# Patient Record
Sex: MALE | Race: WHITE | NOT HISPANIC OR LATINO
[De-identification: names, ages, dates, MRNs, and addresses within clinical notes are randomized per-mention and may not be internally consistent; named-entity substitution may affect disease eponyms.]

---

## 2021-04-19 ENCOUNTER — NON-APPOINTMENT (OUTPATIENT)
Age: 61
End: 2021-04-19

## 2021-04-23 ENCOUNTER — APPOINTMENT (OUTPATIENT)
Dept: DERMATOLOGY | Facility: CLINIC | Age: 61
End: 2021-04-23
Payer: MEDICAID

## 2021-04-23 ENCOUNTER — NON-APPOINTMENT (OUTPATIENT)
Age: 61
End: 2021-04-23

## 2021-04-23 PROCEDURE — 17003 DESTRUCT PREMALG LES 2-14: CPT | Mod: 59

## 2021-04-23 PROCEDURE — 99072 ADDL SUPL MATRL&STAF TM PHE: CPT

## 2021-04-23 PROCEDURE — 99203 OFFICE O/P NEW LOW 30 MIN: CPT | Mod: 25

## 2021-04-23 PROCEDURE — 17000 DESTRUCT PREMALG LESION: CPT | Mod: 59

## 2021-04-23 PROCEDURE — 11102 TANGNTL BX SKIN SINGLE LES: CPT | Mod: 59

## 2021-04-23 NOTE — ASSESSMENT
[FreeTextEntry1] : 1) benign findings as above- education\par \par 2) AKs\par diffuse\par plan to start efudex after bx results; use daily for 4 weeks; SED\par LN2 to 5 lesions on face and 2 on scalp\par Actinic keratoses as above\par -Treated with cryotherapy x 2, side effects discussed including erythema and scarring, blister formation expected by patient, total freeze time 4 seconds. Wound care reviewed withpatient. Risk of hypo/hyperpigmentation reviewed with patient, and possible need for re-treatment and / or future biopsy also reviewed with patient\par - total # treated- 7\par \par 3) Shave bx location left posterior scalp\par diagnosis: r/o SCC\par  \par Shave biopsy performed today over above location, risks and benefits discussed including incomplete removal, not enough tissue for diagnosis scarring and infection, informed consent obtained, pictures taken,  cleaned with alcohol and anesthetized with 1%lido+epi, 0.3 cc total, hemostasis obtained with cautery, vaseline and bandaid placed, tolerated well, wound care reviewed, specimen sent to pathology.\par \par

## 2021-04-23 NOTE — PHYSICAL EXAM
[FreeTextEntry3] : AAOx3, pleasant, NAD, no visual lymphadenopathy\par hair, scalp, face, nose, eyelids, ears, lips, oropharynx, neck, chest, abdomen, back, right arm, left arm, nails, and hands examined with all normal findings,\par pertinent findings include:\par \par multiple benign nevi and lentigines\par diffuse actinic damage on scalp\par scaling erythematous papules x5 on face\par verrucous plaque on left posterior scalp

## 2021-04-23 NOTE — HISTORY OF PRESENT ILLNESS
[FreeTextEntry1] : growths [de-identified] : 61 year old male with numerous growths. here for skin check.

## 2021-04-29 LAB — CORE LAB BIOPSY: NORMAL

## 2021-05-10 ENCOUNTER — APPOINTMENT (OUTPATIENT)
Dept: DERMATOLOGY | Facility: CLINIC | Age: 61
End: 2021-05-10
Payer: MEDICAID

## 2021-05-10 DIAGNOSIS — D04.4 CARCINOMA IN SITU OF SKIN OF SCALP AND NECK: ICD-10-CM

## 2021-05-10 PROCEDURE — 99072 ADDL SUPL MATRL&STAF TM PHE: CPT

## 2021-05-10 PROCEDURE — 14020 TIS TRNFR S/A/L 10 SQ CM/<: CPT

## 2021-05-10 NOTE — PROCEDURE
[___ mm] : [unfilled] mm [___ cm] : [unfilled] cm [___ ml of 1% lidocaine w/ 1:100,000 epinephrine] : [unfilled] ml of 1% lidocaine with 1:100,000 epinephrine [Pressure] : pressure [Electrodessication] : electrodessication [4-0] : 4-0 Nylon [____ cm] : [unfilled] cm [____ x ____ cm] : [unfilled] cm [____ days] : [unfilled] days [FreeTextEntry1] : Yunior Rai [FreeTextEntry7] : left posterior scalp [TWNoteComboBox1] : Squamous Cell Carcinoma In Situ [TWNoteComboBox4] : Squamous Cell Carcinoma In Situ [TWNoteComboBox3] : Subcutaneous fat [TWNoteComboBox5] : Subcutaneous fat

## 2021-05-24 ENCOUNTER — APPOINTMENT (OUTPATIENT)
Dept: DERMATOLOGY | Facility: CLINIC | Age: 61
End: 2021-05-24
Payer: MEDICAID

## 2021-05-24 ENCOUNTER — TRANSCRIPTION ENCOUNTER (OUTPATIENT)
Age: 61
End: 2021-05-24

## 2021-05-24 PROCEDURE — 11102 TANGNTL BX SKIN SINGLE LES: CPT | Mod: 79

## 2021-05-24 PROCEDURE — 99213 OFFICE O/P EST LOW 20 MIN: CPT | Mod: 24,25

## 2021-05-24 NOTE — ASSESSMENT
[FreeTextEntry1] : sutures removed\par results discussed\par healing well\par \par AKs\par discussed using efudex; SED\par \par SK\par education\par \par Shave bx location left forearm\par diagnosis: r/o DN\par  \par Shave biopsy performed today over above location, risks and benefits discussed including incomplete removal, not enough tissue for diagnosis scarring and infection, informed consent obtained, pictures taken,  cleaned with alcohol and anesthetized with 1%lido+epi, 0.3 cc total, hemostasis obtained with cautery, vaseline and bandaid placed, tolerated well, wound care reviewed, specimen sent to pathology.\par \par

## 2021-05-24 NOTE — HISTORY OF PRESENT ILLNESS
[FreeTextEntry1] : suture removal [de-identified] : Patient here for suture removal. no issues with site. healing well.\par also discussion of AKs and growth on left forearm.

## 2021-05-25 LAB — CORE LAB BIOPSY: NORMAL

## 2021-11-22 ENCOUNTER — APPOINTMENT (OUTPATIENT)
Dept: DERMATOLOGY | Facility: CLINIC | Age: 61
End: 2021-11-22

## 2021-12-27 ENCOUNTER — APPOINTMENT (OUTPATIENT)
Dept: DERMATOLOGY | Facility: CLINIC | Age: 61
End: 2021-12-27
Payer: MEDICAID

## 2021-12-27 DIAGNOSIS — L82.1 OTHER SEBORRHEIC KERATOSIS: ICD-10-CM

## 2021-12-27 PROCEDURE — 99214 OFFICE O/P EST MOD 30 MIN: CPT | Mod: 25

## 2021-12-27 PROCEDURE — 17003 DESTRUCT PREMALG LES 2-14: CPT

## 2021-12-27 PROCEDURE — 17000 DESTRUCT PREMALG LESION: CPT

## 2021-12-27 RX ORDER — FLUOROURACIL 50 MG/G
5 CREAM TOPICAL
Qty: 1 | Refills: 1 | Status: DISCONTINUED | COMMUNITY
Start: 2021-04-23 | End: 2021-12-27

## 2022-06-27 ENCOUNTER — APPOINTMENT (OUTPATIENT)
Dept: DERMATOLOGY | Facility: CLINIC | Age: 62
End: 2022-06-27
Payer: MEDICAID

## 2022-06-27 VITALS — HEIGHT: 70 IN | BODY MASS INDEX: 31.5 KG/M2 | WEIGHT: 220 LBS

## 2022-06-27 DIAGNOSIS — Z12.83 ENCOUNTER FOR SCREENING FOR MALIGNANT NEOPLASM OF SKIN: ICD-10-CM

## 2022-06-27 DIAGNOSIS — D22.9 MELANOCYTIC NEVI, UNSPECIFIED: ICD-10-CM

## 2022-06-27 DIAGNOSIS — Z86.007 PERSONAL HISTORY OF IN-SITU NEOPLASM OF SKIN: ICD-10-CM

## 2022-06-27 DIAGNOSIS — L30.4 ERYTHEMA INTERTRIGO: ICD-10-CM

## 2022-06-27 DIAGNOSIS — D48.5 NEOPLASM OF UNCERTAIN BEHAVIOR OF SKIN: ICD-10-CM

## 2022-06-27 DIAGNOSIS — L82.0 INFLAMED SEBORRHEIC KERATOSIS: ICD-10-CM

## 2022-06-27 DIAGNOSIS — L81.4 OTHER MELANIN HYPERPIGMENTATION: ICD-10-CM

## 2022-06-27 DIAGNOSIS — Z02.82 ENCOUNTER FOR ADOPTION SERVICES: ICD-10-CM

## 2022-06-27 DIAGNOSIS — Z87.891 PERSONAL HISTORY OF NICOTINE DEPENDENCE: ICD-10-CM

## 2022-06-27 PROCEDURE — 11102 TANGNTL BX SKIN SINGLE LES: CPT | Mod: 59

## 2022-06-27 PROCEDURE — 99214 OFFICE O/P EST MOD 30 MIN: CPT | Mod: 25

## 2022-06-27 PROCEDURE — 17110 DESTRUCTION B9 LES UP TO 14: CPT | Mod: 59

## 2022-06-27 PROCEDURE — 11103 TANGNTL BX SKIN EA SEP/ADDL: CPT | Mod: 59

## 2022-06-27 PROCEDURE — 17003 DESTRUCT PREMALG LES 2-14: CPT | Mod: 59

## 2022-06-27 PROCEDURE — 17000 DESTRUCT PREMALG LESION: CPT | Mod: 59

## 2022-06-27 NOTE — PHYSICAL EXAM
[FreeTextEntry3] : AAOx3, pleasant, NAD, no visual lymphadenopathy\par hair, scalp, face, nose, eyelids, ears, lips, oropharynx, neck, chest, abdomen, back, right arm, left arm, nails, and hands examined with all normal findings,\par pertinent findings include:\par \par multiple benign nevi and lentigines\par scaling erythematous papules x4 on scalp\par + inflamed, waxy, keratotic papule on right lower back\par papules on right upper back and mid back

## 2022-06-27 NOTE — ASSESSMENT
[FreeTextEntry1] : 1) benign findings as above- education\par \par 2) ISK\par The specified lesions were treated with liquid nitrogen cryotherapy.  Discussed risks including pain, blistering, crusting, discoloration, recurrence.\par \par 3) Actinic keratoses as above\par -Treated with cryotherapy x 2, side effects discussed including erythema and scarring, blister formation expected by patient, total freeze time 4 seconds. Wound care reviewed withpatient. Risk of hypo/hyperpigmentation reviewed with patient, and possible need for re-treatment and / or future biopsy also reviewed with patient\par - total # treated- 4\par \par 4) Shave bx location mid back, right upper back\par diagnosis: r/o IDN\par  \par Shave biopsy performed today over above location, risks and benefits discussed including incomplete removal, not enough tissue for diagnosis scarring and infection, informed consent obtained, pictures taken,  cleaned with alcohol and anesthetized with 1%lido+epi, 0.3 cc total, hemostasis obtained with monsels, vaseline and bandaid placed, tolerated well, wound care reviewed, specimen sent to pathology.\par \par 5) intertrigo\par keto pRN; SED\par

## 2022-06-27 NOTE — HISTORY OF PRESENT ILLNESS
[FreeTextEntry1] : growth [de-identified] : 62 year old male here with growths on back. here for skin check. and jock itch.

## 2022-09-21 ENCOUNTER — EMERGENCY (EMERGENCY)
Facility: HOSPITAL | Age: 62
LOS: 1 days | Discharge: LEFT BEFORE DISCHARGE | End: 2022-09-21
Attending: EMERGENCY MEDICINE
Payer: MEDICAID

## 2022-09-21 ENCOUNTER — OFFICE (OUTPATIENT)
Dept: URBAN - METROPOLITAN AREA CLINIC 102 | Facility: CLINIC | Age: 62
Setting detail: OPHTHALMOLOGY
End: 2022-09-21
Payer: COMMERCIAL

## 2022-09-21 VITALS
HEART RATE: 78 BPM | DIASTOLIC BLOOD PRESSURE: 83 MMHG | SYSTOLIC BLOOD PRESSURE: 150 MMHG | TEMPERATURE: 98 F | OXYGEN SATURATION: 97 % | WEIGHT: 214.95 LBS | HEIGHT: 70 IN | RESPIRATION RATE: 16 BRPM

## 2022-09-21 VITALS
DIASTOLIC BLOOD PRESSURE: 84 MMHG | RESPIRATION RATE: 18 BRPM | HEART RATE: 73 BPM | OXYGEN SATURATION: 98 % | SYSTOLIC BLOOD PRESSURE: 134 MMHG | TEMPERATURE: 98 F

## 2022-09-21 DIAGNOSIS — H25.13: ICD-10-CM

## 2022-09-21 DIAGNOSIS — H53.40: ICD-10-CM

## 2022-09-21 LAB
ALBUMIN SERPL ELPH-MCNC: 4.9 G/DL — SIGNIFICANT CHANGE UP (ref 3.3–5)
ALP SERPL-CCNC: 68 U/L — SIGNIFICANT CHANGE UP (ref 40–120)
ALT FLD-CCNC: 28 U/L — SIGNIFICANT CHANGE UP (ref 10–45)
ANION GAP SERPL CALC-SCNC: 13 MMOL/L — SIGNIFICANT CHANGE UP (ref 5–17)
AST SERPL-CCNC: 19 U/L — SIGNIFICANT CHANGE UP (ref 10–40)
BASOPHILS # BLD AUTO: 0.04 K/UL — SIGNIFICANT CHANGE UP (ref 0–0.2)
BASOPHILS NFR BLD AUTO: 0.4 % — SIGNIFICANT CHANGE UP (ref 0–2)
BILIRUB SERPL-MCNC: 0.5 MG/DL — SIGNIFICANT CHANGE UP (ref 0.2–1.2)
BUN SERPL-MCNC: 10 MG/DL — SIGNIFICANT CHANGE UP (ref 7–23)
CALCIUM SERPL-MCNC: 10.1 MG/DL — SIGNIFICANT CHANGE UP (ref 8.4–10.5)
CHLORIDE SERPL-SCNC: 100 MMOL/L — SIGNIFICANT CHANGE UP (ref 96–108)
CO2 SERPL-SCNC: 25 MMOL/L — SIGNIFICANT CHANGE UP (ref 22–31)
CREAT SERPL-MCNC: 1.02 MG/DL — SIGNIFICANT CHANGE UP (ref 0.5–1.3)
CRP SERPL-MCNC: <3 MG/L — SIGNIFICANT CHANGE UP (ref 0–4)
EGFR: 82 ML/MIN/1.73M2 — SIGNIFICANT CHANGE UP
EOSINOPHIL # BLD AUTO: 0.08 K/UL — SIGNIFICANT CHANGE UP (ref 0–0.5)
EOSINOPHIL NFR BLD AUTO: 0.8 % — SIGNIFICANT CHANGE UP (ref 0–6)
GLUCOSE SERPL-MCNC: 111 MG/DL — HIGH (ref 70–99)
HCT VFR BLD CALC: 47.8 % — SIGNIFICANT CHANGE UP (ref 39–50)
HGB BLD-MCNC: 15.9 G/DL — SIGNIFICANT CHANGE UP (ref 13–17)
IMM GRANULOCYTES NFR BLD AUTO: 0.4 % — SIGNIFICANT CHANGE UP (ref 0–0.9)
LYMPHOCYTES # BLD AUTO: 1.62 K/UL — SIGNIFICANT CHANGE UP (ref 1–3.3)
LYMPHOCYTES # BLD AUTO: 17.1 % — SIGNIFICANT CHANGE UP (ref 13–44)
MCHC RBC-ENTMCNC: 28.8 PG — SIGNIFICANT CHANGE UP (ref 27–34)
MCHC RBC-ENTMCNC: 33.3 GM/DL — SIGNIFICANT CHANGE UP (ref 32–36)
MCV RBC AUTO: 86.4 FL — SIGNIFICANT CHANGE UP (ref 80–100)
MONOCYTES # BLD AUTO: 0.59 K/UL — SIGNIFICANT CHANGE UP (ref 0–0.9)
MONOCYTES NFR BLD AUTO: 6.2 % — SIGNIFICANT CHANGE UP (ref 2–14)
NEUTROPHILS # BLD AUTO: 7.09 K/UL — SIGNIFICANT CHANGE UP (ref 1.8–7.4)
NEUTROPHILS NFR BLD AUTO: 75.1 % — SIGNIFICANT CHANGE UP (ref 43–77)
NRBC # BLD: 0 /100 WBCS — SIGNIFICANT CHANGE UP (ref 0–0)
PLATELET # BLD AUTO: 154 K/UL — SIGNIFICANT CHANGE UP (ref 150–400)
POTASSIUM SERPL-MCNC: 4.2 MMOL/L — SIGNIFICANT CHANGE UP (ref 3.5–5.3)
POTASSIUM SERPL-SCNC: 4.2 MMOL/L — SIGNIFICANT CHANGE UP (ref 3.5–5.3)
PROT SERPL-MCNC: 8 G/DL — SIGNIFICANT CHANGE UP (ref 6–8.3)
RBC # BLD: 5.53 M/UL — SIGNIFICANT CHANGE UP (ref 4.2–5.8)
RBC # FLD: 13.6 % — SIGNIFICANT CHANGE UP (ref 10.3–14.5)
SARS-COV-2 RNA SPEC QL NAA+PROBE: SIGNIFICANT CHANGE UP
SODIUM SERPL-SCNC: 138 MMOL/L — SIGNIFICANT CHANGE UP (ref 135–145)
WBC # BLD: 9.46 K/UL — SIGNIFICANT CHANGE UP (ref 3.8–10.5)
WBC # FLD AUTO: 9.46 K/UL — SIGNIFICANT CHANGE UP (ref 3.8–10.5)

## 2022-09-21 PROCEDURE — 85025 COMPLETE CBC W/AUTO DIFF WBC: CPT

## 2022-09-21 PROCEDURE — 70496 CT ANGIOGRAPHY HEAD: CPT | Mod: 26,MG

## 2022-09-21 PROCEDURE — 70498 CT ANGIOGRAPHY NECK: CPT | Mod: 26,MG

## 2022-09-21 PROCEDURE — 99285 EMERGENCY DEPT VISIT HI MDM: CPT

## 2022-09-21 PROCEDURE — 92133 CPTRZD OPH DX IMG PST SGM ON: CPT | Performed by: OPHTHALMOLOGY

## 2022-09-21 PROCEDURE — U0005: CPT

## 2022-09-21 PROCEDURE — 70496 CT ANGIOGRAPHY HEAD: CPT | Mod: MG

## 2022-09-21 PROCEDURE — 92083 EXTENDED VISUAL FIELD XM: CPT | Performed by: OPHTHALMOLOGY

## 2022-09-21 PROCEDURE — 70498 CT ANGIOGRAPHY NECK: CPT | Mod: MG

## 2022-09-21 PROCEDURE — 85652 RBC SED RATE AUTOMATED: CPT

## 2022-09-21 PROCEDURE — 86140 C-REACTIVE PROTEIN: CPT

## 2022-09-21 PROCEDURE — 80053 COMPREHEN METABOLIC PANEL: CPT

## 2022-09-21 PROCEDURE — 92020 GONIOSCOPY: CPT | Performed by: OPHTHALMOLOGY

## 2022-09-21 PROCEDURE — 92004 COMPRE OPH EXAM NEW PT 1/>: CPT | Performed by: OPHTHALMOLOGY

## 2022-09-21 PROCEDURE — G1004: CPT

## 2022-09-21 PROCEDURE — U0003: CPT

## 2022-09-21 PROCEDURE — 99284 EMERGENCY DEPT VISIT MOD MDM: CPT | Mod: 25

## 2022-09-21 PROCEDURE — 70450 CT HEAD/BRAIN W/O DYE: CPT | Mod: MG

## 2022-09-21 ASSESSMENT — TONOMETRY
OS_IOP_MMHG: 20
OD_IOP_MMHG: 19

## 2022-09-21 ASSESSMENT — CONFRONTATIONAL VISUAL FIELD TEST (CVF)
OD_FINDINGS: FULL
OS_FINDINGS: FULL

## 2022-09-21 NOTE — ED PROVIDER NOTE - NSFOLLOWUPCLINICS_GEN_ALL_ED_FT
[facility] Internal [facility]  General Internal Medicine  [address] [place] [address]  Phone: [phone]  Fax:     [facility] [place]  Internal Medicine  [address] [place] [address]  Phone: [phone]  Fax: [phone]

## 2022-09-21 NOTE — ED PROVIDER NOTE - CARE PROVIDER_API CALL
Todd Turpin)  Neurosurgery  [address] [place] [address]  Phone: [phone]  Fax: [phone]  Follow Up Time: 4-6 Days

## 2022-09-21 NOTE — CONSULT NOTE ADULT - NSCONSULTADDITIONALINFOA_GEN_ALL_CORE
ATTENDING ADDENDUM  Patient left the hospital AMA prior to case being seen or discussed with me. After review of the resident note agreement with assessment and plan. Please call neurology back if patient returns to the hospital for further evaluation/intervention.

## 2022-09-21 NOTE — ED PROVIDER NOTE - PATIENT PORTAL LINK FT
You can access the FollowMyHealth Patient Portal offered by [facility] by registering at the following website: [email or web address]. By joining [employer]’s FollowMyHealth portal, you will also be able to view your health information using other applications (apps) compatible with our system.

## 2022-09-21 NOTE — ED ADULT NURSE NOTE - NS ED NURSE LEVEL OF CONSCIOUSNESS ORIENTATION
[facility]    Neurology Consultation    Jagdeep Walker MRN# [id number]   YOB: 1970 Age: 51 year old    Code Status:Full Code   Date of Admission: 6/14/2022  Date of service:06/27/2022                                                                                       Assessment and Plan:                                         #.  Encephalopathy-most likely metabolic in origin given the hypernatraemia and intercurrent medical problems.  His lethargy has improved today, suggesting that correction of medical/pulmonary factors is resolving the issue.  However, he does have considerable ongoing dysarthria.    Continue to correct hypernatremia.  MR brain to r/o stroke  If MR brain is negative recommend speech therapies     Radha Cooper MD  [facility] Neurology  Office Phone [phone]    Reason for consult: as above       Chief Complaint:   Lethargy, slurred speech  History is obtained from the patient / chart       History of Present Illness:   This patient is a 51 year old male who presents with increasing lethargy and slurred speech over the last 2 days.    He has a history of longstanding mental disability/schizoaffective disorder for which he lives in a group home.  He also carries a diagnosis of seizure disorder which has been relatively inactive, this condition started when he was approximately 16.  He is followed by Dr. Flaherty from [place] [facility].  Several years ago he underwent a inpatient evaluation with withdrawal of medication no seizures were observed.  He was advised to stay on Depakote and topiramate long-term.  He has been quite ill since late April 2022, requiring several admissions for acute pancreatitis, occlusive portal vein thrombosis.  He was seen by my general neurology colleagues during his more recent admission 6/4/2022 when he was diagnosed with metabolic encephalopathy.    An EEG done at that time revealed mild slowing.  No seizure activity was  observed.  He was found to have a slightly low zinc level.  He was readmitted 6/14, from TCU because of shortness of breath.  Diagnosed with left pleural effusion which is being managed by thoracic surgery, infectious disease services.  For management of pancreatitis, he is on [place] feeding tube.  With plan ERCP next week.    No recurrent spells of seizures  Speech is dysarthric         Past Medical History:     Past Medical History:   Diagnosis Date     Anxiety      Fisher esophagus      Benign essential hypertension      Bipolar disorder (H)      Depression      Obesity      Pancreatitis      Portal vein thrombosis      Schizoaffective disorder, bipolar type (H)      Seizure disorder (H)      Thyroid disease          Past Surgical History:     Past Surgical History:   Procedure Laterality Date     ENDOSCOPIC RETROGRADE CHOLANGIOPANCREATOGRAM COMPLEX N/A 6/21/2022    Procedure: ENDOSCOPIC RETROGRADE CHOLANGIOPANCREATOGRAPHY, COMPLEX;  Surgeon: Massimo Clark MD;  Location:  OR     ENDOSCOPIC ULTRASOUND UPPER GASTROINTESTINAL TRACT (GI) N/A 5/12/2022    Procedure: ENDOSCOPIC ULTRASOUND, ESOPHAGOSCOPY / UPPER GASTROINTESTINAL TRACT (GI);  Surgeon: Massimo Clark MD;  Location:  GI     ESOPHAGOSCOPY, GASTROSCOPY, DUODENOSCOPY (EGD), COMBINED N/A 5/12/2022    Procedure: ESOPHAGOGASTRODUODENOSCOPY, WITH BIOPSY;  Surgeon: Massimo Clark MD;  Location:  GI     IR CHEST TUBE PLACEMENT NON-TUNNELED RIGHT  6/18/2022     IR CHEST TUBE REPOSITION/REPLACEMENT NON-TUNNELED RIGHT  6/22/2022          Social History:     Social History     Socioeconomic History     Marital status: Single     Spouse name: None     Number of children: None     Years of education: None     Highest education level: None   Tobacco Use     Smoking status: Never Smoker     Smokeless tobacco: Never Used   Vaping Use     Vaping Use: Never used   Substance and Sexual Activity     Alcohol use: Not Currently     Comment: Occasional      Drug use: Never     Patient denies smoking, no significant alcohol intake, denies illicit drugs use lives in a group home      Family History:     Family History   Problem Relation Age of Onset     Cerebrovascular Disease Mother 76     Prostate Cancer Father      Reviewed and not felt to be contributory.        Home Medications:     Prior to Admission Medications   Prescriptions Last Dose Informant Patient Reported? Taking?   LORazepam (ATIVAN) 2 MG tablet prn med  No Yes   Sig: Take 1 tablet (2 mg) by mouth daily as needed for seizures Give bucally   Vitamin D (Cholecalciferol) 50 MCG (2000 UT) CAPS 6/14/2022 at Unknown time  Yes Yes   Sig: Take 2,000 Units by mouth daily   acetaminophen (TYLENOL) 325 MG tablet prn med  No Yes   Sig: Take 2 tablets (650 mg) by mouth every 6 hours as needed for mild pain   albuterol (PROAIR HFA/PROVENTIL HFA/VENTOLIN HFA) 108 (90 Base) MCG/ACT inhaler prn med  Yes Yes   Sig: Inhale 2 puffs into the lungs every 6 hours as needed for shortness of breath / dyspnea or wheezing   amylase-lipase-protease (CREON 36) 371250-08340-740691 units CPEP 6/14/2022 at x3  No Yes   Sig: Take 2 capsules by mouth 3 times daily (with meals)   atenolol (TENORMIN) 25 MG tablet 6/14/2022 at Unknown time  Yes Yes   Sig: Take 25 mg by mouth daily   calcium carbonate 600 mg-vitamin D 400 units (CALTRATE) 600-400 MG-UNIT per tablet 6/14/2022 at Unknown time  Yes Yes   Sig: Take 1 tablet by mouth daily (with breakfast)   calcium polycarbophil (FIBERCON) 625 MG tablet 6/14/2022 at am  Yes Yes   Sig: Take 1 tablet by mouth 2 times daily   carboxymethylcellulose PF (REFRESH LIQUIGEL) 1 % ophthalmic gel 6/13/2022 at Unknown time  Yes Yes   Sig: Place 1 drop into the right eye At Bedtime   carboxymethylcellulose PF (REFRESH PLUS) 0.5 % ophthalmic solution prn med  Yes Yes   Sig: Place 1 drop into both eyes daily as needed for dry eyes   chlorhexidine (PERIDEX) 0.12 % solution 6/14/2022 at Unknown time  Yes Yes    Sig: Swish and spit 15 mLs in mouth daily   ciclopirox (LOPROX) 0.77 % cream Not Taking at Unknown time  Yes No   Sig: Apply topically nightly as needed   Patient not taking: Reported on 6/14/2022   clonazePAM (KLONOPIN) 0.125 MG TBDP ODT tab prn med  No Yes   Sig: Take 1 tablet (0.125 mg) by mouth daily as needed for anxiety   dicyclomine (BENTYL) 20 MG tablet 6/14/2022 at x3  No Yes   Sig: Take 1 tablet (20 mg) by mouth 4 times daily (before meals and nightly)   divalproex sodium delayed-release (DEPAKOTE) 250 MG DR tablet 6/13/2022 at Unknown time  No Yes   Sig: Take 1 tablet (250 mg) by mouth At Bedtime   divalproex sodium delayed-release (DEPAKOTE) 500 MG DR tablet 6/14/2022 at x2  No Yes   Sig: Take 1 tablet (500 mg) by mouth 2 times daily With AM meds and early afternoon around 1500.   docusate sodium (COLACE) 100 MG capsule 6/14/2022 at Unknown time  Yes Yes   Sig: Take 100 mg by mouth daily   hydrocortisone (CORTAID) 1 % external cream Not Taking at Unknown time  Yes No   Sig: Apply topically 2 times daily   Patient not taking: Reported on 6/14/2022   ipratropium (ATROVENT) 0.06 % nasal spray 6/14/2022 at x2  Yes Yes   Sig: Spray 2 sprays into both nostrils 3 times daily   ketoconazole (NIZORAL) 2 % external cream prn med  Yes Yes   Sig: Apply topically 2 times daily as needed for itching R foot   levOCARNitine (CARNITOR) 330 MG tablet 6/14/2022 at x2  Yes Yes   Sig: Take by mouth 3 times daily   levocetirizine (XYZAL) 5 MG tablet 6/13/2022 at Unknown time  Yes Yes   Sig: Take 5 mg by mouth every evening   levothyroxine (SYNTHROID/LEVOTHROID) 50 MCG tablet 6/14/2022 at Unknown time  Yes Yes   Sig: Take 50 mcg by mouth daily   loperamide (IMODIUM) 2 MG capsule prn med  Yes Yes   Sig: Take 2 mg by mouth 4 times daily as needed for diarrhea   montelukast (SINGULAIR) 10 MG tablet 6/13/2022 at Unknown time  Yes Yes   Sig: Take 10 mg by mouth At Bedtime   multivitamin, therapeutic (THERA-VIT) TABS tablet  6/14/2022 at Unknown time  Yes Yes   Sig: Take 1 tablet by mouth daily   omeprazole 20 MG tablet 6/14/2022 at am  Yes Yes   Sig: Take 20 mg by mouth 2 times daily   polyethylene glycol-propylene glycol (SYSTANE ULTRA) 0.4-0.3 % SOLN ophthalmic solution prn med  Yes Yes   Sig: Place 2 drops into both eyes daily as needed for dry eyes   prednisoLONE acetate (PRED FORTE) 1 % ophthalmic suspension 6/14/2022 at Unknown time  Yes Yes   Sig: Place 1 drop into the right eye daily   risperiDONE (RISPERDAL) 2 MG tablet 6/14/2022 at am  Yes Yes   Sig: Take 2 mg by mouth 2 times daily   sertraline (ZOLOFT) 100 MG tablet 6/14/2022 at Unknown time  Yes Yes   Sig: Take 100 mg by mouth daily   topiramate (TOPAMAX) 100 MG tablet 6/13/2022 at Unknown time  Yes Yes   Sig: Take 100 mg by mouth At Bedtime      Facility-Administered Medications: None          Allergy:   No Known Allergies       Inpatient Medications:   Scheduled Meds:    albumin human  25 g Intravenous Q8H     [Held by provider] amylase-lipase-protease  1-2 capsule Per Feeding Tube Q4H     atenolol  25 mg Oral or NG Tube Daily     carboxymethylcellulose PF  1 drop Right Eye At Bedtime     ipratropium  2 spray Both Nostrils TID     levOCARNitine  330 mg Oral or NG Tube TID     levocetirizine  5 mg Oral or NG Tube QPM     levothyroxine  50 mcg Oral or NG Tube Daily     montelukast  10 mg Oral or NG Tube At Bedtime     pantoprazole  40 mg Oral or Feeding Tube BID     prednisoLONE acetate  1 drop Right Eye Daily     protein modular  1 packet Per Feeding Tube TID     risperiDONE  2 mg Oral or NG Tube BID     sertraline  100 mg Oral or NG Tube Daily     [Held by provider] sodium bicarbonate  325 mg Per Feeding Tube Q4H     sodium chloride (PF)  3 mL Intracatheter Q8H     topiramate  100 mg Oral or NG Tube At Bedtime     [Held by provider] valproic acid  250 mg Oral or NG Tube At Bedtime     [Held by provider] valproic acid  500 mg Oral or NG Tube BID     zinc sulfate  220 mg  Oral or NG Tube Daily     PRN Meds: acetaminophen, acetaminophen **OR** acetaminophen, albuterol, calcium carbonate, carboxymethylcellulose PF, dextrose, HYDROmorphone, hyoscyamine ER, hypromellose-dextran, lidocaine 4%, lidocaine (buffered or not buffered), melatonin, naloxone **OR** naloxone **OR** naloxone **OR** naloxone, ondansetron **OR** ondansetron, oxyCODONE, polyethylene glycol, prochlorperazine **OR** prochlorperazine **OR** prochlorperazine, senna-docusate, sodium chloride (PF)        Review of Systems    Otherwise noncontributory  INTEGUMENTARY/SKIN: no rash or obvious new lesions  ENT/MOUTH: Nasojejunal feeding tube in place, facemask in place for oxygen  RESP: Chest tube in place  CV: negative for chest pain, palpitations or peripheral edema  GI: Dependent on NG feeding tubes  : Catheterized  MUSCULOSKELETAL: no myalgias, arthralgias or join efffusion  ENDOCRINE: Negative  PSYCHIATRIC: History of schizoaffective disorder  LYMPHATIC: no new lymphadenopathy  HEME: no bleeding or easy bruisability  NEURO: see HPI       Physical Exam:   Physical Exam   Vitals:  Height:Data Unavailable  Weight:181 lbs 11.2 oz   Temp: 98.2  F (36.8  C) Temp src: Oral BP: 109/60 Pulse: 57   Resp: 13 SpO2: 93 % O2 Device: None (Room air) Oxygen Delivery: 2 LPM  General Appearance:  No acute distress  Neuro:       Mental Status Exam:   Alert. he is oriented to June/year , his age.   Communication is difficult as he is severely dysarthric.  He is able to follow all motor commands without difficulty       Cranial Nerves:    Extraocular movements intact, no gaze preference.  Mild facial weakness-  Hearing intact, Tongue midline.         Motor:   Able to move all four limbs against gravity       Reflexes: Symmetrically intact, Plantar signs normal       Sensory: Vibration and light touch intact                      Gait: Unable         Data:   ROUTINE IP LABS   CBC RESULTS:     Recent Labs   Lab 06/27/22  0648 06/25/22  2899  06/25/22  0540 06/22/22  0738 06/21/22  0704   WBC 5.5 6.4  --   --  5.4   RBC 4.05* 4.21*  --   --  4.06*   HGB 12.3* 13.0*  --   --  12.6*   HCT 39.3* 40.9  --   --  39.7*    158 157   < > 138*    < > = values in this interval not displayed.     Basic Metabolic Panel:   Recent Labs   Lab Test 06/27/22  1221 06/27/22  1114 06/27/22  0842 06/27/22  0648 06/27/22  0249 06/26/22  0131 06/25/22  2234 06/25/22  0757 06/25/22  0540 06/22/22  1717 06/22/22  0738 06/21/22  1923 06/21/22  0704   *  --   --  149*  149* 149*   < > 154*  --   --   --   --   --  144   POTASSIUM  --   --   --  3.7  --   --  3.7  --   --   --  3.8  --  3.9   CHLORIDE  --   --   --  113*  --   --  117*  --   --   --   --   --  116*   CO2  --   --   --  33*  --   --  33*  --   --   --   --   --  26   BUN  --   --   --  35*  --   --  33*  --   --   --   --   --  17   CR  --   --   --  0.82  --   --  0.86  --  0.88  --  0.76  --  0.67   GLC  --  166* 144* 176*  --   --  163*   < >  --    < >  --    < > 72   NASIR  --   --   --  9.1  --   --  9.1  --   --   --   --   --  8.8    < > = values in this interval not displayed.     Liver panel:  Recent Labs   Lab Test 06/27/22  0648 06/21/22  0704 06/20/22  0715 06/18/22 2041 06/17/22  1035 06/14/22  1920   PROTTOTAL 5.4* 4.6* 5.0* 4.8* 5.1* 5.4*   ALBUMIN 1.8* 1.7* 1.9* 2.2*  --  2.4*   BILITOTAL 1.7* 0.5 0.5 0.6  --  0.6   ALKPHOS 144 51 72 56  --  75   AST 67* 23 20 23  --  21   ALT 42 12 15 24  --  18     Lipid Profile:  Recent Labs   Lab Test 05/09/22  1415   TRIG 118     Thyroid Panel:  Recent Labs   Lab Test 06/04/22  0754 05/08/22  0917 04/28/22  0016   TSH 3.42 2.16 1.63      Vitamin B12:   Recent Labs   Lab Test 06/06/22  1643 04/28/22  1034   B12 693 [id number]           IMAGING:     CT head: Last CT 6/4/2022-no acute change- attenuation changes consistent with small vessel ischemic disease.      Radha Cooper MD  [facility] Neu[facility]  Office Phone [phone]                Oriented - self; Oriented - place; Oriented - time

## 2022-09-21 NOTE — ED PROVIDER NOTE - NSFOLLOWUPINSTRUCTIONS_ED_ALL_ED_FT
You were found to have brain edema on your CT scan, and likely have an underlying mass that requires brain MRI and body CTs and further work-up.     You have decided to leave against our medical advice.    Please follow up with your primary care physician within 24 hours - take copies of your results.      Please follow up with neurosurgeon Dr. Turpin within 1 week. Call to make an appointment.    Return to the Emergency Department for worsening or persistent symptoms, and/or ANY NEW OR CONCERNING SYMPTOMS, including headache, vision changes, new weakness/numbness/tingling. If you have issues obtaining follow up, please call: [phone] to obtain a doctor or specialist who takes your insurance in your area.

## 2022-09-21 NOTE — ED PROVIDER NOTE - PROGRESS NOTE DETAILS
Abdelrahman, PGY4 - Discussed with pt's ophtho Dr. Yoli Mireles [phone]/ home ([phone]), confirmed that pt has hemianopic defect on visual field testing, eye exam otherwise normal; Dr. Mireles requesting call back with results if possible, understands that will likely take several hours, states she can be reached at her home number listed above. Neuro paged Abdelrahman, PGY4 - CT c/f for L brain mass with vasogenic edema. Pt seen by neuro/nsgy, recommended admission for further w/u of likely underlying malignancy. Pt wishes to leave against AMA, states he cannot change for further CTs or MRI, as he needs to care for his 98 year old mother at home.    The patient wishes to be discharged against medical advice. I have assessed the patient's mental status and  the patient has capacity to make this decision (has remained neuro intact). I have explained the risks of leaving without full treatment, including severe disability and death, which the patient understands and is willing to accept. I have answered all of the patient's questions. I reiterated my medical opinion and advised the patient to return at any time. We discussed the further workup outside of the current visit and return precautions. Abdelrahman, PGY4 - CT c/f for L brain mass with vasogenic edema. Pt seen by neuro/nsgy, recommended admission for further w/u of likely underlying malignancy. Pt wishes to leave against AMA, states he cannot change for further CTs or MRI, as he needs to care for his 98 year old mother at home.    The patient wishes to be discharged against medical advice. I have assessed the patient's mental status and  the patient has capacity to make this decision (has remained neuro intact). I have explained the risks of leaving without full treatment, including severe disability and death, which the patient understands and is willing to accept. I have answered all of the patient's questions. I reiterated my medical opinion and advised the patient to return at any time. We discussed the further workup outside of the current visit and return precautions.    Attending Statement: Agree with the above.  Patient understands he likely has a brain mass.  This was explained to him at length by myself, Dr. Quick, and the neurology and NSG residents.  He was counseled at length that the most prudent course would be admission for further workup and likely biopsy but he declines stating his mother is 98 and relies on him for her care.  He was given multiple alternative solutions, including the ED case manager (his friend) to stay with his mother.  He still declines admission.  He understands that if left untreated this will likely cause worsening vision changes, headaches, likely seizures, and possibly paralysis and/or death.  He was given close f/u with both neuro and NSG, Rx for AED and steroids, and extensive strict verbal and written return precautions.  He demonstrated capacity to make the decision to leave against medical advice and was able to articulate back to me my concerns as written above.  --BMM

## 2022-09-21 NOTE — CONSULT NOTE ADULT - ASSESSMENT
Patient PD is a 63yo M no PMHx who presents for several days of forehead headache and 2 days of R visual field of R eye blurriness for which he went to ophthalmologist and was found with VF defect and told to come to hospital. Otherwise denies double vision, speech changes, facial droop, numbness/weakness of extremities or gait difficulty. Of note, his brother and father had recently passed away and his mother is alone at home. Here CT imaging showed L parietal temporal vasogenic edema w/ abnormal masslike enhancement concerning for malignancy primary or metastatic. No prior colonoscopy screening or formal skin screening. Unsure of fhx. Patient needed to leave home and signed out AMA, promising to return in AM for further diagnostic workup and care.     Impression: Mild RHH secondary to L temporo/parietal vasogenic edema w/ possible underlying lesion. Etiology unknown. *Patient planning to leave AMA due to social factors.     - MR brain, c/t/l spine w/w/o contrast  - CT c/a/p w/ w/o con   - Neurosurgery consult  - Steroids per nsurg  - At this time will defer starting aspirin pending MR brain  - Monitor CBC, BMP  - BG <180  - BP goal normotension given onset of symptoms >48hrs ago  - DVT ppx  - PT/OT    To be discussed with neurology attending and will be formally staffed by attending during morning rounds. Recommendations will be finalized once signed by attending.   Patient PD is a 63yo M no PMHx who presents for several days of forehead headache and 2 days of R visual field of R eye blurriness for which he went to ophthalmologist and was found with VF defect and told to come to hospital. Otherwise denies double vision, speech changes, facial droop, numbness/weakness of extremities or gait difficulty. Of note, his brother and father had recently passed away and his mother is alone at home. Here CT imaging showed L parietal temporal vasogenic edema w/ abnormal masslike enhancement concerning for malignancy primary or metastatic. No prior colonoscopy screening or formal skin screening. Unsure of fhx. Patient needed to leave home and signed out AMA, promising to return in AM for further diagnostic workup and care.     Impression: Mild RHH secondary to L temporo/parietal vasogenic edema w/ possible underlying lesion. Etiology unknown. *Patient planning to leave AMA due to social factors.     - MR brain w/ w/o con  - CT c/a/p w/ w/o con   - Neurosurgery consult  - Steroids per nsurg  - At this time will defer starting aspirin pending MR brain  - Monitor CBC, BMP  - BG <180  - BP goal normotension given onset of symptoms >48hrs ago  - DVT ppx  - PT/OT    To be discussed with neurology attending and will be formally staffed by attending during morning rounds. Recommendations will be finalized once signed by attending.

## 2022-09-21 NOTE — ED PROVIDER NOTE - CLINICAL SUMMARY MEDICAL DECISION MAKING FREE TEXT BOX
Abdelrahman, PGY4 - 62M no PMH p/w headache (resolved) and R partial visual field blurriness x 4 day, found to have hemianopic defect on outpatient ophtho testing today. Neuro exam non-focal, unable to elicit visual field defect on PE. Will eval for CVA, brain mass, do not suspect temporal arteritis. Plan for labs, CTA head/neck, neuro eval Abdelrahman, PGY4 - 62M no PMH p/w headache (resolved) and R partial visual field blurriness x 4 day, found to have hemianopic defect on outpatient ophtho testing today. Neuro exam non-focal, unable to elicit visual field defect on PE. Will eval for CVA, brain mass, do not suspect temporal arteritis. Plan for labs, CTA head/neck, neuro eval    Attending Statement: Agree with the above.  Several days intermittent HA now resolved and partial field deficit OD noted by outpt ophtho to be hemianopia, though patient describes it moreso as waves/lines rather than a visual field cut.  Neurologically intact throughout otherwise and on exam I am unable to appreciate definitive visual field cut OD.  Given findings outpatient will check CT-A h&n to eval for aneurysm, mass, cva (less likely).  Possible primary ophtho cause -- RD/VD.  No pain to suggest glaucoma and globes are soft OU.  Likely neuro and ophtho c/s.  Possible MRI?  Will reassess.  --BINA

## 2022-09-21 NOTE — ED PROVIDER NOTE - RAPID ASSESSMENT
62y M presents to the ED c/o R eye blurred vision. Pt states he has HA initially 3 days ago and woke up the next day (9/19) w/ blurred vision. Pt reports HA has improved since onset but pt has seen his Optho for persistent blurred vision and was recommended to come to the ED for further evaluation. Pt notices symptoms the most when looking at his cell phone. Denies eye pain, weakness, numbness. Pt is well appearing in triage.    Davian MARTINEZ (Scribe) have documented this rapid assessment note under the dictation of Tahir Brown) which has been reviewed and affirmed to be accurate. Patient was seen as a QPA patient. 62y M presents to the ED c/o R eye blurred vision. Pt states he has HA initially 3 days ago and woke up the next day (9/19) w/ blurred vision. Pt reports HA has improved since onset but pt has seen his Optho for persistent blurred vision and was recommended to come to the ED for further evaluation - has paperwork which does not indicate any further recommendations. Pt notices symptoms the most when looking at his cell phone. Denies eye pain, weakness, numbness. Pt is well appearing in triage.    Davian MARTINEZ (Scribe) have documented this rapid assessment note under the dictation of Tahir Brown (PA) which has been reviewed and affirmed to be accurate. Patient was seen as a QPA patient.    Tahir Brown (PA) note: This scribe's documentation has been prepared under my direction and personally reviewed by me. The patient will be seen and further worked up in the main emergency department and their care will be completed by the main emergency department team along with a thorough physical exam. Receiving team will follow up on labs, analgesia, any clinical imaging, reassess and disposition as clinically indicated, all decisions regarding the progression of care will be made at their discretion.

## 2022-09-21 NOTE — ED PROVIDER NOTE - NSPTACCESSSVCSAPPTDETAILS_ED_ALL_ED_FT
within 24 hours please - needs to establish care with PCP and with neurosurgeon and requires further work-up for likely brain mass, left AMA

## 2022-09-21 NOTE — CONSULT NOTE ADULT - ASSESSMENT
62M no hx, sent in by ophthalmologist for R visual blurriness since 9/19, h/a x3d (resolved), found to have "hemianopic defect" at outpt ophto testing today. CTH c/f underlying L TP mass w/ edema. Exam: AOx3, FC, PERRL, EOMI, no facial, 5/5 throughout, no drift, VFF  -No acute nsgy intervention  -Rec med adm for met w/u incluing CT CAP w/ IV contrast and MRI brain wwo  -Pt wants to leave to take care of his mother and understands he would be leaving AMA  -If pt leaves, should f/u outpt w/ Dr. Turpin w/i 1w of d/c  -No keppra or dex recs at this time. 62M no hx, sent in by ophthalmologist for R visual blurriness since 9/19, h/a x3d (resolved), found to have "hemianopic defect" at outpt ophto testing today. CTH c/f underlying L TP mass w/ edema, likely primary brain tumor. Exam: AOx3, FC, PERRL, EOMI, no facial, 5/5 throughout, no drift, VFF  -No acute nsgy intervention  -Rec CT CAP w/ IV contrast and MRI brain wwo  -Pt wants to leave to take care of his mother and understands he would be leaving AMA  -If pt leaves, should f/u outpt w/ Dr. Turpin w/i 1w of d/c  -No keppra or dex recs at this time.

## 2022-09-21 NOTE — CONSULT NOTE ADULT - SUBJECTIVE AND OBJECTIVE BOX
p (1130)       62M no hx, sent in by ophthalmologist for R visual blurriness since 9/19, h/a x3d (resolved), found to have "hemianopic defect" at outpt ophto testing today. CTH c/f underlying L TP mass w/ edema. Exam: AOx3, FC, PERRL, EOMI, no facial, 5/5 throughout, no drift, VFF        --Anticoagulation:    =====================  PAST MEDICAL HISTORY   No pertinent past medical history      PAST SURGICAL HISTORY         MEDICATIONS:  Antibiotics:    Neuro:    Other:      SOCIAL HISTORY:   Occupation:   Marital Status:     FAMILY HISTORY:      ROS: Negative except per HPI    LABS:                          15.9   9.46  )-----------( 154      ( 21 Sep 2022 19:25 )             47.8     09-21    138  |  100  |  10  ----------------------------<  111<H>  4.2   |  25  |  1.02    Ca    10.1      21 Sep 2022 19:25    TPro  8.0  /  Alb  4.9  /  TBili  0.5  /  DBili  x   /  AST  19  /  ALT  28  /  AlkPhos  68  09-21       p ([id number])       62M no hx, sent in by ophthalmologist for R visual blurriness since 9/19, h/a x3d (resolved), found to have "hemianopic defect" at outpt ophto testing today. CTH c/f underlying L TP mass w/ edema, likely primary brain tumor. Exam: AOx3, FC, PERRL, EOMI, no facial, 5/5 throughout, no drift, VFF        --Anticoagulation:    =====================  PAST MEDICAL HISTORY   No pertinent past medical history      PAST SURGICAL HISTORY         MEDICATIONS:  Antibiotics:    Neuro:    Other:      SOCIAL HISTORY:   Occupation:   Marital Status:     FAMILY HISTORY:      ROS: Negative except per HPI    LABS:                          15.9   9.46  )-----------( 154      ( 21 Sep 2022 19:25 )             47.8     09-21    138  |  100  |  10  ----------------------------<  111<H>  4.2   |  25  |  1.02    Ca    10.1      21 Sep 2022 19:25    TPro  8.0  /  Alb  4.9  /  TBili  0.5  /  DBili  x   /  AST  19  /  ALT  28  /  AlkPhos  68  09-21

## 2022-09-21 NOTE — CONSULT NOTE ADULT - SUBJECTIVE AND OBJECTIVE BOX
Neurology Consultation     HPI: Patient PD is a 61yo M no PMHx who presents for several days of forehead headache and 2 days of R visual field of R eye blurriness for which he went to ophthalmologist and was found with VF defect and told to come to hospital. Of note, his brother and father had recently passed away and his mother is alone at home. Here CT imaging showed L parietal temporal vasogenic edema w/ abnormal masslike enhancement concerning for malignancy primary or metastatic. Patient needed to leave home and signed out AMA, promising to return in AM for further diagnostic workup and care.     PAST MEDICAL & SURGICAL HISTORY:  No pertinent past medical history    FAMILY HISTORY:    SOCIAL HISTORY: former smoker, alcohol use    MEDICATIONS (HOME):  Home Medications:    MEDICATIONS  (STANDING):    MEDICATIONS  (PRN):    ALLERGIES/INTOLERANCES:  Allergies  No Known Allergies    Intolerances    VITALS & EXAMINATION:  Vital Signs Last 24 Hrs  T(C): 36.9 (21 Sep 2022 20:11), Max: 37.1 (21 Sep 2022 18:20)  T(F): 98.5 (21 Sep 2022 20:11), Max: 98.7 (21 Sep 2022 18:20)  HR: 64 (21 Sep 2022 20:11) (64 - 78)  BP: 152/92 (21 Sep 2022 20:11) (144/92 - 152/92)  BP(mean): --  RR: 18 (21 Sep 2022 20:11) (16 - 18)  SpO2: 99% (21 Sep 2022 20:11) (97% - 99%)    Parameters below as of 21 Sep 2022 20:11  Patient On (Oxygen Delivery Method): room air         LABORATORY:  CBC                       15.9   9.46  )-----------( 154      ( 21 Sep 2022 19:25 )             47.8     Chem 09-21    138  |  100  |  10  ----------------------------<  111<H>  4.2   |  25  |  1.02    Ca    10.1      21 Sep 2022 19:25    TPro  8.0  /  Alb  4.9  /  TBili  0.5  /  DBili  x   /  AST  19  /  ALT  28  /  AlkPhos  68  09-21    LFTs LIVER FUNCTIONS - ( 21 Sep 2022 19:25 )  Alb: 4.9 g/dL / Pro: 8.0 g/dL / ALK PHOS: 68 U/L / ALT: 28 U/L / AST: 19 U/L / GGT: x           Coagulopathy   Lipid Panel   A1c   Cardiac enzymes     U/A   CSF  Other    STUDIES & IMAGING: (EEG, CT, MR, U/S, TTE/LAZARUS):      ACC: [id number] EXAM:  CT ANGIO NECK (W) IC                        ACC: [id number] EXAM:  CT ANGIO BRAIN (W)[facility]                        ACC: [id number] EXAM:  CT BRAIN                          PROCEDURE DATE:  09/21/2022          INTERPRETATION:  CLINICAL INFORMATION: Right eye blurriness/hemianopsia,   frontal headache.    TECHNIQUE:  Noncontrast CT of the head was performed followed by CT angiography of   the neck and brain. RAPID analysis was performed to assess for hemorrhage.  Additional post-contrast axial CT images through the head were obtained.  Two-dimensional sagittal and coronal reformats and 3-D and maximum   intensity projection reformats were created.  IV Contrast: 70 mL Omnipaque-300 mg/ml.    COMPARISON: None.    FINDINGS:    CT BRAIN:  PARENCHYMA:  There is vasogenic edema within the left parietal and   posterior temporal lobes with underlying masslike lesion in the rounded   masslike lesion suggested in the left parietal lobe on series 2 image 20   measuring about 2.2 cm and possible additional versus contiguous lesion   in the posterior left temporal lobe on series 2 image 14. There is no   acute intracranial hemorrhage or evidence for acute large vascular   territory infarct.  VENTRICLES: Mass effect on the posterior horn of the left lateral   ventricle with associated effacement. No acute hydrocephalus or midline   shift.  EXTRA-AXIAL:  No abnormal extraaxial collection.  PARANASAL SINUSES:  Within normal limits.  TYMPANOMASTOID CAVITIES:  Within normal limits.  ORBITS: Within normal limits.    CTA NECK AND BRAIN:  GREAT VESSELS: Within normal limits.  COMMON CAROTID ARTERIES: Patent without stenosis by NASCET criteria.  CAROTID BIFURCATIONS: Within normal limits.  INTERNAL CAROTID ARTERIES: Patent without stenosis by NASCET criteria.    VERTEBRAL ARTERIES: Right dominant vertebral artery. Left vertebral   artery may terminate as the posterior inferior cerebellar artery Patent   without stenosis or dissection. No evidence of aneurysm.    ANTERIOR CIRCULATION: The bilateral anterior cerebral and middle cerebral   arteries are patent without stenosis or aneurysm.  POSTERIOR CIRCULATION: The distal vertebral arteries, basilar artery, and   bilateral posterior cerebral arteries are patent without stenosis or   aneurysm.    VISUALIZED LUNGS: Within normal limits.  BONES: Degenerative changes.  OTHER: Abnormal enhancement in the vasogenic edema centered within the   left parietal temporal lobes    IMPRESSION:    CT brain: Predominantly left parietal temporal vasogenic edema with   underlying abnormal masslike enhancement concerning for underlying   primary brain malignancy although a metastatic lesion and possible   infectious cause as abscess are in the differential. Follow-up with   contrast-enhanced brain MRI would be of benefit.    CTA neck and brain: No vessel occlusion or significant stenosis. No   evidence of dissection in the neck or aneurysm in the brain.    Evaluation of stenosis in the neck is in keeping with the NASCET criteria.  NASCET CAROTID STENOSIS CRITERIA (distal normal appearing ICA as   denominator for measurement): 0%-none, 1-49%-mild, 50-70%-moderate,   70-89%-severe, 90-99%-critical.    --- End of Report ---       JAGDEEP SOLIS MD; Resident Radiologist  This document has been electronically signed.  GIOVANNY MANNING MD; Attending Radiologist  This document has been electronically signed. Sep 21 2022  9:55PM   Neurology Consultation     HPI: Patient PD is a 61yo M no PMHx who presents for several days of forehead headache and 2 days of R visual field of R eye blurriness for which he went to ophthalmologist and was found with VF defect and told to come to hospital. Otherwise denies double vision, speech changes, facial droop, numbness/weakness of extremities or gait difficulty. Of note, his brother and father had recently passed away and his mother is alone at home. Here CT imaging showed L parietal temporal vasogenic edema w/ abnormal masslike enhancement concerning for malignancy primary or metastatic. No prior colonoscopy screening or formal skin screening. Unsure of fhx. Patient needed to leave home and signed out AMA, promising to return in AM for further diagnostic workup and care.     PAST MEDICAL & SURGICAL HISTORY:  No pertinent past medical history    FAMILY HISTORY:    SOCIAL HISTORY: former smoker, alcohol use    MEDICATIONS (HOME):  Home Medications:    MEDICATIONS  (STANDING):    MEDICATIONS  (PRN):    ALLERGIES/INTOLERANCES:  Allergies  No Known Allergies    Intolerances      VITALS & EXAMINATION:  Vital Signs Last 24 Hrs  T(C): 36.9 (21 Sep 2022 20:11), Max: 37.1 (21 Sep 2022 18:20)  T(F): 98.5 (21 Sep 2022 20:11), Max: 98.7 (21 Sep 2022 18:20)  HR: 64 (21 Sep 2022 20:11) (64 - 78)  BP: 152/92 (21 Sep 2022 20:11) (144/92 - 152/92)  BP(mean): --  RR: 18 (21 Sep 2022 20:11) (16 - 18)  SpO2: 99% (21 Sep 2022 20:11) (97% - 99%)    Parameters below as of 21 Sep 2022 20:11  Patient On (Oxygen Delivery Method): room air    General:  Constitutional: Male, appears stated age   Head: Normocephalic; Eyes: clear sclera;   Extremities: No cyanosis; Skin: No jose alfredo edema of LE  Resp: breathing comfortably     Neurological (>12):  MS: Awake, alert.  Follows commands. Attends to examiner  Language: Speech is clear, fluent, good repetition,  comprehension, registration of words.  CNs: PERRL (R 3mm, L 3mm). grossly VFF with occasional difficulty in R lower quadrant vision both eyes. EOMI no nystagmus. V1-3 intact LT, No jose alfredo facial asymmetry b/l. Hearing grossly normal b/l. Tongue midline.     Motor - Normal bulk and tone throughout. No pronator drift.    L/R         Deltoid  5/5    Biceps   5/5      Triceps  5/5         Wrist Extension 5/5   Wrist Flexion  5/5      5/5   L/R         Hip Flexion  5/5    Hip Extension  5/5  Knee Extension  5/5  Dorsiflexion  5/5      Plantar Flexion 5/5     Sensation: Intact to LT b/l. Cortical: Extinction on DSS (neglect): none  Reflexes L/R:  diffusely +1  no ankle clonus  Toes: mute  Coordination: No dysmetria to FTN b/l UE    LABORATORY:  CBC                       15.9   9.46  )-----------( 154      ( 21 Sep 2022 19:25 )             47.8     Chem 09-21    138  |  100  |  10  ----------------------------<  111<H>  4.2   |  25  |  1.02    Ca    10.1      21 Sep 2022 19:25    TPro  8.0  /  Alb  4.9  /  TBili  0.5  /  DBili  x   /  AST  19  /  ALT  28  /  AlkPhos  68  09-21    LFTs LIVER FUNCTIONS - ( 21 Sep 2022 19:25 )  Alb: 4.9 g/dL / Pro: 8.0 g/dL / ALK PHOS: 68 U/L / ALT: 28 U/L / AST: 19 U/L / GGT: x           Coagulopathy   Lipid Panel   A1c   Cardiac enzymes     U/A   CSF  Other    STUDIES & IMAGING: (EEG, CT, MR, U/S, TTE/LAZARUS):      ACC: [id number] EXAM:  CT ANGIO NECK (W) IC                        ACC: [id number] EXAM:  CT ANGIO BRAIN (W) IC                        ACC: [id number] EXAM:  CT BRAIN                          PROCEDURE DATE:  09/21/2022      INTERPRETATION:  CLINICAL INFORMATION: Right eye blurriness/hemianopsia,   frontal headache.    TECHNIQUE:  Noncontrast CT of the head was performed followed by CT angiography of   the neck and brain. RAPID analysis was performed to assess for hemorrhage.  Additional post-contrast axial CT images through the head were obtained.  Two-dimensional sagittal and coronal reformats and 3-D and maximum   intensity projection reformats were created.  IV Contrast: 70 mL Omnipaque-300 mg/ml.    COMPARISON: None.    FINDINGS:    CT BRAIN:  PARENCHYMA:  There is vasogenic edema within the left parietal and   posterior temporal lobes with underlying masslike lesion in the rounded   masslike lesion suggested in the left parietal lobe on series 2 image 20   measuring about 2.2 cm and possible additional versus contiguous lesion   in the posterior left temporal lobe on series 2 image 14. There is no   acute intracranial hemorrhage or evidence for acute large vascular   territory infarct.  VENTRICLES: Mass effect on the posterior horn of the left lateral   ventricle with associated effacement. No acute hydrocephalus or midline   shift.  EXTRA-AXIAL:  No abnormal extraaxial collection.  PARANASAL SINUSES:  Within normal limits.  TYMPANOMASTOID CAVITIES:  Within normal limits.  ORBITS: Within normal limits.    CTA NECK AND BRAIN:  GREAT VESSELS: Within normal limits.  COMMON CAROTID ARTERIES: Patent without stenosis by NASCET criteria.  CAROTID BIFURCATIONS: Within normal limits.  INTERNAL CAROTID ARTERIES: Patent without stenosis by NASCET criteria.    VERTEBRAL ARTERIES: Right dominant vertebral artery. Left vertebral   artery may terminate as the posterior inferior cerebellar artery Patent   without stenosis or dissection. No evidence of aneurysm.    ANTERIOR CIRCULATION: The bilateral anterior cerebral and middle cerebral   arteries are patent without stenosis or aneurysm.  POSTERIOR CIRCULATION: The distal vertebral arteries, basilar artery, and   bilateral posterior cerebral arteries are patent without stenosis or   aneurysm.    VISUALIZED LUNGS: Within normal limits.  BONES: Degenerative changes.  OTHER: Abnormal enhancement in the vasogenic edema centered within the   left parietal temporal lobes    IMPRESSION:    CT brain: Predominantly left parietal temporal vasogenic edema with   underlying abnormal masslike enhancement concerning for underlying   primary brain malignancy although a metastatic lesion and possible   infectious cause as abscess are in the differential. Follow-up with   contrast-enhanced brain MRI would be of benefit.    CTA neck and brain: No vessel occlusion or significant stenosis. No   evidence of dissection in the neck or aneurysm in the brain.    Evaluation of stenosis in the neck is in keeping with the NASCET criteria.  NASCET CAROTID STENOSIS CRITERIA (distal normal appearing ICA as   denominator for measurement): 0%-none, 1-49%-mild, 50-70%-moderate,   70-89%-severe, 90-99%-critical.    --- End of Report ---       JAGDEEP SOLIS MD; Resident Radiologist  This document has been electronically signed.  GIOVANNY MANNING MD; Attending Radiologist  This document has been electronically signed. Sep 21 2022  9:55PM

## 2022-09-21 NOTE — ED ADULT NURSE NOTE - OBJECTIVE STATEMENT
62 y.o male, A&Ox4, no PMH, pt presents to ED c/o right eye blurriness. pt states on Sunday he was awoken from his sleep with a sudden onset headache, states he went back to sleep and woke up with headache but took advil which seemed to relieve the headache, pt states on Monday he then developed blurriness to his right eye. pt states the blurriness is more noticeable when he is looking at his phone, pt states he went to his optho today. pt denies headache at this time. pt denies chest pain, N/V/D, sob, fevers, chills. IV done by qdoc RN, pt safety and comfort provided.

## 2022-09-21 NOTE — ED ADULT NURSE REASSESSMENT NOTE - NS ED NURSE REASSESS COMMENT FT1
Patient evaluated by "QDOC" provider. Verified patient's identify with two forms of patient identification (correct spelling of full name and date of birth). Laboratory specimen ordered and obtained via peripheral IV access device. Laboratory specimen sent to laboratory.   18  gauge peripheral IV access device placed to the RIGHT AC by DORCAS Sampson RN. Peripheral IV access device tolerated well, positive blood return, flushing without resistance, and secured with securement device, transparent dressing, and tape. COVID swab obtained and sent to laboratory

## 2022-09-21 NOTE — ED PROVIDER NOTE - OBJECTIVE STATEMENT
62M no known PMH (has not seen doctor for several years) [facility]. Pt reports he a frontal headache waking him from sleep 4 days ago, Sunday evening. A/w blurriness to part of R eye x 4 days. States headache resolved. No eye pain, n/v, numbness, tingling, weakness, jaw claudication. Went to [facility] today, had visual field test showing hemianopic defect as per paperwork.

## 2022-09-21 NOTE — ED PROVIDER NOTE - PHYSICAL EXAMINATION
I have reviewed the triage vital signs.  Const: AAOx3, in NAD  Eyes: External appearance: OD no discharge or conjunctival injection; OS no discharge or conjunctival injection  Pupils: OD round and reactive; OS round and reactive  Extraocular movements: OD intact; OS intact  Visual Fields: OD intact x 4; OS intact x 4  Visual Acuity: OD 20/20; OS 20/20; (wearing glasses)  Lids/Lashes/Lacrimal: OD no lesions; OS no lesions  Conjunctiva & Sclera: OD white; OS white  HENT: NCAT, Neck supple, oral mucosa moist, no temporal tenderness, no jaw claudication  CV: RRR, +S1, S2  Resp: CTAB, no respiratory distress  GI: Abdomen soft, NTND, no guarding  : no CVA tenderness  Extremities: No peripheral edema,  2+ radial and DP pulses  Skin: Warm, well perfused, no rash  MSK: No gross deformities appreciated  Neuro: CN2-12 grossly intact, MS +5/5 in UE and LE BL, finger to nose smooth and rapid, gross sensation intact in UE and LE BL, gait smooth and coordinated, negative rhomberg, negative pronator drift   Psych: Appropriate mood and affect

## 2022-09-22 LAB — ERYTHROCYTE [SEDIMENTATION RATE] IN BLOOD: 23 MM/HR — HIGH (ref 0–20)

## 2022-09-22 ASSESSMENT — REFRACTION_MANIFEST
OD_VA1: 20/20-1
OD_SPHERE: +2.00
OD_AXIS: 110
OS_CYLINDER: -0.50
OS_AXIS: 102
OS_VA1: 20/20
OD_CYLINDER: -0.50
OS_SPHERE: +2.25

## 2022-09-22 ASSESSMENT — KERATOMETRY
OD_AXISANGLE_DEGREES: 4
OS_K1POWER_DIOPTERS: 43.25
OD_K2POWER_DIOPTERS: 43.50
OS_K2POWER_DIOPTERS: 43.75
OS_AXISANGLE_DEGREES: 11
OD_K1POWER_DIOPTERS: 42.75
METHOD_AUTO_MANUAL: AUTO

## 2022-09-22 ASSESSMENT — SPHEQUIV_DERIVED
OD_SPHEQUIV: 2
OS_SPHEQUIV: 2
OS_SPHEQUIV: 2
OD_SPHEQUIV: 1.75

## 2022-09-22 ASSESSMENT — AXIALLENGTH_DERIVED
OD_AL: 23.0601
OD_AL: 22.9674
OS_AL: 22.8376
OS_AL: 22.8376

## 2022-09-22 ASSESSMENT — REFRACTION_AUTOREFRACTION
OD_CYLINDER: -0.50
OS_SPHERE: +2.25
OS_CYLINDER: -0.50
OS_AXIS: 102
OD_SPHERE: +2.25
OD_AXIS: 108

## 2022-09-22 ASSESSMENT — VISUAL ACUITY
OD_BCVA: 20/60
OS_BCVA: 20/70+2

## 2022-09-22 NOTE — ED POST DISCHARGE NOTE - RESULT SUMMARY
9/22/22: Spoke with pts referring PMD Dr. Mireles. She called to discuss imaging results. Aware of findings, states pt does not want to come back to be admitted. Gave her neuro/nerusx recs as well as name of attending pt should f/u with. She states she will call him back to stress importance of f/u for this. Lázaro Rodriguez PA-C

## 2022-09-22 NOTE — ED POST DISCHARGE NOTE - ADDITIONAL DOCUMENTATION
9/22/22: Call from red desk-states that patient's outpatient provider would like to discuss patient's care. I was unable to speak provider as phone was disconnected prior to my answering. -Abi Rosario PA-C

## 2022-09-26 ENCOUNTER — INPATIENT (INPATIENT)
Facility: HOSPITAL | Age: 62
LOS: 9 days | Discharge: HOME HEALTH | DRG: 25 | End: 2022-10-06
Attending: NEUROLOGICAL SURGERY | Admitting: INTERNAL MEDICINE
Payer: MEDICAID

## 2022-09-26 VITALS
HEIGHT: 70 IN | RESPIRATION RATE: 18 BRPM | DIASTOLIC BLOOD PRESSURE: 88 MMHG | WEIGHT: 210.1 LBS | SYSTOLIC BLOOD PRESSURE: 129 MMHG | TEMPERATURE: 98 F | OXYGEN SATURATION: 97 % | HEART RATE: 80 BPM

## 2022-09-26 DIAGNOSIS — G93.89 OTHER SPECIFIED DISORDERS OF BRAIN: ICD-10-CM

## 2022-09-26 LAB
ALBUMIN SERPL ELPH-MCNC: 4.5 G/DL — SIGNIFICANT CHANGE UP (ref 3.3–5)
ALP SERPL-CCNC: 65 U/L — SIGNIFICANT CHANGE UP (ref 40–120)
ALT FLD-CCNC: 23 U/L — SIGNIFICANT CHANGE UP (ref 10–45)
ANION GAP SERPL CALC-SCNC: 10 MMOL/L — SIGNIFICANT CHANGE UP (ref 5–17)
AST SERPL-CCNC: 19 U/L — SIGNIFICANT CHANGE UP (ref 10–40)
BASOPHILS # BLD AUTO: 0.05 K/UL — SIGNIFICANT CHANGE UP (ref 0–0.2)
BASOPHILS NFR BLD AUTO: 0.6 % — SIGNIFICANT CHANGE UP (ref 0–2)
BILIRUB SERPL-MCNC: 0.8 MG/DL — SIGNIFICANT CHANGE UP (ref 0.2–1.2)
BUN SERPL-MCNC: 13 MG/DL — SIGNIFICANT CHANGE UP (ref 7–23)
CALCIUM SERPL-MCNC: 9.9 MG/DL — SIGNIFICANT CHANGE UP (ref 8.4–10.5)
CHLORIDE SERPL-SCNC: 105 MMOL/L — SIGNIFICANT CHANGE UP (ref 96–108)
CO2 SERPL-SCNC: 25 MMOL/L — SIGNIFICANT CHANGE UP (ref 22–31)
CREAT SERPL-MCNC: 1.02 MG/DL — SIGNIFICANT CHANGE UP (ref 0.5–1.3)
EGFR: 82 ML/MIN/1.73M2 — SIGNIFICANT CHANGE UP
EOSINOPHIL # BLD AUTO: 0.06 K/UL — SIGNIFICANT CHANGE UP (ref 0–0.5)
EOSINOPHIL NFR BLD AUTO: 0.7 % — SIGNIFICANT CHANGE UP (ref 0–6)
GLUCOSE SERPL-MCNC: 102 MG/DL — HIGH (ref 70–99)
HCT VFR BLD CALC: 48.5 % — SIGNIFICANT CHANGE UP (ref 39–50)
HGB BLD-MCNC: 15.8 G/DL — SIGNIFICANT CHANGE UP (ref 13–17)
IMM GRANULOCYTES NFR BLD AUTO: 0.2 % — SIGNIFICANT CHANGE UP (ref 0–0.9)
LYMPHOCYTES # BLD AUTO: 1.25 K/UL — SIGNIFICANT CHANGE UP (ref 1–3.3)
LYMPHOCYTES # BLD AUTO: 14.6 % — SIGNIFICANT CHANGE UP (ref 13–44)
MCHC RBC-ENTMCNC: 28.9 PG — SIGNIFICANT CHANGE UP (ref 27–34)
MCHC RBC-ENTMCNC: 32.6 GM/DL — SIGNIFICANT CHANGE UP (ref 32–36)
MCV RBC AUTO: 88.8 FL — SIGNIFICANT CHANGE UP (ref 80–100)
MONOCYTES # BLD AUTO: 0.49 K/UL — SIGNIFICANT CHANGE UP (ref 0–0.9)
MONOCYTES NFR BLD AUTO: 5.7 % — SIGNIFICANT CHANGE UP (ref 2–14)
NEUTROPHILS # BLD AUTO: 6.69 K/UL — SIGNIFICANT CHANGE UP (ref 1.8–7.4)
NEUTROPHILS NFR BLD AUTO: 78.2 % — HIGH (ref 43–77)
NRBC # BLD: 0 /100 WBCS — SIGNIFICANT CHANGE UP (ref 0–0)
PLATELET # BLD AUTO: 180 K/UL — SIGNIFICANT CHANGE UP (ref 150–400)
POTASSIUM SERPL-MCNC: 4 MMOL/L — SIGNIFICANT CHANGE UP (ref 3.5–5.3)
POTASSIUM SERPL-SCNC: 4 MMOL/L — SIGNIFICANT CHANGE UP (ref 3.5–5.3)
PROT SERPL-MCNC: 7.4 G/DL — SIGNIFICANT CHANGE UP (ref 6–8.3)
RBC # BLD: 5.46 M/UL — SIGNIFICANT CHANGE UP (ref 4.2–5.8)
RBC # FLD: 13.3 % — SIGNIFICANT CHANGE UP (ref 10.3–14.5)
SARS-COV-2 RNA SPEC QL NAA+PROBE: SIGNIFICANT CHANGE UP
SODIUM SERPL-SCNC: 140 MMOL/L — SIGNIFICANT CHANGE UP (ref 135–145)
WBC # BLD: 8.56 K/UL — SIGNIFICANT CHANGE UP (ref 3.8–10.5)
WBC # FLD AUTO: 8.56 K/UL — SIGNIFICANT CHANGE UP (ref 3.8–10.5)

## 2022-09-26 PROCEDURE — 71260 CT THORAX DX C+: CPT | Mod: 26,MA

## 2022-09-26 PROCEDURE — 99285 EMERGENCY DEPT VISIT HI MDM: CPT

## 2022-09-26 PROCEDURE — 74177 CT ABD & PELVIS W/CONTRAST: CPT | Mod: 26,MA

## 2022-09-26 RX ORDER — DIPHENHYDRAMINE HCL 50 MG
12.5 CAPSULE ORAL ONCE
Refills: 0 | Status: COMPLETED | OUTPATIENT
Start: 2022-09-26 | End: 2022-09-26

## 2022-09-26 RX ORDER — METOCLOPRAMIDE HCL 10 MG
10 TABLET ORAL ONCE
Refills: 0 | Status: COMPLETED | OUTPATIENT
Start: 2022-09-26 | End: 2022-09-26

## 2022-09-26 RX ORDER — ACETAMINOPHEN 500 MG
650 TABLET ORAL ONCE
Refills: 0 | Status: COMPLETED | OUTPATIENT
Start: 2022-09-26 | End: 2022-09-26

## 2022-09-26 RX ADMIN — Medication 650 MILLIGRAM(S): at 13:41

## 2022-09-26 RX ADMIN — Medication 10 MILLIGRAM(S): at 13:41

## 2022-09-26 NOTE — ED ADULT TRIAGE NOTE - CHIEF COMPLAINT QUOTE
Worsening Headache over the last few days, difficulty speaking last few days, dx w/ Brain edema on 9/21/22 left AMA

## 2022-09-26 NOTE — ED PROVIDER NOTE - NS ED ROS FT
CONSTITUTIONAL: No fevers, no chills, no lightheadedness, no dizziness  EYES: no visual changes, no eye pain  EARS: no ear drainage, no ear pain, no change in hearing  NOSE: no nasal congestion  MOUTH/THROAT: no sore throat  CV: No chest pain, no palpitations  RESP: No SOB, no cough  GI: No n/v/d, no abd pain  : no dysuria, no hematuria, no flank pain  MSK: no back pain, no extremity pain  SKIN: no rashes  NEURO: see hpi   PSYCHIATRIC: no known mental health issues

## 2022-09-26 NOTE — ED ADULT NURSE NOTE - OBJECTIVE STATEMENT
pt presented to ed with c/o headache that started 8 days ago. pt denies nausea, vomiting, fever, chills, sob, chest pain, dizziness, numbness, weakness, change in vision. Will continue to monitor.

## 2022-09-26 NOTE — ED ADULT NURSE NOTE - TEMPLATE
I have personally performed a face to face diagnostic evaluation on this patient. I have reviewed the ACP note and agree with the history, exam and plan of care, except as noted. Abdominal Pain, N/V/D

## 2022-09-26 NOTE — H&P ADULT - NSHPLABSRESULTS_GEN_ALL_CORE
15.8   8.56  )-----------( 180      ( 26 Sep 2022 14:09 )             48.5   09-26    140  |  105  |  13  ----------------------------<  102<H>  4.0   |  25  |  1.02    Ca    9.9      26 Sep 2022 14:09    TPro  7.4  /  Alb  4.5  /  TBili  0.8  /  DBili  x   /  AST  19  /  ALT  23  /  AlkPhos  65  09-26

## 2022-09-26 NOTE — ED ADULT NURSE NOTE - NSIMPLEMENTINTERV_GEN_ALL_ED
Implemented All Fall Risk Interventions:  [place] to call system. Call bell, personal items and telephone within reach. Instruct patient to call for assistance. Room bathroom lighting operational. Non-slip footwear when patient is off stretcher. Physically safe environment: no spills, clutter or unnecessary equipment. Stretcher in lowest position, wheels locked, appropriate side rails in place. Provide visual cue, wrist band, yellow gown, etc. Monitor gait and stability. Monitor for mental status changes and reorient to person, place, and time. Review medications for side effects contributing to fall risk. Reinforce activity limits and safety measures with patient and family.

## 2022-09-26 NOTE — ED PROVIDER NOTE - CLINICAL SUMMARY MEDICAL DECISION MAKING FREE TEXT BOX
61 y/o m p/w headache. Known brain mass, recent diagnosis. Unable to obtain CT scans (c/a/p) or MRI brain previously. 63 y/o m p/w headache. Known brain mass, recent diagnosis. Unable to obtain CT scans (c/a/p) or MRI brain. Will get CT a/p and chest. Will admit for further workup.

## 2022-09-26 NOTE — ED PROVIDER NOTE - OBJECTIVE STATEMENT
Patient is a 63 y/o M with no sig PMHx who presents to the ED with headache. On 9/21/22 was seen in this ED for HA x4 days that woke him from sleep with R eye visual defect (hemianopic, dx by Optho). Imaging done then c/f L brain mass with vasogenic edema. Patient evaluated by Neurology and Neurosurgery team. Recommended further imaging, patient signed out out AMA as he had to go home to take care of his mother. Symptoms persisted since then. Has appt with [facility] on wed but not sure when he can have MRI done.

## 2022-09-26 NOTE — ED PROVIDER NOTE - ATTENDING CONTRIBUTION TO CARE
62m here 3d prior for right visual defects and global headache, ct at that time was concerning for vasogenic edema and a mass. Patient was encouraged stay but left AMA. Patient was due for ct c/a/p and MRI brain but now having persistent and worsening HA s n/v. No other new features. Patient requesting STI evaluation.    will get iv, cbc, cmp, ekg, cxr, ekg, pt/inr, analgesia and antiemetic prn  Will follow up on labs, analgesia, imaging, reassess and disposition as clinically indicated.  *The above represents an initial assessment/impression. Please refer to my progress notes below for potential changes in patient clinical course* 62m here 3d prior for right visual defects and global headache, ct at that time was concerning for vasogenic edema and a mass. Patient was encouraged stay but left AMA. Patient was due for ct c/a/p and MRI brain but now having persistent and worsening HA s n/v. No other new features. Patient requesting STI evaluation.    will get iv, cbc, cmp, ekg, cxr, ekg, pt/inr, analgesia and antiemetic prn  Will follow up on labs, analgesia, imaging, reassess and disposition as clinically indicated.  *The above represents an initial assessment/impression. Please refer to my progress notes below for potential changes in patient clinical course*  Patient endorsed to Dr. Murrieta at the time of admission. Based on patient's history and physical exam, as well as the results of today's workup, I feel that patient warrants admission to the hospital for further workup/evaluation and continued management. I discussed the findings of today's workup with the patient and addressed the patient's questions and concerns. The patient was agreeable with admission. Our team spoke with the inpatient receiving team who accepted the patient for admission and subsequently took over the patient's care at the time of admission. The receiving team will follow up on pending labs, analgesia, any clinical imaging results, ancillary findings, reassess, and disposition as clinically indicated. Details of patient and plan conveyed to receiving physician team and conveyed back for understanding. There were no questions at this time about the patient's status, disposition, and plan. Patient's care to be taken over by receiving physician team at this time, all decisions regarding the progression of care will be made at their discretion.

## 2022-09-26 NOTE — H&P ADULT - ASSESSMENT
A/P     Brain mass / Blurry vision/ hemianopsia   -pt. was seen by neurosurgery and neurology team few days ago in ED and at that time he signed out AM   as per ER physcian : Neurosuregry and neurology was consulted from ER   will follow up in morning   as per neuro notes : no steroids for now     HA :  -tylenol prn     please consult house onchology in am     advance care planning : d/w patinet regarding advance directive,. d/w him regarding intubation / CPR if need arise. at present he agrees for everything , remain full code. time spend 15 min

## 2022-09-26 NOTE — H&P ADULT - NSHPPHYSICALEXAM_GEN_ALL_CORE
pt. seen and examined   Vital Signs Last 24 Hrs  T(C): 36.5 (27 Sep 2022 04:34), Max: 37 (26 Sep 2022 22:12)  T(F): 97.7 (27 Sep 2022 04:34), Max: 98.6 (26 Sep 2022 22:12)  HR: 67 (27 Sep 2022 04:34) (67 - 100)  BP: 100/62 (27 Sep 2022 04:34) (100/62 - 129/88)  BP(mean): --  RR: 18 (27 Sep 2022 04:34) (16 - 18)  SpO2: 97% (27 Sep 2022 04:34) (96% - 98%)    Parameters below as of 27 Sep 2022 04:34  Patient On (Oxygen Delivery Method): room air    heent : nc/at   neck : supple, no JVD  lungs : B/L clear , no w/r/r  heart: s1s2 nml  abd : soft, NABS, NT/Nd  ext : no e/c/c, pulses 2 +  neuro: HA+. no focal deficit

## 2022-09-26 NOTE — ED PROVIDER NOTE - PHYSICAL EXAMINATION
General: Alert and Orientated x 3. No apparent distress.  Head: Normocephalic and atraumatic.  Eyes: PERRLA with EOMI.  Neck: Supple. Trachea midline.   Cardiac: Normal S1 and S2 w/ RRR. No murmurs appreciated. No JVD appreciated.  Pulmonary: CTA bilaterally. No increased WOB. No wheezes or crackles.  Abdominal: Soft, non-tender. (+) bowel sounds appreciated in all 4 quadrants. No hepatosplenomegaly.   Neurologic: No focal sensory or motor deficits. cn2-12 grossly intact. No dysmetria. gait normal.   Musculoskeletal: Strength appropriate in all 4 extremities for age with no limited ROM.  Skin: Color appropriate for race. Intact, warm, and well-perfused.  Psychiatric: Appropriate mood and affect. No apparent risk to self or others.

## 2022-09-27 ENCOUNTER — TRANSCRIPTION ENCOUNTER (OUTPATIENT)
Age: 62
End: 2022-09-27

## 2022-09-27 PROCEDURE — 70553 MRI BRAIN STEM W/O & W/DYE: CPT | Mod: 26

## 2022-09-27 RX ORDER — INFLUENZA VIRUS VACCINE 15; 15; 15; 15 UG/.5ML; UG/.5ML; UG/.5ML; UG/.5ML
0.5 SUSPENSION INTRAMUSCULAR ONCE
Refills: 0 | Status: DISCONTINUED | OUTPATIENT
Start: 2022-09-27 | End: 2022-10-06

## 2022-09-27 RX ORDER — HEPARIN SODIUM 5000 [USP'U]/ML
5000 INJECTION INTRAVENOUS; SUBCUTANEOUS EVERY 12 HOURS
Refills: 0 | Status: DISCONTINUED | OUTPATIENT
Start: 2022-09-27 | End: 2022-10-02

## 2022-09-27 RX ORDER — PANTOPRAZOLE SODIUM 20 MG/1
40 TABLET, DELAYED RELEASE ORAL
Refills: 0 | Status: DISCONTINUED | OUTPATIENT
Start: 2022-09-27 | End: 2022-10-03

## 2022-09-27 RX ORDER — DEXAMETHASONE 0.5 MG/5ML
4 ELIXIR ORAL EVERY 6 HOURS
Refills: 0 | Status: DISCONTINUED | OUTPATIENT
Start: 2022-09-27 | End: 2022-10-03

## 2022-09-27 RX ORDER — ACETAMINOPHEN 500 MG
650 TABLET ORAL EVERY 6 HOURS
Refills: 0 | Status: DISCONTINUED | OUTPATIENT
Start: 2022-09-27 | End: 2022-10-03

## 2022-09-27 RX ORDER — LEVETIRACETAM 250 MG/1
500 TABLET, FILM COATED ORAL
Refills: 0 | Status: DISCONTINUED | OUTPATIENT
Start: 2022-09-27 | End: 2022-10-03

## 2022-09-27 RX ORDER — LANOLIN ALCOHOL/MO/W.PET/CERES
5 CREAM (GRAM) TOPICAL ONCE
Refills: 0 | Status: COMPLETED | OUTPATIENT
Start: 2022-09-27 | End: 2022-09-27

## 2022-09-27 RX ORDER — ACETAMINOPHEN 500 MG
650 TABLET ORAL ONCE
Refills: 0 | Status: COMPLETED | OUTPATIENT
Start: 2022-09-27 | End: 2022-09-27

## 2022-09-27 RX ADMIN — Medication 4 MILLIGRAM(S): at 20:12

## 2022-09-27 RX ADMIN — HEPARIN SODIUM 5000 UNIT(S): 5000 INJECTION INTRAVENOUS; SUBCUTANEOUS at 05:37

## 2022-09-27 RX ADMIN — PANTOPRAZOLE SODIUM 40 MILLIGRAM(S): 20 TABLET, DELAYED RELEASE ORAL at 05:37

## 2022-09-27 RX ADMIN — Medication 650 MILLIGRAM(S): at 22:50

## 2022-09-27 RX ADMIN — Medication 650 MILLIGRAM(S): at 00:58

## 2022-09-27 RX ADMIN — Medication 5 MILLIGRAM(S): at 21:53

## 2022-09-27 RX ADMIN — Medication 650 MILLIGRAM(S): at 21:52

## 2022-09-27 RX ADMIN — HEPARIN SODIUM 5000 UNIT(S): 5000 INJECTION INTRAVENOUS; SUBCUTANEOUS at 18:43

## 2022-09-27 RX ADMIN — Medication 1 MILLIGRAM(S): at 15:19

## 2022-09-27 NOTE — DISCHARGE NOTE NURSING/CASE MANAGEMENT/SOCIAL WORK - NSDCPEFALRISK_GEN_ALL_CORE
Constant   Observation        Constant  Observation   was  discontinued at  this  time,  Pt  was  placed  on  enhanced  supervision.   Pt  is  currently  resting comfortable,  pt  is  calm  non- combative  at  this  time.   Bilateral  mitten    is  in  place unsecured. For information on Fall & Injury Prevention, visit: [email or web address] OR  [email or web address] OR  https://www.cdc.gov/steadi/patient.html

## 2022-09-27 NOTE — DISCHARGE NOTE NURSING/CASE MANAGEMENT/SOCIAL WORK - PATIENT PORTAL LINK FT
You can access the FollowMyHealth Patient Portal offered by [facility] by registering at the following website: [email or web address]. By joining [facility]’s FollowMyHealth portal, you will also be able to view your health information using other applications (apps) compatible with our system.

## 2022-09-27 NOTE — CONSULT NOTE ADULT - SUBJECTIVE AND OBJECTIVE BOX
Rachel Del  62M R handed, no hx, originally sent in on 9/21 by ophthalmologist for R visual blurriness since 9/19, found to have "hemianopic defect" at outpt ophto testing today. CTH then showed c/f underlying L TP mass w/ edema, likely primary brain tumor. He left AMA (difficult social situation) and now represents with HA. MRI here shows 4 x 2.5 x 4cm ring-enhancing mass in L temporal occipital region w/ surrounding edema. He endorses mixing up words at times, but this was not seen on current exam. Repetition and naming was intact.     --Anticoagulation--  heparin   Injectable 5000 Unit(s) SubCutaneous every 12 hours    T(C): 36.6 (09-27-22 @ 11:59), Max: 37 (09-26-22 @ 22:12)  HR: 69 (09-27-22 @ 11:59) (67 - 91)  BP: 111/75 (09-27-22 @ 11:59) (100/62 - 122/82)  RR: 16 (09-27-22 @ 11:59) (16 - 18)  SpO2: 98% (09-27-22 @ 11:59) (97% - 98%)  Wt(kg): --    Exam: AO3, PERRL, EOMI, no droop/drift, R homonymous hemianopsia, LOUISE 5/5, SILT

## 2022-09-27 NOTE — CHART NOTE - NSCHARTNOTEFT_GEN_A_CORE
MRI Brain- Irregularly ring-enhancing mass left temporal occipital   region adjacent to the atrium of the lateral ventricle with subependymal   spread and significant vasogenic edema likely represents a neoplasm.   Favor glioblastoma. Mild midline shift to the right.    Finding d/w pt who had requested to be discharge AMA, pt now agrees to remain in the hospital for further monitoring and treatment. Decadron 4mg mg ivp q6hrs started as advised by neurosx team, official consult pending. The above d/w Dr. Murrieta.

## 2022-09-27 NOTE — CONSULT NOTE ADULT - ASSESSMENT
Rachel Del  62M R handed, no hx, originally sent in on 9/21 by ophthalmologist for R visual blurriness since 9/19, found to have "hemianopic defect" at outpt ophto testing today. CTH then showed c/f underlying L TP mass w/ edema, likely primary brain tumor. He left AMA (difficult social situation) and now represents with HA. MRI here shows 4 x 2.5 x 4cm ring-enhancing mass in L temporal occipital region w/ surrounding edema. He endorses mixing up words at times, but this was not seen on current exam. Repetition and naming was intact. Exam: AO3, PERRL, EOMI, no droop/drift, R homonymous hemianopsia, LOUISE 5/5, SILT     - MR brain with DTI synaptive protocol  - CT CAP negative for neoplasm  - Dex4q6   - Keppra 500 BID  - Ophthalmology consult

## 2022-09-27 NOTE — PATIENT PROFILE ADULT - WILL THE PATIENT ACCEPT THE PFIZER COVID-19 VACCINE IF ELIGIBLE AND IT IS AVAILABLE?
Thank you for your visit today!    Please make a follow up with Dr. Esteban in 6 months. Refill has been sent.   
No

## 2022-09-27 NOTE — PROGRESS NOTE ADULT - ASSESSMENT
A/P     Brain mass / Blurry vision/ hemianopsia   -pt. was seen by neurosurgery and neurology team few days ago in ED and at that time he signed out AM   as per ER physcian : Neurosuregry and neurology was consulted from ER   will follow up in morning   as per neuro notes : no steroids for now   MRI brain done : result pending     HA :  -tylenol prn     dispo: pt. is all worked up , dressed and want to leave home, says her mother is 89 y/o and has dementia and he can't stay in hospital , he did same thing few days ago in ED and signed AMA.   I tried to explain him that he need to be seen by neurology/ neurosurgery team after MRI to find out what else he need ( like biopsy or not) but he don't want to stay , says he was here for MRI and will follow with neurosurgery as out pt.     will let him sign AMA again

## 2022-09-27 NOTE — PATIENT PROFILE ADULT - FALL HARM RISK - UNIVERSAL INTERVENTIONS
Bed in lowest position, wheels locked, appropriate side rails in place/Call bell, personal items and telephone in reach/Instruct patient to call for assistance before getting out of bed or chair/Non-slip footwear when patient is out of bed/[place] to call system/Physically safe environment - no spills, clutter or unnecessary equipment/Purposeful Proactive Rounding/Room/bathroom lighting operational, light cord in reach

## 2022-09-27 NOTE — PROGRESS NOTE ADULT - SUBJECTIVE AND OBJECTIVE BOX
Patient is a 62y old  Male who presents with a chief complaint of HA/ brain mass (26 Sep 2022 16:25)      INTERVAL HPI/OVERNIGHT EVENTS: just came back from MRI , no being dress up and want to go home   T(C): 36.6 (09-27-22 @ 11:59), Max: 37 (09-26-22 @ 22:12)  HR: 69 (09-27-22 @ 11:59) (67 - 100)  BP: 111/75 (09-27-22 @ 11:59) (100/62 - 122/82)  RR: 16 (09-27-22 @ 11:59) (16 - 18)  SpO2: 98% (09-27-22 @ 11:59) (97% - 98%)  Wt(kg): --  I&O's Summary      PAST MEDICAL & SURGICAL HISTORY:  No pertinent past medical history      No significant past surgical history          SOCIAL HISTORY  Alcohol:  Tobacco:  Illicit substance use:    FAMILY HISTORY:    REVIEW OF SYSTEMS:  CONSTITUTIONAL: No fever, weight loss, or fatigue  EYES: No eye pain, visual disturbances, or discharge  ENMT:  No difficulty hearing, tinnitus, vertigo; No sinus or throat pain  NECK: No pain or stiffness  RESPIRATORY: No cough, wheezing, chills or hemoptysis; No shortness of breath  CARDIOVASCULAR: No chest pain, palpitations, dizziness, or leg swelling  GASTROINTESTINAL: No abdominal or epigastric pain. No nausea, vomiting, or hematemesis; No diarrhea or constipation. No melena or hematochezia.  GENITOURINARY: No dysuria, frequency, hematuria, or incontinence  NEUROLOGICAL: No headaches, memory loss, loss of strength, numbness, or tremors  SKIN: No itching, burning, rashes, or lesions   LYMPH NODES: No enlarged glands  ENDOCRINE: No heat or cold intolerance; No hair loss  MUSCULOSKELETAL: No joint pain or swelling; No muscle, back, or extremity pain  PSYCHIATRIC: No depression, anxiety, mood swings, or difficulty sleeping  HEME/LYMPH: No easy bruising, or bleeding gums  ALLERY AND IMMUNOLOGIC: No hives or eczema    RADIOLOGY & ADDITIONAL TESTS:    Imaging Personally Reviewed:  [ ] YES  [ ] NO    Consultant(s) Notes Reviewed:  [ ] YES  [ ] NO    PHYSICAL EXAM:  GENERAL: NAD, well-groomed, well-developed  HEAD:  Atraumatic, Normocephalic  EYES: EOMI, PERRLA, conjunctiva and sclera clear  ENMT: No tonsillar erythema, exudates, or enlargement; Moist mucous membranes, Good dentition, No lesions  NECK: Supple, No JVD, Normal thyroid  NERVOUS SYSTEM:  Alert & Oriented X3, Good concentration; Motor Strength 5/5 B/L upper and lower extremities; DTRs 2+ intact and symmetric  CHEST/LUNG: Clear to percussion bilaterally; No rales, rhonchi, wheezing, or rubs  HEART: Regular rate and rhythm; No murmurs, rubs, or gallops  ABDOMEN: Soft, Nontender, Nondistended; Bowel sounds present  EXTREMITIES:  2+ Peripheral Pulses, No clubbing, cyanosis, or edema  LYMPH: No lymphadenopathy noted  SKIN: No rashes or lesions    LABS:                        15.8   8.56  )-----------( 180      ( 26 Sep 2022 14:09 )             48.5     09-26    140  |  105  |  13  ----------------------------<  102<H>  4.0   |  25  |  1.02    Ca    9.9      26 Sep 2022 14:09    TPro  7.4  /  Alb  4.5  /  TBili  0.8  /  DBili  x   /  AST  19  /  ALT  23  /  AlkPhos  65  09-26        CAPILLARY BLOOD GLUCOSE                MEDICATIONS  (STANDING):  heparin   Injectable 5000 Unit(s) SubCutaneous every 12 hours  influenza   Vaccine 0.5 milliLiter(s) IntraMuscular once  pantoprazole    Tablet 40 milliGRAM(s) Oral before breakfast    MEDICATIONS  (PRN):  acetaminophen     Tablet .. 650 milliGRAM(s) Oral every 6 hours PRN Temp greater or equal to 38C (100.4F), Moderate Pain (4 - 6)      Care Discussed with Consultants/Other Providers [ ] YES  [ ] NO

## 2022-09-28 ENCOUNTER — APPOINTMENT (OUTPATIENT)
Dept: NEUROSURGERY | Facility: CLINIC | Age: 62
End: 2022-09-28

## 2022-09-28 LAB
HCV AB S/CO SERPL IA: 0.25 S/CO — SIGNIFICANT CHANGE UP (ref 0–0.99)
HCV AB SERPL-IMP: SIGNIFICANT CHANGE UP

## 2022-09-28 PROCEDURE — 70553 MRI BRAIN STEM W/O & W/DYE: CPT | Mod: 26

## 2022-09-28 RX ADMIN — LEVETIRACETAM 500 MILLIGRAM(S): 250 TABLET, FILM COATED ORAL at 18:00

## 2022-09-28 RX ADMIN — Medication 1 MILLIGRAM(S): at 20:23

## 2022-09-28 RX ADMIN — HEPARIN SODIUM 5000 UNIT(S): 5000 INJECTION INTRAVENOUS; SUBCUTANEOUS at 06:37

## 2022-09-28 RX ADMIN — PANTOPRAZOLE SODIUM 40 MILLIGRAM(S): 20 TABLET, DELAYED RELEASE ORAL at 06:36

## 2022-09-28 RX ADMIN — Medication 4 MILLIGRAM(S): at 18:00

## 2022-09-28 RX ADMIN — LEVETIRACETAM 500 MILLIGRAM(S): 250 TABLET, FILM COATED ORAL at 06:36

## 2022-09-28 RX ADMIN — HEPARIN SODIUM 5000 UNIT(S): 5000 INJECTION INTRAVENOUS; SUBCUTANEOUS at 18:00

## 2022-09-28 RX ADMIN — Medication 4 MILLIGRAM(S): at 06:37

## 2022-09-28 RX ADMIN — Medication 4 MILLIGRAM(S): at 11:43

## 2022-09-28 RX ADMIN — Medication 4 MILLIGRAM(S): at 01:00

## 2022-09-28 NOTE — PROGRESS NOTE ADULT - ASSESSMENT
A/P     Brain mass / Blurry vision/ hemianopsia   -pt. was seen by neurosurgery and neurology team few days ago in ED and at that time he signed out AM   MRI brain done   neurosurgery following   started on keppra and IV decadrone     HA :  -tylenol prn     dispo: Opthalmology consult

## 2022-09-28 NOTE — PROGRESS NOTE ADULT - SUBJECTIVE AND OBJECTIVE BOX
Patient seen and examined at bedside.    --Anticoagulation--  heparin   Injectable 5000 Unit(s) SubCutaneous every 12 hours    T(C): 36.8 (09-28-22 @ 00:07), Max: 36.8 (09-28-22 @ 00:07)  HR: 70 (09-28-22 @ 00:07) (69 - 72)  BP: 100/63 (09-28-22 @ 00:07) (100/63 - 113/72)  RR: 18 (09-28-22 @ 00:07) (16 - 18)  SpO2: 96% (09-28-22 @ 00:07) (96% - 98%)  Wt(kg): --    Exam: AO3, PERRL, EOMI, no droop/drift, R homonymous hemianopsia, LOUISE 5/5, SILT

## 2022-09-28 NOTE — PROGRESS NOTE ADULT - SUBJECTIVE AND OBJECTIVE BOX
Patient is a 62y old  Male who presents with a chief complaint of HA/ brain mass (28 Sep 2022 05:27)      INTERVAL HPI/OVERNIGHT EVENTS:  T(C): 36.3 (09-28-22 @ 09:06), Max: 36.8 (09-28-22 @ 00:07)  HR: 75 (09-28-22 @ 09:06) (70 - 75)  BP: 122/74 (09-28-22 @ 09:06) (100/63 - 122/74)  RR: 18 (09-28-22 @ 09:06) (18 - 18)  SpO2: 98% (09-28-22 @ 09:06) (96% - 98%)  Wt(kg): --  I&O's Summary      PAST MEDICAL & SURGICAL HISTORY:  No pertinent past medical history      No significant past surgical history          SOCIAL HISTORY  Alcohol:  Tobacco:  Illicit substance use:    FAMILY HISTORY:    REVIEW OF SYSTEMS:  CONSTITUTIONAL: No fever, weight loss, or fatigue  EYES: No eye pain, visual disturbances, or discharge  ENMT:  No difficulty hearing, tinnitus, vertigo; No sinus or throat pain  NECK: No pain or stiffness  RESPIRATORY: No cough, wheezing, chills or hemoptysis; No shortness of breath  CARDIOVASCULAR: No chest pain, palpitations, dizziness, or leg swelling  GASTROINTESTINAL: No abdominal or epigastric pain. No nausea, vomiting, or hematemesis; No diarrhea or constipation. No melena or hematochezia.  GENITOURINARY: No dysuria, frequency, hematuria, or incontinence  NEUROLOGICAL: No headaches, memory loss, loss of strength, numbness, or tremors  SKIN: No itching, burning, rashes, or lesions   LYMPH NODES: No enlarged glands  ENDOCRINE: No heat or cold intolerance; No hair loss  MUSCULOSKELETAL: No joint pain or swelling; No muscle, back, or extremity pain  PSYCHIATRIC: No depression, anxiety, mood swings, or difficulty sleeping  HEME/LYMPH: No easy bruising, or bleeding gums  ALLERY AND IMMUNOLOGIC: No hives or eczema    RADIOLOGY & ADDITIONAL TESTS:    Imaging Personally Reviewed:  [ ] YES  [ ] NO    Consultant(s) Notes Reviewed:  [ ] YES  [ ] NO    PHYSICAL EXAM:  GENERAL: NAD, well-groomed, well-developed  HEAD:  Atraumatic, Normocephalic  EYES: EOMI, PERRLA, conjunctiva and sclera clear  ENMT: No tonsillar erythema, exudates, or enlargement; Moist mucous membranes, Good dentition, No lesions  NECK: Supple, No JVD, Normal thyroid  NERVOUS SYSTEM:  Alert & Oriented X3, Good concentration; Motor Strength 5/5 B/L upper and lower extremities; DTRs 2+ intact and symmetric  CHEST/LUNG: Clear to percussion bilaterally; No rales, rhonchi, wheezing, or rubs  HEART: Regular rate and rhythm; No murmurs, rubs, or gallops  ABDOMEN: Soft, Nontender, Nondistended; Bowel sounds present  EXTREMITIES:  2+ Peripheral Pulses, No clubbing, cyanosis, or edema  LYMPH: No lymphadenopathy noted  SKIN: No rashes or lesions    LABS:              CAPILLARY BLOOD GLUCOSE                MEDICATIONS  (STANDING):  dexAMETHasone  Injectable 4 milliGRAM(s) IV Push every 6 hours  heparin   Injectable 5000 Unit(s) SubCutaneous every 12 hours  influenza   Vaccine 0.5 milliLiter(s) IntraMuscular once  levETIRAcetam 500 milliGRAM(s) Oral two times a day  pantoprazole    Tablet 40 milliGRAM(s) Oral before breakfast    MEDICATIONS  (PRN):  acetaminophen     Tablet .. 650 milliGRAM(s) Oral every 6 hours PRN Temp greater or equal to 38C (100.4F), Moderate Pain (4 - 6)      Care Discussed with Consultants/Other Providers [ ] YES  [ ] NO Patient is a 62y old  Male who presents with a chief complaint of HA/ brain mass (28 Sep 2022 05:27)      INTERVAL HPI/OVERNIGHT EVENTS: seen and examined   T(C): 36.3 (09-28-22 @ 09:06), Max: 36.8 (09-28-22 @ 00:07)  HR: 75 (09-28-22 @ 09:06) (70 - 75)  BP: 122/74 (09-28-22 @ 09:06) (100/63 - 122/74)  RR: 18 (09-28-22 @ 09:06) (18 - 18)  SpO2: 98% (09-28-22 @ 09:06) (96% - 98%)  Wt(kg): --  I&O's Summary      PAST MEDICAL & SURGICAL HISTORY:  No pertinent past medical history      No significant past surgical history          SOCIAL HISTORY  Alcohol:  Tobacco:  Illicit substance use:    FAMILY HISTORY:    REVIEW OF SYSTEMS:  CONSTITUTIONAL: No fever, weight loss, or fatigue  EYES: No eye pain, visual disturbances, or discharge  ENMT:  No difficulty hearing, tinnitus, vertigo; No sinus or throat pain  NECK: No pain or stiffness  RESPIRATORY: No cough, wheezing, chills or hemoptysis; No shortness of breath  CARDIOVASCULAR: No chest pain, palpitations, dizziness, or leg swelling  GASTROINTESTINAL: No abdominal or epigastric pain. No nausea, vomiting, or hematemesis; No diarrhea or constipation. No melena or hematochezia.  GENITOURINARY: No dysuria, frequency, hematuria, or incontinence  NEUROLOGICAL: No headaches, memory loss, loss of strength, numbness, or tremors  SKIN: No itching, burning, rashes, or lesions   LYMPH NODES: No enlarged glands  ENDOCRINE: No heat or cold intolerance; No hair loss  MUSCULOSKELETAL: No joint pain or swelling; No muscle, back, or extremity pain  PSYCHIATRIC: No depression, anxiety, mood swings, or difficulty sleeping  HEME/LYMPH: No easy bruising, or bleeding gums  ALLERY AND IMMUNOLOGIC: No hives or eczema    RADIOLOGY & ADDITIONAL TESTS:    Imaging Personally Reviewed:  [ ] YES  [ ] NO    Consultant(s) Notes Reviewed:  [ ] YES  [ ] NO    PHYSICAL EXAM:  GENERAL: NAD, well-groomed, well-developed  HEAD:  Atraumatic, Normocephalic  EYES: EOMI, PERRLA, conjunctiva and sclera clear  ENMT: No tonsillar erythema, exudates, or enlargement; Moist mucous membranes, Good dentition, No lesions  NECK: Supple, No JVD, Normal thyroid  NERVOUS SYSTEM:  Alert & Oriented X3, Good concentration; Motor Strength 5/5 B/L upper and lower extremities; DTRs 2+ intact and symmetric  CHEST/LUNG: Clear to percussion bilaterally; No rales, rhonchi, wheezing, or rubs  HEART: Regular rate and rhythm; No murmurs, rubs, or gallops  ABDOMEN: Soft, Nontender, Nondistended; Bowel sounds present  EXTREMITIES:  2+ Peripheral Pulses, No clubbing, cyanosis, or edema  LYMPH: No lymphadenopathy noted  SKIN: No rashes or lesions    LABS:              CAPILLARY BLOOD GLUCOSE                MEDICATIONS  (STANDING):  dexAMETHasone  Injectable 4 milliGRAM(s) IV Push every 6 hours  heparin   Injectable 5000 Unit(s) SubCutaneous every 12 hours  influenza   Vaccine 0.5 milliLiter(s) IntraMuscular once  levETIRAcetam 500 milliGRAM(s) Oral two times a day  pantoprazole    Tablet 40 milliGRAM(s) Oral before breakfast    MEDICATIONS  (PRN):  acetaminophen     Tablet .. 650 milliGRAM(s) Oral every 6 hours PRN Temp greater or equal to 38C (100.4F), Moderate Pain (4 - 6)      Care Discussed with Consultants/Other Providers [ ] YES  [ ] NO

## 2022-09-29 RX ORDER — CHLORHEXIDINE GLUCONATE 213 G/1000ML
1 SOLUTION TOPICAL DAILY
Refills: 0 | Status: DISCONTINUED | OUTPATIENT
Start: 2022-09-29 | End: 2022-10-03

## 2022-09-29 RX ADMIN — Medication 4 MILLIGRAM(S): at 00:17

## 2022-09-29 RX ADMIN — Medication 4 MILLIGRAM(S): at 23:04

## 2022-09-29 RX ADMIN — Medication 4 MILLIGRAM(S): at 06:16

## 2022-09-29 RX ADMIN — Medication 650 MILLIGRAM(S): at 23:04

## 2022-09-29 RX ADMIN — HEPARIN SODIUM 5000 UNIT(S): 5000 INJECTION INTRAVENOUS; SUBCUTANEOUS at 06:17

## 2022-09-29 RX ADMIN — Medication 4 MILLIGRAM(S): at 17:45

## 2022-09-29 RX ADMIN — LEVETIRACETAM 500 MILLIGRAM(S): 250 TABLET, FILM COATED ORAL at 17:46

## 2022-09-29 RX ADMIN — CHLORHEXIDINE GLUCONATE 1 APPLICATION(S): 213 SOLUTION TOPICAL at 12:22

## 2022-09-29 RX ADMIN — PANTOPRAZOLE SODIUM 40 MILLIGRAM(S): 20 TABLET, DELAYED RELEASE ORAL at 06:18

## 2022-09-29 RX ADMIN — LEVETIRACETAM 500 MILLIGRAM(S): 250 TABLET, FILM COATED ORAL at 06:18

## 2022-09-29 RX ADMIN — Medication 4 MILLIGRAM(S): at 12:22

## 2022-09-29 RX ADMIN — HEPARIN SODIUM 5000 UNIT(S): 5000 INJECTION INTRAVENOUS; SUBCUTANEOUS at 17:45

## 2022-09-29 NOTE — CONSULT NOTE ADULT - ASSESSMENT
Assessment and Recommendations:  62y male w/ no pmhx/ochx consulted for baseline ophthalmic exam in setting of known mass in L temporal occipital region. Pt initially sent in by her outpatient ophthalmologist who found "hemianopic defect" on HVF. Pt found to have subtle R homonymous hemianopsia on confrontational visual field testing.     1. R homonymous hemianopsia 2/2 brain mass  - Visual acuity (sc): 20/20 OD, 20/25 OS, Pupils: PERRL OU, no APD, Ttono: 13 OD, 16 OS, Extraocular movements (EOMs): Full OU, no pain, no diplopia, Confrontational Visual Field (CVF): Full OU, though pt states there are areas of his R VF that he cannot see OU, Color Plates: 12/12 OU (pt shifts plates towards his L VF OU to see plates).   - Anterior exam and DFE unremarkable  - MRI brain: irregularly ring-enhancing mass left temporal occipital, region adjacent to the atrium of the lateral ventricle with subependymal spread and significant vasogenic edema likely represents a neoplasm. Favor glioblastoma. Mild midline shift to the right.  - Per neurosurgery, plan for OR Monday for tumor biopsy/resection  - Medical management per neurosurgery.   - Pt should follow up as an outpatient for serial HVF testing.     Outpatient follow-up: Patient should follow-up with his/her ophthalmologist or with [facility] Department of Ophthalmology at the address below     [address] [place] [address]. [address]  [place] [address]  [phone]    Case to bed discussed with Dr. Narvaez, neuro-ophthalmologist.     Gissel CORDERO Rai, MD  PGY-3, Ophthalmology  [phone]    Also available on [employer] Teams. Assessment and Recommendations:  62y male w/ no pmhx/ochx consulted for baseline ophthalmic exam in setting of known mass in L temporal occipital region. Pt initially sent in by her outpatient ophthalmologist who found "hemianopic defect" on HVF. Pt found to have subtle R homonymous hemianopsia on confrontational visual field testing.     1. R homonymous hemianopsia 2/2 brain mass  - Visual acuity (sc): 20/20 OD, 20/25 OS, Pupils: PERRL OU, no APD, Ttono: 13 OD, 16 OS, Extraocular movements (EOMs): Full OU, no pain, no diplopia, Confrontational Visual Field (CVF): Full OU, though pt states there are areas of his R VF that he cannot see OU, Color Plates: 12/12 OU (pt shifts plates towards his L VF OU to see plates).   - Anterior exam and DFE unremarkable  - MRI brain: irregularly ring-enhancing mass left temporal occipital, region adjacent to the atrium of the lateral ventricle with subependymal spread and significant vasogenic edema likely represents a neoplasm. Favor glioblastoma. Mild midline shift to the right.  - Per neurosurgery, plan for OR Monday for tumor biopsy/resection  - Medical management per neurosurgery.   - Pt should follow up as an outpatient for serial HVF testing.     Outpatient follow-up: Patient should follow-up with his/her ophthalmologist or with [facility] Department of Ophthalmology at the address below     [address] [place] [address]. [address]  [place] [address]  [phone]    Case to be discussed with Dr. Narvaez, neuro-ophthalmologist.     Gissel CORDERO Rai, MD  PGY-3, Ophthalmology  [phone]    Also available on [employer] Teams.

## 2022-09-29 NOTE — PROGRESS NOTE ADULT - SUBJECTIVE AND OBJECTIVE BOX
Patient is a 62y old  Male who presents with a chief complaint of HA/ brain mass (29 Sep 2022 05:14)      INTERVAL HPI/OVERNIGHT EVENTS:  T(C): 36.7 (09-29-22 @ 15:55), Max: 36.7 (09-29-22 @ 15:55)  HR: 55 (09-29-22 @ 15:55) (55 - 89)  BP: 121/61 (09-29-22 @ 15:55) (102/73 - 121/75)  RR: 18 (09-29-22 @ 15:55) (18 - 18)  SpO2: 95% (09-29-22 @ 15:55) (94% - 96%)  Wt(kg): --  I&O's Summary      PAST MEDICAL & SURGICAL HISTORY:  No pertinent past medical history      No significant past surgical history          SOCIAL HISTORY  Alcohol:  Tobacco:  Illicit substance use:    FAMILY HISTORY:    REVIEW OF SYSTEMS:  CONSTITUTIONAL: No fever, weight loss, or fatigue  EYES: No eye pain, visual disturbances, or discharge  ENMT:  No difficulty hearing, tinnitus, vertigo; No sinus or throat pain  NECK: No pain or stiffness  RESPIRATORY: No cough, wheezing, chills or hemoptysis; No shortness of breath  CARDIOVASCULAR: No chest pain, palpitations, dizziness, or leg swelling  GASTROINTESTINAL: No abdominal or epigastric pain. No nausea, vomiting, or hematemesis; No diarrhea or constipation. No melena or hematochezia.  GENITOURINARY: No dysuria, frequency, hematuria, or incontinence  NEUROLOGICAL: No headaches, memory loss, loss of strength, numbness, or tremors  SKIN: No itching, burning, rashes, or lesions   LYMPH NODES: No enlarged glands  ENDOCRINE: No heat or cold intolerance; No hair loss  MUSCULOSKELETAL: No joint pain or swelling; No muscle, back, or extremity pain  PSYCHIATRIC: No depression, anxiety, mood swings, or difficulty sleeping  HEME/LYMPH: No easy bruising, or bleeding gums  ALLERY AND IMMUNOLOGIC: No hives or eczema    RADIOLOGY & ADDITIONAL TESTS:    Imaging Personally Reviewed:  [ ] YES  [ ] NO    Consultant(s) Notes Reviewed:  [ ] YES  [ ] NO    PHYSICAL EXAM:  GENERAL: NAD, well-groomed, well-developed  HEAD:  Atraumatic, Normocephalic  EYES: EOMI, PERRLA, conjunctiva and sclera clear  ENMT: No tonsillar erythema, exudates, or enlargement; Moist mucous membranes, Good dentition, No lesions  NECK: Supple, No JVD, Normal thyroid  NERVOUS SYSTEM:  Alert & Oriented X3, Good concentration; Motor Strength 5/5 B/L upper and lower extremities; DTRs 2+ intact and symmetric  CHEST/LUNG: Clear to percussion bilaterally; No rales, rhonchi, wheezing, or rubs  HEART: Regular rate and rhythm; No murmurs, rubs, or gallops  ABDOMEN: Soft, Nontender, Nondistended; Bowel sounds present  EXTREMITIES:  2+ Peripheral Pulses, No clubbing, cyanosis, or edema  LYMPH: No lymphadenopathy noted  SKIN: No rashes or lesions    LABS:              CAPILLARY BLOOD GLUCOSE                MEDICATIONS  (STANDING):  chlorhexidine 2% Cloths 1 Application(s) Topical daily  dexAMETHasone  Injectable 4 milliGRAM(s) IV Push every 6 hours  heparin   Injectable 5000 Unit(s) SubCutaneous every 12 hours  influenza   Vaccine 0.5 milliLiter(s) IntraMuscular once  levETIRAcetam 500 milliGRAM(s) Oral two times a day  pantoprazole    Tablet 40 milliGRAM(s) Oral before breakfast    MEDICATIONS  (PRN):  acetaminophen     Tablet .. 650 milliGRAM(s) Oral every 6 hours PRN Temp greater or equal to 38C (100.4F), Moderate Pain (4 - 6)      Care Discussed with Consultants/Other Providers [ ] YES  [ ] NO Patient is a 62y old  Male who presents with a chief complaint of HA/ brain mass (29 Sep 2022 05:14)      INTERVAL HPI/OVERNIGHT EVENTS: seen and examined, scheduled for roney surgery on Monday   T(C): 36.7 (09-29-22 @ 15:55), Max: 36.7 (09-29-22 @ 15:55)  HR: 55 (09-29-22 @ 15:55) (55 - 89)  BP: 121/61 (09-29-22 @ 15:55) (102/73 - 121/75)  RR: 18 (09-29-22 @ 15:55) (18 - 18)  SpO2: 95% (09-29-22 @ 15:55) (94% - 96%)  Wt(kg): --  I&O's Summary      PAST MEDICAL & SURGICAL HISTORY:  No pertinent past medical history      No significant past surgical history          SOCIAL HISTORY  Alcohol:  Tobacco:  Illicit substance use:    FAMILY HISTORY:    REVIEW OF SYSTEMS:  CONSTITUTIONAL: No fever, weight loss, or fatigue  EYES: No eye pain, visual disturbances, or discharge  ENMT:  No difficulty hearing, tinnitus, vertigo; No sinus or throat pain  NECK: No pain or stiffness  RESPIRATORY: No cough, wheezing, chills or hemoptysis; No shortness of breath  CARDIOVASCULAR: No chest pain, palpitations, dizziness, or leg swelling  GASTROINTESTINAL: No abdominal or epigastric pain. No nausea, vomiting, or hematemesis; No diarrhea or constipation. No melena or hematochezia.  GENITOURINARY: No dysuria, frequency, hematuria, or incontinence  NEUROLOGICAL: No headaches, memory loss, loss of strength, numbness, or tremors  SKIN: No itching, burning, rashes, or lesions   LYMPH NODES: No enlarged glands  ENDOCRINE: No heat or cold intolerance; No hair loss  MUSCULOSKELETAL: No joint pain or swelling; No muscle, back, or extremity pain  PSYCHIATRIC: No depression, anxiety, mood swings, or difficulty sleeping  HEME/LYMPH: No easy bruising, or bleeding gums  ALLERY AND IMMUNOLOGIC: No hives or eczema    RADIOLOGY & ADDITIONAL TESTS:    Imaging Personally Reviewed:  [ ] YES  [ ] NO    Consultant(s) Notes Reviewed:  [ ] YES  [ ] NO    PHYSICAL EXAM:  GENERAL: NAD, well-groomed, well-developed  HEAD:  Atraumatic, Normocephalic  EYES: EOMI, PERRLA, conjunctiva and sclera clear  ENMT: No tonsillar erythema, exudates, or enlargement; Moist mucous membranes, Good dentition, No lesions  NECK: Supple, No JVD, Normal thyroid  NERVOUS SYSTEM:  Alert & Oriented X3, Good concentration; Motor Strength 5/5 B/L upper and lower extremities; DTRs 2+ intact and symmetric  CHEST/LUNG: Clear to percussion bilaterally; No rales, rhonchi, wheezing, or rubs  HEART: Regular rate and rhythm; No murmurs, rubs, or gallops  ABDOMEN: Soft, Nontender, Nondistended; Bowel sounds present  EXTREMITIES:  2+ Peripheral Pulses, No clubbing, cyanosis, or edema  LYMPH: No lymphadenopathy noted  SKIN: No rashes or lesions    LABS:              CAPILLARY BLOOD GLUCOSE                MEDICATIONS  (STANDING):  chlorhexidine 2% Cloths 1 Application(s) Topical daily  dexAMETHasone  Injectable 4 milliGRAM(s) IV Push every 6 hours  heparin   Injectable 5000 Unit(s) SubCutaneous every 12 hours  influenza   Vaccine 0.5 milliLiter(s) IntraMuscular once  levETIRAcetam 500 milliGRAM(s) Oral two times a day  pantoprazole    Tablet 40 milliGRAM(s) Oral before breakfast    MEDICATIONS  (PRN):  acetaminophen     Tablet .. 650 milliGRAM(s) Oral every 6 hours PRN Temp greater or equal to 38C (100.4F), Moderate Pain (4 - 6)      Care Discussed with Consultants/Other Providers [ ] YES  [ ] NO

## 2022-09-29 NOTE — PROGRESS NOTE ADULT - ASSESSMENT
Rachel Del  62M R handed, no hx, originally sent in on 9/21 by ophthalmologist for R visual blurriness since 9/19, found to have "hemianopic defect" at outpt ophto testing today. CTH then showed c/f underlying L TP mass w/ edema, likely primary brain tumor. He left AMA (difficult social situation) and now represents with HA. MRI here shows 4 x 2.5 x 4cm ring-enhancing mass in L temporal occipital region w/ surrounding edema. He endorses mixing up words at times, but this was not seen on current exam. Repetition and naming was intact. Exam: AO3, PERRL, EOMI, no droop/drift, R homonymous hemianopsia, LOUISE 5/5, SILT     - Plan for OR Monday for tumor biopsy/resection  - MR brain with DTI synaptive protocol completed  - CT CAP negative for neoplasm  - Dex4q6  - Keppra 500 BID

## 2022-09-29 NOTE — PROGRESS NOTE ADULT - SUBJECTIVE AND OBJECTIVE BOX
Patient seen and examined at bedside.    --Anticoagulation--  heparin   Injectable 5000 Unit(s) SubCutaneous every 12 hours    T(C): 36.4 (09-28-22 @ 23:52), Max: 36.4 (09-28-22 @ 23:52)  HR: 89 (09-28-22 @ 23:52) (75 - 89)  BP: 102/73 (09-28-22 @ 23:52) (102/73 - 122/74)  RR: 18 (09-28-22 @ 23:52) (18 - 18)  SpO2: 94% (09-28-22 @ 23:52) (94% - 98%)  Wt(kg): --    Exam: AO3, PERRL, EOMI, no droop/drift, R homonymous hemianopsia, LOUISE 5/5, SILT

## 2022-09-29 NOTE — CONSULT NOTE ADULT - SUBJECTIVE AND OBJECTIVE BOX
[facility] DEPARTMENT OF OPHTHALMOLOGY - INITIAL ADULT CONSULT  ------------------------------------------------------------------------------------------------------------    HPI:   Patient is a 63 y/o M with no sig PMHx who presents to the ED with headache. On 9/21/22 was seen in this ED for HA x4 days that woke him from sleep with R eye visual defect (hemianopic, dx by Optho). Imaging done then c/f L brain mass with vasogenic edema. Patient evaluated by Neurology and Neurosurgery team. Recommended further imaging, patient signed out out AMA as he had to go home to take care of his mother. Symptoms persisted since then. Has appt with Nsurgx on wed but not sure when he can have MRI done. (26 Sep 2022 16:25)    Interval History: Pt states he notices part of R visual field is missing in both eyes. Also endorses some word finding difficulties. States that he has hx of dry eye for his entire life.     PAST MEDICAL & SURGICAL HISTORY:  No pertinent past medical history      No significant past surgical history        Past Ocular History: Dry eye  Ophthalmic Medications: Visine PRN  FAMILY HISTORY: None    Social History: No substance use    MEDICATIONS  (STANDING):  dexAMETHasone  Injectable 4 milliGRAM(s) IV Push every 6 hours  heparin   Injectable 5000 Unit(s) SubCutaneous every 12 hours  influenza   Vaccine 0.5 milliLiter(s) IntraMuscular once  levETIRAcetam 500 milliGRAM(s) Oral two times a day  pantoprazole    Tablet 40 milliGRAM(s) Oral before breakfast    MEDICATIONS  (PRN):  acetaminophen     Tablet .. 650 milliGRAM(s) Oral every 6 hours PRN Temp greater or equal to 38C (100.4F), Moderate Pain (4 - 6)    Allergies & Intolerances:     Review of Systems:  Constitutional: No fever, chills  Eyes: No blurry vision, flashes, floaters, FBS, erythema, discharge, double vision, OU. R visual field loss OU  Neuro: No tremors  Cardiovascular: No chest pain, palpitations  Respiratory: No SOB, no cough  GI: No nausea, vomiting, abdominal pain  : No dysuria  Skin: no rash  Psych: no depression  Endocrine: no polyuria, polydipsia  Heme/lymph: no swelling    VITALS: T(C): 36.4 (09-28-22 @ 23:52)  T(F): 97.5 (09-28-22 @ 23:52), Max: 97.5 (09-28-22 @ 23:52)  HR: 89 (09-28-22 @ 23:52) (75 - 89)  BP: 102/73 (09-28-22 @ 23:52) (102/73 - 122/74)  RR:  (18 - 18)  SpO2:  (94% - 98%)  Wt(kg): --  General: AAO x 3, appropriate mood and affect    Ophthalmology Exam:  Visual acuity (sc): 20/20 OD, 20/25 OS  Pupils: PERRL OU, no APD  Ttono: 13 OD, 16 OS  Extraocular movements (EOMs): Full OU, no pain, no diplopia  Confrontational Visual Field (CVF): Full OU, though pt states there are areas of his R VF that he cannot see OU  Color Plates: 12/12 OU (pt shifts plates towards his L VF OU.     Pen Light Exam (PLE)  External: Flat OU  Lids/Lashes/Lacrimal Ducts: Flat OU    Sclera/Conjunctiva: W+Q OU  Cornea: Cl OU  Anterior Chamber: D+F OU    Iris: Flat OU  Lens: Cl OU    Fundus Exam: dilated with 1% tropicamide and 2.5% phenylephrine  Approval obtained from primary team for dilation  Patient aware that pupils can remained dilated for at least 4-6 hours  Exam performed with 20D lens    Vitreous: wnl OU  Disc, cup/disc: sharp and pink, 0.4 OU  Macula: wnl OU  Vessels: wnl OU  Periphery: wnl OU    Labs/Imaging:    ACC: [id number] EXAM:  MR BRAIN [facility]                          PROCEDURE DATE:  09/27/2022      IMPRESSION: Irregularly ring-enhancing mass left temporal occipital   region adjacent to the atrium of the lateral ventricle with subependymal   spread and significant vasogenic edema likely represents a neoplasm.   Favor glioblastoma. Mild midline shift to the right.     [facility] DEPARTMENT OF OPHTHALMOLOGY - INITIAL ADULT CONSULT  ------------------------------------------------------------------------------------------------------------    HPI:   Patient is a 61 y/o M with no sig PMHx who presents to the ED with headache. On 9/21/22 was seen in this ED for HA x4 days that woke him from sleep with R eye visual defect (hemianopic, dx by Optho). Imaging done then c/f L brain mass with vasogenic edema. Patient evaluated by Neurology and Neurosurgery team. Recommended further imaging, patient signed out out AMA as he had to go home to take care of his mother. Symptoms persisted since then. Has appt with Nsurgx on wed but not sure when he can have MRI done. (26 Sep 2022 16:25)    Interval History: Pt states he notices part of R visual field is missing in both eyes. Also endorses some word finding difficulties. States that he has hx of dry eye for his entire life.     PAST MEDICAL & SURGICAL HISTORY:  No pertinent past medical history      No significant past surgical history        Past Ocular History: Dry eye  Ophthalmic Medications: Visine PRN  FAMILY HISTORY: None    Social History: No substance use    MEDICATIONS  (STANDING):  dexAMETHasone  Injectable 4 milliGRAM(s) IV Push every 6 hours  heparin   Injectable 5000 Unit(s) SubCutaneous every 12 hours  influenza   Vaccine 0.5 milliLiter(s) IntraMuscular once  levETIRAcetam 500 milliGRAM(s) Oral two times a day  pantoprazole    Tablet 40 milliGRAM(s) Oral before breakfast    MEDICATIONS  (PRN):  acetaminophen     Tablet .. 650 milliGRAM(s) Oral every 6 hours PRN Temp greater or equal to 38C (100.4F), Moderate Pain (4 - 6)    Allergies & Intolerances:     Review of Systems:  Constitutional: No fever, chills  Eyes: No blurry vision, flashes, floaters, FBS, erythema, discharge, double vision, OU. R visual field loss OU  Neuro: No tremors  Cardiovascular: No chest pain, palpitations  Respiratory: No SOB, no cough  GI: No nausea, vomiting, abdominal pain  : No dysuria  Skin: no rash  Psych: no depression  Endocrine: no polyuria, polydipsia  Heme/lymph: no swelling    VITALS: T(C): 36.4 (09-28-22 @ 23:52)  T(F): 97.5 (09-28-22 @ 23:52), Max: 97.5 (09-28-22 @ 23:52)  HR: 89 (09-28-22 @ 23:52) (75 - 89)  BP: 102/73 (09-28-22 @ 23:52) (102/73 - 122/74)  RR:  (18 - 18)  SpO2:  (94% - 98%)  Wt(kg): --  General: AAO x 3, appropriate mood and affect    Ophthalmology Exam:  Visual acuity (sc): 20/20 OD, 20/25 OS  Pupils: PERRL OU, no APD  Ttono: 13 OD, 16 OS  Extraocular movements (EOMs): Full OU, no pain, no diplopia  Confrontational Visual Field (CVF): Full OU, though pt states there are areas of his R VF that he cannot see OU  Color Plates: 12/12 OU (pt shifts plates towards his L VF OU to see plates).     Pen Light Exam (PLE)  External: Flat OU  Lids/Lashes/Lacrimal Ducts: Flat OU    Sclera/Conjunctiva: W+Q OU  Cornea: Cl OU  Anterior Chamber: D+F OU    Iris: Flat OU  Lens: NS OU    Fundus Exam: dilated with 1% tropicamide and 2.5% phenylephrine  Approval obtained from primary team for dilation  Patient aware that pupils can remained dilated for at least 4-6 hours  Exam performed with 20D lens    Vitreous: wnl OU  Disc, cup/disc: sharp and pink, 0.3 OU  Macula: wnl OU  Vessels: wnl OU  Periphery: wnl OU    Labs/Imaging:    ACC: [id number] EXAM:  MR BRAIN [facility]                          PROCEDURE DATE:  09/27/2022      IMPRESSION: Irregularly ring-enhancing mass left temporal occipital   region adjacent to the atrium of the lateral ventricle with subependymal   spread and significant vasogenic edema likely represents a neoplasm.   Favor glioblastoma. Mild midline shift to the right.

## 2022-09-29 NOTE — PROGRESS NOTE ADULT - ASSESSMENT
A/P     Brain mass / Blurry vision/ hemianopsia   -pt. was seen by neurosurgery and neurology team few days ago in ED and at that time he signed out AM   MRI brain done   neurosurgery following : scheduled for brain surgery on Monday   started on keppra and IV decadrone   seen by opthalmology     HA :  -tylenol prn     dispo: will consult cardio Dr. dyer for preop cardiac eval

## 2022-09-30 LAB
ANION GAP SERPL CALC-SCNC: 10 MMOL/L — SIGNIFICANT CHANGE UP (ref 5–17)
BUN SERPL-MCNC: 17 MG/DL — SIGNIFICANT CHANGE UP (ref 7–23)
CALCIUM SERPL-MCNC: 9.6 MG/DL — SIGNIFICANT CHANGE UP (ref 8.4–10.5)
CHLORIDE SERPL-SCNC: 103 MMOL/L — SIGNIFICANT CHANGE UP (ref 96–108)
CO2 SERPL-SCNC: 25 MMOL/L — SIGNIFICANT CHANGE UP (ref 22–31)
CREAT SERPL-MCNC: 0.9 MG/DL — SIGNIFICANT CHANGE UP (ref 0.5–1.3)
EGFR: 95 ML/MIN/1.73M2 — SIGNIFICANT CHANGE UP
GLUCOSE SERPL-MCNC: 121 MG/DL — HIGH (ref 70–99)
HCT VFR BLD CALC: 44.8 % — SIGNIFICANT CHANGE UP (ref 39–50)
HGB BLD-MCNC: 14.8 G/DL — SIGNIFICANT CHANGE UP (ref 13–17)
MCHC RBC-ENTMCNC: 29.1 PG — SIGNIFICANT CHANGE UP (ref 27–34)
MCHC RBC-ENTMCNC: 33 GM/DL — SIGNIFICANT CHANGE UP (ref 32–36)
MCV RBC AUTO: 88.2 FL — SIGNIFICANT CHANGE UP (ref 80–100)
NRBC # BLD: 0 /100 WBCS — SIGNIFICANT CHANGE UP (ref 0–0)
PLATELET # BLD AUTO: 213 K/UL — SIGNIFICANT CHANGE UP (ref 150–400)
POTASSIUM SERPL-MCNC: 4.1 MMOL/L — SIGNIFICANT CHANGE UP (ref 3.5–5.3)
POTASSIUM SERPL-SCNC: 4.1 MMOL/L — SIGNIFICANT CHANGE UP (ref 3.5–5.3)
RBC # BLD: 5.08 M/UL — SIGNIFICANT CHANGE UP (ref 4.2–5.8)
RBC # FLD: 13.3 % — SIGNIFICANT CHANGE UP (ref 10.3–14.5)
SODIUM SERPL-SCNC: 138 MMOL/L — SIGNIFICANT CHANGE UP (ref 135–145)
WBC # BLD: 13.58 K/UL — HIGH (ref 3.8–10.5)
WBC # FLD AUTO: 13.58 K/UL — HIGH (ref 3.8–10.5)

## 2022-09-30 RX ORDER — LANOLIN ALCOHOL/MO/W.PET/CERES
5 CREAM (GRAM) TOPICAL AT BEDTIME
Refills: 0 | Status: COMPLETED | OUTPATIENT
Start: 2022-09-30 | End: 2022-09-30

## 2022-09-30 RX ADMIN — PANTOPRAZOLE SODIUM 40 MILLIGRAM(S): 20 TABLET, DELAYED RELEASE ORAL at 05:43

## 2022-09-30 RX ADMIN — Medication 4 MILLIGRAM(S): at 05:43

## 2022-09-30 RX ADMIN — Medication 650 MILLIGRAM(S): at 21:45

## 2022-09-30 RX ADMIN — Medication 4 MILLIGRAM(S): at 23:58

## 2022-09-30 RX ADMIN — Medication 650 MILLIGRAM(S): at 00:04

## 2022-09-30 RX ADMIN — CHLORHEXIDINE GLUCONATE 1 APPLICATION(S): 213 SOLUTION TOPICAL at 11:24

## 2022-09-30 RX ADMIN — HEPARIN SODIUM 5000 UNIT(S): 5000 INJECTION INTRAVENOUS; SUBCUTANEOUS at 05:43

## 2022-09-30 RX ADMIN — Medication 650 MILLIGRAM(S): at 22:45

## 2022-09-30 RX ADMIN — Medication 5 MILLIGRAM(S): at 21:45

## 2022-09-30 RX ADMIN — LEVETIRACETAM 500 MILLIGRAM(S): 250 TABLET, FILM COATED ORAL at 17:41

## 2022-09-30 RX ADMIN — Medication 4 MILLIGRAM(S): at 17:42

## 2022-09-30 RX ADMIN — LEVETIRACETAM 500 MILLIGRAM(S): 250 TABLET, FILM COATED ORAL at 05:43

## 2022-09-30 RX ADMIN — HEPARIN SODIUM 5000 UNIT(S): 5000 INJECTION INTRAVENOUS; SUBCUTANEOUS at 17:41

## 2022-09-30 RX ADMIN — Medication 4 MILLIGRAM(S): at 11:24

## 2022-09-30 NOTE — PROGRESS NOTE ADULT - SUBJECTIVE AND OBJECTIVE BOX
[facility] DEPARTMENT OF OPHTHALMOLOGY  ------------------------------------------------------------------------------  Ned Murray MD PGY 3  [phone]  ------------------------------------------------------------------------------    Interval History: No acute events overnight. Today patient denying blurred vision, eye pain, flashes, floaters, FBS, erythema, or discharge. Awaiting surgery on Monday    MEDICATIONS  (STANDING):  chlorhexidine 2% Cloths 1 Application(s) Topical daily  dexAMETHasone  Injectable 4 milliGRAM(s) IV Push every 6 hours  heparin   Injectable 5000 Unit(s) SubCutaneous every 12 hours  influenza   Vaccine 0.5 milliLiter(s) IntraMuscular once  levETIRAcetam 500 milliGRAM(s) Oral two times a day  pantoprazole    Tablet 40 milliGRAM(s) Oral before breakfast    MEDICATIONS  (PRN):  acetaminophen     Tablet .. 650 milliGRAM(s) Oral every 6 hours PRN Temp greater or equal to 38C (100.4F), Moderate Pain (4 - 6)      VITALS: T(C): 36.9 (09-29-22 @ 22:55)  T(F): 98.4 (09-29-22 @ 22:55), Max: 98.4 (09-29-22 @ 22:55)  HR: 75 (09-29-22 @ 22:55) (55 - 75)  BP: 119/70 (09-29-22 @ 22:55) (119/70 - 121/75)  RR:  (18 - 18)  SpO2:  (95% - 98%)  Wt(kg): --  General: AAO x 3, appropriate mood and affect    Ophthalmology Exam:  Visual acuity (sc): 20/25 OD, 20/25 OS  Pupils: PERRL OU, no APD  Ttono: 16 OD, 16 OS  Extraocular movements (EOMs): Full OU, no pain, no diplopia  Confrontational Visual Field (CVF): subtle right homonymous defect superior>inferior    Pen Light Exam (PLE)  External: Flat OU  Lids/Lashes/Lacrimal Ducts: Flat OU    Sclera/Conjunctiva: W+Q OU  Cornea: Cl OU  Anterior Chamber: D+F OU    Iris: Flat OU  Lens: NS OU

## 2022-09-30 NOTE — PROGRESS NOTE ADULT - ASSESSMENT
A/P     Brain mass / Blurry vision/ hemianopsia   -pt. was seen by neurosurgery and neurology team few days ago in ED and at that time he signed out AM   MRI brain done   neurosurgery following : scheduled for brain surgery on Monday   started on keppra and IV decadrone   seen by opthalmology     HA :  -tylenol prn     insomnia : try melatonin , if don't work , start ambien 5 mg q HS

## 2022-09-30 NOTE — CONSULT NOTE ADULT - SUBJECTIVE AND OBJECTIVE BOX
DATE OF SERVICE: 09-30-22 @ 15:43    CHIEF COMPLAINT:Patient is a 62y old  Male who presents with a chief complaint of HA/ brain mass (30 Sep 2022 08:43)      HISTORY OF PRESENT ILLNESS:   Patient is a 61 y/o M with no sig PMHx who presents to the ED with headache. On 9/21/22 was seen in this ED for HA x4 days that woke him from sleep with R eye visual defect (hemianopic, dx by Optho). Imaging done then c/f L brain mass with vasogenic edema. Patient evaluated by Neurology and Neurosurgery team. Recommended further imaging, patient signed out out AMA as he had to go home to take care of his mother. Symptoms persisted since then. Has appt with [facility] on wed but not sure when he can have MRI done. (26 Sep 2022 16:25)      PAST MEDICAL & SURGICAL HISTORY:  No pertinent past medical history      No significant past surgical history              MEDICATIONS:  heparin   Injectable 5000 Unit(s) SubCutaneous every 12 hours        acetaminophen     Tablet .. 650 milliGRAM(s) Oral every 6 hours PRN  levETIRAcetam 500 milliGRAM(s) Oral two times a day    pantoprazole    Tablet 40 milliGRAM(s) Oral before breakfast    dexAMETHasone  Injectable 4 milliGRAM(s) IV Push every 6 hours    chlorhexidine 2% Cloths 1 Application(s) Topical daily  influenza   Vaccine 0.5 milliLiter(s) IntraMuscular once      FAMILY HISTORY:      Non-contributory    SOCIAL HISTORY:    [ ] Tobacco  [ ] Drugs  [ ] Alcohol    Allergies    No Known Allergies    Intolerances    	    REVIEW OF SYSTEMS:  CONSTITUTIONAL: No fever, + HA  EYES: No eye pain,  + visual disturbances, or discharge  ENMT:  No difficulty hearing, tinnitus  NECK: No pain or stiffness  RESPIRATORY: No cough, wheezing,  CARDIOVASCULAR: No chest pain, palpitations, passing out, dizziness, or leg swelling  GASTROINTESTINAL:  No nausea, vomiting, diarrhea or constipation. No melena.  GENITOURINARY: No dysuria, hematuria  NEUROLOGICAL: No stroke like symptoms  SKIN: No burning or lesions   ENDOCRINE: No heat or cold intolerance  MUSCULOSKELETAL: No joint pain or swelling  PSYCHIATRIC: No  anxiety, mood swings  HEME/LYMPH: No bleeding gums  ALLERGY AND IMMUNOLOGIC: No hives or eczema	    All other ROS negative    PHYSICAL EXAM:  T(C): 36.3 (09-30-22 @ 08:55), Max: 36.9 (09-29-22 @ 22:55)  HR: 66 (09-30-22 @ 08:55) (55 - 75)  BP: 123/75 (09-30-22 @ 08:55) (119/70 - 123/75)  RR: 18 (09-30-22 @ 08:55) (18 - 18)  SpO2: 98% (09-30-22 @ 08:55) (95% - 98%)  Wt(kg): --  I&O's Summary      Appearance: Normal	  HEENT:   Normal oral mucosa, EOMI	  Cardiovascular:  S1 S2, No JVD,    Respiratory: Lungs clear to auscultation	  Psychiatry: Alert  Gastrointestinal:  Soft, Non-tender, + BS	  Skin: No rashes   Neurologic: Non-focal  Extremities:  No edema  Vascular: Peripheral pulses palpable    	    	  	  CARDIAC MARKERS:  Labs personally reviewed by me                                  14.8   13.58 )-----------( 213      ( 30 Sep 2022 07:13 )             44.8     09-30    138  |  103  |  17  ----------------------------<  121<H>  4.1   |  25  |  0.90    Ca    9.6      30 Sep 2022 07:10            EKG: Personally reviewed by me - SR with RBBB, twi III, aVF, V1-V4  Radiology: Personally reviewed by me -      CT Abdomen and Pelvis w/ IV Cont (09.26.22 @ 15:03) >  IMPRESSION:  No acute pathology or evidence of malignancy in the chest, abdomen or   pelvis.     CT Angio Neck w/ IV Cont (09.21.22 @ 20:56) >  CT brain: Predominantly left parietal temporal vasogenic edema with   underlying abnormal masslike enhancement concerning for underlying   primary brain malignancy although a metastatic lesion and possible   infectious cause as abscess are in the differential. Follow-up with   contrast-enhanced brain MRI would be of benefit.    CTA neck and brain: No vessel occlusion or significant stenosis. No   evidence of dissection in the neck or aneurysm in the brain.    Evaluation of stenosis in the neck is in keeping with the NASCET criteria.  NASCET CAROTID STENOSIS CRITERIA (distal normal appearing ICA as   denominator for measurement): 0%-none, 1-49%-mild, 50-70%-moderate,   70-89%-severe, 90-99%-critical.    : MR Head w/wo IV Cont (09.28.22 @ 22:58) >  IMPRESSION: Preoperative imaging of a left temporal occipital ring   enhancing mass suggestive of a neoplasm. No change since 9/27/2022.        Assessment /Plan:     62M with no hx, originally sent in on 9/21 by ophthalmologist for R visual blurriness since 9/19, found to have "hemianopic defect" at outpt ophto testing today. CTH then showed c/f underlying L TP mass w/ edema, likely primary brain tumor. MRI here shows 4 x 2.5 x 4cm ring-enhancing mass in L temporal occipital region w/ surrounding edema. Now planned for tumor biopsy/resection likely Monday.    1. Cardiac Risk Stratification  - EKG NSR with RBBB and possible inferior ischemia  - Patient currently not in decompensated HF  - No hx of AS/MS. NO cardiac murmur appreciated.  - No hx of tachy/neida arrhythmia.  - Patient is mod risk for mod to high risk, no-elective surgery surgery. Further cardiac workup would not  at this time therefore no absolute contraindication to proceed.    2. Brain Tumor  - Plan for OR Monday for possible biopsy/resection  - Neurosurgery following    3. DVT PPx  - c/w SQ heparin    Differential diagnosis and plan of care discussed with patient after the evaluation. Counseling on diet, nutritional counseling, weight management, exercise and medication compliance was done.   Advanced care planning/advanced directives discussed with patient/family. DNR status including forceful chest compressions to attempt to restart the heart, ventilator support/artificial breathing, electric shock, artificial nutrition, health care proxy, Molst form all discussed with pt. Pt wishes to consider. More than fifteen minutes spent on discussing advanced directives.     Isha Almonte [facility]-BC  Josue Ybarra DO [facility]  Cardiovascular Medicine  [address], [address]  Available for call or text via [employer] TE[employer]  Office [phone]   DATE OF SERVICE: 09-30-22 @ 15:43    CHIEF COMPLAINT:Patient is a 62y old  Male who presents with a chief complaint of HA/ brain mass (30 Sep 2022 08:43)      HISTORY OF PRESENT ILLNESS:   Patient is a 61 y/o M with no sig PMHx who presents to the ED with headache. On 9/21/22 was seen in this ED for HA x4 days that woke him from sleep with R eye visual defect (hemianopic, dx by Optho). Imaging done then c/f L brain mass with vasogenic edema. Patient evaluated by Neurology and Neurosurgery team. Recommended further imaging, patient signed out out AMA as he had to go home to take care of his mother. Symptoms persisted since then. Has appt with [facility] on wed but not sure when he can have MRI done. (26 Sep 2022 16:25)      PAST MEDICAL & SURGICAL HISTORY:  No pertinent past medical history      No significant past surgical history              MEDICATIONS:  heparin   Injectable 5000 Unit(s) SubCutaneous every 12 hours        acetaminophen     Tablet .. 650 milliGRAM(s) Oral every 6 hours PRN  levETIRAcetam 500 milliGRAM(s) Oral two times a day    pantoprazole    Tablet 40 milliGRAM(s) Oral before breakfast    dexAMETHasone  Injectable 4 milliGRAM(s) IV Push every 6 hours    chlorhexidine 2% Cloths 1 Application(s) Topical daily  influenza   Vaccine 0.5 milliLiter(s) IntraMuscular once      FAMILY HISTORY:      Non-contributory    SOCIAL HISTORY:    [ ] not current smoker    Allergies    No Known Allergies    Intolerances    	    REVIEW OF SYSTEMS:  CONSTITUTIONAL: No fever, + HA  EYES: No eye pain,  + visual disturbances, or discharge  ENMT:  No difficulty hearing, tinnitus  NECK: No pain or stiffness  RESPIRATORY: No cough, wheezing,  CARDIOVASCULAR: No chest pain, palpitations, passing out, dizziness, or leg swelling  GASTROINTESTINAL:  No nausea, vomiting, diarrhea or constipation. No melena.  GENITOURINARY: No dysuria, hematuria  NEUROLOGICAL: No stroke like symptoms  SKIN: No burning or lesions   ENDOCRINE: No heat or cold intolerance  MUSCULOSKELETAL: No joint pain or swelling  PSYCHIATRIC: No  anxiety, mood swings  HEME/LYMPH: No bleeding gums  ALLERGY AND IMMUNOLOGIC: No hives or eczema	    All other ROS negative    PHYSICAL EXAM:  T(C): 36.3 (09-30-22 @ 08:55), Max: 36.9 (09-29-22 @ 22:55)  HR: 66 (09-30-22 @ 08:55) (55 - 75)  BP: 123/75 (09-30-22 @ 08:55) (119/70 - 123/75)  RR: 18 (09-30-22 @ 08:55) (18 - 18)  SpO2: 98% (09-30-22 @ 08:55) (95% - 98%)  Wt(kg): --  I&O's Summary      Appearance: Normal	  HEENT:   Normal oral mucosa, EOMI	  Cardiovascular:  S1 S2, No JVD,    Respiratory: Lungs clear to auscultation	  Psychiatry: Alert  Gastrointestinal:  Soft, Non-tender, + BS	  Skin: No rashes   Neurologic: Non-focal  Extremities:  No edema  Vascular: Peripheral pulses palpable    	    	  	  CARDIAC MARKERS:  Labs personally reviewed by me                                  14.8   13.58 )-----------( 213      ( 30 Sep 2022 07:13 )             44.8     09-30    138  |  103  |  17  ----------------------------<  121<H>  4.1   |  25  |  0.90    Ca    9.6      30 Sep 2022 07:10            EKG: Personally reviewed by me - SR with RBBB, twi III, aVF, V1-V4  Radiology: Personally reviewed by me -      CT Abdomen and Pelvis w/ IV Cont (09.26.22 @ 15:03) >  IMPRESSION:  No acute pathology or evidence of malignancy in the chest, abdomen or   pelvis.     CT Angio Neck w/ IV Cont (09.21.22 @ 20:56) >  CT brain: Predominantly left parietal temporal vasogenic edema with   underlying abnormal masslike enhancement concerning for underlying   primary brain malignancy although a metastatic lesion and possible   infectious cause as abscess are in the differential. Follow-up with   contrast-enhanced brain MRI would be of benefit.    CTA neck and brain: No vessel occlusion or significant stenosis. No   evidence of dissection in the neck or aneurysm in the brain.    Evaluation of stenosis in the neck is in keeping with the NASCET criteria.  NASCET CAROTID STENOSIS CRITERIA (distal normal appearing ICA as   denominator for measurement): 0%-none, 1-49%-mild, 50-70%-moderate,   70-89%-severe, 90-99%-critical.    : MR Head w/wo IV Cont (09.28.22 @ 22:58) >  IMPRESSION: Preoperative imaging of a left temporal occipital ring   enhancing mass suggestive of a neoplasm. No change since 9/27/2022.        Assessment /Plan:     62M with no hx, originally sent in on 9/21 by ophthalmologist for R visual blurriness since 9/19, found to have "hemianopic defect" at outpt ophto testing today. CTH then showed c/f underlying L TP mass w/ edema, likely primary brain tumor. MRI here shows 4 x 2.5 x 4cm ring-enhancing mass in L temporal occipital region w/ surrounding edema. Now planned for tumor biopsy/resection likely Monday.    1. Cardiac Risk Stratification  - EKG NSR with RBBB and possible inferior ischemia  - Patient currently not in decompensated HF  - No hx of AS/MS. NO cardiac murmur appreciated.  - No hx of tachy/neida arrhythmia.  - Patient is mod risk for mod to high risk, non-elective surgery. Further cardiac workup would not  at this time therefore no absolute contraindication to proceed.    2. Brain Tumor  - Plan for OR Monday for possible biopsy/resection  - Neurosurgery following    3. DVT PPx  - c/w SQ heparin        Differential diagnosis and plan of care discussed with patient after the evaluation. Counseling on diet, nutritional counseling, weight management, exercise and medication compliance was done.   Advanced care planning/advanced directives discussed with patient/family. DNR status including forceful chest compressions to attempt to restart the heart, ventilator support/artificial breathing, electric shock, artificial nutrition, health care proxy, Molst form all discussed with pt. Pt wishes to consider. More than fifteen minutes spent on discussing advanced directives.     Isha JESSICA-BC  Josue Ybarra DO [facility]  Cardiovascular Medicine  [address], [address]  Available for call or text via [employer]  Office [phone]

## 2022-09-30 NOTE — PROGRESS NOTE ADULT - SUBJECTIVE AND OBJECTIVE BOX
Patient is a 62y old  Male who presents with a chief complaint of HA/ brain mass (30 Sep 2022 15:43)      INTERVAL HPI/OVERNIGHT EVENTS:  T(C): 36.4 (09-30-22 @ 15:44), Max: 36.4 (09-30-22 @ 15:44)  HR: 68 (09-30-22 @ 15:44) (66 - 68)  BP: 108/70 (09-30-22 @ 15:44) (108/70 - 123/75)  RR: 18 (09-30-22 @ 15:44) (18 - 18)  SpO2: 95% (09-30-22 @ 15:44) (95% - 98%)  Wt(kg): --  I&O's Summary      PAST MEDICAL & SURGICAL HISTORY:  No pertinent past medical history      No significant past surgical history          SOCIAL HISTORY  Alcohol:  Tobacco:  Illicit substance use:    FAMILY HISTORY:    REVIEW OF SYSTEMS:  CONSTITUTIONAL: No fever, weight loss, or fatigue  EYES: No eye pain, visual disturbances, or discharge  ENMT:  No difficulty hearing, tinnitus, vertigo; No sinus or throat pain  NECK: No pain or stiffness  RESPIRATORY: No cough, wheezing, chills or hemoptysis; No shortness of breath  CARDIOVASCULAR: No chest pain, palpitations, dizziness, or leg swelling  GASTROINTESTINAL: No abdominal or epigastric pain. No nausea, vomiting, or hematemesis; No diarrhea or constipation. No melena or hematochezia.  GENITOURINARY: No dysuria, frequency, hematuria, or incontinence  NEUROLOGICAL: No headaches, memory loss, loss of strength, numbness, or tremors  SKIN: No itching, burning, rashes, or lesions   LYMPH NODES: No enlarged glands  ENDOCRINE: No heat or cold intolerance; No hair loss  MUSCULOSKELETAL: No joint pain or swelling; No muscle, back, or extremity pain  PSYCHIATRIC: No depression, anxiety, mood swings, or difficulty sleeping  HEME/LYMPH: No easy bruising, or bleeding gums  ALLERY AND IMMUNOLOGIC: No hives or eczema    RADIOLOGY & ADDITIONAL TESTS:    Imaging Personally Reviewed:  [ ] YES  [ ] NO    Consultant(s) Notes Reviewed:  [ ] YES  [ ] NO    PHYSICAL EXAM:  GENERAL: NAD, well-groomed, well-developed  HEAD:  Atraumatic, Normocephalic  EYES: EOMI, PERRLA, conjunctiva and sclera clear  ENMT: No tonsillar erythema, exudates, or enlargement; Moist mucous membranes, Good dentition, No lesions  NECK: Supple, No JVD, Normal thyroid  NERVOUS SYSTEM:  Alert & Oriented X3, Good concentration; Motor Strength 5/5 B/L upper and lower extremities; DTRs 2+ intact and symmetric  CHEST/LUNG: Clear to percussion bilaterally; No rales, rhonchi, wheezing, or rubs  HEART: Regular rate and rhythm; No murmurs, rubs, or gallops  ABDOMEN: Soft, Nontender, Nondistended; Bowel sounds present  EXTREMITIES:  2+ Peripheral Pulses, No clubbing, cyanosis, or edema  LYMPH: No lymphadenopathy noted  SKIN: No rashes or lesions    LABS:                        14.8   13.58 )-----------( 213      ( 30 Sep 2022 07:13 )             44.8     09-30    138  |  103  |  17  ----------------------------<  121<H>  4.1   |  25  |  0.90    Ca    9.6      30 Sep 2022 07:10          CAPILLARY BLOOD GLUCOSE                MEDICATIONS  (STANDING):  chlorhexidine 2% Cloths 1 Application(s) Topical daily  dexAMETHasone  Injectable 4 milliGRAM(s) IV Push every 6 hours  heparin   Injectable 5000 Unit(s) SubCutaneous every 12 hours  influenza   Vaccine 0.5 milliLiter(s) IntraMuscular once  levETIRAcetam 500 milliGRAM(s) Oral two times a day  pantoprazole    Tablet 40 milliGRAM(s) Oral before breakfast    MEDICATIONS  (PRN):  acetaminophen     Tablet .. 650 milliGRAM(s) Oral every 6 hours PRN Temp greater or equal to 38C (100.4F), Moderate Pain (4 - 6)      Care Discussed with Consultants/Other Providers [ ] YES  [ ] NO Patient is a 62y old  Male who presents with a chief complaint of HA/ brain mass (30 Sep 2022 15:43)      INTERVAL HPI/OVERNIGHT EVENTS: c/o difficulty sleeping   T(C): 36.4 (09-30-22 @ 15:44), Max: 36.4 (09-30-22 @ 15:44)  HR: 68 (09-30-22 @ 15:44) (66 - 68)  BP: 108/70 (09-30-22 @ 15:44) (108/70 - 123/75)  RR: 18 (09-30-22 @ 15:44) (18 - 18)  SpO2: 95% (09-30-22 @ 15:44) (95% - 98%)  Wt(kg): --  I&O's Summary      PAST MEDICAL & SURGICAL HISTORY:  No pertinent past medical history      No significant past surgical history          SOCIAL HISTORY  Alcohol:  Tobacco:  Illicit substance use:    FAMILY HISTORY:    REVIEW OF SYSTEMS:  CONSTITUTIONAL: No fever, weight loss, or fatigue  EYES: No eye pain, visual disturbances, or discharge  ENMT:  No difficulty hearing, tinnitus, vertigo; No sinus or throat pain  NECK: No pain or stiffness  RESPIRATORY: No cough, wheezing, chills or hemoptysis; No shortness of breath  CARDIOVASCULAR: No chest pain, palpitations, dizziness, or leg swelling  GASTROINTESTINAL: No abdominal or epigastric pain. No nausea, vomiting, or hematemesis; No diarrhea or constipation. No melena or hematochezia.  GENITOURINARY: No dysuria, frequency, hematuria, or incontinence  NEUROLOGICAL: No headaches, memory loss, loss of strength, numbness, or tremors  SKIN: No itching, burning, rashes, or lesions   LYMPH NODES: No enlarged glands  ENDOCRINE: No heat or cold intolerance; No hair loss  MUSCULOSKELETAL: No joint pain or swelling; No muscle, back, or extremity pain  PSYCHIATRIC: No depression, anxiety, mood swings, or difficulty sleeping  HEME/LYMPH: No easy bruising, or bleeding gums  ALLERY AND IMMUNOLOGIC: No hives or eczema    RADIOLOGY & ADDITIONAL TESTS:    Imaging Personally Reviewed:  [ ] YES  [ ] NO    Consultant(s) Notes Reviewed:  [ ] YES  [ ] NO    PHYSICAL EXAM:  GENERAL: NAD, well-groomed, well-developed  HEAD:  Atraumatic, Normocephalic  EYES: EOMI, PERRLA, conjunctiva and sclera clear  ENMT: No tonsillar erythema, exudates, or enlargement; Moist mucous membranes, Good dentition, No lesions  NECK: Supple, No JVD, Normal thyroid  NERVOUS SYSTEM:  Alert & Oriented X3, Good concentration; Motor Strength 5/5 B/L upper and lower extremities; DTRs 2+ intact and symmetric  CHEST/LUNG: Clear to percussion bilaterally; No rales, rhonchi, wheezing, or rubs  HEART: Regular rate and rhythm; No murmurs, rubs, or gallops  ABDOMEN: Soft, Nontender, Nondistended; Bowel sounds present  EXTREMITIES:  2+ Peripheral Pulses, No clubbing, cyanosis, or edema  LYMPH: No lymphadenopathy noted  SKIN: No rashes or lesions    LABS:                        14.8   13.58 )-----------( 213      ( 30 Sep 2022 07:13 )             44.8     09-30    138  |  103  |  17  ----------------------------<  121<H>  4.1   |  25  |  0.90    Ca    9.6      30 Sep 2022 07:10          CAPILLARY BLOOD GLUCOSE                MEDICATIONS  (STANDING):  chlorhexidine 2% Cloths 1 Application(s) Topical daily  dexAMETHasone  Injectable 4 milliGRAM(s) IV Push every 6 hours  heparin   Injectable 5000 Unit(s) SubCutaneous every 12 hours  influenza   Vaccine 0.5 milliLiter(s) IntraMuscular once  levETIRAcetam 500 milliGRAM(s) Oral two times a day  pantoprazole    Tablet 40 milliGRAM(s) Oral before breakfast    MEDICATIONS  (PRN):  acetaminophen     Tablet .. 650 milliGRAM(s) Oral every 6 hours PRN Temp greater or equal to 38C (100.4F), Moderate Pain (4 - 6)      Care Discussed with Consultants/Other Providers [ ] YES  [ ] NO

## 2022-09-30 NOTE — PROGRESS NOTE ADULT - ASSESSMENT
Assessment and Recommendations:  62y male w/ no pmhx/ochx consulted for baseline ophthalmic exam in setting of known mass in L temporal occipital region. Pt initially sent in by her outpatient ophthalmologist who found "hemianopic defect" on HVF. Pt found to have subtle R homonymous hemianopsia on confrontational visual field testing.     # R homonymous hemianopsia 2/2 brain mass  - Vision intact, no sign of optic nerve dysfunction  - Subtle right homonymous defect superior>inferior   - Anterior exam and DFE unremarkable  - MRI brain: irregularly ring-enhancing mass left temporal occipital, region adjacent to the atrium of the lateral ventricle with subependymal spread and significant vasogenic edema likely represents a neoplasm. Favor glioblastoma. Mild midline shift to the right.  - Per neurosurgery, plan for OR Monday for tumor biopsy/resection  - Medical management per neurosurgery.   - Pt should follow up as an outpatient for serial HVF testing.     Outpatient follow-up: Patient should follow-up with his/her ophthalmologist or with [facility] Department of Ophthalmology at the address below     [address] [place] [address]. [address]  [place] [address]  [phone]     Assessment and Recommendations:  62y male w/ no pmhx/ochx consulted for baseline ophthalmic exam in setting of known mass in L temporal occipital region. Pt initially sent in by her outpatient ophthalmologist who found "hemianopic defect" on HVF. Pt found to have subtle R homonymous hemianopsia on confrontational visual field testing.     # R homonymous hemianopsia 2/2 brain mass  - Vision intact, no sign of optic nerve dysfunction  - Subtle right homonymous defect superior>inferior   - Anterior exam and DFE unremarkable  - MRI brain: irregularly ring-enhancing mass left temporal occipital, region adjacent to the atrium of the lateral ventricle with subependymal spread and significant vasogenic edema likely represents a neoplasm. Favor glioblastoma. Mild midline shift to the right.  - Per neurosurgery, plan for OR Monday for tumor biopsy/resection  - Medical management per neurosurgery.   - Pt should follow up as an outpatient for serial HVF testing.     Seen & discussed with Dr Burgos    Outpatient follow-up: Patient should follow-up with his/her ophthalmologist or with [facility] Department of Ophthalmology at the address below     [address] [place] [address]. [address]  [place] [address]  [phone]

## 2022-09-30 NOTE — PROGRESS NOTE ADULT - SUBJECTIVE AND OBJECTIVE BOX
Patient seen and examined at bedside.    --Anticoagulation--  heparin   Injectable 5000 Unit(s) SubCutaneous every 12 hours    T(C): 36.9 (09-29-22 @ 22:55), Max: 36.9 (09-29-22 @ 22:55)  HR: 75 (09-29-22 @ 22:55) (55 - 75)  BP: 119/70 (09-29-22 @ 22:55) (119/70 - 121/75)  RR: 18 (09-29-22 @ 22:55) (18 - 18)  SpO2: 98% (09-29-22 @ 22:55) (95% - 98%)  Wt(kg): --    Exam: AO3, PERRL, EOMI, no droop/drift, R homonymous hemianopsia, LOUISE 5/5, SILT

## 2022-09-30 NOTE — CONSULT NOTE ADULT - NS ATTEND AMEND GEN_ALL_CORE FT
Patient care and plan discussed and reviewed with Advanced Care Provider. Plan as outlined above edited by me to reflect our discussion.

## 2022-09-30 NOTE — PROGRESS NOTE ADULT - ASSESSMENT
Rachel Del  62M R handed, no hx, originally sent in on 9/21 by ophthalmologist for R visual blurriness since 9/19, found to have "hemianopic defect" at outpt ophto testing today. CTH then showed c/f underlying L TP mass w/ edema, likely primary brain tumor. He left AMA (difficult social situation) and now represents with HA. MRI here shows 4 x 2.5 x 4cm ring-enhancing mass in L temporal occipital region w/ surrounding edema. He endorses mixing up words at times, but this was not seen on current exam. Repetition and naming was intact. Exam: AO3, PERRL, EOMI, no droop/drift, R homonymous hemianopsia, LOUISE 5/5, SILT     - Plan for OR Monday for tumor biopsy/resection. Please pre-op and document clearance.  - MR brain with DTI synaptive protocol completed  - CT CAP negative for neoplasm  - Dex4q6  - Keppra 500 BID

## 2022-09-30 NOTE — CHART NOTE - NSCHARTNOTEFT_GEN_A_CORE
Pt c/o difficulty sleeping since admission, did not sleep last night, requested sleep aid this morning 5 am (too late). He is requesting to have sleep aid so he "can have at least 1 good night of sleep before surgery". He reports he has tried ambien at home ~ a yr ago (got from a friend) and it "worked well without issues". he is agreeable to start with melatonin then if ineffective he will try ambien    Sleep difficulty  - melatonin 5mg

## 2022-10-01 LAB
APTT BLD: 23.7 SEC — LOW (ref 27.5–35.5)
BLD GP AB SCN SERPL QL: NEGATIVE — SIGNIFICANT CHANGE UP
INR BLD: 1.08 RATIO — SIGNIFICANT CHANGE UP (ref 0.88–1.16)
PROTHROM AB SERPL-ACNC: 12.5 SEC — SIGNIFICANT CHANGE UP (ref 10.5–13.4)
RH IG SCN BLD-IMP: NEGATIVE — SIGNIFICANT CHANGE UP
RH IG SCN BLD-IMP: NEGATIVE — SIGNIFICANT CHANGE UP
SARS-COV-2 RNA SPEC QL NAA+PROBE: SIGNIFICANT CHANGE UP

## 2022-10-01 RX ORDER — LANOLIN ALCOHOL/MO/W.PET/CERES
5 CREAM (GRAM) TOPICAL ONCE
Refills: 0 | Status: COMPLETED | OUTPATIENT
Start: 2022-10-01 | End: 2022-10-01

## 2022-10-01 RX ADMIN — LEVETIRACETAM 500 MILLIGRAM(S): 250 TABLET, FILM COATED ORAL at 18:30

## 2022-10-01 RX ADMIN — PANTOPRAZOLE SODIUM 40 MILLIGRAM(S): 20 TABLET, DELAYED RELEASE ORAL at 05:59

## 2022-10-01 RX ADMIN — HEPARIN SODIUM 5000 UNIT(S): 5000 INJECTION INTRAVENOUS; SUBCUTANEOUS at 05:58

## 2022-10-01 RX ADMIN — Medication 4 MILLIGRAM(S): at 12:06

## 2022-10-01 RX ADMIN — Medication 5 MILLIGRAM(S): at 23:22

## 2022-10-01 RX ADMIN — Medication 4 MILLIGRAM(S): at 23:20

## 2022-10-01 RX ADMIN — Medication 4 MILLIGRAM(S): at 18:29

## 2022-10-01 RX ADMIN — LEVETIRACETAM 500 MILLIGRAM(S): 250 TABLET, FILM COATED ORAL at 05:58

## 2022-10-01 RX ADMIN — Medication 650 MILLIGRAM(S): at 23:20

## 2022-10-01 RX ADMIN — Medication 4 MILLIGRAM(S): at 05:58

## 2022-10-01 RX ADMIN — HEPARIN SODIUM 5000 UNIT(S): 5000 INJECTION INTRAVENOUS; SUBCUTANEOUS at 18:29

## 2022-10-01 RX ADMIN — CHLORHEXIDINE GLUCONATE 1 APPLICATION(S): 213 SOLUTION TOPICAL at 12:06

## 2022-10-01 NOTE — PROGRESS NOTE ADULT - ASSESSMENT
Rachel Del  62M R handed, no hx, originally sent in on 9/21 by ophthalmologist for R visual blurriness since 9/19, found to have "hemianopic defect" at outpt ophto testing today. CTH then showed c/f underlying L TP mass w/ edema, likely primary brain tumor. He left AMA (difficult social situation) and now represents with HA. MRI here shows 4 x 2.5 x 4cm ring-enhancing mass in L temporal occipital region w/ surrounding edema. He endorses mixing up words at times, but this was not seen on current exam. Repetition and naming was intact. Exam: AO3, PERRL, EOMI, no droop/drift, R homonymous hemianopsia, LOUISE 5/5, SILT     - Plan for OR Monday for tumor resection  - Please document medical clearance  - Cardiology- cleared  - MR brain with DTI synaptive protocol completed  - CT CAP negative for neoplasm  - Dex4q6  - Keppra 500 BID

## 2022-10-01 NOTE — PROGRESS NOTE ADULT - ASSESSMENT
A/P     Brain mass / Blurry vision/ hemianopsia   -pt. was seen by neurosurgery and neurology team few days ago in ED and at that time he signed out AM   MRI brain done   neurosurgery following : scheduled for brain surgery on Monday   started on keppra and IV decadrone   seen by opthalmology     HA :  -tylenol prn     insomnia :   try melatonin , if don't work , start ambien 5 mg q HS     dispo: seen by cardio , cleared for surgery with moderate to high risk for the procedure

## 2022-10-01 NOTE — PROGRESS NOTE ADULT - SUBJECTIVE AND OBJECTIVE BOX
Patient is a 62y old  Male who presents with a chief complaint of HA/ brain mass (01 Oct 2022 15:11)      INTERVAL HPI/OVERNIGHT EVENTS: seen and examined   T(C): 36.6 (10-01-22 @ 17:04), Max: 36.8 (10-01-22 @ 08:43)  HR: 81 (10-01-22 @ 17:04) (68 - 81)  BP: 118/78 (10-01-22 @ 17:04) (114/75 - 118/78)  RR: 18 (10-01-22 @ 17:04) (18 - 18)  SpO2: 95% (10-01-22 @ 17:04) (95% - 96%)  Wt(kg): --  I&O's Summary      PAST MEDICAL & SURGICAL HISTORY:  No pertinent past medical history      No significant past surgical history          SOCIAL HISTORY  Alcohol:  Tobacco:  Illicit substance use:    FAMILY HISTORY:    REVIEW OF SYSTEMS:  CONSTITUTIONAL: No fever, weight loss, or fatigue  EYES: No eye pain, visual disturbances, or discharge  ENMT:  No difficulty hearing, tinnitus, vertigo; No sinus or throat pain  NECK: No pain or stiffness  RESPIRATORY: No cough, wheezing, chills or hemoptysis; No shortness of breath  CARDIOVASCULAR: No chest pain, palpitations, dizziness, or leg swelling  GASTROINTESTINAL: No abdominal or epigastric pain. No nausea, vomiting, or hematemesis; No diarrhea or constipation. No melena or hematochezia.  GENITOURINARY: No dysuria, frequency, hematuria, or incontinence  NEUROLOGICAL: No headaches, memory loss, loss of strength, numbness, or tremors  SKIN: No itching, burning, rashes, or lesions   LYMPH NODES: No enlarged glands  ENDOCRINE: No heat or cold intolerance; No hair loss  MUSCULOSKELETAL: No joint pain or swelling; No muscle, back, or extremity pain  PSYCHIATRIC: No depression, anxiety, mood swings, or difficulty sleeping  HEME/LYMPH: No easy bruising, or bleeding gums  ALLERY AND IMMUNOLOGIC: No hives or eczema    RADIOLOGY & ADDITIONAL TESTS:    Imaging Personally Reviewed:  [ ] YES  [ ] NO    Consultant(s) Notes Reviewed:  [ ] YES  [ ] NO    PHYSICAL EXAM:  GENERAL: NAD, well-groomed, well-developed  HEAD:  Atraumatic, Normocephalic  EYES: EOMI, PERRLA, conjunctiva and sclera clear  ENMT: No tonsillar erythema, exudates, or enlargement; Moist mucous membranes, Good dentition, No lesions  NECK: Supple, No JVD, Normal thyroid  NERVOUS SYSTEM:  Alert & Oriented X3, Good concentration; Motor Strength 5/5 B/L upper and lower extremities; DTRs 2+ intact and symmetric  CHEST/LUNG: Clear to percussion bilaterally; No rales, rhonchi, wheezing, or rubs  HEART: Regular rate and rhythm; No murmurs, rubs, or gallops  ABDOMEN: Soft, Nontender, Nondistended; Bowel sounds present  EXTREMITIES:  2+ Peripheral Pulses, No clubbing, cyanosis, or edema  LYMPH: No lymphadenopathy noted  SKIN: No rashes or lesions    LABS:                        14.8   13.58 )-----------( 213      ( 30 Sep 2022 07:13 )             44.8     09-30    138  |  103  |  17  ----------------------------<  121<H>  4.1   |  25  |  0.90    Ca    9.6      30 Sep 2022 07:10      PT/INR - ( 01 Oct 2022 19:07 )   PT: 12.5 sec;   INR: 1.08 ratio         PTT - ( 01 Oct 2022 19:07 )  PTT:23.7 sec    CAPILLARY BLOOD GLUCOSE                MEDICATIONS  (STANDING):  chlorhexidine 2% Cloths 1 Application(s) Topical daily  dexAMETHasone  Injectable 4 milliGRAM(s) IV Push every 6 hours  heparin   Injectable 5000 Unit(s) SubCutaneous every 12 hours  influenza   Vaccine 0.5 milliLiter(s) IntraMuscular once  levETIRAcetam 500 milliGRAM(s) Oral two times a day  melatonin 5 milliGRAM(s) Oral once  pantoprazole    Tablet 40 milliGRAM(s) Oral before breakfast    MEDICATIONS  (PRN):  acetaminophen     Tablet .. 650 milliGRAM(s) Oral every 6 hours PRN Temp greater or equal to 38C (100.4F), Moderate Pain (4 - 6)      Care Discussed with Consultants/Other Providers [ ] YES  [ ] NO

## 2022-10-01 NOTE — PROGRESS NOTE ADULT - SUBJECTIVE AND OBJECTIVE BOX
DATE OF SERVICE: 10-01-22 @ 15:11    Patient is a 62y old  Male who presents with a chief complaint of HA/ brain mass (01 Oct 2022 09:54)      INTERVAL HISTORY: No complaints     REVIEW OF SYSTEMS:  CONSTITUTIONAL: No weakness  EYES/ENT: No visual changes;  No throat pain   NECK: No pain or stiffness  RESPIRATORY: No cough, wheezing; No shortness of breath  CARDIOVASCULAR: No chest pain or palpitations  GASTROINTESTINAL: No abdominal  pain. No nausea, vomiting, or hematemesis  GENITOURINARY: No dysuria, frequency or hematuria  NEUROLOGICAL: No stroke like symptoms  SKIN: No rashes    	  MEDICATIONS:        PHYSICAL EXAM:  T(C): 36.8 (10-01-22 @ 08:43), Max: 36.8 (10-01-22 @ 08:43)  HR: 77 (10-01-22 @ 08:43) (68 - 77)  BP: 114/75 (10-01-22 @ 08:43) (108/70 - 116/69)  RR: 18 (10-01-22 @ 08:43) (18 - 18)  SpO2: 96% (10-01-22 @ 08:43) (95% - 96%)  Wt(kg): --  I&O's Summary        Appearance: In no distress	  HEENT:    PERRL, EOMI	  Cardiovascular:  S1 S2, No JVD  Respiratory: Lungs clear to auscultation	  Gastrointestinal:  Soft, Non-tender, + BS	  Vascularature:  No edema of LE  Psychiatric: Appropriate affect   Neuro: no acute focal deficits                               14.8   13.58 )-----------( 213      ( 30 Sep 2022 07:13 )             44.8     09-30    138  |  103  |  17  ----------------------------<  121<H>  4.1   |  25  |  0.90    Ca    9.6      30 Sep 2022 07:10          Labs personally reviewed      ASSESSMENT/PLAN: 	    62M with no hx, originally sent in on 9/21 by ophthalmologist for R visual blurriness since 9/19, found to have "hemianopic defect" at outpt ophto testing today. CTH then showed c/f underlying L TP mass w/ edema, likely primary brain tumor. MRI here shows 4 x 2.5 x 4cm ring-enhancing mass in L temporal occipital region w/ surrounding edema. Now planned for tumor biopsy/resection likely Monday.    1. Cardiac Risk Stratification  - EKG NSR with RBBB and possible inferior ischemia  - Patient currently not in decompensated HF  - No hx of AS/MS. NO cardiac murmur appreciated.  - No hx of tachy/neida arrhythmia.  - Patient is mod risk for mod to high risk, non-elective surgery. Further cardiac workup would not  at this time therefore no absolute contraindication to proceed.    2. Brain Tumor  - Plan for OR Monday for possible biopsy/resection  - Neurosurgery following    3. DVT PPx  - c/w SQ heparin        MARSHALL Olson DO [facility]  Cardiovascular Medicine  [address] [place] [address], [address]  Office: [phone]  Cell: [phone]

## 2022-10-01 NOTE — PROGRESS NOTE ADULT - SUBJECTIVE AND OBJECTIVE BOX
Patient seen and examined at bedside.    --Anticoagulation--  heparin   Injectable 5000 Unit(s) SubCutaneous every 12 hours    T(C): 36.8 (10-01-22 @ 08:43), Max: 36.8 (10-01-22 @ 08:43)  HR: 77 (10-01-22 @ 08:43) (68 - 77)  BP: 114/75 (10-01-22 @ 08:43) (108/70 - 116/69)  RR: 18 (10-01-22 @ 08:43) (18 - 18)  SpO2: 96% (10-01-22 @ 08:43) (95% - 96%)  Wt(kg): --    Exam: AO3, PERRL, EOMI, no droop/drift, R homonymous hemianopsia, LOUISE 5/5, SILT

## 2022-10-02 ENCOUNTER — TRANSCRIPTION ENCOUNTER (OUTPATIENT)
Age: 62
End: 2022-10-02

## 2022-10-02 ENCOUNTER — FORM ENCOUNTER (OUTPATIENT)
Age: 62
End: 2022-10-02

## 2022-10-02 LAB
ANION GAP SERPL CALC-SCNC: 12 MMOL/L — SIGNIFICANT CHANGE UP (ref 5–17)
BUN SERPL-MCNC: 18 MG/DL — SIGNIFICANT CHANGE UP (ref 7–23)
CALCIUM SERPL-MCNC: 9.4 MG/DL — SIGNIFICANT CHANGE UP (ref 8.4–10.5)
CHLORIDE SERPL-SCNC: 100 MMOL/L — SIGNIFICANT CHANGE UP (ref 96–108)
CO2 SERPL-SCNC: 24 MMOL/L — SIGNIFICANT CHANGE UP (ref 22–31)
CREAT SERPL-MCNC: 0.82 MG/DL — SIGNIFICANT CHANGE UP (ref 0.5–1.3)
EGFR: 98 ML/MIN/1.73M2 — SIGNIFICANT CHANGE UP
GLUCOSE SERPL-MCNC: 155 MG/DL — HIGH (ref 70–99)
HCT VFR BLD CALC: 46.6 % — SIGNIFICANT CHANGE UP (ref 39–50)
HGB BLD-MCNC: 15.2 G/DL — SIGNIFICANT CHANGE UP (ref 13–17)
MCHC RBC-ENTMCNC: 29.1 PG — SIGNIFICANT CHANGE UP (ref 27–34)
MCHC RBC-ENTMCNC: 32.6 GM/DL — SIGNIFICANT CHANGE UP (ref 32–36)
MCV RBC AUTO: 89.1 FL — SIGNIFICANT CHANGE UP (ref 80–100)
MRSA PCR RESULT.: SIGNIFICANT CHANGE UP
NRBC # BLD: 0 /100 WBCS — SIGNIFICANT CHANGE UP (ref 0–0)
PLATELET # BLD AUTO: 225 K/UL — SIGNIFICANT CHANGE UP (ref 150–400)
POTASSIUM SERPL-MCNC: 4.1 MMOL/L — SIGNIFICANT CHANGE UP (ref 3.5–5.3)
POTASSIUM SERPL-SCNC: 4.1 MMOL/L — SIGNIFICANT CHANGE UP (ref 3.5–5.3)
RBC # BLD: 5.23 M/UL — SIGNIFICANT CHANGE UP (ref 4.2–5.8)
RBC # FLD: 13.5 % — SIGNIFICANT CHANGE UP (ref 10.3–14.5)
S AUREUS DNA NOSE QL NAA+PROBE: SIGNIFICANT CHANGE UP
SODIUM SERPL-SCNC: 136 MMOL/L — SIGNIFICANT CHANGE UP (ref 135–145)
WBC # BLD: 14.61 K/UL — HIGH (ref 3.8–10.5)
WBC # FLD AUTO: 14.61 K/UL — HIGH (ref 3.8–10.5)

## 2022-10-02 PROCEDURE — 93970 EXTREMITY STUDY: CPT | Mod: 26

## 2022-10-02 RX ORDER — LANOLIN ALCOHOL/MO/W.PET/CERES
5 CREAM (GRAM) TOPICAL ONCE
Refills: 0 | Status: COMPLETED | OUTPATIENT
Start: 2022-10-02 | End: 2022-10-02

## 2022-10-02 RX ORDER — SODIUM CHLORIDE 9 MG/ML
1000 INJECTION, SOLUTION INTRAVENOUS
Refills: 0 | Status: DISCONTINUED | OUTPATIENT
Start: 2022-10-02 | End: 2022-10-03

## 2022-10-02 RX ORDER — CHLORHEXIDINE GLUCONATE 213 G/1000ML
1 SOLUTION TOPICAL ONCE
Refills: 0 | Status: COMPLETED | OUTPATIENT
Start: 2022-10-02 | End: 2022-10-02

## 2022-10-02 RX ORDER — DIPHENHYDRAMINE HCL 50 MG
25 CAPSULE ORAL ONCE
Refills: 0 | Status: COMPLETED | OUTPATIENT
Start: 2022-10-02 | End: 2022-10-02

## 2022-10-02 RX ADMIN — Medication 650 MILLIGRAM(S): at 15:40

## 2022-10-02 RX ADMIN — LEVETIRACETAM 500 MILLIGRAM(S): 250 TABLET, FILM COATED ORAL at 17:34

## 2022-10-02 RX ADMIN — Medication 4 MILLIGRAM(S): at 23:17

## 2022-10-02 RX ADMIN — Medication 4 MILLIGRAM(S): at 17:34

## 2022-10-02 RX ADMIN — Medication 4 MILLIGRAM(S): at 05:57

## 2022-10-02 RX ADMIN — PANTOPRAZOLE SODIUM 40 MILLIGRAM(S): 20 TABLET, DELAYED RELEASE ORAL at 05:58

## 2022-10-02 RX ADMIN — Medication 25 MILLIGRAM(S): at 21:25

## 2022-10-02 RX ADMIN — Medication 650 MILLIGRAM(S): at 14:47

## 2022-10-02 RX ADMIN — Medication 650 MILLIGRAM(S): at 00:20

## 2022-10-02 RX ADMIN — Medication 4 MILLIGRAM(S): at 12:04

## 2022-10-02 RX ADMIN — CHLORHEXIDINE GLUCONATE 1 APPLICATION(S): 213 SOLUTION TOPICAL at 12:07

## 2022-10-02 RX ADMIN — Medication 5 MILLIGRAM(S): at 21:25

## 2022-10-02 RX ADMIN — HEPARIN SODIUM 5000 UNIT(S): 5000 INJECTION INTRAVENOUS; SUBCUTANEOUS at 05:57

## 2022-10-02 RX ADMIN — CHLORHEXIDINE GLUCONATE 1 APPLICATION(S): 213 SOLUTION TOPICAL at 21:26

## 2022-10-02 RX ADMIN — LEVETIRACETAM 500 MILLIGRAM(S): 250 TABLET, FILM COATED ORAL at 05:57

## 2022-10-02 NOTE — PROGRESS NOTE ADULT - SUBJECTIVE AND OBJECTIVE BOX
Patient seen and examined at bedside.    --Anticoagulation--    T(C): 36.8 (10-02-22 @ 08:00), Max: 36.8 (10-02-22 @ 08:00)  HR: 75 (10-02-22 @ 08:00) (75 - 85)  BP: 111/71 (10-02-22 @ 08:00) (99/66 - 118/78)  RR: 18 (10-02-22 @ 08:00) (18 - 18)  SpO2: 96% (10-02-22 @ 08:00) (95% - 96%)  Wt(kg): --    Exam: AO3, PERRL, EOMI, no droop/drift, R homonymous hemianopsia, LOUISE 5/5, SILT

## 2022-10-02 NOTE — PROGRESS NOTE ADULT - ASSESSMENT
Rachel Del  62M R handed, no hx, originally sent in on 9/21 by ophthalmologist for R visual blurriness since 9/19, found to have "hemianopic defect" at outpt ophto testing today. CTH then showed c/f underlying L TP mass w/ edema, likely primary brain tumor. He left AMA (difficult social situation) and now represents with HA. MRI here shows 4 x 2.5 x 4cm ring-enhancing mass in L temporal occipital region w/ surrounding edema. He endorses mixing up words at times, but this was not seen on current exam. Repetition and naming was intact. Exam: AO3, PERRL, EOMI, no droop/drift, R homonymous hemianopsia, LOUISE 5/5, SILT     10/2  - Plan for OR Monday for tumor biopsy/resection  - MR brain with DTI synaptive protocol completed  - CT CAP negative for neoplasm  - Dex4q6  - Keppra 500 BID

## 2022-10-02 NOTE — PROGRESS NOTE ADULT - SUBJECTIVE AND OBJECTIVE BOX
Patient is a 62y old  Male who presents with a chief complaint of HA/ brain mass (02 Oct 2022 11:25)      INTERVAL HPI/OVERNIGHT EVENTS:  T(C): 36.4 (10-02-22 @ 16:30), Max: 36.8 (10-02-22 @ 08:00)  HR: 67 (10-02-22 @ 16:30) (67 - 85)  BP: 121/70 (10-02-22 @ 16:30) (99/66 - 121/70)  RR: 18 (10-02-22 @ 16:30) (18 - 18)  SpO2: 98% (10-02-22 @ 16:30) (95% - 98%)  Wt(kg): --  I&O's Summary      PAST MEDICAL & SURGICAL HISTORY:  No pertinent past medical history      No significant past surgical history          SOCIAL HISTORY  Alcohol:  Tobacco:  Illicit substance use:    FAMILY HISTORY:    REVIEW OF SYSTEMS:  CONSTITUTIONAL: No fever, weight loss, or fatigue  EYES: No eye pain, visual disturbances, or discharge  ENMT:  No difficulty hearing, tinnitus, vertigo; No sinus or throat pain  NECK: No pain or stiffness  RESPIRATORY: No cough, wheezing, chills or hemoptysis; No shortness of breath  CARDIOVASCULAR: No chest pain, palpitations, dizziness, or leg swelling  GASTROINTESTINAL: No abdominal or epigastric pain. No nausea, vomiting, or hematemesis; No diarrhea or constipation. No melena or hematochezia.  GENITOURINARY: No dysuria, frequency, hematuria, or incontinence  NEUROLOGICAL: No headaches, memory loss, loss of strength, numbness, or tremors  SKIN: No itching, burning, rashes, or lesions   LYMPH NODES: No enlarged glands  ENDOCRINE: No heat or cold intolerance; No hair loss  MUSCULOSKELETAL: No joint pain or swelling; No muscle, back, or extremity pain  PSYCHIATRIC: No depression, anxiety, mood swings, or difficulty sleeping  HEME/LYMPH: No easy bruising, or bleeding gums  ALLERY AND IMMUNOLOGIC: No hives or eczema    RADIOLOGY & ADDITIONAL TESTS:    Imaging Personally Reviewed:  [ ] YES  [ ] NO    Consultant(s) Notes Reviewed:  [ ] YES  [ ] NO    PHYSICAL EXAM:  GENERAL: NAD, well-groomed, well-developed  HEAD:  Atraumatic, Normocephalic  EYES: EOMI, PERRLA, conjunctiva and sclera clear  ENMT: No tonsillar erythema, exudates, or enlargement; Moist mucous membranes, Good dentition, No lesions  NECK: Supple, No JVD, Normal thyroid  NERVOUS SYSTEM:  Alert & Oriented X3, Good concentration; Motor Strength 5/5 B/L upper and lower extremities; DTRs 2+ intact and symmetric  CHEST/LUNG: Clear to percussion bilaterally; No rales, rhonchi, wheezing, or rubs  HEART: Regular rate and rhythm; No murmurs, rubs, or gallops  ABDOMEN: Soft, Nontender, Nondistended; Bowel sounds present  EXTREMITIES:  2+ Peripheral Pulses, No clubbing, cyanosis, or edema  LYMPH: No lymphadenopathy noted  SKIN: No rashes or lesions    LABS:                        15.2   14.61 )-----------( 225      ( 02 Oct 2022 07:36 )             46.6     10-02    136  |  100  |  18  ----------------------------<  155<H>  4.1   |  24  |  0.82    Ca    9.4      02 Oct 2022 07:24      PT/INR - ( 01 Oct 2022 19:07 )   PT: 12.5 sec;   INR: 1.08 ratio         PTT - ( 01 Oct 2022 19:07 )  PTT:23.7 sec    CAPILLARY BLOOD GLUCOSE                MEDICATIONS  (STANDING):  chlorhexidine 2% Cloths 1 Application(s) Topical daily  chlorhexidine 4% Liquid 1 Application(s) Topical once  dexAMETHasone  Injectable 4 milliGRAM(s) IV Push every 6 hours  influenza   Vaccine 0.5 milliLiter(s) IntraMuscular once  lactated ringers. 1000 milliLiter(s) (75 mL/Hr) IV Continuous <Continuous>  levETIRAcetam 500 milliGRAM(s) Oral two times a day  melatonin 5 milliGRAM(s) Oral once  pantoprazole    Tablet 40 milliGRAM(s) Oral before breakfast    MEDICATIONS  (PRN):  acetaminophen     Tablet .. 650 milliGRAM(s) Oral every 6 hours PRN Temp greater or equal to 38C (100.4F), Moderate Pain (4 - 6)  diphenhydrAMINE 25 milliGRAM(s) Oral once PRN Insomnia      Care Discussed with Consultants/Other Providers [ ] YES  [ ] NO

## 2022-10-03 ENCOUNTER — NON-APPOINTMENT (OUTPATIENT)
Age: 62
End: 2022-10-03

## 2022-10-03 ENCOUNTER — APPOINTMENT (OUTPATIENT)
Dept: NEUROSURGERY | Facility: CLINIC | Age: 62
End: 2022-10-03

## 2022-10-03 ENCOUNTER — RESULT REVIEW (OUTPATIENT)
Age: 62
End: 2022-10-03

## 2022-10-03 ENCOUNTER — TRANSCRIPTION ENCOUNTER (OUTPATIENT)
Age: 62
End: 2022-10-03

## 2022-10-03 LAB
ANION GAP SERPL CALC-SCNC: 12 MMOL/L — SIGNIFICANT CHANGE UP (ref 5–17)
APTT BLD: 22.6 SEC — LOW (ref 27.5–35.5)
BUN SERPL-MCNC: 19 MG/DL — SIGNIFICANT CHANGE UP (ref 7–23)
CALCIUM SERPL-MCNC: 9.2 MG/DL — SIGNIFICANT CHANGE UP (ref 8.4–10.5)
CHLORIDE SERPL-SCNC: 102 MMOL/L — SIGNIFICANT CHANGE UP (ref 96–108)
CO2 SERPL-SCNC: 24 MMOL/L — SIGNIFICANT CHANGE UP (ref 22–31)
CREAT SERPL-MCNC: 0.84 MG/DL — SIGNIFICANT CHANGE UP (ref 0.5–1.3)
EGFR: 97 ML/MIN/1.73M2 — SIGNIFICANT CHANGE UP
GAS PNL BLDA: SIGNIFICANT CHANGE UP
GLUCOSE SERPL-MCNC: 130 MG/DL — HIGH (ref 70–99)
HCT VFR BLD CALC: 47.8 % — SIGNIFICANT CHANGE UP (ref 39–50)
HGB BLD-MCNC: 15.7 G/DL — SIGNIFICANT CHANGE UP (ref 13–17)
INR BLD: 1.1 RATIO — SIGNIFICANT CHANGE UP (ref 0.88–1.16)
MAGNESIUM SERPL-MCNC: 2.3 MG/DL — SIGNIFICANT CHANGE UP (ref 1.6–2.6)
MCHC RBC-ENTMCNC: 28.5 PG — SIGNIFICANT CHANGE UP (ref 27–34)
MCHC RBC-ENTMCNC: 32.8 GM/DL — SIGNIFICANT CHANGE UP (ref 32–36)
MCV RBC AUTO: 86.9 FL — SIGNIFICANT CHANGE UP (ref 80–100)
NRBC # BLD: 0 /100 WBCS — SIGNIFICANT CHANGE UP (ref 0–0)
PHOSPHATE SERPL-MCNC: 3.8 MG/DL — SIGNIFICANT CHANGE UP (ref 2.5–4.5)
PLATELET # BLD AUTO: 236 K/UL — SIGNIFICANT CHANGE UP (ref 150–400)
POTASSIUM SERPL-MCNC: 4.1 MMOL/L — SIGNIFICANT CHANGE UP (ref 3.5–5.3)
POTASSIUM SERPL-SCNC: 4.1 MMOL/L — SIGNIFICANT CHANGE UP (ref 3.5–5.3)
PROTHROM AB SERPL-ACNC: 12.8 SEC — SIGNIFICANT CHANGE UP (ref 10.5–13.4)
RBC # BLD: 5.5 M/UL — SIGNIFICANT CHANGE UP (ref 4.2–5.8)
RBC # FLD: 13.6 % — SIGNIFICANT CHANGE UP (ref 10.3–14.5)
SODIUM SERPL-SCNC: 138 MMOL/L — SIGNIFICANT CHANGE UP (ref 135–145)
WBC # BLD: 16.81 K/UL — HIGH (ref 3.8–10.5)
WBC # FLD AUTO: 16.81 K/UL — HIGH (ref 3.8–10.5)

## 2022-10-03 PROCEDURE — 88360 TUMOR IMMUNOHISTOCHEM/MANUAL: CPT | Mod: 26

## 2022-10-03 PROCEDURE — 95962 ELECTRODE STIM BRAIN ADD-ON: CPT | Mod: 26

## 2022-10-03 PROCEDURE — 99291 CRITICAL CARE FIRST HOUR: CPT

## 2022-10-03 PROCEDURE — 95961 ELECTRODE STIMULATION BRAIN: CPT | Mod: 26

## 2022-10-03 PROCEDURE — 88341 IMHCHEM/IMCYTCHM EA ADD ANTB: CPT | Mod: 26,59

## 2022-10-03 PROCEDURE — 61510 CRNEC TREPH EXC BRN TUM STTL: CPT

## 2022-10-03 PROCEDURE — 88342 IMHCHEM/IMCYTCHM 1ST ANTB: CPT | Mod: 26,59

## 2022-10-03 PROCEDURE — 88334 PATH CONSLTJ SURG CYTO XM EA: CPT | Mod: 26,59

## 2022-10-03 PROCEDURE — 88331 PATH CONSLTJ SURG 1 BLK 1SPC: CPT | Mod: 26

## 2022-10-03 PROCEDURE — 61781 SCAN PROC CRANIAL INTRA: CPT

## 2022-10-03 PROCEDURE — 88307 TISSUE EXAM BY PATHOLOGIST: CPT | Mod: 26

## 2022-10-03 PROCEDURE — 69990 MICROSURGERY ADD-ON: CPT | Mod: 59

## 2022-10-03 DEVICE — ELCTR SPINAL KIT (6 CONTACTS): Type: IMPLANTABLE DEVICE | Site: LEFT | Status: FUNCTIONAL

## 2022-10-03 DEVICE — PLATE COVER BURRHOLE UN3 W/TAB 10MM: Type: IMPLANTABLE DEVICE | Site: LEFT | Status: FUNCTIONAL

## 2022-10-03 DEVICE — SCREW UN3 AXS SELF DRILL 1.5X4MM 5/PK: Type: IMPLANTABLE DEVICE | Site: LEFT | Status: FUNCTIONAL

## 2022-10-03 DEVICE — SURGICEL FIBRILLAR 2 X 4": Type: IMPLANTABLE DEVICE | Site: LEFT | Status: FUNCTIONAL

## 2022-10-03 DEVICE — IMPLANTABLE DEVICE: Type: IMPLANTABLE DEVICE | Site: LEFT | Status: FUNCTIONAL

## 2022-10-03 DEVICE — PLATE UN3 2 HOLE RIGID: Type: IMPLANTABLE DEVICE | Site: LEFT | Status: FUNCTIONAL

## 2022-10-03 DEVICE — KIT A-LINE 1LUM 20G X 12CM SAFE KIT: Type: IMPLANTABLE DEVICE | Site: LEFT | Status: FUNCTIONAL

## 2022-10-03 DEVICE — MAYFIELD SKULL PIN ADULT PLASTIC: Type: IMPLANTABLE DEVICE | Site: LEFT | Status: FUNCTIONAL

## 2022-10-03 DEVICE — SURGIFLO MATRIX WITH THROMBIN KIT: Type: IMPLANTABLE DEVICE | Site: LEFT | Status: FUNCTIONAL

## 2022-10-03 DEVICE — SURGIFOAM PAD 8CM X 12.5CM X 10MM (100): Type: IMPLANTABLE DEVICE | Site: LEFT | Status: FUNCTIONAL

## 2022-10-03 DEVICE — SURGICEL 2 X 14": Type: IMPLANTABLE DEVICE | Site: LEFT | Status: FUNCTIONAL

## 2022-10-03 RX ORDER — DEXAMETHASONE 0.5 MG/5ML
4 ELIXIR ORAL EVERY 6 HOURS
Refills: 0 | Status: DISCONTINUED | OUTPATIENT
Start: 2022-10-03 | End: 2022-10-06

## 2022-10-03 RX ORDER — SENNA PLUS 8.6 MG/1
2 TABLET ORAL AT BEDTIME
Refills: 0 | Status: DISCONTINUED | OUTPATIENT
Start: 2022-10-03 | End: 2022-10-06

## 2022-10-03 RX ORDER — PANTOPRAZOLE SODIUM 20 MG/1
40 TABLET, DELAYED RELEASE ORAL
Refills: 0 | Status: DISCONTINUED | OUTPATIENT
Start: 2022-10-03 | End: 2022-10-06

## 2022-10-03 RX ORDER — ACETAMINOPHEN 500 MG
1000 TABLET ORAL ONCE
Refills: 0 | Status: COMPLETED | OUTPATIENT
Start: 2022-10-03 | End: 2022-10-03

## 2022-10-03 RX ORDER — CHLORHEXIDINE GLUCONATE 213 G/1000ML
1 SOLUTION TOPICAL DAILY
Refills: 0 | Status: DISCONTINUED | OUTPATIENT
Start: 2022-10-03 | End: 2022-10-03

## 2022-10-03 RX ORDER — POLYETHYLENE GLYCOL 3350 17 G/17G
17 POWDER, FOR SOLUTION ORAL DAILY
Refills: 0 | Status: DISCONTINUED | OUTPATIENT
Start: 2022-10-03 | End: 2022-10-04

## 2022-10-03 RX ORDER — LEVETIRACETAM 250 MG/1
500 TABLET, FILM COATED ORAL
Refills: 0 | Status: DISCONTINUED | OUTPATIENT
Start: 2022-10-03 | End: 2022-10-06

## 2022-10-03 RX ORDER — ACETAMINOPHEN 500 MG
650 TABLET ORAL EVERY 6 HOURS
Refills: 0 | Status: DISCONTINUED | OUTPATIENT
Start: 2022-10-03 | End: 2022-10-06

## 2022-10-03 RX ORDER — SODIUM CHLORIDE 9 MG/ML
1000 INJECTION, SOLUTION INTRAVENOUS
Refills: 0 | Status: DISCONTINUED | OUTPATIENT
Start: 2022-10-03 | End: 2022-10-04

## 2022-10-03 RX ORDER — CHLORHEXIDINE GLUCONATE 213 G/1000ML
1 SOLUTION TOPICAL
Refills: 0 | Status: DISCONTINUED | OUTPATIENT
Start: 2022-10-03 | End: 2022-10-04

## 2022-10-03 RX ORDER — OXYCODONE HYDROCHLORIDE 5 MG/1
5 TABLET ORAL EVERY 4 HOURS
Refills: 0 | Status: DISCONTINUED | OUTPATIENT
Start: 2022-10-03 | End: 2022-10-06

## 2022-10-03 RX ORDER — OXYCODONE HYDROCHLORIDE 5 MG/1
10 TABLET ORAL EVERY 4 HOURS
Refills: 0 | Status: DISCONTINUED | OUTPATIENT
Start: 2022-10-03 | End: 2022-10-06

## 2022-10-03 RX ORDER — NAFCILLIN 10 G/100ML
2 INJECTION, POWDER, FOR SOLUTION INTRAVENOUS EVERY 4 HOURS
Refills: 0 | Status: COMPLETED | OUTPATIENT
Start: 2022-10-03 | End: 2022-10-04

## 2022-10-03 RX ADMIN — PANTOPRAZOLE SODIUM 40 MILLIGRAM(S): 20 TABLET, DELAYED RELEASE ORAL at 04:53

## 2022-10-03 RX ADMIN — Medication 4 MILLIGRAM(S): at 04:53

## 2022-10-03 RX ADMIN — LEVETIRACETAM 500 MILLIGRAM(S): 250 TABLET, FILM COATED ORAL at 04:54

## 2022-10-03 RX ADMIN — Medication 4 MILLIGRAM(S): at 13:33

## 2022-10-03 RX ADMIN — Medication 400 MILLIGRAM(S): at 23:45

## 2022-10-03 NOTE — PRE-ANESTHESIA EVALUATION ADULT - NSANTHOSAYNRD_GEN_A_CORE
No. KELLY screening performed.  STOP BANG Legend: 0-2 = LOW Risk; 3-4 = INTERMEDIATE Risk; 5-8 = HIGH Risk
fair balance

## 2022-10-03 NOTE — PROGRESS NOTE ADULT - SUBJECTIVE AND OBJECTIVE BOX
Patient seen and examined at bedside.    --Anticoagulation--    T(C): 36.3 (10-02-22 @ 22:55), Max: 36.8 (10-02-22 @ 08:00)  HR: 62 (10-02-22 @ 22:55) (62 - 75)  BP: 100/60 (10-02-22 @ 22:55) (100/60 - 121/70)  RR: 18 (10-02-22 @ 22:55) (18 - 18)  SpO2: 93% (10-02-22 @ 22:55) (93% - 98%)  Wt(kg): --    Exam: AO3, PERRL, EOMI, no droop/drift, R homonymous hemianopsia, LOUISE 5/5, SILT

## 2022-10-03 NOTE — PROGRESS NOTE ADULT - SUBJECTIVE AND OBJECTIVE BOX
Patient is a 62y old  Male who presents with a chief complaint of HA/ brain mass (03 Oct 2022 05:16)      INTERVAL HPI/OVERNIGHT EVENTS: going to OR today   T(C): 36.8 (10-03-22 @ 16:11), Max: 36.8 (10-03-22 @ 14:30)  HR: 59 (10-03-22 @ 16:11) (59 - 63)  BP: 138/78 (10-03-22 @ 16:11) (100/60 - 138/78)  RR: 16 (10-03-22 @ 16:11) (16 - 18)  SpO2: 98% (10-03-22 @ 16:11) (93% - 100%)  Wt(kg): --  I&O's Summary      PAST MEDICAL & SURGICAL HISTORY:  No pertinent past medical history      No significant past surgical history          SOCIAL HISTORY  Alcohol:  Tobacco:  Illicit substance use:    FAMILY HISTORY:    REVIEW OF SYSTEMS:  CONSTITUTIONAL: No fever, weight loss, or fatigue  EYES: No eye pain, visual disturbances, or discharge  ENMT:  No difficulty hearing, tinnitus, vertigo; No sinus or throat pain  NECK: No pain or stiffness  RESPIRATORY: No cough, wheezing, chills or hemoptysis; No shortness of breath  CARDIOVASCULAR: No chest pain, palpitations, dizziness, or leg swelling  GASTROINTESTINAL: No abdominal or epigastric pain. No nausea, vomiting, or hematemesis; No diarrhea or constipation. No melena or hematochezia.  GENITOURINARY: No dysuria, frequency, hematuria, or incontinence  NEUROLOGICAL: No headaches, memory loss, loss of strength, numbness, or tremors  SKIN: No itching, burning, rashes, or lesions   LYMPH NODES: No enlarged glands  ENDOCRINE: No heat or cold intolerance; No hair loss  MUSCULOSKELETAL: No joint pain or swelling; No muscle, back, or extremity pain  PSYCHIATRIC: No depression, anxiety, mood swings, or difficulty sleeping  HEME/LYMPH: No easy bruising, or bleeding gums  ALLERY AND IMMUNOLOGIC: No hives or eczema    RADIOLOGY & ADDITIONAL TESTS:    Imaging Personally Reviewed:  [ ] YES  [ ] NO    Consultant(s) Notes Reviewed:  [ ] YES  [ ] NO    PHYSICAL EXAM:  GENERAL: NAD, well-groomed, well-developed  HEAD:  Atraumatic, Normocephalic  EYES: EOMI, PERRLA, conjunctiva and sclera clear  ENMT: No tonsillar erythema, exudates, or enlargement; Moist mucous membranes, Good dentition, No lesions  NECK: Supple, No JVD, Normal thyroid  NERVOUS SYSTEM:  Alert & Oriented X3, Good concentration; Motor Strength 5/5 B/L upper and lower extremities; DTRs 2+ intact and symmetric  CHEST/LUNG: Clear to percussion bilaterally; No rales, rhonchi, wheezing, or rubs  HEART: Regular rate and rhythm; No murmurs, rubs, or gallops  ABDOMEN: Soft, Nontender, Nondistended; Bowel sounds present  EXTREMITIES:  2+ Peripheral Pulses, No clubbing, cyanosis, or edema  LYMPH: No lymphadenopathy noted  SKIN: No rashes or lesions    LABS:                        15.7   16.81 )-----------( 236      ( 03 Oct 2022 06:47 )             47.8     10-03    138  |  102  |  19  ----------------------------<  130<H>  4.1   |  24  |  0.84    Ca    9.2      03 Oct 2022 06:47  Phos  3.8     10-03  Mg     2.3     10-03      PT/INR - ( 03 Oct 2022 06:47 )   PT: 12.8 sec;   INR: 1.10 ratio         PTT - ( 03 Oct 2022 06:47 )  PTT:22.6 sec    CAPILLARY BLOOD GLUCOSE        ABG - ( 03 Oct 2022 22:03 )  pH, Arterial: 7.40  pH, Blood: x     /  pCO2: 30    /  pO2: 213   / HCO3: 19    / Base Excess: -5.0  /  SaO2: 99.6                    MEDICATIONS  (STANDING):  influenza   Vaccine 0.5 milliLiter(s) IntraMuscular once    MEDICATIONS  (PRN):      Care Discussed with Consultants/Other Providers [ ] YES  [ ] NO

## 2022-10-03 NOTE — CHART NOTE - NSCHARTNOTEFT_GEN_A_CORE
62M R handed, no sig med hx, originally sent in on 9/21 by ophthalmologist for R visual blurriness since 9/19, found to have "hemianopic defect" at outpt ophto testing. CTH then showed c/f underlying L TP mass w/ edema, likely primary brain tumor. He left AMA (difficult social situation) and represented with HA. MRI here shows 4 x 2.5 x 4cm ring-enhancing mass in L temporal occipital region w/ surrounding edema. CT CAP w/ IV contrast was negative.     The decision was made to proceed with a left craniotomy for biopsy and resection of tumor on 10/3/22. After surgery, the patient will be in the [facility] where he will be closely monitored with q1h neuro checks. Plan was discussed with patient. All questions were addressed.    Exam today: AO3, PERRL, EOMI, no droop/drift, R homonymous hemianopsia, LOUISE 5/5, SILT 62M R handed, no sig med hx, originally sent in on 9/21 by ophthalmologist for R visual blurriness since 9/19, found to have "hemianopic defect" at outpt ophto testing. CTH then showed c/f underlying L TP mass w/ edema, likely primary brain tumor. He left AMA (difficult social situation) and represented with HA. MRI here shows 4 x 2.5 x 4cm ring-enhancing mass in L temporal occipital region w/ surrounding edema. CT CAP w/ IV contrast was negative.     The decision was made to proceed with a left craniotomy for biopsy and resection of tumor on 10/3/22. After surgery, the patient will be in the [facility] where he will be closely monitored with q1h neuro checks. Plan was discussed with patient. All questions were addressed.    Exam today: AO3, PERRL, EOMI, no droop/drift, no homonymous hemianopsia appreciated, LOUISE 5/5, SILT

## 2022-10-03 NOTE — PROGRESS NOTE ADULT - ASSESSMENT
Rachel Del  62M R handed, no hx, originally sent in on 9/21 by ophthalmologist for R visual blurriness since 9/19, found to have "hemianopic defect" at outpt ophto testing today. CTH then showed c/f underlying L TP mass w/ edema, likely primary brain tumor. He left AMA (difficult social situation) and now represents with HA. MRI here shows 4 x 2.5 x 4cm ring-enhancing mass in L temporal occipital region w/ surrounding edema. He endorses mixing up words at times, but this was not seen on current exam. Repetition and naming was intact. Exam: AO3, PERRL, EOMI, no droop/drift, R homonymous hemianopsia, LOUISE 5/5, SILT     10/3  - Plan for OR Monday for tumor biopsy/resection  - MR brain with DTI synaptive protocol completed  - CT CAP negative for neoplasm  - Dex4q6  - Keppra 500 BID

## 2022-10-03 NOTE — PROGRESS NOTE ADULT - ATTENDING COMMENTS
The patient says his headache has resolved on steroids, and that his mild word finding difficulty has nearly resolved. His right hemianopsia was mild and peripheral on exam, and he did not extinguish double simultaneous stimuli.    Plan: Left craniotomy for mass  I discussed the risks, benefits, and alternatives to surgery with the patient, and answered all questions. In particular, the risks of bleeding, infection, and new neurological deficit, in particular, right sided visual worsening, were discussed.

## 2022-10-03 NOTE — BRIEF OPERATIVE NOTE - NSICDXBRIEFPROCEDURE_GEN_ALL_CORE_FT
PROCEDURES:  Craniotomy for resection of tumor of left side of brain 03-Oct-2022 21:57:13  Gregory Fry

## 2022-10-03 NOTE — PROGRESS NOTE ADULT - ASSESSMENT
A/P     Brain mass / Blurry vision/ hemianopsia   -pt. was seen by neurosurgery and neurology team few days ago in ED and at that time he signed out AM   MRI brain done   neurosurgery following : scheduled for brain surgery today  started on keppra and IV decadrone   seen by opthalmology     HA :  -tylenol prn     insomnia :   try melatonin , if don't work , start ambien 5 mg q HS     dispo: seen by cardio , cleared for surgery with moderate to high risk for the procedure

## 2022-10-03 NOTE — PROGRESS NOTE ADULT - SUBJECTIVE AND OBJECTIVE BOX
Interval events:    VITALS:  T(C): , Max: 36.8 (10-03-22 @ 14:30)  HR:  (59 - 63)  BP:  (116/68 - 138/78)  ABP: --  RR:  (16 - 18)  SpO2:  (98% - 100%)  Wt(kg): --      LABS:  Na: 138 (10-03 @ 06:47), 136 (10-02 @ 07:24)  K: 4.1 (10-03 @ 06:47), 4.1 (10-02 @ 07:24)  Cl: 102 (10-03 @ 06:47), 100 (10-02 @ 07:24)  CO2: 24 (10-03 @ 06:47), 24 (10-02 @ 07:24)  BUN: 19 (10-03 @ 06:47), 18 (10-02 @ 07:24)  Cr: 0.84 (10-03 @ 06:47), 0.82 (10-02 @ 07:24)  Glu: 130(10-03 @ 06:47), 155(10-02 @ 07:24)    Hgb: 15.7 (10-03 @ 06:47), 15.2 (10-02 @ 07:36)  Hct: 47.8 (10-03 @ 06:47), 46.6 (10-02 @ 07:36)  WBC: 16.81 (10-03 @ 06:47), 14.61 (10-02 @ 07:36)  Plt: 236 (10-03 @ 06:47), 225 (10-02 @ 07:36)    INR: 1.10 10-03-22 @ 06:47, 1.08 10-01-22 @ 19:07  PTT: 22.6 10-03-22 @ 06:47, 23.7 10-01-22 @ 19:07    MEDICATIONS:  acetaminophen     Tablet .. 650 milliGRAM(s) Oral every 6 hours PRN  acetaminophen   IVPB .. 1000 milliGRAM(s) IV Intermittent once  chlorhexidine 4% Liquid 1 Application(s) Topical <User Schedule>  dexAMETHasone  Injectable 4 milliGRAM(s) IV Push every 6 hours  influenza   Vaccine 0.5 milliLiter(s) IntraMuscular once  lactated ringers. 1000 milliLiter(s) IV Continuous <Continuous>  levETIRAcetam 500 milliGRAM(s) Oral two times a day  nafcillin  IVPB 2 Gram(s) IV Intermittent every 4 hours  oxyCODONE    IR 5 milliGRAM(s) Oral every 4 hours PRN  oxyCODONE    IR 10 milliGRAM(s) Oral every 4 hours PRN  pantoprazole    Tablet 40 milliGRAM(s) Oral before breakfast  polyethylene glycol 3350 17 Gram(s) Oral daily  senna 2 Tablet(s) Oral at bedtime    EXAMINATION:  General:  in NAD  HEENT:  MMM  Neuro:  awake, alert, oriented x 3, follows commands, EOMI, face symmetric, no PD, DF 5/5   Cards:  RRR  Respiratory:  no respiratory distress  Abdomen:  soft  Extremities:  no LE edema    Assessment/Plan:    HPI:  62 RH man with no significant PMH who was found to have a R VF cut as outpatient. CTH then showed c/f underlying L TP mass w/ edema, likely primary brain tumor. He left AMA (difficult social situation) and represented with HA. MRI here shows 4 x 2.5 x 4cm ring-enhancing mass in L temporal occipital region w/ surrounding edema. CT CAP w/ IV contrast was negative.    S/p craniotomy for resection of tumor today with some oozing in OR, EBL 400cc, frozen path with high grade neoplasm.     In NSCU, tachycardic, receiving pain control and 1 L NS bolus. Patient reports headache is well controlled.     VITALS:  T(C): , Max: 36.8 (10-03-22 @ 14:30)  HR:  (59 - 63)  BP:  (116/68 - 138/78)  ABP: --  RR:  (16 - 18)  SpO2:  (98% - 100%)  Wt(kg): --      LABS:  Na: 138 (10-03 @ 06:47), 136 (10-02 @ 07:24)  K: 4.1 (10-03 @ 06:47), 4.1 (10-02 @ 07:24)  Cl: 102 (10-03 @ 06:47), 100 (10-02 @ 07:24)  CO2: 24 (10-03 @ 06:47), 24 (10-02 @ 07:24)  BUN: 19 (10-03 @ 06:47), 18 (10-02 @ 07:24)  Cr: 0.84 (10-03 @ 06:47), 0.82 (10-02 @ 07:24)  Glu: 130(10-03 @ 06:47), 155(10-02 @ 07:24)    Hgb: 15.7 (10-03 @ 06:47), 15.2 (10-02 @ 07:36)  Hct: 47.8 (10-03 @ 06:47), 46.6 (10-02 @ 07:36)  WBC: 16.81 (10-03 @ 06:47), 14.61 (10-02 @ 07:36)  Plt: 236 (10-03 @ 06:47), 225 (10-02 @ 07:36)    INR: 1.10 10-03-22 @ 06:47, 1.08 10-01-22 @ 19:07  PTT: 22.6 10-03-22 @ 06:47, 23.7 10-01-22 @ 19:07    MEDICATIONS:  acetaminophen     Tablet .. 650 milliGRAM(s) Oral every 6 hours PRN  acetaminophen   IVPB .. 1000 milliGRAM(s) IV Intermittent once  chlorhexidine 4% Liquid 1 Application(s) Topical <User Schedule>  dexAMETHasone  Injectable 4 milliGRAM(s) IV Push every 6 hours  influenza   Vaccine 0.5 milliLiter(s) IntraMuscular once  lactated ringers. 1000 milliLiter(s) IV Continuous <Continuous>  levETIRAcetam 500 milliGRAM(s) Oral two times a day  nafcillin  IVPB 2 Gram(s) IV Intermittent every 4 hours  oxyCODONE    IR 5 milliGRAM(s) Oral every 4 hours PRN  oxyCODONE    IR 10 milliGRAM(s) Oral every 4 hours PRN  pantoprazole    Tablet 40 milliGRAM(s) Oral before breakfast  polyethylene glycol 3350 17 Gram(s) Oral daily  senna 2 Tablet(s) Oral at bedtime    EXAMINATION:  General:  in NAD  HEENT:  MMM  Neuro:  awake, alert, oriented to institution, month and year, speech is fluent but patient occasionally corrects himself, follows commands, PERRL, EOMI, with mild R VF cut, face symmetric, no PD, 5/5 in b/l biceps and triceps, HF, DF and PF 5/5    Cards:  RRR  Respiratory:  no respiratory distress  Abdomen:  soft  Extremities:  no LE edema    Assessment/Plan:     Neuro:  q1h NC  CT head in AM   dex 4 mg q6h  oxycodone and tylenol for pain   keppra for seizure ppx     CV:  SBP goal 100-130    Pulm: on RA  ABG with lactate post bolus     GI:  PPI  dysphagia screen  senna and miralax     Renal:  Bolus 1L NS, NS at 75cc/hr    Heme:  SCDs, no Lov ppx for POD 0    ID:  Ancef     Endo:   FS goal 120-180    A line, castro removed     Patient seen and examined by attending on 10/3/2022.    Patient is critically ill due to ? and at high risk for neurological deterioration or death due to:     HPI:  63yo RH man with no significant PMH who was found to have a R VF cut as outpatient. MRI brain with a 4 x 2.5 x 4cm ring-enhancing mass in L temporal occipital region with surrounding edema. CT CAP was negative.    Patient is s/p craniotomy for resection of tumor today with some oozing in OR, EBL 400cc, frozen path with high grade neoplasm.     In NSCU, tachycardic, receiving pain control and 1 L NS bolus. Patient reports headache is well controlled.     VITALS:  T(C): , Max: 36.8 (10-03-22 @ 14:30)  HR:  (59 - 63)  BP:  (116/68 - 138/78)  ABP: --  RR:  (16 - 18)  SpO2:  (98% - 100%)  Wt(kg): --      LABS:  Na: 138 (10-03 @ 06:47), 136 (10-02 @ 07:24)  K: 4.1 (10-03 @ 06:47), 4.1 (10-02 @ 07:24)  Cl: 102 (10-03 @ 06:47), 100 (10-02 @ 07:24)  CO2: 24 (10-03 @ 06:47), 24 (10-02 @ 07:24)  BUN: 19 (10-03 @ 06:47), 18 (10-02 @ 07:24)  Cr: 0.84 (10-03 @ 06:47), 0.82 (10-02 @ 07:24)  Glu: 130(10-03 @ 06:47), 155(10-02 @ 07:24)    Hgb: 15.7 (10-03 @ 06:47), 15.2 (10-02 @ 07:36)  Hct: 47.8 (10-03 @ 06:47), 46.6 (10-02 @ 07:36)  WBC: 16.81 (10-03 @ 06:47), 14.61 (10-02 @ 07:36)  Plt: 236 (10-03 @ 06:47), 225 (10-02 @ 07:36)    INR: 1.10 10-03-22 @ 06:47, 1.08 10-01-22 @ 19:07  PTT: 22.6 10-03-22 @ 06:47, 23.7 10-01-22 @ 19:07    MEDICATIONS:  acetaminophen     Tablet .. 650 milliGRAM(s) Oral every 6 hours PRN  acetaminophen   IVPB .. 1000 milliGRAM(s) IV Intermittent once  chlorhexidine 4% Liquid 1 Application(s) Topical <User Schedule>  dexAMETHasone  Injectable 4 milliGRAM(s) IV Push every 6 hours  influenza   Vaccine 0.5 milliLiter(s) IntraMuscular once  lactated ringers. 1000 milliLiter(s) IV Continuous <Continuous>  levETIRAcetam 500 milliGRAM(s) Oral two times a day  nafcillin  IVPB 2 Gram(s) IV Intermittent every 4 hours  oxyCODONE    IR 5 milliGRAM(s) Oral every 4 hours PRN  oxyCODONE    IR 10 milliGRAM(s) Oral every 4 hours PRN  pantoprazole    Tablet 40 milliGRAM(s) Oral before breakfast  polyethylene glycol 3350 17 Gram(s) Oral daily  senna 2 Tablet(s) Oral at bedtime    EXAMINATION:  General:  in NAD  HEENT:  MMM  Neuro:  awake, alert, oriented to institution, month and year, speech is fluent but patient occasionally corrects himself, follows commands, PERRL, EOMI, with mild R VF cut, face symmetric, no PD, 5/5 in b/l biceps and triceps, HF, DF and PF 5/5    Cards:  RRR  Respiratory:  no respiratory distress  Abdomen:  soft  Extremities:  no LE edema    Assessment/Plan:   63yo RH man with no significant PMH who was found to have a R VF cut as outpatient. MRI brain with a 4 x 2.5 x 4cm ring-enhancing mass in L temporal occipital region with surrounding edema. CT CAP was negative. Patient is s/p craniotomy for resection of tumor today.     Neuro:  q1h NC  CT head in AM   dex 4 mg q6h  oxycodone and tylenol for pain   keppra for seizure ppx     CV:  SBP goal 100-130    Pulm: on RA  ABG with lactate post bolus     GI:  PPI  dysphagia screen  senna and miralax     Renal:  Bolus 1L NS, NS at 75cc/hr    Heme:  SCDs, no Lov ppx for POD 0    ID:  Ancef     Endo:   FS goal 120-180    A line, castro removed     Patient seen and examined by attending on 10/3/2022.    Patient is critically ill due to craniotomy for tumor resection and at high risk for neurological deterioration or death due to: potential complications from craniotomy for tumor resection such as ICH and brain edema requiring close neurologic monitoring.

## 2022-10-04 LAB
ANION GAP SERPL CALC-SCNC: 12 MMOL/L — SIGNIFICANT CHANGE UP (ref 5–17)
BUN SERPL-MCNC: 21 MG/DL — SIGNIFICANT CHANGE UP (ref 7–23)
CALCIUM SERPL-MCNC: 8.1 MG/DL — LOW (ref 8.4–10.5)
CHLORIDE SERPL-SCNC: 107 MMOL/L — SIGNIFICANT CHANGE UP (ref 96–108)
CO2 SERPL-SCNC: 19 MMOL/L — LOW (ref 22–31)
CREAT SERPL-MCNC: 0.78 MG/DL — SIGNIFICANT CHANGE UP (ref 0.5–1.3)
EGFR: 100 ML/MIN/1.73M2 — SIGNIFICANT CHANGE UP
GAS PNL BLDA: SIGNIFICANT CHANGE UP
GLUCOSE SERPL-MCNC: 150 MG/DL — HIGH (ref 70–99)
HCT VFR BLD CALC: 41.7 % — SIGNIFICANT CHANGE UP (ref 39–50)
HGB BLD-MCNC: 13.6 G/DL — SIGNIFICANT CHANGE UP (ref 13–17)
MAGNESIUM SERPL-MCNC: 2 MG/DL — SIGNIFICANT CHANGE UP (ref 1.6–2.6)
MCHC RBC-ENTMCNC: 28.2 PG — SIGNIFICANT CHANGE UP (ref 27–34)
MCHC RBC-ENTMCNC: 32.6 GM/DL — SIGNIFICANT CHANGE UP (ref 32–36)
MCV RBC AUTO: 86.5 FL — SIGNIFICANT CHANGE UP (ref 80–100)
NRBC # BLD: 0 /100 WBCS — SIGNIFICANT CHANGE UP (ref 0–0)
PHOSPHATE SERPL-MCNC: 5.4 MG/DL — HIGH (ref 2.5–4.5)
PLATELET # BLD AUTO: 255 K/UL — SIGNIFICANT CHANGE UP (ref 150–400)
POTASSIUM SERPL-MCNC: 4.2 MMOL/L — SIGNIFICANT CHANGE UP (ref 3.5–5.3)
POTASSIUM SERPL-SCNC: 4.2 MMOL/L — SIGNIFICANT CHANGE UP (ref 3.5–5.3)
RBC # BLD: 4.82 M/UL — SIGNIFICANT CHANGE UP (ref 4.2–5.8)
RBC # FLD: 13.8 % — SIGNIFICANT CHANGE UP (ref 10.3–14.5)
SODIUM SERPL-SCNC: 138 MMOL/L — SIGNIFICANT CHANGE UP (ref 135–145)
WBC # BLD: 20.01 K/UL — HIGH (ref 3.8–10.5)
WBC # FLD AUTO: 20.01 K/UL — HIGH (ref 3.8–10.5)

## 2022-10-04 PROCEDURE — 99233 SBSQ HOSP IP/OBS HIGH 50: CPT

## 2022-10-04 PROCEDURE — 70450 CT HEAD/BRAIN W/O DYE: CPT | Mod: 26

## 2022-10-04 RX ORDER — LANOLIN ALCOHOL/MO/W.PET/CERES
5 CREAM (GRAM) TOPICAL AT BEDTIME
Refills: 0 | Status: DISCONTINUED | OUTPATIENT
Start: 2022-10-04 | End: 2022-10-06

## 2022-10-04 RX ORDER — POLYETHYLENE GLYCOL 3350 17 G/17G
17 POWDER, FOR SOLUTION ORAL
Refills: 0 | Status: DISCONTINUED | OUTPATIENT
Start: 2022-10-04 | End: 2022-10-06

## 2022-10-04 RX ORDER — SODIUM CHLORIDE 9 MG/ML
1000 INJECTION INTRAMUSCULAR; INTRAVENOUS; SUBCUTANEOUS
Refills: 0 | Status: DISCONTINUED | OUTPATIENT
Start: 2022-10-04 | End: 2022-10-04

## 2022-10-04 RX ORDER — SODIUM CHLORIDE 9 MG/ML
1000 INJECTION, SOLUTION INTRAVENOUS ONCE
Refills: 0 | Status: COMPLETED | OUTPATIENT
Start: 2022-10-04 | End: 2022-10-04

## 2022-10-04 RX ORDER — CALCIUM GLUCONATE 100 MG/ML
1 VIAL (ML) INTRAVENOUS ONCE
Refills: 0 | Status: COMPLETED | OUTPATIENT
Start: 2022-10-04 | End: 2022-10-04

## 2022-10-04 RX ADMIN — Medication 100 GRAM(S): at 02:43

## 2022-10-04 RX ADMIN — Medication 650 MILLIGRAM(S): at 05:42

## 2022-10-04 RX ADMIN — OXYCODONE HYDROCHLORIDE 5 MILLIGRAM(S): 5 TABLET ORAL at 03:54

## 2022-10-04 RX ADMIN — LEVETIRACETAM 500 MILLIGRAM(S): 250 TABLET, FILM COATED ORAL at 17:07

## 2022-10-04 RX ADMIN — Medication 650 MILLIGRAM(S): at 18:00

## 2022-10-04 RX ADMIN — OXYCODONE HYDROCHLORIDE 5 MILLIGRAM(S): 5 TABLET ORAL at 02:54

## 2022-10-04 RX ADMIN — Medication 4 MILLIGRAM(S): at 00:36

## 2022-10-04 RX ADMIN — NAFCILLIN 200 GRAM(S): 10 INJECTION, POWDER, FOR SOLUTION INTRAVENOUS at 13:52

## 2022-10-04 RX ADMIN — NAFCILLIN 200 GRAM(S): 10 INJECTION, POWDER, FOR SOLUTION INTRAVENOUS at 22:21

## 2022-10-04 RX ADMIN — LEVETIRACETAM 500 MILLIGRAM(S): 250 TABLET, FILM COATED ORAL at 05:07

## 2022-10-04 RX ADMIN — Medication 650 MILLIGRAM(S): at 18:30

## 2022-10-04 RX ADMIN — Medication 4 MILLIGRAM(S): at 17:07

## 2022-10-04 RX ADMIN — NAFCILLIN 200 GRAM(S): 10 INJECTION, POWDER, FOR SOLUTION INTRAVENOUS at 09:14

## 2022-10-04 RX ADMIN — Medication 650 MILLIGRAM(S): at 12:20

## 2022-10-04 RX ADMIN — OXYCODONE HYDROCHLORIDE 5 MILLIGRAM(S): 5 TABLET ORAL at 23:00

## 2022-10-04 RX ADMIN — Medication 650 MILLIGRAM(S): at 11:50

## 2022-10-04 RX ADMIN — NAFCILLIN 200 GRAM(S): 10 INJECTION, POWDER, FOR SOLUTION INTRAVENOUS at 05:06

## 2022-10-04 RX ADMIN — Medication 4 MILLIGRAM(S): at 11:21

## 2022-10-04 RX ADMIN — Medication 4 MILLIGRAM(S): at 23:38

## 2022-10-04 RX ADMIN — NAFCILLIN 200 GRAM(S): 10 INJECTION, POWDER, FOR SOLUTION INTRAVENOUS at 17:07

## 2022-10-04 RX ADMIN — NAFCILLIN 200 GRAM(S): 10 INJECTION, POWDER, FOR SOLUTION INTRAVENOUS at 01:16

## 2022-10-04 RX ADMIN — PANTOPRAZOLE SODIUM 40 MILLIGRAM(S): 20 TABLET, DELAYED RELEASE ORAL at 08:43

## 2022-10-04 RX ADMIN — Medication 1000 MILLIGRAM(S): at 00:00

## 2022-10-04 RX ADMIN — Medication 4 MILLIGRAM(S): at 05:07

## 2022-10-04 RX ADMIN — POLYETHYLENE GLYCOL 3350 17 GRAM(S): 17 POWDER, FOR SOLUTION ORAL at 17:10

## 2022-10-04 RX ADMIN — SODIUM CHLORIDE 2000 MILLILITER(S): 9 INJECTION, SOLUTION INTRAVENOUS at 01:15

## 2022-10-04 RX ADMIN — SODIUM CHLORIDE 75 MILLILITER(S): 9 INJECTION INTRAMUSCULAR; INTRAVENOUS; SUBCUTANEOUS at 01:00

## 2022-10-04 RX ADMIN — Medication 650 MILLIGRAM(S): at 06:42

## 2022-10-04 NOTE — PHYSICAL THERAPY INITIAL EVALUATION ADULT - PERTINENT HX OF CURRENT PROBLEM, REHAB EVAL
No signif PMHx. originally pt c/o HA x4 days that woke him from sleep with R eye visual defect, was sent to ED on 9/21 by ophthalmologist for R visual blurriness since 9/19 where pt was found to have "hemianopic defect" at outpt ophto testing (subtle R homonymous hemianopsia on confrontational visual field testing). CTH: underlying L TP mass with vasogenic edema, likely primary brain tumor. Pt left AMA (difficult social situation, needed to go home to take care of mother). Pt now represents with HA. MRI here shows 4 x 2.5 x 4cm ring-enhancing mass in L temporal occipital region with surrounding edema. He endorses mixing up words at times, but this was not seen on current exam. Repetition and naming was intact. CT CAP w/ IV contrast was negative. s/p L crani for tumor resection 10/3 (frozen path with high grade neoplasm)

## 2022-10-04 NOTE — PHYSICAL THERAPY INITIAL EVALUATION ADULT - PLANNED THERAPY INTERVENTIONS, PT EVAL
stair training: GOAL: (to be met in 4 wks) negotiate 1 flight of stairs with 1 rail and appropriate assistive device with step to step pattern independently/balance training/gait training/strengthening

## 2022-10-04 NOTE — PROGRESS NOTE ADULT - SUBJECTIVE AND OBJECTIVE BOX
HPI:  62 RH man with no significant PMH who was found to have a R VF cut as outpatient. CTH then showed c/f underlying L TP mass w/ edema, likely primary brain tumor. He left AMA (difficult social situation) and represented with HA. MRI here shows 4 x 2.5 x 4cm ring-enhancing mass in L temporal occipital region w/ surrounding edema. CT CAP w/ IV contrast was negative.    S/p craniotomy for resection of tumor today with some oozing in OR, EBL 400cc, frozen path with high grade neoplasm.     In NSCU, tachycardic, receiving pain control and 1 L NS bolus. Patient reports headache is well controlled.     REVIEW OF SYSTEMS: [] Unable to Assess due to neurologic exam   [ ] All ROS addressed below are non-contributory, except:  Neuro: [ ] Headache [ ] Back pain [ ] Numbness [ ] Weakness [ ] Ataxia [ ] Dizziness [ ] Aphasia [ ] Dysarthria [ ] Visual disturbance  Resp: [ ] Shortness of breath/dyspnea [ ] Orthopnea [ ] Cough  CV: [ ] Chest pain [ ] Palpitation [ ] Lightheadedness [ ] Syncope  Renal: [ ] Thirst [ ] Edema  GI: [ ] Nausea [ ] Emesis [ ] Abdominal pain [ ] Constipation [ ] Diarrhea  Hem: [ ] Hematemesis [ ] bBright red blood per rectum  ID: [ ] Fever [ ] Chills [ ] Dysuria  ENT: [ ] Rhinorrhea    VITALS: reviewed  LABS: reviewed  MEDICATIONS: reviewed  IMAGING reviewed    EXAMINATION:  General:  in NAD  HEENT:  MMM  Neuro:  awake, alert, oriented to institution, month and year, speech is fluent but patient occasionally corrects himself, follows commands, PERRL, EOMI, with mild R VF cut, face symmetric, no PD, 5/5 in b/l biceps and triceps, HF, DF and PF 5/5    Cards:  RRR  Respiratory:  no respiratory distress  Abdomen:  soft  Extremities:  no LE edema     HPI:  62 RH man with no significant PMH who was found to have a R VF cut as outpatient. CTH then showed c/f underlying L TP mass w/ edema, likely primary brain tumor. He left AMA (difficult social situation) and represented with HA. MRI here shows 4 x 2.5 x 4cm ring-enhancing mass in L temporal occipital region w/ surrounding edema. CT CAP w/ IV contrast was negative.    S/p craniotomy for resection of tumor today with some oozing in OR, EBL 400cc, frozen path with high grade neoplasm.     In NSCU, tachycardic, receiving pain control and 1 L NS bolus. Patient reports headache is well controlled.     stable exam     REVIEW OF SYSTEMS: [] Unable to Assess due to neurologic exam   [x ] All ROS addressed below are non-contributory, except:  Neuro: [ x] Headache [ ] Back pain [ ] Numbness [ ] Weakness [ ] Ataxia [ ] Dizziness [ ] Aphasia [ ] Dysarthria [ ] Visual disturbance  Resp: [ ] Shortness of breath/dyspnea [ ] Orthopnea [ ] Cough  CV: [ ] Chest pain [ ] Palpitation [ ] Lightheadedness [ ] Syncope  Renal: [ ] Thirst [ ] Edema  GI: [ ] Nausea [ ] Emesis [ ] Abdominal pain [ ] Constipation [ ] Diarrhea  Hem: [ ] Hematemesis [ ] bBright red blood per rectum  ID: [ ] Fever [ ] Chills [ ] Dysuria  ENT: [ ] Rhinorrhea    VITALS: reviewed  LABS: reviewed  MEDICATIONS: reviewed  IMAGING reviewed    EXAMINATION:  General:  in NAD  HEENT:  MMM  Neuro:  awake, alert, fully oriented, follows commands, PERRL, EOMI, with mild R VF cut, face symmetric, no drift, full strength throughout   Cards:  RRR  Respiratory:  no respiratory distress  Abdomen:  soft  Extremities:  no LE edema

## 2022-10-04 NOTE — DIETITIAN INITIAL EVALUATION ADULT - REASON FOR ADMISSION
Pt is a 63 yo M with no significant PMH. Found to have a R VF cut as outpatient. CTH then showed c/f underlying TP mass with edema, likely primary brain tumor. Left AMA, and represented with a HA. MRI here showed 4 x 2.5 x 4cm ring-enhancing mass in L temporal occipital region with surrounding edema. CT CAP with IV contrast negative. Pt now s/p craniotomy for resection of tumor; frozen path with high grade neoplasm.

## 2022-10-04 NOTE — PROGRESS NOTE ADULT - SUBJECTIVE AND OBJECTIVE BOX
Patient is a 62y old  Male who presents with a chief complaint of HA/ brain mass (04 Oct 2022 15:15)      INTERVAL HPI/OVERNIGHT EVENTS: seen and examined POD#1  T(C): 37.2 (10-05-22 @ 00:00), Max: 37.3 (10-04-22 @ 20:00)  HR: 72 (10-05-22 @ 00:00) (72 - 123)  BP: 111/69 (10-05-22 @ 00:00) (107/73 - 111/69)  RR: 18 (10-05-22 @ 00:00) (16 - 23)  SpO2: 96% (10-05-22 @ 00:00) (92% - 97%)  Wt(kg): --  I&O's Summary    03 Oct 2022 07:01  -  04 Oct 2022 07:00  --------------------------------------------------------  IN: 2035 mL / OUT: 1100 mL / NET: 935 mL    04 Oct 2022 07:01  -  05 Oct 2022 02:06  --------------------------------------------------------  IN: 1100 mL / OUT: 1100 mL / NET: 0 mL        PAST MEDICAL & SURGICAL HISTORY:  No pertinent past medical history      No significant past surgical history          SOCIAL HISTORY  Alcohol:  Tobacco:  Illicit substance use:    FAMILY HISTORY:    REVIEW OF SYSTEMS:  CONSTITUTIONAL: No fever, weight loss, or fatigue  EYES: No eye pain, visual disturbances, or discharge  ENMT:  No difficulty hearing, tinnitus, vertigo; No sinus or throat pain  NECK: No pain or stiffness  RESPIRATORY: No cough, wheezing, chills or hemoptysis; No shortness of breath  CARDIOVASCULAR: No chest pain, palpitations, dizziness, or leg swelling  GASTROINTESTINAL: No abdominal or epigastric pain. No nausea, vomiting, or hematemesis; No diarrhea or constipation. No melena or hematochezia.  GENITOURINARY: No dysuria, frequency, hematuria, or incontinence  NEUROLOGICAL: No headaches, memory loss, loss of strength, numbness, or tremors  SKIN: No itching, burning, rashes, or lesions   LYMPH NODES: No enlarged glands  ENDOCRINE: No heat or cold intolerance; No hair loss  MUSCULOSKELETAL: No joint pain or swelling; No muscle, back, or extremity pain  PSYCHIATRIC: No depression, anxiety, mood swings, or difficulty sleeping  HEME/LYMPH: No easy bruising, or bleeding gums  ALLERY AND IMMUNOLOGIC: No hives or eczema    RADIOLOGY & ADDITIONAL TESTS:    Imaging Personally Reviewed:  [ ] YES  [ ] NO    Consultant(s) Notes Reviewed:  [ ] YES  [ ] NO    PHYSICAL EXAM:  GENERAL: NAD, well-groomed, well-developed  HEAD:  Atraumatic, Normocephalic  EYES: EOMI, PERRLA, conjunctiva and sclera clear  ENMT: No tonsillar erythema, exudates, or enlargement; Moist mucous membranes, Good dentition, No lesions  NECK: Supple, No JVD, Normal thyroid  NERVOUS SYSTEM:  Alert & Oriented X3, Good concentration; Motor Strength 5/5 B/L upper and lower extremities; DTRs 2+ intact and symmetric  CHEST/LUNG: Clear to percussion bilaterally; No rales, rhonchi, wheezing, or rubs  HEART: Regular rate and rhythm; No murmurs, rubs, or gallops  ABDOMEN: Soft, Nontender, Nondistended; Bowel sounds present  EXTREMITIES:  2+ Peripheral Pulses, No clubbing, cyanosis, or edema  LYMPH: No lymphadenopathy noted  SKIN: No rashes or lesions    LABS:                        13.6   20.01 )-----------( 255      ( 03 Oct 2022 23:54 )             41.7     10-03    138  |  107  |  21  ----------------------------<  150<H>  4.2   |  19<L>  |  0.78    Ca    8.1<L>      03 Oct 2022 23:54  Phos  5.4     10-03  Mg     2.0     10-03      PT/INR - ( 03 Oct 2022 06:47 )   PT: 12.8 sec;   INR: 1.10 ratio         PTT - ( 03 Oct 2022 06:47 )  PTT:22.6 sec    CAPILLARY BLOOD GLUCOSE        ABG - ( 04 Oct 2022 02:44 )  pH, Arterial: 7.41  pH, Blood: x     /  pCO2: 35    /  pO2: 96    / HCO3: 22    / Base Excess: -1.9  /  SaO2: 99.2                    MEDICATIONS  (STANDING):  dexAMETHasone  Injectable 4 milliGRAM(s) IV Push every 6 hours  influenza   Vaccine 0.5 milliLiter(s) IntraMuscular once  levETIRAcetam 500 milliGRAM(s) Oral two times a day  pantoprazole    Tablet 40 milliGRAM(s) Oral before breakfast  polyethylene glycol 3350 17 Gram(s) Oral two times a day  senna 2 Tablet(s) Oral at bedtime    MEDICATIONS  (PRN):  acetaminophen     Tablet .. 650 milliGRAM(s) Oral every 6 hours PRN Temp greater or equal to 38C (100.4F), Moderate Pain (4 - 6)  melatonin 5 milliGRAM(s) Oral at bedtime PRN Insomnia  oxyCODONE    IR 5 milliGRAM(s) Oral every 4 hours PRN Moderate Pain (4 - 6)  oxyCODONE    IR 10 milliGRAM(s) Oral every 4 hours PRN Severe Pain (7 - 10)      Care Discussed with Consultants/Other Providers [ ] YES  [ ] NO

## 2022-10-04 NOTE — DIETITIAN INITIAL EVALUATION ADULT - PERTINENT LABORATORY DATA
10-03    138  |  107  |  21  ----------------------------<  150<H>  4.2   |  19<L>  |  0.78    Ca    8.1<L>      03 Oct 2022 23:54  Phos  5.4     10-03  Mg     2.0     10-03

## 2022-10-04 NOTE — DIETITIAN INITIAL EVALUATION ADULT - ENERGY INTAKE
Pt reports good intake in-house. Now s/p crani; awaiting arrival of breakfast meal. Menu provided to pt, and educated on ordering procedures.

## 2022-10-04 NOTE — PROGRESS NOTE ADULT - SUBJECTIVE AND OBJECTIVE BOX
DATE OF SERVICE: 10-04-22 @ 15:15    Patient is a 62y old  Male who presents with a chief complaint of Pt is a 63 yo M with no significant PMH. Found to have a R VF cut as outpatient. CTH then showed c/f underlying TP mass with edema, likely primary brain tumor. Left AMA, and represented with a HA. MRI here showed 4 x 2.5 x 4cm ring-enhancing mass in L temporal occipital region with surrounding edema. CT CAP with IV contrast negative. Pt now s/p craniotomy for resection of tumor; frozen path with high grade neoplasm.      (04 Oct 2022 09:33)      INTERVAL HISTORY: Feeling slightly claustrophobic post-op in ICU but overall well.     REVIEW OF SYSTEMS:  CONSTITUTIONAL: No weakness  EYES/ENT: No visual changes;  No throat pain   NECK: No pain or stiffness  RESPIRATORY: No cough, wheezing; No shortness of breath  CARDIOVASCULAR: No chest pain or palpitations  GASTROINTESTINAL: No abdominal  pain. No nausea, vomiting, or hematemesis  GENITOURINARY: No dysuria, frequency or hematuria  NEUROLOGICAL: No stroke like symptoms  SKIN: No rashes    TELEMETRY Personally reviewed: SR/ST  	  MEDICATIONS:        PHYSICAL EXAM:  T(C): 36.9 (10-04-22 @ 12:00), Max: 36.9 (10-04-22 @ 11:00)  HR: 106 (10-04-22 @ 14:00) (59 - 125)  BP: 138/78 (10-03-22 @ 16:11) (138/78 - 138/78)  RR: 20 (10-04-22 @ 14:00) (11 - 28)  SpO2: 92% (10-04-22 @ 14:00) (92% - 98%)  Wt(kg): --  I&O's Summary    03 Oct 2022 07:01  -  04 Oct 2022 07:00  --------------------------------------------------------  IN: 2035 mL / OUT: 1100 mL / NET: 935 mL    04 Oct 2022 07:01  -  04 Oct 2022 15:15  --------------------------------------------------------  IN: 1050 mL / OUT: 700 mL / NET: 350 mL      Height (cm): 177.8 (10-03 @ 16:11)  Weight (kg): 95.3 (10-03 @ 16:11)  BMI (kg/m2): 30.1 (10-03 @ 16:11)  BSA (m2): 2.13 (10-03 @ 16:11)    Appearance: In no distress	  HEENT:    PERRL, EOMI	  Cardiovascular:  S1 S2, No JVD  Respiratory: Lungs clear to auscultation	  Gastrointestinal:  Soft, Non-tender, + BS	  Vascularature:  No edema of LE  Psychiatric: Appropriate affect   Neuro: no acute focal deficits                               13.6   20.01 )-----------( 255      ( 03 Oct 2022 23:54 )             41.7     10-03    138  |  107  |  21  ----------------------------<  150<H>  4.2   |  19<L>  |  0.78    Ca    8.1<L>      03 Oct 2022 23:54  Phos  5.4     10-03  Mg     2.0     10-03          Labs personally reviewed      ASSESSMENT/PLAN: 	    62M with no hx, originally sent in on 9/21 by ophthalmologist for R visual blurriness since 9/19, found to have "hemianopic defect" at outpt ophto testing today. CTH then showed c/f underlying L TP mass w/ edema, likely primary brain tumor. MRI here shows 4 x 2.5 x 4cm ring-enhancing mass in L temporal occipital region w/ surrounding edema. Now planned for tumor biopsy/resection likely Monday.    1. Cardiac Risk Stratification  - EKG NSR with RBBB and possible inferior ischemia  - Patient currently not in decompensated HF  - No hx of AS/MS. NO cardiac murmur appreciated.  - No hx of tachy/neida arrhythmia.  - Patient is mod risk for mod to high risk, non-elective surgery. Further cardiac workup would not  at this time therefore no absolute contraindication to proceed.  - Tolerated surgery well.     2. Brain Tumor  - s/p Brain biopsy/resection  - Neurosurgery following  - Neuro checks [id number]    3. DVT PPx  - c/w SQ heparin          GOGO Thompson-NP   Josue Ybarra DO [facility]  Cardiovascular Medicine  [address] [place] [address], [address]  Available through call or text on [employer]  Office: [phone]

## 2022-10-04 NOTE — OCCUPATIONAL THERAPY INITIAL EVALUATION ADULT - PERTINENT HX OF CURRENT PROBLEM, REHAB EVAL
62 RH man with no significant PMH who was found to have a R VF cut as outpatient. CTH then showed c/f underlying L TP mass w/ edema, likely primary brain tumor. He left AMA (difficult social situation) and represented with HA. MRI here shows 4 x 2.5 x 4cm ring-enhancing mass in L temporal occipital region w/ surrounding edema. CT CAP w/ IV contrast was negative. S/p craniotomy for resection of tumor 10/3

## 2022-10-04 NOTE — PROGRESS NOTE ADULT - ASSESSMENT
Assessment/Plan:     Neuro:  q1h NC  CT head in AM   dex 4 mg q6h  oxycodone and tylenol for pain   keppra for seizure ppx     CV:  SBP goal 100-130    Pulm: on RA  ABG with lactate post bolus     GI:  PPI  dysphagia screen  senna and miralax     Renal:  Bolus 1L NS, NS at 75cc/hr    Heme:  SCDs, no Lov ppx for POD 0    ID:  Ancef     Endo:   FS goal 120-180    A line, castro removed     Patient seen and examined by attending on 10/3/2022.    Patient is critically ill due to ? and at high risk for neurological deterioration or death due to:     Assessment/Plan:  POD1 L occipital crani for tumor resection     Neuro:  q4h NC  dex 4 mg q6h for cerebral edema   oxycodone and tylenol for pain   keppra for seizure ppx   PT/OT speech eval    CV:  SBP goal 100-150    Pulm: on RA  incentive spirometry as able     GI:  PPI for decadron   regular diet   senna and miralax     Renal:  IVL     Heme:  SCDs, no Lov ppx fresh post op  BLE dopplers pending, high risk DVT due to tumor     ID:  periop antibiotics, monitor for fevers    Endo:   FS goal 120-180    A line, castro removed     full code  clinically stable for transfer to floor if neurosurgery agreeable  not critically ill but medically complex

## 2022-10-04 NOTE — PROGRESS NOTE ADULT - SUBJECTIVE AND OBJECTIVE BOX
Patient seen and examined at bedside.    --Anticoagulation--    T(C): 36.4 (10-04-22 @ 03:00), Max: 36.8 (10-03-22 @ 14:30)  HR: 111 (10-04-22 @ 03:00) (59 - 125)  BP: 138/78 (10-03-22 @ 16:11) (116/68 - 138/78)  RR: 20 (10-04-22 @ 03:00) (11 - 28)  SpO2: 96% (10-04-22 @ 03:00) (95% - 100%)  Wt(kg): --    Exam: EOV, Ox3, EOMI, Right homonymous hemianopsia, no facial/drift, LOUISE 5/5, SILT

## 2022-10-04 NOTE — DIETITIAN INITIAL EVALUATION ADULT - NSFNSGIIOFT_GEN_A_CORE
-Last BM (10/2). On bowel regimen (senna, Miralax).   -Continues on Decadron as prescribed. At risk for elevated BG. Not on apparent antihyperglycemic regimen at this time.   -Continues on NaCl 0.9%, infusing at 75ml/hr for hydration.   -Continues on antibiotics as prescribed.

## 2022-10-04 NOTE — DIETITIAN INITIAL EVALUATION ADULT - PERTINENT MEDS FT
MEDICATIONS  (STANDING):  chlorhexidine 4% Liquid 1 Application(s) Topical <User Schedule>  dexAMETHasone  Injectable 4 milliGRAM(s) IV Push every 6 hours  influenza   Vaccine 0.5 milliLiter(s) IntraMuscular once  levETIRAcetam 500 milliGRAM(s) Oral two times a day  nafcillin  IVPB 2 Gram(s) IV Intermittent every 4 hours  pantoprazole    Tablet 40 milliGRAM(s) Oral before breakfast  polyethylene glycol 3350 17 Gram(s) Oral daily  senna 2 Tablet(s) Oral at bedtime  sodium chloride 0.9%. 1000 milliLiter(s) (75 mL/Hr) IV Continuous <Continuous>    MEDICATIONS  (PRN):  acetaminophen     Tablet .. 650 milliGRAM(s) Oral every 6 hours PRN Temp greater or equal to 38C (100.4F), Moderate Pain (4 - 6)  oxyCODONE    IR 5 milliGRAM(s) Oral every 4 hours PRN Moderate Pain (4 - 6)  oxyCODONE    IR 10 milliGRAM(s) Oral every 4 hours PRN Severe Pain (7 - 10)

## 2022-10-04 NOTE — PROGRESS NOTE ADULT - ASSESSMENT
A/P     Brain mass / Blurry vision/ hemianopsia   s/p brain surgery   POD#1   -c/w keppra   c/w nisreen     HA :  -tylenol prn

## 2022-10-04 NOTE — PHYSICAL THERAPY INITIAL EVALUATION ADULT - GENERAL OBSERVATIONS, REHAB EVAL
Anticoagulation Progress Note    Indication: PE, Prothrombin gene mutation  Goal INR: 2.0-3.0  INR Result: 2.4     66 year old male returns to the [facility] for a follow-up visit after 6 weeks.      Assessment  (-) bleeding, bruising or thromboembolic complications  (-) missed warfarin doses  (-) medication changes  (-) dietary changes  (-) alcohol  (-) activity level  (-) hospital visits, ER visits, interruptions in therapy  (-) illness/infection, injuries, or falls    Plan   -Pt's INR=2.4 today, which is within the desired therapeutic range of 2.0-3.0.  -Pt's INR has increased from 2.1 at last visit.    -Pt has been taking warfarin 27.5mg weekly.  -Plan to have the pt Continue with the current warfarin regimen.   -Pt to return to clinic in 6 weeks (5/7/20).     received supine with head of bed elevated +iv, +lashon RN at bedside, +bandage head c/d/i

## 2022-10-04 NOTE — PHYSICAL THERAPY INITIAL EVALUATION ADULT - WEIGHT-BEARING RESTRICTIONS: SIT/STAND, REHAB EVAL
"  [facility]  Adult Nutrition  Consult Note    SUMMARY     Recommendations    1. Continue Cardiac diet.   2. Weekly weights.     Goals: 1. >75% of EEN, EPN by RD follow up.   Nutrition Goal Status: new  Communication of RD Recs: other (comment)(POC)    Reason for Assessment    Reason For Assessment: consult  Diagnosis: (S/P CABG (coronary artery bypass graft) Z95.1 )  Relevant Medical History: HLD, HTN, CAD  Interdisciplinary Rounds: did not attend  General Information Comments:   20- Pt is a 61 year old male who presents to [facility] via virtual visit for possible CABG. Pt reports decreased appetite. PO intake 25-75%  of meals x 3 days. UBW 227lbs, no signifigant change in weight. Pt states prior knowledge of cardiac diet and tries to follow a low sodium diet at home. Pt educated on Cardiac diet and handouts left at bedside.   NFPE 20- Pt nouished.    Nutrition Discharge Planning: Adequate nutrition to meet needs via Cardiac diet.     Nutrition Risk Screen    Nutrition Risk Screen: no indicators present    Nutrition/Diet History    Food Preferences: No chocolate, almonds, martinez, peppers, or onions.   Spiritual, Cultural Beliefs, Confucianist Practices, Values that Affect Care: no    Anthropometrics    Temp: 98.7 °F (37.1 °C)  Height Method: Stated  Height: 5' 11" (180.3 cm)  Height (inches): 71 in  Weight Method: Standard Scale  Weight: 100.7 kg (222 lb 0.1 oz)  Weight (lb): 222.01 lb  Ideal Body Weight (IBW), Male: 172 lb  % Ideal Body Weight, Male (lb): 129.08 %  BMI (Calculated): 31  BMI Grade: 30 - 34.9- obesity - grade I  Usual Body Weight (UBW), k kg  % Usual Body Weight: 97.97  % Weight Change From Usual Weight: -2.23 %     Lab/Procedures/Meds    Pertinent Labs Reviewed: reviewed  Pertinent Labs Comments: BUN 29, Phos 2.2, Ca 8.6  Pertinent Medications Reviewed: reviewed  Pertinent Medications Comments: Atorvastatin, Furosemide, Metoprolol, pantoprazole    Estimated/Assessed " Needs    Weight Used For Calorie Calculations: 100.7 kg (222 lb 0.1 oz)  Energy Calorie Requirements (kcal): 1824.12kcals/day  Energy Need Method: [place]- Callum  Protein Requirements: 80..91 g/day(0.8-1.0 g/kg)  Weight Used For Protein Calculations: 100.7 kg (222 lb 0.1 oz)  Fluid Requirements (mL): 1mL/kcal or per MD  Estimated Fluid Requirement Method: RDA Method  RDA Method (mL): 1824.12     Nutrition Prescription Ordered    Current Diet Order: Cardiac    Evaluation of Received Nutrient/Fluid Intake    Energy Calories Required: meeting needs  Protein Required: meeting needs  Fluid Required: meeting needs  % Intake of Estimated Energy Needs: 25 - 50 %  % Meal Intake: 25 - 50 %    Nutrition Risk    Level of Risk/Frequency of Follow-up: low     Assessment and Plan    * S/P CABG (coronary artery bypass graft)  Contributing Nutrition Diagnosis  Inadequate energy intake    Related to (etiology):   Decreased appetite    Signs and Symptoms (as evidenced by):   PO intake >75% of meals    Interventions (treatment strategy):  Collaboration with other providers    Nutrition Diagnosis Status:   New    Monitor and Evaluation    Food and Nutrient Intake: food and beverage intake  Food and Nutrient Adminstration: diet order  Knowledge/Beliefs/Attitudes: food and nutrition knowledge/skill  Physical Activity and Function: nutrition-related ADLs and IADLs  Anthropometric Measurements: weight  Biochemical Data, Medical Tests and Procedures: lipid profile  Nutrition-Focused Physical Findings: overall appearance     Malnutrition Assessment    Juan Daniel Score: 20  Orbital Region (Subcutaneous Fat Loss): well nourished  Upper Arm Region (Subcutaneous Fat Loss): well nourished  Thoracic and Lumbar Region: well nourished   [place] Region (Muscle Loss): well nourished  Clavicle Bone Region (Muscle Loss): well nourished  Clavicle and Acromion Bone Region (Muscle Loss): well nourished  Scapular Bone Region (Muscle Loss): well  nourished  Dorsal Hand (Muscle Loss): well nourished  Patellar Region (Muscle Loss): well nourished  Anterior Thigh Region (Muscle Loss): well nourished  Posterior Calf Region (Muscle Loss): well nourished   Edema (Fluid Accumulation): 0-->no edema present        Nutrition Follow-Up    RD Follow-up?: Yes     full weight-bearing

## 2022-10-04 NOTE — OCCUPATIONAL THERAPY INITIAL EVALUATION ADULT - NS ASR FOLLOW COMMAND OT EVAL
impulsive at times/100% of the time Detail Level: Detailed Photo Preface (Leave Blank If You Do Not Want): Photographs were obtained today

## 2022-10-04 NOTE — DIETITIAN INITIAL EVALUATION ADULT - ORAL INTAKE PTA/DIET HISTORY
Pt reports good appetite PTA; consumes three meals daily with no overt changes in overall PO intake. Noted with preference for "light foods" in-house, however denies specific dietary preferences at baseline. Denies food allergies, denies intolerance to chewing/swallowing. Denies intake of vitamins/supplements PTA. Denies N/V/C/diarrhea.

## 2022-10-04 NOTE — PHYSICAL THERAPY INITIAL EVALUATION ADULT - ORIENTATION, REHAB EVAL
year = 202023 initially, then 202022; initially stated "not Trump" for name of president but then unable to state the correct name without choices/oriented to person, place, time and situation

## 2022-10-04 NOTE — PHYSICAL THERAPY INITIAL EVALUATION ADULT - ADDITIONAL COMMENTS
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

## 2022-10-04 NOTE — PROGRESS NOTE ADULT - ASSESSMENT
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

## 2022-10-05 LAB
ANION GAP SERPL CALC-SCNC: 11 MMOL/L — SIGNIFICANT CHANGE UP (ref 5–17)
BUN SERPL-MCNC: 21 MG/DL — SIGNIFICANT CHANGE UP (ref 7–23)
CALCIUM SERPL-MCNC: 8.6 MG/DL — SIGNIFICANT CHANGE UP (ref 8.4–10.5)
CHLORIDE SERPL-SCNC: 102 MMOL/L — SIGNIFICANT CHANGE UP (ref 96–108)
CO2 SERPL-SCNC: 23 MMOL/L — SIGNIFICANT CHANGE UP (ref 22–31)
CREAT SERPL-MCNC: 0.81 MG/DL — SIGNIFICANT CHANGE UP (ref 0.5–1.3)
EGFR: 98 ML/MIN/1.73M2 — SIGNIFICANT CHANGE UP
GLUCOSE SERPL-MCNC: 157 MG/DL — HIGH (ref 70–99)
HCT VFR BLD CALC: 39.5 % — SIGNIFICANT CHANGE UP (ref 39–50)
HGB BLD-MCNC: 13 G/DL — SIGNIFICANT CHANGE UP (ref 13–17)
MCHC RBC-ENTMCNC: 29 PG — SIGNIFICANT CHANGE UP (ref 27–34)
MCHC RBC-ENTMCNC: 32.9 GM/DL — SIGNIFICANT CHANGE UP (ref 32–36)
MCV RBC AUTO: 88 FL — SIGNIFICANT CHANGE UP (ref 80–100)
NRBC # BLD: 0 /100 WBCS — SIGNIFICANT CHANGE UP (ref 0–0)
PLATELET # BLD AUTO: 185 K/UL — SIGNIFICANT CHANGE UP (ref 150–400)
POTASSIUM SERPL-MCNC: 3.9 MMOL/L — SIGNIFICANT CHANGE UP (ref 3.5–5.3)
POTASSIUM SERPL-SCNC: 3.9 MMOL/L — SIGNIFICANT CHANGE UP (ref 3.5–5.3)
RBC # BLD: 4.49 M/UL — SIGNIFICANT CHANGE UP (ref 4.2–5.8)
RBC # FLD: 14.5 % — SIGNIFICANT CHANGE UP (ref 10.3–14.5)
SODIUM SERPL-SCNC: 136 MMOL/L — SIGNIFICANT CHANGE UP (ref 135–145)
WBC # BLD: 16.13 K/UL — HIGH (ref 3.8–10.5)
WBC # FLD AUTO: 16.13 K/UL — HIGH (ref 3.8–10.5)

## 2022-10-05 PROCEDURE — 70553 MRI BRAIN STEM W/O & W/DYE: CPT | Mod: 26

## 2022-10-05 RX ORDER — DIAZEPAM 5 MG
5 TABLET ORAL ONCE
Refills: 0 | Status: DISCONTINUED | OUTPATIENT
Start: 2022-10-05 | End: 2022-10-05

## 2022-10-05 RX ADMIN — Medication 650 MILLIGRAM(S): at 05:51

## 2022-10-05 RX ADMIN — Medication 650 MILLIGRAM(S): at 23:59

## 2022-10-05 RX ADMIN — Medication 5 MILLIGRAM(S): at 20:52

## 2022-10-05 RX ADMIN — Medication 650 MILLIGRAM(S): at 06:21

## 2022-10-05 RX ADMIN — Medication 4 MILLIGRAM(S): at 23:58

## 2022-10-05 RX ADMIN — Medication 4 MILLIGRAM(S): at 17:35

## 2022-10-05 RX ADMIN — Medication 4 MILLIGRAM(S): at 05:49

## 2022-10-05 RX ADMIN — Medication 4 MILLIGRAM(S): at 11:40

## 2022-10-05 RX ADMIN — LEVETIRACETAM 500 MILLIGRAM(S): 250 TABLET, FILM COATED ORAL at 05:49

## 2022-10-05 RX ADMIN — LEVETIRACETAM 500 MILLIGRAM(S): 250 TABLET, FILM COATED ORAL at 17:35

## 2022-10-05 NOTE — SPEECH LANGUAGE PATHOLOGY EVALUATION - COMMENTS
10/3: s/p craniotomy for resection of tumor with some oozing in OR, EBL 400cc, frozen path with high grade neoplasm.

## 2022-10-05 NOTE — PROGRESS NOTE ADULT - SUBJECTIVE AND OBJECTIVE BOX
SUBJECTIVE:   Patient was seen and evaluated at bedside. Patient is resting in bed and is in no new acute distress.   OVERNIGHT EVENTS:   none   Vital Signs Last 24 Hrs  T(C): 36.6 (05 Oct 2022 09:30), Max: 37.3 (04 Oct 2022 20:00)  T(F): 97.8 (05 Oct 2022 09:30), Max: 99.2 (04 Oct 2022 20:00)  HR: 86 (05 Oct 2022 09:30) (68 - 106)  BP: 114/74 (05 Oct 2022 09:30) (107/73 - 114/74)  BP(mean): 84 (04 Oct 2022 19:00) (84 - 84)  RR: 18 (05 Oct 2022 09:30) (18 - 22)  SpO2: 95% (05 Oct 2022 09:30) (92% - 96%)    Parameters below as of 05 Oct 2022 09:30  Patient On (Oxygen Delivery Method): room air        PHYSICAL EXAM:    General: No Acute Distress     Neurological: Awake, alert oriented to person, place and time, Following Commands, PERRL, EOMI,  has right homonymous hemianopsia Face Symmetrical, Speech Fluent, Moving all extremities, Muscle Strength normal in all four extremities, No Drift, Sensation to Light Touch Intact    Pulmonary: Clear to Auscultation, No Rales, No Rhonchi, No Wheezes     Cardiovascular: S1, S2, Regular Rate and Rhythm     Gastrointestinal: Soft, Nontender, Nondistended     Incision:   clean and dry   LABS:                        13.0   16.13 )-----------( 185      ( 05 Oct 2022 10:45 )             39.5    10-05    136  |  102  |  21  ----------------------------<  157<H>  3.9   |  23  |  0.81    Ca    8.6      05 Oct 2022 10:45  Phos  5.4     10-03  Mg     2.0     10-03          10-04 @ 07:01  -  10-05 @ 07:00  --------------------------------------------------------  IN: 1100 mL / OUT: 2050 mL / NET: -950 mL      DRAINS:     MEDICATIONS:  Antibiotics:    Neuro:  acetaminophen     Tablet .. 650 milliGRAM(s) Oral every 6 hours PRN Temp greater or equal to 38C (100.4F), Moderate Pain (4 - 6)  diazepam    Tablet 5 milliGRAM(s) Oral once  levETIRAcetam 500 milliGRAM(s) Oral two times a day  melatonin 5 milliGRAM(s) Oral at bedtime PRN Insomnia  oxyCODONE    IR 5 milliGRAM(s) Oral every 4 hours PRN Moderate Pain (4 - 6)  oxyCODONE    IR 10 milliGRAM(s) Oral every 4 hours PRN Severe Pain (7 - 10)    Cardiac:    Pulm:    GI/:  pantoprazole    Tablet 40 milliGRAM(s) Oral before breakfast  polyethylene glycol 3350 17 Gram(s) Oral two times a day  senna 2 Tablet(s) Oral at bedtime    Other:   dexAMETHasone  Injectable 4 milliGRAM(s) IV Push every 6 hours  influenza   Vaccine 0.5 milliLiter(s) IntraMuscular once    DIET: [] Regular [] CCD [] Renal [] Puree [] Dysphagia [] Tube Feeds:   regular diet   IMAGING:   ACC: [id number] EXAM:  CT BRAIN                          PROCEDURE DATE:  10/04/2022          INTERPRETATION:  Noncontrast CT of the brain.    CLINICAL INDICATION:  Postoperative scan for resection of left parietal   mass    TECHNIQUE : Axial CT scanning of the brain was obtained from the skull   base to the vertex without the administration of intravenous contrast.   Sagittal and coronal reformats were provided.    COMPARISON: CT brain 9/21/2022. MRI brain 9/20/2022.    FINDINGS:    Interval left parietal craniotomy. Subjacent pneumocephalus measures 4 to   5 mm in depth.    Multifocal irregularly shaped acute parenchymal hemorrhage in the left   parietal surgical bed.    Small acute subdural hemorrhage layering along left tentorial leaf.    Minimal acute hemorrhage within the left ventricular atrium.    Similar left parietotemporal edema and mass effect upon the left lateral   ventricle.    Similar rightward midline shift of 4 to 5 mm. Basal cisterns are   visualized.    Ventricles are similar in size, no hydrocephalus.    IMPRESSION:    Postsurgical left parietal parenchymal, small left tentorial subdural,   and small intraventricular hemorrhage as described.    Similar left parietotemporal edema.    Similar mild rightward midline shift, no hydrocephalus.        --- End of Report ---            FELY DELONG MD; Attending Radiologist  This document has been electronically signed. Oct  4 2022  9:42AM

## 2022-10-05 NOTE — PROGRESS NOTE ADULT - SUBJECTIVE AND OBJECTIVE BOX
Patient is a 62y old  Male who presents with a chief complaint of HA/ brain mass (05 Oct 2022 12:40)      INTERVAL HPI/OVERNIGHT EVENTS: seen and examined, doing fair   T(C): 37.1 (10-05-22 @ 20:01), Max: 37.2 (10-05-22 @ 00:00)  HR: 63 (10-05-22 @ 20:01) (63 - 86)  BP: 108/72 (10-05-22 @ 20:01) (108/72 - 116/72)  RR: 18 (10-05-22 @ 20:01) (18 - 18)  SpO2: 95% (10-05-22 @ 20:01) (95% - 96%)  Wt(kg): --  I&O's Summary    04 Oct 2022 07:01  -  05 Oct 2022 07:00  --------------------------------------------------------  IN: 1100 mL / OUT: 2050 mL / NET: -950 mL    05 Oct 2022 07:01  -  05 Oct 2022 23:21  --------------------------------------------------------  IN: 250 mL / OUT: 0 mL / NET: 250 mL        PAST MEDICAL & SURGICAL HISTORY:  No pertinent past medical history      No significant past surgical history          SOCIAL HISTORY  Alcohol:  Tobacco:  Illicit substance use:    FAMILY HISTORY:    REVIEW OF SYSTEMS:  CONSTITUTIONAL: No fever, weight loss, or fatigue  EYES: No eye pain, visual disturbances, or discharge  ENMT:  No difficulty hearing, tinnitus, vertigo; No sinus or throat pain  NECK: No pain or stiffness  RESPIRATORY: No cough, wheezing, chills or hemoptysis; No shortness of breath  CARDIOVASCULAR: No chest pain, palpitations, dizziness, or leg swelling  GASTROINTESTINAL: No abdominal or epigastric pain. No nausea, vomiting, or hematemesis; No diarrhea or constipation. No melena or hematochezia.  GENITOURINARY: No dysuria, frequency, hematuria, or incontinence  NEUROLOGICAL: No headaches, memory loss, loss of strength, numbness, or tremors  SKIN: No itching, burning, rashes, or lesions   LYMPH NODES: No enlarged glands  ENDOCRINE: No heat or cold intolerance; No hair loss  MUSCULOSKELETAL: No joint pain or swelling; No muscle, back, or extremity pain  PSYCHIATRIC: No depression, anxiety, mood swings, or difficulty sleeping  HEME/LYMPH: No easy bruising, or bleeding gums  ALLERY AND IMMUNOLOGIC: No hives or eczema    RADIOLOGY & ADDITIONAL TESTS:    Imaging Personally Reviewed:  [ ] YES  [ ] NO    Consultant(s) Notes Reviewed:  [ ] YES  [ ] NO    PHYSICAL EXAM:  GENERAL: NAD, well-groomed, well-developed  HEAD:  Atraumatic, Normocephalic  EYES: EOMI, PERRLA, conjunctiva and sclera clear  ENMT: No tonsillar erythema, exudates, or enlargement; Moist mucous membranes, Good dentition, No lesions  NECK: Supple, No JVD, Normal thyroid  NERVOUS SYSTEM:  Alert & Oriented X3, Good concentration; Motor Strength 5/5 B/L upper and lower extremities; DTRs 2+ intact and symmetric  CHEST/LUNG: Clear to percussion bilaterally; No rales, rhonchi, wheezing, or rubs  HEART: Regular rate and rhythm; No murmurs, rubs, or gallops  ABDOMEN: Soft, Nontender, Nondistended; Bowel sounds present  EXTREMITIES:  2+ Peripheral Pulses, No clubbing, cyanosis, or edema  LYMPH: No lymphadenopathy noted  SKIN: No rashes or lesions    LABS:                        13.0   16.13 )-----------( 185      ( 05 Oct 2022 10:45 )             39.5     10-05    136  |  102  |  21  ----------------------------<  157<H>  3.9   |  23  |  0.81    Ca    8.6      05 Oct 2022 10:45  Phos  5.4     10-03  Mg     2.0     10-03          CAPILLARY BLOOD GLUCOSE        ABG - ( 04 Oct 2022 02:44 )  pH, Arterial: 7.41  pH, Blood: x     /  pCO2: 35    /  pO2: 96    / HCO3: 22    / Base Excess: -1.9  /  SaO2: 99.2                    MEDICATIONS  (STANDING):  dexAMETHasone  Injectable 4 milliGRAM(s) IV Push every 6 hours  influenza   Vaccine 0.5 milliLiter(s) IntraMuscular once  levETIRAcetam 500 milliGRAM(s) Oral two times a day  pantoprazole    Tablet 40 milliGRAM(s) Oral before breakfast  polyethylene glycol 3350 17 Gram(s) Oral two times a day  senna 2 Tablet(s) Oral at bedtime    MEDICATIONS  (PRN):  acetaminophen     Tablet .. 650 milliGRAM(s) Oral every 6 hours PRN Temp greater or equal to 38C (100.4F), Moderate Pain (4 - 6)  melatonin 5 milliGRAM(s) Oral at bedtime PRN Insomnia  oxyCODONE    IR 5 milliGRAM(s) Oral every 4 hours PRN Moderate Pain (4 - 6)  oxyCODONE    IR 10 milliGRAM(s) Oral every 4 hours PRN Severe Pain (7 - 10)      Care Discussed with Consultants/Other Providers [ ] YES  [ ] NO

## 2022-10-05 NOTE — PROGRESS NOTE ADULT - ASSESSMENT
A/P     Brain mass / Blurry vision/ hemianopsia   s/p brain surgery   POD#2   -c/w keppra   c/w decadrone   oxycodone for pain control    HA :  -tylenol prn

## 2022-10-05 NOTE — SPEECH LANGUAGE PATHOLOGY EVALUATION - SLP DIAGNOSIS
Consult for speech language evaluation received and appreciated. Chart reviewed and events noted. Attempted to see pt for evaluation, however, currently speaking with . Will f/u and re-attempt as schedule permits.

## 2022-10-05 NOTE — SPEECH LANGUAGE PATHOLOGY EVALUATION - SLP PERTINENT HISTORY OF CURRENT PROBLEM
Per neurosurgery: 62M R handed, no hx, originally sent in on 9/21 by ophthalmologist for R visual blurriness since 9/19, found to have "hemianopic defect" at outpt ophto testing today. CTH then showed c/f underlying L TP mass w/ edema, likely primary brain tumor. He left AMA (difficult social situation) and now represents with HA. MRI here shows 4 x 2.5 x 4cm ring-enhancing mass in L temporal occipital region w/ surrounding edema. He endorses mixing up words at times, but this was not seen on current exam. Repetition and naming was intact.

## 2022-10-05 NOTE — PROGRESS NOTE ADULT - ASSESSMENT
HPI:   Patient is a 63 y/o M with no sig PMHx who presents to the ED with headache. On 9/21/22 was seen in this ED for HA x4 days that woke him from sleep with R eye visual defect (hemianopic, dx by Optho). Imaging done then c/f L brain mass with vasogenic edema. Patient evaluated by Neurology and Neurosurgery team. Recommended further imaging, patient signed out out AMA as he had to go home to take care of his mother. Symptoms persisted since then. Has appt with Nsurgx on wed but not sure when he can have MRI done. (26 Sep 2022 16:25)    PROCEDURE: Craniotomy for resection of tumor of left side of brain  s/p Left occipital craniotomy and tumor resection on 10/3   POD#2     PLAN:  Neuro:  1 Out of bed   2 continue pt/ot   3 prn pain medication   4 keppra to prevent seizure   5 decadron 4q6 to control brain edema      Respiratory:   1 room air   2 incentive spirometry     CV:  1 blood pressure stable     Endocrine:   1 stable     Heme/Onc:             DVT ppx: scds , if mri stable will start sq lovenox   1 pathology :p     Renal:   1 voiding   2 iv lock     ID:   1 afebrile     GI:   1 regular diet   2 senna and miralax     Social/Family:   Discharge planning: home likely in am     -will discuss with Dr Turpin   -paula [phone]

## 2022-10-06 ENCOUNTER — APPOINTMENT (OUTPATIENT)
Dept: NEUROSURGERY | Facility: CLINIC | Age: 62
End: 2022-10-06

## 2022-10-06 ENCOUNTER — TRANSCRIPTION ENCOUNTER (OUTPATIENT)
Age: 62
End: 2022-10-06

## 2022-10-06 VITALS
TEMPERATURE: 98 F | HEART RATE: 89 BPM | SYSTOLIC BLOOD PRESSURE: 117 MMHG | RESPIRATION RATE: 18 BRPM | OXYGEN SATURATION: 98 % | DIASTOLIC BLOOD PRESSURE: 79 MMHG

## 2022-10-06 PROCEDURE — 80053 COMPREHEN METABOLIC PANEL: CPT

## 2022-10-06 PROCEDURE — 93970 EXTREMITY STUDY: CPT

## 2022-10-06 PROCEDURE — 88334 PATH CONSLTJ SURG CYTO XM EA: CPT

## 2022-10-06 PROCEDURE — 84132 ASSAY OF SERUM POTASSIUM: CPT

## 2022-10-06 PROCEDURE — 86901 BLOOD TYPING SEROLOGIC RH(D): CPT

## 2022-10-06 PROCEDURE — 85610 PROTHROMBIN TIME: CPT

## 2022-10-06 PROCEDURE — 97116 GAIT TRAINING THERAPY: CPT

## 2022-10-06 PROCEDURE — C1713: CPT

## 2022-10-06 PROCEDURE — 83605 ASSAY OF LACTIC ACID: CPT

## 2022-10-06 PROCEDURE — 97165 OT EVAL LOW COMPLEX 30 MIN: CPT

## 2022-10-06 PROCEDURE — 82803 BLOOD GASES ANY COMBINATION: CPT

## 2022-10-06 PROCEDURE — 84100 ASSAY OF PHOSPHORUS: CPT

## 2022-10-06 PROCEDURE — 86850 RBC ANTIBODY SCREEN: CPT

## 2022-10-06 PROCEDURE — 36415 COLL VENOUS BLD VENIPUNCTURE: CPT

## 2022-10-06 PROCEDURE — 82565 ASSAY OF CREATININE: CPT

## 2022-10-06 PROCEDURE — 87641 MR-STAPH DNA AMP PROBE: CPT

## 2022-10-06 PROCEDURE — U0003: CPT

## 2022-10-06 PROCEDURE — 85014 HEMATOCRIT: CPT

## 2022-10-06 PROCEDURE — 88307 TISSUE EXAM BY PATHOLOGIST: CPT

## 2022-10-06 PROCEDURE — A9585: CPT

## 2022-10-06 PROCEDURE — 70450 CT HEAD/BRAIN W/O DYE: CPT

## 2022-10-06 PROCEDURE — 85730 THROMBOPLASTIN TIME PARTIAL: CPT

## 2022-10-06 PROCEDURE — 88331 PATH CONSLTJ SURG 1 BLK 1SPC: CPT

## 2022-10-06 PROCEDURE — C1769: CPT

## 2022-10-06 PROCEDURE — 70553 MRI BRAIN STEM W/O & W/DYE: CPT

## 2022-10-06 PROCEDURE — 85027 COMPLETE CBC AUTOMATED: CPT

## 2022-10-06 PROCEDURE — 96374 THER/PROPH/DIAG INJ IV PUSH: CPT

## 2022-10-06 PROCEDURE — 82435 ASSAY OF BLOOD CHLORIDE: CPT

## 2022-10-06 PROCEDURE — 86900 BLOOD TYPING SEROLOGIC ABO: CPT

## 2022-10-06 PROCEDURE — 85018 HEMOGLOBIN: CPT

## 2022-10-06 PROCEDURE — 71260 CT THORAX DX C+: CPT | Mod: MA

## 2022-10-06 PROCEDURE — 84295 ASSAY OF SERUM SODIUM: CPT

## 2022-10-06 PROCEDURE — 88341 IMHCHEM/IMCYTCHM EA ADD ANTB: CPT

## 2022-10-06 PROCEDURE — 88342 IMHCHEM/IMCYTCHM 1ST ANTB: CPT

## 2022-10-06 PROCEDURE — 80048 BASIC METABOLIC PNL TOTAL CA: CPT

## 2022-10-06 PROCEDURE — 85025 COMPLETE CBC W/AUTO DIFF WBC: CPT

## 2022-10-06 PROCEDURE — 83735 ASSAY OF MAGNESIUM: CPT

## 2022-10-06 PROCEDURE — C1889: CPT

## 2022-10-06 PROCEDURE — 97530 THERAPEUTIC ACTIVITIES: CPT

## 2022-10-06 PROCEDURE — 99285 EMERGENCY DEPT VISIT HI MDM: CPT | Mod: 25

## 2022-10-06 PROCEDURE — 82947 ASSAY GLUCOSE BLOOD QUANT: CPT

## 2022-10-06 PROCEDURE — 74177 CT ABD & PELVIS W/CONTRAST: CPT | Mod: MA

## 2022-10-06 PROCEDURE — 87640 STAPH A DNA AMP PROBE: CPT

## 2022-10-06 PROCEDURE — 88360 TUMOR IMMUNOHISTOCHEM/MANUAL: CPT

## 2022-10-06 PROCEDURE — 86803 HEPATITIS C AB TEST: CPT

## 2022-10-06 PROCEDURE — 97161 PT EVAL LOW COMPLEX 20 MIN: CPT

## 2022-10-06 PROCEDURE — U0005: CPT

## 2022-10-06 PROCEDURE — 82330 ASSAY OF CALCIUM: CPT

## 2022-10-06 RX ORDER — DEXAMETHASONE 0.5 MG/5ML
1 ELIXIR ORAL
Qty: 22 | Refills: 0
Start: 2022-10-06 | End: 2022-10-12

## 2022-10-06 RX ORDER — LEVETIRACETAM 250 MG/1
1 TABLET, FILM COATED ORAL
Qty: 60 | Refills: 0
Start: 2022-10-06 | End: 2022-11-04

## 2022-10-06 RX ORDER — ACETAMINOPHEN 500 MG
2 TABLET ORAL
Qty: 0 | Refills: 0 | DISCHARGE
Start: 2022-10-06

## 2022-10-06 RX ORDER — OXYCODONE HYDROCHLORIDE 5 MG/1
1 TABLET ORAL
Qty: 20 | Refills: 0
Start: 2022-10-06 | End: 2022-10-10

## 2022-10-06 RX ORDER — PANTOPRAZOLE SODIUM 20 MG/1
1 TABLET, DELAYED RELEASE ORAL
Qty: 10 | Refills: 0
Start: 2022-10-06 | End: 2022-10-15

## 2022-10-06 RX ADMIN — Medication 650 MILLIGRAM(S): at 01:29

## 2022-10-06 RX ADMIN — Medication 4 MILLIGRAM(S): at 05:40

## 2022-10-06 RX ADMIN — LEVETIRACETAM 500 MILLIGRAM(S): 250 TABLET, FILM COATED ORAL at 05:41

## 2022-10-06 NOTE — DISCHARGE NOTE PROVIDER - HOSPITAL COURSE
62M R handed, no hx, originally sent in on 9/21 by ophthalmologist for R visual blurriness since 9/19, found to have "hemianopic defect" at outpt ophto testing today. CT Head  then showed concern for underlying Left Temporal Parietal mass w/ edema, likely primary brain tumor. He left AMA (difficult social situation/care for elderly mother) and now represents with HA. MRI here shows 4 x 2.5 x 4cm ring-enhancing mass in L temporal occipital region w/ surrounding edema. He endorsed mixing up words at times, but this was not seen on exam.  Patient admitted under medicine and followed by neurosurgery as consultation service until OR on 10/3/2022.  Patient evaluated by Ophthalmology while inpatient and instructed to follow-up outpatient.  Patient cleared by cardiology for operation.  Patient underwent Left craniotomy for tumor resection on 10/3/2022 and recovered in Neuro ICU post-operatively.  Post-operative imaging obtained and reviewed by Neurosurgeon, patient then transferred to floor for further medical care and discharge planning.  Patient seen by physical therapy and Occupational therapy, both whom recommend No PT needs but Outpatient OT/Speech therapy.  Patient cleared by neurosurgery and medically stable for discharge.

## 2022-10-06 NOTE — PROGRESS NOTE ADULT - REASON FOR ADMISSION
HA/ brain mass

## 2022-10-06 NOTE — DISCHARGE NOTE PROVIDER - CARE PROVIDER_API CALL
Todd Turpin)  Neurosurgery  [address] [place] [address]  Phone: [phone]  Fax: [phone]  Follow Up Time:

## 2022-10-06 NOTE — PROGRESS NOTE ADULT - PROVIDER SPECIALTY LIST ADULT
Internal Medicine
Neurosurgery
Neurosurgery
Internal Medicine
Neurosurgery
Neurosurgery
Cardiology
Internal Medicine
NSICU
Neurosurgery
Ophthalmology
Internal Medicine
NSICU
Neurosurgery

## 2022-10-06 NOTE — PROGRESS NOTE ADULT - ASSESSMENT
63 y/o M with no sig PMHx who presents to the ED with headache. On 9/21/22 was seen in this ED for HA x4 days that woke him from sleep with R eye visual defect (hemianopic, dx by Optho). Imaging done then c/f L brain mass with vasogenic edema. Patient evaluated by Neurology and Neurosurgery team. Recommended further imaging, patient signed out out AMA as he had to go home to take care of his mother. Symptoms persisted since then. Has appt with Nsurgx on wed but not sure when he can have MRI done. (26 Sep 2022 16:25)    PROCEDURE:   POD#3 s/p Craniotomy for resection of tumor of left side of brain    Plan    Neuro:  Cont Keppra for seizure Ppx  Cont decadron 4Q6, will DC on 1 week taper  Pain control  F/U Ophthalmology outpatient      Cards:  -160  No meds at home    Pulm:  Incentive spirometry as tolerated  RA sats >92%    Renal:  IVL  Voiding  Replete electrolytes PRN    GI:  Tolerating diet  Bowel regimen    Endo:  Maintain Euglycemia    ID:  Afebrile  No leukocytosis    Heme:  DVT PPX: SCDs, SQL    Dispo:  PT/OT Rec: Outpatient OT, PT no needs.     Discussed with patient and family wound care, follow up plans, activity, and medications. Questions answered, and they verbalized understanding. Time Spent: 30 min    d/w Dr Turpin   [id number]

## 2022-10-06 NOTE — DISCHARGE NOTE PROVIDER - NSDCFUSCHEDAPPT_GEN_ALL_CORE_FT
Yunior RaiFormerly [facility]  [facility] [address]  Scheduled Appointment: 11/01/2022

## 2022-10-06 NOTE — DISCHARGE NOTE PROVIDER - NSDCCPCAREPLAN_GEN_ALL_CORE_FT
PRINCIPAL DISCHARGE DIAGNOSIS  Diagnosis: Brain mass  Assessment and Plan of Treatment: Please follow up with Dr Turpin in 1-2 weeks upon discharge. If you need to reschedule or make an appointment, Please call their office.  Your incision will be evaluated at this appointment. Any sutures or staples may be removed.   You are being discharged on new medications; DECADRON for cerebral edema and KEPPRA for seizure prevention.   Please make sure you follow the instructions both verbally told and on the prescription.  If you have any questions or concerns, please call the office.  No strenous activity. No heavy lifting. Do not return to work or operate a motor vehicle until cleared by physician. DO NOT start aspirin / NSAIDS (motrin, advil ...) until cleared by your Neurosurgeon.  You may shower but please keep incision clean and dry, do not submerge wound in water for prolonged periods of time, pat dry after showering, and do not use any creams or ointments to incision.  Please return immediately to the ED for any of the following: fevers, bleeding, swelling, pain not relieved by medication, increased sluggishness or irritability, increased nausea or vomiting, inability to tolerate foods or liquids, increased numbness/tingling/ or inability to move extremities, seizures, chest pain, shortness of breathe.  Please follow up with your Primary Care Physician as it is important to keep them updated on your health. Please call their office to make appointment.      SECONDARY DISCHARGE DIAGNOSES  Diagnosis: Visual changes  Assessment and Plan of Treatment: Follow up as an outpatient for serial HVF testing.   Outpatient follow-up: Patient should follow-up with his/her ophthalmologist or with [facility] Department of Ophthalmology at the address below   [address] [place] [address]. [address]  [place] [address]  [phone]

## 2022-10-06 NOTE — DISCHARGE NOTE PROVIDER - NSDCMRMEDTOKEN_GEN_ALL_CORE_FT
acetaminophen 325 mg oral tablet: 2 tab(s) orally every 6 hours, As needed, Temp greater or equal to 38C (100.4F), Moderate Pain (4 - 6)  dexamethasone 2 mg oral tablet: 2 tabs orally every 6 hours x 1 day  2 tabs orally every 12 hours x 2 days  1 tab every 12 hours x 2 days  1 tab daily x 2 days MDD:As written  levETIRAcetam 500 mg oral tablet: 1 tab(s) orally 2 times a day MDD:2  oxyCODONE 5 mg oral tablet: 1 tab(s) orally every 6 hours, As Needed -Moderate Pain (4 - 6) MDD:4  pantoprazole 40 mg oral delayed release tablet: 1 tab(s) orally once a day (before a meal) MDD:1

## 2022-10-06 NOTE — DISCHARGE NOTE PROVIDER - NSDCCPTREATMENT_GEN_ALL_CORE_FT
PRINCIPAL PROCEDURE  Procedure: Craniotomy for resection of tumor of left side of brain  Findings and Treatment: on 10/3/202 with Dr. Turpin.

## 2022-10-06 NOTE — PROGRESS NOTE ADULT - SUBJECTIVE AND OBJECTIVE BOX
DATE OF SERVICE: 10-06-22 @ 14:52    Patient is a 62y old  Male who presents with a chief complaint of HA/ brain mass (06 Oct 2022 09:35)      INTERVAL HISTORY: Feels ok. Planned for DC today.    REVIEW OF SYSTEMS:  CONSTITUTIONAL: No weakness  EYES/ENT: No visual changes;  No throat pain   NECK: No pain or stiffness  RESPIRATORY: No cough, wheezing; No shortness of breath  CARDIOVASCULAR: No chest pain or palpitations  GASTROINTESTINAL: No abdominal  pain. No nausea, vomiting, or hematemesis  GENITOURINARY: No dysuria, frequency or hematuria  NEUROLOGICAL: No stroke like symptoms  SKIN: No rashes    	  MEDICATIONS:        PHYSICAL EXAM:  T(C): 36.5 (10-06-22 @ 08:00), Max: 37.1 (10-05-22 @ 20:01)  HR: 89 (10-06-22 @ 08:00) (63 - 89)  BP: 117/79 (10-06-22 @ 08:00) (107/76 - 117/79)  RR: 18 (10-06-22 @ 08:00) (18 - 18)  SpO2: 98% (10-06-22 @ 08:00) (95% - 98%)  Wt(kg): --  I&O's Summary    05 Oct 2022 07:01  -  06 Oct 2022 07:00  --------------------------------------------------------  IN: 250 mL / OUT: 500 mL / NET: -250 mL    06 Oct 2022 07:01  -  06 Oct 2022 14:52  --------------------------------------------------------  IN: 240 mL / OUT: 0 mL / NET: 240 mL          Appearance: In no distress	  HEENT:    PERRL, EOMI	  Cardiovascular:  S1 S2, No JVD  Respiratory: Lungs clear to auscultation	  Gastrointestinal:  Soft, Non-tender, + BS	  Vascularature:  No edema of LE  Psychiatric: Appropriate affect   Neuro: no acute focal deficits                               13.0   16.13 )-----------( 185      ( 05 Oct 2022 10:45 )             39.5     10-05    136  |  102  |  21  ----------------------------<  157<H>  3.9   |  23  |  0.81    Ca    8.6      05 Oct 2022 10:45          Labs personally reviewed      ASSESSMENT/PLAN: 	      62M with no hx, originally sent in on 9/21 by ophthalmologist for R visual blurriness since 9/19, found to have "hemianopic defect" at outpt ophto testing today. CTH then showed c/f underlying L TP mass w/ edema, likely primary brain tumor. MRI here shows 4 x 2.5 x 4cm ring-enhancing mass in L temporal occipital region w/ surrounding edema. Now planned for tumor biopsy/resection likely Monday.    1. Cardiac Risk Stratification  - EKG NSR with RBBB and possible inferior ischemia  - Patient currently not in decompensated HF  - No hx of AS/MS. NO cardiac murmur appreciated.  - No hx of tachy/neida arrhythmia.  - Patient is mod risk for mod to high risk, non-elective surgery. Further cardiac workup would not  at this time therefore no absolute contraindication to proceed.  - Tolerated surgery well.     2. Brain Tumor  - s/p Brain biopsy/resection  - Neurosurgery following    3. DVT PPx  - c/w SQ heparin        Isha Almonte, GOGO-NP   Josue Ybarra DO [facility]  Cardiovascular Medicine  [address], [address]  Available through call or text on [employer]  Office: [phone]

## 2022-10-06 NOTE — PROGRESS NOTE ADULT - SUBJECTIVE AND OBJECTIVE BOX
SUBJECTIVE:  Patient seen and examined, no acute complaints.  Has present VF cut on Right.    OVERNIGHT EVENTS:  none     Vital Signs Last 24 Hrs  T(C): 36.5 (06 Oct 2022 08:00), Max: 37.1 (05 Oct 2022 20:01)  T(F): 97.7 (06 Oct 2022 08:00), Max: 98.8 (05 Oct 2022 20:01)  HR: 89 (06 Oct 2022 08:00) (63 - 89)  BP: 117/79 (06 Oct 2022 08:00) (107/76 - 117/79)  BP(mean): --  RR: 18 (06 Oct 2022 08:00) (18 - 18)  SpO2: 98% (06 Oct 2022 08:00) (95% - 98%)    Parameters below as of 06 Oct 2022 08:00  Patient On (Oxygen Delivery Method): room air    PHYSICAL EXAM:    General: No Acute Distress   Neurological: Awake, alert oriented to person, place and time, Following Commands, PERRL, EOMI,  has right homonymous hemianopsia Face Symmetrical, Speech Fluent, Moving all extremities, Muscle Strength normal in all four extremities, No Drift, Sensation to Light Touch Intact  Pulmonary: Clear to Auscultation, No Rales, No Rhonchi, No Wheezes   Cardiovascular: S1, S2, Regular Rate and Rhythm   Gastrointestinal: Soft, Nontender, Nondistended     Incision:   clean and dry     LABS: No new labs today, stable yesterday.                                    13.0   16.13 )-----------( 185      ( 05 Oct 2022 10:45 )             39.5     10-05    136  |  102  |  21  ----------------------------<  157<H>  3.9   |  23  |  0.81    Ca    8.6      05 Oct 2022 10:45    DRAINS:  None    MEDICATIONS  (STANDING):  dexAMETHasone  Injectable 4 milliGRAM(s) IV Push every 6 hours  influenza   Vaccine 0.5 milliLiter(s) IntraMuscular once  levETIRAcetam 500 milliGRAM(s) Oral two times a day  pantoprazole    Tablet 40 milliGRAM(s) Oral before breakfast  polyethylene glycol 3350 17 Gram(s) Oral two times a day  senna 2 Tablet(s) Oral at bedtime    MEDICATIONS  (PRN):  acetaminophen     Tablet .. 650 milliGRAM(s) Oral every 6 hours PRN Temp greater or equal to 38C (100.4F), Moderate Pain (4 - 6)  melatonin 5 milliGRAM(s) Oral at bedtime PRN Insomnia  oxyCODONE    IR 5 milliGRAM(s) Oral every 4 hours PRN Moderate Pain (4 - 6)  oxyCODONE    IR 10 milliGRAM(s) Oral every 4 hours PRN Severe Pain (7 - 10)      IMAGING:     m< from: CT Head No Cont (10.04.22 @ 08:25) >  IMPRESSION:    Postsurgical left parietal parenchymal, small left tentorial subdural,   and small intraventricular hemorrhage as described.    Similar left parietotemporal edema.    Similar mild rightward midline shift, no hydrocephalus.    < end of copied text >

## 2022-10-10 LAB — SURGICAL PATHOLOGY STUDY: SIGNIFICANT CHANGE UP

## 2022-10-12 ENCOUNTER — NON-APPOINTMENT (OUTPATIENT)
Age: 62
End: 2022-10-12

## 2022-10-12 ENCOUNTER — APPOINTMENT (OUTPATIENT)
Dept: NEUROSURGERY | Facility: CLINIC | Age: 62
End: 2022-10-12

## 2022-10-12 DIAGNOSIS — Z98.890 OTHER SPECIFIED POSTPROCEDURAL STATES: ICD-10-CM

## 2022-10-12 PROCEDURE — 99024 POSTOP FOLLOW-UP VISIT: CPT

## 2022-10-12 NOTE — ASSESSMENT
[FreeTextEntry1] : IMPRESSION:\par 62 year old right handed male with ring-enhancing mass in L temporal occipital region with surrounding edema s/p left craniotomy for tumor resection on 10/3/2022. Doing well, in good spirit. Mild headache, and mild blurry vision on the right. No other symptoms. Incision healing well, no drainage or signs of infection. Staples removed without complications. 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 \par \par \par \par PLAN:\par - Had a long discussion with patient regarding his diagnosis and the next steps. \par - Will arrange to see Hem/Onc and radiation oncology Dr Torres and Dr. Ayala as soon as possible for evaluation and treatment planning chemo and radiation \par - Continue Keppra for a month post-op \par \par \par \par

## 2022-10-12 NOTE — PHYSICAL EXAM
[General Appearance - Alert] : alert [General Appearance - In No Acute Distress] : in no acute distress [General Appearance - Well Nourished] : well nourished [General Appearance - Well Developed] : well developed [Transverse] : transverse [Clean] : clean [Well-Healed] : well-healed [Intact] : intact [No Drainage] : without drainage [Normal Skin] : normal [Normal Skin Turgor] : skin turgor was normal [Oriented To Time, Place, And Person] : oriented to person, place, and time [Impaired Insight] : insight and judgment were intact [Affect] : the affect was normal [Mood] : the mood was normal [Person] : oriented to person [Place] : oriented to place [Time] : oriented to time [Cranial Nerves Optic (II)] : visual acuity intact bilaterally,  pupils equal round and reactive to light [Cranial Nerves Oculomotor (III)] : extraocular motion intact [Cranial Nerves Trigeminal (V)] : facial sensation intact symmetrically [Cranial Nerves Facial (VII)] : face symmetrical [Cranial Nerves Vestibulocochlear (VIII)] : hearing was intact bilaterally [Cranial Nerves Glossopharyngeal (IX)] : tongue and palate midline [Cranial Nerves Accessory (XI - Cranial And Spinal)] : head turning and shoulder shrug symmetric [Cranial Nerves Hypoglossal (XII)] : there was no tongue deviation with protrusion [Motor Tone] : muscle tone was normal in all four extremities [Motor Strength] : muscle strength was normal in all four extremities [Sensation Tactile Decrease] : light touch was intact [Balance] : balance was intact [Sclera] : the sclera and conjunctiva were normal [Extraocular Movements] : extraocular movements were intact [Outer Ear] : the ears and nose were normal in appearance [Hearing Threshold Finger Rub Not Petroleum] : hearing was normal [Neck Appearance] : the appearance of the neck was normal [] : no respiratory distress [Exaggerated Use Of Accessory Muscles For Inspiration] : no accessory muscle use [Edema] : there was no peripheral edema [Abnormal Walk] : normal gait [Skin Color & Pigmentation] : normal skin color and pigmentation [Erythema] : not erythematous [Tender] : not tender [Warm] : not warm [Indurated] : not indurated [FreeTextEntry5] : R Homonymous hemianopia, visual field full on the left side

## 2022-10-12 NOTE — REASON FOR VISIT
[de-identified] : Left craniotomy for tumor resection [de-identified] : 10/3/22 [de-identified] : 9

## 2022-10-12 NOTE — HISTORY OF PRESENT ILLNESS
[FreeTextEntry1] : 62 year old right handed male with no significant PMH, right vision changes since 9/19/22 who presented with headache. MRI showed with ring-enhancing mass in L temporal occipital region with surrounding edema. \par \par Patient underwent Left craniotomy for tumor resection on 10/3/2022, unremarkable hospital course. Post-op imaging stable. Discharged with outpatient OT and speech therapy. Comes in today for post-op check. Doing well, in good spirit. Mild headache, right sided blurry vision but over all vision is getting better, otherwise no other symptoms, no weakness, numbness, nausea, vomiting. Off Decadron today, still taking Keppra. Path result c/w GBM (WHO grade 4). \par \par \par

## 2022-10-17 ENCOUNTER — NON-APPOINTMENT (OUTPATIENT)
Age: 62
End: 2022-10-17

## 2022-10-17 ENCOUNTER — APPOINTMENT (OUTPATIENT)
Dept: OPHTHALMOLOGY | Facility: CLINIC | Age: 62
End: 2022-10-17

## 2022-10-17 PROCEDURE — 92133 CPTRZD OPH DX IMG PST SGM ON: CPT

## 2022-10-17 PROCEDURE — 92083 EXTENDED VISUAL FIELD XM: CPT

## 2022-10-17 PROCEDURE — 99204 OFFICE O/P NEW MOD 45 MIN: CPT

## 2022-10-18 ENCOUNTER — APPOINTMENT (OUTPATIENT)
Dept: NEUROLOGY | Facility: CLINIC | Age: 62
End: 2022-10-18

## 2022-10-18 ENCOUNTER — OUTPATIENT (OUTPATIENT)
Dept: OUTPATIENT SERVICES | Facility: HOSPITAL | Age: 62
LOS: 1 days | Discharge: HOME | End: 2022-10-18

## 2022-10-18 ENCOUNTER — APPOINTMENT (OUTPATIENT)
Dept: HEMATOLOGY ONCOLOGY | Facility: CLINIC | Age: 62
End: 2022-10-18

## 2022-10-18 ENCOUNTER — RESULT REVIEW (OUTPATIENT)
Age: 62
End: 2022-10-18

## 2022-10-18 ENCOUNTER — NON-APPOINTMENT (OUTPATIENT)
Age: 62
End: 2022-10-18

## 2022-10-18 ENCOUNTER — APPOINTMENT (OUTPATIENT)
Dept: RADIATION ONCOLOGY | Facility: CLINIC | Age: 62
End: 2022-10-18

## 2022-10-18 VITALS
BODY MASS INDEX: 28.47 KG/M2 | OXYGEN SATURATION: 100 % | DIASTOLIC BLOOD PRESSURE: 72 MMHG | RESPIRATION RATE: 16 BRPM | TEMPERATURE: 34.44 F | HEIGHT: 70 IN | HEART RATE: 71 BPM | SYSTOLIC BLOOD PRESSURE: 107 MMHG | WEIGHT: 198.85 LBS

## 2022-10-18 DIAGNOSIS — G93.9 DISORDER OF BRAIN, UNSPECIFIED: ICD-10-CM

## 2022-10-18 LAB
BASOPHILS # BLD AUTO: 0.03 K/UL — SIGNIFICANT CHANGE UP (ref 0–0.2)
BASOPHILS NFR BLD AUTO: 0.4 % — SIGNIFICANT CHANGE UP (ref 0–2)
EOSINOPHIL # BLD AUTO: 0.12 K/UL — SIGNIFICANT CHANGE UP (ref 0–0.5)
EOSINOPHIL NFR BLD AUTO: 1.5 % — SIGNIFICANT CHANGE UP (ref 0–6)
HCT VFR BLD CALC: 41.9 % — SIGNIFICANT CHANGE UP (ref 39–50)
HGB BLD-MCNC: 13.4 G/DL — SIGNIFICANT CHANGE UP (ref 13–17)
IMM GRANULOCYTES NFR BLD AUTO: 0.5 % — SIGNIFICANT CHANGE UP (ref 0–0.9)
LYMPHOCYTES # BLD AUTO: 1.3 K/UL — SIGNIFICANT CHANGE UP (ref 1–3.3)
LYMPHOCYTES # BLD AUTO: 16.5 % — SIGNIFICANT CHANGE UP (ref 13–44)
MCHC RBC-ENTMCNC: 29.1 PG — SIGNIFICANT CHANGE UP (ref 27–34)
MCHC RBC-ENTMCNC: 32 G/DL — SIGNIFICANT CHANGE UP (ref 32–36)
MCV RBC AUTO: 90.9 FL — SIGNIFICANT CHANGE UP (ref 80–100)
MONOCYTES # BLD AUTO: 0.41 K/UL — SIGNIFICANT CHANGE UP (ref 0–0.9)
MONOCYTES NFR BLD AUTO: 5.2 % — SIGNIFICANT CHANGE UP (ref 2–14)
NEUTROPHILS # BLD AUTO: 5.96 K/UL — SIGNIFICANT CHANGE UP (ref 1.8–7.4)
NEUTROPHILS NFR BLD AUTO: 75.9 % — SIGNIFICANT CHANGE UP (ref 43–77)
NRBC # BLD: 0 /100 WBCS — SIGNIFICANT CHANGE UP (ref 0–0)
PLATELET # BLD AUTO: 171 K/UL — SIGNIFICANT CHANGE UP (ref 150–400)
RBC # BLD: 4.61 M/UL — SIGNIFICANT CHANGE UP (ref 4.2–5.8)
RBC # FLD: 15.3 % — HIGH (ref 10.3–14.5)
WBC # BLD: 7.86 K/UL — SIGNIFICANT CHANGE UP (ref 3.8–10.5)
WBC # FLD AUTO: 7.86 K/UL — SIGNIFICANT CHANGE UP (ref 3.8–10.5)

## 2022-10-18 PROCEDURE — 99495 TRANSJ CARE MGMT MOD F2F 14D: CPT

## 2022-10-18 PROCEDURE — 99204 OFFICE O/P NEW MOD 45 MIN: CPT | Mod: 25

## 2022-10-18 PROCEDURE — 77263 THER RADIOLOGY TX PLNG CPLX: CPT

## 2022-10-18 NOTE — DISCUSSION/SUMMARY
[FreeTextEntry1] : In summary, this is a 61 yo man with a newly diagnosed GBM, WHO 4 - IDH wt - remainder of molecular studies pending - s/p large resection of left parieto-occipital mass. Neurologic exam notable for right VF defect - worse in UQ - not driving for safety reasons.\par \par We discussed the need for treatment including Radiation with concurrent Temodar - risks and benefits also discussed. Baseline blood work to be obtained today and if ok will order Temodar and set up a chemotherapy teaching session - should he have any new issues in the interim, he knows to call me.\par Keppra was refilled.

## 2022-10-18 NOTE — PHYSICAL EXAM
[FreeTextEntry1] : 5'10", 198 lbs\par He is awake and alert -  mild reduced fluency which improved throughout our time together\par Normal comprehension and fully oriented\par Anxious but appropriate\par EOMI, Right VF defect - UQ more than LQ - no neglect\par Face symmetric and tongue protrudes midline\par No drift - normal FFM and TATYANA\par Strength full in UE and LE\par Coordination intact\par Ambulating independently and steadily.

## 2022-10-18 NOTE — HISTORY OF PRESENT ILLNESS
[FreeTextEntry1] : Mr. Ramos is being seen in neuro oncologic evaluation for a new diagnosis of brain tumor.\par \par History obtained via discussion with patient and chart review, including personal review of all neuroimaging.\par Mr. 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 He returned on 926 with worsening headache and underwent MRI scan of his brain:\par \par MRI brain 9/27 shows an irregularly enhancing mass in the left temporal-occipital region measuring 2.6 cm transversely and 8.9 cm cranial - caudal with surrounding vasogenic edema.\par \par CT C/A/P 9/26 unremarkable.\par \par Dr. Turpin performed a large resection of this mass on 10/3.\par 10/3 Pathology - Glioblastoma, WHO 4, IDH wt -  Molecular studies pending.\par \par Post-operative MRI 10/5: LEFT parietal occipital craniotomy with near complete resection of the of the neoplasm in the LEFT occipital/posterior temporal lobes. Minimal residual neoplasm is seen in the anterior part of the neoplasm, which abuts the ependymal surface of the atrium of the LEFT ventricle. Moderate postsurgical hemorrhage is seen within the surgical cavity. Moderate surrounding vasogenic edema is unchanged with effacement of the posterior horn of the LEFT lateral ventricle. Abnormal signal is also seen within the LEFT cerebral peduncle with a 4 mm focus of central enhancement and 1.6 cm region of rim enhancement, which is suspicious for a secondary focus of neoplasm or extension from the primary along the white matter tracts.\par \par He is here today to discuss diagnosis and treatment - he has been tapered off of steroids and denies headache. He is taking the Keppra 500 mg bid - no history of seizures. Patient had not seen Drs for many years and is unaware of any underlying medical issues.\par \par Patient retires about 9 years ago - he was working as a  - remote tobacco history. Currently no alcohol.\par NKDA\par He is adopted.

## 2022-10-19 LAB
ALBUMIN SERPL ELPH-MCNC: 4.2 G/DL
ALP BLD-CCNC: 64 U/L
ALT SERPL-CCNC: 26 U/L
ANION GAP SERPL CALC-SCNC: 12 MMOL/L
AST SERPL-CCNC: 12 U/L
BILIRUB SERPL-MCNC: 0.3 MG/DL
BUN SERPL-MCNC: 11 MG/DL
CALCIUM SERPL-MCNC: 9.4 MG/DL
CHLORIDE SERPL-SCNC: 105 MMOL/L
CO2 SERPL-SCNC: 24 MMOL/L
CREAT SERPL-MCNC: 0.99 MG/DL
EGFR: 85 ML/MIN/1.73M2
GLUCOSE SERPL-MCNC: 91 MG/DL
POTASSIUM SERPL-SCNC: 4.3 MMOL/L
PROT SERPL-MCNC: 6.3 G/DL
SODIUM SERPL-SCNC: 141 MMOL/L

## 2022-10-20 ENCOUNTER — NON-APPOINTMENT (OUTPATIENT)
Age: 62
End: 2022-10-20

## 2022-10-20 NOTE — HISTORY OF PRESENT ILLNESS
[FreeTextEntry1] : Mr. Del Ramos present with Glioblastoma, WHO 4, IDH WT for initial consultation of post-op RT. \par \par Mr. Ramos with no significant PMH presented with severe headache and right vision changes since 9/19/22.\par MRI brain 9/27 shows an irregularly enhancing mass in the left temporal-occipital region measuring 2.6 cm transversely and 8.9 cm cranial - caudal with surrounding vasogenic edema.\par \par Patient underwent Left craniotomy for tumor resection on 10/3 by Dr. Turpin.  Pathology - show a high grade glioma with nuclear pleomorphism, increased mitotic activity, microvascular proliferation and  necrosis. Immunostains are performed on block 2B and show tumor cells are positive for GFAP and EGFR. A subset of tumor cell nuclei are highlighted by p53. There is no ATRX loss in the tumor. The MIB-1 (Ki-67) labeling index is markedly elevated (15-25%). pHH3 shows scattered mitoses. IDH1 (by anti-IDH1   R132H antibody) immunostain is negative.  - Molecular studies pending.\par \par Post-operative MRI 10/5: LEFT parietal occipital craniotomy with near complete resection of the of the neoplasm in the LEFT occipital/posterior temporal lobes. Minimal residual neoplasm is seen in the anterior part of the neoplasm, which abuts the ependymal surface of the atrium of the LEFT ventricle. Moderate postsurgical hemorrhage is seen within the surgical cavity. Moderate surrounding vasogenic edema is unchanged with effacement of the posterior horn of the LEFT lateral ventricle. Abnormal signal is also seen within the LEFT cerebral peduncle with a 4 mm focus of central enhancement and 1.6 cm region of rim enhancement, which is suspicious for a secondary focus of neoplasm or extension from the primary along the white matter tracts. \par \par On 10/18/2022, he has light frontal headache 1/10, no seizure, no focal deficit.  Off Decadron since 10/12/2022 , still taking Keppra 500mg bid. f/u with Dr. Torres and Dr. Turpin.

## 2022-10-20 NOTE — REVIEW OF SYSTEMS
[Fever] : no fever [Chills] : no chills [Night Sweats] : no night sweats [FreeTextEntry2] : intentional weight loss 20 lbs [FreeTextEntry3] : right side visual field comprised

## 2022-10-21 ENCOUNTER — NON-APPOINTMENT (OUTPATIENT)
Age: 62
End: 2022-10-21

## 2022-10-21 PROCEDURE — 77334 RADIATION TREATMENT AID(S): CPT | Mod: 26

## 2022-10-25 ENCOUNTER — NON-APPOINTMENT (OUTPATIENT)
Age: 62
End: 2022-10-25

## 2022-10-27 NOTE — CHART NOTE - NSCHARTNOTEFT_GEN_A_CORE
Surgical Pathology Report:   ACCESSION No: [id number]   Patient: SERJIO TIDWELL   Accession: [id number]   Collected Date/Time: 10/3/2022 19:18 EDT   Received Date/Time: 10/3/2022 19:43 EDT   Surgical Pathology Report - Auth (Verified)   Specimen(s) Submitted   1 Left occipital mass   2 Left occipital mass   Final Diagnosis   1. Brain, designated "left occipital mass ", excision:   - Glioblastoma (WHO grade 4) (See comment)   2. Brain, designated "left occipital mass ", resection:   - Glioblastoma (WHO grade 4) (See comment)   Comment:   Tissue sections from parts 1-2 show a high grade glioma with nuclear   pleomorphism, increased mitotic activity, microvascular proliferation   and necrosis. Immunostains are performed on block 2B and show tumor   cells are positive for GFAP and EGFR. A subset of tumor cell nuclei are   highlighted by p53. There is no ATRX loss in the tumor. The MIB-1 (Ki-67)   labeling index is markedly elevated (15-25%). pHH3 shows scattered   mitoses. IDH1 (by anti-IDH1 R132H antibody) immunostain is negative.   Slide(s) with built in immunohistochemical study control(s) and negative   control associated with this case has/have been verified by the sign out   pathologist.   For slide(s) without built in controls positive control slides has/have   been reviewed and approved by immunohistochemistry lab   These immunohistochemical/ in-situ hybridization tests have been developed   and their performance characteristics determined by [facility], Department of Pathology, Division of Immunopathology,   [address] [place] [address]. It has not been cleared   or approved by the U.S. Food and Drug Administration. The FDA has   determined that such clearance or approval is not necessary. This test   is used for clinical purposes. The laboratory is certified under the   CLIA-88 as qualified to perform high complexity clinical testing.   Verified by: KATT KIRBY MD   (Electronic Signature)   Reported on: 10/10/22 09:44 EDT, [facility], [address] [place] [address]   Phone: [phone] Fax: [phone]   _________________________________________________________________   Intraoperative Consultation   1. Left occipital mass, smear and frozen section diagnosis:   - Lesional tissue present, favor high-grade neoplasm   By Dr. Aquiles Villagran   Frozen section performed at [facility], Department   of Pathology, [address] [place] [address]. The frozen section and   frozen section control have been reviewed by the final pathologist who   verified this report.   Clinical Information   MRI: Left occipital mass, history of headache, right hematochezia   Gross Description   1. Received: In formalin labeled "left occipital mass" , consisting of   multiple pieces of soft tissue measuring up to 1.5 cm in aggregate   Submitted: One smear is done. Entirely for frozen section diagnosis in 2   cassettes:   1FSA: Frozen section representative   1A: Remaining tissue   2. Received: In formalin labeled "left occipital mass"   Size: 3.2 x 2.8 x 1.1 cm aggregate   Description: Tan soft tissue fragments admixed with clotted blood   Submitted: Entirely in two cassettes:2A-2B   MARTITA Kevin 10/04/2022 01:19 PM   Disclaimer   In addition to other data that may appear on the specimen containers, all   labels have been inspected to confirm the presence of the patient's name   and date of birth.   Histological Processing Performed at [facility],   Department of Pathology, [address] [place] [address]. (10.03.22 @ 19:18)

## 2022-10-28 PROCEDURE — 77338 DESIGN MLC DEVICE FOR IMRT: CPT | Mod: 26

## 2022-10-28 PROCEDURE — 77300 RADIATION THERAPY DOSE PLAN: CPT | Mod: 26

## 2022-10-28 PROCEDURE — 77301 RADIOTHERAPY DOSE PLAN IMRT: CPT | Mod: 26

## 2022-10-28 RX ORDER — LEVETIRACETAM 500 MG/1
500 TABLET, FILM COATED ORAL TWICE DAILY
Qty: 60 | Refills: 5 | Status: DISCONTINUED | COMMUNITY
Start: 2022-10-18 | End: 2022-10-28

## 2022-11-01 ENCOUNTER — APPOINTMENT (OUTPATIENT)
Dept: DERMATOLOGY | Facility: CLINIC | Age: 62
End: 2022-11-01

## 2022-11-02 ENCOUNTER — APPOINTMENT (OUTPATIENT)
Dept: NEUROLOGY | Facility: CLINIC | Age: 62
End: 2022-11-02

## 2022-11-02 VITALS
SYSTOLIC BLOOD PRESSURE: 112 MMHG | BODY MASS INDEX: 28.35 KG/M2 | HEART RATE: 72 BPM | DIASTOLIC BLOOD PRESSURE: 64 MMHG | HEIGHT: 70 IN | OXYGEN SATURATION: 98 % | WEIGHT: 198 LBS | RESPIRATION RATE: 16 BRPM

## 2022-11-02 PROCEDURE — 99215 OFFICE O/P EST HI 40 MIN: CPT

## 2022-11-02 RX ORDER — KETOCONAZOLE 20 MG/G
2 CREAM TOPICAL TWICE DAILY
Qty: 1 | Refills: 3 | Status: DISCONTINUED | COMMUNITY
Start: 2022-06-27 | End: 2022-11-02

## 2022-11-02 RX ORDER — OXYCODONE 5 MG/1
5 TABLET ORAL
Qty: 20 | Refills: 0 | Status: DISCONTINUED | COMMUNITY
Start: 2022-10-10

## 2022-11-02 NOTE — PHYSICAL EXAM
[General Appearance - Alert] : alert [General Appearance - In No Acute Distress] : in no acute distress [General Appearance - Well Nourished] : well nourished [Oriented To Time, Place, And Person] : oriented to person, place, and time [Impaired Insight] : insight and judgment were intact [] : no respiratory distress [Respiration, Rhythm And Depth] : normal respiratory rhythm and effort [Exaggerated Use Of Accessory Muscles For Inspiration] : no accessory muscle use [Edema] : there was no peripheral edema [FreeTextEntry1] : Awake and alert , oriented X 3 - mild reduced fluency \par \par EOMI, Right VF defect - UQ more than LQ - no neglect\par Face symmetric and tongue protrudes midline\par LOUISE X 4 with good and equal strength\par normal FFM and TATYANA\par Strength full in UE and LE\par Coordination intact\par Ambulating independently and steadily.

## 2022-11-02 NOTE — HISTORY OF PRESENT ILLNESS
[FreeTextEntry1] : Mr. Ramos is being seen in neuro oncologic evaluation for a new diagnosis of brain tumor.\par \par History obtained via discussion with patient and chart review, including personal review of all neuroimaging.\par Mr. 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 He returned on 926 with worsening headache and underwent MRI scan of his brain:\par \par MRI brain 9/27 shows an irregularly enhancing mass in the left temporal-occipital region measuring 2.6 cm transversely and 8.9 cm cranial - caudal with surrounding vasogenic edema.\par \par CT C/A/P 9/26 unremarkable.\par \par Dr. Turpin performed a large resection of this mass on 10/3.\par 10/3 Pathology - Glioblastoma, WHO 4, IDH wt - MGMT methylated.\par \par Post-operative MRI 10/5: LEFT parietal occipital craniotomy with near complete resection of the of the neoplasm in the LEFT occipital/posterior temporal lobes. Minimal residual neoplasm is seen in the anterior part of the neoplasm, which abuts the ependymal surface of the atrium of the LEFT ventricle. Moderate postsurgical hemorrhage is seen within the surgical cavity. Moderate surrounding vasogenic edema is unchanged with effacement of the posterior horn of the LEFT lateral ventricle. Abnormal signal is also seen within the LEFT cerebral peduncle with a 4 mm focus of central enhancement and 1.6 cm region of rim enhancement, which is suspicious for a secondary focus of neoplasm or extension from the primary along the white matter tracts.\par 10/25- Had an episode of loss of vision X 15-20 minutes , episode resolved on its own - Restarted Dexamethasone 2 mg daily, Keppra raised to 750 mg bid\par 11/2/2022- Here for a follow up and chemo teaching. He continues to have headaches even waking him from sleep. No associated N/V.  Admits compliance to all medications.

## 2022-11-02 NOTE — DISCUSSION/SUMMARY
[FreeTextEntry1] : Patient seen and examined\par GLIOMA - Neurologically not worse. Patient to start Chemo on 11/7/2022 and RT will begin on 11/8/2022\par Temozolomide instructions reviewed with patient\par Explained to take Zofran 2 ½ hours followed by TMZ 30 minutes later. Patient to remain NPO till morning after taking chemo pill.Explained medication side effects in detail. \par Educational pamphlet provided\par Reinforced weekly CBC - start on 11/14/2022, script given\par Patient to take TDD of 160 mg X 42 days\par HEADACHES - Will raise dexamethasone to 4 mg daily. GI Prophylaxis with pantoprazole.\par SEIZURES- Continue Keppra 750 mg bid.\par  More than 50% of time spent counseling and educating patient about epilepsy specific safety issues including AED side effects and interactions, alcohol consumption, sleep deprivation, risks and driving privileges associated with the [facility] Guidelines, death related to seizures/SUDEP, seizure 1st aid and risks. Patient is educated on seizure precautions, including instruction not to do following after a breakthrough sz - no driving, no operating machinery, no swimming or bathing, no climbing heights, or engage in any risky activities during which a seizure could cause further injury to pt or others. \par  FOLLOW UP - Will do a clinical follow up on 11/30/2022. In the interim, if any concerns patient knows to call our office\par \par

## 2022-11-07 ENCOUNTER — NON-APPOINTMENT (OUTPATIENT)
Age: 62
End: 2022-11-07

## 2022-11-08 ENCOUNTER — NON-APPOINTMENT (OUTPATIENT)
Age: 62
End: 2022-11-08

## 2022-11-08 ENCOUNTER — APPOINTMENT (OUTPATIENT)
Dept: NEUROLOGY | Facility: CLINIC | Age: 62
End: 2022-11-08

## 2022-11-08 VITALS
DIASTOLIC BLOOD PRESSURE: 83 MMHG | TEMPERATURE: 98.6 F | OXYGEN SATURATION: 100 % | SYSTOLIC BLOOD PRESSURE: 125 MMHG | BODY MASS INDEX: 29.36 KG/M2 | HEART RATE: 75 BPM | WEIGHT: 204.59 LBS | RESPIRATION RATE: 18 BRPM

## 2022-11-08 PROCEDURE — G6002: CPT | Mod: 26

## 2022-11-09 ENCOUNTER — NON-APPOINTMENT (OUTPATIENT)
Age: 62
End: 2022-11-09

## 2022-11-09 PROCEDURE — G6002: CPT | Mod: 26

## 2022-11-10 ENCOUNTER — NON-APPOINTMENT (OUTPATIENT)
Age: 62
End: 2022-11-10

## 2022-11-10 VITALS
HEART RATE: 67 BPM | RESPIRATION RATE: 17 BRPM | TEMPERATURE: 97.88 F | BODY MASS INDEX: 29.27 KG/M2 | OXYGEN SATURATION: 99 % | SYSTOLIC BLOOD PRESSURE: 118 MMHG | WEIGHT: 204 LBS | DIASTOLIC BLOOD PRESSURE: 78 MMHG

## 2022-11-10 PROCEDURE — G6002: CPT | Mod: 26

## 2022-11-11 PROCEDURE — G6002: CPT | Mod: 26

## 2022-11-14 ENCOUNTER — RESULT REVIEW (OUTPATIENT)
Age: 62
End: 2022-11-14

## 2022-11-14 ENCOUNTER — APPOINTMENT (OUTPATIENT)
Dept: NEUROLOGY | Facility: CLINIC | Age: 62
End: 2022-11-14

## 2022-11-14 ENCOUNTER — APPOINTMENT (OUTPATIENT)
Dept: HEMATOLOGY ONCOLOGY | Facility: CLINIC | Age: 62
End: 2022-11-14

## 2022-11-14 VITALS
HEART RATE: 80 BPM | OXYGEN SATURATION: 99 % | SYSTOLIC BLOOD PRESSURE: 110 MMHG | RESPIRATION RATE: 16 BRPM | DIASTOLIC BLOOD PRESSURE: 78 MMHG

## 2022-11-14 DIAGNOSIS — Z00.00 ENCOUNTER FOR GENERAL ADULT MEDICAL EXAMINATION W/OUT ABNORMAL FINDINGS: ICD-10-CM

## 2022-11-14 LAB
BASOPHILS # BLD AUTO: 0.03 K/UL — SIGNIFICANT CHANGE UP (ref 0–0.2)
BASOPHILS NFR BLD AUTO: 0.2 % — SIGNIFICANT CHANGE UP (ref 0–2)
EOSINOPHIL # BLD AUTO: 0 K/UL — SIGNIFICANT CHANGE UP (ref 0–0.5)
EOSINOPHIL NFR BLD AUTO: 0 % — SIGNIFICANT CHANGE UP (ref 0–6)
HCT VFR BLD CALC: 43.1 % — SIGNIFICANT CHANGE UP (ref 39–50)
HGB BLD-MCNC: 14.3 G/DL — SIGNIFICANT CHANGE UP (ref 13–17)
IMM GRANULOCYTES NFR BLD AUTO: 1 % — HIGH (ref 0–0.9)
LYMPHOCYTES # BLD AUTO: 0.75 K/UL — LOW (ref 1–3.3)
LYMPHOCYTES # BLD AUTO: 5 % — LOW (ref 13–44)
MCHC RBC-ENTMCNC: 28.7 PG — SIGNIFICANT CHANGE UP (ref 27–34)
MCHC RBC-ENTMCNC: 33.2 G/DL — SIGNIFICANT CHANGE UP (ref 32–36)
MCV RBC AUTO: 86.4 FL — SIGNIFICANT CHANGE UP (ref 80–100)
MONOCYTES # BLD AUTO: 0.66 K/UL — SIGNIFICANT CHANGE UP (ref 0–0.9)
MONOCYTES NFR BLD AUTO: 4.4 % — SIGNIFICANT CHANGE UP (ref 2–14)
NEUTROPHILS # BLD AUTO: 13.45 K/UL — HIGH (ref 1.8–7.4)
NEUTROPHILS NFR BLD AUTO: 89.4 % — HIGH (ref 43–77)
NRBC # BLD: 0 /100 WBCS — SIGNIFICANT CHANGE UP (ref 0–0)
PLATELET # BLD AUTO: 225 K/UL — SIGNIFICANT CHANGE UP (ref 150–400)
RBC # BLD: 4.99 M/UL — SIGNIFICANT CHANGE UP (ref 4.2–5.8)
RBC # FLD: 14 % — SIGNIFICANT CHANGE UP (ref 10.3–14.5)
WBC # BLD: 15.04 K/UL — HIGH (ref 3.8–10.5)
WBC # FLD AUTO: 15.04 K/UL — HIGH (ref 3.8–10.5)

## 2022-11-14 PROCEDURE — 77427 RADIATION TX MANAGEMENT X5: CPT

## 2022-11-14 PROCEDURE — 99215 OFFICE O/P EST HI 40 MIN: CPT

## 2022-11-14 PROCEDURE — 77014: CPT | Mod: 26

## 2022-11-14 RX ORDER — DEXAMETHASONE 2 MG/1
2 TABLET ORAL DAILY
Qty: 60 | Refills: 2 | Status: DISCONTINUED | COMMUNITY
Start: 2022-10-25 | End: 2022-11-14

## 2022-11-14 RX ORDER — DEXAMETHASONE 2 MG/1
2 TABLET ORAL TWICE DAILY
Qty: 120 | Refills: 0 | Status: DISCONTINUED | COMMUNITY
Start: 2022-11-11 | End: 2022-11-14

## 2022-11-14 NOTE — DISCUSSION/SUMMARY
[FreeTextEntry1] : Patient seen and examined\par GLIOMA - Neurologically not worse. \par Started  Chemo on 11/7/2022 and RT will begin on 11/8/2022\par Patient to take TDD of 160 mg X 42 days\par Reinforced weekly blood works\par HEADACHES -  Continue dexamethasone to 4 mg bid. GI Prophylaxis with pantoprazole.\par If he remains headache free on Monday will restart taper.\par SEIZURES- Continue Keppra 750 mg bid.\par \par FOLLOW UP - Will do a clinical follow up on 11/30/2022. In the interim, if any concerns patient knows to call our office

## 2022-11-14 NOTE — HISTORY OF PRESENT ILLNESS
[FreeTextEntry1] : Mr. Ramos is being seen in neuro oncologic evaluation for a new diagnosis of brain tumor.\par \par History obtained via discussion with patient and chart review, including personal review of all neuroimaging.\par Mr. 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 He returned on 926 with worsening headache and underwent MRI scan of his brain:\par \par MRI brain 9/27 shows an irregularly enhancing mass in the left temporal-occipital region measuring 2.6 cm transversely and 8.9 cm cranial - caudal with surrounding vasogenic edema.\par \par CT C/A/P 9/26 unremarkable.\par \par Dr. Turpin performed a large resection of this mass on 10/3.\par 10/3 Pathology - Glioblastoma, WHO 4, IDH wt - MGMT methylated.\par \par Post-operative MRI 10/5: LEFT parietal occipital craniotomy with near complete resection of the of the neoplasm in the LEFT occipital/posterior temporal lobes. Minimal residual neoplasm is seen in the anterior part of the neoplasm, which abuts the ependymal surface of the atrium of the LEFT ventricle. Moderate postsurgical hemorrhage is seen within the surgical cavity. Moderate surrounding vasogenic edema is unchanged with effacement of the posterior horn of the LEFT lateral ventricle. Abnormal signal is also seen within the LEFT cerebral peduncle with a 4 mm focus of central enhancement and 1.6 cm region of rim enhancement, which is suspicious for a secondary focus of neoplasm or extension from the primary along the white matter tracts.\par 10/25- Had an episode of loss of vision X 15-20 minutes , episode resolved on its own - Restarted Dexamethasone 2 mg daily, Keppra raised to 750 mg bid\par 11/2/2022-  He continues to have headaches even waking him from sleep. Raised dexamethasone to 4 mg daily\par 11/14/2022 - Patient here for a follow up. Reports when we raised the Dexamethasone on the 2nd , he was not taking the 4 mg daily, after discussing with HCP started on 11/7, headache didn’t resolve so on 11/10- Dr Ayala raised to 4 mg bid. Since then he reports no further headaches. Vision has not improved but has not worsened. No GI side effects with chemotherapy  - mild constipation - resolved with OTC.\par Weekly CBC to start today.\par

## 2022-11-14 NOTE — PHYSICAL EXAM
[General Appearance - Alert] : alert [General Appearance - In No Acute Distress] : in no acute distress [General Appearance - Well Nourished] : well nourished [] : no respiratory distress [Respiration, Rhythm And Depth] : normal respiratory rhythm and effort [Exaggerated Use Of Accessory Muscles For Inspiration] : no accessory muscle use [Edema] : there was no peripheral edema [Abnormal Walk] : normal gait [FreeTextEntry1] : \par Awake and alert , oriented X 3 - mild reduced fluency \par EOMI, Right VF defect - UQ more than LQ - no neglect\par Face symmetric and tongue protrudes midline\par LOUISE X 4 with good and equal strength\par FFM and TATYANA equal\par LOUISE X 4 with good and equal strength\par No extinction to DSS\par Coordination intact\par Ambulating independently and steadily.

## 2022-11-15 ENCOUNTER — NON-APPOINTMENT (OUTPATIENT)
Age: 62
End: 2022-11-15

## 2022-11-15 PROCEDURE — G6002: CPT | Mod: 26

## 2022-11-15 NOTE — DISEASE MANAGEMENT
[Clinical] : TNM Stage: c [TTNM] : - [NTNM] : - [MTNM] : - [N/A] : Currently not applicable [de-identified] : 1200 cgy [de-identified] : 6000 cgy [de-identified] : left partial brain

## 2022-11-15 NOTE — VITALS
[Maximal Pain Intensity: 1/10] : 1/10 [Least Pain Intensity: 0/10] : 0/10 [90: Able to carry normal activity; minor signs or symptoms of disease.] : 90: Able to carry normal activity; minor signs or symptoms of disease.  [ECOG Performance Status: 1 - Restricted in physically strenuous activity but ambulatory and able to carry out work of a light or sedentary nature] : Performance Status: 1 - Restricted in physically strenuous activity but ambulatory and able to carry out work of a light or sedentary nature, e.g., light house work, office work [Date: ____________] : Patient's last distress assessment performed on [unfilled]. [1 - Distress Level] : Distress Level: 1

## 2022-11-15 NOTE — HISTORY OF PRESENT ILLNESS
[FreeTextEntry1] : Mr. Del Ramos present with Glioblastoma, WHO 4, IDH WT, presented for initial consultation of post-op RT on 10/18/2022.\par \par ONCOLOGY HISTORY\par Mr. Ramos with no significant PMH presented with severe headache and right vision changes since 9/19/22.\par MRI brain 9/27 shows an irregularly enhancing mass in the left temporal-occipital region measuring 2.6 cm transversely and 8.9 cm cranial - caudal with surrounding vasogenic edema.\par \par Patient underwent Left craniotomy for tumor resection on 10/3 by Dr. Turpin.  Pathology - show a high grade glioma with nuclear pleomorphism, increased mitotic activity, microvascular proliferation and  necrosis. Immunostains are performed on block 2B and show tumor cells are positive for GFAP and EGFR. A subset of tumor cell nuclei are highlighted by p53. There is no ATRX loss in the tumor. The MIB-1 (Ki-67) labeling index is markedly elevated (15-25%). pHH3 shows scattered mitoses. IDH1 (by anti-IDH1   R132H antibody) immunostain is negative.  - Molecular studies pending.\par \par Post-operative MRI 10/5: LEFT parietal occipital craniotomy with near complete resection of the of the neoplasm in the LEFT occipital/posterior temporal lobes. Minimal residual neoplasm is seen in the anterior part of the neoplasm, which abuts the ependymal surface of the atrium of the LEFT ventricle. Moderate postsurgical hemorrhage is seen within the surgical cavity. Moderate surrounding vasogenic edema is unchanged with effacement of the posterior horn of the LEFT lateral ventricle. Abnormal signal is also seen within the LEFT cerebral peduncle with a 4 mm focus of central enhancement and 1.6 cm region of rim enhancement, which is suspicious for a secondary focus of neoplasm or extension from the primary along the white matter tracts. \par \par On 10/18/2022, he has light frontal headache 1/10, no seizure, no focal deficit.  Off Decadron since 10/12/2022 , still taking Keppra 500mg bid. f/u with Dr. Torres and Dr. Turpin.\par \par 10/15/2022- Mr. Ramos presents today for on treatment visit. he has completed 1200/6000 cgy of radiation to the left partial brain.\par He feels well overall. Currently taking dexamethasone 4mg BID. no longer having headaches. remains with visual changes on the right side, not improved on higher dose of dex. confusion and speech trouble is much improved.

## 2022-11-16 PROCEDURE — G6002: CPT | Mod: 26

## 2022-11-17 PROCEDURE — G6002: CPT | Mod: 26

## 2022-11-18 ENCOUNTER — NON-APPOINTMENT (OUTPATIENT)
Age: 62
End: 2022-11-18

## 2022-11-18 PROCEDURE — G6002: CPT | Mod: 26

## 2022-11-20 PROCEDURE — 77427 RADIATION TX MANAGEMENT X5: CPT

## 2022-11-20 PROCEDURE — 77014: CPT | Mod: 26

## 2022-11-21 PROCEDURE — G6002: CPT | Mod: 26

## 2022-11-22 ENCOUNTER — NON-APPOINTMENT (OUTPATIENT)
Age: 62
End: 2022-11-22

## 2022-11-22 VITALS
DIASTOLIC BLOOD PRESSURE: 76 MMHG | BODY MASS INDEX: 29.06 KG/M2 | TEMPERATURE: 97.7 F | HEART RATE: 60 BPM | OXYGEN SATURATION: 98 % | WEIGHT: 203 LBS | HEIGHT: 70 IN | SYSTOLIC BLOOD PRESSURE: 120 MMHG | RESPIRATION RATE: 17 BRPM

## 2022-11-22 PROCEDURE — G6002: CPT | Mod: 26

## 2022-11-23 ENCOUNTER — RESULT REVIEW (OUTPATIENT)
Age: 62
End: 2022-11-23

## 2022-11-23 ENCOUNTER — APPOINTMENT (OUTPATIENT)
Dept: HEMATOLOGY ONCOLOGY | Facility: CLINIC | Age: 62
End: 2022-11-23

## 2022-11-23 VITALS
RESPIRATION RATE: 18 BRPM | HEIGHT: 70 IN | WEIGHT: 203 LBS | SYSTOLIC BLOOD PRESSURE: 106 MMHG | DIASTOLIC BLOOD PRESSURE: 69 MMHG | BODY MASS INDEX: 29.06 KG/M2 | OXYGEN SATURATION: 95 % | TEMPERATURE: 97.7 F | HEART RATE: 57 BPM

## 2022-11-23 LAB
BASOPHILS # BLD AUTO: 0.02 K/UL — SIGNIFICANT CHANGE UP (ref 0–0.2)
BASOPHILS NFR BLD AUTO: 0.1 % — SIGNIFICANT CHANGE UP (ref 0–2)
EOSINOPHIL # BLD AUTO: 0.01 K/UL — SIGNIFICANT CHANGE UP (ref 0–0.5)
EOSINOPHIL NFR BLD AUTO: 0.1 % — SIGNIFICANT CHANGE UP (ref 0–6)
HCT VFR BLD CALC: 45.4 % — SIGNIFICANT CHANGE UP (ref 39–50)
HGB BLD-MCNC: 15 G/DL — SIGNIFICANT CHANGE UP (ref 13–17)
IMM GRANULOCYTES NFR BLD AUTO: 1.2 % — HIGH (ref 0–0.9)
LYMPHOCYTES # BLD AUTO: 0.57 K/UL — LOW (ref 1–3.3)
LYMPHOCYTES # BLD AUTO: 4.1 % — LOW (ref 13–44)
MCHC RBC-ENTMCNC: 29.1 PG — SIGNIFICANT CHANGE UP (ref 27–34)
MCHC RBC-ENTMCNC: 33 G/DL — SIGNIFICANT CHANGE UP (ref 32–36)
MCV RBC AUTO: 88.2 FL — SIGNIFICANT CHANGE UP (ref 80–100)
MONOCYTES # BLD AUTO: 0.9 K/UL — SIGNIFICANT CHANGE UP (ref 0–0.9)
MONOCYTES NFR BLD AUTO: 6.5 % — SIGNIFICANT CHANGE UP (ref 2–14)
NEUTROPHILS # BLD AUTO: 12.15 K/UL — HIGH (ref 1.8–7.4)
NEUTROPHILS NFR BLD AUTO: 88 % — HIGH (ref 43–77)
NRBC # BLD: 0 /100 WBCS — SIGNIFICANT CHANGE UP (ref 0–0)
PLATELET # BLD AUTO: 159 K/UL — SIGNIFICANT CHANGE UP (ref 150–400)
RBC # BLD: 5.15 M/UL — SIGNIFICANT CHANGE UP (ref 4.2–5.8)
RBC # FLD: 14.8 % — HIGH (ref 10.3–14.5)
WBC # BLD: 13.81 K/UL — HIGH (ref 3.8–10.5)
WBC # FLD AUTO: 13.81 K/UL — HIGH (ref 3.8–10.5)

## 2022-11-23 PROCEDURE — G6002: CPT | Mod: 26

## 2022-11-23 NOTE — DISEASE MANAGEMENT
[Clinical] : TNM Stage: c [N/A] : Currently not applicable [TTNM] : - [NTNM] : - [MTNM] : - [de-identified] : 2600 cgy [de-identified] : 6000 cgy [de-identified] : left partial brain

## 2022-11-23 NOTE — REVIEW OF SYSTEMS
[Constipation: Grade 0] : Constipation: Grade 0 [Diarrhea: Grade 0] : Diarrhea: Grade 0 [Nausea: Grade 0] : Nausea: Grade 0 [Vomiting: Grade 0] : Vomiting: Grade 0 [Dizziness: Grade 0] : Dizziness: Grade 0  [Headache: Grade 0] : Headache: Grade 0 [Lethargy: Grade 0] : Lethargy: Grade 0 [de-identified] : Right eye visual compromised [Fever] : no fever [Chills] : no chills [Night Sweats] : no night sweats [Recent Change In Weight] : ~T no recent weight change [Visual Disturbances] : no visual disturbances

## 2022-11-23 NOTE — REASON FOR VISIT
[Routine On-Treatment] : a routine on-treatment visit for [Brain Tumor] : brain tumor [Other: _____] : [unfilled]

## 2022-11-23 NOTE — HISTORY OF PRESENT ILLNESS
[FreeTextEntry1] : Mr. Del Ramos present with Glioblastoma, WHO 4, IDH WT, presented for initial consultation of post-op RT on 10/18/2022.\par \par ONCOLOGY HISTORY\par Mr. Ramos with no significant PMH presented with severe headache and right vision changes since 9/19/22.\par MRI brain 9/27 shows an irregularly enhancing mass in the left temporal-occipital region measuring 2.6 cm transversely and 8.9 cm cranial - caudal with surrounding vasogenic edema.\par \par Patient underwent Left craniotomy for tumor resection on 10/3 by Dr. Turpin.  Pathology - show a high grade glioma with nuclear pleomorphism, increased mitotic activity, microvascular proliferation and  necrosis. Immunostains are performed on block 2B and show tumor cells are positive for GFAP and EGFR. A subset of tumor cell nuclei are highlighted by p53. There is no ATRX loss in the tumor. The MIB-1 (Ki-67) labeling index is markedly elevated (15-25%). pHH3 shows scattered mitoses. IDH1 (by anti-IDH1   R132H antibody) immunostain is negative.  - Molecular studies pending.\par \par Post-operative MRI 10/5: LEFT parietal occipital craniotomy with near complete resection of the of the neoplasm in the LEFT occipital/posterior temporal lobes. Minimal residual neoplasm is seen in the anterior part of the neoplasm, which abuts the ependymal surface of the atrium of the LEFT ventricle. Moderate postsurgical hemorrhage is seen within the surgical cavity. Moderate surrounding vasogenic edema is unchanged with effacement of the posterior horn of the LEFT lateral ventricle. Abnormal signal is also seen within the LEFT cerebral peduncle with a 4 mm focus of central enhancement and 1.6 cm region of rim enhancement, which is suspicious for a secondary focus of neoplasm or extension from the primary along the white matter tracts. \par \par On 10/18/2022, he has light frontal headache 1/10, no seizure, no focal deficit.  Off Decadron since 10/12/2022 , still taking Keppra 500mg bid. f/u with Dr. Torres and Dr. Turpin.\par \par 10/15/2022- Mr. Ramos presents today for on treatment visit. he has completed 1200/6000 cgy of radiation to the left partial brain.\par He feels well overall. Currently taking dexamethasone 4mg BID. no longer having headaches. remains with visual changes on the right side, not improved on higher dose of dex. confusion and speech trouble is much improved.\par \par 11/23/2022: 13/30 Fractions completed. Mr. Ramos present today for on treatment visit.  He has completed   2600/6000 cgy of radiation to the left partial brain. He is doing well today. He denies an headache, nausea, vomiting or bowel issues. He complains of insomnia and "puffiness" of his face.

## 2022-11-28 PROCEDURE — 77014: CPT | Mod: 26

## 2022-11-29 ENCOUNTER — NON-APPOINTMENT (OUTPATIENT)
Age: 62
End: 2022-11-29

## 2022-11-29 ENCOUNTER — APPOINTMENT (OUTPATIENT)
Dept: HEMATOLOGY ONCOLOGY | Facility: CLINIC | Age: 62
End: 2022-11-29

## 2022-11-29 ENCOUNTER — RESULT REVIEW (OUTPATIENT)
Age: 62
End: 2022-11-29

## 2022-11-29 VITALS
HEART RATE: 63 BPM | TEMPERATURE: 97.34 F | HEIGHT: 70 IN | DIASTOLIC BLOOD PRESSURE: 75 MMHG | SYSTOLIC BLOOD PRESSURE: 114 MMHG | WEIGHT: 203.71 LBS | OXYGEN SATURATION: 99 % | RESPIRATION RATE: 18 BRPM | BODY MASS INDEX: 29.16 KG/M2

## 2022-11-29 LAB
BASOPHILS # BLD AUTO: 0.02 K/UL — SIGNIFICANT CHANGE UP (ref 0–0.2)
BASOPHILS NFR BLD AUTO: 0.2 % — SIGNIFICANT CHANGE UP (ref 0–2)
EOSINOPHIL # BLD AUTO: 0.01 K/UL — SIGNIFICANT CHANGE UP (ref 0–0.5)
EOSINOPHIL NFR BLD AUTO: 0.1 % — SIGNIFICANT CHANGE UP (ref 0–6)
HCT VFR BLD CALC: 46.2 % — SIGNIFICANT CHANGE UP (ref 39–50)
HGB BLD-MCNC: 15.2 G/DL — SIGNIFICANT CHANGE UP (ref 13–17)
IMM GRANULOCYTES NFR BLD AUTO: 0.8 % — SIGNIFICANT CHANGE UP (ref 0–0.9)
LYMPHOCYTES # BLD AUTO: 0.51 K/UL — LOW (ref 1–3.3)
LYMPHOCYTES # BLD AUTO: 3.9 % — LOW (ref 13–44)
MCHC RBC-ENTMCNC: 29.1 PG — SIGNIFICANT CHANGE UP (ref 27–34)
MCHC RBC-ENTMCNC: 32.9 G/DL — SIGNIFICANT CHANGE UP (ref 32–36)
MCV RBC AUTO: 88.3 FL — SIGNIFICANT CHANGE UP (ref 80–100)
MONOCYTES # BLD AUTO: 0.84 K/UL — SIGNIFICANT CHANGE UP (ref 0–0.9)
MONOCYTES NFR BLD AUTO: 6.4 % — SIGNIFICANT CHANGE UP (ref 2–14)
NEUTROPHILS # BLD AUTO: 11.58 K/UL — HIGH (ref 1.8–7.4)
NEUTROPHILS NFR BLD AUTO: 88.6 % — HIGH (ref 43–77)
NRBC # BLD: 0 /100 WBCS — SIGNIFICANT CHANGE UP (ref 0–0)
PLATELET # BLD AUTO: 172 K/UL — SIGNIFICANT CHANGE UP (ref 150–400)
RBC # BLD: 5.23 M/UL — SIGNIFICANT CHANGE UP (ref 4.2–5.8)
RBC # FLD: 15 % — HIGH (ref 10.3–14.5)
WBC # BLD: 13.06 K/UL — HIGH (ref 3.8–10.5)
WBC # FLD AUTO: 13.06 K/UL — HIGH (ref 3.8–10.5)

## 2022-11-29 PROCEDURE — 77427 RADIATION TX MANAGEMENT X5: CPT

## 2022-11-29 PROCEDURE — G6002: CPT | Mod: 26

## 2022-11-29 NOTE — HISTORY OF PRESENT ILLNESS
[FreeTextEntry1] : Mr. Del Ramos present with Glioblastoma, WHO 4, IDH WT, presented for initial consultation of post-op RT on 10/18/2022.\par \par ONCOLOGY HISTORY\par Mr. Ramos with no significant PMH presented with severe headache and right vision changes since 9/19/22.\par MRI brain 9/27 shows an irregularly enhancing mass in the left temporal-occipital region measuring 2.6 cm transversely and 8.9 cm cranial - caudal with surrounding vasogenic edema.\par \par Patient underwent Left craniotomy for tumor resection on 10/3 by Dr. Turpin.  Pathology - show a high grade glioma with nuclear pleomorphism, increased mitotic activity, microvascular proliferation and  necrosis. Immunostains are performed on block 2B and show tumor cells are positive for GFAP and EGFR. A subset of tumor cell nuclei are highlighted by p53. There is no ATRX loss in the tumor. The MIB-1 (Ki-67) labeling index is markedly elevated (15-25%). pHH3 shows scattered mitoses. IDH1 (by anti-IDH1   R132H antibody) immunostain is negative.  - Molecular studies pending.\par \par Post-operative MRI 10/5: LEFT parietal occipital craniotomy with near complete resection of the of the neoplasm in the LEFT occipital/posterior temporal lobes. Minimal residual neoplasm is seen in the anterior part of the neoplasm, which abuts the ependymal surface of the atrium of the LEFT ventricle. Moderate postsurgical hemorrhage is seen within the surgical cavity. Moderate surrounding vasogenic edema is unchanged with effacement of the posterior horn of the LEFT lateral ventricle. Abnormal signal is also seen within the LEFT cerebral peduncle with a 4 mm focus of central enhancement and 1.6 cm region of rim enhancement, which is suspicious for a secondary focus of neoplasm or extension from the primary along the white matter tracts. \par \par On 10/18/2022, he has light frontal headache 1/10, no seizure, no focal deficit.  Off Decadron since 10/12/2022 , still taking Keppra 500mg bid. f/u with Dr. Torres and Dr. Turpin.\par \par 10/15/2022- Mr. Ramos presents today for on treatment visit. he has completed 1200/6000 cgy of radiation to the left partial brain.\par He feels well overall. Currently taking dexamethasone 4mg BID. no longer having headaches. remains with visual changes on the right side, not improved on higher dose of dex. confusion and speech trouble is much improved.\par \par 11/23/2022: 13/30 Fractions completed. Mr. Ramos present today for on treatment visit.  He has completed   2600/6000 cgy of radiation to the left partial brain. He is doing well today. He denies an headache, nausea, vomiting or bowel issues. He complains of insomnia and "puffiness" of his face.  \par \par 11/29/22_ Mr. Ramos present today for on treatment visit s/p 15/30 fraction. He has completed 3000/6000 cgy of radiation to the left partial brain. feeling well. no headaches, no nausea, no vomiting, no focal weakness. speech is improving. sleep improved some on dexamethasone 2mg BID. vision stable.

## 2022-11-29 NOTE — DISEASE MANAGEMENT
[Clinical] : TNM Stage: c [N/A] : Currently not applicable [TTNM] : - [NTNM] : - [MTNM] : - [de-identified] : 3000 cgy [de-identified] : 6000 cgy [de-identified] : left partial brain

## 2022-11-29 NOTE — PHYSICAL EXAM
[General Appearance - Well Developed] : well developed [Normal] : oriented to person, place and time, the affect was normal, the mood was normal and not anxious [de-identified] : notes visual changes to the right eye, stable this week

## 2022-11-29 NOTE — REVIEW OF SYSTEMS
[Constipation: Grade 0] : Constipation: Grade 0 [Diarrhea: Grade 0] : Diarrhea: Grade 0 [Nausea: Grade 0] : Nausea: Grade 0 [Vomiting: Grade 0] : Vomiting: Grade 0 [Dizziness: Grade 0] : Dizziness: Grade 0  [Headache: Grade 0] : Headache: Grade 0 [Lethargy: Grade 0] : Lethargy: Grade 0 [Negative] : Psychiatric [de-identified] : Right eye visual compromised [Fever] : no fever [Chills] : no chills [Night Sweats] : no night sweats [Recent Change In Weight] : ~T no recent weight change [Visual Disturbances] : no visual disturbances [Confused] : no confusion [Dizziness] : no dizziness [Fainting] : no fainting [FreeTextEntry2] : sleep improved some

## 2022-11-30 ENCOUNTER — APPOINTMENT (OUTPATIENT)
Dept: NEUROLOGY | Facility: CLINIC | Age: 62
End: 2022-11-30

## 2022-11-30 PROCEDURE — G6002: CPT | Mod: 26

## 2022-12-01 ENCOUNTER — APPOINTMENT (OUTPATIENT)
Dept: NEUROLOGY | Facility: CLINIC | Age: 62
End: 2022-12-01

## 2022-12-01 PROCEDURE — 99215 OFFICE O/P EST HI 40 MIN: CPT

## 2022-12-01 PROCEDURE — G6002: CPT | Mod: 26

## 2022-12-01 NOTE — HISTORY OF PRESENT ILLNESS
[FreeTextEntry1] : Mr. Ramos is being seen in neuro oncologic evaluation for a new diagnosis of brain tumor.\par \par History obtained via discussion with patient and chart review, including personal review of all neuroimaging.\par Mr. 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 He returned on 926 with worsening headache and underwent MRI scan of his brain:\par \par MRI brain 9/27 shows an irregularly enhancing mass in the left temporal-occipital region measuring 2.6 cm transversely and 8.9 cm cranial - caudal with surrounding vasogenic edema.\par \par CT C/A/P 9/26 unremarkable.\par \par Dr. Turpin performed a large resection of this mass on 10/3.\par 10/3 Pathology - Glioblastoma, WHO 4, IDH wt - MGMT methylated.\par \par Post-operative MRI 10/5: LEFT parietal occipital craniotomy with near complete resection of the of the neoplasm in the LEFT occipital/posterior temporal lobes. Minimal residual neoplasm is seen in the anterior part of the neoplasm, which abuts the ependymal surface of the atrium of the LEFT ventricle. Moderate postsurgical hemorrhage is seen within the surgical cavity. Moderate surrounding vasogenic edema is unchanged with effacement of the posterior horn of the LEFT lateral ventricle. Abnormal signal is also seen within the LEFT cerebral peduncle with a 4 mm focus of central enhancement and 1.6 cm region of rim enhancement, which is suspicious for a secondary focus of neoplasm or extension from the primary along the white matter tracts.\par 10/25- Had an episode of loss of vision X 15-20 minutes , episode resolved on its own - Restarted Dexamethasone 2 mg daily, Keppra raised to 750 mg bid\par 11/2/2022- He continues to have headaches even waking him from sleep. Raised dexamethasone to 4 mg daily\par 11/7/2022 - CHEMORT started\par 11/14/2022 - Reports when we raised the Dexamethasone on the 2nd , he was not taking the 4 mg daily, after discussing with HCP started on 11/7, headache didn’t resolve so on 11/10- Dr Ayala raised to 4 mg bid. Since then he reports no further headaches. Vision has not improved but has not worsened. \par 12/1/2022 - Here for a follow up. Offers no new complaints , he feels his vision has improved. He is on Dexamethasone 2 mg bid\par

## 2022-12-01 NOTE — DISCUSSION/SUMMARY
[FreeTextEntry1] : Patient seen and examined\par GLIOMA - Neurologically not worse. \par Continue ChemoRT\par Continue  TDD of   mg \par Reinforced weekly blood works\par Will refer for PT.OT,ST\par HEADACHES - Continue dexamethasone 2 mg bid ( Taper per Dr Ayala). GI Prophylaxis with pantoprazole.\par SEIZURES- Continue Keppra 750 mg bid.\par FOLLOW UP - Will do a clinical follow up on 12/20//2022 at 11: 30 am. In the interim, if any concerns patient knows to call our office. \par

## 2022-12-01 NOTE — PHYSICAL EXAM
[General Appearance - Alert] : alert [General Appearance - In No Acute Distress] : in no acute distress [Oriented To Time, Place, And Person] : oriented to person, place, and time [Impaired Insight] : insight and judgment were intact [Affect] : the affect was normal [] : no respiratory distress [Respiration, Rhythm And Depth] : normal respiratory rhythm and effort [Exaggerated Use Of Accessory Muscles For Inspiration] : no accessory muscle use [Edema] : there was no peripheral edema [FreeTextEntry1] : \par Awake and alert , oriented X 3 - speaking well\par Excellent energy today\par EOMI, Right VF defect - UQ more than LQ - no neglect\par Face symmetric and tongue protrudes midline\par LOUISE X 4 with good and equal strength\par FFM and TATYANA equal\par LOUISE X 4 with good and equal strength\par No extinction to DSS\par Coordination intact\par Ambulating independently and steadily.

## 2022-12-02 PROCEDURE — G6002: CPT | Mod: 26

## 2022-12-05 PROCEDURE — 77014: CPT | Mod: 26

## 2022-12-06 ENCOUNTER — RESULT REVIEW (OUTPATIENT)
Age: 62
End: 2022-12-06

## 2022-12-06 ENCOUNTER — APPOINTMENT (OUTPATIENT)
Dept: HEMATOLOGY ONCOLOGY | Facility: CLINIC | Age: 62
End: 2022-12-06

## 2022-12-06 LAB
BASOPHILS # BLD AUTO: 0 K/UL — SIGNIFICANT CHANGE UP (ref 0–0.2)
BASOPHILS NFR BLD AUTO: 0 % — SIGNIFICANT CHANGE UP (ref 0–2)
EOSINOPHIL # BLD AUTO: 0.01 K/UL — SIGNIFICANT CHANGE UP (ref 0–0.5)
EOSINOPHIL NFR BLD AUTO: 0.1 % — SIGNIFICANT CHANGE UP (ref 0–6)
HCT VFR BLD CALC: 45.2 % — SIGNIFICANT CHANGE UP (ref 39–50)
HGB BLD-MCNC: 14.7 G/DL — SIGNIFICANT CHANGE UP (ref 13–17)
IMM GRANULOCYTES NFR BLD AUTO: 0.7 % — SIGNIFICANT CHANGE UP (ref 0–0.9)
LYMPHOCYTES # BLD AUTO: 0.38 K/UL — LOW (ref 1–3.3)
LYMPHOCYTES # BLD AUTO: 4.7 % — LOW (ref 13–44)
MCHC RBC-ENTMCNC: 28.9 PG — SIGNIFICANT CHANGE UP (ref 27–34)
MCHC RBC-ENTMCNC: 32.5 G/DL — SIGNIFICANT CHANGE UP (ref 32–36)
MCV RBC AUTO: 88.8 FL — SIGNIFICANT CHANGE UP (ref 80–100)
MONOCYTES # BLD AUTO: 0.45 K/UL — SIGNIFICANT CHANGE UP (ref 0–0.9)
MONOCYTES NFR BLD AUTO: 5.5 % — SIGNIFICANT CHANGE UP (ref 2–14)
NEUTROPHILS # BLD AUTO: 7.24 K/UL — SIGNIFICANT CHANGE UP (ref 1.8–7.4)
NEUTROPHILS NFR BLD AUTO: 89 % — HIGH (ref 43–77)
NRBC # BLD: 0 /100 WBCS — SIGNIFICANT CHANGE UP (ref 0–0)
PLATELET # BLD AUTO: 131 K/UL — LOW (ref 150–400)
RBC # BLD: 5.09 M/UL — SIGNIFICANT CHANGE UP (ref 4.2–5.8)
RBC # FLD: 14.5 % — SIGNIFICANT CHANGE UP (ref 10.3–14.5)
WBC # BLD: 8.14 K/UL — SIGNIFICANT CHANGE UP (ref 3.8–10.5)
WBC # FLD AUTO: 8.14 K/UL — SIGNIFICANT CHANGE UP (ref 3.8–10.5)

## 2022-12-06 PROCEDURE — G6002: CPT | Mod: 26

## 2022-12-07 PROCEDURE — G6002: CPT | Mod: 26

## 2022-12-08 ENCOUNTER — NON-APPOINTMENT (OUTPATIENT)
Age: 62
End: 2022-12-08

## 2022-12-08 VITALS
BODY MASS INDEX: 29.07 KG/M2 | SYSTOLIC BLOOD PRESSURE: 111 MMHG | RESPIRATION RATE: 18 BRPM | TEMPERATURE: 97.2 F | OXYGEN SATURATION: 99 % | DIASTOLIC BLOOD PRESSURE: 75 MMHG | WEIGHT: 203.06 LBS | HEART RATE: 69 BPM | HEIGHT: 70 IN

## 2022-12-08 PROCEDURE — G6002: CPT | Mod: 26

## 2022-12-08 NOTE — REVIEW OF SYSTEMS
[Constipation: Grade 0] : Constipation: Grade 0 [Diarrhea: Grade 0] : Diarrhea: Grade 0 [Nausea: Grade 0] : Nausea: Grade 0 [Vomiting: Grade 0] : Vomiting: Grade 0 [de-identified] : Right eye visual compromised [Dizziness: Grade 0] : Dizziness: Grade 0  [Headache: Grade 0] : Headache: Grade 0 [Lethargy: Grade 0] : Lethargy: Grade 0 [Fever] : no fever [Chills] : no chills [Night Sweats] : no night sweats [Recent Change In Weight] : ~T no recent weight change [Visual Disturbances] : no visual disturbances [Confused] : no confusion [Dizziness] : no dizziness [Fainting] : no fainting [Negative] : Psychiatric [FreeTextEntry2] : sleep improved some

## 2022-12-08 NOTE — PHYSICAL EXAM
[General Appearance - Well Developed] : well developed [Normal] : oriented to person, place and time, the affect was normal, the mood was normal and not anxious [de-identified] : notes visual changes to the right eye, stable this week

## 2022-12-08 NOTE — DISEASE MANAGEMENT
[Clinical] : TNM Stage: c [TTNM] : - [NTNM] : - [MTNM] : - [N/A] : Currently not applicable [de-identified] : 4400 cgy [de-identified] : 6000 cgy [de-identified] : left partial brain

## 2022-12-08 NOTE — HISTORY OF PRESENT ILLNESS
[FreeTextEntry1] : Mr. Del Ramos present with Glioblastoma, WHO 4, IDH WT, presented for initial consultation of post-op RT on 10/18/2022.\par \par ONCOLOGY HISTORY\par Mr. Ramos with no significant PMH presented with severe headache and right vision changes since 9/19/22.\par MRI brain 9/27 shows an irregularly enhancing mass in the left temporal-occipital region measuring 2.6 cm transversely and 8.9 cm cranial - caudal with surrounding vasogenic edema.\par \par Patient underwent Left craniotomy for tumor resection on 10/3 by Dr. Turpin.  Pathology - show a high grade glioma with nuclear pleomorphism, increased mitotic activity, microvascular proliferation and  necrosis. Immunostains are performed on block 2B and show tumor cells are positive for GFAP and EGFR. A subset of tumor cell nuclei are highlighted by p53. There is no ATRX loss in the tumor. The MIB-1 (Ki-67) labeling index is markedly elevated (15-25%). pHH3 shows scattered mitoses. IDH1 (by anti-IDH1   R132H antibody) immunostain is negative.  - Molecular studies pending.\par \par Post-operative MRI 10/5: LEFT parietal occipital craniotomy with near complete resection of the of the neoplasm in the LEFT occipital/posterior temporal lobes. Minimal residual neoplasm is seen in the anterior part of the neoplasm, which abuts the ependymal surface of the atrium of the LEFT ventricle. Moderate postsurgical hemorrhage is seen within the surgical cavity. Moderate surrounding vasogenic edema is unchanged with effacement of the posterior horn of the LEFT lateral ventricle. Abnormal signal is also seen within the LEFT cerebral peduncle with a 4 mm focus of central enhancement and 1.6 cm region of rim enhancement, which is suspicious for a secondary focus of neoplasm or extension from the primary along the white matter tracts. \par \par On 10/18/2022, he has light frontal headache 1/10, no seizure, no focal deficit.  Off Decadron since 10/12/2022 , still taking Keppra 500mg bid. f/u with Dr. Torres and Dr. Turpin.\par \par 10/15/2022- Mr. Ramos presents today for on treatment visit. he has completed 1200/6000 cgy of radiation to the left partial brain.\par He feels well overall. Currently taking dexamethasone 4mg BID. no longer having headaches. remains with visual changes on the right side, not improved on higher dose of dex. confusion and speech trouble is much improved.\par \par 11/23/2022: 13/30 Fractions completed. Mr. Ramos present today for on treatment visit.  He has completed   2600/6000 cgy of radiation to the left partial brain. He is doing well today. He denies an headache, nausea, vomiting or bowel issues. He complains of insomnia and "puffiness" of his face.  \par \par 11/29/22_ Mr. Ramos present today for on treatment visit s/p 15/30 fraction. He has completed 3000/6000 cgy of radiation to the left partial brain. feeling well. no headaches, no nausea, no vomiting, no focal weakness. speech is improving. sleep improved some on dexamethasone 2mg BID. vision stable. \par \par 12/8/2022- Mr. Ramos presents today for on treatment visit. He has completed 4400/6000 cgy of radiation to the left partial brain. feeling well. no headaches, speech more fluid, notes vision improvement today. had one episode of cramping to the right hand.

## 2022-12-09 PROCEDURE — G6002: CPT | Mod: 26

## 2022-12-12 PROCEDURE — G6002: CPT | Mod: 26

## 2022-12-13 ENCOUNTER — RESULT REVIEW (OUTPATIENT)
Age: 62
End: 2022-12-13

## 2022-12-13 ENCOUNTER — APPOINTMENT (OUTPATIENT)
Dept: HEMATOLOGY ONCOLOGY | Facility: CLINIC | Age: 62
End: 2022-12-13

## 2022-12-13 ENCOUNTER — NON-APPOINTMENT (OUTPATIENT)
Age: 62
End: 2022-12-13

## 2022-12-13 VITALS
OXYGEN SATURATION: 98 % | RESPIRATION RATE: 18 BRPM | HEART RATE: 84 BPM | BODY MASS INDEX: 29.08 KG/M2 | DIASTOLIC BLOOD PRESSURE: 77 MMHG | HEIGHT: 70 IN | WEIGHT: 203.15 LBS | TEMPERATURE: 98.2 F | SYSTOLIC BLOOD PRESSURE: 117 MMHG

## 2022-12-13 LAB
BASOPHILS # BLD AUTO: 0.07 K/UL — SIGNIFICANT CHANGE UP (ref 0–0.2)
BASOPHILS NFR BLD AUTO: 0.7 % — SIGNIFICANT CHANGE UP (ref 0–2)
EOSINOPHIL # BLD AUTO: 0.02 K/UL — SIGNIFICANT CHANGE UP (ref 0–0.5)
EOSINOPHIL NFR BLD AUTO: 0.2 % — SIGNIFICANT CHANGE UP (ref 0–6)
HCT VFR BLD CALC: 48.1 % — SIGNIFICANT CHANGE UP (ref 39–50)
HGB BLD-MCNC: 15.8 G/DL — SIGNIFICANT CHANGE UP (ref 13–17)
IMM GRANULOCYTES NFR BLD AUTO: 2.3 % — HIGH (ref 0–0.9)
LYMPHOCYTES # BLD AUTO: 0.56 K/UL — LOW (ref 1–3.3)
LYMPHOCYTES # BLD AUTO: 5.8 % — LOW (ref 13–44)
MCHC RBC-ENTMCNC: 28.8 PG — SIGNIFICANT CHANGE UP (ref 27–34)
MCHC RBC-ENTMCNC: 32.8 G/DL — SIGNIFICANT CHANGE UP (ref 32–36)
MCV RBC AUTO: 87.6 FL — SIGNIFICANT CHANGE UP (ref 80–100)
MONOCYTES # BLD AUTO: 0.6 K/UL — SIGNIFICANT CHANGE UP (ref 0–0.9)
MONOCYTES NFR BLD AUTO: 6.2 % — SIGNIFICANT CHANGE UP (ref 2–14)
NEUTROPHILS # BLD AUTO: 8.19 K/UL — HIGH (ref 1.8–7.4)
NEUTROPHILS NFR BLD AUTO: 84.8 % — HIGH (ref 43–77)
NRBC # BLD: 0 /100 WBCS — SIGNIFICANT CHANGE UP (ref 0–0)
PLATELET # BLD AUTO: 168 K/UL — SIGNIFICANT CHANGE UP (ref 150–400)
RBC # BLD: 5.49 M/UL — SIGNIFICANT CHANGE UP (ref 4.2–5.8)
RBC # FLD: 13.8 % — SIGNIFICANT CHANGE UP (ref 10.3–14.5)
WBC # BLD: 9.66 K/UL — SIGNIFICANT CHANGE UP (ref 3.8–10.5)
WBC # FLD AUTO: 9.66 K/UL — SIGNIFICANT CHANGE UP (ref 3.8–10.5)

## 2022-12-13 PROCEDURE — 77427 RADIATION TX MANAGEMENT X5: CPT

## 2022-12-13 PROCEDURE — 77014: CPT | Mod: 26

## 2022-12-13 NOTE — PHYSICAL EXAM
[General Appearance - Well Developed] : well developed [Normal] : oriented to person, place and time, the affect was normal, the mood was normal and not anxious [de-identified] : notes visual changes to right eye continue to improve

## 2022-12-13 NOTE — DISEASE MANAGEMENT
[Clinical] : TNM Stage: c [TTNM] : - [NTNM] : - [MTNM] : - [N/A] : Currently not applicable [de-identified] : 5000 cgy [de-identified] : 6000 cgy [de-identified] : left partial brain

## 2022-12-13 NOTE — REVIEW OF SYSTEMS
[Constipation: Grade 0] : Constipation: Grade 0 [Diarrhea: Grade 0] : Diarrhea: Grade 0 [Nausea: Grade 0] : Nausea: Grade 0 [Vomiting: Grade 0] : Vomiting: Grade 0 [de-identified] : Right eye visual compromised, improving [Dizziness: Grade 0] : Dizziness: Grade 0  [Headache: Grade 0] : Headache: Grade 0 [Lethargy: Grade 0] : Lethargy: Grade 0 [Fever] : no fever [Chills] : no chills [Night Sweats] : no night sweats [Recent Change In Weight] : ~T no recent weight change [Visual Disturbances] : no visual disturbances [Confused] : no confusion [Dizziness] : no dizziness [Fainting] : no fainting [Negative] : Psychiatric [FreeTextEntry2] : sleep improved some [FreeTextEntry3] : vision improving on the right

## 2022-12-13 NOTE — HISTORY OF PRESENT ILLNESS
[FreeTextEntry1] : Mr. Del Ramos present with Glioblastoma, WHO 4, IDH WT, presented for initial consultation of post-op RT on 10/18/2022.\par \par ONCOLOGY HISTORY\par Mr. Ramos with no significant PMH presented with severe headache and right vision changes since 9/19/22.\par MRI brain 9/27 shows an irregularly enhancing mass in the left temporal-occipital region measuring 2.6 cm transversely and 8.9 cm cranial - caudal with surrounding vasogenic edema.\par \par Patient underwent Left craniotomy for tumor resection on 10/3 by Dr. Turpin.  Pathology - show a high grade glioma with nuclear pleomorphism, increased mitotic activity, microvascular proliferation and  necrosis. Immunostains are performed on block 2B and show tumor cells are positive for GFAP and EGFR. A subset of tumor cell nuclei are highlighted by p53. There is no ATRX loss in the tumor. The MIB-1 (Ki-67) labeling index is markedly elevated (15-25%). pHH3 shows scattered mitoses. IDH1 (by anti-IDH1   R132H antibody) immunostain is negative.  - Molecular studies pending.\par \par Post-operative MRI 10/5: LEFT parietal occipital craniotomy with near complete resection of the of the neoplasm in the LEFT occipital/posterior temporal lobes. Minimal residual neoplasm is seen in the anterior part of the neoplasm, which abuts the ependymal surface of the atrium of the LEFT ventricle. Moderate postsurgical hemorrhage is seen within the surgical cavity. Moderate surrounding vasogenic edema is unchanged with effacement of the posterior horn of the LEFT lateral ventricle. Abnormal signal is also seen within the LEFT cerebral peduncle with a 4 mm focus of central enhancement and 1.6 cm region of rim enhancement, which is suspicious for a secondary focus of neoplasm or extension from the primary along the white matter tracts. \par \par On 10/18/2022, he has light frontal headache 1/10, no seizure, no focal deficit.  Off Decadron since 10/12/2022 , still taking Keppra 500mg bid. f/u with Dr. Torres and Dr. Turpin.\par \par 10/15/2022- Mr. Ramos presents today for on treatment visit. he has completed 1200/6000 cgy of radiation to the left partial brain.\par He feels well overall. Currently taking dexamethasone 4mg BID. no longer having headaches. remains with visual changes on the right side, not improved on higher dose of dex. confusion and speech trouble is much improved.\par \par 11/23/2022: 13/30 Fractions completed. Mr. Ramos present today for on treatment visit.  He has completed   2600/6000 cgy of radiation to the left partial brain. He is doing well today. He denies an headache, nausea, vomiting or bowel issues. He complains of insomnia and "puffiness" of his face.  \par \par 11/29/22_ Mr. Ramos present today for on treatment visit s/p 15/30 fraction. He has completed 3000/6000 cgy of radiation to the left partial brain. feeling well. no headaches, no nausea, no vomiting, no focal weakness. speech is improving. sleep improved some on dexamethasone 2mg BID. vision stable. \par \par 12/8/2022- Mr. Ramos presents today for on treatment visit. He has completed 4400/6000 cgy of radiation to the left partial brain. feeling well. no headaches, speech more fluid, notes vision improvement today. had one episode of cramping to the right hand.\par \par 12/13/2022- Mr. Ramos presents today for on treatment visit. He has completed 5000/6000 cgy of radiation to the left partial roney. feeling well overall. vision continues to improve. no headaches, no nausea, no focal weakness. speech continues to improve. on dexamethasone 2mg daily.

## 2022-12-14 PROCEDURE — G6002: CPT | Mod: 26

## 2022-12-15 PROCEDURE — G6002: CPT | Mod: 26

## 2022-12-16 PROCEDURE — G6002: CPT | Mod: 26

## 2022-12-19 PROCEDURE — G6002: CPT | Mod: 26

## 2022-12-20 ENCOUNTER — NON-APPOINTMENT (OUTPATIENT)
Age: 62
End: 2022-12-20

## 2022-12-20 ENCOUNTER — APPOINTMENT (OUTPATIENT)
Dept: NEUROLOGY | Facility: CLINIC | Age: 62
End: 2022-12-20

## 2022-12-20 VITALS
WEIGHT: 205.01 LBS | HEIGHT: 70 IN | SYSTOLIC BLOOD PRESSURE: 112 MMHG | TEMPERATURE: 98.5 F | HEART RATE: 74 BPM | BODY MASS INDEX: 29.35 KG/M2 | OXYGEN SATURATION: 98 % | RESPIRATION RATE: 18 BRPM | DIASTOLIC BLOOD PRESSURE: 75 MMHG

## 2022-12-20 PROCEDURE — 77427 RADIATION TX MANAGEMENT X5: CPT

## 2022-12-20 PROCEDURE — 77014: CPT | Mod: 26

## 2022-12-20 PROCEDURE — 99215 OFFICE O/P EST HI 40 MIN: CPT

## 2022-12-20 RX ORDER — TEMOZOLOMIDE 20 MG/1
20 CAPSULE ORAL
Qty: 42 | Refills: 0 | Status: DISCONTINUED | COMMUNITY
Start: 2022-10-19 | End: 2022-12-20

## 2022-12-20 RX ORDER — TEMOZOLOMIDE 140 MG/1
140 CAPSULE ORAL
Qty: 42 | Refills: 0 | Status: DISCONTINUED | COMMUNITY
Start: 2022-10-19 | End: 2022-12-20

## 2022-12-20 RX ORDER — PANTOPRAZOLE 40 MG/1
40 TABLET, DELAYED RELEASE ORAL
Qty: 30 | Refills: 6 | Status: ACTIVE | COMMUNITY
Start: 2022-10-06 | End: 1900-01-01

## 2022-12-20 RX ORDER — DEXAMETHASONE 2 MG/1
2 TABLET ORAL TWICE DAILY
Qty: 60 | Refills: 0 | Status: DISCONTINUED | COMMUNITY
Start: 2022-11-10 | End: 2022-12-20

## 2022-12-20 NOTE — REVIEW OF SYSTEMS
[Constipation: Grade 0] : Constipation: Grade 0 [Diarrhea: Grade 0] : Diarrhea: Grade 0 [Nausea: Grade 0] : Nausea: Grade 0 [Vomiting: Grade 0] : Vomiting: Grade 0 [de-identified] : Right eye visual compromised, improving [Cognitive Disturbance: Grade 0] : Cognitive Disturbance: Grade 0 [Concentration Impairment: Grade 0] : Concentration Impairment: Grade 0 [Dizziness: Grade 0] : Dizziness: Grade 0  [Facial Muscle Weakness: Grade 0] : Facial Muscle Weakness: Grade 0 [Headache: Grade 0] : Headache: Grade 0 [Lethargy: Grade 0] : Lethargy: Grade 0 [Meningismus: Grade 0] : Meningismus: Grade 0 [Fever] : no fever [Chills] : no chills [Night Sweats] : no night sweats [Recent Change In Weight] : ~T no recent weight change [Visual Disturbances] : no visual disturbances [Confused] : no confusion [Dizziness] : no dizziness [Fainting] : no fainting [Negative] : Psychiatric [FreeTextEntry2] : sleep improved some [FreeTextEntry3] : vision improving on the right

## 2022-12-20 NOTE — PHYSICAL EXAM
[General Appearance - Well Developed] : well developed [Normal] : oriented to person, place and time, the affect was normal, the mood was normal and not anxious [de-identified] : notes visual changes to right eye continue to improve

## 2022-12-20 NOTE — PHYSICAL EXAM
[] : no respiratory distress [Respiration, Rhythm And Depth] : normal respiratory rhythm and effort [Exaggerated Use Of Accessory Muscles For Inspiration] : no accessory muscle use [Edema] : there was no peripheral edema [FreeTextEntry1] : \par Awake and alert , oriented X 3 - speaking well\par EOMI, Right VF defect - UQ more than LQ - no neglect\par Face symmetric and tongue protrudes midline\par LOUISE X 4 with good and equal strength\par FFM and TATYANA equal\par LOUISE X 4 with good and equal strength\par No extinction to DSS\par Coordination intact\par Ambulating independently and steadily.

## 2022-12-20 NOTE — HISTORY OF PRESENT ILLNESS
[FreeTextEntry1] : Mr. Ramos is being seen in neuro oncologic evaluation for a new diagnosis of brain tumor.\par \par History obtained via discussion with patient and chart review, including personal review of all neuroimaging.\par Mr. 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 He returned on 926 with worsening headache and underwent MRI scan of his brain:\par \par MRI brain 9/27 shows an irregularly enhancing mass in the left temporal-occipital region measuring 2.6 cm transversely and 8.9 cm cranial - caudal with surrounding vasogenic edema.\par \par CT C/A/P 9/26 unremarkable.\par \par Dr. Turpin performed a large resection of this mass on 10/3.\par 10/3 Pathology - Glioblastoma, WHO 4, IDH wt - MGMT methylated.\par \par Post-operative MRI 10/5: LEFT parietal occipital craniotomy with near complete resection of the of the neoplasm in the LEFT occipital/posterior temporal lobes. Minimal residual neoplasm is seen in the anterior part of the neoplasm, which abuts the ependymal surface of the atrium of the LEFT ventricle. Moderate postsurgical hemorrhage is seen within the surgical cavity. Moderate surrounding vasogenic edema is unchanged with effacement of the posterior horn of the LEFT lateral ventricle. Abnormal signal is also seen within the LEFT cerebral peduncle with a 4 mm focus of central enhancement and 1.6 cm region of rim enhancement, which is suspicious for a secondary focus of neoplasm or extension from the primary along the white matter tracts.\par 10/25- Had an episode of loss of vision X 15-20 minutes , episode resolved on its own - Restarted Dexamethasone 2 mg daily, Keppra raised to 750 mg bid\par 11/2/2022- He continues to have headaches even waking him from sleep. Raised dexamethasone to 4 mg daily\par 11/7/2022 - CHEMORT started\par 11/14/2022 - Reports when we raised the Dexamethasone on the 2nd , he was not taking the 4 mg daily, after discussing with HCP started on 11/7, headache didn’t resolve so on 11/10- Dr Ayala raised to 4 mg bid. Since then he reports no further headaches. Vision has not improved but has not worsened. \par 12/1/2022 - Here for a follow up. Offers no new complaints , he feels his vision has improved. He is on Dexamethasone 2 mg bid\par 120/20/2022 - Here for a follow up. Offers no new complaints. Today is his last day of RT. He continues to be on Dexamethasone 1 mg daily

## 2022-12-20 NOTE — HISTORY OF PRESENT ILLNESS
[FreeTextEntry1] : Mr. Del Ramos present with Glioblastoma, WHO 4, IDH WT, presented for initial consultation of post-op RT on 10/18/2022.\par \par ONCOLOGY HISTORY\par Mr. Ramos with no significant PMH presented with severe headache and right vision changes since 9/19/22.\par MRI brain 9/27 shows an irregularly enhancing mass in the left temporal-occipital region measuring 2.6 cm transversely and 8.9 cm cranial - caudal with surrounding vasogenic edema.\par \par Patient underwent Left craniotomy for tumor resection on 10/3 by Dr. Turpin.  Pathology - show a high grade glioma with nuclear pleomorphism, increased mitotic activity, microvascular proliferation and  necrosis. Immunostains are performed on block 2B and show tumor cells are positive for GFAP and EGFR. A subset of tumor cell nuclei are highlighted by p53. There is no ATRX loss in the tumor. The MIB-1 (Ki-67) labeling index is markedly elevated (15-25%). pHH3 shows scattered mitoses. IDH1 (by anti-IDH1   R132H antibody) immunostain is negative.  - Molecular studies pending.\par \par Post-operative MRI 10/5: LEFT parietal occipital craniotomy with near complete resection of the of the neoplasm in the LEFT occipital/posterior temporal lobes. Minimal residual neoplasm is seen in the anterior part of the neoplasm, which abuts the ependymal surface of the atrium of the LEFT ventricle. Moderate postsurgical hemorrhage is seen within the surgical cavity. Moderate surrounding vasogenic edema is unchanged with effacement of the posterior horn of the LEFT lateral ventricle. Abnormal signal is also seen within the LEFT cerebral peduncle with a 4 mm focus of central enhancement and 1.6 cm region of rim enhancement, which is suspicious for a secondary focus of neoplasm or extension from the primary along the white matter tracts. \par \par On 10/18/2022, he has light frontal headache 1/10, no seizure, no focal deficit.  Off Decadron since 10/12/2022 , still taking Keppra 500mg bid. f/u with Dr. Torres and Dr. Turpin.\par \par 10/15/2022- Mr. Ramos presents today for on treatment visit. he has completed 1200/6000 cgy of radiation to the left partial brain.\par He feels well overall. Currently taking dexamethasone 4mg BID. no longer having headaches. remains with visual changes on the right side, not improved on higher dose of dex. confusion and speech trouble is much improved.\par \par 11/23/2022: 13/30 Fractions completed. Mr. Ramos present today for on treatment visit.  He has completed   2600/6000 cgy of radiation to the left partial brain. He is doing well today. He denies an headache, nausea, vomiting or bowel issues. He complains of insomnia and "puffiness" of his face.  \par \par 11/29/22_ Mr. Ramos present today for on treatment visit s/p 15/30 fraction. He has completed 3000/6000 cgy of radiation to the left partial brain. feeling well. no headaches, no nausea, no vomiting, no focal weakness. speech is improving. sleep improved some on dexamethasone 2mg BID. vision stable. \par \par 12/8/2022- Mr. Ramos presents today for on treatment visit. He has completed 4400/6000 cgy of radiation to the left partial brain. feeling well. no headaches, speech more fluid, notes vision improvement today. had one episode of cramping to the right hand.\par \par 12/13/2022- Mr. Ramos presents today for on treatment visit. He has completed 5000/6000 cgy of radiation to the left partial roney. feeling well overall. vision continues to improve. no headaches, no nausea, no focal weakness. speech continues to improve. on dexamethasone 2mg daily.\par \par 12/20/2022- Mr. Ramos presents today for on treatment visit. He has completed 6000/6000 cgy of radiation to the left partial brain. on dexamethasone 1mg daily. 1 couple of headaches last week that did not require intervention.  no nausea, no vomiting. speech improved. vision continues to improve.

## 2022-12-20 NOTE — DISCUSSION/SUMMARY
[FreeTextEntry1] : Patient seen and examined\par GLIOMA - Neurologically stable\par ChemoRT completed today\par HEADACHES - Continue dexamethasone 1 mg daily for a week , if tolerating well stop on 12/27/2022. GI Prophylaxis with pantoprazole.\par SEIZURES- Continue Keppra 750 mg bid.\par FOLLOW UP - Will do a clinical follow up along with an MRI on 1/17/2023.  In the interim, if any concerns patient knows to call our office.

## 2022-12-20 NOTE — DISEASE MANAGEMENT
[Clinical] : TNM Stage: c [TTNM] : - [NTNM] : - [MTNM] : - [N/A] : Currently not applicable [de-identified] : 6000 cgy [de-identified] : 6000 cgy [de-identified] : left partial brain

## 2022-12-21 ENCOUNTER — INPATIENT (INPATIENT)
Facility: HOSPITAL | Age: 62
LOS: 8 days | Discharge: NURSING, REHAB OR LONG-TERM CARE | DRG: 54 | End: 2022-12-30
Attending: NEUROLOGICAL SURGERY | Admitting: NEUROLOGICAL SURGERY
Payer: MEDICAID

## 2022-12-21 ENCOUNTER — NON-APPOINTMENT (OUTPATIENT)
Age: 62
End: 2022-12-21

## 2022-12-21 VITALS
SYSTOLIC BLOOD PRESSURE: 128 MMHG | RESPIRATION RATE: 18 BRPM | DIASTOLIC BLOOD PRESSURE: 83 MMHG | HEIGHT: 70 IN | TEMPERATURE: 98 F | OXYGEN SATURATION: 95 % | HEART RATE: 80 BPM

## 2022-12-21 DIAGNOSIS — G93.6 CEREBRAL EDEMA: ICD-10-CM

## 2022-12-21 LAB
ALBUMIN SERPL ELPH-MCNC: 4.2 G/DL — SIGNIFICANT CHANGE UP (ref 3.3–5)
ALP SERPL-CCNC: 56 U/L — SIGNIFICANT CHANGE UP (ref 40–120)
ALT FLD-CCNC: 42 U/L — SIGNIFICANT CHANGE UP (ref 10–45)
ANION GAP SERPL CALC-SCNC: 10 MMOL/L — SIGNIFICANT CHANGE UP (ref 5–17)
APTT BLD: 29.4 SEC — SIGNIFICANT CHANGE UP (ref 27.5–35.5)
APTT BLD: 30.9 SEC — SIGNIFICANT CHANGE UP (ref 27.5–35.5)
AST SERPL-CCNC: 16 U/L — SIGNIFICANT CHANGE UP (ref 10–40)
BASOPHILS # BLD AUTO: 0.05 K/UL — SIGNIFICANT CHANGE UP (ref 0–0.2)
BASOPHILS NFR BLD AUTO: 0.7 % — SIGNIFICANT CHANGE UP (ref 0–2)
BILIRUB SERPL-MCNC: 0.6 MG/DL — SIGNIFICANT CHANGE UP (ref 0.2–1.2)
BUN SERPL-MCNC: 14 MG/DL — SIGNIFICANT CHANGE UP (ref 7–23)
CALCIUM SERPL-MCNC: 9.4 MG/DL — SIGNIFICANT CHANGE UP (ref 8.4–10.5)
CHLORIDE SERPL-SCNC: 102 MMOL/L — SIGNIFICANT CHANGE UP (ref 96–108)
CO2 SERPL-SCNC: 28 MMOL/L — SIGNIFICANT CHANGE UP (ref 22–31)
CREAT SERPL-MCNC: 0.98 MG/DL — SIGNIFICANT CHANGE UP (ref 0.5–1.3)
EGFR: 86 ML/MIN/1.73M2 — SIGNIFICANT CHANGE UP
EOSINOPHIL # BLD AUTO: 0.02 K/UL — SIGNIFICANT CHANGE UP (ref 0–0.5)
EOSINOPHIL NFR BLD AUTO: 0.3 % — SIGNIFICANT CHANGE UP (ref 0–6)
FLUAV AG NPH QL: SIGNIFICANT CHANGE UP
FLUBV AG NPH QL: SIGNIFICANT CHANGE UP
GAS PNL BLDA: SIGNIFICANT CHANGE UP
GLUCOSE BLDC GLUCOMTR-MCNC: 130 MG/DL — HIGH (ref 70–99)
GLUCOSE SERPL-MCNC: 115 MG/DL — HIGH (ref 70–99)
HCT VFR BLD CALC: 45.6 % — SIGNIFICANT CHANGE UP (ref 39–50)
HCT VFR BLD CALC: 47.5 % — SIGNIFICANT CHANGE UP (ref 39–50)
HGB BLD-MCNC: 14.8 G/DL — SIGNIFICANT CHANGE UP (ref 13–17)
HGB BLD-MCNC: 15.3 G/DL — SIGNIFICANT CHANGE UP (ref 13–17)
IMM GRANULOCYTES NFR BLD AUTO: 1.5 % — HIGH (ref 0–0.9)
INR BLD: 1.09 RATIO — SIGNIFICANT CHANGE UP (ref 0.88–1.16)
INR BLD: 1.16 RATIO — SIGNIFICANT CHANGE UP (ref 0.88–1.16)
LYMPHOCYTES # BLD AUTO: 0.72 K/UL — LOW (ref 1–3.3)
LYMPHOCYTES # BLD AUTO: 9.7 % — LOW (ref 13–44)
MCHC RBC-ENTMCNC: 28.8 PG — SIGNIFICANT CHANGE UP (ref 27–34)
MCHC RBC-ENTMCNC: 29 PG — SIGNIFICANT CHANGE UP (ref 27–34)
MCHC RBC-ENTMCNC: 32.2 GM/DL — SIGNIFICANT CHANGE UP (ref 32–36)
MCHC RBC-ENTMCNC: 32.5 GM/DL — SIGNIFICANT CHANGE UP (ref 32–36)
MCV RBC AUTO: 88.7 FL — SIGNIFICANT CHANGE UP (ref 80–100)
MCV RBC AUTO: 90 FL — SIGNIFICANT CHANGE UP (ref 80–100)
MONOCYTES # BLD AUTO: 0.49 K/UL — SIGNIFICANT CHANGE UP (ref 0–0.9)
MONOCYTES NFR BLD AUTO: 6.6 % — SIGNIFICANT CHANGE UP (ref 2–14)
NEUTROPHILS # BLD AUTO: 6 K/UL — SIGNIFICANT CHANGE UP (ref 1.8–7.4)
NEUTROPHILS NFR BLD AUTO: 81.2 % — HIGH (ref 43–77)
NRBC # BLD: 0 /100 WBCS — SIGNIFICANT CHANGE UP (ref 0–0)
NRBC # BLD: 0 /100 WBCS — SIGNIFICANT CHANGE UP (ref 0–0)
PCP SPEC-MCNC: SIGNIFICANT CHANGE UP
PLATELET # BLD AUTO: 154 K/UL — SIGNIFICANT CHANGE UP (ref 150–400)
PLATELET # BLD AUTO: 165 K/UL — SIGNIFICANT CHANGE UP (ref 150–400)
POTASSIUM SERPL-MCNC: 3.9 MMOL/L — SIGNIFICANT CHANGE UP (ref 3.5–5.3)
POTASSIUM SERPL-SCNC: 3.9 MMOL/L — SIGNIFICANT CHANGE UP (ref 3.5–5.3)
PROT SERPL-MCNC: 6.8 G/DL — SIGNIFICANT CHANGE UP (ref 6–8.3)
PROTHROM AB SERPL-ACNC: 12.6 SEC — SIGNIFICANT CHANGE UP (ref 10.5–13.4)
PROTHROM AB SERPL-ACNC: 13.5 SEC — HIGH (ref 10.5–13.4)
RBC # BLD: 5.14 M/UL — SIGNIFICANT CHANGE UP (ref 4.2–5.8)
RBC # BLD: 5.28 M/UL — SIGNIFICANT CHANGE UP (ref 4.2–5.8)
RBC # FLD: 13.4 % — SIGNIFICANT CHANGE UP (ref 10.3–14.5)
RBC # FLD: 13.9 % — SIGNIFICANT CHANGE UP (ref 10.3–14.5)
RSV RNA NPH QL NAA+NON-PROBE: SIGNIFICANT CHANGE UP
SARS-COV-2 RNA SPEC QL NAA+PROBE: DETECTED
SODIUM SERPL-SCNC: 140 MMOL/L — SIGNIFICANT CHANGE UP (ref 135–145)
TROPONIN T, HIGH SENSITIVITY RESULT: 8 NG/L — SIGNIFICANT CHANGE UP (ref 0–51)
WBC # BLD: 10.66 K/UL — HIGH (ref 3.8–10.5)
WBC # BLD: 7.39 K/UL — SIGNIFICANT CHANGE UP (ref 3.8–10.5)
WBC # FLD AUTO: 10.66 K/UL — HIGH (ref 3.8–10.5)
WBC # FLD AUTO: 7.39 K/UL — SIGNIFICANT CHANGE UP (ref 3.8–10.5)

## 2022-12-21 PROCEDURE — 31500 INSERT EMERGENCY AIRWAY: CPT

## 2022-12-21 PROCEDURE — 71045 X-RAY EXAM CHEST 1 VIEW: CPT | Mod: 26,77

## 2022-12-21 PROCEDURE — 99291 CRITICAL CARE FIRST HOUR: CPT | Mod: 25

## 2022-12-21 PROCEDURE — 70450 CT HEAD/BRAIN W/O DYE: CPT | Mod: 26,MA

## 2022-12-21 PROCEDURE — 71045 X-RAY EXAM CHEST 1 VIEW: CPT | Mod: 26,76

## 2022-12-21 PROCEDURE — 99291 CRITICAL CARE FIRST HOUR: CPT | Mod: 24

## 2022-12-21 PROCEDURE — 0042T: CPT | Mod: MA

## 2022-12-21 RX ORDER — PROPOFOL 10 MG/ML
20 INJECTION, EMULSION INTRAVENOUS
Qty: 1000 | Refills: 0 | Status: DISCONTINUED | OUTPATIENT
Start: 2022-12-21 | End: 2022-12-22

## 2022-12-21 RX ORDER — POLYETHYLENE GLYCOL 3350 17 G/17G
17 POWDER, FOR SOLUTION ORAL DAILY
Refills: 0 | Status: DISCONTINUED | OUTPATIENT
Start: 2022-12-21 | End: 2022-12-22

## 2022-12-21 RX ORDER — LACOSAMIDE 50 MG/1
50 TABLET ORAL
Refills: 0 | Status: DISCONTINUED | OUTPATIENT
Start: 2022-12-21 | End: 2022-12-21

## 2022-12-21 RX ORDER — SUCCINYLCHOLINE CHLORIDE 100 MG/5ML
140 SYRINGE (ML) INTRAVENOUS ONCE
Refills: 0 | Status: COMPLETED | OUTPATIENT
Start: 2022-12-21 | End: 2022-12-21

## 2022-12-21 RX ORDER — MIDAZOLAM HYDROCHLORIDE 1 MG/ML
4 INJECTION, SOLUTION INTRAMUSCULAR; INTRAVENOUS ONCE
Refills: 0 | Status: DISCONTINUED | OUTPATIENT
Start: 2022-12-21 | End: 2022-12-21

## 2022-12-21 RX ORDER — SODIUM CHLORIDE 9 MG/ML
1000 INJECTION INTRAMUSCULAR; INTRAVENOUS; SUBCUTANEOUS
Refills: 0 | Status: DISCONTINUED | OUTPATIENT
Start: 2022-12-21 | End: 2022-12-23

## 2022-12-21 RX ORDER — LEVETIRACETAM 250 MG/1
1000 TABLET, FILM COATED ORAL ONCE
Refills: 0 | Status: COMPLETED | OUTPATIENT
Start: 2022-12-21 | End: 2022-12-21

## 2022-12-21 RX ORDER — LACOSAMIDE 50 MG/1
100 TABLET ORAL ONCE
Refills: 0 | Status: DISCONTINUED | OUTPATIENT
Start: 2022-12-21 | End: 2022-12-21

## 2022-12-21 RX ORDER — SODIUM CHLORIDE 9 MG/ML
1000 INJECTION INTRAMUSCULAR; INTRAVENOUS; SUBCUTANEOUS ONCE
Refills: 0 | Status: COMPLETED | OUTPATIENT
Start: 2022-12-21 | End: 2022-12-21

## 2022-12-21 RX ORDER — SENNA PLUS 8.6 MG/1
2 TABLET ORAL AT BEDTIME
Refills: 0 | Status: DISCONTINUED | OUTPATIENT
Start: 2022-12-21 | End: 2022-12-23

## 2022-12-21 RX ORDER — ACETAMINOPHEN 500 MG
1000 TABLET ORAL ONCE
Refills: 0 | Status: COMPLETED | OUTPATIENT
Start: 2022-12-21 | End: 2022-12-21

## 2022-12-21 RX ORDER — ACETAMINOPHEN 500 MG
650 TABLET ORAL EVERY 6 HOURS
Refills: 0 | Status: DISCONTINUED | OUTPATIENT
Start: 2022-12-21 | End: 2022-12-23

## 2022-12-21 RX ORDER — ONDANSETRON 8 MG/1
1 TABLET, FILM COATED ORAL
Qty: 0 | Refills: 0 | DISCHARGE

## 2022-12-21 RX ORDER — LACOSAMIDE 50 MG/1
50 TABLET ORAL EVERY 12 HOURS
Refills: 0 | Status: DISCONTINUED | OUTPATIENT
Start: 2022-12-21 | End: 2022-12-22

## 2022-12-21 RX ORDER — LEVETIRACETAM 250 MG/1
1000 TABLET, FILM COATED ORAL EVERY 12 HOURS
Refills: 0 | Status: DISCONTINUED | OUTPATIENT
Start: 2022-12-21 | End: 2022-12-22

## 2022-12-21 RX ORDER — CHLORHEXIDINE GLUCONATE 213 G/1000ML
15 SOLUTION TOPICAL EVERY 12 HOURS
Refills: 0 | Status: DISCONTINUED | OUTPATIENT
Start: 2022-12-21 | End: 2022-12-23

## 2022-12-21 RX ORDER — DEXAMETHASONE 0.5 MG/5ML
4 ELIXIR ORAL EVERY 6 HOURS
Refills: 0 | Status: DISCONTINUED | OUTPATIENT
Start: 2022-12-21 | End: 2022-12-23

## 2022-12-21 RX ORDER — PROPOFOL 10 MG/ML
90 INJECTION, EMULSION INTRAVENOUS ONCE
Refills: 0 | Status: COMPLETED | OUTPATIENT
Start: 2022-12-21 | End: 2022-12-21

## 2022-12-21 RX ORDER — FENTANYL CITRATE 50 UG/ML
50 INJECTION INTRAVENOUS ONCE
Refills: 0 | Status: DISCONTINUED | OUTPATIENT
Start: 2022-12-21 | End: 2022-12-21

## 2022-12-21 RX ORDER — PANTOPRAZOLE SODIUM 20 MG/1
40 TABLET, DELAYED RELEASE ORAL
Refills: 0 | Status: DISCONTINUED | OUTPATIENT
Start: 2022-12-21 | End: 2022-12-22

## 2022-12-21 RX ORDER — INSULIN LISPRO 100/ML
VIAL (ML) SUBCUTANEOUS
Refills: 0 | Status: DISCONTINUED | OUTPATIENT
Start: 2022-12-21 | End: 2022-12-21

## 2022-12-21 RX ORDER — INSULIN LISPRO 100/ML
VIAL (ML) SUBCUTANEOUS EVERY 6 HOURS
Refills: 0 | Status: DISCONTINUED | OUTPATIENT
Start: 2022-12-21 | End: 2022-12-24

## 2022-12-21 RX ADMIN — MIDAZOLAM HYDROCHLORIDE 4 MILLIGRAM(S): 1 INJECTION, SOLUTION INTRAMUSCULAR; INTRAVENOUS at 18:07

## 2022-12-21 RX ADMIN — Medication 400 MILLIGRAM(S): at 23:32

## 2022-12-21 RX ADMIN — PROPOFOL 90 MILLIGRAM(S): 10 INJECTION, EMULSION INTRAVENOUS at 17:52

## 2022-12-21 RX ADMIN — Medication 140 MILLIGRAM(S): at 17:53

## 2022-12-21 RX ADMIN — PROPOFOL 11 MICROGRAM(S)/KG/MIN: 10 INJECTION, EMULSION INTRAVENOUS at 18:25

## 2022-12-21 RX ADMIN — LACOSAMIDE 120 MILLIGRAM(S): 50 TABLET ORAL at 16:23

## 2022-12-21 RX ADMIN — Medication 2 MILLIGRAM(S): at 17:37

## 2022-12-21 RX ADMIN — LEVETIRACETAM 400 MILLIGRAM(S): 250 TABLET, FILM COATED ORAL at 14:45

## 2022-12-21 RX ADMIN — FENTANYL CITRATE 50 MICROGRAM(S): 50 INJECTION INTRAVENOUS at 17:37

## 2022-12-21 RX ADMIN — SODIUM CHLORIDE 70 MILLILITER(S): 9 INJECTION INTRAMUSCULAR; INTRAVENOUS; SUBCUTANEOUS at 23:32

## 2022-12-21 RX ADMIN — LEVETIRACETAM 400 MILLIGRAM(S): 250 TABLET, FILM COATED ORAL at 23:32

## 2022-12-21 RX ADMIN — Medication 4 MILLIGRAM(S): at 23:32

## 2022-12-21 RX ADMIN — PROPOFOL 11 MICROGRAM(S)/KG/MIN: 10 INJECTION, EMULSION INTRAVENOUS at 23:32

## 2022-12-21 NOTE — ED CLERICAL - NS ED CLERK NOTE PRE-ARRIVAL INFORMATION; ADDITIONAL PRE-ARRIVAL INFORMATION
CC/Reason For referral:  hx glioblastoma. craniotomy and resection on 10/3/22 with dr lópez.  chemo and radiation completed 12/20/22.  worsening aphasia, headache, nausea, one side of face puffy.  was instructed to take 1 dose 8mg dexamethasone enroute to hospital   Preferred Consultant(if applicable):  Who admits for you (if needed):  Do you have documents you would like to fax over?  Would you still like to speak to an ED attending?  please call after patient is seen

## 2022-12-21 NOTE — ED PROVIDER NOTE - ATTENDING CONTRIBUTION TO CARE
Attending MD Grace Mak:  I personally have seen and examined this patient.  Fellow note reviewed and agree on plan of care and except where noted.  See HPI, PE, and MDM for details.

## 2022-12-21 NOTE — CONSULT NOTE ADULT - SUBJECTIVE AND OBJECTIVE BOX
Neurology - Consult Note    -  Spectra: [phone] ([facility]), [phone] ([facility])  -    HPI: Patient SERJIO TIDWELL is a 62y (1960) man with a PMHx significant for GBM s/p resection in 10/22 on Keppra 750 bid followed by Dr. Turpin who presented to the ED for change in mental status. Further history provided by HCP friend at bedside. Per HCP, she spoke to pt on the phone at 1230 and he was not making sense. While en route to the hospital had witnessed RUE twitching and rigid lasting around 2 min.  Pt given decadron 8mg PO by HCP as per radiooncology PA recommendations over the phone en route.  At baseline fully independent with full adls.     While in the ED, Pt had witnessed RUE twitching lasting around 10 seconds. Pt desatted to the low 80s and placed on 4L nc now satting 100%.     NIHSS 12  MRS 0   LKN unknown today 12/21/22  no lvo no mt  no tpa due to no known LKN    Review of Systems:    unable to assess due to mental status     Allergies:      PMHx/PSHx/Family Hx: As above, otherwise see below   No pertinent past medical history        Social Hx:  No current use of tobacco (former smoker), alcohol, or illicit drugs      Medications:  MEDICATIONS  (STANDING):  levETIRAcetam  IVPB 1000 milliGRAM(s) IV Intermittent once    MEDICATIONS  (PRN):      Vitals:  T(C): 36.4 (12-21-22 @ 14:06), Max: 36.4 (12-21-22 @ 14:06)  HR: 70 (12-21-22 @ 15:13) (70 - 80)  BP: 131/75 (12-21-22 @ 15:13) (128/83 - 131/75)  RR: 16 (12-21-22 @ 15:13) (16 - 18)  SpO2: 100% (12-21-22 @ 15:13) (95% - 100%)    Physical Examination:    General - NAD  Cardiovascular - Peripheral pulses palpable, no edema    Neurologic Exam:  Mental status - Awake, Alert, not Oriented to person, place, or time. fluctuating but mostly non productive speech, some expressive aphagia. Able to follow some simple commands (close open eyes) but not others (show fingers or point).     Cranial nerves - PERRL, prior RHH, unable to assess EOM due to participation barrier, no facial asymmetry b/l, tongue midline on protrusion     Motor - Normal bulk and tone throughout. No pronator drift.  Strength testing         All four extremities at least antigravity 3/5    Sensation - withdraws equally     Coordination - unable to assess finger to nose. No tremors appreciated    Gait and station -differed     Labs:                        15.3   7.39  )-----------( 165      ( 21 Dec 2022 14:32 )             47.5     12-21    140  |  102  |  14  ----------------------------<  115<H>  3.9   |  28  |  0.98    Ca    9.4      21 Dec 2022 14:32    TPro  6.8  /  Alb  4.2  /  TBili  0.6  /  DBili  x   /  AST  16  /  ALT  42  /  AlkPhos  56  12-21    CAPILLARY BLOOD GLUCOSE      POCT Blood Glucose.: 101 mg/dL (21 Dec 2022 14:26)    LIVER FUNCTIONS - ( 21 Dec 2022 14:32 )  Alb: 4.2 g/dL / Pro: 6.8 g/dL / ALK PHOS: 56 U/L / ALT: 42 U/L / AST: 16 U/L / GGT: x             PT/INR - ( 21 Dec 2022 14:32 )   PT: 12.6 sec;   INR: 1.09 ratio         PTT - ( 21 Dec 2022 14:32 )  PTT:30.9 sec          Radiology:  < from: CT Brain Stroke Protocol (12.21.22 @ 14:57) >    There has been a left parietal craniotomy. There is mixed intensity in   the left parietal temporal region consistent with vasogenic edema and   neoplasm. The edema is increased since the prior examination and there is   increased mass effect on the posterior body and atrium of the left   lateral ventricle. Rapid AI evaluation for hemorrhage incorrectly states   there is hemorrhage suspected.      There is no midline shift.        IMPRESSION: Increased size of the left temporal parietal mass with   vasogenic edema consistent with tumor progression since 10/4/2022.    No acute hemorrhage.    < end of copied text >     Neurology - Consult Note    -  Spectra: [phone] ([facility]), [phone] ([facility])  -    HPI: Patient SERJIO TIDWELL is a 62y (1960) man with a PMHx significant for GBM s/p resection in 10/22 on Keppra 750 bid followed by Dr. Turpin who presented to the ED for change in mental status. Further history provided by HCP friend at bedside. Per HCP, she spoke to pt on the phone at 1230 and he was not making sense. While en route to the hospital had witnessed RUE twitching and rigid lasting around 2 min.  Pt given decadron 8mg PO by HCP as per radiooncology PA recommendations over the phone en route.  At baseline fully independent with full adls.     While in the ED, Pt had witnessed RUE twitching lasting around 10 seconds. Pt desatted to the low 80s and placed on 4L nc now satting 100%.     NIHSS 12  MRS 0   LKN unknown today 12/21/22  no lvo no mt  no tpa due to no known LKN    Review of Systems:    unable to assess due to mental status     Allergies:  NKDA    PMHx/PSHx/Family Hx: As above, otherwise see below   No pertinent past medical history        Social Hx:  No current use of tobacco (former smoker), alcohol, or illicit drugs      Medications:  MEDICATIONS  (STANDING):  levETIRAcetam  IVPB 1000 milliGRAM(s) IV Intermittent once    MEDICATIONS  (PRN):      Vitals:  T(C): 36.4 (12-21-22 @ 14:06), Max: 36.4 (12-21-22 @ 14:06)  HR: 70 (12-21-22 @ 15:13) (70 - 80)  BP: 131/75 (12-21-22 @ 15:13) (128/83 - 131/75)  RR: 16 (12-21-22 @ 15:13) (16 - 18)  SpO2: 100% (12-21-22 @ 15:13) (95% - 100%)    Physical Examination:    General - NAD  Cardiovascular - Peripheral pulses palpable, no edema  Eyes - Fundoscopy not performed due to safety precautions in setting of COVID-19 pandemic    Neurologic Exam:  Mental status - Awake, Alert, not Oriented to person, place, or time. fluctuating but mostly non productive speech, some expressive aphagia. Able to follow some simple commands (close open eyes) but not others (show fingers or point). Due to mental status unable to assess attention/concentration, recent or remote memory, or fund of knowledge    Cranial nerves - PERRL, prior RHH, unable to assess EOM due to participation barrier, no facial asymmetry b/l, tongue midline on protrusion     Motor - Normal bulk and tone throughout. No pronator drift.  Strength testing         All four extremities at least antigravity 3/5    Sensation - withdraws equally     DTR's - 2+ all and neutral b/l plantar response    Coordination - unable to assess finger to nose. No tremors appreciated    Gait and station - Not assessed due to fall risk/safety concerns    Labs:                        15.3   7.39  )-----------( 165      ( 21 Dec 2022 14:32 )             47.5     12-21    140  |  102  |  14  ----------------------------<  115<H>  3.9   |  28  |  0.98    Ca    9.4      21 Dec 2022 14:32    TPro  6.8  /  Alb  4.2  /  TBili  0.6  /  DBili  x   /  AST  16  /  ALT  42  /  AlkPhos  56  12-21    CAPILLARY BLOOD GLUCOSE      POCT Blood Glucose.: 101 mg/dL (21 Dec 2022 14:26)    LIVER FUNCTIONS - ( 21 Dec 2022 14:32 )  Alb: 4.2 g/dL / Pro: 6.8 g/dL / ALK PHOS: 56 U/L / ALT: 42 U/L / AST: 16 U/L / GGT: x             PT/INR - ( 21 Dec 2022 14:32 )   PT: 12.6 sec;   INR: 1.09 ratio         PTT - ( 21 Dec 2022 14:32 )  PTT:30.9 sec          Radiology:  < from: CT Brain Stroke Protocol (12.21.22 @ 14:57) >    There has been a left parietal craniotomy. There is mixed intensity in   the left parietal temporal region consistent with vasogenic edema and   neoplasm. The edema is increased since the prior examination and there is   increased mass effect on the posterior body and atrium of the left   lateral ventricle. Rapid AI evaluation for hemorrhage incorrectly states   there is hemorrhage suspected.      There is no midline shift.        IMPRESSION: Increased size of the left temporal parietal mass with   vasogenic edema consistent with tumor progression since 10/4/2022.    No acute hemorrhage.    < end of copied text >

## 2022-12-21 NOTE — CONSULT NOTE ADULT - CRITICAL CARE ATTENDING COMMENT
HPI as per resident note, personally verified by me. Patient with episode of encephalopathy and aphasia while talking to his wife on the phone on 12/21. EMS was called and en route to the hospital he had RUE twitching and rigidity. Had additional seizure in the hospital/ED with resultant desaturation. He was given dexamethasone 8mg PO x 1 en route to hospital and head imaging showed vasogenic edema. He was subsequently intubated for airway protection and admitted to NSICU. No further seizures reported and sedation stopped with improvement in mentation; for likely extubation today following MRI. Patient has indicated he would like ET tube removed when able and he is eager to go to the general medical/surgical floor.    GTT: None    Gen - Intubated, not sedated, otherwise NAD  CV - Peripheral circulation intact, no evidence of edema  Eyes - Fundoscopy not performed due to safety precautions in setting of COVID-19 pandemic    Neurologic exam:  MS - Intubated, not sedated, Awake, alert to all stimuli b/l, no speech output but he nods head appropriately to "yes"/"no" questions, follows commands. Due to clinical condition unable to assess (STEVE) orientation, rep/naming, attn/conc/recent and remote memory/fund of knowledge  CN - PERRLA, EOMI, VFF, face sens/str/hearing WNL b/l, tongue midline, trap 5/5 b/l, (+) spontaneous respirations (CPAP 26 bpm), (+) cough. STEVE palate  Motor - Normal bulk/tone, 5/5 all except for RUE 4++/5  Sens - LT/temp intact all  DTR's - 2+ all and downgoing b/l plantar response  Coord - FtN intact b/l  Gait and station - STEVE    vEEG 12/22 -  EEG Classification / Summary:    Abnormal  EEG in the awake / drowsy  state(s).    - LPDs in the left anterior temporal region.   - Continuous left hemispheric theta and polymorphic delta slowing, maximal in the left temporal region.  - Background slowing, generalized, mild    - Asymmetry, focal, PDR absent over the left hemisphere.  - Breach in occipital region.     Clinical Impression:    - Potential epileptogenic zone from the left anterior temporal region.   - Regional structural abnormality in the left hemisphere most conspicuous in the left temporal area  - Mild diffuse / multifocal cerebral dysfunction.   - No seizures were recorded.   - Skull defect in occipital region.     A/P:  Epilepsy, not intractable, without status epilepticus  Encephalopathy  Left parieto-temporal GBM  Vasogenic edema  Respiratory failure  COVID-19 infection    - Breakthrough seizure likely secondary to progression of GBM or edema but also may be from additional toxic/metabolic causes (such as current COVID-19 infection). He has high epileptogenic risk but no seizures on EEG. Mentation is much improved and now off sedation, for likely extubation today  - Continue AED's with Keppra 1g PO/IV TID  - Would increase Vimpat to 100mg IV/PO [id number]. Check EKG tomorrow (12/23) AM for OK interval prolongation  - vEEG to evaluate for focal slowing, epileptiform discharges, or seizures. If no increase in periodicity or seizures by tomorrow (12/23) would recommend to stop  - Continue to address above medical problems, as you are doing  - Will continue to follow patient with you

## 2022-12-21 NOTE — PROGRESS NOTE ADULT - ASSESSMENT
ASSESSMENT/PLAN:    NEURO:    - Neurochecks q1 hrs  - CT Head in the AM  - Decadron Taper   Activity: [] mobilize as tolerated [] Bedrest [] PT [] OT [] PMNR    PULM:    - O2 sat > 92%    CV:  SBP goal 100 - 160  MAP > 65    RENAL:  Fluids:  NS @ 75    GI:    Diet: advance diet as tolerated   GI prophylaxis [] not indicated [] PPI [] other:  Bowel regimen [x] colace [x] senna [] other:    ENDO:   Goal euglycemia (-180)    HEME/ONC:  VTE prophylaxis: [] SCDs [] chemoprophylaxis [x] hold chemoprophylaxis due to:   [] high risk of DVT/PE on admission due to:    ID:  afebrile     -      ASSESSMENT/PLAN:    NEURO:    - Neurochecks q1 hrs  - CTH/CTA in AM  -MRI w/w/o  -hx of GBM, s/p crani 10/3, with adjust radiation and chemo treatment  - Dex 4Q6 for now  -seized in the ER(rt sided shaking, aborted with versed and ativan)  -continue AEDs, neurology following, loaded with keppra in the ED, continue with keppra 1g BID, and vimpat 50 BID  -on vEEG, f/u read  -pain control  -prop for vent synchrony   Activity: [] mobilize as tolerated [x] Bedrest [] PT [] OT [] PMNR    PULM:    - intubated, CPAP as tolerated    CV:  SBP goal 100 - 160  MAP > 65  f/u TTE, EKG    RENAL:  Fluids:  NS @ 75    GI:    Diet: NPO, insert NGT  GI prophylaxis [] not indicated [x] PPI [] other:  Bowel regimen [x] miralax [x] senna [] other:    ENDO:   Goal euglycemia (-180)  f/u A1C    HEME/ONC:  VTE prophylaxis: [x] SCDs [] chemoprophylaxis [x] hold chemoprophylaxis due to: in case of decompensation and must go to OR  [] high risk of DVT/PE on admission due to: malignancy hx, bedrest    ID:  afebrile     critical care time 30 minutes  at risk of death due to: brain herniation cerebral edema     ASSESSMENT/PLAN:    NEURO:    - Neurochecks q1 hrs  - CTH/CTA in AM  -MRI w/w/o  -hx of GBM, s/p crani 10/3, with adjust radiation and chemo treatment  - Dex 4Q6 for now  -seized in the ER(rt sided shaking, aborted with versed and ativan)  -continue AEDs, neurology following, loaded with keppra in the ED, continue with keppra 1g BID, and vimpat 50 BID  -on vEEG, f/u read  -pain control  -prop for vent synchrony   Activity: [] mobilize as tolerated [x] Bedrest [] PT [] OT [] PMNR    PULM:    - intubated, CPAP as tolerated    CV:  SBP goal 100 - 160  MAP > 65  f/u TTE, EKG    RENAL:  Fluids:  NS @ 75    GI:    Diet: NPO, insert NGT  GI prophylaxis [] not indicated [x] PPI [] other:  Bowel regimen [x] miralax [x] senna [] other:    ENDO:   Goal euglycemia (-180)  f/u A1C  ISS while on steroids    HEME/ONC:  VTE prophylaxis: [x] SCDs [] chemoprophylaxis [x] hold chemoprophylaxis due to: in case of decompensation and must go to OR  [] high risk of DVT/PE on admission due to: malignancy hx, bedrest    ID:  afebrile     critical care time 30 minutes  at risk of death due to: brain herniation cerebral edema     ASSESSMENT/PLAN:    NEURO:    - Neurochecks q1 hrs  - CTH/CTA in AM  -MRI w/w/o  -hx of GBM, s/p crani 10/3, with adjunct radiation and chemo treatment  - Dex 4Q6 for now  -seized in the ER(rt sided shaking, aborted with versed and ativan)  -continue AEDs, neurology following, loaded with keppra in the ED, continue with keppra 1g BID, and vimpat 50 BID  -on vEEG, f/u read  -pain control  -prop for vent synchrony   Activity: [] mobilize as tolerated [x] Bedrest [] PT [] OT [] PMNR    PULM:    - intubated, CPAP as tolerated  -f/u ABG    CV:  SBP goal 100 - 160  MAP > 65  f/u TTE, EKG    RENAL:  Fluids:  NS @ 75    GI:    Diet: NPO, insert NGT  GI prophylaxis [] not indicated [x] PPI [] other:  Bowel regimen [x] miralax [x] senna [] other:    ENDO:   Goal euglycemia (-180)  f/u A1C  ISS while on steroids    HEME/ONC:  VTE prophylaxis: [x] SCDs [] chemoprophylaxis [x] hold chemoprophylaxis due to: in case of decompensation and must go to OR  [] high risk of DVT/PE on admission due to: malignancy hx, bedrest    ID:  afebrile     critical care time 30 minutes  at risk of death due to: brain herniation cerebral edema     ASSESSMENT/PLAN:    NEURO:    - Neurochecks q1 hrs  - CTH/CTA in AM  -MRI w/w/o  -hx of GBM, s/p crani 10/3, with adjunct radiation and chemo treatment  - Dex 4Q6 for now  -seized in the ER(rt sided shaking, aborted with versed and ativan)  -continue AEDs, neurology following, loaded with keppra in the ED, continue with keppra 1g BID, and vimpat 50 BID  -on vEEG, f/u read  -pain control  -prop for vent synchrony   Activity: [] mobilize as tolerated [x] Bedrest [] PT [] OT [] PMNR    PULM:    - intubated, CPAP as tolerated  -f/u ABG    CV:  SBP goal 100 - 160  MAP > 65  f/u TTE, EKG    RENAL:  Fluids:  NS @ 75    GI:    Diet: NPO, insert NGT  GI prophylaxis [] not indicated [x] PPI [] other:  Bowel regimen [x] miralax [x] senna [] other:    ENDO:   Goal euglycemia (-180)  f/u A1C  ISS while on steroids    HEME/ONC:  VTE prophylaxis: [x] SCDs [] chemoprophylaxis [x] hold chemoprophylaxis due to: in case of decompensation and must go to OR  [] high risk of DVT/PE on admission due to: malignancy hx, bedrest    ID:  afebrile     critical care time 30 minutes  at risk of death due to: brain herniation, cerebral edema     ASSESSMENT/PLAN:    NEURO:    - Neurochecks q1 hrs  - CTH/CTA in AM  -MRI w/w/o  -hx of GBM, s/p crani 10/3, with adjunct radiation and chemo treatment  - Dex 4Q6 for now  -seized in the ER(rt sided shaking, aborted with versed and ativan)  -continue AEDs, neurology following, loaded with keppra in the ED, continue with keppra 1g BID, and vimpat 50 BID  -on vEEG, f/u read  -pain control  -prop for vent synchrony   Activity: [] mobilize as tolerated [x] Bedrest [] PT [] OT [] PMNR    PULM:    - intubated, CPAP as tolerated  -f/u ABG    CV:  SBP goal 100 - 160  MAP > 65  f/u TTE, EKG    RENAL:  Fluids:  NS @ 75    GI:    Diet: NPO, insert NGT  GI prophylaxis [] not indicated [x] PPI [] other:  Bowel regimen [x] miralax [x] senna [] other:    ENDO:   Goal euglycemia (-180)  f/u A1C  ISS while on steroids    HEME/ONC:  VTE prophylaxis: [x] SCDs [] chemoprophylaxis [x] hold chemoprophylaxis due to: in case of decompensation and must go to OR  [] high risk of DVT/PE on admission due to: malignancy hx, bedrest  f/u LEDs    ID:  afebrile     critical care time 30 minutes  at risk of death due to: brain herniation, cerebral edema     ASSESSMENT/PLAN: 61 y/o prior GBM resection now with increased seizures post/chemo rad treatment    NEURO:    - Neurochecks q1 hrs  - CTH/CTA in AM  -MRI w/w/o  -hx of GBM, s/p crani 10/3, with adjunct radiation and chemo treatment  - Dex 4Q6 for now  -seized in the ER(rt sided shaking, aborted with versed and ativan)  -continue AEDs, neurology following, loaded with keppra in the ED, continue with keppra 1g BID, and vimpat 50 BID  -on vEEG, f/u read  -pain control  -prop for vent synchrony   Activity: [] mobilize as tolerated [x] Bedrest [] PT [] OT [] PMNR    PULM:    - intubated, CPAP as tolerated  -f/u ABG    CV:  SBP goal 100 - 160  MAP > 65  f/u TTE, EKG    RENAL:  Fluids:  NS @ 75    GI:    Diet: NPO, insert NGT  GI prophylaxis [] not indicated [x] PPI [] other:  Bowel regimen [x] miralax [x] senna [] other:    ENDO:   Goal euglycemia (-180)  f/u A1C  ISS while on steroids    HEME/ONC:  VTE prophylaxis: [x] SCDs [] chemoprophylaxis [x] hold chemoprophylaxis due to: in case of decompensation and must go to OR  [] high risk of DVT/PE on admission due to: malignancy hx, bedrest  f/u LEDs    ID:  afebrile     critical care time 30 minutes  at risk of death due to: brain herniation, cerebral edema

## 2022-12-21 NOTE — CHART NOTE - NSCHARTNOTEFT_GEN_A_CORE
CAPRINI SCORE [CLOT]    AGE RELATED RISK FACTORS                                                       MOBILITY RELATED FACTORS  [ ] Age 41-60 years                                            (1 Point)                  [ ] Bed rest                                                        (1 Point)  [x ] Age: 61-74 years                                           (2 Points)                 [ ] Plaster cast                                                   (2 Points)  [ ] Age= 75 years                                              (3 Points)                 [ ] Bed bound for more than 72 hours                 (2 Points)    DISEASE RELATED RISK FACTORS                                               GENDER SPECIFIC FACTORS  [ ] Edema in the lower extremities                       (1 Point)                  [ ] Pregnancy                                                     (1 Point)  [ ] Varicose veins                                               (1 Point)                  [ ] Post-partum < 6 weeks                                   (1 Point)             [x] BMI > 25 Kg/m2                                            (1 Point)                  [ ] Hormonal therapy  or oral contraception          (1 Point)                 [ ] Sepsis (in the previous month)                        (1 Point)                  [ ] History of pregnancy complications                 (1 point)  [ ] Pneumonia or serious lung disease                                               [ ] Unexplained or recurrent                     (1 Point)           (in the previous month)                               (1 Point)  [ ] Abnormal pulmonary function test                     (1 Point)                 SURGERY RELATED RISK FACTORS  [ ] Acute myocardial infarction                              (1 Point)                 [ ]  Section                                             (1 Point)  [ ] Congestive heart failure (in the previous month)  (1 Point)               [ ] Minor surgery                                                  (1 Point)   [ ] Inflammatory bowel disease                             (1 Point)                 [ ] Arthroscopic surgery                                        (2 Points)  [ ] Central venous access                                      (2 Points)                [ ] General surgery lasting more than 45 minutes   (2 Points)       [ ] Stroke (in the previous month)                          (5 Points)               [ ] Elective arthroplasty                                         (5 Points)     [x] history of malignancy                                       (2 points)                                                                                                                                      HEMATOLOGY RELATED FACTORS                                                 TRAUMA RELATED RISK FACTORS  [ ] Prior episodes of VTE                                     (3 Points)                [ ] Fracture of the hip, pelvis, or leg                       (5 Points)  [ ] Positive family history for VTE                         (3 Points)                 [ ] Acute spinal cord injury (in the previous month)  (5 Points)  [ ] Prothrombin 41141 A                                     (3 Points)                 [ ] Paralysis  (less than 1 month)                             (5 Points)  [ ] Factor V Leiden                                             (3 Points)                  [ ] Multiple Trauma within 1 month                        (5 Points)  [ ] Lupus anticoagulants                                     (3 Points)                                                           [ ] Anticardiolipin antibodies                               (3 Points)                                                       [ ] High homocysteine in the blood                      (3 Points)                                             [ ] Other congenital or acquired thrombophilia      (3 Points)                                                [ ] Heparin induced thrombocytopenia                  (3 Points)                                          Total Score [     6     ]    Caprini Score 0 - 2:  Low Risk, No VTE Prophylaxis required for most patients, encourage ambulation  Caprini Score 3 - 6:  At Risk, pharmacologic VTE prophylaxis is indicated for most patients (in the absence of a contraindication)  Caprini Score Greater than or = 7:  High Risk, pharmacologic VTE prophylaxis is indicated for most patients (in the absence of a contraindication)

## 2022-12-21 NOTE — PROGRESS NOTE ADULT - SUBJECTIVE AND OBJECTIVE BOX
62M Hx GBB s/p crani 10/3/2022 undergoing radiation p/f AMS and increasing aphasia. Radiation PA suggested dex8mg, which was given at 1pm. The patient was on his way to the hospital when he had a seizure lasting 2m    SUBJECTIVE: Patient seen and examined at bedside during rounds. Patient denies chest pain, shortness of breath, nausea/vomiting.    Vital Signs Last 24 Hrs  T(C): 36.4 (12-21-22 @ 19:37), Max: 36.4 (12-21-22 @ 14:06)  T(F): 97.5 (12-21-22 @ 19:37), Max: 97.5 (12-21-22 @ 14:06)  HR: 100 (12-21-22 @ 19:37) (70 - 131)  BP: 127/93 (12-21-22 @ 19:37) (127/93 - 168/106)  BP(mean): --  RR: 16 (12-21-22 @ 19:37) (16 - 22)  SpO2: 97% (12-21-22 @ 19:37) (95% - 100%)    PHYSICAL EXAM:    Constitutional: No Acute Distress     Neurological: Awake, alert, oriented to person, place and time, speech clear and fluent, face equal, tongue midline, briskly following commands, no drift, moves all extremities with 5/5 strength, sensation intact to light touch throughout, pupils 4mm and reactive bilaterally, extraocular movements intact, no nystagmus      Pulmonary: intubated              LABS:                          15.3   7.39  )-----------( 165      ( 21 Dec 2022 14:32 )             47.5    12-21    140  |  102  |  14  ----------------------------<  115<H>  3.9   |  28  |  0.98    Ca    9.4      21 Dec 2022 14:32    TPro  6.8  /  Alb  4.2  /  TBili  0.6  /  DBili  x   /  AST  16  /  ALT  42  /  AlkPhos  56  12-21  PT/INR - ( 21 Dec 2022 14:32 )   PT: 12.6 sec;   INR: 1.09 ratio         PTT - ( 21 Dec 2022 14:32 )  PTT:30.9 sec      IMAGING:     MEDICATIONS:  Antibiotics:    Neuro:  lacosamide 50 milliGRAM(s) Oral two times a day  propofol Infusion 20 MICROgram(s)/kG/Min IV Continuous <Continuous>    Cardiac:    Pulm:    GI/:  pantoprazole    Tablet 40 milliGRAM(s) Oral before breakfast    Other:   chlorhexidine 0.12% Liquid 15 milliLiter(s) Oral Mucosa every 12 hours        DIET:     62y (1960) man with a PMHx significant for GBM s/p resection in 10/22 on Keppra 750 bid followed by Dr. Turpin who presented to the ED for change in mental status. Further history provided by HCP friend at bedside. Per HCP, she spoke to pt on the phone at 1230 and he was not making sense. While en route to the hospital had witnessed RUE twitching and rigid lasting around 2 min.  Pt given decadron 8mg PO by HCP as per radiooncology PA recommendations over the phone en route. in ED, patient had another seizure, which was aborted with versed, pt remained hypoxic post ictal and was intubated for airway protection.    ICU Vital Signs Last 24 Hrs  T(C): 37.2 (21 Dec 2022 22:00), Max: 37.2 (21 Dec 2022 22:00)  T(F): 99 (21 Dec 2022 22:00), Max: 99 (21 Dec 2022 22:00)  HR: 105 (21 Dec 2022 22:30) (70 - 131)  BP: 137/90 (21 Dec 2022 22:30) (123/88 - 168/106)  BP(mean): 104 (21 Dec 2022 22:30) (104 - 116)  ABP: --  ABP(mean): --  RR: 14 (21 Dec 2022 22:30) (12 - 22)  SpO2: 100% (21 Dec 2022 22:30) (95% - 100%)    O2 Parameters below as of 21 Dec 2022 22:01  Patient On (Oxygen Delivery Method): ventilator    O2 Concentration (%): 70        PHYSICAL EXAM:    Constitutional: No Acute Distress     Neurological: intubated, opens eyes spontaneously, PERLL, EOMI,  no facial, no gaze preference, closes eyes and wiggles toes to commands, LOUISE AG      Pulmonary: intubated          LABS:                          15.3   7.39  )-----------( 165      ( 21 Dec 2022 14:32 )             47.5    12-21    140  |  102  |  14  ----------------------------<  115<H>  3.9   |  28  |  0.98    Ca    9.4      21 Dec 2022 14:32    TPro  6.8  /  Alb  4.2  /  TBili  0.6  /  DBili  x   /  AST  16  /  ALT  42  /  AlkPhos  56  12-21  PT/INR - ( 21 Dec 2022 14:32 )   PT: 12.6 sec;   INR: 1.09 ratio         PTT - ( 21 Dec 2022 14:32 )  PTT:30.9 sec      IMAGING:     MEDICATIONS:  Antibiotics:    Neuro:  lacosamide 50 milliGRAM(s) Oral two times a day  propofol Infusion 20 MICROgram(s)/kG/Min IV Continuous <Continuous>    Cardiac:    Pulm:    GI/:  pantoprazole    Tablet 40 milliGRAM(s) Oral before breakfast    Other:   chlorhexidine 0.12% Liquid 15 milliLiter(s) Oral Mucosa every 12 hours        DIET:       62y (1960) man with a PMHx significant for GBM s/p resection in 10/22 on Keppra 750 bid followed by Dr. Turpin who presented to the ED for change in mental status. Further history provided by HCP friend at bedside. Per HCP, she spoke to pt on the phone at 1230 and he was not making sense. While en route to the hospital had witnessed RUE twitching and rigid lasting around 2 min.  Pt given decadron 8mg PO by HCP as per radio-oncology PA recommendations over the phone en route. in ED, patient had another seizure, which was aborted with versed, pt remained hypoxic post ictal and was intubated for airway protection.    ICU Vital Signs Last 24 Hrs  T(C): 37.2 (21 Dec 2022 22:00), Max: 37.2 (21 Dec 2022 22:00)  T(F): 99 (21 Dec 2022 22:00), Max: 99 (21 Dec 2022 22:00)  HR: 105 (21 Dec 2022 22:30) (70 - 131)  BP: 137/90 (21 Dec 2022 22:30) (123/88 - 168/106)  BP(mean): 104 (21 Dec 2022 22:30) (104 - 116)  ABP: --  ABP(mean): --  RR: 14 (21 Dec 2022 22:30) (12 - 22)  SpO2: 100% (21 Dec 2022 22:30) (95% - 100%)    O2 Parameters below as of 21 Dec 2022 22:01  Patient On (Oxygen Delivery Method): ventilator    O2 Concentration (%): 70                          14.8   10.66 )-----------( 154      ( 21 Dec 2022 23:29 )             45.6         PHYSICAL EXAM:    Constitutional: intubated, in No Acute Distress     Neurological: intubated, opens eyes spontaneously, PERLL, EOMI,  no facial, no gaze preference, closes eyes and wiggles toes to commands, LOUISE AG      Extremities: No calf tenderness bilaterally, no cyanosis, clubbing or edema          LABS:                          14.8   10.66 )-----------( 154      ( 21 Dec 2022 23:29 )             45.6    12-21    136  |  102  |  13  ----------------------------<  138<H>  4.2   |  24  |  0.71    Ca    9.2      21 Dec 2022 23:29  Phos  4.9     12-21  Mg     1.9     12-21    TPro  6.6  /  Alb  3.8  /  TBili  0.4  /  DBili  x   /  AST  17  /  ALT  39  /  AlkPhos  57  12-21  PT/INR - ( 21 Dec 2022 23:29 )   PT: 13.5 sec;   INR: 1.16 ratio         PTT - ( 21 Dec 2022 23:29 )  PTT:29.4 sec      IMAGING:     MEDICATIONS:  Antibiotics:    Neuro:  acetaminophen     Tablet .. 650 milliGRAM(s) Oral every 6 hours PRN Temp greater or equal to 38C (100.4F), Mild Pain (1 - 3)  lacosamide IVPB 50 milliGRAM(s) IV Intermittent every 12 hours  levETIRAcetam  IVPB 1000 milliGRAM(s) IV Intermittent every 12 hours  propofol Infusion 20 MICROgram(s)/kG/Min IV Continuous <Continuous>    Cardiac:    Pulm:    GI/:  pantoprazole  Injectable 40 milliGRAM(s) IV Push at bedtime  polyethylene glycol 3350 17 Gram(s) Oral daily  senna 2 Tablet(s) Oral at bedtime    Other:   chlorhexidine 0.12% Liquid 15 milliLiter(s) Oral Mucosa every 12 hours  dexAMETHasone  Injectable 4 milliGRAM(s) IV Push every 6 hours  insulin lispro (ADMELOG) corrective regimen sliding scale   SubCutaneous every 6 hours  magnesium sulfate  IVPB 2 Gram(s) IV Intermittent once  sodium chloride 0.9%. 1000 milliLiter(s) IV Continuous <Continuous>        DIET: NPO for now                 62y (1960) man with a PMHx significant for GBM s/p resection in 10/22 on Keppra 750 bid followed by Dr. Turpin who presented to the ED for change in mental status. Further history provided by HCP friend at bedside. Per HCP, she spoke to pt on the phone at 1230 and he was not making sense. While en route to the hospital had witnessed RUE twitching and rigid lasting around 2 min.  Pt given decadron 8mg PO by HCP as per radio-oncology PA recommendations over the phone en route. in ED, patient had another seizure, which was aborted with versed, pt remained hypoxic post ictal and was intubated for airway protection.    ICU Vital Signs Last 24 Hrs  T(C): 37.2 (21 Dec 2022 22:00), Max: 37.2 (21 Dec 2022 22:00)  T(F): 99 (21 Dec 2022 22:00), Max: 99 (21 Dec 2022 22:00)  HR: 105 (21 Dec 2022 22:30) (70 - 131)  BP: 137/90 (21 Dec 2022 22:30) (123/88 - 168/106)  BP(mean): 104 (21 Dec 2022 22:30) (104 - 116)  ABP: --  ABP(mean): --  RR: 14 (21 Dec 2022 22:30) (12 - 22)  SpO2: 100% (21 Dec 2022 22:30) (95% - 100%)    O2 Parameters below as of 21 Dec 2022 22:01  Patient On (Oxygen Delivery Method): ventilator    O2 Concentration (%): 70                          14.8   10.66 )-----------( 154      ( 21 Dec 2022 23:29 )             45.6         PHYSICAL EXAM:    Constitutional: intubated, in No Acute Distress     Neurological: intubated, opens eyes spontaneously, PERLL, EOMI,  no facial, no gaze preference, closes eyes and wiggles toes to commands, LOUISE AG      Extremities: No calf tenderness bilaterally, no cyanosis, clubbing or edema          LABS:                          14.8   10.66 )-----------( 154      ( 21 Dec 2022 23:29 )             45.6    12-21    136  |  102  |  13  ----------------------------<  138<H>  4.2   |  24  |  0.71    Ca    9.2      21 Dec 2022 23:29  Phos  4.9     12-21  Mg     1.9     12-21    TPro  6.6  /  Alb  3.8  /  TBili  0.4  /  DBili  x   /  AST  17  /  ALT  39  /  AlkPhos  57  12-21  PT/INR - ( 21 Dec 2022 23:29 )   PT: 13.5 sec;   INR: 1.16 ratio         PTT - ( 21 Dec 2022 23:29 )  PTT:29.4 sec      IMAGING: reviewed    MEDICATIONS:  Antibiotics:    Neuro:  acetaminophen     Tablet .. 650 milliGRAM(s) Oral every 6 hours PRN Temp greater or equal to 38C (100.4F), Mild Pain (1 - 3)  lacosamide IVPB 50 milliGRAM(s) IV Intermittent every 12 hours  levETIRAcetam  IVPB 1000 milliGRAM(s) IV Intermittent every 12 hours  propofol Infusion 20 MICROgram(s)/kG/Min IV Continuous <Continuous>    Cardiac:    Pulm:    GI/:  pantoprazole  Injectable 40 milliGRAM(s) IV Push at bedtime  polyethylene glycol 3350 17 Gram(s) Oral daily  senna 2 Tablet(s) Oral at bedtime    Other:   chlorhexidine 0.12% Liquid 15 milliLiter(s) Oral Mucosa every 12 hours  dexAMETHasone  Injectable 4 milliGRAM(s) IV Push every 6 hours  insulin lispro (ADMELOG) corrective regimen sliding scale   SubCutaneous every 6 hours  magnesium sulfate  IVPB 2 Gram(s) IV Intermittent once  sodium chloride 0.9%. 1000 milliLiter(s) IV Continuous <Continuous>        DIET: NPO for now                62M PMH glioblastoma s/p craniotomy for resection of L brain tumor 10/3/22 with DR. Turpin on Keppra 500 BID, dexamethasone p/w word-finding difficulty & AMS today. Last known well unknown. HCP at bedside states pt was in usual state of health yesterday. Today, spoke to pt on the phone at 12:30pm and he was not making sense. En route to hospital had witnessed 2minute seizure ~1:30pm, described as pt rigid with jerking of R head/neck. Of note, pt just completed oral chemo yesterday, also recently completed radiation. Pt given decadron 8mg PO by HCP as per radiooncology PA recommendations over the phone en route. In ED, patient had another seizure, which was aborted with versed, pt remained hypoxic post ictal and was intubated for airway protection.    ICU Vital Signs Last 24 Hrs  T(C): 37.2 (21 Dec 2022 22:00), Max: 37.2 (21 Dec 2022 22:00)  T(F): 99 (21 Dec 2022 22:00), Max: 99 (21 Dec 2022 22:00)  HR: 105 (21 Dec 2022 22:30) (70 - 131)  BP: 137/90 (21 Dec 2022 22:30) (123/88 - 168/106)  BP(mean): 104 (21 Dec 2022 22:30) (104 - 116)  ABP: --  ABP(mean): --  RR: 14 (21 Dec 2022 22:30) (12 - 22)  SpO2: 100% (21 Dec 2022 22:30) (95% - 100%)    O2 Parameters below as of 21 Dec 2022 22:01  Patient On (Oxygen Delivery Method): ventilator    O2 Concentration (%): 70                          14.8   10.66 )-----------( 154      ( 21 Dec 2022 23:29 )             45.6         PHYSICAL EXAM:    Constitutional: intubated, in No Acute Distress     Neurological: intubated, opens eyes spontaneously, PERLL, EOMI,  no facial, no gaze preference, closes eyes and wiggles toes to commands, LOUISE AG      Extremities: No calf tenderness bilaterally, no cyanosis, clubbing or edema        LABS:                          14.8   10.66 )-----------( 154      ( 21 Dec 2022 23:29 )             45.6    12-21    136  |  102  |  13  ----------------------------<  138<H>  4.2   |  24  |  0.71    Ca    9.2      21 Dec 2022 23:29  Phos  4.9     12-21  Mg     1.9     12-21    TPro  6.6  /  Alb  3.8  /  TBili  0.4  /  DBili  x   /  AST  17  /  ALT  39  /  AlkPhos  57  12-21  PT/INR - ( 21 Dec 2022 23:29 )   PT: 13.5 sec;   INR: 1.16 ratio         PTT - ( 21 Dec 2022 23:29 )  PTT:29.4 sec      IMAGING: reviewed    MEDICATIONS:  Antibiotics:    Neuro:  acetaminophen     Tablet .. 650 milliGRAM(s) Oral every 6 hours PRN Temp greater or equal to 38C (100.4F), Mild Pain (1 - 3)  lacosamide IVPB 50 milliGRAM(s) IV Intermittent every 12 hours  levETIRAcetam  IVPB 1000 milliGRAM(s) IV Intermittent every 12 hours  propofol Infusion 20 MICROgram(s)/kG/Min IV Continuous <Continuous>      GI/:  pantoprazole  Injectable 40 milliGRAM(s) IV Push at bedtime  polyethylene glycol 3350 17 Gram(s) Oral daily  senna 2 Tablet(s) Oral at bedtime    Other:   chlorhexidine 0.12% Liquid 15 milliLiter(s) Oral Mucosa every 12 hours  dexAMETHasone  Injectable 4 milliGRAM(s) IV Push every 6 hours  insulin lispro (ADMELOG) corrective regimen sliding scale   SubCutaneous every 6 hours  magnesium sulfate  IVPB 2 Gram(s) IV Intermittent once  sodium chloride 0.9%. 1000 milliLiter(s) IV Continuous <Continuous>        DIET: NPO for now

## 2022-12-21 NOTE — PHARMACOTHERAPY INTERVENTION NOTE - COMMENTS
Code stroke called ~ 12/21/22 14:25. Pt is a 63yo man with a PMHx significant for GBM s/p resection in 10/22 on keppra 750 bid followed by Dr. Turpin who presented to the ED for change in mental status. Further history provided by health care proxy friend at bedside. Per HCP, she spoke to pt on the phone at 1230 and he was not making sense. While en route to the hospital had witnessed RUE twitching and rigid lasting around 2 min.  Pt given decadron 8mg PO by HCP as per radiooncology PA recommendations over the phone en route. At baseline fully independent with full adls.     Confirmed patient's home medications through HCP friend, pharmacy through [facility], and through [facility] oncology progress notes. Per HCP friend, patient completed oral chemo for GBM 12/20.    Home Medications:  dexamethasone 1 mg oral tablet: 1 tab(s) orally once a day for GBM treatment with chemoRT----------decreased from 2mg to 1mg daily on 12/13/22. Per oncology, if tolerating well stop 12/27/22  **12/21 13:40 received dexamethasone 8mg x1 en route to hospital per radiooncology PA  levETIRAcetam 750 mg oral tablet: 1 tab(s) orally 2 times a day -------recently increased from 500mg BID in October 2022  ondansetron 8 mg oral tablet: 1 tab(s) orally once a day, As Needed for nausea  pantoprazole 40mg oral tablet: 1 tab(s) orally once a day ---------HCP friend unsure if patient is currently still taking; she stated that patient's last dose was with his last dose of chemo (12/20)  oxycodone 5mg oral tablet: 1 tab(s) orally every 6 hours, As Needed for moderate pain (4-6)--------HCP friend states patient has not taken any    Mendy No, PharmD  PGY2 Critical Care Pharmacy Resident  Available on [employer] Teams      Code stroke called ~ 12/21/22 14:25. Pt is a 61yo man with a PMHx significant for GBM s/p resection in 10/22 on keppra 750 bid followed by Dr. Turpin who presented to the ED for change in mental status. Further history provided by health care proxy friend at bedside. Per HCP, she spoke to pt on the phone at 1230 and he was not making sense. While en route to the hospital had witnessed RUE twitching and rigid lasting around 2 min.  Pt given decadron 8mg PO by HCP as per radiooncology PA recommendations over the phone en route. At baseline fully independent with full adls.     Confirmed patient's home medications through HCP friend, pharmacy through [facility]cript[facility], and through [facility] oncology progress notes. Per HCP friend, patient completed oral chemo for GBM 12/20.    Home Medications:  ·	dexamethasone 1 mg oral tablet: 1 tab(s) orally once a day for GBM treatment with chemoR---> (decreased from 2mg to 1mg daily on 12/13/22. Per oncology, if tolerating well stop 12/27/22)  **12/21 13:40 received dexamethasone 8mg x1 en route to hospital per radiooncology PA  ·	levETIRAcetam 750 mg oral tablet: 1 tab(s) orally 2 times a day-----(recently increased from 500mg BID in October 2022)  ·	ondansetron 8 mg oral tablet: 1 tab(s) orally once a day, As Needed for nausea  ·	pantoprazole 40mg oral tablet: 1 tab(s) orally once a day-----(HCP friend unsure if patient is currently still taking; she stated that patient's last dose was with his last dose of chemo (12/20))  ·	oxycodone 5mg oral tablet: 1 tab(s) orally every 6 hours, As Needed for moderate pain (4-6)-------(HCP friend states patient has not taken any)    Added:  ondansetron 8mg 1 tab daily PRN for nausea    Changed:  dexamethasone taper to dexamethasone 1mg daily  levetriacetam 500mg BID to 750mg BID    Mendy No, PharmD  PGY2 Critical Care Pharmacy Resident  Available on [employer] Teams      Code stroke called ~ 12/21/22 14:25. Pt is a 63yo man with a PMHx significant for GBM s/p resection in 10/22 on keppra 750 bid followed by Dr. Turpin who presented to the ED for change in mental status. Further history provided by health care proxy friend at bedside. Per HCP, she spoke to pt on the phone at 1230 and he was not making sense. While en route to the hospital had witnessed RUE twitching and rigid lasting around 2 min.  Pt given decadron 8mg PO by HCP as per radiooncology PA recommendations over the phone en route. At baseline fully independent with full adls.     Confirmed patient's home medications through HCP friend, pharmacy through [facility], and through [facility] oncology progress notes. Per HCP friend, patient completed oral chemo for GBM 12/20.    Home Medications:  dexamethasone 1 mg oral tablet: 1 tab(s) orally once a day for GBM treatment with chemoRT  ·	decreased from 2mg to 1mg daily on 12/13/22. Per oncology, if tolerating well stop 12/27/22   ·	**12/21 13:40 received dexamethasone 8mg x1 en route to hospital per radiooncology PA  levETIRAcetam 750 mg oral tablet: 1 tab(s) orally 2 times a day  ·	recently increased from 500mg BID in October 2022  ondansetron 8 mg oral tablet: 1 tab(s) orally once a day, As Needed for nausea  pantoprazole 40mg oral tablet: 1 tab(s) orally once a day  ·	HCP friend unsure if patient is currently still taking; she stated that patient's last dose was with his last dose of chemo (12/20)  oxycodone 5mg oral tablet: 1 tab(s) orally every 6 hours, As Needed for moderate pain (4-6)  ·	HCP friend states patient has not taken any    Added:  ondansetron 8mg 1 tab daily PRN for nausea    Changed:  dexamethasone taper to dexamethasone 1mg daily  levetriacetam 500mg BID to 750mg BID    Mendy No, PharmD  PGY2 Critical Care Pharmacy Resident  Available on [employer] Teams

## 2022-12-21 NOTE — H&P ADULT - NSHPLABSRESULTS_GEN_ALL_CORE
CTH showed increased edema and size of lesion, Dr. Salas read as possible recurrance but timeline could represent radiation necrosis v pseudoprogression

## 2022-12-21 NOTE — H&P ADULT - ASSESSMENT
Del Ramos  62M Hx GBB s/p crani 10/3/2022 undergoing radiation p/f AMS and increasing aphasia. Radiation PA suggested dex8mg, which was given at 1pm. The patient was on his way to the hospital when he had a seizure lasting 2m. CTH showed increased edema and size of lesion, Dr. Salas read as possible recurrence but timeline could represent radiation necrosis v pseudoprogression  Exam: AOx0, expresive>receptive aphasia, PERRL, EOMI (followed central commands briskly), BUE Ag no FC, BLE 5/5  -Neurology declined to adm, [facility]  -MR brain ww/o  -[id number]  -defer to neurology for AED recs

## 2022-12-21 NOTE — ED PROVIDER NOTE - CLINICAL SUMMARY MEDICAL DECISION MAKING FREE TEXT BOX
Abdelrahman, PGY4 - 62M PMH GBM s/p crani on 10/3 with Dr. Monson here with AMS & witnessed seizure. Code stroke activated. DDX includes seizure vs GBM recurrence vs ICH vs CVA. Plan for CTs, neuro/nsgy recs, keppra load Abdelrahman, PGY4 - 62M PMH GBM s/p crani on 10/3 with Dr. Monson here with AMS & witnessed seizure. Code stroke activated. DDX includes seizure vs GBM recurrence vs ICH vs CVA. Plan for CTs, neuro/nsgy recs, keppra load  Attending Grace Mak: 63 yo male h/o GBM presenting with ams and concern for seizure activity. upon arrival pt awake with difficulty speaking. code stroke called and pt taken to ct scan. ct showed evidence of edema, cannot exclude recurrence. neurosurgery and neurology consulted and pt loaded with seizure medications. will need MRI. no fevers or evidence of infection on exam.

## 2022-12-21 NOTE — ED ADULT NURSE REASSESSMENT NOTE - NS ED NURSE REASSESS COMMENT FT1
Handoff report received from RN Dominique. Pt resting in green B with HCP Johnnie at bedside. Pt intubated with VSS. Pt pending NSCU bed. Friend at bedside updated on plan of care. Stretcher locked in lowest position, side rails up and call bell in reach.
MRI form filled out by HCP at bedside and given to MRI. copy of form placed in chart. MRI scheduled for 2100. plan of care discussed. safety and comfort measures maintained.
Pharmacy called for Vimpat that is not available on the unit. Spoke with pharmacist Tapan states the medication will be sent to the area as soon as it is available. pending medication at this time.
PT had seizure that lasted 1 minute, witnessed by friend Johnnie at bedside.  Pt lifted and airway protected, suctioned.  Awaiting further orders.  PT VSS at this time.

## 2022-12-21 NOTE — ED PROVIDER NOTE - PHYSICAL EXAMINATION
I have reviewed the triage vital signs.  Const: awake, alert, in NAD  Eyes: no conjunctival injection, PERRL  HENT: NCAT, Neck supple, oral mucosa moist  CV: RRR, +S1, S2  Resp: CTAB, no respiratory distress  GI: Abdomen soft, NTND, no guarding  Extremities: No peripheral edema,  2+ radial and DP pulses  Skin: Warm, well perfused, no rash  MSK: No gross deformities appreciated  Neuro: unable to fully assess, pt not following commands, aphasic, responds "yes", face symmetric, moving BL UE/LE spontaneously  Psych: Calm I have reviewed the triage vital signs.  Const: awake, alert, in NAD  Eyes: no conjunctival injection, PERRL  HENT: NCAT, Neck supple, oral mucosa moist  CV: RRR, +S1, S2  Resp: CTAB, no respiratory distress  GI: Abdomen soft, NTND, no guarding  Extremities: No peripheral edema,  2+ radial and DP pulses  Skin: Warm, well perfused, no rash  MSK: No gross deformities appreciated  Neuro: unable to fully assess, pt not following commands, aphasic, responds "yes", face symmetric, moving BL UE/LE spontaneously  Psych: Calm  Attending Grace Mak: Gen; nad, heent: atraumatic, perrla, mmm, op pink, neck: nttp, full rom, no meningismus, cv: rrr, lungs; ctab, chest; Nttp, abd: soft, mnontender, nondinstedned. no peritoneal signs, ext: wwp, neuro: awake, follwoingcommands, confused,

## 2022-12-21 NOTE — H&P ADULT - HISTORY OF PRESENT ILLNESS
62M Hx GBB s/p crani 10/3/2022 undergoing radiation p/f AMS and increasing aphasia. Radiation PA suggested dex8mg, which was given at 1pm. The patient was on his way to the hospital when he had a seizure lasting 2m

## 2022-12-21 NOTE — ED ADULT NURSE NOTE - OBJECTIVE STATEMENT
Pt is a 62 year old male with pmhx of glioblastoma, surgery in september to remove it with Dr Shoulder.  PT completed radiation yesterday and oral chemo monday.  Pt called Johnnie today at 1200 and she could hear that he was "off"  Pt was having expressive aphasia, and then had a seizure in the car on the way here.   Right sided decreased visual cut.  Pt took 8 mg prednisone on the way here.

## 2022-12-21 NOTE — ED PROVIDER NOTE - PROGRESS NOTE DETAILS
Abdelrahman, PGY4 - nsgy consulted, will eval pt. States mass looks stable on CT, will reeval and provide further recs Abdelrahman, PGY4 - pt seen by nsgy at bedside, states pt will require MRI brain with/without contrast for further eval to determine if GBM recurrence vs postradiation changes. [facility] pt does not require additional decadron at this time (Just received at 1pm). [facility] can admit to nsgy floor at this time Abdelrahman, PGY4 - pt seen by nsgy at bedside, states pt will require MRI brain with/without contrast for further eval to determine if GBM recurrence vs postradiation changes. [facility] pt does not require additional decadron at this time (Just received at 1pm). [facility] can admit to nsgy floor at this time. Pt also desaturated to 80s on RA, placed on 4L NC with improvement. Lungs CTAB, pt not in respiratory distress. Ordered for CTPA to eval for PE Abdelrahman, PGY4 - called to bedside by RN, pt with R sided tonic clonic seizure limited to head/neck, lasted 1min. Neuro paged, will continue to monitor Attending MD Rucker: Called to room as patient was reportedly seizing.  The first episode was characterized as tonic-clonic activity of the right upper extremity and bilateral lower extremities with impaired awareness.  The patient was not exhibiting this behavior when I came into the room however he was hypopneic.  Initially was hypoxic to 89% but improved with airway maneuvers.  There was a page sent out to neurology for ongoing recommendations as patient had received a load of Keppra and Vimpat already.  As the event had ceased, no benzodiazepines were administered.  Several minutes later called to the room again for recurrent seizure activity.  This time I did visualize tonic-clonic activity of the right upper extremity and bilateral lower extremities.  There was foaming of the mouth and erythema of the face.  Patient had desaturation to 80%.  Patient was placed on nonrebreather mask airway maneuvers were performed and decision was made to proceed with endotracheal intubation for status epilepticus.  Still awaiting reply from neurology service.  We will update the neurosurgical service. Abdelrahman, PGY4 - after first witnessed seizure in the ED, spoke to neuro, who recommended continuing with already ordered maintenance Vimpat orders, no further recs. Pt later intubated as above for status epilepticus. Nsgy team updated on clinical course, states will dispo to NSCU but no beds at this time Attending MD Rucker: Called to room as patient was reportedly seizing.  The first episode was characterized as tonic-clonic activity of the right upper extremity and bilateral lower extremities with impaired awareness.  The patient was not exhibiting this behavior when I came into the room however he was tachypneic.  Initially was hypoxic to 89% but improved with airway maneuvers.  There was a page sent out to neurology for ongoing recommendations as patient had received a load of Keppra and Vimpat already.  As the event had ceased, no benzodiazepines were administered.  Several minutes later called to the room again for recurrent seizure activity.  This time I did visualize tonic-clonic activity of the right upper extremity and bilateral lower extremities.  There was foaming of the mouth and erythema of the face.  Patient had desaturation to 80%.  Patient was placed on nonrebreather mask airway maneuvers were performed and decision was made to proceed with endotracheal intubation for status epilepticus.  Still awaiting reply from neurology service.  We will update the neurosurgical service.

## 2022-12-21 NOTE — CONSULT NOTE ADULT - ASSESSMENT
62y (1960) man with a PMHx significant for GBM s/p resection in 10/22 on keppra 750 bid followed by Dr. Turpin who presented to the ED for change in mental status. Further history provided by HCP friend at bedside. Per HCP, she spoke to pt on the phone at 1230 and he was not making sense. While en route to the hospital had witnessed RUE twitching and rigid lasting around 2 min.  Pt given decadron 8mg PO by HCP as per radiooncology PA recommendations over the phone en route.  At baseline fully independent with full adls.     Impression  Expressive aphagia with witnessed RUE consistent with focal motor sz likely 2/2 L temporal parietal mass with vasogenic edema consistent with tumor progression.     Recommendations:  [] Load with total 4.5 grams Keppra now  [] Increase maintenance dose of Keppra to 1250 mg BID  [] If focal sz not controlled, consider added second agent such as vimpat.   [] Consult NS for further recommendations for GBM management.    Case discussed with both stroke and epilepsy fellows    Case to be seen by general Neurology during AM rounds 62y (1960) man with a PMHx significant for GBM s/p resection in 10/22 on keppra 750 bid followed by Dr. Turpin who presented to the ED for change in mental status. Further history provided by HCP friend at bedside. Per HCP, she spoke to pt on the phone at 1230 and he was not making sense. While en route to the hospital had witnessed RUE twitching and rigid lasting around 2 min.  Pt given decadron 8mg PO by HCP as per radiooncology PA recommendations over the phone en route.  At baseline fully independent with full adls.     Impression  Expressive aphasia with witnessed RUE consistent with focal motor sz likely 2/2 L temporal parietal mass with vasogenic edema consistent with tumor progression.     Recommendations:  [] Load with total 4.5 grams Keppra now  [] Increase maintenance dose of Keppra to 1250 mg BID  [] If focal sz not controlled, consider added second agent such as vimpat.   [] Consult NS for further recommendations for GBM management.    Case discussed with both stroke and epilepsy fellows    Case to be seen by general Neurology during AM rounds 62y (1960) man with a PMHx significant for GBM s/p resection in 10/22 on keppra 750 bid followed by Dr. Turpin who presented to the ED for change in mental status. Further history provided by HCP friend at bedside. Per HCP, she spoke to pt on the phone at 1230 and he was not making sense. While en route to the hospital had witnessed RUE twitching and rigid lasting around 2 min.  Pt given decadron 8mg PO by HCP as per radiooncology PA recommendations over the phone en route.  At baseline fully independent with full adls.     Impression  Expressive aphasia with witnessed RUE consistent with focal motor sz likely 2/2 L temporal parietal mass with vasogenic edema consistent with tumor progression.     Recommendations:  [] Load with total 4.5 grams Keppra now  [] Increase maintenance dose of Keppra to 1250 mg BID  [] If focal sz not controlled, consider added second agent such as vimpat.   [] Consult NS for further recommendations for GBM management.  [] Seizure rescue medications for a generalized tonic clonic episode lasting >3 min or significant derangement of vital signs:   ---> 1st line: Ativan 2 mg IV. For GTC > 3 min and refractory to Ativan please call [phone].    Other:   [] Telemetry monitoring; Neurochecks/VS per unit protocol  [] Seizure, fall and aspiration precautions  [] Please note: if patient has a convulsion, please document time of onset, progression of limb involvement (upper/lower; R/L) if any, and specifically what patient was doing paying attention to eye opening vs closure, head turn, gaze deviation, shaking of extremities, tongue bite, urinary/bowel incontinence, any derangement of vital signs, length of episode, and duration of postictal period.  [] Given concern for seizure, advise patient to not drive, operate heavy machinery, avoid heights, pools, bathtubs, locked doors until cleared by further follow up outpatient by neurology.     Case discussed with both stroke and epilepsy fellows    Case to be seen by general Neurology during AM rounds 62y (1960) man with a PMHx significant for GBM s/p resection in 10/22 on keppra 750 bid followed by Dr. Turpin who presented to the ED for change in mental status. Further history provided by HCP friend at bedside. Per HCP, she spoke to pt on the phone at 1230 and he was not making sense. While en route to the hospital had witnessed RUE twitching and rigid lasting around 2 min.  Pt given decadron 8mg PO by HCP as per radiooncology PA recommendations over the phone en route.  At baseline fully independent with full adls.     Impression  Expressive aphasia with witnessed RUE consistent with focal motor sz likely 2/2 L temporal parietal mass with vasogenic edema consistent with tumor progression.     Recommendations:  [] Load with total 100 mg VIMPAT IV  [] start maintenance VIMPAT 50 mg BID   [] Increase maintenance dose of Keppra to 1000 mg TID  [] Consult NS for further recommendations for GBM management.  [] Seizure rescue medications for a generalized tonic clonic episode lasting >3 min or significant derangement of vital signs:   ---> 1st line: Ativan 2 mg IV. For GTC > 3 min and refractory to Ativan please call [phone].    Other:   [] Telemetry monitoring; Neurochecks/VS per unit protocol  [] Seizure, fall and aspiration precautions  [] Please note: if patient has a convulsion, please document time of onset, progression of limb involvement (upper/lower; R/L) if any, and specifically what patient was doing paying attention to eye opening vs closure, head turn, gaze deviation, shaking of extremities, tongue bite, urinary/bowel incontinence, any derangement of vital signs, length of episode, and duration of postictal period.  [] Given concern for seizure, advise patient to not drive, operate heavy machinery, avoid heights, pools, bathtubs, locked doors until cleared by further follow up outpatient by neurology.     Case discussed with both stroke and epilepsy fellows    Case to be seen by general Neurology during AM rounds 62y (1960) man with a PMHx significant for GBM s/p resection in 10/22 on keppra 750 bid followed by Dr. Turpin who presented to the ED for change in mental status. Further history provided by HCP friend at bedside. Per HCP, she spoke to pt on the phone at 1230 and he was not making sense. While en route to the hospital had witnessed RUE twitching and rigid lasting around 2 min.  Pt given decadron 8mg PO by HCP as per radiooncology PA recommendations over the phone en route.  At baseline fully independent with full adls.     Impression  Expressive aphasia with witnessed RUE consistent with focal motor sz likely 2/2 L temporal parietal mass with vasogenic edema consistent with tumor progression.     Recommendations:  [] Load with total 100 mg VIMPAT IV  [] start maintenance VIMPAT 50 mg BID   [] Increase maintenance dose of Keppra to 1000 mg TID  [] Consult NS for further recommendations for GBM management.  [] Seizure rescue medications for a generalized tonic clonic episode lasting >3 min or significant derangement of vital signs:   ---> 1st line: Ativan 2 mg IV. For GTC > 3 min and refractory to Ativan please call [phone].  [] Will consider EEG     Other:   [] Telemetry monitoring; Neurochecks/VS per unit protocol  [] Seizure, fall and aspiration precautions  [] Please note: if patient has a convulsion, please document time of onset, progression of limb involvement (upper/lower; R/L) if any, and specifically what patient was doing paying attention to eye opening vs closure, head turn, gaze deviation, shaking of extremities, tongue bite, urinary/bowel incontinence, any derangement of vital signs, length of episode, and duration of postictal period.  [] Given concern for seizure, advise patient to not drive, operate heavy machinery, avoid heights, pools, bathtubs, locked doors until cleared by further follow up outpatient by neurology.     Case discussed with both stroke and epilepsy fellows    Case to be seen by general Neurology during AM rounds 62y (1960) man with a PMHx significant for GBM s/p resection in 10/22 on keppra 750 bid followed by Dr. Turpin who presented to the ED for change in mental status. Further history provided by HCP friend at bedside. Per HCP, she spoke to pt on the phone at 1230 and he was not making sense. While en route to the hospital had witnessed RUE twitching and rigid lasting around 2 min.  Pt given decadron 8mg PO by HCP as per radiooncology PA recommendations over the phone en route.  At baseline fully independent with full adls.     Impression  Expressive aphasia with witnessed RUE consistent with focal motor sz likely 2/2 L temporal parietal mass with vasogenic edema consistent with tumor progression.     Recommendations:  [] Load with total 100 mg VIMPAT IV  [] start maintenance VIMPAT 50 mg BID   [] Increase maintenance dose of Keppra to 1000 mg TID  [] Consult NS for further recommendations for GBM management.  [] Seizure rescue medications for a generalized tonic clonic episode lasting >3 min or significant derangement of vital signs:   ---> 1st line: Ativan 2 mg IV. For GTC > 3 min and refractory to Ativan please call [phone] ([facility]).  [] Will consider EEG     Other:   [] Telemetry monitoring; Neurochecks/VS per unit protocol  [] Seizure, fall and aspiration precautions  [] Please note: if patient has a convulsion, please document time of onset, progression of limb involvement (upper/lower; R/L) if any, and specifically what patient was doing paying attention to eye opening vs closure, head turn, gaze deviation, shaking of extremities, tongue bite, urinary/bowel incontinence, any derangement of vital signs, length of episode, and duration of postictal period.  [] Given concern for seizure, advise patient to not drive, operate heavy machinery, avoid heights, pools, bathtubs, locked doors until cleared by further follow up outpatient by neurology.     Case discussed with both stroke and epilepsy fellows    Case to be seen by general Neurology during AM rounds

## 2022-12-21 NOTE — ED PROVIDER NOTE - OBJECTIVE STATEMENT
62M PMH glioblastoma s/p craniotomy for resection of L brain tumor 10/3/22 with DR. Monson on Keppra 500 BID, dexamethasone p/w word-finding difficulty & AMS today. Last known well unknown. HCP at bedside states pt was in USOH yesterday. Today, spoke to pt on the phone at 12:30pm and he was not making sense. En route to hospital had witnesse 2minute seizure ~1:30pm, described as pt rigid with jerking of R head/neck. Of note, pt just completed oral chemo yesterday, also recently completed radiation. Pt given decadron 8mg PO by HCP as per radiooncology PA recommendations over the phone en route

## 2022-12-22 LAB
A1C WITH ESTIMATED AVERAGE GLUCOSE RESULT: 5.7 % — HIGH (ref 4–5.6)
ALBUMIN SERPL ELPH-MCNC: 3.8 G/DL — SIGNIFICANT CHANGE UP (ref 3.3–5)
ALP SERPL-CCNC: 57 U/L — SIGNIFICANT CHANGE UP (ref 40–120)
ALT FLD-CCNC: 39 U/L — SIGNIFICANT CHANGE UP (ref 10–45)
AMPHET UR-MCNC: NEGATIVE — SIGNIFICANT CHANGE UP
ANION GAP SERPL CALC-SCNC: 10 MMOL/L — SIGNIFICANT CHANGE UP (ref 5–17)
ANION GAP SERPL CALC-SCNC: 11 MMOL/L — SIGNIFICANT CHANGE UP (ref 5–17)
AST SERPL-CCNC: 17 U/L — SIGNIFICANT CHANGE UP (ref 10–40)
BARBITURATES UR SCN-MCNC: NEGATIVE — SIGNIFICANT CHANGE UP
BENZODIAZ UR-MCNC: POSITIVE
BILIRUB SERPL-MCNC: 0.4 MG/DL — SIGNIFICANT CHANGE UP (ref 0.2–1.2)
BLD GP AB SCN SERPL QL: NEGATIVE — SIGNIFICANT CHANGE UP
BUN SERPL-MCNC: 13 MG/DL — SIGNIFICANT CHANGE UP (ref 7–23)
BUN SERPL-MCNC: 16 MG/DL — SIGNIFICANT CHANGE UP (ref 7–23)
CALCIUM SERPL-MCNC: 8.8 MG/DL — SIGNIFICANT CHANGE UP (ref 8.4–10.5)
CALCIUM SERPL-MCNC: 9.2 MG/DL — SIGNIFICANT CHANGE UP (ref 8.4–10.5)
CHLORIDE SERPL-SCNC: 101 MMOL/L — SIGNIFICANT CHANGE UP (ref 96–108)
CHLORIDE SERPL-SCNC: 102 MMOL/L — SIGNIFICANT CHANGE UP (ref 96–108)
CO2 SERPL-SCNC: 22 MMOL/L — SIGNIFICANT CHANGE UP (ref 22–31)
CO2 SERPL-SCNC: 24 MMOL/L — SIGNIFICANT CHANGE UP (ref 22–31)
COCAINE METAB.OTHER UR-MCNC: NEGATIVE — SIGNIFICANT CHANGE UP
CREAT SERPL-MCNC: 0.58 MG/DL — SIGNIFICANT CHANGE UP (ref 0.5–1.3)
CREAT SERPL-MCNC: 0.71 MG/DL — SIGNIFICANT CHANGE UP (ref 0.5–1.3)
EGFR: 102 ML/MIN/1.73M2 — SIGNIFICANT CHANGE UP
EGFR: 109 ML/MIN/1.73M2 — SIGNIFICANT CHANGE UP
ESTIMATED AVERAGE GLUCOSE: 117 MG/DL — HIGH (ref 68–114)
GAS PNL BLDA: SIGNIFICANT CHANGE UP
GLUCOSE BLDC GLUCOMTR-MCNC: 131 MG/DL — HIGH (ref 70–99)
GLUCOSE BLDC GLUCOMTR-MCNC: 136 MG/DL — HIGH (ref 70–99)
GLUCOSE BLDC GLUCOMTR-MCNC: 143 MG/DL — HIGH (ref 70–99)
GLUCOSE BLDC GLUCOMTR-MCNC: 144 MG/DL — HIGH (ref 70–99)
GLUCOSE SERPL-MCNC: 138 MG/DL — HIGH (ref 70–99)
GLUCOSE SERPL-MCNC: 165 MG/DL — HIGH (ref 70–99)
HCT VFR BLD CALC: 39.8 % — SIGNIFICANT CHANGE UP (ref 39–50)
HGB BLD-MCNC: 13.1 G/DL — SIGNIFICANT CHANGE UP (ref 13–17)
MAGNESIUM SERPL-MCNC: 1.9 MG/DL — SIGNIFICANT CHANGE UP (ref 1.6–2.6)
MAGNESIUM SERPL-MCNC: 2.1 MG/DL — SIGNIFICANT CHANGE UP (ref 1.6–2.6)
MCHC RBC-ENTMCNC: 28.8 PG — SIGNIFICANT CHANGE UP (ref 27–34)
MCHC RBC-ENTMCNC: 32.9 GM/DL — SIGNIFICANT CHANGE UP (ref 32–36)
MCV RBC AUTO: 87.5 FL — SIGNIFICANT CHANGE UP (ref 80–100)
METHADONE UR-MCNC: NEGATIVE — SIGNIFICANT CHANGE UP
NRBC # BLD: 0 /100 WBCS — SIGNIFICANT CHANGE UP (ref 0–0)
OPIATES UR-MCNC: NEGATIVE — SIGNIFICANT CHANGE UP
OXYCODONE UR-MCNC: NEGATIVE — SIGNIFICANT CHANGE UP
PCP UR-MCNC: NEGATIVE — SIGNIFICANT CHANGE UP
PHOSPHATE SERPL-MCNC: 3.9 MG/DL — SIGNIFICANT CHANGE UP (ref 2.5–4.5)
PHOSPHATE SERPL-MCNC: 4.9 MG/DL — HIGH (ref 2.5–4.5)
PLATELET # BLD AUTO: 149 K/UL — LOW (ref 150–400)
POTASSIUM SERPL-MCNC: 4.2 MMOL/L — SIGNIFICANT CHANGE UP (ref 3.5–5.3)
POTASSIUM SERPL-MCNC: 4.5 MMOL/L — SIGNIFICANT CHANGE UP (ref 3.5–5.3)
POTASSIUM SERPL-SCNC: 4.2 MMOL/L — SIGNIFICANT CHANGE UP (ref 3.5–5.3)
POTASSIUM SERPL-SCNC: 4.5 MMOL/L — SIGNIFICANT CHANGE UP (ref 3.5–5.3)
PROT SERPL-MCNC: 6.6 G/DL — SIGNIFICANT CHANGE UP (ref 6–8.3)
RBC # BLD: 4.55 M/UL — SIGNIFICANT CHANGE UP (ref 4.2–5.8)
RBC # FLD: 13.2 % — SIGNIFICANT CHANGE UP (ref 10.3–14.5)
RH IG SCN BLD-IMP: NEGATIVE — SIGNIFICANT CHANGE UP
SODIUM SERPL-SCNC: 134 MMOL/L — LOW (ref 135–145)
SODIUM SERPL-SCNC: 136 MMOL/L — SIGNIFICANT CHANGE UP (ref 135–145)
T3 SERPL-MCNC: 74 NG/DL — LOW (ref 80–200)
T4 AB SER-ACNC: 6.1 UG/DL — SIGNIFICANT CHANGE UP (ref 4.6–12)
THC UR QL: NEGATIVE — SIGNIFICANT CHANGE UP
TSH SERPL-MCNC: 0.48 UIU/ML — SIGNIFICANT CHANGE UP (ref 0.27–4.2)
WBC # BLD: 10.43 K/UL — SIGNIFICANT CHANGE UP (ref 3.8–10.5)
WBC # FLD AUTO: 10.43 K/UL — SIGNIFICANT CHANGE UP (ref 3.8–10.5)

## 2022-12-22 PROCEDURE — 76390 MR SPECTROSCOPY: CPT | Mod: 26

## 2022-12-22 PROCEDURE — 70553 MRI BRAIN STEM W/O & W/DYE: CPT | Mod: 26

## 2022-12-22 PROCEDURE — 70450 CT HEAD/BRAIN W/O DYE: CPT | Mod: 26

## 2022-12-22 PROCEDURE — 99291 CRITICAL CARE FIRST HOUR: CPT

## 2022-12-22 PROCEDURE — 95720 EEG PHY/QHP EA INCR W/VEEG: CPT

## 2022-12-22 RX ORDER — CHLORHEXIDINE GLUCONATE 213 G/1000ML
1 SOLUTION TOPICAL DAILY
Refills: 0 | Status: DISCONTINUED | OUTPATIENT
Start: 2022-12-22 | End: 2022-12-23

## 2022-12-22 RX ORDER — PROPOFOL 10 MG/ML
19.93 INJECTION, EMULSION INTRAVENOUS
Qty: 1000 | Refills: 0 | Status: DISCONTINUED | OUTPATIENT
Start: 2022-12-22 | End: 2022-12-22

## 2022-12-22 RX ORDER — LACOSAMIDE 50 MG/1
100 TABLET ORAL
Refills: 0 | Status: DISCONTINUED | OUTPATIENT
Start: 2022-12-22 | End: 2022-12-23

## 2022-12-22 RX ORDER — CHLORHEXIDINE GLUCONATE 213 G/1000ML
1 SOLUTION TOPICAL
Refills: 0 | Status: DISCONTINUED | OUTPATIENT
Start: 2022-12-22 | End: 2022-12-24

## 2022-12-22 RX ORDER — PANTOPRAZOLE SODIUM 20 MG/1
40 TABLET, DELAYED RELEASE ORAL AT BEDTIME
Refills: 0 | Status: DISCONTINUED | OUTPATIENT
Start: 2022-12-22 | End: 2022-12-23

## 2022-12-22 RX ORDER — MAGNESIUM SULFATE 500 MG/ML
2 VIAL (ML) INJECTION ONCE
Refills: 0 | Status: COMPLETED | OUTPATIENT
Start: 2022-12-22 | End: 2022-12-22

## 2022-12-22 RX ORDER — LEVETIRACETAM 250 MG/1
1000 TABLET, FILM COATED ORAL
Refills: 0 | Status: DISCONTINUED | OUTPATIENT
Start: 2022-12-22 | End: 2022-12-23

## 2022-12-22 RX ORDER — ONDANSETRON 8 MG/1
4 TABLET, FILM COATED ORAL ONCE
Refills: 0 | Status: COMPLETED | OUTPATIENT
Start: 2022-12-22 | End: 2022-12-22

## 2022-12-22 RX ORDER — DEXMEDETOMIDINE HYDROCHLORIDE IN 0.9% SODIUM CHLORIDE 4 UG/ML
0.2 INJECTION INTRAVENOUS
Qty: 200 | Refills: 0 | Status: DISCONTINUED | OUTPATIENT
Start: 2022-12-22 | End: 2022-12-24

## 2022-12-22 RX ORDER — POLYETHYLENE GLYCOL 3350 17 G/17G
17 POWDER, FOR SOLUTION ORAL
Refills: 0 | Status: DISCONTINUED | OUTPATIENT
Start: 2022-12-22 | End: 2022-12-23

## 2022-12-22 RX ADMIN — POLYETHYLENE GLYCOL 3350 17 GRAM(S): 17 POWDER, FOR SOLUTION ORAL at 17:48

## 2022-12-22 RX ADMIN — DEXMEDETOMIDINE HYDROCHLORIDE IN 0.9% SODIUM CHLORIDE 4.6 MICROGRAM(S)/KG/HR: 4 INJECTION INTRAVENOUS at 19:15

## 2022-12-22 RX ADMIN — Medication 4 MILLIGRAM(S): at 23:39

## 2022-12-22 RX ADMIN — CHLORHEXIDINE GLUCONATE 1 APPLICATION(S): 213 SOLUTION TOPICAL at 21:03

## 2022-12-22 RX ADMIN — PANTOPRAZOLE SODIUM 40 MILLIGRAM(S): 20 TABLET, DELAYED RELEASE ORAL at 21:03

## 2022-12-22 RX ADMIN — LACOSAMIDE 110 MILLIGRAM(S): 50 TABLET ORAL at 02:15

## 2022-12-22 RX ADMIN — SODIUM CHLORIDE 70 MILLILITER(S): 9 INJECTION INTRAMUSCULAR; INTRAVENOUS; SUBCUTANEOUS at 19:15

## 2022-12-22 RX ADMIN — Medication 4 MILLIGRAM(S): at 11:04

## 2022-12-22 RX ADMIN — Medication 4 MILLIGRAM(S): at 17:49

## 2022-12-22 RX ADMIN — Medication 1000 MILLIGRAM(S): at 00:02

## 2022-12-22 RX ADMIN — CHLORHEXIDINE GLUCONATE 15 MILLILITER(S): 213 SOLUTION TOPICAL at 05:34

## 2022-12-22 RX ADMIN — LEVETIRACETAM 400 MILLIGRAM(S): 250 TABLET, FILM COATED ORAL at 05:33

## 2022-12-22 RX ADMIN — ONDANSETRON 4 MILLIGRAM(S): 8 TABLET, FILM COATED ORAL at 09:00

## 2022-12-22 RX ADMIN — Medication 4 MILLIGRAM(S): at 05:34

## 2022-12-22 RX ADMIN — Medication 25 GRAM(S): at 05:34

## 2022-12-22 RX ADMIN — CHLORHEXIDINE GLUCONATE 15 MILLILITER(S): 213 SOLUTION TOPICAL at 17:49

## 2022-12-22 RX ADMIN — LACOSAMIDE 100 MILLIGRAM(S): 50 TABLET ORAL at 17:48

## 2022-12-22 RX ADMIN — LEVETIRACETAM 1000 MILLIGRAM(S): 250 TABLET, FILM COATED ORAL at 17:49

## 2022-12-22 RX ADMIN — SENNA PLUS 2 TABLET(S): 8.6 TABLET ORAL at 21:04

## 2022-12-22 RX ADMIN — DEXMEDETOMIDINE HYDROCHLORIDE IN 0.9% SODIUM CHLORIDE 4.6 MICROGRAM(S)/KG/HR: 4 INJECTION INTRAVENOUS at 05:34

## 2022-12-22 NOTE — DIETITIAN INITIAL EVALUATION ADULT - ADD RECOMMEND
1. Obtain subjective wt/diet history from pt/family PRN.   2. Trend electrolytes; replete PRN. Defer initiation of multivitamin to medical team, as pt s/p chemo/radiation.   3. Monitor wt trends/labs/skin integrity/hydration status/bowel regularity.   4. Malnutrition sticker placed.

## 2022-12-22 NOTE — DIETITIAN INITIAL EVALUATION ADULT - REASON FOR ADMISSION
Pt is a 61 yo M with PMH: glioblastoma s/p craniotomy for resection of L brain tumor 10/3/22. Presented with word-finding difficulty and AMS. Last known well unknown. HCP states in usual state of health yesterday. On 12/21, pt spoke on phone at 12:30pm and was not making sense. En route to hospital, had witnessed 2 minute seizure. Of note, pt completed oral chemo yesterday, completed radiation.      Pt is a 63 yo M with PMH: glioblastoma s/p craniotomy for resection of L brain tumor 10/3/22. Presented with word-finding difficulty and AMS. Last known well unknown. HCP states in usual state of health yesterday. On 12/21, pt spoke on phone at 12:30pm and was not making sense. En route to hospital, had witnessed 2 minute seizure. Of note, pt completed oral chemo yesterday, completed radiation. Remained hypoxic post-ictal and subsequently intubated for airway protection. Continues on vEEG at this time.

## 2022-12-22 NOTE — DIETITIAN INITIAL EVALUATION ADULT - OTHER INFO
Dosing wt (12/21): 202.4 lbs  Wt trend (per [facility]): (11/23): 203 lbs; (10/3): 210.2 lbs; (9/21): 214.1 lbs  Pt with apparent wt loss trend. Net loss of 11.7 lbs/5.5% x ~3 months.

## 2022-12-22 NOTE — PROGRESS NOTE ADULT - ASSESSMENT
ASSESSMENT/PLAN: 61 y/o prior GBM resection now with increased seizures post/chemo rad treatment    NEURO:    - Neurochecks q1 hrs  - CTH/CTA in AM  -MRI w/w/o  -hx of GBM, s/p crani 10/3, with adjunct radiation and chemo treatment  - Dex 4Q6 for now  -seized in the ER(rt sided shaking, aborted with versed and ativan)  -continue AEDs, neurology following, loaded with keppra in the ED, continue with keppra 1g BID, and vimpat 50 BID  -on vEEG, f/u read  -pain control  -prop for vent synchrony   Activity: [] mobilize as tolerated [x] Bedrest [] PT [] OT [] PMNR    PULM:    - intubated, CPAP as tolerated  -f/u ABG    CV:  SBP goal 100 - 160  MAP > 65  f/u TTE, EKG    RENAL:  Fluids:  NS @ 75    GI:    Diet: NPO, insert NGT  GI prophylaxis [] not indicated [x] PPI [] other:  Bowel regimen [x] miralax [x] senna [] other:    ENDO:   Goal euglycemia (-180)  f/u A1C  ISS while on steroids    HEME/ONC:  VTE prophylaxis: [x] SCDs [] chemoprophylaxis [x] hold chemoprophylaxis due to: in case of decompensation and must go to OR  [] high risk of DVT/PE on admission due to: malignancy hx, bedrest  f/u LEDs    ID:  afebrile     critical care time 30 minutes  at risk of death due to: brain herniation, cerebral edema     ASSESSMENT/PLAN: 63 y/o prior GBM resection now with increased seizures post/chemo rad treatment    NEURO:    hx of GBM, s/p crani 10/3, with adjunct radiation and chemo treatment  - Neurochecks q1 hrs  - CTH today  -MRI w/w/o  - Vasogenic edema Dex 4Q6   -Seizure: loaded with keppra in the ED, continue with keppra 1g BID, and vimpat 50 BID  -on vEEG, f/u read  - neurology following  - pain control  - hold sedation for exubtation      PULM:    - ETT to vent  - mucomyst, nebs  - wean to extubate  - CPAP  - CXR, ABG  - VAP bundle    CV:  - MAP > 65  - f/u TTE, EKG    RENAL:  - Fluids:  NS @ 75 while NPO  - BMP q12h  - Na goal 140-150    GI:    - Diet: NPO, insert NGT  - PPI while on CCS/ETT  - bowel regimen    ENDO:   - Goal euglycemia (-180)  - ISS while on steroids    HEME/ONC:  - SCDs  - plan to start chemoppx if no planned intervention  - LEDs    ID:  afebrile          ASSESSMENT/PLAN: 61 y/o prior GBM resection now with increased seizures post/chemo rad treatment    NEURO:    hx of GBM, s/p crani 10/3, with adjunct radiation and chemo treatment  - Neurochecks q1 hrs  - CTH today  -MRI w/w/o  - Vasogenic edema Dex 4Q6   -Seizure: loaded with keppra in the ED, continue with keppra 1g BID, and vimpat 50 BID  -on vEEG, f/u read  - neurology following  - pain control  - hold sedation for exubtation      PULM:    - ETT to vent  - mucomyst, nebs  - wean to extubate  - CPAP  - CXR, ABG  - VAP bundle    CV:  - MAP > 65  - f/u TTE, EKG    RENAL:  - Fluids:  NS @ 75 while NPO  - BMP q12h  - Na goal 140-150    GI:    - Diet: NPO  - PPI while on CCS/ETT  - bowel regimen    ENDO:   - Goal euglycemia (-180)  - ISS while on steroids    HEME/ONC:  - SCDs  - SQL start if no intervention  - LEDs    ID:  afebrile         Dispo: ICU for mechanical ventillation

## 2022-12-22 NOTE — PROGRESS NOTE ADULT - SUBJECTIVE AND OBJECTIVE BOX
NSCU Progress Note    Assessment/Hospital Course:    SERJIO TIDWELL is a 62y (1960) man with a PMHx significant for GBM s/p resection in 10/22 on Keppra 750 bid followed by Dr. Turpin who presented to the ED for change in mental status. Further history provided by HCP friend at bedside. Per HCP, she spoke to pt on the phone at 1230 and he was not making sense. While en route to the hospital had witnessed RUE twitching and rigid lasting around 2 min.  Pt given decadron 8mg PO by HCP as per radiooncology PA recommendations over the phone en route, in ED found to have witnessed seizure, intubated for airway protection and admitted to NSICU.      24 Hour Events/Subjective:  - MRI done, FU results  CTH this am w slight increase in mass effect  tolerating CPAP  EEG w no sz, has LPDs      REVIEW OF SYSTEMS:  - negative except as above    VITALS:   - Reviewed      IMAGING/DATA:   - Reviewed          PHYSICAL EXAM:    General: ett to vent  CVS: RRR  Pulm: CTAB  GI: Soft, NTND  Extremities: No LE Edema  Neuro: Neurological: intubated, opens eyes spontaneously, PERLL, EOMI,  no facial, no gaze preference, closes eyes and wiggles toes to commands, LOUISE AG

## 2022-12-22 NOTE — DIETITIAN INITIAL EVALUATION ADULT - NSFNSGIIOFT_GEN_A_CORE
-Last BM PTA. On bowel regimen (senna, Miralax).  -Continues on NaCl 0.9% at 70ml/hr. Na goal 140-150 per neurosurgical team.   -Continues on Decadron as prescribed. At risk for elevated FSBG/A1c. A1c 5.7%. Continues on insulin lispro sliding scale.   -Ordered for propofol and Precedex; remains on hold at this time.

## 2022-12-22 NOTE — EEG REPORT - NS EEG TEXT BOX
Specialty Pharmacy Note      Name:  Melania Mae  :  1966  Date:  2019         Past Medical History:   Diagnosis Date   • Aneurysm (CMS/HCC)     Cavernous left ICA aneurysm   • Asthma    • Carotid artery occlusion    • Chronic back pain    • CLL (chronic lymphocytic leukemia) (CMS/HCC)     2012   • COPD (chronic obstructive pulmonary disease) (CMS/HCC)    • Degenerative arthritis    • Depression    • Diabetes mellitus (CMS/[facility])     type 2   • Hyperlipidemia    • Hypertension    • Lymphadenopathy    • Non Hodgkin's lymphoma (CMS/HCC)        Past Surgical History:   Procedure Laterality Date   • APPENDECTOMY     • BACK SURGERY       (work accident) c-spine surgery    • CAROTID STENT Right 2017   • CEREBRAL ANGIOGRAM N/A 2017    Diagnostic Carotid/Cerebral Angiogram   • CHOLECYSTECTOMY     • HYSTERECTOMY      for endometriosis/ovarian bleed   • NECK SURGERY     • OTHER SURGICAL HISTORY      pac insertion and removal   • TONSILLECTOMY         Social History     Socioeconomic History   • Marital status:      Spouse name: Not on file   • Number of children: Not on file   • Years of education: Not on file   • Highest education level: Not on file   Tobacco Use   • Smoking status: Current Every Day Smoker     Packs/day: 0.50     Years: 35.00     Pack years: 17.50     Types: Cigarettes   • Smokeless tobacco: Never Used   • Tobacco comment: 0.5-1 PPD   Substance and Sexual Activity   • Alcohol use: No   • Drug use: No   • Sexual activity: Defer       Family History   Problem Relation Age of Onset   • Lung cancer Father 72   • Hypertension Father    • Heart disease Father    • Cancer Father    • Diabetes Father    • Other Brother 46        MI   • Heart disease Brother    • Breast cancer Paternal Aunt    • Colon cancer Maternal Grandfather    • Other Cousin         CLL (dx 46 yo)   • Hypertension Mother        Allergies   Allergen Reactions   • Codeine Other  SERJIO TIDWELL [facility]-[id number]     Study Start Date:  0100 12/22/22  Study End Date:  0800 12/22/22	  Duration x Hours:  7 hr   --------------------------------------------------------------------------------------------------    History:  CC/ HPI Patient is a 62y old  Male who presents with a chief complaint of Pt is a 63 yo M with PMH: glioblastoma s/p craniotomy for resection of L brain tumor 10/3/22. Presented with word-finding difficulty and AMS. Last known well unknown. HCP states in usual state of health yesterday. On 12/21, pt spoke on phone at 12:30pm and was not making sense. En route to hospital, had witnessed 2 minute seizure. Of note, pt completed oral chemo yesterday, completed radiation.      (22 Dec 2022 08:02)    MEDICATIONS  (STANDING):  chlorhexidine 0.12% Liquid 15 milliLiter(s) Oral Mucosa every 12 hours  dexAMETHasone  Injectable 4 milliGRAM(s) IV Push every 6 hours  dexMEDEtomidine Infusion 0.2 MICROgram(s)/kG/Hr (4.6 mL/Hr) IV Continuous <Continuous>  insulin lispro (ADMELOG) corrective regimen sliding scale   SubCutaneous every 6 hours  lacosamide IVPB 50 milliGRAM(s) IV Intermittent every 12 hours  levETIRAcetam  IVPB 1000 milliGRAM(s) IV Intermittent every 12 hours  pantoprazole  Injectable 40 milliGRAM(s) IV Push at bedtime  polyethylene glycol 3350 17 Gram(s) Oral daily  propofol Infusion 19.928 MICROgram(s)/kG/Min (11 mL/Hr) IV Continuous <Continuous>  senna 2 Tablet(s) Oral at bedtime  sodium chloride 0.9%. 1000 milliLiter(s) (70 mL/Hr) IV Continuous <Continuous>      --------------------------------------------------------------------------------------------------  Study Interpretation:    [[[Abbreviation Key:  PDR=alpha rhythm/posterior dominant rhythm. A-P=anterior posterior.  Amplitude: ‘very low’:<20; ‘low’:20-49; ‘medium’:; ‘high’:>150uV.  Persistence for periodic/rhythmic patterns (% of epoch) ‘rare’:<1%; ‘occasional’:1-10%; ‘frequent’:10-50%; ‘abundant’:50-90%; ‘continuous’:>90%.  Persistence for sporadic discharges: ‘rare’:<1/hr; ‘occasional’:1/min-1/hr; ‘frequent’:>1/min; ‘abundant’:>1/10 sec.  RPP=rhythmic and periodic patterns; GRDA=generalized rhythmic delta activity; FIRDA=frontal intermittent GRDA; LRDA=lateralized rhythmic delta activity; TIRDA=temporal intermittent rhythmic delta activity;  LPD=PLED=lateralized periodic discharges; GPD=generalized periodic discharges; BIPDs =bilateral independent periodic discharges; Mf=multifocal; SIRPDs=stimulus induced rhythmic, periodic, or ictal appearing discharges; BIRDs=brief potentially ictal rhythmic discharges >4 Hz, lasting .5-10s; PFA (paroxysmal bursts >13 Hz or =8 Hz <10s).  Modifiers: +F=with fast component; +S=with spike component; +R=with rhythmic component.  S-B=burst suppression pattern.  Max=maximal. N1-drowsy; N2-stage II sleep; N3-slow wave sleep. SSS/BETS=small sharp spikes/benign epileptiform transients of sleep. HV=hyperventilation; PS=photic stimulation]]]    Daily EEG Visual Analysis    FINDINGS:      Background:  Continuous: continuous  Symmetry: asymmetric  PDR: 9-10 Hz activity, with amplitude to 40 uV, that attenuated to eye opening over right hemisphere no discernable PDR over the left hemispehere.  Low amplitude frontal beta noted in wakefulness.  Reactivity: present  Voltage: normal, mostly 20-150uV  Anterior Posterior Gradient: present  Other background findings: none  Breach: absent    Background Slowing:  Generalized slowing: diffuse theta and delta slowing.   Focal slowing: Continuous left hemispheric slowing, left temporal maximal     State Changes:   Drowsiness noted with increased slowing, attenuation of fast activity, vertex transients  N2 sleep transients were not recorded.    Sporadic Epileptiform Discharges:    None    Rhythmic and Periodic Patterns (RPPs):  Frequent, long, sharply contoured lateralized periodic discharges (LPDs) in the left anterior temporal region static 1 Hz without evolution this pattern break by 0600.     Electrographic and Electroclinical seizures:  None    Other Clinical Events:  None    Activation Procedures:   Hyperventilation was not performed.    Photic stimulation was not performed.    Artifacts:  Intermittent myogenic and movement artifacts were noted.    ECG:  The heart rate on single channel ECG was predominantly between 60-70 BPM.    EEG Classification / Summary:    Abnormal  EEG in the awake / drowsy  state(s).    - LPDs in the left anterior temporal region.   - Continuous left hemispheric theta and polymorphic delta slowing, maximal in the left temporal region.  - Background slowing, generalized, mild    - Asymmetry, focal, PDR absent over the left hemisphere.    Clinical Impression:    - Potential epileptogenic zone from the left anterior temporal region.   - Regional structural abnormality in the left hemisphere most conspicuous in the left temporal area  - Mild diffuse / multifocal cerebral dysfunction.   - No seizures were recorded.     This is a preliminary report     Elizabeth Segovia M.D.   Epilepsy fellow    -------------------------------------------------------------------------------------------------------  [facility] EEG Reading Room Ph#: [phone]  Epilepsy Answering Service after 5PM and before 8:30AM: Ph#: [phone]     "(See Comments)     \"UNKNOW  CHILDHOOD REACTION\"   • Penicillins Other (See Comments)     \"UNKNOWN CHILDHOOD ALLERGY\"   • Rituxan [Rituximab] Other (See Comments)     \"THROAT RAWNESS; FELT LIKE MY THROAT WAS CLOSING UP\"       Current Outpatient Medications   Medication Sig Dispense Refill   • albuterol (PROVENTIL HFA;VENTOLIN HFA) 108 (90 Base) MCG/ACT inhaler Inhale 2 puffs As Needed for Wheezing.     • ALPRAZolam (XANAX) 0.5 MG tablet Take 0.5 mg by mouth 3 (Three) Times a Day As Needed for Anxiety.     • aspirin 81 MG EC tablet Take 1 tablet by mouth Daily. 30 tablet 5   • B-D UF III MINI PEN NEEDLES 31G X 5 MM misc      • Brexpiprazole (REXULTI) 1 MG tablet Take  by mouth Daily.     • CloNIDine (CATAPRES) 0.1 MG tablet 0.1 mg 2 (Two) Times a Day.     • clopidogrel (PLAVIX) 75 MG tablet Take 1 tablet by mouth Daily. 30 tablet 5   • fluticasone-salmeterol (ADVAIR) 100-50 MCG/DOSE DISKUS Inhale 1 puff As Needed.     • gabapentin (NEURONTIN) 800 MG tablet Take 800 mg by mouth 4 (Four) Times a Day.     • ibrutinib (IMBRUVICA) 420 MG tablet tablet Take 1 tablet by mouth Daily. 28 tablet 2   • Ibuprofen (ADVIL PO) Take 400 mg by mouth 3 (Three) Times a Day. LIQUIGELS     • Insulin Aspart (NOVOLOG FLEXPEN SC) Inject 8-12 Units under the skin 3 (Three) Times a Day Before Meals. PER SLIDING SCALE     • Insulin Detemir (LEVEMIR FLEXPEN SC) Inject 8-12 Units under the skin Every Night. SLIDING SCALE     • levETIRAcetam (KEPPRA) 1000 MG tablet Take 1,000 mg by mouth 2 (Two) Times a Day.     • lisinopril (PRINIVIL,ZESTRIL) 20 MG tablet Take 20 mg by mouth Every Morning.     • loratadine (CLARITIN) 10 MG tablet Take 10 mg by mouth Every Night.     • metoprolol tartrate (LOPRESSOR) 25 MG tablet 25 mg 2 (Two) Times a Day.     • ondansetron (ZOFRAN) 8 MG tablet Take 1 tablet by mouth Every 8 (Eight) Hours As Needed for Nausea or Vomiting. 90 tablet 6   • oxyCODONE-acetaminophen (PERCOCET)  MG per tablet Take 1 tablet by " SERJIO TIDWELL [facility]-[id number]     Study Start Date:  0100 12/22/22  Study End Date:  0800 12/22/22	  Duration x Hours:  7 hr   --------------------------------------------------------------------------------------------------    History:  CC/ HPI Patient is a 62y old  Male who presents with a chief complaint of Pt is a 63 yo M with PMH: glioblastoma s/p craniotomy for resection of L brain tumor 10/3/22. Presented with word-finding difficulty and AMS. Last known well unknown. HCP states in usual state of health yesterday. On 12/21, pt spoke on phone at 12:30pm and was not making sense. En route to hospital, had witnessed 2 minute seizure. Of note, pt completed oral chemo yesterday, completed radiation.      (22 Dec 2022 08:02)    MEDICATIONS  (STANDING):  chlorhexidine 0.12% Liquid 15 milliLiter(s) Oral Mucosa every 12 hours  dexAMETHasone  Injectable 4 milliGRAM(s) IV Push every 6 hours  dexMEDEtomidine Infusion 0.2 MICROgram(s)/kG/Hr (4.6 mL/Hr) IV Continuous <Continuous>  insulin lispro (ADMELOG) corrective regimen sliding scale   SubCutaneous every 6 hours  lacosamide IVPB 50 milliGRAM(s) IV Intermittent every 12 hours  levETIRAcetam  IVPB 1000 milliGRAM(s) IV Intermittent every 12 hours  pantoprazole  Injectable 40 milliGRAM(s) IV Push at bedtime  polyethylene glycol 3350 17 Gram(s) Oral daily  propofol Infusion 19.928 MICROgram(s)/kG/Min (11 mL/Hr) IV Continuous <Continuous>  senna 2 Tablet(s) Oral at bedtime  sodium chloride 0.9%. 1000 milliLiter(s) (70 mL/Hr) IV Continuous <Continuous>      --------------------------------------------------------------------------------------------------  Study Interpretation:    [[[Abbreviation Key:  PDR=alpha rhythm/posterior dominant rhythm. A-P=anterior posterior.  Amplitude: ‘very low’:<20; ‘low’:20-49; ‘medium’:; ‘high’:>150uV.  Persistence for periodic/rhythmic patterns (% of epoch) ‘rare’:<1%; ‘occasional’:1-10%; ‘frequent’:10-50%; ‘abundant’:50-90%; ‘continuous’:>90%.  Persistence for sporadic discharges: ‘rare’:<1/hr; ‘occasional’:1/min-1/hr; ‘frequent’:>1/min; ‘abundant’:>1/10 sec.  RPP=rhythmic and periodic patterns; GRDA=generalized rhythmic delta activity; FIRDA=frontal intermittent GRDA; LRDA=lateralized rhythmic delta activity; TIRDA=temporal intermittent rhythmic delta activity;  LPD=PLED=lateralized periodic discharges; GPD=generalized periodic discharges; BIPDs =bilateral independent periodic discharges; Mf=multifocal; SIRPDs=stimulus induced rhythmic, periodic, or ictal appearing discharges; BIRDs=brief potentially ictal rhythmic discharges >4 Hz, lasting .5-10s; PFA (paroxysmal bursts >13 Hz or =8 Hz <10s).  Modifiers: +F=with fast component; +S=with spike component; +R=with rhythmic component.  S-B=burst suppression pattern.  Max=maximal. N1-drowsy; N2-stage II sleep; N3-slow wave sleep. SSS/BETS=small sharp spikes/benign epileptiform transients of sleep. HV=hyperventilation; PS=photic stimulation]]]    Daily EEG Visual Analysis    FINDINGS:      Background:  Continuous: continuous  Symmetry: asymmetric  PDR: 9-10 Hz activity, with amplitude to 40 uV, that attenuated to eye opening over right hemisphere no discernable PDR over the left hemispehere.  Low amplitude frontal beta noted in wakefulness.  Reactivity: present  Voltage: normal, mostly 20-150uV  Anterior Posterior Gradient: present  Other background findings: none  Breach: occipital region.     Background Slowing:  Generalized slowing: diffuse theta and delta slowing.   Focal slowing: Continuous left hemispheric slowing, left temporal maximal     State Changes:   Drowsiness noted with increased slowing, attenuation of fast activity, vertex transients  N2 sleep transients were not recorded.    Sporadic Epileptiform Discharges:    None    Rhythmic and Periodic Patterns (RPPs):  Frequent, long, sharply contoured lateralized periodic discharges (LPDs) in the left anterior temporal region static 1 Hz without evolution this pattern break by 0600.     Electrographic and Electroclinical seizures:  None    Other Clinical Events:  None    Activation Procedures:   Hyperventilation was not performed.    Photic stimulation was not performed.    Artifacts:  Intermittent myogenic and movement artifacts were noted.    ECG:  The heart rate on single channel ECG was predominantly between 60-70 BPM.    EEG Classification / Summary:    Abnormal  EEG in the awake / drowsy  state(s).    - LPDs in the left anterior temporal region.   - Continuous left hemispheric theta and polymorphic delta slowing, maximal in the left temporal region.  - Background slowing, generalized, mild    - Asymmetry, focal, PDR absent over the left hemisphere.  - Breach in occipital region.     Clinical Impression:    - Potential epileptogenic zone from the left anterior temporal region.   - Regional structural abnormality in the left hemisphere most conspicuous in the left temporal area  - Mild diffuse / multifocal cerebral dysfunction.   - No seizures were recorded.   - Skull defect in occipital region.     This is a preliminary report     Elizabeth Segovia M.D.   Epilepsy fellow    -------------------------------------------------------------------------------------------------------  [facility] EEG Reading Room Ph#: [phone]  Epilepsy Answering Service after 5PM and before 8:30AM: Ph#: [phone]     SERJIO TIDWELL [facility]-[id number]     Study Start Date:  0100 12/22/22  Study End Date:  0800 12/22/22	  Duration x Hours:  7 hr   --------------------------------------------------------------------------------------------------    History:  CC/ HPI Patient is a 62y old  Male who presents with a chief complaint of Pt is a 63 yo M with PMH: glioblastoma s/p craniotomy for resection of L brain tumor 10/3/22. Presented with word-finding difficulty and AMS. Last known well unknown. HCP states in usual state of health yesterday. On 12/21, pt spoke on phone at 12:30pm and was not making sense. En route to hospital, had witnessed 2 minute seizure. Of note, pt completed oral chemo yesterday, completed radiation.      (22 Dec 2022 08:02)    MEDICATIONS  (STANDING):  chlorhexidine 0.12% Liquid 15 milliLiter(s) Oral Mucosa every 12 hours  dexAMETHasone  Injectable 4 milliGRAM(s) IV Push every 6 hours  dexMEDEtomidine Infusion 0.2 MICROgram(s)/kG/Hr (4.6 mL/Hr) IV Continuous <Continuous>  insulin lispro (ADMELOG) corrective regimen sliding scale   SubCutaneous every 6 hours  lacosamide IVPB 50 milliGRAM(s) IV Intermittent every 12 hours  levETIRAcetam  IVPB 1000 milliGRAM(s) IV Intermittent every 12 hours  pantoprazole  Injectable 40 milliGRAM(s) IV Push at bedtime  polyethylene glycol 3350 17 Gram(s) Oral daily  propofol Infusion 19.928 MICROgram(s)/kG/Min (11 mL/Hr) IV Continuous <Continuous>  senna 2 Tablet(s) Oral at bedtime  sodium chloride 0.9%. 1000 milliLiter(s) (70 mL/Hr) IV Continuous <Continuous>      --------------------------------------------------------------------------------------------------  Study Interpretation:    [[[Abbreviation Key:  PDR=alpha rhythm/posterior dominant rhythm. A-P=anterior posterior.  Amplitude: ‘very low’:<20; ‘low’:20-49; ‘medium’:; ‘high’:>150uV.  Persistence for periodic/rhythmic patterns (% of epoch) ‘rare’:<1%; ‘occasional’:1-10%; ‘frequent’:10-50%; ‘abundant’:50-90%; ‘continuous’:>90%.  Persistence for sporadic discharges: ‘rare’:<1/hr; ‘occasional’:1/min-1/hr; ‘frequent’:>1/min; ‘abundant’:>1/10 sec.  RPP=rhythmic and periodic patterns; GRDA=generalized rhythmic delta activity; FIRDA=frontal intermittent GRDA; LRDA=lateralized rhythmic delta activity; TIRDA=temporal intermittent rhythmic delta activity;  LPD=PLED=lateralized periodic discharges; GPD=generalized periodic discharges; BIPDs =bilateral independent periodic discharges; Mf=multifocal; SIRPDs=stimulus induced rhythmic, periodic, or ictal appearing discharges; BIRDs=brief potentially ictal rhythmic discharges >4 Hz, lasting .5-10s; PFA (paroxysmal bursts >13 Hz or =8 Hz <10s).  Modifiers: +F=with fast component; +S=with spike component; +R=with rhythmic component.  S-B=burst suppression pattern.  Max=maximal. N1-drowsy; N2-stage II sleep; N3-slow wave sleep. SSS/BETS=small sharp spikes/benign epileptiform transients of sleep. HV=hyperventilation; PS=photic stimulation]]]    Daily EEG Visual Analysis    FINDINGS:      Background:  Continuous: continuous  Symmetry: asymmetric  PDR: 9-10 Hz activity, with amplitude to 40 uV, that attenuated to eye opening over right hemisphere no discernable PDR over the left hemispehere.  Low amplitude frontal beta noted in wakefulness.  Reactivity: present  Voltage: normal, mostly 20-150uV  Anterior Posterior Gradient: present  Other background findings: none  Breach: occipital region.     Background Slowing:  Generalized slowing: diffuse theta and delta slowing.   Focal slowing: Continuous left hemispheric slowing, left temporal maximal     State Changes:   Drowsiness noted with increased slowing, attenuation of fast activity, vertex transients  N2 sleep transients were not recorded.    Sporadic Epileptiform Discharges:    None    Rhythmic and Periodic Patterns (RPPs):  Frequent, long, sharply contoured lateralized periodic discharges (LPDs) in the left anterior temporal region static 1 Hz without evolution this pattern break by 0600.     Electrographic and Electroclinical seizures:  None    Other Clinical Events:  None    Activation Procedures:   Hyperventilation was not performed.    Photic stimulation was not performed.    Artifacts:  Intermittent myogenic and movement artifacts were noted.    ECG:  The heart rate on single channel ECG was predominantly between 60-70 BPM.    EEG Classification / Summary:    Abnormal  EEG in the awake / drowsy  state(s).    - LPDs in the left anterior temporal region.   - Continuous left hemispheric theta and polymorphic delta slowing, maximal in the left temporal region.  - Background slowing, generalized, mild    - Asymmetry, focal, PDR absent over the left hemisphere.  - Breach in occipital region.     Clinical Impression:    - Potential epileptogenic zone from the left anterior temporal region.   - Regional structural abnormality in the left hemisphere most conspicuous in the left temporal area  - Mild diffuse / multifocal cerebral dysfunction.   - No seizures were recorded.   - Skull defect in occipital region.       Elizabeth Segovia M.D.   Epilepsy fellow    Yoan Marie MD  EEG/Epilepsy Attending     -------------------------------------------------------------------------------------------------------  Health system EEG Reading Room Ph#: [phone]  Epilepsy Answering Service after 5PM and before 8:30AM: Ph#: [phone]     mouth Every 6 (Six) Hours As Needed for Moderate Pain .     • prochlorperazine (COMPAZINE) 10 MG tablet Take 1 tablet by mouth Every 6 (Six) Hours As Needed for Nausea or Vomiting. 60 tablet 3   • Rosuvastatin Calcium (CRESTOR PO) Take  by mouth.     • sertraline (ZOLOFT) 100 MG tablet Take 100 mg by mouth Every Night.     • sulfamethoxazole-trimethoprim (BACTRIM) 400-80 MG tablet Take 1 tablet by mouth Daily. 30 tablet 3   • valACYclovir (VALTREX) 500 MG tablet Take 1 tablet by mouth Daily. 30 tablet 3     No current facility-administered medications for this visit.          ASSESSMENT/PLAN:    Patient Update Assessment (new medications, allergies, medical history): Assessed    Medication(s): Imbruvica    Currently Taking Medication(s): Yes, she is compiant    Effectiveness of Medication: Effective    Experiencing Side Effects: Minimal    Prior Authorization Status: Active    Financial Assistance Status: None needed at this time    Any Issues Identified: No issues at this time.    Appropriate to Process Prescription(s): Yes, after chart review and patient discussion, it is appropriate to fill at this time. Patient had no refills, thus, new Rx was obtained.    Next Specialty Pharmacy Visit: in 4 weeks                   SERJIO TIDWELL [facility]-[id number]     Study Start Date:  0100 12/22/22  Study End Date:  1800 12/22/22	  Duration x Hours:  17 hr   --------------------------------------------------------------------------------------------------    History:  CC/ HPI Patient is a 62y old  Male who presents with a chief complaint of Pt is a 63 yo M with PMH: glioblastoma s/p craniotomy for resection of L brain tumor 10/3/22. Presented with word-finding difficulty and AMS. Last known well unknown. HCP states in usual state of health yesterday. On 12/21, pt spoke on phone at 12:30pm and was not making sense. En route to hospital, had witnessed 2 minute seizure. Of note, pt completed oral chemo yesterday, completed radiation.      (22 Dec 2022 08:02)    MEDICATIONS  (STANDING):  chlorhexidine 0.12% Liquid 15 milliLiter(s) Oral Mucosa every 12 hours  dexAMETHasone  Injectable 4 milliGRAM(s) IV Push every 6 hours  dexMEDEtomidine Infusion 0.2 MICROgram(s)/kG/Hr (4.6 mL/Hr) IV Continuous <Continuous>  insulin lispro (ADMELOG) corrective regimen sliding scale   SubCutaneous every 6 hours  lacosamide IVPB 50 milliGRAM(s) IV Intermittent every 12 hours  levETIRAcetam  IVPB 1000 milliGRAM(s) IV Intermittent every 12 hours  pantoprazole  Injectable 40 milliGRAM(s) IV Push at bedtime  polyethylene glycol 3350 17 Gram(s) Oral daily  propofol Infusion 19.928 MICROgram(s)/kG/Min (11 mL/Hr) IV Continuous <Continuous>  senna 2 Tablet(s) Oral at bedtime  sodium chloride 0.9%. 1000 milliLiter(s) (70 mL/Hr) IV Continuous <Continuous>      --------------------------------------------------------------------------------------------------  Study Interpretation:    [[[Abbreviation Key:  PDR=alpha rhythm/posterior dominant rhythm. A-P=anterior posterior.  Amplitude: ‘very low’:<20; ‘low’:20-49; ‘medium’:; ‘high’:>150uV.  Persistence for periodic/rhythmic patterns (% of epoch) ‘rare’:<1%; ‘occasional’:1-10%; ‘frequent’:10-50%; ‘abundant’:50-90%; ‘continuous’:>90%.  Persistence for sporadic discharges: ‘rare’:<1/hr; ‘occasional’:1/min-1/hr; ‘frequent’:>1/min; ‘abundant’:>1/10 sec.  RPP=rhythmic and periodic patterns; GRDA=generalized rhythmic delta activity; FIRDA=frontal intermittent GRDA; LRDA=lateralized rhythmic delta activity; TIRDA=temporal intermittent rhythmic delta activity;  LPD=PLED=lateralized periodic discharges; GPD=generalized periodic discharges; BIPDs =bilateral independent periodic discharges; Mf=multifocal; SIRPDs=stimulus induced rhythmic, periodic, or ictal appearing discharges; BIRDs=brief potentially ictal rhythmic discharges >4 Hz, lasting .5-10s; PFA (paroxysmal bursts >13 Hz or =8 Hz <10s).  Modifiers: +F=with fast component; +S=with spike component; +R=with rhythmic component.  S-B=burst suppression pattern.  Max=maximal. N1-drowsy; N2-stage II sleep; N3-slow wave sleep. SSS/BETS=small sharp spikes/benign epileptiform transients of sleep. HV=hyperventilation; PS=photic stimulation]]]    Daily EEG Visual Analysis    FINDINGS:      Background:  Continuous: continuous  Symmetry: asymmetric  PDR: 9-10 Hz activity, with amplitude to 40 uV, that attenuated to eye opening over right hemisphere no discernable PDR over the left hemispehere.  Low amplitude frontal beta noted in wakefulness.  Reactivity: present  Voltage: normal, mostly 20-150uV  Anterior Posterior Gradient: present  Other background findings: none  Breach: occipital region.     Background Slowing:  Generalized slowing: diffuse theta and delta slowing.   Focal slowing: Continuous left hemispheric slowing, left temporal maximal     State Changes:   Drowsiness noted with increased slowing, attenuation of fast activity, vertex transients  N2 sleep transients were not recorded.    Sporadic Epileptiform Discharges:    None    Rhythmic and Periodic Patterns (RPPs):  Frequent, long, sharply contoured lateralized periodic discharges (LPDs) in the left anterior temporal region static 1 Hz without evolution this pattern break by 0600.     Electrographic and Electroclinical seizures:  None    Other Clinical Events:  None    Activation Procedures:   Hyperventilation was not performed.    Photic stimulation was not performed.    Artifacts:  Intermittent myogenic and movement artifacts were noted.    ECG:  The heart rate on single channel ECG was predominantly between 60-70 BPM.    EEG Classification / Summary:    Abnormal  EEG in the awake / drowsy  state(s).    - LPDs in the left anterior temporal region.   - Continuous left hemispheric theta and polymorphic delta slowing, maximal in the left temporal region.  - Background slowing, generalized, mild    - Asymmetry, focal, PDR absent over the left hemisphere.  - Breach in occipital region.     Clinical Impression:    - Potential epileptogenic zone from the left anterior temporal region.   - Regional structural abnormality in the left hemisphere most conspicuous in the left temporal area  - Mild diffuse / multifocal cerebral dysfunction.   - No seizures were recorded.   - Skull defect in occipital region.       Elizabeth Segovia M.D.   Epilepsy fellow    Yoan Marie MD  EEG/Epilepsy Attending     -------------------------------------------------------------------------------------------------------  [facility] EEG Reading Room Ph#: [phone]  Epilepsy Answering Service after 5PM and before 8:30AM: Ph#: [phone]

## 2022-12-22 NOTE — PROGRESS NOTE ADULT - SUBJECTIVE AND OBJECTIVE BOX
Patient seen and examined at bedside.    --Anticoagulation--    T(C): 37.2 (12-21-22 @ 22:00), Max: 37.2 (12-21-22 @ 22:00)  HR: 105 (12-21-22 @ 22:30) (70 - 131)  BP: 137/90 (12-21-22 @ 22:30) (123/88 - 168/106)  RR: 14 (12-21-22 @ 22:30) (12 - 22)  SpO2: 100% (12-21-22 @ 22:30) (95% - 100%)  Wt(kg): --    Exam:  Intubated, EOS, PERRL, EOMI, aphasic, FC (wiggles toes, closes eyes), LOUISE AG  Yes

## 2022-12-22 NOTE — PROGRESS NOTE ADULT - ASSESSMENT
ASSESSMENT/PLAN: 61 y/o prior GBM resection now with increased seizures post/chemo rad treatment    NEURO:    hx of GBM, s/p crani 10/3, with adjunct radiation and chemo treatment  - Neurochecks q1 hrs  - CTH today  -MRI w/w/o FU  - Vasogenic edema Dex 4Q6   -Seizure: loaded with keppra in the ED, continue with keppra 1g BID, and vimpat 100 BID  -on vEEG, f/u read  - neurology following  - pain control  - hold sedation for exubtation      PULM:    - ETT to vent  - mucomyst, nebs  - wean to extubate  - CPAP  - CXR, ABG  - VAP bundle    CV:  - MAP > 65  - f/u TTE   EKG w NSR, RBBB, TWI precordial leads, trop neg    RENAL:  - Fluids:  NS @ 75 while NPO  - BMP q12h  - Na goal 140-150    GI:    - Diet: NPO  - PPI while on CCS/ETT  - bowel regimen    ENDO:   - Goal euglycemia (-180)  - ISS while on steroids    HEME/ONC:  - SCDs  - SQL   - LEDs    ID:  afebrile         Dispo: ICU for mechanical ventillation

## 2022-12-22 NOTE — DIETITIAN INITIAL EVALUATION ADULT - ETIOLOGY
concern for inadequate energy/protein intake in setting of increased nutrient needs r/t GBM, s/p resection with craniotomy.

## 2022-12-22 NOTE — DIETITIAN INITIAL EVALUATION ADULT - PERTINENT MEDS FT
MEDICATIONS  (STANDING):  chlorhexidine 0.12% Liquid 15 milliLiter(s) Oral Mucosa every 12 hours  dexAMETHasone  Injectable 4 milliGRAM(s) IV Push every 6 hours  dexMEDEtomidine Infusion 0.2 MICROgram(s)/kG/Hr (4.6 mL/Hr) IV Continuous <Continuous>  insulin lispro (ADMELOG) corrective regimen sliding scale   SubCutaneous every 6 hours  lacosamide IVPB 50 milliGRAM(s) IV Intermittent every 12 hours  levETIRAcetam  IVPB 1000 milliGRAM(s) IV Intermittent every 12 hours  pantoprazole  Injectable 40 milliGRAM(s) IV Push at bedtime  polyethylene glycol 3350 17 Gram(s) Oral daily  propofol Infusion 19.928 MICROgram(s)/kG/Min (11 mL/Hr) IV Continuous <Continuous>  senna 2 Tablet(s) Oral at bedtime  sodium chloride 0.9%. 1000 milliLiter(s) (70 mL/Hr) IV Continuous <Continuous>    MEDICATIONS  (PRN):  acetaminophen     Tablet .. 650 milliGRAM(s) Oral every 6 hours PRN Temp greater or equal to 38C (100.4F), Mild Pain (1 - 3)

## 2022-12-22 NOTE — DIETITIAN INITIAL EVALUATION ADULT - ENTERAL
Defer initiation of EN to medical team. If warranted, recommend Glucerna 1.5 at 60ml/hr x 24 hrs. To provide (based on dosing wt 92kg): 1440ml, 2160kcal (23.5kcal/kg) and 119g protein (1.29g protein/kg).

## 2022-12-22 NOTE — PROGRESS NOTE ADULT - ASSESSMENT
NSCU, q1h neuro checks  seizure in ED, treated w/ versed & ativan, de-saturated and required intubation   CTH in AM  MRI w/w/o   Wean to extubate

## 2022-12-22 NOTE — DIETITIAN INITIAL EVALUATION ADULT - PERTINENT LABORATORY DATA
12-21    136  |  102  |  13  ----------------------------<  138<H>  4.2   |  24  |  0.71    Ca    9.2      21 Dec 2022 23:29  Phos  4.9     12-21  Mg     1.9     12-21    TPro  6.6  /  Alb  3.8  /  TBili  0.4  /  DBili  x   /  AST  17  /  ALT  39  /  AlkPhos  57  12-21  POCT Blood Glucose.: 143 mg/dL (12-22-22 @ 06:44)  A1C with Estimated Average Glucose Result: 5.7 % (12-22-22 @ 02:21)

## 2022-12-22 NOTE — DIETITIAN INITIAL EVALUATION ADULT - ORAL INTAKE PTA/DIET HISTORY
Pt intubated; unable to participate in nutrition evaluation at this time. Known to writer, with previous hospitalization 10/2022. Per previous RD note 10/4/22, pt with hx of good appetite PTA. Consumed three meals daily with no overt changes in overall PO. At that time, pt with preference for "light foods" in-house, however denied specific dietary preferences at baseline. Denied intolerance to chewing and swallowing, and denied food allergies. Denied intake of vitamins/supplements PTA. Interim diet history since October 2022 unclear. Will obtain subjective diet hx from pt/family PRN.

## 2022-12-22 NOTE — PROGRESS NOTE ADULT - SUBJECTIVE AND OBJECTIVE BOX
NSCU Progress Note    Assessment/Hospital Course:        24 Hour Events/Subjective:  -       REVIEW OF SYSTEMS:  - negative except as above    VITALS:   - T(C): 37.1 (12-21-22 @ 23:00), Max: 37.2 (12-21-22 @ 22:00)  T(F): 98.8 (12-21-22 @ 23:00), Max: 99 (12-21-22 @ 22:00)  HR: 88 (12-22-22 @ 05:00) (61 - 131)  BP: 120/79 (12-22-22 @ 05:00) (96/67 - 168/106)  ABP: --  ABP(mean): --  RR: 17 (12-22-22 @ 05:00) (12 - 22)  SpO2: 100% (12-22-22 @ 05:00) (95% - 100%)      IMAGING/DATA:   - Reviewed          PHYSICAL EXAM:    General: calm  CVS: RRR  Pulm: CTAB  GI: Soft, NTND  Extremities: No LE Edema  Neuro: AOx3, PERRL, EOMI, facial symmetrical, fluent speech, motor 5/5 throughout, no PND, sensation in tact   NSCU Progress Note    Assessment/Hospital Course:    62M PMH glioblastoma s/p craniotomy for resection of L brain tumor 10/3/22 with DR. Turpin on Keppra 500 BID, dexamethasone p/w word-finding difficulty & AMS today. Last known well unknown. HCP at bedside states pt was in usual state of health yesterday. Today, spoke to pt on the phone at 12:30pm and he was not making sense. En route to hospital had witnessed 2minute seizure ~1:30pm, described as pt rigid with jerking of R head/neck. Of note, pt just completed oral chemo yesterday, also recently completed radiation. Pt given decadron 8mg PO by HCP as per radiooncology PA recommendations over the phone en route. In ED, patient had another seizure, which was aborted with versed, pt remained hypoxic post ictal and was intubated for airway protection.      24 Hour Events/Subjective:  - admitted overnight, intubated in ED for airway protection  - apneic on cpap overnight      REVIEW OF SYSTEMS:  - negative except as above    VITALS:   - Reviewed      IMAGING/DATA:   - Reviewed          PHYSICAL EXAM:    General: ett to vent  CVS: RRR  Pulm: CTAB  GI: Soft, NTND  Extremities: No LE Edema  Neuro: Neurological: intubated, opens eyes spontaneously, PERLL, EOMI,  no facial, no gaze preference, closes eyes and wiggles toes to commands, LOUISE AG   NSCU Progress Note    Assessment/Hospital Course:    SERJIO TIDWELL is a 62y (1960) man with a PMHx significant for GBM s/p resection in 10/22 on Keppra 750 bid followed by Dr. Turpin who presented to the ED for change in mental status. Further history provided by HCP friend at bedside. Per HCP, she spoke to pt on the phone at 1230 and he was not making sense. While en route to the hospital had witnessed RUE twitching and rigid lasting around 2 min.  Pt given decadron 8mg PO by HCP as per radiooncology PA recommendations over the phone en route, in ED found to have witnessed seizure, intubated for airway protection and admitted to NSICU.      24 Hour Events/Subjective:  - admitted overnight, intubated in ED for airway protection  - apneic on cpap overnight      REVIEW OF SYSTEMS:  - negative except as above    VITALS:   - Reviewed      IMAGING/DATA:   - Reviewed          PHYSICAL EXAM:    General: ett to vent  CVS: RRR  Pulm: CTAB  GI: Soft, NTND  Extremities: No LE Edema  Neuro: Neurological: intubated, opens eyes spontaneously, PERLL, EOMI,  no facial, no gaze preference, closes eyes and wiggles toes to commands, LOUISE AG

## 2022-12-23 LAB
ANION GAP SERPL CALC-SCNC: 12 MMOL/L — SIGNIFICANT CHANGE UP (ref 5–17)
BUN SERPL-MCNC: 19 MG/DL — SIGNIFICANT CHANGE UP (ref 7–23)
CALCIUM SERPL-MCNC: 9.5 MG/DL — SIGNIFICANT CHANGE UP (ref 8.4–10.5)
CHLORIDE SERPL-SCNC: 103 MMOL/L — SIGNIFICANT CHANGE UP (ref 96–108)
CO2 SERPL-SCNC: 26 MMOL/L — SIGNIFICANT CHANGE UP (ref 22–31)
CREAT SERPL-MCNC: 0.71 MG/DL — SIGNIFICANT CHANGE UP (ref 0.5–1.3)
EGFR: 102 ML/MIN/1.73M2 — SIGNIFICANT CHANGE UP
GLUCOSE BLDC GLUCOMTR-MCNC: 106 MG/DL — HIGH (ref 70–99)
GLUCOSE BLDC GLUCOMTR-MCNC: 117 MG/DL — HIGH (ref 70–99)
GLUCOSE BLDC GLUCOMTR-MCNC: 120 MG/DL — HIGH (ref 70–99)
GLUCOSE BLDC GLUCOMTR-MCNC: 133 MG/DL — HIGH (ref 70–99)
GLUCOSE SERPL-MCNC: 127 MG/DL — HIGH (ref 70–99)
HCT VFR BLD CALC: 43.5 % — SIGNIFICANT CHANGE UP (ref 39–50)
HGB BLD-MCNC: 14.3 G/DL — SIGNIFICANT CHANGE UP (ref 13–17)
MAGNESIUM SERPL-MCNC: 2.2 MG/DL — SIGNIFICANT CHANGE UP (ref 1.6–2.6)
MCHC RBC-ENTMCNC: 28.4 PG — SIGNIFICANT CHANGE UP (ref 27–34)
MCHC RBC-ENTMCNC: 32.9 GM/DL — SIGNIFICANT CHANGE UP (ref 32–36)
MCV RBC AUTO: 86.3 FL — SIGNIFICANT CHANGE UP (ref 80–100)
NRBC # BLD: 0 /100 WBCS — SIGNIFICANT CHANGE UP (ref 0–0)
PHOSPHATE SERPL-MCNC: 3.3 MG/DL — SIGNIFICANT CHANGE UP (ref 2.5–4.5)
PLATELET # BLD AUTO: 156 K/UL — SIGNIFICANT CHANGE UP (ref 150–400)
POTASSIUM SERPL-MCNC: 4.1 MMOL/L — SIGNIFICANT CHANGE UP (ref 3.5–5.3)
POTASSIUM SERPL-SCNC: 4.1 MMOL/L — SIGNIFICANT CHANGE UP (ref 3.5–5.3)
RBC # BLD: 5.04 M/UL — SIGNIFICANT CHANGE UP (ref 4.2–5.8)
RBC # FLD: 13.4 % — SIGNIFICANT CHANGE UP (ref 10.3–14.5)
SODIUM SERPL-SCNC: 141 MMOL/L — SIGNIFICANT CHANGE UP (ref 135–145)
WBC # BLD: 10.17 K/UL — SIGNIFICANT CHANGE UP (ref 3.8–10.5)
WBC # FLD AUTO: 10.17 K/UL — SIGNIFICANT CHANGE UP (ref 3.8–10.5)

## 2022-12-23 PROCEDURE — 99291 CRITICAL CARE FIRST HOUR: CPT

## 2022-12-23 PROCEDURE — 93306 TTE W/DOPPLER COMPLETE: CPT | Mod: 26

## 2022-12-23 PROCEDURE — 93970 EXTREMITY STUDY: CPT | Mod: 26

## 2022-12-23 PROCEDURE — 93010 ELECTROCARDIOGRAM REPORT: CPT

## 2022-12-23 PROCEDURE — 99233 SBSQ HOSP IP/OBS HIGH 50: CPT

## 2022-12-23 PROCEDURE — 93308 TTE F-UP OR LMTD: CPT | Mod: 26

## 2022-12-23 RX ORDER — ENOXAPARIN SODIUM 100 MG/ML
40 INJECTION SUBCUTANEOUS
Refills: 0 | Status: DISCONTINUED | OUTPATIENT
Start: 2022-12-23 | End: 2022-12-30

## 2022-12-23 RX ORDER — QUETIAPINE FUMARATE 200 MG/1
50 TABLET, FILM COATED ORAL AT BEDTIME
Refills: 0 | Status: DISCONTINUED | OUTPATIENT
Start: 2022-12-23 | End: 2022-12-24

## 2022-12-23 RX ORDER — QUETIAPINE FUMARATE 200 MG/1
25 TABLET, FILM COATED ORAL
Refills: 0 | Status: DISCONTINUED | OUTPATIENT
Start: 2022-12-23 | End: 2022-12-24

## 2022-12-23 RX ORDER — POLYETHYLENE GLYCOL 3350 17 G/17G
17 POWDER, FOR SOLUTION ORAL
Refills: 0 | Status: DISCONTINUED | OUTPATIENT
Start: 2022-12-23 | End: 2022-12-30

## 2022-12-23 RX ORDER — LACOSAMIDE 50 MG/1
100 TABLET ORAL
Refills: 0 | Status: DISCONTINUED | OUTPATIENT
Start: 2022-12-23 | End: 2022-12-27

## 2022-12-23 RX ORDER — DEXAMETHASONE 0.5 MG/5ML
4 ELIXIR ORAL EVERY 6 HOURS
Refills: 0 | Status: DISCONTINUED | OUTPATIENT
Start: 2022-12-23 | End: 2022-12-24

## 2022-12-23 RX ORDER — QUETIAPINE FUMARATE 200 MG/1
25 TABLET, FILM COATED ORAL AT BEDTIME
Refills: 0 | Status: DISCONTINUED | OUTPATIENT
Start: 2022-12-23 | End: 2022-12-23

## 2022-12-23 RX ORDER — QUETIAPINE FUMARATE 200 MG/1
12.5 TABLET, FILM COATED ORAL
Refills: 0 | Status: DISCONTINUED | OUTPATIENT
Start: 2022-12-23 | End: 2022-12-23

## 2022-12-23 RX ORDER — ACETAMINOPHEN 500 MG
650 TABLET ORAL EVERY 6 HOURS
Refills: 0 | Status: DISCONTINUED | OUTPATIENT
Start: 2022-12-23 | End: 2022-12-30

## 2022-12-23 RX ORDER — QUETIAPINE FUMARATE 200 MG/1
12.5 TABLET, FILM COATED ORAL ONCE
Refills: 0 | Status: COMPLETED | OUTPATIENT
Start: 2022-12-23 | End: 2022-12-23

## 2022-12-23 RX ORDER — LEVETIRACETAM 250 MG/1
1000 TABLET, FILM COATED ORAL
Refills: 0 | Status: DISCONTINUED | OUTPATIENT
Start: 2022-12-23 | End: 2022-12-30

## 2022-12-23 RX ORDER — PANTOPRAZOLE SODIUM 20 MG/1
40 TABLET, DELAYED RELEASE ORAL
Refills: 0 | Status: DISCONTINUED | OUTPATIENT
Start: 2022-12-23 | End: 2022-12-30

## 2022-12-23 RX ORDER — QUETIAPINE FUMARATE 200 MG/1
50 TABLET, FILM COATED ORAL
Refills: 0 | Status: DISCONTINUED | OUTPATIENT
Start: 2022-12-23 | End: 2022-12-23

## 2022-12-23 RX ORDER — SENNA PLUS 8.6 MG/1
2 TABLET ORAL AT BEDTIME
Refills: 0 | Status: DISCONTINUED | OUTPATIENT
Start: 2022-12-23 | End: 2022-12-30

## 2022-12-23 RX ADMIN — LACOSAMIDE 100 MILLIGRAM(S): 50 TABLET ORAL at 05:14

## 2022-12-23 RX ADMIN — CHLORHEXIDINE GLUCONATE 1 APPLICATION(S): 213 SOLUTION TOPICAL at 21:21

## 2022-12-23 RX ADMIN — SENNA PLUS 2 TABLET(S): 8.6 TABLET ORAL at 21:21

## 2022-12-23 RX ADMIN — QUETIAPINE FUMARATE 12.5 MILLIGRAM(S): 200 TABLET, FILM COATED ORAL at 10:15

## 2022-12-23 RX ADMIN — POLYETHYLENE GLYCOL 3350 17 GRAM(S): 17 POWDER, FOR SOLUTION ORAL at 18:03

## 2022-12-23 RX ADMIN — DEXMEDETOMIDINE HYDROCHLORIDE IN 0.9% SODIUM CHLORIDE 4.6 MICROGRAM(S)/KG/HR: 4 INJECTION INTRAVENOUS at 19:29

## 2022-12-23 RX ADMIN — QUETIAPINE FUMARATE 50 MILLIGRAM(S): 200 TABLET, FILM COATED ORAL at 21:21

## 2022-12-23 RX ADMIN — LEVETIRACETAM 1000 MILLIGRAM(S): 250 TABLET, FILM COATED ORAL at 05:14

## 2022-12-23 RX ADMIN — Medication 4 MILLIGRAM(S): at 12:00

## 2022-12-23 RX ADMIN — QUETIAPINE FUMARATE 12.5 MILLIGRAM(S): 200 TABLET, FILM COATED ORAL at 12:30

## 2022-12-23 RX ADMIN — POLYETHYLENE GLYCOL 3350 17 GRAM(S): 17 POWDER, FOR SOLUTION ORAL at 05:14

## 2022-12-23 RX ADMIN — Medication 4 MILLIGRAM(S): at 23:23

## 2022-12-23 RX ADMIN — SODIUM CHLORIDE 70 MILLILITER(S): 9 INJECTION INTRAMUSCULAR; INTRAVENOUS; SUBCUTANEOUS at 21:21

## 2022-12-23 RX ADMIN — DEXMEDETOMIDINE HYDROCHLORIDE IN 0.9% SODIUM CHLORIDE 4.6 MICROGRAM(S)/KG/HR: 4 INJECTION INTRAVENOUS at 07:15

## 2022-12-23 RX ADMIN — LACOSAMIDE 100 MILLIGRAM(S): 50 TABLET ORAL at 18:04

## 2022-12-23 RX ADMIN — Medication 4 MILLIGRAM(S): at 18:04

## 2022-12-23 RX ADMIN — Medication 4 MILLIGRAM(S): at 05:14

## 2022-12-23 RX ADMIN — DEXMEDETOMIDINE HYDROCHLORIDE IN 0.9% SODIUM CHLORIDE 4.6 MICROGRAM(S)/KG/HR: 4 INJECTION INTRAVENOUS at 18:04

## 2022-12-23 RX ADMIN — LEVETIRACETAM 1000 MILLIGRAM(S): 250 TABLET, FILM COATED ORAL at 18:42

## 2022-12-23 RX ADMIN — CHLORHEXIDINE GLUCONATE 15 MILLILITER(S): 213 SOLUTION TOPICAL at 05:15

## 2022-12-23 NOTE — PHYSICAL THERAPY INITIAL EVALUATION ADULT - PLANNED THERAPY INTERVENTIONS, PT EVAL
GOAL: Pt will ascend/descend 12 steps (I) with U HR and step over step pattern in 4 weeks./balance training/bed mobility training/gait training/strengthening/transfer training

## 2022-12-23 NOTE — PHYSICAL THERAPY INITIAL EVALUATION ADULT - GAIT DEVIATIONS NOTED, PT EVAL
decreased griffin/decreased velocity of limb motion/decreased step length/decreased weight-shifting ability

## 2022-12-23 NOTE — PROGRESS NOTE ADULT - ASSESSMENT
ASSESSMENT/PLAN: 63 y/o prior GBM resection now with increased seizures post/chemo rad treatment    NEURO:    hx of GBM, s/p crani 10/3, with adjunct radiation and chemo treatment  - Neurochecks q1 hrs  - CTH today  -MRI w/w/o FU  - Vasogenic edema Dex 4Q6   -Seizure: loaded with keppra in the ED, continue with keppra 1g BID, and vimpat 100 BID  -on vEEG, f/u read  - neurology following  - pain control  - hold sedation for exubtation      PULM:    - ETT to vent  - mucomyst, nebs  - wean to extubate  - CPAP  - CXR, ABG  - VAP bundle    CV:  - MAP > 65  - f/u TTE   EKG w NSR, RBBB, TWI precordial leads, trop neg    RENAL:  - Fluids:  NS @ 75 while NPO  - BMP q12h  - Na goal 140-150    GI:    - Diet: NPO  - PPI while on CCS/ETT  - bowel regimen    ENDO:   - Goal euglycemia (-180)  - ISS while on steroids    HEME/ONC:  - SCDs  - SQL   - LEDs    ID:  afebrile         Dispo: ICU for mechanical ventillation ASSESSMENT/PLAN: 61 y/o prior GBM resection now with increased seizures post/chemo rad treatment    NEURO:    hx of GBM, s/p crani 10/3, with adjunct radiation and chemo treatment  - Neurochecks q1 hrs  -MRI w/w/o, SPECT pending final read  - Vasogenic edema Dex 4Q6   - Seizure:  keppra 1g BID, and vimpat 100 BID  - on vEEG, f/u read  - neurology following  - pain control      PULM:    - RA  - Aspiration precautions  - IS as tolerated    CV:  - MAP > 65  - f/u TTE  - EKG w NSR, RBBB, TWI precordial leads, trop neg    RENAL:  - Fluids:  NS @ 75 while NPO  - BMP qd  - Na goal 140-150    GI:    - Diet: NPO  - PPI while on CCS  - bowel regimen    ENDO:   - Goal euglycemia (-180)  - ISS while on steroids    HEME/ONC:  - SCDs  - SQL   - LEDs    ID:  afebrile         Dispo: ICU for mechanical ventillation ASSESSMENT/PLAN: 61 y/o prior GBM resection now with increased seizures post/chemo rad treatment    NEURO:    hx of GBM, s/p crani 10/3, with adjunct radiation and chemo treatment  - Neurochecks q4 hrs  - MRI w/w/o, SPECT pending final read  - Vasogenic edema Dex 4Q6   - Seizure: keppra 1g BID, and vimpat 100 BID  - DC vEEG,   - Seroquel 50qhs for agitation, 25qam x1 week then off   - neurology following  - pain control      PULM:    - RA  - Aspiration precautions  - IS as tolerated    CV:  - MAP > 65  - f/u TTE  - EKG w NSR, RBBB, TWI precordial leads, trop neg    RENAL:  - Fluids:  NS @ 75 while NPO  - BMP qd  - Na goal 140-150    GI:    - Diet: passed dysphagia, adat  - PPI while on CCS  - bowel regimen    ENDO:   - Goal euglycemia (-180)  - ISS while on steroids    HEME/ONC:  - SCDs  - SQL   - LEDs    ID:  afebrile         Dispo: 4C this afternoon if no airway issues

## 2022-12-23 NOTE — OCCUPATIONAL THERAPY INITIAL EVALUATION ADULT - DIAGNOSIS, OT EVAL
Pt demonstrating decreased strength, balance, ROM impacting pt's ability to participate in functional mobility and ADLs.

## 2022-12-23 NOTE — OCCUPATIONAL THERAPY INITIAL EVALUATION ADULT - PERTINENT HX OF CURRENT PROBLEM, REHAB EVAL
62M Hx GBB s/p crani 10/3/2022 undergoing radiation p/f AMS and increasing aphasia. Radiation PA suggested dex8mg, which was given at 1pm. The patient was on his way to the hospital when he had a seizure lasting 2m. Doppler (-)

## 2022-12-23 NOTE — PHYSICAL THERAPY INITIAL EVALUATION ADULT - FOLLOWS COMMANDS/ANSWERS QUESTIONS, REHAB EVAL
25% of the time/unable to follow multi-step instructions <25% of simple commands/unable to follow multi-step instructions

## 2022-12-23 NOTE — OCCUPATIONAL THERAPY INITIAL EVALUATION ADULT - LIVES WITH, PROFILE
pt unable to report As per care coordination note on 12/22, "Patient lives with his mother in a private home with 1 flight up in the front and 1 step in the back...Prior to admission patient was independent with ADLs and ambulation. No assistive devices or homecare services

## 2022-12-23 NOTE — PHYSICAL THERAPY INITIAL EVALUATION ADULT - ADDITIONAL COMMENTS
As per care coordination note on 12/22, "Patient lives with his mother in a private home with 1 flight up in the front and 1 step in the back...Prior to admission patient was independent with ADLs and ambulation. No assistive devices or homecare services"

## 2022-12-23 NOTE — PROGRESS NOTE ADULT - ASSESSMENT
NSCU, q1h neuro checks  Wean to extubate  LE dopplers pending  On vimpat, keppra for sz  TTE pending  MRI done pending final read  CTH today showing slight inc mass effect on L lat vent  EEG read from 12/22: no sz, cont to have LPDs from L temp region

## 2022-12-23 NOTE — PROGRESS NOTE ADULT - ASSESSMENT
ASSESSMENT/PLAN: 61 y/o prior GBM resection now with increased seizures post/chemo rad treatment    NEURO:    hx of GBM, s/p crani 10/3, with adjunct radiation and chemo treatment  - Neurochecks q4 hrs  - MRI w/w/o, SPECT pending final read  - Vasogenic edema Dex 4Q6   - Seizure: keppra 1g BID, and vimpat 100 BID  - DC vEEG,   - Seroquel 50qhs for agitation, 25qam x1 week then off   - neurology following  - pain control      PULM:    - RA  - Aspiration precautions  - IS as tolerated    CV:  - MAP > 65  - f/u TTE  - EKG w NSR, RBBB, TWI precordial leads, trop neg    RENAL:  - Fluids:  NS @ 75 while NPO  - BMP qd  - Na goal 140-150    GI:    - Diet: passed dysphagia, adat  - PPI while on CCS  - bowel regimen    ENDO:   - Goal euglycemia (-180)  - ISS while on steroids    HEME/ONC:  - SCDs  - SQL   - LEDs    ID:  afebrile         Dispo: 4C this afternoon if no airway issues ASSESSMENT/PLAN: 61 y/o prior GBM resection now with increased seizures post/chemo rad treatment    NEURO:    hx of GBM, s/p crani 10/3, with adjunct radiation and chemo treatment  - Neurochecks q4 hrs  - MRI w/w/o, SPECT pending final read  - Vasogenic edema Dex 4Q6   - Seizure: keppra 1g BID, and vimpat 100 BID  - DC vEEG,   - Seroquel 50qhs for agitation, 25qam x1 week then off   - neurology following  - pain control      PULM:    - RA  - Aspiration precautions  - IS as tolerated    CV:  - 100-160  -  TTE w nml EF  - EKG w NSR, RBBB, TWI precordial leads, trop neg  repeat ecg for qtc    RENAL:  - Fluids: IVL  - BMP qd  - Na goal 140-150    GI:    - Diet: passed dysphagia, adat  - PPI while on CCS  - bowel regimen  LBM PTA    ENDO:   - Goal euglycemia (-180)  - ISS while on steroids    HEME/ONC:  - SCDs  - SQL   - LEDs    ID:  afebrile         Dispo: 4C this afternoon if no airway issues

## 2022-12-23 NOTE — PROGRESS NOTE ADULT - SUBJECTIVE AND OBJECTIVE BOX
NEUROLOGY FOLLOW-UP CONSULT NOTE    RFC: expressive aphasia with witnessed RUE twitching and rigidity    Interval history: No acute neurologic events overnight.    Meds:  MEDICATIONS  (STANDING):  chlorhexidine 4% Liquid 1 Application(s) Topical <User Schedule>  dexAMETHasone     Tablet 4 milliGRAM(s) Oral every 6 hours  dexMEDEtomidine Infusion 0.2 MICROgram(s)/kG/Hr (4.6 mL/Hr) IV Continuous <Continuous>  insulin lispro (ADMELOG) corrective regimen sliding scale   SubCutaneous every 6 hours  lacosamide Solution 100 milliGRAM(s) Oral two times a day  levETIRAcetam  Solution 1000 milliGRAM(s) Oral two times a day  pantoprazole    Tablet 40 milliGRAM(s) Oral before breakfast  polyethylene glycol 3350 17 Gram(s) Oral two times a day  QUEtiapine 50 milliGRAM(s) Oral <User Schedule>  QUEtiapine 25 milliGRAM(s) Oral at bedtime  senna 2 Tablet(s) Oral at bedtime  sodium chloride 0.9%. 1000 milliLiter(s) (70 mL/Hr) IV Continuous <Continuous>    MEDICATIONS  (PRN):  acetaminophen     Tablet .. 650 milliGRAM(s) Oral every 6 hours PRN Temp greater or equal to 38C (100.4F), Mild Pain (1 - 3)      PMHx/PSHx/FHx/SHx:  Cerebral edema    Actinic keratosis    Carcinoma in situ of skin of scalp and neck    Disorder of brain, unspecified    Encounter for screening for malignant neoplasm of skin    Erythema intertrigo    Homonymous bilateral field defects, right side    Inflamed seborrheic keratosis    Insomnia, unspecified    Malignant neoplasm of brain, unspecified    Malignant neoplasm of occipital lobe    Melanocytic nevi, unspecified    Neoplasm of uncertain behavior of skin    Neoplasm of uncertain behavior, unspecified    Other melanin hyperpigmentation    Other seborrheic keratosis    Other specified disorders of brain    Other specified postprocedural states    Personal history of in-situ neoplasm of skin    Unspecified visual disturbance    Handoff    MEWS Score    No pertinent past medical history    Brain edema    No significant past surgical history    HEADACHE VISUAL CHG THIS AM    74    Seizure    [email or web address]        Allergies:  No Known Allergies      ROS: All systems negative except as documented in Interval history    O:  T(C): 36.7 (22 @ 11:00), Max: 37 (22 @ 19:00)  HR: 57 (22 @ 11:00) (55 - 78)  BP: 124/83 (22 @ 11:00) (111/66 - 136/82)  RR: 18 (22 @ 11:00) (14 - 21)  SpO2: 96% (22 @ 11:00) (93% - 100%)    Focused neurologic exam:  MS - Alert, lethargic, opens eyes to verbal stimuli, keeps repeating yes, does not answer questions, follows some commands, unable to assess for attn/conc/recent and remote memory/fund of knowledge due to expressive aphasia and mental status  CN - PERRLA, EOMI, blink to threat, face sens/str/hearing WNL b/l, tongue/palate midline, unable to assess for trap due to pt not following commands  Motor - Normal bulk/tone, b/l UE antigravity, unable to assess for strength due to patient not following commands  Sens - LT intact   DTR's - 2+ all and downgoing b/l plantar response  Coord - unable to assess due to patient not following commands  Gait and station - unable to assess due to fall risk and patient not following commands    Pertinent labs/studies:    LABS:  cret                        13.1   10.43 )-----------( 149      ( 22 Dec 2022 20:15 )             39.8     12-    134<L>  |  101  |  16  ----------------------------<  165<H>  4.5   |  22  |  0.58    Ca    8.8      22 Dec 2022 20:15  Phos  3.9     12-  Mg     2.1     -    TPro  6.6  /  Alb  3.8  /  TBili  0.4  /  DBili  x   /  AST  17  /  ALT  39  /  AlkPhos  57  12-21    PT/INR - ( 21 Dec 2022 23:29 )   PT: 13.5 sec;   INR: 1.16 ratio         PTT - ( 21 Dec 2022 23:29 )  PTT:29.4 sec    Radiology:     CT Head No Cont (22 @ 09:28)     IMPRESSION:  Slight increased mass effect on the left lateral ventricle   when compared with the prior MR 10/5/2022 unchanged from CT one day prior   area findings suggest progression of disease. Recommend correlation with   MR for more complete evaluation      MR Head w/wo IV Cont (22 @ 17:19)   IMPRESSION: Findings suspicious for progressive neoplasm in the left   parietal postoperative bed compared with 10/5/2022 with MR spectroscopy.   Tiny focus of neoplasm also likely in the left brainstem, unchanged.      MR Spectroscopy (22 @ 17:20)     There has been a left parietal craniotomy. There is increased enhancement   in the left parietal postoperative bed compared with 10/5/2022 suggesting   increased postoperative changes versus recurrent neoplasm. Enhancement   measures 7.6 cm in AP diameter by 1.6 cm transversely by 2.8 cm in   craniocaudal diameter compared with the prior of 6.6 cm in AP diameter by   1.9 cm transversely by 2.1 cm in craniocaudal diameter with peripheral   enhancement suggesting predominantly postoperative changes. There is also   a tiny punctate focus of enhancement in the left midbrain which is in   similar clinical indication 2 enhancement noted on the preoperative exam   of 2022 and likely represents additional focus of neoplasm.     MR perfusion was performed with raw data was sent to the rapid ischemia   view software for postprocessing. MR perfusion demonstrates decreased   blood flow in the left parietal postoperative bed and the lesion in the   left brainstem is too small to evaluate.    MR spectroscopy was performed with a TE of 30 and 144. An enhancing   portion of the mass anteriorly was interrogated as well as a tiny focus   of enhancement in the left midbrain. These both demonstrate elevated  choline to creatine and decreased KLEBER. The left parietal lesion also   demonstrates elevated lipids.    IMPRESSION: Findings suspicious for progressive neoplasm in the left   parietal postoperative bed compared with 10/5/2022 with MR spectroscopy.   Tiny focus of neoplasm also likely in the left brainstem, unchanged.    EE22 EEG Classification / Summary:    Abnormal  EEG in the awake / drowsy  state(s).    - LPDs in the left anterior temporal region.   - Continuous left hemispheric theta and polymorphic delta slowing, maximal in the left temporal region.  - Background slowing, generalized, mild    - Asymmetry, focal, PDR absent over the left hemisphere.  - Breach in occipital region.     Clinical Impression:    - Potential epileptogenic zone from the left anterior temporal region.   - Regional structural abnormality in the left hemisphere most conspicuous in the left temporal area  - Mild diffuse / multifocal cerebral dysfunction.   - No seizures were recorded.   - Skull defect in occipital region.   No seizures so far continues to have have LPDs from left temporal region.

## 2022-12-23 NOTE — PROGRESS NOTE ADULT - SUBJECTIVE AND OBJECTIVE BOX
NSCU Progress Note    Assessment/Hospital Course:    SERJIO TIDWELL is a 62y (1960) man with a PMHx significant for GBM s/p resection in 10/22 on Keppra 750 bid followed by Dr. Turpin who presented to the ED for change in mental status. Further history provided by HCP friend at bedside. Per HCP, she spoke to pt on the phone at 1230 and he was not making sense. While en route to the hospital had witnessed RUE twitching and rigid lasting around 2 min.  Pt given decadron 8mg PO by HCP as per radiooncology PA recommendations over the phone en route, in ED found to have witnessed seizure, intubated for airway protection and admitted to NSICU.      24 Hour Events/Subjective:  - admitted overnight, intubated in ED for airway protection  - apneic on cpap overnight      REVIEW OF SYSTEMS:  - negative except as above    VITALS:   - Reviewed      IMAGING/DATA:   - Reviewed          PHYSICAL EXAM:    General: ett to vent  CVS: RRR  Pulm: CTAB  GI: Soft, NTND  Extremities: No LE Edema  Neuro: Neurological: intubated, opens eyes spontaneously, PERLL, EOMI,  no facial, no gaze preference, closes eyes and wiggles toes to commands, LOUISE AG   NSCU Progress Note    Assessment/Hospital Course:    SERJIO TIDWELL is a 62y (1960) man with a PMHx significant for GBM s/p resection in 10/22 on Keppra 750 bid followed by Dr. Turpin who presented to the ED for change in mental status. Further history provided by HCP friend at bedside. Per HCP, she spoke to pt on the phone at 1230 and he was not making sense. While en route to the hospital had witnessed RUE twitching and rigid lasting around 2 min.  Pt given decadron 8mg PO by HCP as per radiooncology PA recommendations over the phone en route, in ED found to have witnessed seizure, intubated for airway protection and admitted to NSICU.      24 Hour Events/Subjective:  - Extubated to RA stably overnight  - MRI completed yesterday  - EEG LPDs Lt temporal region      REVIEW OF SYSTEMS:  - negative except as above    VITALS:   - Reviewed      IMAGING/DATA:   - Reviewed          PHYSICAL EXAM:    General: cooperative  CVS: RRR  Pulm: CTAB  GI: Soft, NTND  Extremities: No LE Edema  Neuro: Neurological:  opens eyes spontaneously, PERLL, EOMI,  no facial, no gaze preference, closes eyes and wiggles toes to commands, LOUISE AG

## 2022-12-23 NOTE — PROGRESS NOTE ADULT - SUBJECTIVE AND OBJECTIVE BOX
NSCU Progress Note    Assessment/Hospital Course:    SERJIO TIDWELL is a 62y (1960) man with a PMHx significant for GBM s/p resection in 10/22 on Keppra 750 bid followed by Dr. Turpin who presented to the ED for change in mental status. Further history provided by HCP friend at bedside. Per HCP, she spoke to pt on the phone at 1230 and he was not making sense. While en route to the hospital had witnessed RUE twitching and rigid lasting around 2 min.  Pt given decadron 8mg PO by HCP as per radiooncology PA recommendations over the phone en route, in ED found to have witnessed seizure, intubated for airway protection and admitted to NSICU.      24 Hour Events/Subjective:  - Extubated to RA stably overnight  - MRI completed yesterday  - EEG LPDs Lt temporal region  agitated, on precedex and seroquel      REVIEW OF SYSTEMS:  - negative except as above    VITALS:   - Reviewed      IMAGING/DATA:   - Reviewed          PHYSICAL EXAM:    General: cooperative  CVS: RRR  Pulm: CTAB  GI: Soft, NTND  Extremities: No LE Edema  Neuro: Neurological:  alert awake, aphasic, perseverates, PERLL, EOMI,  no facial, no gaze preference, closes eyes and wiggles toes to commands, LOUISE AG   NSCU Progress Note    Assessment/Hospital Course:    SERJIO TIDWELL is a 62y (1960) man with a PMHx significant for GBM s/p resection in 10/22 on Keppra 750 bid followed by Dr. Turpin who presented to the ED for change in mental status. Further history provided by HCP friend at bedside. Per HCP, she spoke to pt on the phone at 1230 and he was not making sense. While en route to the hospital had witnessed RUE twitching and rigid lasting around 2 min.  Pt given decadron 8mg PO by HCP as per radiooncology PA recommendations over the phone en route, in ED found to have witnessed seizure, intubated for airway protection and admitted to NSICU.      24 Hour Events/Subjective:  - Extubated to RA stably overnight  - MRI completed yesterday  - EEG LPDs Lt temporal region  agitated, on precedex and seroquel      REVIEW OF SYSTEMS:  - negative except as above    VITALS:   - Reviewed      IMAGING/DATA:   - Reviewed          PHYSICAL EXAM:    General: cooperative  CVS: RRR  Pulm: CTAB  GI: Soft, NTND  Extremities: No LE Edema  Neuro: Neurological:  alert awake, aphasic, perseverates, PERLL, EOMI,  no facial, no gaze preference, slight agitation, LOUISE AG

## 2022-12-23 NOTE — PHYSICAL THERAPY INITIAL EVALUATION ADULT - GENERAL OBSERVATIONS, REHAB EVAL
Pt rec'd semi-supine in bed +lethargic, +IV, +tele, +pulse-ox, +ICU monitoring, +Posey vest, +B/L wrist restraints, agreeable to PT. PPE donned as per contact/airborne precautions Pt rec'd semi-supine in bed in NAD, +IV, +tele, +pulse-ox, 2LNC, +Posey vest, +B/L wrist restraints, agreeable to PT. PPE donned as per contact/airborne precautions

## 2022-12-23 NOTE — PROGRESS NOTE ADULT - SUBJECTIVE AND OBJECTIVE BOX
Patient seen and examined at bedside.    --Anticoagulation--    T(C): 36.2 (12-22-22 @ 23:00), Max: 37 (12-22-22 @ 07:00)  HR: 59 (12-22-22 @ 23:00) (55 - 98)  BP: 115/73 (12-22-22 @ 23:00) (96/67 - 133/85)  RR: 18 (12-22-22 @ 23:00) (16 - 23)  SpO2: 93% (12-22-22 @ 23:00) (93% - 100%)  Wt(kg): --    Exam:  Intubated, EOS, PERRL, EOMI, aphasic, FC (wiggles toes, closes eyes), LOUISE AG

## 2022-12-23 NOTE — PHYSICAL THERAPY INITIAL EVALUATION ADULT - PERTINENT HX OF CURRENT PROBLEM, REHAB EVAL
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 Radiation PA suggested dex8mg, which was given at 1pm. The patient was on his way to the hospital when he had a seizure lasting 2 minutes. CTH showed increased edema and size of lesion, Dr. Salas read as possible recurrence but timeline could represent radiation necrosis v pseudoprogression. Pt is a/w seizure in setting of recent GBM resection. Hospital Course: pt intubated in ED on 12/21 for airway protection and admitted to NSCU. Pt +COVID on 12/21. Pt extubated overnight 12/22. XRAY CHEST (12/21): (-). CT BRAIN STROKE (12/21): Increased size of L temporal parietal mass with vasogenic edema consistent with tumor progression since 10/4/2022. No acute hemorrhage. MRI SPRECTROSCOPY (12/22): Findings suspicious for progressive neoplasm in Lparietal postoperative bed compared with 10/5/2022. VA DUPLEX BLE (12/22): (-) in either LE.

## 2022-12-24 LAB
GLUCOSE BLDC GLUCOMTR-MCNC: 104 MG/DL — HIGH (ref 70–99)
GLUCOSE BLDC GLUCOMTR-MCNC: 115 MG/DL — HIGH (ref 70–99)
GLUCOSE BLDC GLUCOMTR-MCNC: 77 MG/DL — SIGNIFICANT CHANGE UP (ref 70–99)
MRSA PCR RESULT.: SIGNIFICANT CHANGE UP
S AUREUS DNA NOSE QL NAA+PROBE: SIGNIFICANT CHANGE UP

## 2022-12-24 PROCEDURE — 99233 SBSQ HOSP IP/OBS HIGH 50: CPT

## 2022-12-24 RX ORDER — SODIUM CHLORIDE 9 MG/ML
1000 INJECTION, SOLUTION INTRAVENOUS
Refills: 0 | Status: DISCONTINUED | OUTPATIENT
Start: 2022-12-24 | End: 2022-12-30

## 2022-12-24 RX ORDER — HALOPERIDOL DECANOATE 100 MG/ML
2 INJECTION INTRAMUSCULAR EVERY 6 HOURS
Refills: 0 | Status: DISCONTINUED | OUTPATIENT
Start: 2022-12-24 | End: 2022-12-29

## 2022-12-24 RX ORDER — DEXAMETHASONE 0.5 MG/5ML
4 ELIXIR ORAL EVERY 12 HOURS
Refills: 0 | Status: DISCONTINUED | OUTPATIENT
Start: 2022-12-24 | End: 2022-12-30

## 2022-12-24 RX ORDER — DEXTROSE 50 % IN WATER 50 %
12.5 SYRINGE (ML) INTRAVENOUS ONCE
Refills: 0 | Status: DISCONTINUED | OUTPATIENT
Start: 2022-12-24 | End: 2022-12-30

## 2022-12-24 RX ORDER — INSULIN LISPRO 100/ML
VIAL (ML) SUBCUTANEOUS
Refills: 0 | Status: DISCONTINUED | OUTPATIENT
Start: 2022-12-24 | End: 2022-12-28

## 2022-12-24 RX ORDER — HALOPERIDOL DECANOATE 100 MG/ML
2 INJECTION INTRAMUSCULAR ONCE
Refills: 0 | Status: COMPLETED | OUTPATIENT
Start: 2022-12-24 | End: 2022-12-24

## 2022-12-24 RX ORDER — DEXTROSE 50 % IN WATER 50 %
15 SYRINGE (ML) INTRAVENOUS ONCE
Refills: 0 | Status: DISCONTINUED | OUTPATIENT
Start: 2022-12-24 | End: 2022-12-30

## 2022-12-24 RX ORDER — QUETIAPINE FUMARATE 200 MG/1
50 TABLET, FILM COATED ORAL AT BEDTIME
Refills: 0 | Status: DISCONTINUED | OUTPATIENT
Start: 2022-12-24 | End: 2022-12-24

## 2022-12-24 RX ORDER — GLUCAGON INJECTION, SOLUTION 0.5 MG/.1ML
1 INJECTION, SOLUTION SUBCUTANEOUS ONCE
Refills: 0 | Status: DISCONTINUED | OUTPATIENT
Start: 2022-12-24 | End: 2022-12-30

## 2022-12-24 RX ORDER — HALOPERIDOL DECANOATE 100 MG/ML
5 INJECTION INTRAMUSCULAR EVERY 8 HOURS
Refills: 0 | Status: DISCONTINUED | OUTPATIENT
Start: 2022-12-24 | End: 2022-12-30

## 2022-12-24 RX ORDER — DEXTROSE 50 % IN WATER 50 %
25 SYRINGE (ML) INTRAVENOUS ONCE
Refills: 0 | Status: DISCONTINUED | OUTPATIENT
Start: 2022-12-24 | End: 2022-12-30

## 2022-12-24 RX ORDER — INSULIN LISPRO 100/ML
VIAL (ML) SUBCUTANEOUS AT BEDTIME
Refills: 0 | Status: DISCONTINUED | OUTPATIENT
Start: 2022-12-24 | End: 2022-12-28

## 2022-12-24 RX ADMIN — HALOPERIDOL DECANOATE 2 MILLIGRAM(S): 100 INJECTION INTRAMUSCULAR at 12:08

## 2022-12-24 RX ADMIN — HALOPERIDOL DECANOATE 5 MILLIGRAM(S): 100 INJECTION INTRAMUSCULAR at 14:03

## 2022-12-24 RX ADMIN — ENOXAPARIN SODIUM 40 MILLIGRAM(S): 100 INJECTION SUBCUTANEOUS at 18:33

## 2022-12-24 RX ADMIN — LEVETIRACETAM 1000 MILLIGRAM(S): 250 TABLET, FILM COATED ORAL at 05:54

## 2022-12-24 RX ADMIN — LACOSAMIDE 100 MILLIGRAM(S): 50 TABLET ORAL at 05:54

## 2022-12-24 RX ADMIN — Medication 4 MILLIGRAM(S): at 05:55

## 2022-12-24 RX ADMIN — SENNA PLUS 2 TABLET(S): 8.6 TABLET ORAL at 22:37

## 2022-12-24 RX ADMIN — HALOPERIDOL DECANOATE 5 MILLIGRAM(S): 100 INJECTION INTRAMUSCULAR at 22:39

## 2022-12-24 RX ADMIN — QUETIAPINE FUMARATE 25 MILLIGRAM(S): 200 TABLET, FILM COATED ORAL at 05:55

## 2022-12-24 RX ADMIN — HALOPERIDOL DECANOATE 2 MILLIGRAM(S): 100 INJECTION INTRAMUSCULAR at 06:50

## 2022-12-24 RX ADMIN — PANTOPRAZOLE SODIUM 40 MILLIGRAM(S): 20 TABLET, DELAYED RELEASE ORAL at 05:56

## 2022-12-24 RX ADMIN — LACOSAMIDE 100 MILLIGRAM(S): 50 TABLET ORAL at 18:33

## 2022-12-24 RX ADMIN — LEVETIRACETAM 1000 MILLIGRAM(S): 250 TABLET, FILM COATED ORAL at 18:33

## 2022-12-24 RX ADMIN — Medication 4 MILLIGRAM(S): at 18:33

## 2022-12-24 RX ADMIN — Medication 5 MILLIGRAM(S): at 22:38

## 2022-12-24 RX ADMIN — POLYETHYLENE GLYCOL 3350 17 GRAM(S): 17 POWDER, FOR SOLUTION ORAL at 18:33

## 2022-12-24 RX ADMIN — POLYETHYLENE GLYCOL 3350 17 GRAM(S): 17 POWDER, FOR SOLUTION ORAL at 05:55

## 2022-12-24 NOTE — PROGRESS NOTE ADULT - SUBJECTIVE AND OBJECTIVE BOX
NSCU Progress Note    Assessment/Hospital Course:    SERIJO TIDWELL is a 62y (1960) man with a PMHx significant for GBM s/p resection in 10/22 on Keppra 750 bid followed by Dr. Turpin who presented to the ED for change in mental status. Further history provided by HCP friend at bedside. Per HCP, she spoke to pt on the phone at 1230 and he was not making sense. While en route to the hospital had witnessed RUE twitching and rigid lasting around 2 min.  Pt given decadron 8mg PO by HCP as per radiooncology PA recommendations over the phone en route, in ED found to have witnessed seizure, intubated for airway protection and admitted to NSICU.      24 Hour Events/Subjective:  - Agitated overnight, required haldol, seroquel       REVIEW OF SYSTEMS:  - negative except as above    VITALS:   - Reviewed      IMAGING/DATA:   - Reviewed          PHYSICAL EXAM:    General: cooperative  CVS: RRR  Pulm: CTAB  GI: Soft, NTND  Extremities: No LE Edema  Neuro: Neurological:  opens eyes spontaneously, PERLL, EOMI,  no facial, no gaze preference, AGx4

## 2022-12-24 NOTE — PROGRESS NOTE ADULT - ATTENDING COMMENTS
extubated overnight.  with significant agitation today, on precedex GTT.  will add seroquel, attempt to wean off GTT.  NSG noted MRI, no imminent OR plan, taper steroids.
still with intermittent agitation though precedex GTT off, haldol with some help.  COVID +, needs iso.  ok to transfer to floor on 1:1.
intubated for airway protection.  neurology following.  EEG has been negative thus far.  plan to get MRI this afternoon, when patient returns to unit will d/c EEG, CPAP, wean to extubate if able.

## 2022-12-24 NOTE — SPEECH LANGUAGE PATHOLOGY EVALUATION - COMMENTS
*10/5/2022 consult received for Speech-Language Evaluation s/p craniotomy for resection of tumor; however pt unavailable at that time. Goals:  1. Pt will improve expressive language skills for functional communication.  2. Pt will improve receptive language skills for functional communication.  3. Family/caregiver will demonstrate understanding/carryover of strategies to improve patient’s communication of wants/needs  4. Pt will improve cognitive-linguistic skills for functional communication.

## 2022-12-24 NOTE — SPEECH LANGUAGE PATHOLOGY EVALUATION - SLP DIAGNOSIS
Pt is a 63 y/o male p/w seizure in setting of recent GBM resection. Pt p/w mixed aphasia (expressive>receptive). Pt repeating yes to all simple questions and unable to follow simple commands. Additionally suspect superimposed mental status affected by medication. Plan for reassessment pending improved mentation and ability to participate.

## 2022-12-24 NOTE — PROGRESS NOTE ADULT - SUBJECTIVE AND OBJECTIVE BOX
Patient seen and examined at bedside.    --Anticoagulation--  enoxaparin Injectable 40 milliGRAM(s) SubCutaneous <User Schedule>    T(C): 36.7 (12-23-22 @ 23:00), Max: 36.9 (12-23-22 @ 15:00)  HR: 67 (12-23-22 @ 23:00) (55 - 78)  BP: 112/83 (12-23-22 @ 23:00) (111/66 - 140/91)  RR: 14 (12-23-22 @ 23:00) (14 - 21)  SpO2: 99% (12-23-22 @ 23:00) (94% - 100%)  Wt(kg): --    Exam:  Awake, EOS, PERRL, EOMI, aphasic, perseverates, FC, LOUISE AG

## 2022-12-24 NOTE — SPEECH LANGUAGE PATHOLOGY EVALUATION - SLP GENERAL OBSERVATIONS
Pt encountered semi-supine in bed, on room air, + b/l wrist restraints, + posey vest, + icu monitoring (VSS), sleeping. Pt moderate-max verbal and tactile stimuli to rouse. RN Jacinta endorsed pt previously received 2 mg Haldol 1 hour prior. Pt waxing/wanning and intermittently restless throughout evaluation during periods of awakeness.

## 2022-12-24 NOTE — PROGRESS NOTE ADULT - ASSESSMENT
ASSESSMENT/PLAN: 63 y/o prior GBM resection now with increased seizures post/chemo rad treatment    NEURO:    hx of GBM, s/p crani 10/3, with adjunct radiation and chemo treatment  - Neurochecks q4 hrs  - Vasogenic edema Dex 4Q6 , taper per NSG  - Seizure: keppra 1g BID (home med), and vimpat 100 BID  - Agitation: Seroquel, haldol prn, delirium precautions, consider risperidone ; DC precedex  - neurology following  - Surgical plan per NSG, pending ongoing discussion  - pain control      PULM:    - RA  - Aspiration precautions  - IS as tolerated    CV:  - 100-160  -  TTE w nml EF  - repeat ecg for qtc today    RENAL:  - Fluids: IVL  - BMP qd  - Na goal 140-150    GI:    - Diet: Regular  - PPI while on CCS  - bowel regimen  - LBM PTA    ENDO:   - Goal euglycemia (-180)  - ISS while on steroids    HEME/ONC:  - SCDs  - SQL   - LEDs    ID:  afebrile         Dispo: 4C vs. Obs ASSESSMENT/PLAN: 61 y/o prior GBM resection now with increased seizures post/chemo rad treatment    NEURO:    hx of GBM, s/p crani 10/3, with adjunct radiation and chemo treatment  - Neurochecks q4 hrs  - Vasogenic edema Dex 4Q12 , taper per NSG  - Seizure: keppra 1g BID (home med), and vimpat 100 BID  - Agitation: Seroquel, haldol prn, delirium precautions, consider risperidone ; DC precedex  - neurology following  - No Surgical plan per NSG, pending ongoing discussion  - pain control      PULM:    - RA  - Aspiration precautions  - IS as tolerated    CV:  - 100-160  -  TTE w nml EF  - repeat ecg for qtc today    RENAL:  - Fluids: IVL  - BMP qd  - Na goal 140-150    GI:    - Diet: Regular  - PPI while on CCS  - bowel regimen  - LBM PTA    ENDO:   - Goal euglycemia (-180)  - ISS while on steroids    HEME/ONC:  - SCDs  - SQL   - LEDs    ID:  afebrile         Dispo: 4C vs. Obs

## 2022-12-24 NOTE — SPEECH LANGUAGE PATHOLOGY EVALUATION - SLP PERTINENT HISTORY OF CURRENT PROBLEM
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 Radiation PA suggested dex8mg, which was given at 1pm. The patient was on his way to the hospital when he had a seizure lasting 2 minutes. CTH showed increased edema and size of lesion, Dr. Salas read as possible recurrence but timeline could represent radiation necrosis v pseudoprogression. Pt is a/w seizure in setting of recent GBM resection. Hospital Course: pt intubated in ED on 12/21 for airway protection and admitted to NSCU. Pt +COVID on 12/21. Pt extubated overnight 12/22. XRAY CHEST (12/21): (-). CT BRAIN STROKE (12/21): Increased size of L temporal parietal mass with vasogenic edema consistent with tumor progression since 10/4/2022. No acute hemorrhage. MRI SPRECTROSCOPY (12/22): Findings suspicious for progressive neoplasm in Lparietal postoperative bed compared with 10/5/2022. Pt is a 61 y/o M with PMH: GBM s/p crani 10/3/2022 undergoing radiation p/f AMS and increasing aphasia. Radiation PA suggested dex8mg, which was given at 1pm. The patient was on his way to the hospital when he had a seizure lasting 2 minutes. CTH showed increased edema and size of lesion, Dr. Salas read as possible recurrence but timeline could represent radiation necrosis v pseudoprogression. Pt is a/w seizure in setting of recent GBM resection. Hospital Course: pt intubated in ED on 12/21 for airway protection and admitted to NSCU. Pt +COVID on 12/21. Pt extubated overnight 12/22. XRAY CHEST (12/21): (-). CT BRAIN STROKE (12/21): Increased size of L temporal parietal mass with vasogenic edema consistent with tumor progression since 10/4/2022. No acute hemorrhage. MRI SPRECTROSCOPY (12/22): Findings suspicious for progressive neoplasm in Lparietal postoperative bed compared with 10/5/2022.

## 2022-12-24 NOTE — PROGRESS NOTE ADULT - ASSESSMENT
-NSCU, q1h neuro checks  -On vimpat, keppra for sz  -EEG read 12/22: no sz but cont to have LPDs from [facility] region  -LE dopps 12/23: neg  - TTE 12/23: wnl  -Wean off precedex

## 2022-12-25 LAB
ANION GAP SERPL CALC-SCNC: 14 MMOL/L — SIGNIFICANT CHANGE UP (ref 5–17)
BUN SERPL-MCNC: 21 MG/DL — SIGNIFICANT CHANGE UP (ref 7–23)
CALCIUM SERPL-MCNC: 9.4 MG/DL — SIGNIFICANT CHANGE UP (ref 8.4–10.5)
CHLORIDE SERPL-SCNC: 104 MMOL/L — SIGNIFICANT CHANGE UP (ref 96–108)
CO2 SERPL-SCNC: 24 MMOL/L — SIGNIFICANT CHANGE UP (ref 22–31)
CREAT SERPL-MCNC: 0.76 MG/DL — SIGNIFICANT CHANGE UP (ref 0.5–1.3)
EGFR: 100 ML/MIN/1.73M2 — SIGNIFICANT CHANGE UP
GLUCOSE BLDC GLUCOMTR-MCNC: 102 MG/DL — HIGH (ref 70–99)
GLUCOSE BLDC GLUCOMTR-MCNC: 103 MG/DL — HIGH (ref 70–99)
GLUCOSE BLDC GLUCOMTR-MCNC: 113 MG/DL — HIGH (ref 70–99)
GLUCOSE BLDC GLUCOMTR-MCNC: 117 MG/DL — HIGH (ref 70–99)
GLUCOSE SERPL-MCNC: 124 MG/DL — HIGH (ref 70–99)
HCT VFR BLD CALC: 49 % — SIGNIFICANT CHANGE UP (ref 39–50)
HGB BLD-MCNC: 15.9 G/DL — SIGNIFICANT CHANGE UP (ref 13–17)
MAGNESIUM SERPL-MCNC: 2.2 MG/DL — SIGNIFICANT CHANGE UP (ref 1.6–2.6)
MCHC RBC-ENTMCNC: 28 PG — SIGNIFICANT CHANGE UP (ref 27–34)
MCHC RBC-ENTMCNC: 32.4 GM/DL — SIGNIFICANT CHANGE UP (ref 32–36)
MCV RBC AUTO: 86.4 FL — SIGNIFICANT CHANGE UP (ref 80–100)
NRBC # BLD: 0 /100 WBCS — SIGNIFICANT CHANGE UP (ref 0–0)
PHOSPHATE SERPL-MCNC: 3.5 MG/DL — SIGNIFICANT CHANGE UP (ref 2.5–4.5)
PLATELET # BLD AUTO: 166 K/UL — SIGNIFICANT CHANGE UP (ref 150–400)
POTASSIUM SERPL-MCNC: 3.4 MMOL/L — LOW (ref 3.5–5.3)
POTASSIUM SERPL-SCNC: 3.4 MMOL/L — LOW (ref 3.5–5.3)
RBC # BLD: 5.67 M/UL — SIGNIFICANT CHANGE UP (ref 4.2–5.8)
RBC # FLD: 14.3 % — SIGNIFICANT CHANGE UP (ref 10.3–14.5)
SODIUM SERPL-SCNC: 142 MMOL/L — SIGNIFICANT CHANGE UP (ref 135–145)
WBC # BLD: 13.44 K/UL — HIGH (ref 3.8–10.5)
WBC # FLD AUTO: 13.44 K/UL — HIGH (ref 3.8–10.5)

## 2022-12-25 PROCEDURE — 99024 POSTOP FOLLOW-UP VISIT: CPT

## 2022-12-25 RX ORDER — LACTULOSE 10 G/15ML
10 SOLUTION ORAL EVERY 8 HOURS
Refills: 0 | Status: COMPLETED | OUTPATIENT
Start: 2022-12-25 | End: 2022-12-26

## 2022-12-25 RX ORDER — POTASSIUM CHLORIDE 20 MEQ
20 PACKET (EA) ORAL
Refills: 0 | Status: COMPLETED | OUTPATIENT
Start: 2022-12-25 | End: 2022-12-25

## 2022-12-25 RX ADMIN — ENOXAPARIN SODIUM 40 MILLIGRAM(S): 100 INJECTION SUBCUTANEOUS at 17:00

## 2022-12-25 RX ADMIN — LACOSAMIDE 100 MILLIGRAM(S): 50 TABLET ORAL at 17:00

## 2022-12-25 RX ADMIN — Medication 20 MILLIEQUIVALENT(S): at 09:38

## 2022-12-25 RX ADMIN — Medication 4 MILLIGRAM(S): at 17:00

## 2022-12-25 RX ADMIN — Medication 5 MILLIGRAM(S): at 22:08

## 2022-12-25 RX ADMIN — LEVETIRACETAM 1000 MILLIGRAM(S): 250 TABLET, FILM COATED ORAL at 06:36

## 2022-12-25 RX ADMIN — LEVETIRACETAM 1000 MILLIGRAM(S): 250 TABLET, FILM COATED ORAL at 17:00

## 2022-12-25 RX ADMIN — PANTOPRAZOLE SODIUM 40 MILLIGRAM(S): 20 TABLET, DELAYED RELEASE ORAL at 06:42

## 2022-12-25 RX ADMIN — POLYETHYLENE GLYCOL 3350 17 GRAM(S): 17 POWDER, FOR SOLUTION ORAL at 06:37

## 2022-12-25 RX ADMIN — LACTULOSE 10 GRAM(S): 10 SOLUTION ORAL at 14:05

## 2022-12-25 RX ADMIN — LACOSAMIDE 100 MILLIGRAM(S): 50 TABLET ORAL at 06:38

## 2022-12-25 RX ADMIN — HALOPERIDOL DECANOATE 5 MILLIGRAM(S): 100 INJECTION INTRAMUSCULAR at 14:05

## 2022-12-25 RX ADMIN — HALOPERIDOL DECANOATE 5 MILLIGRAM(S): 100 INJECTION INTRAMUSCULAR at 06:39

## 2022-12-25 RX ADMIN — Medication 20 MILLIEQUIVALENT(S): at 11:35

## 2022-12-25 RX ADMIN — Medication 4 MILLIGRAM(S): at 06:39

## 2022-12-25 RX ADMIN — HALOPERIDOL DECANOATE 5 MILLIGRAM(S): 100 INJECTION INTRAMUSCULAR at 22:08

## 2022-12-25 NOTE — PROGRESS NOTE ADULT - ASSESSMENT
62M Hx GBB s/p crani 10/3/2022 undergoing radiation p/f AMS and increasing aphasia. Radiation PA suggested dex8mg, which was given at 1pm. The patient was on his way to the hospital when he had a seizure lasting 2m (21 Dec 2022 16:00)    PROCEDURE:  Adm 12/21  Covid. Hx Crani GBM rsx'd 10/3/22-RT/Chemo w/AMS and seizures  POD#83    PLAN:  Neuro: 12/21 Cov+, on isolation. Cont restraints-can wean off or removed if able to go to observation room. Cont Dex 4Q12h, Keppra and Vimpat. Leukocytosis from steroids. 12/25 Hypokalemia supp.  FU BM. Inc activity/OOB. Dispo A. Rehab.  PMR-P    Neuro note of 12/24-- continue Vimpat 100 mg bid and Keppra 1000 mg TID. - Rest of care per neurosurgery team for further management of neoplasm seen on MRI head.  - Seizure rescue medications for a generalized tonic clonic episode lasting >3 min or significant derangement of vital signs: ---> 1st line: Ativan 2 mg IV. For GTC > 3 min and refractory to Ativan please call [phone] ([facility]).- Telemetry monitoring; Neurochecks/VS per unit protocol - Seizure, fall and aspiration precautions Other: [] Please note: if patient has a convulsion, please document time of onset, progression of limb involvement (upper/lower; R/L) if any, and specifically what patient was doing paying attention to eye opening vs closure, head turn, gaze deviation, shaking of extremities, tongue bite, urinary/bowel incontinence, any derangement of vital signs, length of episode, and duration of postictal period.  [] Given concern for seizure, advise patient to not drive, operate heavy machinery, avoid heights, pools, bathtubs, locked doors until cleared by further follow up outpatient by neurology.     Respiratory: Patient instructed to use incentive spirometer [ ] YES [ X] NO              DVT ppx: [X ] SQL [ ] SQH and Venodynes [ ] Left [ ] Right [ X] Bilateral    Discharge Planning:  The patient was evaluated by PT and dispo TBD, OT Recomm A. Rehab. PMR eval-P FU.   He was subsequently DC on >>> in stable condition.    More than 30 minutes spent on total encounter: more than 50% of the visit was spent on educating the patient and family regarding condition, medications, follow up plans, signs and symptoms to be concerned with, preparing paperwork, and questions answered regarding discharge.

## 2022-12-25 NOTE — PROGRESS NOTE ADULT - SUBJECTIVE AND OBJECTIVE BOX
SUBJECTIVE: [facility] This is my initial contact with pt. No complaints except wants restraints off. NAD. On Covid Isolation    OVERNIGHT EVENTS: None    Vital Signs Last 24 Hrs  T(C): 36.5 (25 Dec 2022 08:24), Max: 37.6 (24 Dec 2022 17:00)  T(F): 97.7 (25 Dec 2022 08:24), Max: 99.6 (24 Dec 2022 17:00)  HR: 100 (25 Dec 2022 08:24) (96 - 149)  BP: 125/85 (25 Dec 2022 08:24) (102/67 - 128/84)  BP(mean): --  RR: 18 (25 Dec 2022 08:24) (18 - 18)  SpO2: 96% (25 Dec 2022 08:24) (90% - 96%)    Parameters below as of 25 Dec 2022 08:24  Patient On (Oxygen Delivery Method): nasal cannula      IVF: [X ] IVL [ ] NS+K@   DIET: [X ] Regular [ ] CCD [ ] Renal [ ] Puree [ ] Dysphagia [ ] Tube Feeds:   PCA: [ ] YES [X ] NO   SOLOMON: [ ] YES [X ] NO [ ] VOID Urinary Incontinence  BM: [ ] YES [X] NO     DRAINS: None    PHYSICAL EXAM:    General: No Acute Distress     Neurological: AAO x 2-3. PERRL. +FC. LOUISE antigravity/equally. +perseveration. Following Commands, Face Symmetrical, Speech clear,  Sensation to Light Touch Intact    Pulmonary: Clear to Auscultation, No Rales, No Rhonchi, No Wheezes     Cardiovascular: S1, S2, Regular Rate and Rhythm     Gastrointestinal: Soft, Nontender, Nondistended     Incision: NA    LABS:                        15.9   13.44 )-----------( 166      ( 25 Dec 2022 05:49 )             49.0    12-25    142  |  104  |  21  ----------------------------<  124<H>  3.4<L>   |  24  |  0.76    Ca    9.4      25 Dec 2022 05:49  Phos  3.5     12-25  Mg     2.2     12-25    Flu With COVID-19 By STEVE (12.21.22 @ 16:13)    SARS-CoV-2 Result: Detected    IMAGING:   < from: POCUS ED TTE 2D F/U, Limited w/o Cont. (12.23.22 @ 22:35) >  INTERPRETATION:  A focused transthoracic cardiac ultrasound examination   was performed on 12/21/2022.  No pericardial effusion was present.  No global wall motion abnormality was identified  Preserved cardiac contractility    IMPRESSION:  No Pericardial Effusion.    < end of copied text >    < from: VA Duplex Lower Ext Vein Scan, Bilat (12.23.22 @ 11:50) >  IMPRESSION:  No evidence of deep venous thrombosis in either lower extremity.    < end of copied text >    < from: MR Spectroscopy (12.22.22 @ 17:20) >    IMPRESSION: Findings suspicious for progressive neoplasm in the left   parietal postoperative bed compared with 10/5/2022 with MR spectroscopy.   Tiny focus of neoplasm also likely in the left brainstem, unchanged.    < end of copied text >      MEDICATIONS  (STANDING):  bisacodyl 5 milliGRAM(s) Oral at bedtime  dexAMETHasone     Tablet 4 milliGRAM(s) Oral every 12 hours  dextrose 5%. 1000 milliLiter(s) (50 mL/Hr) IV Continuous <Continuous>  dextrose 5%. 1000 milliLiter(s) (100 mL/Hr) IV Continuous <Continuous>  dextrose 50% Injectable 25 Gram(s) IV Push once  dextrose 50% Injectable 12.5 Gram(s) IV Push once  dextrose 50% Injectable 25 Gram(s) IV Push once  enoxaparin Injectable 40 milliGRAM(s) SubCutaneous <User Schedule>  glucagon  Injectable 1 milliGRAM(s) IntraMuscular once  haloperidol     Tablet 5 milliGRAM(s) Oral every 8 hours  insulin lispro (ADMELOG) corrective regimen sliding scale   SubCutaneous three times a day before meals  insulin lispro (ADMELOG) corrective regimen sliding scale   SubCutaneous at bedtime  lacosamide Solution 100 milliGRAM(s) Oral two times a day  lactulose Syrup 10 Gram(s) Oral every 8 hours  levETIRAcetam  Solution 1000 milliGRAM(s) Oral two times a day  pantoprazole    Tablet 40 milliGRAM(s) Oral before breakfast  polyethylene glycol 3350 17 Gram(s) Oral two times a day  potassium chloride    Tablet ER 20 milliEquivalent(s) Oral every 2 hours  senna 2 Tablet(s) Oral at bedtime    MEDICATIONS  (PRN):  acetaminophen     Tablet .. 650 milliGRAM(s) Oral every 6 hours PRN Temp greater or equal to 38C (100.4F), Mild Pain (1 - 3)  dextrose Oral Gel 15 Gram(s) Oral once PRN Blood Glucose LESS THAN 70 milliGRAM(s)/deciliter  haloperidol    Injectable 2 milliGRAM(s) IV Push every 6 hours PRN Agitation

## 2022-12-26 LAB
GLUCOSE BLDC GLUCOMTR-MCNC: 108 MG/DL — HIGH (ref 70–99)
GLUCOSE BLDC GLUCOMTR-MCNC: 108 MG/DL — HIGH (ref 70–99)
GLUCOSE BLDC GLUCOMTR-MCNC: 112 MG/DL — HIGH (ref 70–99)
GLUCOSE BLDC GLUCOMTR-MCNC: 132 MG/DL — HIGH (ref 70–99)

## 2022-12-26 PROCEDURE — 93010 ELECTROCARDIOGRAM REPORT: CPT

## 2022-12-26 PROCEDURE — 99024 POSTOP FOLLOW-UP VISIT: CPT

## 2022-12-26 RX ADMIN — LEVETIRACETAM 1000 MILLIGRAM(S): 250 TABLET, FILM COATED ORAL at 17:41

## 2022-12-26 RX ADMIN — ENOXAPARIN SODIUM 40 MILLIGRAM(S): 100 INJECTION SUBCUTANEOUS at 17:41

## 2022-12-26 RX ADMIN — HALOPERIDOL DECANOATE 5 MILLIGRAM(S): 100 INJECTION INTRAMUSCULAR at 14:41

## 2022-12-26 RX ADMIN — HALOPERIDOL DECANOATE 5 MILLIGRAM(S): 100 INJECTION INTRAMUSCULAR at 21:49

## 2022-12-26 RX ADMIN — SENNA PLUS 2 TABLET(S): 8.6 TABLET ORAL at 21:49

## 2022-12-26 RX ADMIN — POLYETHYLENE GLYCOL 3350 17 GRAM(S): 17 POWDER, FOR SOLUTION ORAL at 06:01

## 2022-12-26 RX ADMIN — POLYETHYLENE GLYCOL 3350 17 GRAM(S): 17 POWDER, FOR SOLUTION ORAL at 17:39

## 2022-12-26 RX ADMIN — PANTOPRAZOLE SODIUM 40 MILLIGRAM(S): 20 TABLET, DELAYED RELEASE ORAL at 06:06

## 2022-12-26 RX ADMIN — Medication 4 MILLIGRAM(S): at 17:40

## 2022-12-26 RX ADMIN — Medication 5 MILLIGRAM(S): at 21:49

## 2022-12-26 RX ADMIN — LACOSAMIDE 100 MILLIGRAM(S): 50 TABLET ORAL at 06:05

## 2022-12-26 RX ADMIN — Medication 4 MILLIGRAM(S): at 06:06

## 2022-12-26 RX ADMIN — LACOSAMIDE 100 MILLIGRAM(S): 50 TABLET ORAL at 17:40

## 2022-12-26 RX ADMIN — LEVETIRACETAM 1000 MILLIGRAM(S): 250 TABLET, FILM COATED ORAL at 06:01

## 2022-12-26 RX ADMIN — HALOPERIDOL DECANOATE 5 MILLIGRAM(S): 100 INJECTION INTRAMUSCULAR at 06:06

## 2022-12-26 NOTE — PROGRESS NOTE ADULT - TIME BILLING
More than 30 minutes spent on total encounter: more than 50% of the visit was spent on educating the patient and family regarding condition, medications, follow up plans, signs and symptoms to be concerned with, preparing paperwork, and questions answered regarding discharge
More than 30 minutes spent on total encounter: more than 50% of the visit was spent on educating the patient and family regarding condition, medications, follow up plans, signs and symptoms to be concerned with, preparing paperwork, and questions answered regarding discharge
brain tumor

## 2022-12-26 NOTE — PROGRESS NOTE ADULT - SUBJECTIVE AND OBJECTIVE BOX
SUBJECTIVE: Appears well w/o agitation at present. B/L Wrist and soft vest restraints on.  No complaints. NAD. 12/21 On Covid Isolation-no cough/SOB    OVERNIGHT EVENTS: None    Vital Signs Last 24 Hrs  T(C): 37 (26 Dec 2022 05:25), Max: 37.2 (25 Dec 2022 11:46)  T(F): 98.6 (26 Dec 2022 05:25), Max: 98.9 (25 Dec 2022 11:46)  HR: 70 (26 Dec 2022 05:25) (70 - 95)  BP: 118/82 (26 Dec 2022 05:25) (115/81 - 131/91)  BP(mean): --  RR: 18 (26 Dec 2022 05:25) (18 - 18)  SpO2: 99% (26 Dec 2022 05:25) (91% - 99%)    Parameters below as of 26 Dec 2022 05:25  Patient On (Oxygen Delivery Method): room air    IVF: [X ] IVL [ ] NS+K@   DIET: [X ] Regular [ ] CCD [ ] Renal [ ] Puree [ ] Dysphagia [ ] Tube Feeds:   PCA: [ ] YES [X ] NO   SOLOMON: [ ] YES [X ] NO [X ] VOID Urinary Incontinence  BM: [ X] YES-on 12/25    DRAINS: None    PHYSICAL EXAM:    General: No Acute Distress     Neurological: Wide awake, conversative. AAO x 2, except month and date. PERRLA. +FC. LOUISE 4-5/5.  Following Commands, Face Symmetrical, Speech clear,  Sensation to Light Touch Intact    Pulmonary: Clear to Auscultation, No Rales, No Rhonchi, No Wheezes     Cardiovascular: S1, S2, Regular Rate and Rhythm     Gastrointestinal: Soft, Nontender, Nondistended     Incision: NA    LABS:                        15.9   13.44 )-----------( 166      ( 25 Dec 2022 05:49 )             49.0    12-25    142  |  104  |  21  ----------------------------<  124<H>  3.4<L>   |  24  |  0.76    Ca    9.4      25 Dec 2022 05:49  Phos  3.5     12-25  Mg     2.2     12-25    Flu With COVID-19 By STEVE (12.21.22 @ 16:13)    SARS-CoV-2 Result: Detected    IMAGING:   < from: POCUS ED TTE 2D F/U, Limited w/o Cont. (12.23.22 @ 22:35) >  INTERPRETATION:  A focused transthoracic cardiac ultrasound examination   was performed on 12/21/2022.  No pericardial effusion was present.  No global wall motion abnormality was identified  Preserved cardiac contractility    IMPRESSION:  No Pericardial Effusion.    < end of copied text >    < from: [facility] Duplex Lower Ext Vein Scan, Bilat (12.23.22 @ 11:50) >  IMPRESSION:  No evidence of deep venous thrombosis in either lower extremity.    < end of copied text >    < from: MR Spectroscopy (12.22.22 @ 17:20) >    IMPRESSION: Findings suspicious for progressive neoplasm in the left   parietal postoperative bed compared with 10/5/2022 with MR spectroscopy.   Tiny focus of neoplasm also likely in the left brainstem, unchanged.    < end of copied text >      MEDICATIONS  (STANDING):  bisacodyl 5 milliGRAM(s) Oral at bedtime  dexAMETHasone     Tablet 4 milliGRAM(s) Oral every 12 hours  dextrose 5%. 1000 milliLiter(s) (50 mL/Hr) IV Continuous <Continuous>  dextrose 5%. 1000 milliLiter(s) (100 mL/Hr) IV Continuous <Continuous>  dextrose 50% Injectable 25 Gram(s) IV Push once  dextrose 50% Injectable 25 Gram(s) IV Push once  dextrose 50% Injectable 12.5 Gram(s) IV Push once  enoxaparin Injectable 40 milliGRAM(s) SubCutaneous <User Schedule>  glucagon  Injectable 1 milliGRAM(s) IntraMuscular once  haloperidol     Tablet 5 milliGRAM(s) Oral every 8 hours  insulin lispro (ADMELOG) corrective regimen sliding scale   SubCutaneous three times a day before meals  insulin lispro (ADMELOG) corrective regimen sliding scale   SubCutaneous at bedtime  lacosamide Solution 100 milliGRAM(s) Oral two times a day  levETIRAcetam  Solution 1000 milliGRAM(s) Oral two times a day  pantoprazole    Tablet 40 milliGRAM(s) Oral before breakfast  polyethylene glycol 3350 17 Gram(s) Oral two times a day  senna 2 Tablet(s) Oral at bedtime    MEDICATIONS  (PRN):  acetaminophen     Tablet .. 650 milliGRAM(s) Oral every 6 hours PRN Temp greater or equal to 38C (100.4F), Mild Pain (1 - 3)  dextrose Oral Gel 15 Gram(s) Oral once PRN Blood Glucose LESS THAN 70 milliGRAM(s)/deciliter  haloperidol    Injectable 2 milliGRAM(s) IV Push every 6 hours PRN Agitation

## 2022-12-26 NOTE — PROGRESS NOTE ADULT - ASSESSMENT
62M Hx GBB s/p crani 10/3/2022 undergoing radiation p/f AMS and increasing aphasia. Radiation PA suggested dex8mg, which was given at 1pm. The patient was on his way to the hospital when he had a seizure lasting 2m (21 Dec 2022 16:00)    PROCEDURE:  Adm 12/21  Covid. Hx Crani GBM rsx'd 10/3/22-RT/Chemo w/AMS and seizures  POD#84    PLAN:  Neuro: 12/21 Cov+, on isolation-Sat 99% RA. 12/26 DC B/L wrist restraints and monitor on soft vest and siderails x 4up. If able to go to obs rm can potetentially DC vest restraints. Cont Dex 4Q12h, Keppra and Vimpat. Leukocytosis from steroids. 12/25 Hypokalemia supp FU. Inc activity/OOB. Dispo A. Rehab once off Isolation [id number].  PMR-P    Neuro note of 12/24-- continue Vimpat 100 mg bid and Keppra 1000 mg TID. - Rest of care per neurosurgery team for further management of neoplasm seen on MRI head.  - Seizure rescue medications for a generalized tonic clonic episode lasting >3 min or significant derangement of vital signs: ---> 1st line: Ativan 2 mg IV. For GTC > 3 min and refractory to Ativan please call [phone] ([facility]).- Telemetry monitoring; Neurochecks/VS per unit protocol - Seizure, fall and aspiration precautions Other: [] Please note: if patient has a convulsion, please document time of onset, progression of limb involvement (upper/lower; R/L) if any, and specifically what patient was doing paying attention to eye opening vs closure, head turn, gaze deviation, shaking of extremities, tongue bite, urinary/bowel incontinence, any derangement of vital signs, length of episode, and duration of postictal period.  [] Given concern for seizure, advise patient to not drive, operate heavy machinery, avoid heights, pools, bathtubs, locked doors until cleared by further follow up outpatient by neurology.     Respiratory: Patient instructed to use incentive spirometer [ ] YES [ X] NO              DVT ppx: [X ] SQL [ ] SQH and Venodynes [ ] Left [ ] Right [ X] Bilateral    Discharge Planning:  The patient was evaluated by PT/OT and recomm A. Rehab. PMR eval-P FU.   He was subsequently DC on >>> in stable condition.    More than 30 minutes spent on total encounter: more than 50% of the visit was spent on educating the patient and family regarding condition, medications, follow up plans, signs and symptoms to be concerned with, preparing paperwork, and questions answered regarding discharge.

## 2022-12-27 LAB
ANION GAP SERPL CALC-SCNC: 13 MMOL/L — SIGNIFICANT CHANGE UP (ref 5–17)
BUN SERPL-MCNC: 25 MG/DL — HIGH (ref 7–23)
CALCIUM SERPL-MCNC: 10 MG/DL — SIGNIFICANT CHANGE UP (ref 8.4–10.5)
CHLORIDE SERPL-SCNC: 104 MMOL/L — SIGNIFICANT CHANGE UP (ref 96–108)
CO2 SERPL-SCNC: 25 MMOL/L — SIGNIFICANT CHANGE UP (ref 22–31)
CREAT SERPL-MCNC: 0.89 MG/DL — SIGNIFICANT CHANGE UP (ref 0.5–1.3)
EGFR: 96 ML/MIN/1.73M2 — SIGNIFICANT CHANGE UP
GLUCOSE BLDC GLUCOMTR-MCNC: 101 MG/DL — HIGH (ref 70–99)
GLUCOSE BLDC GLUCOMTR-MCNC: 112 MG/DL — HIGH (ref 70–99)
GLUCOSE BLDC GLUCOMTR-MCNC: 138 MG/DL — HIGH (ref 70–99)
GLUCOSE BLDC GLUCOMTR-MCNC: 86 MG/DL — SIGNIFICANT CHANGE UP (ref 70–99)
GLUCOSE SERPL-MCNC: 126 MG/DL — HIGH (ref 70–99)
HCT VFR BLD CALC: 50.7 % — HIGH (ref 39–50)
HGB BLD-MCNC: 16.6 G/DL — SIGNIFICANT CHANGE UP (ref 13–17)
MCHC RBC-ENTMCNC: 29 PG — SIGNIFICANT CHANGE UP (ref 27–34)
MCHC RBC-ENTMCNC: 32.7 GM/DL — SIGNIFICANT CHANGE UP (ref 32–36)
MCV RBC AUTO: 88.6 FL — SIGNIFICANT CHANGE UP (ref 80–100)
NRBC # BLD: 0 /100 WBCS — SIGNIFICANT CHANGE UP (ref 0–0)
PLATELET # BLD AUTO: 179 K/UL — SIGNIFICANT CHANGE UP (ref 150–400)
POTASSIUM SERPL-MCNC: 4.1 MMOL/L — SIGNIFICANT CHANGE UP (ref 3.5–5.3)
POTASSIUM SERPL-SCNC: 4.1 MMOL/L — SIGNIFICANT CHANGE UP (ref 3.5–5.3)
RBC # BLD: 5.72 M/UL — SIGNIFICANT CHANGE UP (ref 4.2–5.8)
RBC # FLD: 14.4 % — SIGNIFICANT CHANGE UP (ref 10.3–14.5)
SODIUM SERPL-SCNC: 142 MMOL/L — SIGNIFICANT CHANGE UP (ref 135–145)
WBC # BLD: 10.41 K/UL — SIGNIFICANT CHANGE UP (ref 3.8–10.5)
WBC # FLD AUTO: 10.41 K/UL — SIGNIFICANT CHANGE UP (ref 3.8–10.5)

## 2022-12-27 PROCEDURE — 99222 1ST HOSP IP/OBS MODERATE 55: CPT

## 2022-12-27 PROCEDURE — 99024 POSTOP FOLLOW-UP VISIT: CPT

## 2022-12-27 PROCEDURE — 99233 SBSQ HOSP IP/OBS HIGH 50: CPT

## 2022-12-27 RX ORDER — LACOSAMIDE 50 MG/1
100 TABLET ORAL
Refills: 0 | Status: DISCONTINUED | OUTPATIENT
Start: 2022-12-27 | End: 2022-12-30

## 2022-12-27 RX ADMIN — Medication 4 MILLIGRAM(S): at 05:16

## 2022-12-27 RX ADMIN — PANTOPRAZOLE SODIUM 40 MILLIGRAM(S): 20 TABLET, DELAYED RELEASE ORAL at 07:57

## 2022-12-27 RX ADMIN — LACOSAMIDE 100 MILLIGRAM(S): 50 TABLET ORAL at 05:15

## 2022-12-27 RX ADMIN — HALOPERIDOL DECANOATE 5 MILLIGRAM(S): 100 INJECTION INTRAMUSCULAR at 21:47

## 2022-12-27 RX ADMIN — POLYETHYLENE GLYCOL 3350 17 GRAM(S): 17 POWDER, FOR SOLUTION ORAL at 05:15

## 2022-12-27 RX ADMIN — ENOXAPARIN SODIUM 40 MILLIGRAM(S): 100 INJECTION SUBCUTANEOUS at 17:37

## 2022-12-27 RX ADMIN — SENNA PLUS 2 TABLET(S): 8.6 TABLET ORAL at 21:47

## 2022-12-27 RX ADMIN — LACOSAMIDE 100 MILLIGRAM(S): 50 TABLET ORAL at 17:36

## 2022-12-27 RX ADMIN — HALOPERIDOL DECANOATE 5 MILLIGRAM(S): 100 INJECTION INTRAMUSCULAR at 13:38

## 2022-12-27 RX ADMIN — HALOPERIDOL DECANOATE 5 MILLIGRAM(S): 100 INJECTION INTRAMUSCULAR at 05:15

## 2022-12-27 RX ADMIN — LEVETIRACETAM 1000 MILLIGRAM(S): 250 TABLET, FILM COATED ORAL at 05:15

## 2022-12-27 RX ADMIN — Medication 4 MILLIGRAM(S): at 17:39

## 2022-12-27 RX ADMIN — LEVETIRACETAM 1000 MILLIGRAM(S): 250 TABLET, FILM COATED ORAL at 17:37

## 2022-12-27 NOTE — PROGRESS NOTE ADULT - SUBJECTIVE AND OBJECTIVE BOX
62M with left temporal GBM, MGMT methylated  resected by Dr. Turpin in 10/22  just completed chemoradiation  admitted with ictal aphasia  intubated  MRI with progressive enhancement and edema  seizures controlled with vimpat added to keppra and decadron increase  course complicated by COVID    INTERVAL HISTORY:  Feels well.  no complaints.   awaiting rehab eval.      MEDICATIONS  (STANDING):  bisacodyl 5 milliGRAM(s) Oral every 12 hours  dexAMETHasone     Tablet 4 milliGRAM(s) Oral every 12 hours  dextrose 5%. 1000 milliLiter(s) (50 mL/Hr) IV Continuous <Continuous>  dextrose 5%. 1000 milliLiter(s) (100 mL/Hr) IV Continuous <Continuous>  dextrose 50% Injectable 25 Gram(s) IV Push once  dextrose 50% Injectable 12.5 Gram(s) IV Push once  dextrose 50% Injectable 25 Gram(s) IV Push once  enoxaparin Injectable 40 milliGRAM(s) SubCutaneous <User Schedule>  glucagon  Injectable 1 milliGRAM(s) IntraMuscular once  haloperidol     Tablet 5 milliGRAM(s) Oral every 8 hours  insulin lispro (ADMELOG) corrective regimen sliding scale   SubCutaneous three times a day before meals  insulin lispro (ADMELOG) corrective regimen sliding scale   SubCutaneous at bedtime  lacosamide Solution 100 milliGRAM(s) Oral two times a day  levETIRAcetam  Solution 1000 milliGRAM(s) Oral two times a day  pantoprazole    Tablet 40 milliGRAM(s) Oral before breakfast  polyethylene glycol 3350 17 Gram(s) Oral two times a day  senna 2 Tablet(s) Oral at bedtime      Vital Signs Last 24 Hrs  T(C): 36.4 (27 Dec 2022 17:15), Max: 36.7 (26 Dec 2022 20:39)  T(F): 97.6 (27 Dec 2022 17:15), Max: 98 (26 Dec 2022 20:39)  HR: 72 (27 Dec 2022 17:15) (62 - 87)  BP: 115/83 (27 Dec 2022 17:15) (106/68 - 125/83)  BP(mean): --  RR: 18 (27 Dec 2022 17:15) (17 - 18)  SpO2: 100% (27 Dec 2022 17:15) (97% - 100%)    Parameters below as of 27 Dec 2022 17:15  Patient On (Oxygen Delivery Method): room air    Exam  Gen:  well appearing man, NAD  MSE:  alert, oriented x3, unable to provide history, not aphasic to naming, rep, commands,   CN:  VFF, EOMI, PERRL, face symmetric, face sensation intact, Tongue midline  Motor:  tone, bulk, power all normal without drift  Sensory:  intact to lt, pp  Gait:  walks with assistance  Coord:  intact HKS, FNF  DTR:  normal symmetric throughout toes down    Cardiac:  nl RR, normal heart sounds, no MRG  Lungs:  clear to auscultation  Vasc: no edema  Abd:  nd nt normal bowel sounds    imaging personally reviewed including MRI brain showing progressive enhancement and edema    impression is GBM, methylated, just post RT, with increased edema, 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 Torres     62M with left temporal GBM, MGMT methylated  resected by Dr. Turpin in 10/22  just completed chemoradiation  admitted with ictal aphasia  intubated  MRI with progressive enhancement and edema  seizures controlled with vimpat added to keppra and decadron increase  course complicated by COVID    INTERVAL HISTORY:  Feels well.  no complaints.   awaiting rehab eval.      MEDICATIONS  (STANDING):  bisacodyl 5 milliGRAM(s) Oral every 12 hours  dexAMETHasone     Tablet 4 milliGRAM(s) Oral every 12 hours  dextrose 5%. 1000 milliLiter(s) (50 mL/Hr) IV Continuous <Continuous>  dextrose 5%. 1000 milliLiter(s) (100 mL/Hr) IV Continuous <Continuous>  dextrose 50% Injectable 25 Gram(s) IV Push once  dextrose 50% Injectable 12.5 Gram(s) IV Push once  dextrose 50% Injectable 25 Gram(s) IV Push once  enoxaparin Injectable 40 milliGRAM(s) SubCutaneous <User Schedule>  glucagon  Injectable 1 milliGRAM(s) IntraMuscular once  haloperidol     Tablet 5 milliGRAM(s) Oral every 8 hours  insulin lispro (ADMELOG) corrective regimen sliding scale   SubCutaneous three times a day before meals  insulin lispro (ADMELOG) corrective regimen sliding scale   SubCutaneous at bedtime  lacosamide Solution 100 milliGRAM(s) Oral two times a day  levETIRAcetam  Solution 1000 milliGRAM(s) Oral two times a day  pantoprazole    Tablet 40 milliGRAM(s) Oral before breakfast  polyethylene glycol 3350 17 Gram(s) Oral two times a day  senna 2 Tablet(s) Oral at bedtime      Vital Signs Last 24 Hrs  T(C): 36.4 (27 Dec 2022 17:15), Max: 36.7 (26 Dec 2022 20:39)  T(F): 97.6 (27 Dec 2022 17:15), Max: 98 (26 Dec 2022 20:39)  HR: 72 (27 Dec 2022 17:15) (62 - 87)  BP: 115/83 (27 Dec 2022 17:15) (106/68 - 125/83)  BP(mean): --  RR: 18 (27 Dec 2022 17:15) (17 - 18)  SpO2: 100% (27 Dec 2022 17:15) (97% - 100%)    Parameters below as of 27 Dec 2022 17:15  Patient On (Oxygen Delivery Method): room air    Exam  Gen:  well appearing man, NAD  MSE:  alert, oriented x3, unable to provide history, not aphasic to naming, rep, commands,   CN:  VFF, EOMI, PERRL, face symmetric, face sensation intact, Tongue midline  Motor:  tone, bulk, power all normal without drift  Sensory:  intact to lt, pp  Gait:  walks with assistance  Coord:  intact HKS, FNF  DTR:  normal symmetric throughout toes down    Cardiac:  nl RR, normal heart sounds, no MRG  Lungs:  clear to auscultation  Vasc: no edema  Abd:  nd nt normal bowel sounds    imaging personally reviewed including MRI brain showing progressive enhancement and edema

## 2022-12-27 NOTE — PROGRESS NOTE ADULT - ASSESSMENT
ASSESSMENT AND PLAN: 62M Hx GBB s/p crani 10/3/2022 undergoing radiation p/f AMS and increasing aphasia. Radiation PA suggested dex8mg, which was given at 1pm. 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 Vallejo (Neuro Oncology) once patient is to be discharged  - On standing Haldol TID for agitation, has PRN also but did not receive in the last 48hrs, patient is calm and appropriate - will trial off posey today  - Continue pain control with Tylenol prn  - PT/OT - Acute TBI, PMR - P    PULM:   - On room air, O2Sat>97%  - Incentive spirometry  - On isolation for COVID+ 12/21, afebrile and asymptomatic    CV:  - -160  - Monitor QTC while in the setting of Haldol, last QTC 12/26 432  - 12/23 TTE EF 62%, normal, right and left ventricular systolic function    ENDO:   - A1C 5.7  - Monitor glucose in the setting of steroids, continue ISS - stable    HEME/ONC:        12/27 CBC - resolved leukocytosis              DVT ppx: SQL, SCDs, [place]pp - negative 12/23    RENAL:   - IVL  - 12/27 BMP stable    ID:   - Afebrile  - 12/21 COVID+, afebrile / asymptomatic  - 12/22 MRSA/MSSA negative    GI:    - Continue oral diet  - Continue protonix for GI ppx while on steroids  - Continue senna / miralax / dulcolax (dulcolax increased), last BM 12/25    DISCHARGE PLANNING:   PT/OT - Acute TBI, PMR - P    Plan to be discussed w/ Dr. Turpin  [phone]

## 2022-12-27 NOTE — PROGRESS NOTE ADULT - ASSESSMENT
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 Torres

## 2022-12-27 NOTE — CONSULT NOTE ADULT - SUBJECTIVE AND OBJECTIVE BOX
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 Radiation PA suggested dex8mg, which was given at 1pm. The patient was on his way to the hospital when he had a seizure lasting 2m (21 Dec 2022 16:00)    Patient was admitted on 12/21, COVID+, started on decadron, patient seen earlier today, on COVID isolation, 4 rivera, speech improved.     REVIEW OF SYSTEMS  Constitutional - No fever, No weight loss, No fatigue  HEENT - No eye pain, No visual disturbances, No difficulty hearing, No tinnitus, No vertigo, No neck pain  Respiratory - No cough, No wheezing, No shortness of breath  Cardiovascular - No chest pain, No palpitations  Gastrointestinal - No abdominal pain, No nausea, No vomiting, No diarrhea, No constipation  Genitourinary - No dysuria, No frequency, No hematuria, No incontinence  Psychiatric - No depression, No anxiety    VITALS  T(C): 36.4 (12-27-22 @ 10:40), Max: 36.7 (12-26-22 @ 20:39)  HR: 62 (12-27-22 @ 10:40) (62 - 87)  BP: 125/83 (12-27-22 @ 10:40) (106/68 - 125/83)  RR: 17 (12-27-22 @ 10:40) (17 - 18)  SpO2: 97% (12-27-22 @ 10:40) (97% - 98%)  Wt(kg): --    PAST MEDICAL & SURGICAL HISTORY  see HPI       SOCIAL HISTORY  Smoking - Denied  EtOH - Denied   Drugs - Denied    FUNCTIONAL HISTORY  Lives with mother  Independent AMB and ADLs PTA     CURRENT FUNCTIONAL STATUS  12/24 SLP  mixed aphasia  unable to follow simple commands     12/25 PT  bed mobility min assist  transfers mod assist   gait mod assist x 5 side steps   follows <25% simple commands     12/25 OT  bed mobility min assist   transfers mod assist x 2    FAMILY HISTORY   no pertinent history in first degree relatives     RECENT LABS/IMAGING  CBC Full  -  ( 27 Dec 2022 09:36 )  WBC Count : 10.41 K/uL  RBC Count : 5.72 M/uL  Hemoglobin : 16.6 g/dL  Hematocrit : 50.7 %  Platelet Count - Automated : 179 K/uL  Mean Cell Volume : 88.6 fl  Mean Cell Hemoglobin : 29.0 pg  Mean Cell Hemoglobin Concentration : 32.7 gm/dL  Auto Neutrophil # : x  Auto Lymphocyte # : x  Auto Monocyte # : x  Auto Eosinophil # : x  Auto Basophil # : x  Auto Neutrophil % : x  Auto Lymphocyte % : x  Auto Monocyte % : x  Auto Eosinophil % : x  Auto Basophil % : x    12-27    142  |  104  |  25<H>  ----------------------------<  126<H>  4.1   |  25  |  0.89    Ca    10.0      27 Dec 2022 09:36      < from: CT Head No Cont (12.22.22 @ 09:28) >    IMPRESSION:  Slight increased mass effect on the left lateral ventricle   when compared with the prior MR 10/5/2022 unchanged from CT one day prior   area findings suggest progression of disease. Recommend correlation with   MR for more complete evaluation      < end of copied text >    < from: MR Head w/wo IV Cont (12.22.22 @ 17:19) >    IMPRESSION: Findings suspicious for progressive neoplasm in the left   parietal postoperative bed compared with 10/5/2022 with MR spectroscopy.   Tiny focus of neoplasm also likely in the left brainstem, unchanged.      < end of copied text >      ALLERGIES  No Known Allergies      MEDICATIONS   acetaminophen     Tablet .. 650 milliGRAM(s) Oral every 6 hours PRN  bisacodyl 5 milliGRAM(s) Oral every 12 hours  dexAMETHasone     Tablet 4 milliGRAM(s) Oral every 12 hours  dextrose 5%. 1000 milliLiter(s) IV Continuous <Continuous>  dextrose 5%. 1000 milliLiter(s) IV Continuous <Continuous>  dextrose 50% Injectable 25 Gram(s) IV Push once  dextrose 50% Injectable 12.5 Gram(s) IV Push once  dextrose 50% Injectable 25 Gram(s) IV Push once  dextrose Oral Gel 15 Gram(s) Oral once PRN  enoxaparin Injectable 40 milliGRAM(s) SubCutaneous <User Schedule>  glucagon  Injectable 1 milliGRAM(s) IntraMuscular once  haloperidol     Tablet 5 milliGRAM(s) Oral every 8 hours  haloperidol    Injectable 2 milliGRAM(s) IV Push every 6 hours PRN  insulin lispro (ADMELOG) corrective regimen sliding scale   SubCutaneous three times a day before meals  insulin lispro (ADMELOG) corrective regimen sliding scale   SubCutaneous at bedtime  lacosamide Solution 100 milliGRAM(s) Oral two times a day  levETIRAcetam  Solution 1000 milliGRAM(s) Oral two times a day  pantoprazole    Tablet 40 milliGRAM(s) Oral before breakfast  polyethylene glycol 3350 17 Gram(s) Oral two times a day  senna 2 Tablet(s) Oral at bedtime      ----------------------------------------------------------------------------------------  PHYSICAL EXAM  Constitutional - NAD, Comfortable, in bed  Chest - Breathing comfortably +NC   Cardiovascular - S1S2   Abdomen - Soft   Extremities - No C/C/E, No calf tenderness   Neurologic Exam -          follows commands          Cognitive - Awake, Alert, AAO to self, place, date, year, situation     Communication - some word finding, speech fluent        Motor - 5/5     Sensory - Intact to LT     Psychiatric -emotional/crying about mother being alone   ----------------------------------------------------------------------------------------  ASSESSMENT/PLAN  62yMale h/o GBM s/p craniotomy 10/22, radiation with functional deficits due to AMS, aphasia  improved on decadron   keppra/vimpat for seizure prophylaxis  COVID + 12/21, on isolation   Pain - Tylenol  DVT PPX - SCDs, lovenox   Rehab -   continue bedside therapy  needs PT/OT follow up, SLP, there has been improvement since initial evaluations   Recommend ACUTE inpatient rehabilitation for the functional deficits consisting of 3 hours of therapy/day & 24 hour RN/daily PMR physician for comorbid medical management. Patient will be able to tolerate 3 hours a day--if continues to have functional deficits when COVID isolation completed   Will continue to follow for ongoing rehab needs and recommendations.

## 2022-12-27 NOTE — PROGRESS NOTE ADULT - SUBJECTIVE AND OBJECTIVE BOX
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 Radiation PA suggested dex8mg, which was given at 1pm. The patient was on his way to the hospital when he had a seizure lasting 2m (21 Dec 2022 16:00)      OVERNIGHT EVENTS: No acute events overnight, patient seen and evaluated at bedside - appears calm and appropriate. Did not receive any PRN Haldol in the last 48hrs.    Vital Signs Last 24 Hrs  T(C): 36.4 (27 Dec 2022 10:40), Max: 36.7 (26 Dec 2022 11:55)  T(F): 97.5 (27 Dec 2022 10:40), Max: 98.1 (26 Dec 2022 11:55)  HR: 62 (27 Dec 2022 10:40) (62 - 87)  BP: 125/83 (27 Dec 2022 10:40) (106/68 - 125/83)  BP(mean): --  RR: 17 (27 Dec 2022 10:40) (17 - 20)  SpO2: 97% (27 Dec 2022 10:40) (97% - 98%)    Parameters below as of 27 Dec 2022 06:00  Patient On (Oxygen Delivery Method): room air        PHYSICAL EXAM:    General: No Acute Distress     Neurological: Awake, Ox3, pupils 3mm react b/l, mild word hesitancy, EOMI, following commands, uppers 5/5, no drift, lowers 5/5, SILT    Pulmonary: Clear to Auscultation, No Rales, No Rhonchi, No Wheezes     Cardiovascular: S1, S2, Regular Rate and Rhythm     Gastrointestinal: Soft, Nontender, Nondistended     Incision: Crani incision well healed    LABS:                        16.6   10.41 )-----------( 179      ( 27 Dec 2022 09:36 )             50.7    12-27    142  |  104  |  25<H>  ----------------------------<  126<H>  4.1   |  25  |  0.89    Ca    10.0      27 Dec 2022 09:36          12-26 @ 07:01  -  12-27 @ 07:00  --------------------------------------------------------  IN: 1320 mL / OUT: 0 mL / NET: 1320 mL      DRAINS: None    MEDICATIONS:  Antibiotics:    Neuro:  acetaminophen     Tablet .. 650 milliGRAM(s) Oral every 6 hours PRN Temp greater or equal to 38C (100.4F), Mild Pain (1 - 3)  haloperidol     Tablet 5 milliGRAM(s) Oral every 8 hours  haloperidol    Injectable 2 milliGRAM(s) IV Push every 6 hours PRN Agitation  lacosamide Solution 100 milliGRAM(s) Oral two times a day  levETIRAcetam  Solution 1000 milliGRAM(s) Oral two times a day    Cardiac:    Pulm:    GI/:  bisacodyl 5 milliGRAM(s) Oral at bedtime  pantoprazole    Tablet 40 milliGRAM(s) Oral before breakfast  polyethylene glycol 3350 17 Gram(s) Oral two times a day  senna 2 Tablet(s) Oral at bedtime    Other:   dexAMETHasone     Tablet 4 milliGRAM(s) Oral every 12 hours  dextrose 5%. 1000 milliLiter(s) IV Continuous <Continuous>  dextrose 5%. 1000 milliLiter(s) IV Continuous <Continuous>  dextrose 50% Injectable 25 Gram(s) IV Push once  dextrose 50% Injectable 12.5 Gram(s) IV Push once  dextrose 50% Injectable 25 Gram(s) IV Push once  dextrose Oral Gel 15 Gram(s) Oral once PRN Blood Glucose LESS THAN 70 milliGRAM(s)/deciliter  enoxaparin Injectable 40 milliGRAM(s) SubCutaneous <User Schedule>  glucagon  Injectable 1 milliGRAM(s) IntraMuscular once  insulin lispro (ADMELOG) corrective regimen sliding scale   SubCutaneous three times a day before meals  insulin lispro (ADMELOG) corrective regimen sliding scale   SubCutaneous at bedtime    DIET: [x] Regular [] CCD [] Renal [] Puree [] Dysphagia [] Tube Feeds:     IMAGING:   < from: CT Head No Cont (12.22.22 @ 09:28) >  IMPRESSION:  Slight increased mass effect on the left lateral ventricle   when compared with the prior MR 10/5/2022 unchanged from CT one day prior   area findings suggest progression of disease. Recommend correlation with   MR for more complete evaluation    --- End of Report ---    ARNOLDO STACY MD; Attending Radiologist  This document has been electronically signed. Dec 22 2022 10:57AM    < end of copied text >    < from: MR Head w/wo IV Cont (12.22.22 @ 17:19) >  IMPRESSION: Findings suspicious for progressive neoplasm in the left   parietal postoperative bed compared with 10/5/2022 with MR spectroscopy.   Tiny focus of neoplasm also likely in the left brainstem, unchanged.    --- End of Report ---      GABY CARDONA MD; Attending Radiologist  This document has been electronically signed. Dec 23 2022 11:04AM    < end of copied text >

## 2022-12-28 LAB
GLUCOSE BLDC GLUCOMTR-MCNC: 104 MG/DL — HIGH (ref 70–99)
GLUCOSE BLDC GLUCOMTR-MCNC: 97 MG/DL — SIGNIFICANT CHANGE UP (ref 70–99)
SARS-COV-2 RNA SPEC QL NAA+PROBE: SIGNIFICANT CHANGE UP

## 2022-12-28 PROCEDURE — 99232 SBSQ HOSP IP/OBS MODERATE 35: CPT | Mod: 24

## 2022-12-28 RX ORDER — CHLORHEXIDINE GLUCONATE 213 G/1000ML
1 SOLUTION TOPICAL DAILY
Refills: 0 | Status: DISCONTINUED | OUTPATIENT
Start: 2022-12-28 | End: 2022-12-30

## 2022-12-28 RX ADMIN — HALOPERIDOL DECANOATE 5 MILLIGRAM(S): 100 INJECTION INTRAMUSCULAR at 21:08

## 2022-12-28 RX ADMIN — HALOPERIDOL DECANOATE 5 MILLIGRAM(S): 100 INJECTION INTRAMUSCULAR at 05:23

## 2022-12-28 RX ADMIN — LEVETIRACETAM 1000 MILLIGRAM(S): 250 TABLET, FILM COATED ORAL at 05:23

## 2022-12-28 RX ADMIN — Medication 5 MILLIGRAM(S): at 17:30

## 2022-12-28 RX ADMIN — LEVETIRACETAM 1000 MILLIGRAM(S): 250 TABLET, FILM COATED ORAL at 17:22

## 2022-12-28 RX ADMIN — Medication 4 MILLIGRAM(S): at 17:23

## 2022-12-28 RX ADMIN — Medication 4 MILLIGRAM(S): at 05:23

## 2022-12-28 RX ADMIN — LACOSAMIDE 100 MILLIGRAM(S): 50 TABLET ORAL at 17:27

## 2022-12-28 RX ADMIN — LACOSAMIDE 100 MILLIGRAM(S): 50 TABLET ORAL at 05:29

## 2022-12-28 RX ADMIN — ENOXAPARIN SODIUM 40 MILLIGRAM(S): 100 INJECTION SUBCUTANEOUS at 17:23

## 2022-12-28 RX ADMIN — HALOPERIDOL DECANOATE 5 MILLIGRAM(S): 100 INJECTION INTRAMUSCULAR at 13:42

## 2022-12-28 RX ADMIN — SENNA PLUS 2 TABLET(S): 8.6 TABLET ORAL at 21:08

## 2022-12-28 RX ADMIN — PANTOPRAZOLE SODIUM 40 MILLIGRAM(S): 20 TABLET, DELAYED RELEASE ORAL at 06:30

## 2022-12-28 RX ADMIN — CHLORHEXIDINE GLUCONATE 1 APPLICATION(S): 213 SOLUTION TOPICAL at 17:24

## 2022-12-28 RX ADMIN — POLYETHYLENE GLYCOL 3350 17 GRAM(S): 17 POWDER, FOR SOLUTION ORAL at 17:23

## 2022-12-28 NOTE — PROGRESS NOTE ADULT - SUBJECTIVE AND OBJECTIVE BOX
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 Radiation PA suggested dex8mg, which was given at 1pm. The patient was on his way to the hospital when he had a seizure lasting 2m (21 Dec 2022 16:00)    OVERNIGHT EVENTS:  Vital Signs Last 24 Hrs  T(C): 36.4 (28 Dec 2022 12:31), Max: 36.8 (27 Dec 2022 22:04)  T(F): 97.5 (28 Dec 2022 12:31), Max: 98.3 (27 Dec 2022 22:04)  HR: 86 (28 Dec 2022 12:31) (63 - 86)  BP: 125/89 (28 Dec 2022 12:31) (113/79 - 135/89)  BP(mean): --  RR: 17 (28 Dec 2022 12:31) (17 - 18)  SpO2: 94% (28 Dec 2022 12:31) (94% - 100%)    Parameters below as of 28 Dec 2022 12:31  Patient On (Oxygen Delivery Method): room air        I&O's Detail    27 Dec 2022 07:01  -  28 Dec 2022 07:00  --------------------------------------------------------  IN:    Oral Fluid: 380 mL  Total IN: 380 mL    OUT:  Total OUT: 0 mL    Total NET: 380 mL      28 Dec 2022 07:01  -  28 Dec 2022 13:12  --------------------------------------------------------  IN:    Oral Fluid: 240 mL  Total IN: 240 mL    OUT:  Total OUT: 0 mL    Total NET: 240 mL        I&O's Summary    27 Dec 2022 07:01  -  28 Dec 2022 07:00  --------------------------------------------------------  IN: 380 mL / OUT: 0 mL / NET: 380 mL    28 Dec 2022 07:01  -  28 Dec 2022 13:12  --------------------------------------------------------  IN: 240 mL / OUT: 0 mL / NET: 240 mL        PHYSICAL EXAM:  Neurological:    Motor exam:         [] Upper extremity              Delt        Bicep      Tricep     HG                                               R         5/5        5/5         5/5          5/5                                               L          5/5        5/5         5/5          5/5         [] Lower extremity             HF         KF          KE         PF          DF                                               R        5/5        5/5        5/5       5/5        5/5                                               L         5/5        5/5       5/5        5/5        5/5                                                        [] warm well perfused    Sensation: [] intact to light touch  [] decreased:       Cardiovascular: +s1, s2  Respiratory: clear to auscultation b/l  Gastrointestinal: soft, non-distended, non-tender  Genitourinary:   Extremities:  Incision/Wound:    TUBES/LINES:  [] CVC  [] A-line  [] Lumbar Drain  [] Ventriculostomy  [] Other    DIET:  [] NPO  [] Mechanical  [] Tube feeds    LABS:                        16.6   10.41 )-----------( 179      ( 27 Dec 2022 09:36 )             50.7     12-27    142  |  104  |  25<H>  ----------------------------<  126<H>  4.1   |  25  |  0.89    Ca    10.0      27 Dec 2022 09:36              CAPILLARY BLOOD GLUCOSE      POCT Blood Glucose.: 97 mg/dL (28 Dec 2022 11:41)  POCT Blood Glucose.: 104 mg/dL (28 Dec 2022 07:45)  POCT Blood Glucose.: 138 mg/dL (27 Dec 2022 22:12)  POCT Blood Glucose.: 86 mg/dL (27 Dec 2022 17:09)      Drug Levels: [] N/A    CSF Analysis: [] N/A      Allergies    No Known Allergies    Intolerances      MEDICATIONS:  Antibiotics:    Neuro:  acetaminophen     Tablet .. 650 milliGRAM(s) Oral every 6 hours PRN  haloperidol     Tablet 5 milliGRAM(s) Oral every 8 hours  haloperidol    Injectable 2 milliGRAM(s) IV Push every 6 hours PRN  lacosamide Solution 100 milliGRAM(s) Oral two times a day  levETIRAcetam  Solution 1000 milliGRAM(s) Oral two times a day    Anticoagulation:  enoxaparin Injectable 40 milliGRAM(s) SubCutaneous <User Schedule>    OTHER:  bisacodyl 5 milliGRAM(s) Oral every 12 hours  dexAMETHasone     Tablet 4 milliGRAM(s) Oral every 12 hours  dextrose 50% Injectable 12.5 Gram(s) IV Push once  dextrose 50% Injectable 25 Gram(s) IV Push once  dextrose 50% Injectable 25 Gram(s) IV Push once  dextrose Oral Gel 15 Gram(s) Oral once PRN  glucagon  Injectable 1 milliGRAM(s) IntraMuscular once  insulin lispro (ADMELOG) corrective regimen sliding scale   SubCutaneous three times a day before meals  insulin lispro (ADMELOG) corrective regimen sliding scale   SubCutaneous at bedtime  pantoprazole    Tablet 40 milliGRAM(s) Oral before breakfast  polyethylene glycol 3350 17 Gram(s) Oral two times a day  senna 2 Tablet(s) Oral at bedtime    IVF:  dextrose 5%. 1000 milliLiter(s) IV Continuous <Continuous>  dextrose 5%. 1000 milliLiter(s) IV Continuous <Continuous>    CULTURES:    RADIOLOGY & ADDITIONAL TESTS:       Patient was seen at bedside this am. Patient felt well. Denied any headache, cp, sob, cough, n/v, or abd pain.    OVERNIGHT EVENTS: No acute event overnight    Vital Signs Last 24 Hrs  T(C): 36.4 (28 Dec 2022 12:31), Max: 36.8 (27 Dec 2022 22:04)  T(F): 97.5 (28 Dec 2022 12:31), Max: 98.3 (27 Dec 2022 22:04)  HR: 86 (28 Dec 2022 12:31) (63 - 86)  BP: 125/89 (28 Dec 2022 12:31) (113/79 - 135/89)  BP(mean): --  RR: 17 (28 Dec 2022 12:31) (17 - 18)  SpO2: 94% (28 Dec 2022 12:31) (94% - 100%)    Parameters below as of 28 Dec 2022 12:31  Patient On (Oxygen Delivery Method): room air        I&O's Detail    27 Dec 2022 07:01  -  28 Dec 2022 07:00  --------------------------------------------------------  IN:    Oral Fluid: 380 mL  Total IN: 380 mL    OUT:  Total OUT: 0 mL    Total NET: 380 mL      28 Dec 2022 07:01  -  28 Dec 2022 13:12  --------------------------------------------------------  IN:    Oral Fluid: 240 mL  Total IN: 240 mL    OUT:  Total OUT: 0 mL    Total NET: 240 mL        I&O's Summary    27 Dec 2022 07:01  -  28 Dec 2022 07:00  --------------------------------------------------------  IN: 380 mL / OUT: 0 mL / NET: 380 mL    28 Dec 2022 07:01  -  28 Dec 2022 13:12  --------------------------------------------------------  IN: 240 mL / OUT: 0 mL / NET: 240 mL        PHYSICAL EXAM:  Neurological:  awake, alert, oriented to person, place, and date, PERRL, face symmetrical, speech clear and fluent, following commands, moving all extremities 5/5, sensation intact to light touch, no drift noted  Cardiovascular: +s1, s2  Respiratory: clear to auscultation b/l  Gastrointestinal: soft, non-distended, non-tender  Genitourinary: voiding  Extremities: warm, dry  Incision/Wound: incision site C/D/I    LABS:                        16.6   10.41 )-----------( 179      ( 27 Dec 2022 09:36 )             50.7     12-27    142  |  104  |  25<H>  ----------------------------<  126<H>  4.1   |  25  |  0.89    Ca    10.0      27 Dec 2022 09:36              CAPILLARY BLOOD GLUCOSE      POCT Blood Glucose.: 97 mg/dL (28 Dec 2022 11:41)  POCT Blood Glucose.: 104 mg/dL (28 Dec 2022 07:45)  POCT Blood Glucose.: 138 mg/dL (27 Dec 2022 22:12)  POCT Blood Glucose.: 86 mg/dL (27 Dec 2022 17:09)      Drug Levels: [] N/A    CSF Analysis: [] N/A      Allergies    No Known Allergies    Intolerances      MEDICATIONS:  Antibiotics:    Neuro:  acetaminophen     Tablet .. 650 milliGRAM(s) Oral every 6 hours PRN  haloperidol     Tablet 5 milliGRAM(s) Oral every 8 hours  haloperidol    Injectable 2 milliGRAM(s) IV Push every 6 hours PRN  lacosamide Solution 100 milliGRAM(s) Oral two times a day  levETIRAcetam  Solution 1000 milliGRAM(s) Oral two times a day    Anticoagulation:  enoxaparin Injectable 40 milliGRAM(s) SubCutaneous <User Schedule>    OTHER:  bisacodyl 5 milliGRAM(s) Oral every 12 hours  dexAMETHasone     Tablet 4 milliGRAM(s) Oral every 12 hours  dextrose 50% Injectable 12.5 Gram(s) IV Push once  dextrose 50% Injectable 25 Gram(s) IV Push once  dextrose 50% Injectable 25 Gram(s) IV Push once  dextrose Oral Gel 15 Gram(s) Oral once PRN  glucagon  Injectable 1 milliGRAM(s) IntraMuscular once  insulin lispro (ADMELOG) corrective regimen sliding scale   SubCutaneous three times a day before meals  insulin lispro (ADMELOG) corrective regimen sliding scale   SubCutaneous at bedtime  pantoprazole    Tablet 40 milliGRAM(s) Oral before breakfast  polyethylene glycol 3350 17 Gram(s) Oral two times a day  senna 2 Tablet(s) Oral at bedtime    IVF:  dextrose 5%. 1000 milliLiter(s) IV Continuous <Continuous>  dextrose 5%. 1000 milliLiter(s) IV Continuous <Continuous>    CULTURES:    RADIOLOGY & ADDITIONAL TESTS:

## 2022-12-28 NOTE — PROGRESS NOTE ADULT - ASSESSMENT
SERJIO TIDWELL is a 62y (1960) man with a PMHx significant for GBM s/p resection in 10/22 on Keppra 750 bid followed by Dr. Turpin who presented to the ED for change in mental status. Further history provided by HCP friend at bedside. Per HCP, she spoke to pt on the phone at 1230 and he was not making sense. While en route to the hospital had witnessed RUE twitching and rigid lasting around 2 min.  Pt given decadron 8mg PO by HCP as per radiooncology PA recommendations over the phone en route, in ED found to have witnessed seizure, intubated for airway protection and admitted to NSICU. Patient was extubated on 12/22 and transferred to neurosurgery floor on 12/24.    PLAN:  - neuro and vital check Q4hrs  - continue vimpat and keppra for seizures  - continue decadron 4mg BID for cerebral edema  - pain control with tylenol and oxycodone prn  - history of GBM, spoke with Dr. Vallejo this am, no plan for radiation or chemo therapy during the period of acute rehab, but will need follow up after discharge from rehab  - agitation, improved, continue haldol 5mg Qhrs, will f/u repeat ECG in am for QTc  - COVID+, but remains asymptomatic, will f/u repeat COVID test today  - tolerating regular diet  - last BM 12/27, continue current bowel regimens  - continue protonix for GI ppx   - activity increase as tolerated  - incentive spirometer  - DVT ppx: b/l SCDs and SQL  - Disposition: PT/OT- AR    will discuss above with Dr. Turpin    [facility] [id number]

## 2022-12-29 ENCOUNTER — TRANSCRIPTION ENCOUNTER (OUTPATIENT)
Age: 62
End: 2022-12-29

## 2022-12-29 LAB
ANION GAP SERPL CALC-SCNC: 15 MMOL/L — SIGNIFICANT CHANGE UP (ref 5–17)
BUN SERPL-MCNC: 20 MG/DL — SIGNIFICANT CHANGE UP (ref 7–23)
CALCIUM SERPL-MCNC: 9.3 MG/DL — SIGNIFICANT CHANGE UP (ref 8.4–10.5)
CHLORIDE SERPL-SCNC: 105 MMOL/L — SIGNIFICANT CHANGE UP (ref 96–108)
CO2 SERPL-SCNC: 20 MMOL/L — LOW (ref 22–31)
CREAT SERPL-MCNC: 0.85 MG/DL — SIGNIFICANT CHANGE UP (ref 0.5–1.3)
EGFR: 97 ML/MIN/1.73M2 — SIGNIFICANT CHANGE UP
GLUCOSE SERPL-MCNC: 104 MG/DL — HIGH (ref 70–99)
HCT VFR BLD CALC: 48 % — SIGNIFICANT CHANGE UP (ref 39–50)
HGB BLD-MCNC: 15.6 G/DL — SIGNIFICANT CHANGE UP (ref 13–17)
MCHC RBC-ENTMCNC: 28.4 PG — SIGNIFICANT CHANGE UP (ref 27–34)
MCHC RBC-ENTMCNC: 32.5 GM/DL — SIGNIFICANT CHANGE UP (ref 32–36)
MCV RBC AUTO: 87.4 FL — SIGNIFICANT CHANGE UP (ref 80–100)
NRBC # BLD: 0 /100 WBCS — SIGNIFICANT CHANGE UP (ref 0–0)
PLATELET # BLD AUTO: 170 K/UL — SIGNIFICANT CHANGE UP (ref 150–400)
POTASSIUM SERPL-MCNC: 4.1 MMOL/L — SIGNIFICANT CHANGE UP (ref 3.5–5.3)
POTASSIUM SERPL-SCNC: 4.1 MMOL/L — SIGNIFICANT CHANGE UP (ref 3.5–5.3)
RBC # BLD: 5.49 M/UL — SIGNIFICANT CHANGE UP (ref 4.2–5.8)
RBC # FLD: 13.7 % — SIGNIFICANT CHANGE UP (ref 10.3–14.5)
SARS-COV-2 RNA SPEC QL NAA+PROBE: SIGNIFICANT CHANGE UP
SARS-COV-2 RNA SPEC QL NAA+PROBE: SIGNIFICANT CHANGE UP
SODIUM SERPL-SCNC: 140 MMOL/L — SIGNIFICANT CHANGE UP (ref 135–145)
WBC # BLD: 9.68 K/UL — SIGNIFICANT CHANGE UP (ref 3.8–10.5)
WBC # FLD AUTO: 9.68 K/UL — SIGNIFICANT CHANGE UP (ref 3.8–10.5)

## 2022-12-29 PROCEDURE — 99232 SBSQ HOSP IP/OBS MODERATE 35: CPT | Mod: 24

## 2022-12-29 PROCEDURE — 93010 ELECTROCARDIOGRAM REPORT: CPT

## 2022-12-29 RX ORDER — ENOXAPARIN SODIUM 100 MG/ML
40 INJECTION SUBCUTANEOUS
Refills: 0 | Status: DISCONTINUED | OUTPATIENT
Start: 2022-12-30 | End: 2023-01-18

## 2022-12-29 RX ORDER — LANOLIN ALCOHOL/MO/W.PET/CERES
5 CREAM (GRAM) TOPICAL AT BEDTIME
Refills: 0 | Status: DISCONTINUED | OUTPATIENT
Start: 2022-12-29 | End: 2022-12-30

## 2022-12-29 RX ORDER — DEXAMETHASONE 0.5 MG/5ML
1 ELIXIR ORAL
Qty: 0 | Refills: 0 | DISCHARGE
Start: 2022-12-29

## 2022-12-29 RX ORDER — LEVETIRACETAM 250 MG/1
1000 TABLET, FILM COATED ORAL
Refills: 0 | Status: DISCONTINUED | OUTPATIENT
Start: 2022-12-30 | End: 2023-01-18

## 2022-12-29 RX ORDER — LACOSAMIDE 50 MG/1
10 TABLET ORAL
Qty: 0 | Refills: 0 | DISCHARGE
Start: 2022-12-29

## 2022-12-29 RX ORDER — LACOSAMIDE 50 MG/1
100 TABLET ORAL
Refills: 0 | Status: DISCONTINUED | OUTPATIENT
Start: 2022-12-30 | End: 2023-01-09

## 2022-12-29 RX ORDER — PANTOPRAZOLE SODIUM 20 MG/1
40 TABLET, DELAYED RELEASE ORAL
Refills: 0 | Status: DISCONTINUED | OUTPATIENT
Start: 2022-12-31 | End: 2023-01-18

## 2022-12-29 RX ORDER — HALOPERIDOL DECANOATE 100 MG/ML
5 INJECTION INTRAMUSCULAR EVERY 8 HOURS
Refills: 0 | Status: DISCONTINUED | OUTPATIENT
Start: 2022-12-30 | End: 2023-01-02

## 2022-12-29 RX ORDER — LEVETIRACETAM 250 MG/1
10 TABLET, FILM COATED ORAL
Qty: 0 | Refills: 0 | DISCHARGE
Start: 2022-12-29

## 2022-12-29 RX ORDER — DEXAMETHASONE 0.5 MG/5ML
4 ELIXIR ORAL EVERY 12 HOURS
Refills: 0 | Status: DISCONTINUED | OUTPATIENT
Start: 2022-12-30 | End: 2023-01-18

## 2022-12-29 RX ORDER — ACETAMINOPHEN 500 MG
2 TABLET ORAL
Qty: 0 | Refills: 0 | DISCHARGE
Start: 2022-12-29

## 2022-12-29 RX ORDER — HALOPERIDOL DECANOATE 100 MG/ML
1 INJECTION INTRAMUSCULAR
Qty: 0 | Refills: 0 | DISCHARGE
Start: 2022-12-29

## 2022-12-29 RX ORDER — SENNA PLUS 8.6 MG/1
2 TABLET ORAL AT BEDTIME
Refills: 0 | Status: DISCONTINUED | OUTPATIENT
Start: 2022-12-30 | End: 2023-01-18

## 2022-12-29 RX ORDER — LEVETIRACETAM 250 MG/1
1 TABLET, FILM COATED ORAL
Qty: 0 | Refills: 0 | DISCHARGE

## 2022-12-29 RX ORDER — DEXAMETHASONE 0.5 MG/5ML
1 ELIXIR ORAL
Qty: 0 | Refills: 0 | DISCHARGE

## 2022-12-29 RX ORDER — ONDANSETRON 8 MG/1
1 TABLET, FILM COATED ORAL
Qty: 0 | Refills: 0 | DISCHARGE

## 2022-12-29 RX ORDER — POLYETHYLENE GLYCOL 3350 17 G/17G
17 POWDER, FOR SOLUTION ORAL
Qty: 0 | Refills: 0 | DISCHARGE
Start: 2022-12-29

## 2022-12-29 RX ORDER — POLYETHYLENE GLYCOL 3350 17 G/17G
17 POWDER, FOR SOLUTION ORAL
Refills: 0 | Status: DISCONTINUED | OUTPATIENT
Start: 2022-12-30 | End: 2022-12-30

## 2022-12-29 RX ORDER — PANTOPRAZOLE SODIUM 20 MG/1
1 TABLET, DELAYED RELEASE ORAL
Qty: 0 | Refills: 0 | DISCHARGE
Start: 2022-12-29

## 2022-12-29 RX ORDER — ACETAMINOPHEN 500 MG
650 TABLET ORAL EVERY 6 HOURS
Refills: 0 | Status: DISCONTINUED | OUTPATIENT
Start: 2022-12-30 | End: 2023-01-18

## 2022-12-29 RX ORDER — SENNA PLUS 8.6 MG/1
2 TABLET ORAL
Qty: 0 | Refills: 0 | DISCHARGE
Start: 2022-12-29

## 2022-12-29 RX ADMIN — LACOSAMIDE 100 MILLIGRAM(S): 50 TABLET ORAL at 17:31

## 2022-12-29 RX ADMIN — PANTOPRAZOLE SODIUM 40 MILLIGRAM(S): 20 TABLET, DELAYED RELEASE ORAL at 06:10

## 2022-12-29 RX ADMIN — ENOXAPARIN SODIUM 40 MILLIGRAM(S): 100 INJECTION SUBCUTANEOUS at 17:25

## 2022-12-29 RX ADMIN — HALOPERIDOL DECANOATE 5 MILLIGRAM(S): 100 INJECTION INTRAMUSCULAR at 05:43

## 2022-12-29 RX ADMIN — HALOPERIDOL DECANOATE 5 MILLIGRAM(S): 100 INJECTION INTRAMUSCULAR at 13:32

## 2022-12-29 RX ADMIN — POLYETHYLENE GLYCOL 3350 17 GRAM(S): 17 POWDER, FOR SOLUTION ORAL at 05:40

## 2022-12-29 RX ADMIN — Medication 4 MILLIGRAM(S): at 05:42

## 2022-12-29 RX ADMIN — SENNA PLUS 2 TABLET(S): 8.6 TABLET ORAL at 21:38

## 2022-12-29 RX ADMIN — Medication 5 MILLIGRAM(S): at 05:42

## 2022-12-29 RX ADMIN — HALOPERIDOL DECANOATE 5 MILLIGRAM(S): 100 INJECTION INTRAMUSCULAR at 21:38

## 2022-12-29 RX ADMIN — LACOSAMIDE 100 MILLIGRAM(S): 50 TABLET ORAL at 05:40

## 2022-12-29 RX ADMIN — LEVETIRACETAM 1000 MILLIGRAM(S): 250 TABLET, FILM COATED ORAL at 05:41

## 2022-12-29 RX ADMIN — LEVETIRACETAM 1000 MILLIGRAM(S): 250 TABLET, FILM COATED ORAL at 17:24

## 2022-12-29 RX ADMIN — Medication 4 MILLIGRAM(S): at 17:24

## 2022-12-29 RX ADMIN — Medication 5 MILLIGRAM(S): at 23:45

## 2022-12-29 NOTE — DISCHARGE NOTE PROVIDER - CARE PROVIDERS DIRECT ADDRESSES
[email or web address]

## 2022-12-29 NOTE — DISCHARGE NOTE NURSING/CASE MANAGEMENT/SOCIAL WORK - NSTRANSFERBELONGINGSDISPO_GEN_A_NUR
CALLING BACK STATES THEY CALLED [facility] YESTERDAY AND THEY STATES THE TEST FOR A1C HAS TO BE CODED THAT TEST IS MEDICALLY NECESSARY AND TEST WILL BE COVERED     CALLBACK # [phone]    with patient

## 2022-12-29 NOTE — DISCHARGE NOTE PROVIDER - NSDCCPCAREPLAN_GEN_ALL_CORE_FT
PRINCIPAL DISCHARGE DIAGNOSIS  Diagnosis: Seizure  Assessment and Plan of Treatment: - Please continue to take Vimpat and keppra as instructed for seizure control.  - Return to Emergency Department or contact your Neurosurgeon if any changes in mental status, severe headache, weakness, gait instability, seizures, numbness or tingling of extremities; difficulty swallowing; drainage or redness of wound, high fever, unrelenting vomiting; pain in legs; difficulty urinating or constipation.  - Donot restart your Aspirin or take any Motrin/NSAIDS until checking with your Neurosurgeon.      SECONDARY DISCHARGE DIAGNOSES  Diagnosis: GBM (glioblastoma multiforme)  Assessment and Plan of Treatment: - Pleaes continue to take decadron 4mg twice a day for cerebral edama.  - Please follow up with radiation oncology, neuro-oncology, and neurosurgery within 1 week after discharge from rehab.    Diagnosis: 2019 novel coronavirus disease (COVID-19)  Assessment and Plan of Treatment: - asymptomatic, now negative on COVID PCRs  - Please follow up with your primary care physician within 1 week after discharge.    Diagnosis: Agitation  Assessment and Plan of Treatment: - You are started on haldol standing for agitation. Please check biweekly ECG to follow up QTc.  - Please follow up with your primary care physician within 1 week after discharge.    Diagnosis: Prophylactic measure  Assessment and Plan of Treatment: - Please adjust bowel regimens at rehab to prevent constipation or straining

## 2022-12-29 NOTE — DISCHARGE NOTE PROVIDER - NSDCFUSCHEDAPPT_GEN_ALL_CORE_FT
[facility]  MRI  [facility]  Scheduled Appointment: 01/17/2023    Debby Torres  [facility]  NEUROLOGY [address]  Scheduled Appointment: 01/23/2023    Bereket Ayala  [facility]  RADMED [address]  Scheduled Appointment: 02/02/2023

## 2022-12-29 NOTE — DISCHARGE NOTE NURSING/CASE MANAGEMENT/SOCIAL WORK - NSDCPEFALRISK_GEN_ALL_CORE
For information on Fall & Injury Prevention, visit: [email or web address] OR  [email or web address] OR  https://www.cdc.gov/steadi/patient.html

## 2022-12-29 NOTE — PROGRESS NOTE ADULT - PROVIDER SPECIALTY LIST ADULT
NSICU
NSICU
Neurology
Neurosurgery
Neurosurgery
NSICU
Neurosurgery
NSICU
NSICU
Neurosurgery
NSICU
Neurology
Neurosurgery
Neurosurgery

## 2022-12-29 NOTE — H&P ADULT - NSHPSOCIALHISTORY_GEN_ALL_CORE
SOCIAL HISTORY  Smoking - Denied  EtOH - Denied   Drugs - Denied      FUNCTIONAL HISTORY  Lives with mother  Independent AMB and ADLs PTA     CURRENT FUNCTIONAL STATUS  12/24 SLP  mixed aphasia  unable to follow simple commands     12/25 PT  bed mobility min assist  transfers mod assist   gait mod assist x 5 side steps   follows <25% simple commands     12/25 OT  bed mobility min assist   transfers mod assist x 2

## 2022-12-29 NOTE — DISCHARGE NOTE PROVIDER - NSDCMRMEDTOKEN_GEN_ALL_CORE_FT
acetaminophen 325 mg oral tablet: 2 tab(s) orally every 6 hours, As needed, Temp greater or equal to 38C (100.4F), Mild Pain (1 - 3)  bisacodyl 5 mg oral delayed release tablet: 1 tab(s) orally every 12 hours  dexamethasone 4 mg oral tablet: 1 tab(s) orally every 12 hours  haloperidol 5 mg oral tablet: 1 tab(s) orally every 8 hours  lacosamide 10 mg/mL oral solution: 10 milliliter(s) orally 2 times a day  levETIRAcetam 100 mg/mL oral solution: 10 milliliter(s) orally 2 times a day  pantoprazole 40 mg oral delayed release tablet: 1 tab(s) orally once a day (before a meal)  polyethylene glycol 3350 oral powder for reconstitution: 17 gram(s) orally 2 times a day  senna leaf extract oral tablet: 2 tab(s) orally once a day (at bedtime)

## 2022-12-29 NOTE — PROGRESS NOTE ADULT - SUBJECTIVE AND OBJECTIVE BOX
Patient was seen at bedside this am. Patient felt well. Denied any headache, cp, sob, n/v, or abd pain.    OVERNIGHT EVENTS: no acute event overnight    Vital Signs Last 24 Hrs  T(C): 36.9 (29 Dec 2022 08:39), Max: 36.9 (29 Dec 2022 08:39)  T(F): 98.5 (29 Dec 2022 08:39), Max: 98.5 (29 Dec 2022 08:39)  HR: 79 (29 Dec 2022 08:39) (65 - 103)  BP: 101/67 (29 Dec 2022 08:39) (101/67 - 138/91)  BP(mean): --  RR: 17 (29 Dec 2022 08:39) (17 - 18)  SpO2: 95% (29 Dec 2022 08:39) (94% - 98%)    Parameters below as of 29 Dec 2022 08:39  Patient On (Oxygen Delivery Method): room air        I&O's Detail    28 Dec 2022 07:01  -  29 Dec 2022 07:00  --------------------------------------------------------  IN:    Oral Fluid: 480 mL  Total IN: 480 mL    OUT:  Total OUT: 0 mL    Total NET: 480 mL        I&O's Summary    28 Dec 2022 07:01  -  29 Dec 2022 07:00  --------------------------------------------------------  IN: 480 mL / OUT: 0 mL / NET: 480 mL        PHYSICAL EXAM:  Neurological:  alert, awake, oriented to person, place, and date, PERRL, face symmetrical, speech clear and fluent, following commands, moving all extremities 5/5, sensation intact to light touch, no drift noted  Cardiovascular: +s1, s2  Respiratory: clear to auscultation b/l  Gastrointestinal: soft, non-distended, non-tender  Genitourinary: voiding  Extremities: warm, dry  Incision/Wound: incision site C/D/I    LABS:                        15.6   9.68  )-----------( 170      ( 29 Dec 2022 05:00 )             48.0     12-29    140  |  105  |  20  ----------------------------<  104<H>  4.1   |  20<L>  |  0.85    Ca    9.3      29 Dec 2022 05:00              CAPILLARY BLOOD GLUCOSE          Drug Levels: [] N/A    CSF Analysis: [] N/A      Allergies    No Known Allergies    Intolerances      MEDICATIONS:  Antibiotics:    Neuro:  acetaminophen     Tablet .. 650 milliGRAM(s) Oral every 6 hours PRN  haloperidol     Tablet 5 milliGRAM(s) Oral every 8 hours  haloperidol    Injectable 2 milliGRAM(s) IV Push every 6 hours PRN  lacosamide Solution 100 milliGRAM(s) Oral two times a day  levETIRAcetam  Solution 1000 milliGRAM(s) Oral two times a day    Anticoagulation:  enoxaparin Injectable 40 milliGRAM(s) SubCutaneous <User Schedule>    OTHER:  bisacodyl 5 milliGRAM(s) Oral every 12 hours  chlorhexidine 2% Cloths 1 Application(s) Topical daily  dexAMETHasone     Tablet 4 milliGRAM(s) Oral every 12 hours  dextrose 50% Injectable 25 Gram(s) IV Push once  dextrose 50% Injectable 12.5 Gram(s) IV Push once  dextrose 50% Injectable 25 Gram(s) IV Push once  dextrose Oral Gel 15 Gram(s) Oral once PRN  glucagon  Injectable 1 milliGRAM(s) IntraMuscular once  pantoprazole    Tablet 40 milliGRAM(s) Oral before breakfast  polyethylene glycol 3350 17 Gram(s) Oral two times a day  senna 2 Tablet(s) Oral at bedtime    IVF:  dextrose 5%. 1000 milliLiter(s) IV Continuous <Continuous>  dextrose 5%. 1000 milliLiter(s) IV Continuous <Continuous>    CULTURES:    RADIOLOGY & ADDITIONAL TESTS:

## 2022-12-29 NOTE — DISCHARGE NOTE PROVIDER - HOSPITAL COURSE
SERJIO TIDWELL is a 62y (1960) man with a PMHx significant for GBM s/p resection in 10/22 on Keppra 750 bid followed by Dr. Turpin who presented to the ED for change in mental status. Further history provided by HCP friend at bedside. Per HCP, she spoke to pt on the phone at 1230 and he was not making sense. While en route to the hospital had witnessed RUE twitching and rigid lasting around 2 min.  Pt given decadron 8mg PO by HCP as per radiooncology PA recommendations over the phone en route, in ED found to have witnessed seizure, intubated for airway protection and admitted to NSICU on 12/21/2022. Patient was on VEEG and AEDs were optimized in NSCU. Patient had MRI brain and MR SPECT on 12/22, which showed suspicious for progressive neoplasm in the left parietal postoperative bed compared with 10/5/2022 with MR spectroscopy. Tiny focus of neoplasm also likely in the left brainstem, unchanged. Patient was extubated on 12/22.     Patient was found to be positive for COVID upon admission and isolated per protocol. Patient has been asymptomatic during this admission and repeat COVID tests on 12/28 and 12/29 were negative. Hospital course was also complicated by agitation, which now resolved with standing haldol.     Physical therapy, occupational therapy, and rehab medicine evaluations were appreciated and recommended acute rehab. On the day of discharge, patient was medically cleared and stable for discharge.

## 2022-12-29 NOTE — DISCHARGE NOTE NURSING/CASE MANAGEMENT/SOCIAL WORK - PATIENT PORTAL LINK FT
You can access the FollowMyHealth Patient Portal offered by [facility] by registering at the following website: [email or web address]. By joining [employer]’s FollowMyHealth portal, you will also be able to view your health information using other applications (apps) compatible with our system.

## 2022-12-29 NOTE — H&P ADULT - ASSESSMENT
ASSESSMENT/PLAN  62 year old male with PMH of GBM s/p resection in 10/22 on Keppra 750 bid followed by Dr. [facility] in [facility] status. Further history provided by HCP friend at bedside. Per HCP, she spoke to pt on the phone at 1230 on day of admit and he was not making sense. While en route to the hospital had witnessed RUE twitching and rigid lasting around 2 min.  Pt given decadron 8mg PO by HCP as per radiooncology PA recommendations over the phone en route, in ED found to have witnessed seizure, intubated for airway protection and admitted to NSICU. Patient was on VEEG and AEDs were optimized in NSCU. Patient had MRI brain and MR SPECT on 12/22, which showed suspicious for progressive neoplasm in the left parietal postoperative bed compared with 10/5/2022 with MR spectroscopy. Tiny focus of neoplasm also likely in the left brainstem, unchanged. Patient was extubated on 12/22. Patient was found to be positive for COVID upon admission and isolated per protocol. Patient has been asymptomatic during this admission and repeat COVID tests on 12/28 and 12/29 were negative. Hospital course was also complicated by agitation, which resolved with standing haldol. He was evaluated for admission to acute inpatient rehab and admitted to [facility] on 12/29/22.     #GBM with seizures now with Gait Instability, ADL impairments and Functional impairments  - Start Comprehensive Rehab Program of PT/OT/SLP  -no plan for radiation or chemo therapy during the period of acute rehab, but will need follow up after discharge from rehab  -Continue Dexamethasone 4mg BID  -Continue lacosamide 100mg BID  -Continue Keppra 100mg BID    #Agitation  -Continue Haldol 5mg TID  -Monitor QTC    #Covid  -Noted on 12/21   -Repeat negative on 12/28 and 12/29  -No treatment administered as he was asymptomatic     #Pain control  - Tylenol PRN    #GI/Bowel Mgmt   - Continue Senna at bedtime   -Continue bisacodyl 5mg BID  - Miralax PRN    #Bladder management  - Continue to monitor PVR q 8 hours (SC if > 400)  -Monitor UO    #DVT  - Lovenox  - TEDs     #Skin:  -***    FEN   - Diet - Regular + Thins  [CCHO, DASH/TLC]    - Dysphagia  SLP - evaluation and treatment    Precautions / PROPHYLAXIS:   - Falls, Seizure   - ortho: Weight bearing status: WBAT   - Lungs: Aspiration, Incentive Spirometer   - Pressure injury/Skin: Turn Q2hrs while in bed, OOB to Chair, PT/OT        MEDICAL PROGNOSIS: GOOD              REHAB POTENTIAL: GOOD              ESTIMATED DISPOSITION: HOME WITH HOME CARE              ELOS: 10-14 Days   EXPECTED THERAPY:     P.T. 1hr/day       O.T. 1hr/day      S.L.P. 1hr/day       P&O Unnecessary       EXP FREQUENCY: 5 days per 7 day period     PRESCREEN COMPARISON:   I have reviewed the prescreen information and I have found no relevant changes between the preadmission screening and my post admission evaluation     RATIONALE FOR INPATIENT ADMISSION - Patient demonstrates the following: (check all that apply)  [X] Medically appropriate for rehabilitation admission  [X] Has attainable rehab goals with an appropriate initial discharge plan  [X] Has rehabilitation potential (expected to make a significant improvement within a reasonable period of time)   [X] Requires close medical management by a rehab physician, rehab nursing care, Hospitalist and comprehensive interdisciplinary team (including PT, OT, & or SLP, Prosthetics and Orthotics)

## 2022-12-29 NOTE — PROGRESS NOTE ADULT - ASSESSMENT
SERJIO TIDWELL is a 62y (1960) man with a PMHx significant for GBM s/p resection in 10/22 on Keppra 750 bid followed by Dr. Turpin who presented to the ED for change in mental status. Further history provided by HCP friend at bedside. Per HCP, she spoke to pt on the phone at 1230 and he was not making sense. While en route to the hospital had witnessed RUE twitching and rigid lasting around 2 min.  Pt given decadron 8mg PO by HCP as per radiooncology PA recommendations over the phone en route, in ED found to have witnessed seizure, intubated for airway protection and admitted to NSICU. Patient was extubated on 12/22 and transferred to neurosurgery floor on 12/24.    PLAN:  - neuro and vital check Q4hrs  - continue vimpat and keppra for seizures  - continue decadron 4mg BID for cerebral edema  - pain control with tylenol and oxycodone prn  - history of GBM, per Dr. Vallejo this am, no plan for radiation or chemo therapy during the period of acute rehab, but will need follow up after discharge from rehab  - agitation, improved, continue haldol 5mg Qhrs, last QTc 432  - COVID+, but remains asymptomatic, repeat COVID negative yesterday, will f/u repeat COVID test today  - tolerating regular diet  - last BM 12/27, continue current bowel regimens  - continue protonix for GI ppx   - activity increase as tolerated  - incentive spirometer  - DVT ppx: b/l SCDs and SQL  - Disposition: PT/OT- AR    will discuss above with Dr. Turpni    [facility] [id number]

## 2022-12-29 NOTE — DISCHARGE NOTE PROVIDER - CARE PROVIDER_API CALL
Todd Turpin)  Neurosurgery  [address] [place] [address]  Phone: [phone]  Fax: [phone]  Follow Up Time:     Debby Torres)  Neurology  [address] [place] [address]  Phone: [phone]  Fax: [phone]  Follow Up Time:     Bereket Ayala  RADIATION ONCOLOGY  [address] [place] [address] - [place] [address]  Phone: [phone]  Fax: [phone]  Follow Up Time:

## 2022-12-29 NOTE — PROGRESS NOTE ADULT - THIS PATIENT HAS THE FOLLOWING CONDITION(S)/DIAGNOSES ON THIS ADMISSION:
Cerebral Edema
None
None
Cerebral Edema/Brain Compression / Herniation
Cerebral Edema/Brain Compression / Herniation
None
Cerebral Edema
None
Cerebral Edema/Brain Compression / Herniation
None

## 2022-12-30 ENCOUNTER — INPATIENT (INPATIENT)
Facility: HOSPITAL | Age: 62
LOS: 18 days | Discharge: HOME HEALTH | DRG: 949 | End: 2023-01-18
Attending: STUDENT IN AN ORGANIZED HEALTH CARE EDUCATION/TRAINING PROGRAM | Admitting: STUDENT IN AN ORGANIZED HEALTH CARE EDUCATION/TRAINING PROGRAM
Payer: MEDICAID

## 2022-12-30 VITALS
SYSTOLIC BLOOD PRESSURE: 115 MMHG | OXYGEN SATURATION: 93 % | TEMPERATURE: 98 F | DIASTOLIC BLOOD PRESSURE: 85 MMHG | RESPIRATION RATE: 18 BRPM | HEART RATE: 98 BPM

## 2022-12-30 VITALS
TEMPERATURE: 98 F | SYSTOLIC BLOOD PRESSURE: 122 MMHG | WEIGHT: 192.68 LBS | DIASTOLIC BLOOD PRESSURE: 84 MMHG | HEART RATE: 95 BPM | OXYGEN SATURATION: 96 % | HEIGHT: 70 IN | RESPIRATION RATE: 16 BRPM

## 2022-12-30 DIAGNOSIS — C71.9 MALIGNANT NEOPLASM OF BRAIN, UNSPECIFIED: ICD-10-CM

## 2022-12-30 DIAGNOSIS — G61.0 GUILLAIN-BARRE SYNDROME: ICD-10-CM

## 2022-12-30 LAB
ALBUMIN SERPL ELPH-MCNC: 3 G/DL — LOW (ref 3.3–5)
ALP SERPL-CCNC: 63 U/L — SIGNIFICANT CHANGE UP (ref 40–120)
ALT FLD-CCNC: 36 U/L — SIGNIFICANT CHANGE UP (ref 10–45)
ANION GAP SERPL CALC-SCNC: 7 MMOL/L — SIGNIFICANT CHANGE UP (ref 5–17)
AST SERPL-CCNC: 9 U/L — LOW (ref 10–40)
BASOPHILS # BLD AUTO: 0 K/UL — SIGNIFICANT CHANGE UP (ref 0–0.2)
BASOPHILS NFR BLD AUTO: 0 % — SIGNIFICANT CHANGE UP (ref 0–2)
BILIRUB SERPL-MCNC: 0.7 MG/DL — SIGNIFICANT CHANGE UP (ref 0.2–1.2)
BUN SERPL-MCNC: 19 MG/DL — SIGNIFICANT CHANGE UP (ref 7–23)
CALCIUM SERPL-MCNC: 9.1 MG/DL — SIGNIFICANT CHANGE UP (ref 8.4–10.5)
CHLORIDE SERPL-SCNC: 103 MMOL/L — SIGNIFICANT CHANGE UP (ref 96–108)
CO2 SERPL-SCNC: 27 MMOL/L — SIGNIFICANT CHANGE UP (ref 22–31)
CREAT SERPL-MCNC: 0.95 MG/DL — SIGNIFICANT CHANGE UP (ref 0.5–1.3)
EGFR: 89 ML/MIN/1.73M2 — SIGNIFICANT CHANGE UP
EOSINOPHIL # BLD AUTO: 0 K/UL — SIGNIFICANT CHANGE UP (ref 0–0.5)
EOSINOPHIL NFR BLD AUTO: 0 % — SIGNIFICANT CHANGE UP (ref 0–6)
GLUCOSE SERPL-MCNC: 113 MG/DL — HIGH (ref 70–99)
HCT VFR BLD CALC: 46.7 % — SIGNIFICANT CHANGE UP (ref 39–50)
HGB BLD-MCNC: 15.5 G/DL — SIGNIFICANT CHANGE UP (ref 13–17)
LYMPHOCYTES # BLD AUTO: 0.68 K/UL — LOW (ref 1–3.3)
LYMPHOCYTES # BLD AUTO: 6 % — LOW (ref 13–44)
MCHC RBC-ENTMCNC: 28.8 PG — SIGNIFICANT CHANGE UP (ref 27–34)
MCHC RBC-ENTMCNC: 33.2 GM/DL — SIGNIFICANT CHANGE UP (ref 32–36)
MCV RBC AUTO: 86.8 FL — SIGNIFICANT CHANGE UP (ref 80–100)
MONOCYTES # BLD AUTO: 1.02 K/UL — HIGH (ref 0–0.9)
MONOCYTES NFR BLD AUTO: 9 % — SIGNIFICANT CHANGE UP (ref 2–14)
NEUTROPHILS # BLD AUTO: 9.62 K/UL — HIGH (ref 1.8–7.4)
NEUTROPHILS NFR BLD AUTO: 85 % — HIGH (ref 43–77)
NRBC # BLD: 0 /100 — SIGNIFICANT CHANGE UP (ref 0–0)
PLAT MORPH BLD: NORMAL — SIGNIFICANT CHANGE UP
PLATELET # BLD AUTO: 159 K/UL — SIGNIFICANT CHANGE UP (ref 150–400)
POTASSIUM SERPL-MCNC: 4 MMOL/L — SIGNIFICANT CHANGE UP (ref 3.5–5.3)
POTASSIUM SERPL-SCNC: 4 MMOL/L — SIGNIFICANT CHANGE UP (ref 3.5–5.3)
PROT SERPL-MCNC: 6.5 G/DL — SIGNIFICANT CHANGE UP (ref 6–8.3)
RBC # BLD: 5.38 M/UL — SIGNIFICANT CHANGE UP (ref 4.2–5.8)
RBC # FLD: 13.9 % — SIGNIFICANT CHANGE UP (ref 10.3–14.5)
RBC BLD AUTO: NORMAL — SIGNIFICANT CHANGE UP
SODIUM SERPL-SCNC: 137 MMOL/L — SIGNIFICANT CHANGE UP (ref 135–145)
WBC # BLD: 11.32 K/UL — HIGH (ref 3.8–10.5)
WBC # FLD AUTO: 11.32 K/UL — HIGH (ref 3.8–10.5)

## 2022-12-30 PROCEDURE — 95714 VEEG EA 12-26 HR UNMNTR: CPT

## 2022-12-30 PROCEDURE — 84484 ASSAY OF TROPONIN QUANT: CPT

## 2022-12-30 PROCEDURE — 87640 STAPH A DNA AMP PROBE: CPT

## 2022-12-30 PROCEDURE — 83036 HEMOGLOBIN GLYCOSYLATED A1C: CPT

## 2022-12-30 PROCEDURE — 93306 TTE W/DOPPLER COMPLETE: CPT

## 2022-12-30 PROCEDURE — 71045 X-RAY EXAM CHEST 1 VIEW: CPT

## 2022-12-30 PROCEDURE — 86900 BLOOD TYPING SEROLOGIC ABO: CPT

## 2022-12-30 PROCEDURE — 97110 THERAPEUTIC EXERCISES: CPT

## 2022-12-30 PROCEDURE — 31500 INSERT EMERGENCY AIRWAY: CPT

## 2022-12-30 PROCEDURE — U0003: CPT

## 2022-12-30 PROCEDURE — 70450 CT HEAD/BRAIN W/O DYE: CPT | Mod: MA

## 2022-12-30 PROCEDURE — 84436 ASSAY OF TOTAL THYROXINE: CPT

## 2022-12-30 PROCEDURE — 85730 THROMBOPLASTIN TIME PARTIAL: CPT

## 2022-12-30 PROCEDURE — 84100 ASSAY OF PHOSPHORUS: CPT

## 2022-12-30 PROCEDURE — 86901 BLOOD TYPING SEROLOGIC RH(D): CPT

## 2022-12-30 PROCEDURE — 76390 MR SPECTROSCOPY: CPT

## 2022-12-30 PROCEDURE — 80053 COMPREHEN METABOLIC PANEL: CPT

## 2022-12-30 PROCEDURE — 80048 BASIC METABOLIC PNL TOTAL CA: CPT

## 2022-12-30 PROCEDURE — A9585: CPT

## 2022-12-30 PROCEDURE — 93308 TTE F-UP OR LMTD: CPT

## 2022-12-30 PROCEDURE — 85014 HEMATOCRIT: CPT

## 2022-12-30 PROCEDURE — 82947 ASSAY GLUCOSE BLOOD QUANT: CPT

## 2022-12-30 PROCEDURE — 92523 SPEECH SOUND LANG COMPREHEN: CPT

## 2022-12-30 PROCEDURE — 87641 MR-STAPH DNA AMP PROBE: CPT

## 2022-12-30 PROCEDURE — 94003 VENT MGMT INPAT SUBQ DAY: CPT

## 2022-12-30 PROCEDURE — 84443 ASSAY THYROID STIM HORMONE: CPT

## 2022-12-30 PROCEDURE — 97116 GAIT TRAINING THERAPY: CPT

## 2022-12-30 PROCEDURE — 80307 DRUG TEST PRSMV CHEM ANLYZR: CPT

## 2022-12-30 PROCEDURE — 99291 CRITICAL CARE FIRST HOUR: CPT

## 2022-12-30 PROCEDURE — 83605 ASSAY OF LACTIC ACID: CPT

## 2022-12-30 PROCEDURE — 85610 PROTHROMBIN TIME: CPT

## 2022-12-30 PROCEDURE — 87637 SARSCOV2&INF A&B&RSV AMP PRB: CPT

## 2022-12-30 PROCEDURE — 86850 RBC ANTIBODY SCREEN: CPT

## 2022-12-30 PROCEDURE — 99223 1ST HOSP IP/OBS HIGH 75: CPT

## 2022-12-30 PROCEDURE — 93010 ELECTROCARDIOGRAM REPORT: CPT

## 2022-12-30 PROCEDURE — 85025 COMPLETE CBC W/AUTO DIFF WBC: CPT

## 2022-12-30 PROCEDURE — 94002 VENT MGMT INPAT INIT DAY: CPT

## 2022-12-30 PROCEDURE — 36415 COLL VENOUS BLD VENIPUNCTURE: CPT

## 2022-12-30 PROCEDURE — 95700 EEG CONT REC W/VID EEG TECH: CPT

## 2022-12-30 PROCEDURE — 82435 ASSAY OF BLOOD CHLORIDE: CPT

## 2022-12-30 PROCEDURE — U0005: CPT

## 2022-12-30 PROCEDURE — 93970 EXTREMITY STUDY: CPT

## 2022-12-30 PROCEDURE — 0042T: CPT | Mod: MA

## 2022-12-30 PROCEDURE — C9254: CPT

## 2022-12-30 PROCEDURE — 82962 GLUCOSE BLOOD TEST: CPT

## 2022-12-30 PROCEDURE — 70553 MRI BRAIN STEM W/O & W/DYE: CPT

## 2022-12-30 PROCEDURE — 85018 HEMOGLOBIN: CPT

## 2022-12-30 PROCEDURE — 84480 ASSAY TRIIODOTHYRONINE (T3): CPT

## 2022-12-30 PROCEDURE — 96374 THER/PROPH/DIAG INJ IV PUSH: CPT

## 2022-12-30 PROCEDURE — 97162 PT EVAL MOD COMPLEX 30 MIN: CPT

## 2022-12-30 PROCEDURE — 82565 ASSAY OF CREATININE: CPT

## 2022-12-30 PROCEDURE — 97530 THERAPEUTIC ACTIVITIES: CPT

## 2022-12-30 PROCEDURE — 83735 ASSAY OF MAGNESIUM: CPT

## 2022-12-30 PROCEDURE — 82803 BLOOD GASES ANY COMBINATION: CPT

## 2022-12-30 PROCEDURE — 82330 ASSAY OF CALCIUM: CPT

## 2022-12-30 PROCEDURE — 85027 COMPLETE CBC AUTOMATED: CPT

## 2022-12-30 PROCEDURE — 84132 ASSAY OF SERUM POTASSIUM: CPT

## 2022-12-30 PROCEDURE — 84295 ASSAY OF SERUM SODIUM: CPT

## 2022-12-30 PROCEDURE — 93005 ELECTROCARDIOGRAM TRACING: CPT

## 2022-12-30 RX ADMIN — Medication 4 MILLIGRAM(S): at 18:12

## 2022-12-30 RX ADMIN — HALOPERIDOL DECANOATE 5 MILLIGRAM(S): 100 INJECTION INTRAMUSCULAR at 05:28

## 2022-12-30 RX ADMIN — PANTOPRAZOLE SODIUM 40 MILLIGRAM(S): 20 TABLET, DELAYED RELEASE ORAL at 05:28

## 2022-12-30 RX ADMIN — LACOSAMIDE 100 MILLIGRAM(S): 50 TABLET ORAL at 05:28

## 2022-12-30 RX ADMIN — Medication 5 MILLIGRAM(S): at 18:12

## 2022-12-30 RX ADMIN — POLYETHYLENE GLYCOL 3350 17 GRAM(S): 17 POWDER, FOR SOLUTION ORAL at 05:27

## 2022-12-30 RX ADMIN — HALOPERIDOL DECANOATE 5 MILLIGRAM(S): 100 INJECTION INTRAMUSCULAR at 21:34

## 2022-12-30 RX ADMIN — LEVETIRACETAM 1000 MILLIGRAM(S): 250 TABLET, FILM COATED ORAL at 18:12

## 2022-12-30 RX ADMIN — ENOXAPARIN SODIUM 40 MILLIGRAM(S): 100 INJECTION SUBCUTANEOUS at 18:13

## 2022-12-30 RX ADMIN — Medication 5 MILLIGRAM(S): at 05:29

## 2022-12-30 RX ADMIN — LEVETIRACETAM 1000 MILLIGRAM(S): 250 TABLET, FILM COATED ORAL at 05:27

## 2022-12-30 RX ADMIN — HALOPERIDOL DECANOATE 5 MILLIGRAM(S): 100 INJECTION INTRAMUSCULAR at 15:03

## 2022-12-30 RX ADMIN — POLYETHYLENE GLYCOL 3350 17 GRAM(S): 17 POWDER, FOR SOLUTION ORAL at 18:12

## 2022-12-30 RX ADMIN — Medication 4 MILLIGRAM(S): at 05:28

## 2022-12-30 RX ADMIN — LACOSAMIDE 100 MILLIGRAM(S): 50 TABLET ORAL at 18:13

## 2022-12-30 RX ADMIN — SENNA PLUS 2 TABLET(S): 8.6 TABLET ORAL at 21:34

## 2022-12-30 NOTE — H&P ADULT - HISTORY OF PRESENT ILLNESS
62 year old male with PMH of GBM s/p resection in 10/22 on Keppra 750 bid followed by Dr. [facility] in mental status. Further history provided by HCP friend at bedside. Per HCP, she spoke to pt on the phone at 1230 on day of admit and he was not making sense. While en route to the hospital had witnessed RUE twitching and rigid lasting around 2 min.  Pt given decadron 8mg PO by HCP as per radiooncology PA recommendations over the phone en route, in ED found to have witnessed seizure, intubated for airway protection and admitted to NSICU. Patient was on VEEG and AEDs were optimized in NSCU. Patient had MRI brain and MR SPECT on 12/22, which showed suspicious for progressive neoplasm in the left parietal postoperative bed compared with 10/5/2022 with MR spectroscopy. Tiny focus of neoplasm also likely in the left brainstem, unchanged. Patient was extubated on 12/22. Patient was found to be positive for COVID upon admission and isolated per protocol. Patient has been asymptomatic during this admission and repeat COVID tests on 12/28 and 12/29 were negative. Hospital course was also complicated by agitation, which resolved with standing haldol. He was evaluated for admission to acute inpatient rehab and admitted to [facility] on 12/29/22. 
62 year old male with PMH of GBM s/p resection in 10/22 on Keppra 750 bid followed by Dr[facility]. Further history provided by HCP friend at bedside. Per HCP, she spoke to pt on the phone at 1230 on day of admit and he was not making sense. While en route to the hospital had witnessed RUE twitching and rigid lasting around 2 min.  Pt given decadron 8mg PO by HCP as per radiooncology PA recommendations over the phone en route, in ED found to have witnessed seizure, intubated for airway protection and admitted to NSICU. Patient was on VEEG and AEDs were optimized in NSCU. Patient had MRI brain and MR SPECT on 12/22, which showed suspicious for progressive neoplasm in the left parietal postoperative bed compared with 10/5/2022 with MR spectroscopy. Tiny focus of neoplasm also likely in the left brainstem, unchanged. Patient was extubated on 12/22. Patient was found to be positive for COVID upon admission and isolated per protocol. Patient has been asymptomatic during this admission and repeat COVID tests on 12/28 and 12/29 were negative. Hospital course was also complicated by agitation, which resolved with standing haldol. He was evaluated for admission to acute inpatient rehab and admitted to [facility] on 12/29/22.

## 2022-12-30 NOTE — H&P ADULT - NSHPSOCIALHISTORY_GEN_ALL_CORE
SOCIAL HISTORY  Smoking - Denied  EtOH - Denied   Drugs - Denied      FUNCTIONAL HISTORY  Lives with mother  Independent AMB and ADLs PTA     CURRENT FUNCTIONAL STATUS  12/24 SLP  mixed aphasia  unable to follow simple commands     12/25 PT  bed mobility min assist  transfers mod assist   gait mod assist x 5 side steps   follows <25% simple commands     12/25 OT  bed mobility min assist   transfers mod assist x 2 SOCIAL HISTORY  Smoking - Quit 6 years ago-- smoked 1ppd  EtOH - Occasional  Drugs - Denied      FUNCTIONAL HISTORY  Lives with mother in  in [place] with 3 SHAQ,  and split level-- 3 Seps down to main level and 6 steps to upstairs.    Independent AMB and ADLs PTA   Retired   College education    CURRENT FUNCTIONAL STATUS  12/24 SLP  mixed aphasia  unable to follow simple commands     12/25 PT  bed mobility min assist  transfers mod assist   gait mod assist x 5 side steps   follows <25% simple commands     12/25 OT  bed mobility min assist   transfers mod assist x 2

## 2022-12-30 NOTE — PATIENT PROFILE ADULT - FALL HARM RISK - HARM RISK INTERVENTIONS

## 2022-12-30 NOTE — H&P ADULT - NSHPLABSRESULTS_GEN_ALL_CORE
LABS:                          15.6   9.68  )-----------( 170      ( 29 Dec 2022 05:00 )             48.0     12-29    140  |  105  |  20  ----------------------------<  104<H>  4.1   |  20<L>  |  0.85    Ca    9.3      29 Dec 2022 05:00        < from: CT Brain Stroke Protocol (12.21.22 @ 14:57) >      IMPRESSION: Increased size of the left temporal parietal mass with   vasogenic edema consistent with tumor progression since 10/4/2022.    No acute hemorrhage.    < end of copied text >    < from: CT Head No Cont (12.22.22 @ 09:28) >    IMPRESSION:  Slight increased mass effect on the left lateral ventricle   when compared with the prior MR 10/5/2022 unchanged from CT one day prior   area findings suggest progression of disease. Recommend correlation with   MR for more complete evaluation    < end of copied text >    < from: MR Spectroscopy (12.22.22 @ 17:20) >      IMPRESSION: Findings suspicious for progressive neoplasm in the left   parietal postoperative bed compared with 10/5/2022 with MR spectroscopy.   Tiny focus of neoplasm also likely in the left brainstem, unchanged.    < end of copied text >
LABS:                          15.6   9.68  )-----------( 170      ( 29 Dec 2022 05:00 )             48.0     12-29    140  |  105  |  20  ----------------------------<  104<H>  4.1   |  20<L>  |  0.85    Ca    9.3      29 Dec 2022 05:00        < from: CT Brain Stroke Protocol (12.21.22 @ 14:57) >      IMPRESSION: Increased size of the left temporal parietal mass with   vasogenic edema consistent with tumor progression since 10/4/2022.    No acute hemorrhage.    < end of copied text >    < from: CT Head No Cont (12.22.22 @ 09:28) >    IMPRESSION:  Slight increased mass effect on the left lateral ventricle   when compared with the prior MR 10/5/2022 unchanged from CT one day prior   area findings suggest progression of disease. Recommend correlation with   MR for more complete evaluation    < end of copied text >    < from: MR Spectroscopy (12.22.22 @ 17:20) >      IMPRESSION: Findings suspicious for progressive neoplasm in the left   parietal postoperative bed compared with 10/5/2022 with MR spectroscopy.   Tiny focus of neoplasm also likely in the left brainstem, unchanged.    < end of copied text >

## 2022-12-30 NOTE — H&P ADULT - NSHPPHYSICALEXAM_GEN_ALL_CORE
On Neurological Examination:  Mental Status - Patient is drowsy, oriented X3.   Speech is fluent, able to  name and repeat. following all commands.   Attention --Impaired-- unable to state Days of  week backward or serial 7's  Recall: 1/3 spontaneously after few Min.  2/3 with cat. cues.     Mood and affect  normal.    Cranial Nerves - PERRL--sluggish bilat;  EOMI, Visual fields impaired on right; V1-V3 intact, no gross facial asymmetry, no dysarthria, no dysphagia,  Motor Exam -   Right upper 5/5,   Right lower 5/5  Left upper   5/5,   Left lower  5/5    Normal muscle bulk/tone  Sensory    Intact to light touch and pinprick bilaterally.   Proprioception: intact bilat.   Coord: FTN--slight impairment on left & HTS intact bilaterally   No tremors  Tone: normal                                              GENERAL Exam:     Nontoxic , No Acute Distress 	  HEENT:  normocephalic, atraumatic	  LUNGS:	Clear bilaterally  No Wheeze   HEART:	 Normal S1S2    RRR      	  GI/ ABDOMEN:  Soft  Non tender  EXTREMITIES:   No Edema , no calf tenderness

## 2022-12-30 NOTE — H&P ADULT - ATTENDING COMMENTS
Pt. seen 12/30/22 on admission.  Agree with documentation above as per resident on call with amendments made as appropriate. Patient medically stable. Appropriate for acute rehabilitation.       62 year old male with PMH of GBM s/p resection in 10/22 on Keppra 750 bid followed by Dr. Turpin who presented to the ED at [facility] on 12/21/22 for change in mental status. Patient with seizures, intubated for airway protection and admitted to NSICU.  Patient had MRI brain and MR SPECT on 12/22, which showed suspicious for progressive neoplasm in the left parietal postoperative bed compared with 10/5/2022 with MR spectroscopy. . Patient was found to be positive for COVID upon admission and isolated per protocol. Hospital course was also complicated by agitation, which resolved with standing haldol. He was evaluated for admission to acute inpatient rehab and admitted to [facility] on 12/30/22 with cognitive deficits, gait and ADL impairment.

## 2022-12-30 NOTE — H&P ADULT - NSHPREVIEWOFSYSTEMS_GEN_ALL_CORE
REVIEW OF SYSTEMS:  CONSTITUTIONAL: No fever, weight loss, or fatigue  EYES: + visual disturbances, no discharge  ENMT:  No difficulty hearing, tinnitus, vertigo; No sinus or throat pain  NECK: No pain or stiffness  RESPIRATORY: No cough, wheezing, chills or hemoptysis; No shortness of breath  CARDIOVASCULAR: No chest pain, palpitations, dizziness, or leg swelling  GASTROINTESTINAL: No abdominal or epigastric pain. No nausea, vomiting, or hematemesis; No diarrhea or constipation. No melena or hematochezia.  GENITOURINARY: No dysuria, frequency, hematuria, or incontinence  NEUROLOGICAL: No headaches, +memory loss, loss of strength, numbness, or tremors  SKIN: No itching, burning, rashes, or lesions   LYMPH NODES: No enlarged glands  ENDOCRINE: No heat or cold intolerance; No hair loss  MUSCULOSKELETAL: No joint pain or swelling; No muscle, back, or extremity pain  PSYCHIATRIC: No depression, anxiety, mood swings, or difficulty sleeping  HEME/LYMPH: No easy bruising, or bleeding gums  ALLERY AND IMMUNOLOGIC: No hives or eczema

## 2022-12-30 NOTE — H&P ADULT - ASSESSMENT
62 year old male with PMH of GBM s/p resection in 10/22 on Keppra 750 bid followed by Dr. Turpin who presented to the ED at [facility] on 12/21/22 for change in mental status. Patient with seizures, intubated for airway protection and admitted to NSICU. Patient was on VEEG and AEDs were optimized in NSCU. Patient had MRI brain and MR SPECT on 12/22, which showed suspicious for progressive neoplasm in the left parietal postoperative bed compared with 10/5/2022 with MR spectroscopy. Patient was extubated on 12/22. Patient was found to be positive for COVID upon admission and isolated per protocol. Hospital course was also complicated by agitation, which resolved with standing haldol. He was evaluated for admission to acute inpatient rehab and admitted to [facility] on 12/29/22.     #GBM with seizures now with Gait Instability, ADL impairments and Functional impairments  - Start Comprehensive Rehab Program of PT/OT/SLP  -no plan for radiation or chemo therapy during the period of acute rehab, but will need follow up after discharge from rehab  -Continue Dexamethasone 4mg BID  -Continue lacosamide 100mg BID  -Continue Keppra 100mg BID    #Agitation  -Continue Haldol 5mg TID  -Monitor QTC    #Covid  -Noted on 12/21   -Repeat negative on 12/28 and 12/29  -No treatment administered as he was asymptomatic     #Pain control  - Tylenol PRN    #GI/Bowel Mgmt   - Continue Senna at bedtime   -Continue bisacodyl 5mg BID  - Miralax PRN    #Bladder management  - Continue to monitor PVR q 8 hours (SC if > 400)  -Monitor UO    #DVT ppx  - Lovenox  - TEDs     #Skin:  -***    FEN   - Diet - Regular + Thins  [CCHO, DASH/TLC]    - Dysphagia  SLP - evaluation and treatment    Precautions / PROPHYLAXIS:   - Falls, Seizure   - ortho: Weight bearing status: WBAT   - Lungs: Aspiration, Incentive Spirometer   - Pressure injury/Skin: Turn Q2hrs while in bed, OOB to Chair, PT/OT        MEDICAL PROGNOSIS: GOOD              REHAB POTENTIAL: GOOD              ESTIMATED DISPOSITION: HOME WITH HOME CARE              ELOS: 10-14 Days   EXPECTED THERAPY:     P.T. 1hr/day       O.T. 1hr/day      S.L.P. 1hr/day       P&O Unnecessary       EXP FREQUENCY: 5 days per 7 day period     PRESCREEN COMPARISON:   I have reviewed the prescreen information and I have found no relevant changes between the preadmission screening and my post admission evaluation     RATIONALE FOR INPATIENT ADMISSION - Patient demonstrates the following: (check all that apply)  [X] Medically appropriate for rehabilitation admission  [X] Has attainable rehab goals with an appropriate initial discharge plan  [X] Has rehabilitation potential (expected to make a significant improvement within a reasonable period of time)   [X] Requires close medical management by a rehab physician, rehab nursing care, Hospitalist and comprehensive interdisciplinary team (including PT, OT, & or SLP, Prosthetics and Orthotics)     62 year old male with PMH of GBM s/p resection in 10/22 on Keppra 750 bid followed by Dr. Turpin who presented to the ED at [facility] on 12/21/22 for change in mental status. Patient with seizures, intubated for airway protection and admitted to NSICU.  Patient had MRI brain and MR SPECT on 12/22, which showed suspicious for progressive neoplasm in the left parietal postoperative bed compared with 10/5/2022 with MR spectroscopy. . Patient was found to be positive for COVID upon admission and isolated per protocol. Hospital course was also complicated by agitation, which resolved with standing haldol. He was evaluated for admission to acute inpatient rehab and admitted to [facility] on 12/30/22 with cognitive deficits, gait and ADL impairment.     #GBM with seizures now with Gait Instability, ADL impairments and Functional impairments  - Start Comprehensive Rehab Program of PT/OT/SLP  -no plan for radiation or chemo therapy during the period of acute rehab, but will need follow up after discharge from rehab  -Continue Dexamethasone 4mg BID  -Continue lacosamide 100mg BID  -Continue Keppra 100mg BID    #Agitation  -Continue Haldol 5mg TID-- taper as tolerated  -Monitor QTC    #Covid  -Noted on 12/21   -Repeat negative on 12/28 and 12/29  -No treatment administered as he was asymptomatic     #Pain control  - Tylenol PRN    #GI/Bowel Mgmt   - Continue Senna at bedtime   -Continue bisacodyl 5mg BID  - Miralax PRN    #Bladder management  - Continue to monitor PVR q 8 hours (SC if > 400)  -Monitor UO    #DVT ppx  - Lovenox      FEN   - Diet - Regular + Thins  [CCHO, DASH/TLC]    - Dysphagia  SLP - evaluation and treatment    Precautions / PROPHYLAXIS:   - Falls, Seizure         MEDICAL PROGNOSIS: GOOD              REHAB POTENTIAL: GOOD              ESTIMATED DISPOSITION: HOME WITH HOME CARE              ELOS: 14 Days   EXPECTED THERAPY:     P.T. 1hr/day       O.T. 1hr/day      S.L.P. 1hr/day       P&O Unnecessary       EXP FREQUENCY: 5 days per 7 day period     PRESCREEN COMPARISON:   I have reviewed the prescreen information and I have found no relevant changes between the preadmission screening and my post admission evaluation     RATIONALE FOR INPATIENT ADMISSION - Patient demonstrates the following: (check all that apply)  [X] Medically appropriate for rehabilitation admission  [X] Has attainable rehab goals with an appropriate initial discharge plan  [X] Has rehabilitation potential (expected to make a significant improvement within a reasonable period of time)   [X] Requires close medical management by a rehab physician, rehab nursing care, Hospitalist and comprehensive interdisciplinary team (including PT, OT, & or SLP, Prosthetics and Orthotics)

## 2022-12-31 LAB
ALBUMIN SERPL ELPH-MCNC: 2.6 G/DL — LOW (ref 3.3–5)
ALP SERPL-CCNC: 56 U/L — SIGNIFICANT CHANGE UP (ref 40–120)
ALT FLD-CCNC: 37 U/L — SIGNIFICANT CHANGE UP (ref 10–45)
ANION GAP SERPL CALC-SCNC: 10 MMOL/L — SIGNIFICANT CHANGE UP (ref 5–17)
AST SERPL-CCNC: 19 U/L — SIGNIFICANT CHANGE UP (ref 10–40)
BASOPHILS # BLD AUTO: 0.03 K/UL — SIGNIFICANT CHANGE UP (ref 0–0.2)
BASOPHILS NFR BLD AUTO: 0.3 % — SIGNIFICANT CHANGE UP (ref 0–2)
BILIRUB SERPL-MCNC: 0.7 MG/DL — SIGNIFICANT CHANGE UP (ref 0.2–1.2)
BUN SERPL-MCNC: 22 MG/DL — SIGNIFICANT CHANGE UP (ref 7–23)
CALCIUM SERPL-MCNC: 9.1 MG/DL — SIGNIFICANT CHANGE UP (ref 8.4–10.5)
CHLORIDE SERPL-SCNC: 106 MMOL/L — SIGNIFICANT CHANGE UP (ref 96–108)
CO2 SERPL-SCNC: 25 MMOL/L — SIGNIFICANT CHANGE UP (ref 22–31)
CREAT SERPL-MCNC: 0.97 MG/DL — SIGNIFICANT CHANGE UP (ref 0.5–1.3)
EGFR: 87 ML/MIN/1.73M2 — SIGNIFICANT CHANGE UP
EOSINOPHIL # BLD AUTO: 0.01 K/UL — SIGNIFICANT CHANGE UP (ref 0–0.5)
EOSINOPHIL NFR BLD AUTO: 0.1 % — SIGNIFICANT CHANGE UP (ref 0–6)
GLUCOSE SERPL-MCNC: 125 MG/DL — HIGH (ref 70–99)
HCT VFR BLD CALC: 47.3 % — SIGNIFICANT CHANGE UP (ref 39–50)
HGB BLD-MCNC: 15.4 G/DL — SIGNIFICANT CHANGE UP (ref 13–17)
IMM GRANULOCYTES NFR BLD AUTO: 2.3 % — HIGH (ref 0–0.9)
LYMPHOCYTES # BLD AUTO: 0.37 K/UL — LOW (ref 1–3.3)
LYMPHOCYTES # BLD AUTO: 3.2 % — LOW (ref 13–44)
MCHC RBC-ENTMCNC: 28.8 PG — SIGNIFICANT CHANGE UP (ref 27–34)
MCHC RBC-ENTMCNC: 32.6 GM/DL — SIGNIFICANT CHANGE UP (ref 32–36)
MCV RBC AUTO: 88.6 FL — SIGNIFICANT CHANGE UP (ref 80–100)
MONOCYTES # BLD AUTO: 0.64 K/UL — SIGNIFICANT CHANGE UP (ref 0–0.9)
MONOCYTES NFR BLD AUTO: 5.5 % — SIGNIFICANT CHANGE UP (ref 2–14)
NEUTROPHILS # BLD AUTO: 10.36 K/UL — HIGH (ref 1.8–7.4)
NEUTROPHILS NFR BLD AUTO: 88.6 % — HIGH (ref 43–77)
NRBC # BLD: 0 /100 WBCS — SIGNIFICANT CHANGE UP (ref 0–0)
PLATELET # BLD AUTO: 159 K/UL — SIGNIFICANT CHANGE UP (ref 150–400)
POTASSIUM SERPL-MCNC: 3.6 MMOL/L — SIGNIFICANT CHANGE UP (ref 3.5–5.3)
POTASSIUM SERPL-SCNC: 3.6 MMOL/L — SIGNIFICANT CHANGE UP (ref 3.5–5.3)
PROT SERPL-MCNC: 6.3 G/DL — SIGNIFICANT CHANGE UP (ref 6–8.3)
RBC # BLD: 5.34 M/UL — SIGNIFICANT CHANGE UP (ref 4.2–5.8)
RBC # FLD: 14.2 % — SIGNIFICANT CHANGE UP (ref 10.3–14.5)
SODIUM SERPL-SCNC: 141 MMOL/L — SIGNIFICANT CHANGE UP (ref 135–145)
WBC # BLD: 11.68 K/UL — HIGH (ref 3.8–10.5)
WBC # FLD AUTO: 11.68 K/UL — HIGH (ref 3.8–10.5)

## 2022-12-31 PROCEDURE — 99232 SBSQ HOSP IP/OBS MODERATE 35: CPT

## 2022-12-31 PROCEDURE — 99222 1ST HOSP IP/OBS MODERATE 55: CPT

## 2022-12-31 RX ADMIN — LACOSAMIDE 100 MILLIGRAM(S): 50 TABLET ORAL at 06:34

## 2022-12-31 RX ADMIN — Medication 5 MILLIGRAM(S): at 17:01

## 2022-12-31 RX ADMIN — ENOXAPARIN SODIUM 40 MILLIGRAM(S): 100 INJECTION SUBCUTANEOUS at 17:02

## 2022-12-31 RX ADMIN — LACOSAMIDE 100 MILLIGRAM(S): 50 TABLET ORAL at 17:07

## 2022-12-31 RX ADMIN — HALOPERIDOL DECANOATE 5 MILLIGRAM(S): 100 INJECTION INTRAMUSCULAR at 21:32

## 2022-12-31 RX ADMIN — HALOPERIDOL DECANOATE 5 MILLIGRAM(S): 100 INJECTION INTRAMUSCULAR at 14:31

## 2022-12-31 RX ADMIN — HALOPERIDOL DECANOATE 5 MILLIGRAM(S): 100 INJECTION INTRAMUSCULAR at 06:34

## 2022-12-31 RX ADMIN — PANTOPRAZOLE SODIUM 40 MILLIGRAM(S): 20 TABLET, DELAYED RELEASE ORAL at 06:33

## 2022-12-31 RX ADMIN — Medication 4 MILLIGRAM(S): at 06:33

## 2022-12-31 RX ADMIN — Medication 4 MILLIGRAM(S): at 17:02

## 2022-12-31 RX ADMIN — LEVETIRACETAM 1000 MILLIGRAM(S): 250 TABLET, FILM COATED ORAL at 17:02

## 2022-12-31 RX ADMIN — LEVETIRACETAM 1000 MILLIGRAM(S): 250 TABLET, FILM COATED ORAL at 06:34

## 2022-12-31 NOTE — PROGRESS NOTE ADULT - SUBJECTIVE AND OBJECTIVE BOX
Did not sleep well and is angered by the bed alarms.  He reports he has been walking all over the unit and going to the bathroom on his own before this at the other hospital.   No other new ROS.    VITALS  T(C): 36.7 (12-30-22 @ 20:51), Max: 36.7 (12-30-22 @ 20:51)  HR: 110 (12-30-22 @ 22:51) (95 - 110)  BP: 103/70 (12-30-22 @ 22:51) (101/70 - 122/84)  RR: 16 (12-30-22 @ 22:51) (16 - 18)  SpO2: 95% (12-30-22 @ 22:51) (93% - 96%)  Wt(kg): --     MEDICATIONS   acetaminophen     Tablet .. 650 milliGRAM(s) every 6 hours PRN  bisacodyl 5 milliGRAM(s) every 12 hours  bisacodyl Suppository 10 milliGRAM(s) daily PRN  dexAMETHasone     Tablet 4 milliGRAM(s) every 12 hours  enoxaparin Injectable 40 milliGRAM(s) <User Schedule>  haloperidol     Tablet 5 milliGRAM(s) every 8 hours  lacosamide Solution 100 milliGRAM(s) two times a day  levETIRAcetam  Solution 1000 milliGRAM(s) two times a day  pantoprazole    Tablet 40 milliGRAM(s) before breakfast  senna 2 Tablet(s) at bedtime      RECENT LABS/IMAGING                        15.5   11.32 )-----------( 159      ( 30 Dec 2022 13:15 )             46.7     12-30    137  |  103  |  19  ----------------------------<  113<H>  4.0   |  27  |  0.95    Ca    9.1      30 Dec 2022 13:15    TPro  6.5  /  Alb  3.0<L>  /  TBili  0.7  /  DBili  x   /  AST  9<L>  /  ALT  36  /  AlkPhos  63  12-30               ------------------------------------------  PHYSICAL EXAM  Constitutional - NAD, Uncomfortable  Pulm - Breathing comfortably, No wheezing  Abd - Nondistended  Extremities - No calf tenderness  Neurologic Exam - Awake, Alert  Psychiatric - Mood Frustrated    ASSESSMENT/PLAN  62y Male with impairments in mobility and ADLs   - Begin current rehabilitation program 3hrs a day   - Continue current medications, patient is medically stable   - DVT prophylaxis  - Skin - OOB and mobilization daily

## 2022-12-31 NOTE — CONSULT NOTE ADULT - ASSESSMENT
Mr Ramos is a pleasant 62 year old male with PMH of GBM s/p resection in 10/22 on Keppra 750 bid followed by Dr. Turpin who presented to the ED at [facility] on 12/21/22 for change in mental status. While there he had a witnessed seizure and subsequently intubated for airway protection and admitted to NSICU.  Patient had MRI brain and MR SPECT on 12/22, which showed suspicious for progressive neoplasm in the left parietal postoperative bed compared with 10/5/2022 with MR spectroscopy. . Patient was found to be positive for COVID upon admission and isolated per protocol. Hospital course was also complicated by agitation, which resolved with standing haldol. He was evaluated for admission to acute inpatient rehab and admitted to [facility] on 12/30/22 with cognitive deficits, gait and ADL impairment.     Neuorology  GBM with seizures   - PT/OT/SLP per primary team  -no plan for radiation or chemo therapy during the period of acute rehab, but will need follow up after discharge from rehab  -Continue Dexamethasone 4mg BID  -Continue lacosamide 100mg BID  -Continue Keppra 100mg BID    Psych  Agitation  -Continue Haldol 5mg TID  -Monitor QTC, corrected     Pulmonary  COVID  -Noted on 12/21   -Repeat negative on 12/28 and 12/29  -No treatment administered as he was asymptomatic     Hematology  Leukocytosis  - continue to monitor fever curve    Gastroenterology  Constipation  - Continue Senna at bedtime   - Continue bisacodyl 5mg BID  - Miralax PRN    Pain control: per primary team   DVT ppx: lovenox

## 2022-12-31 NOTE — CONSULT NOTE ADULT - SUBJECTIVE AND OBJECTIVE BOX
Patient is a 62y old  Male who presents with a chief complaint of GBM    HPI:  62 year old male with PMH of GBM s/p resection in 10/22 on Keppra 750 bid followed by Dr. [facility]. He was brought to [facility] by a friend and while there he  had a witnessed seizure. Following which he was intubated for airway protection and admitted to NSICU. Patient was on VEEG and AEDs were optimized in NSCU. Patient had MRI brain and MR SPECT on 12/22, which showed suspicious for progressive neoplasm in the left parietal postoperative bed compared with 10/5/2022 with MR spectroscopy. Tiny focus of neoplasm also likely in the left brainstem, unchanged. Patient was extubated on 12/22. Patient was found to be positive for COVID upon admission and isolated per protocol. Patient has been asymptomatic during this admission and repeat COVID tests on 12/28 and 12/29 were negative. Hospital course was also complicated by agitation, which resolved with standing haldol. He was evaluated for admission to acute inpatient rehab and admitted to [facility] on 12/29/22.     Allergies    No Known Allergies    Intolerance  bisacodyl Suppository 10 milliGRAM(s) Rectal daily PRN      REVIEW OF SYSTEMS:  CONSTITUTIONAL: No fever, weight loss, or fatigue  EYES: No eye pain, visual disturbances, or discharge  ENMT:  No difficulty hearing, tinnitus, vertigo; No sinus or throat pain  NECK: No pain or stiffness  BREASTS: No pain, masses, or nipple discharge  RESPIRATORY: No cough, wheezing, chills or hemoptysis; No shortness of breath  CARDIOVASCULAR: No chest pain, palpitations, dizziness, or leg swelling  GASTROINTESTINAL: No abdominal or epigastric pain. No nausea, vomiting, or hematemesis; No diarrhea or constipation. No melena or hematochezia.  GENITOURINARY: No dysuria, frequency, hematuria, or incontinence  NEUROLOGICAL: No headaches, memory loss, loss of strength, numbness, or tremors  SKIN: No itching, burning, rashes, or lesions   LYMPH NODES: No enlarged glands  ENDOCRINE: No heat or cold intolerance; No hair loss  MUSCULOSKELETAL: No joint pain or swelling; No muscle, back, or extremity pain  PSYCHIATRIC: No depression, anxiety, mood swings, or difficulty sleeping  HEME/LYMPH: No easy bruising, or bleeding gums  ALLERY AND IMMUNOLOGIC: No hives or eczema    ALL ROS REVIEWED AND NORMAL EXCEPT AS STATED ABOVE    T(C): 37.1 (12-31-22 @ 07:35), Max: 37.1 (12-31-22 @ 07:35)  HR: 81 (12-31-22 @ 07:35) (81 - 110)  BP: 105/71 (12-31-22 @ 07:35) (101/70 - 122/84)  RR: 14 (12-31-22 @ 07:35) (14 - 16)  SpO2: 95% (12-31-22 @ 07:35) (94% - 96%)  Wt(kg): --Vital Signs Last 24 Hrs  T(C): 37.1 (31 Dec 2022 07:35), Max: 37.1 (31 Dec 2022 07:35)  T(F): 98.8 (31 Dec 2022 07:35), Max: 98.8 (31 Dec 2022 07:35)  HR: 81 (31 Dec 2022 07:35) (81 - 110)  BP: 105/71 (31 Dec 2022 07:35) (101/70 - 122/84)  BP(mean): --  RR: 14 (31 Dec 2022 07:35) (14 - 16)  SpO2: 95% (31 Dec 2022 07:35) (94% - 96%)    Parameters below as of 31 Dec 2022 07:35  Patient On (Oxygen Delivery Method): room air        PHYSICAL EXAM:  GENERAL: NAD, well-groomed, well-developed  HEAD:  Atraumatic, Normocephalic  EYES: EOMI, PERRLA, conjunctiva and sclera clear  ENMT: No tonsillar erythema, exudates, or enlargement; Moist mucous membranes, Good dentition, No lesions  NECK: Supple, No JVD, Normal thyroid  NERVOUS SYSTEM:  Alert & Oriented X3, Good concentration; Motor Strength 5/5 B/L upper and lower extremities; DTRs 2+ intact and symmetric  CHEST/LUNG: Clear to percussion bilaterally; No rales, rhonchi, wheezing, or rubs  HEART: Regular rate and rhythm; No murmurs, rubs, or gallops  ABDOMEN: Soft, Nontender, Nondistended; Bowel sounds present  EXTREMITIES:  2+ Peripheral Pulses, No clubbing, cyanosis, or edema  LYMPH: No lymphadenopathy noted  SKIN: No rashes or lesions    LABS:                        15.4   11.68 )-----------( 159      ( 31 Dec 2022 07:20 )             47.3     12-31    141  |  106  |  22  ----------------------------<  125<H>  3.6   |  25  |  0.97    Ca    9.1      31 Dec 2022 07:20    TPro  6.3  /  Alb  2.6<L>  /  TBili  0.7  /  DBili  x   /  AST  19  /  ALT  37  /  AlkPhos  56  12-31

## 2023-01-01 PROCEDURE — 99232 SBSQ HOSP IP/OBS MODERATE 35: CPT

## 2023-01-01 RX ORDER — LANOLIN ALCOHOL/MO/W.PET/CERES
3 CREAM (GRAM) TOPICAL AT BEDTIME
Refills: 0 | Status: DISCONTINUED | OUTPATIENT
Start: 2023-01-01 | End: 2023-01-02

## 2023-01-01 RX ADMIN — PANTOPRAZOLE SODIUM 40 MILLIGRAM(S): 20 TABLET, DELAYED RELEASE ORAL at 05:39

## 2023-01-01 RX ADMIN — LACOSAMIDE 100 MILLIGRAM(S): 50 TABLET ORAL at 17:15

## 2023-01-01 RX ADMIN — Medication 4 MILLIGRAM(S): at 05:39

## 2023-01-01 RX ADMIN — ENOXAPARIN SODIUM 40 MILLIGRAM(S): 100 INJECTION SUBCUTANEOUS at 17:15

## 2023-01-01 RX ADMIN — Medication 5 MILLIGRAM(S): at 17:15

## 2023-01-01 RX ADMIN — HALOPERIDOL DECANOATE 5 MILLIGRAM(S): 100 INJECTION INTRAMUSCULAR at 13:50

## 2023-01-01 RX ADMIN — HALOPERIDOL DECANOATE 5 MILLIGRAM(S): 100 INJECTION INTRAMUSCULAR at 21:14

## 2023-01-01 RX ADMIN — LEVETIRACETAM 1000 MILLIGRAM(S): 250 TABLET, FILM COATED ORAL at 05:39

## 2023-01-01 RX ADMIN — Medication 3 MILLIGRAM(S): at 21:14

## 2023-01-01 RX ADMIN — HALOPERIDOL DECANOATE 5 MILLIGRAM(S): 100 INJECTION INTRAMUSCULAR at 05:39

## 2023-01-01 RX ADMIN — LEVETIRACETAM 1000 MILLIGRAM(S): 250 TABLET, FILM COATED ORAL at 17:15

## 2023-01-01 RX ADMIN — LACOSAMIDE 100 MILLIGRAM(S): 50 TABLET ORAL at 05:39

## 2023-01-01 RX ADMIN — Medication 4 MILLIGRAM(S): at 17:15

## 2023-01-01 NOTE — PROGRESS NOTE ADULT - SUBJECTIVE AND OBJECTIVE BOX
Patient keeps eyes closed.  Reports no pain.  No other new ROS.  Has been tolerating rehabilitation program.    VITALS  T(C): 36.3 (01-01-23 @ 07:30), Max: 36.9 (12-31-22 @ 19:52)  HR: 71 (01-01-23 @ 07:30) (71 - 93)  BP: 106/66 (01-01-23 @ 07:30) (102/72 - 106/66)  RR: 15 (01-01-23 @ 07:30) (15 - 16)  SpO2: 97% (01-01-23 @ 07:30) (95% - 97%)  Wt(kg): --     MEDICATIONS   acetaminophen     Tablet .. 650 milliGRAM(s) every 6 hours PRN  bisacodyl 5 milliGRAM(s) every 12 hours  bisacodyl Suppository 10 milliGRAM(s) daily PRN  dexAMETHasone     Tablet 4 milliGRAM(s) every 12 hours  enoxaparin Injectable 40 milliGRAM(s) <User Schedule>  haloperidol     Tablet 5 milliGRAM(s) every 8 hours  lacosamide Solution 100 milliGRAM(s) two times a day  levETIRAcetam  Solution 1000 milliGRAM(s) two times a day  pantoprazole    Tablet 40 milliGRAM(s) before breakfast  senna 2 Tablet(s) at bedtime      RECENT LABS/IMAGING                        15.4   11.68 )-----------( 159      ( 31 Dec 2022 07:20 )             47.3     12-31    141  |  106  |  22  ----------------------------<  125<H>  3.6   |  25  |  0.97    Ca    9.1      31 Dec 2022 07:20    TPro  6.3  /  Alb  2.6<L>  /  TBili  0.7  /  DBili  x   /  AST  19  /  ALT  37  /  AlkPhos  56  12-31               ------------------------------------------  PHYSICAL EXAM  Constitutional - NAD, Comfortable  Pulm - Breathing comfortably, No wheezing  Abd - Nondistended  Extremities - No edema  Neurologic Exam - Awake, Alert, keeps eyes closed  Psychiatric - Mood Withdrawn    ASSESSMENT/PLAN  62y Male with impairments in mobility and ADLs   - Continue current rehabilitation program 3hrs a day   - Continue current medications, patient is medically stable - WBC elevated since last draw - vitals normal  - DVT prophylaxis  - Skin - OOB and mobilization daily      Patient keeps eyes closed.  Reports no pain.  No other new ROS.  As per aide, was walking around unit with family.   Has been tolerating rehabilitation program.    VITALS  T(C): 36.3 (01-01-23 @ 07:30), Max: 36.9 (12-31-22 @ 19:52)  HR: 71 (01-01-23 @ 07:30) (71 - 93)  BP: 106/66 (01-01-23 @ 07:30) (102/72 - 106/66)  RR: 15 (01-01-23 @ 07:30) (15 - 16)  SpO2: 97% (01-01-23 @ 07:30) (95% - 97%)  Wt(kg): --     MEDICATIONS   acetaminophen     Tablet .. 650 milliGRAM(s) every 6 hours PRN  bisacodyl 5 milliGRAM(s) every 12 hours  bisacodyl Suppository 10 milliGRAM(s) daily PRN  dexAMETHasone     Tablet 4 milliGRAM(s) every 12 hours  enoxaparin Injectable 40 milliGRAM(s) <User Schedule>  haloperidol     Tablet 5 milliGRAM(s) every 8 hours  lacosamide Solution 100 milliGRAM(s) two times a day  levETIRAcetam  Solution 1000 milliGRAM(s) two times a day  pantoprazole    Tablet 40 milliGRAM(s) before breakfast  senna 2 Tablet(s) at bedtime      RECENT LABS/IMAGING                        15.4   11.68 )-----------( 159      ( 31 Dec 2022 07:20 )             47.3     12-31    141  |  106  |  22  ----------------------------<  125<H>  3.6   |  25  |  0.97    Ca    9.1      31 Dec 2022 07:20    TPro  6.3  /  Alb  2.6<L>  /  TBili  0.7  /  DBili  x   /  AST  19  /  ALT  37  /  AlkPhos  56  12-31               ------------------------------------------  PHYSICAL EXAM  Constitutional - NAD, Comfortable  Pulm - Breathing comfortably, No wheezing  Abd - Nondistended  Extremities - No edema  Neurologic Exam - Awake, Alert, keeps eyes closed  Psychiatric - Mood Withdrawn    ASSESSMENT/PLAN  62y Male with impairments in mobility and ADLs   - Continue current rehabilitation program 3hrs a day   - Continue current medications, patient is medically stable - WBC elevated since last draw - vitals normal  - DVT prophylaxis  - Skin - OOB and mobilization daily

## 2023-01-01 NOTE — PROGRESS NOTE ADULT - ASSESSMENT
Mr Ramos is a pleasant 62 year old male with PMH of GBM s/p resection in 10/22 on Keppra 750 bid followed by Dr. Turpin who presented to the ED at [facility] on 12/21/22 for change in mental status. While there he had a witnessed seizure and subsequently intubated for airway protection and admitted to NSICU.  Patient had MRI brain and MR SPECT on 12/22, which showed suspicious for progressive neoplasm in the left parietal postoperative bed compared with 10/5/2022 with MR spectroscopy. . Patient was found to be positive for COVID upon admission and isolated per protocol. Hospital course was also complicated by agitation, which resolved with standing haldol. He was evaluated for admission to acute inpatient rehab and admitted to [facility] on 12/30/22 with cognitive deficits, gait and ADL impairment.     Neuorology  GBM with seizures   - PT/OT/SLP per primary team  -no plan for radiation or chemo therapy during the period of acute rehab, but will need follow up after discharge from rehab  -Continue Dexamethasone 4mg BID  -Continue lacosamide 100mg BID  -Continue Keppra 100mg BID    Psych  Agitation  -Continue Haldol 5mg TID  -Monitor QTC, corrected     Pulmonary  COVID  -Noted on 12/21   -Repeat negative on 12/28 and 12/29  -No treatment administered as he was asymptomatic     Hematology  Leukocytosis  - continue to monitor fever curve  - remains afebrile, check CBC on 1/2    Gastroenterology  Constipation  - Continue Senna at bedtime   - Continue bisacodyl 5mg BID  - Miralax PRN    Pain control: per primary team   DVT ppx: lovenox

## 2023-01-01 NOTE — PROGRESS NOTE ADULT - SUBJECTIVE AND OBJECTIVE BOX
Patient seen and examined at bedside. No overnight events. Patient is w/o complaints.     ALLERGIES:  No Known Allergies    MEDICATIONS  (STANDING):  bisacodyl 5 milliGRAM(s) Oral every 12 hours  dexAMETHasone     Tablet 4 milliGRAM(s) Oral every 12 hours  enoxaparin Injectable 40 milliGRAM(s) SubCutaneous <User Schedule>  haloperidol     Tablet 5 milliGRAM(s) Oral every 8 hours  lacosamide Solution 100 milliGRAM(s) Oral two times a day  levETIRAcetam  Solution 1000 milliGRAM(s) Oral two times a day  pantoprazole    Tablet 40 milliGRAM(s) Oral before breakfast  senna 2 Tablet(s) Oral at bedtime    MEDICATIONS  (PRN):  acetaminophen     Tablet .. 650 milliGRAM(s) Oral every 6 hours PRN Temp greater or equal to 38C (100.4F), Mild Pain (1 - 3)  bisacodyl Suppository 10 milliGRAM(s) Rectal daily PRN Constipation    Vital Signs Last 24 Hrs  T(F): 97.4 (01 Jan 2023 07:30), Max: 98.4 (31 Dec 2022 19:52)  HR: 71 (01 Jan 2023 07:30) (71 - 93)  BP: 106/66 (01 Jan 2023 07:30) (102/72 - 106/66)  RR: 15 (01 Jan 2023 07:30) (15 - 16)  SpO2: 97% (01 Jan 2023 07:30) (95% - 97%)  I&O's Summary    BMI (kg/m2): 27.6 (12-30-22 @ 11:30)  PHYSICAL EXAM:  Gen: nad, resting in bed  Neuro: aaox3, no focal deficits  Heent: eomi b/l, no jvd, no oral exudates  Pulm: cta b/l, no w/r/r  CV: +s1s2, no m/r/g  Ab: soft, nt/nd, normoactive bs x 4  Extrem: no edema, pulses intact and equal      LABS:                        15.4   11.68 )-----------( 159      ( 31 Dec 2022 07:20 )             47.3       12-31    141  |  106  |  22  ----------------------------<  125  3.6   |  25  |  0.97    Ca    9.1      31 Dec 2022 07:20    TPro  6.3  /  Alb  2.6  /  TBili  0.7  /  DBili  x   /  AST  19  /  ALT  37  /  AlkPhos  56  12-31                                      COVID-19 PCR: NotDetec (12-29-22 @ 09:34)  COVID-19 PCR: NotDetec (12-29-22 @ 05:00)  COVID-19 PCR: NotDetec (12-28-22 @ 09:54)      RADIOLOGY & ADDITIONAL TESTS:    Care Discussed with Consultants/Other Providers:

## 2023-01-02 LAB
APPEARANCE UR: CLEAR — SIGNIFICANT CHANGE UP
BACTERIA # UR AUTO: ABNORMAL /HPF
BILIRUB UR-MCNC: NEGATIVE — SIGNIFICANT CHANGE UP
COLOR SPEC: YELLOW — SIGNIFICANT CHANGE UP
DIFF PNL FLD: ABNORMAL
EPI CELLS # UR: SIGNIFICANT CHANGE UP
GLUCOSE UR QL: NEGATIVE — SIGNIFICANT CHANGE UP
KETONES UR-MCNC: NEGATIVE — SIGNIFICANT CHANGE UP
LEUKOCYTE ESTERASE UR-ACNC: ABNORMAL
NITRITE UR-MCNC: POSITIVE
PH UR: 6 — SIGNIFICANT CHANGE UP (ref 5–8)
PROT UR-MCNC: 30 MG/DL
RBC CASTS # UR COMP ASSIST: ABNORMAL /HPF (ref 0–4)
SP GR SPEC: 1.02 — SIGNIFICANT CHANGE UP (ref 1.01–1.02)
UROBILINOGEN FLD QL: 1
WBC UR QL: ABNORMAL /HPF (ref 0–5)

## 2023-01-02 PROCEDURE — 99232 SBSQ HOSP IP/OBS MODERATE 35: CPT

## 2023-01-02 RX ORDER — ZOLPIDEM TARTRATE 10 MG/1
5 TABLET ORAL AT BEDTIME
Refills: 0 | Status: DISCONTINUED | OUTPATIENT
Start: 2023-01-02 | End: 2023-01-02

## 2023-01-02 RX ORDER — HALOPERIDOL DECANOATE 100 MG/ML
2.5 INJECTION INTRAMUSCULAR
Refills: 0 | Status: DISCONTINUED | OUTPATIENT
Start: 2023-01-02 | End: 2023-01-03

## 2023-01-02 RX ORDER — HALOPERIDOL DECANOATE 100 MG/ML
10 INJECTION INTRAMUSCULAR AT BEDTIME
Refills: 0 | Status: DISCONTINUED | OUTPATIENT
Start: 2023-01-02 | End: 2023-01-03

## 2023-01-02 RX ORDER — LANOLIN ALCOHOL/MO/W.PET/CERES
9 CREAM (GRAM) TOPICAL AT BEDTIME
Refills: 0 | Status: DISCONTINUED | OUTPATIENT
Start: 2023-01-02 | End: 2023-01-18

## 2023-01-02 RX ADMIN — HALOPERIDOL DECANOATE 10 MILLIGRAM(S): 100 INJECTION INTRAMUSCULAR at 21:00

## 2023-01-02 RX ADMIN — Medication 1 TABLET(S): at 18:39

## 2023-01-02 RX ADMIN — Medication 4 MILLIGRAM(S): at 17:36

## 2023-01-02 RX ADMIN — ENOXAPARIN SODIUM 40 MILLIGRAM(S): 100 INJECTION SUBCUTANEOUS at 17:35

## 2023-01-02 RX ADMIN — Medication 5 MILLIGRAM(S): at 17:36

## 2023-01-02 RX ADMIN — LACOSAMIDE 100 MILLIGRAM(S): 50 TABLET ORAL at 17:34

## 2023-01-02 RX ADMIN — HALOPERIDOL DECANOATE 2.5 MILLIGRAM(S): 100 INJECTION INTRAMUSCULAR at 09:06

## 2023-01-02 RX ADMIN — PANTOPRAZOLE SODIUM 40 MILLIGRAM(S): 20 TABLET, DELAYED RELEASE ORAL at 05:35

## 2023-01-02 RX ADMIN — LEVETIRACETAM 1000 MILLIGRAM(S): 250 TABLET, FILM COATED ORAL at 17:35

## 2023-01-02 RX ADMIN — LEVETIRACETAM 1000 MILLIGRAM(S): 250 TABLET, FILM COATED ORAL at 05:35

## 2023-01-02 RX ADMIN — Medication 4 MILLIGRAM(S): at 05:35

## 2023-01-02 RX ADMIN — HALOPERIDOL DECANOATE 2.5 MILLIGRAM(S): 100 INJECTION INTRAMUSCULAR at 15:49

## 2023-01-02 RX ADMIN — LACOSAMIDE 100 MILLIGRAM(S): 50 TABLET ORAL at 05:35

## 2023-01-02 RX ADMIN — Medication 9 MILLIGRAM(S): at 21:01

## 2023-01-02 RX ADMIN — HALOPERIDOL DECANOATE 5 MILLIGRAM(S): 100 INJECTION INTRAMUSCULAR at 05:35

## 2023-01-02 NOTE — PROGRESS NOTE ADULT - SUBJECTIVE AND OBJECTIVE BOX
Patient seen and examined at bedside. Patient continues to note significant insomnia. Tearful this AM in regards to lack of sleep, restlessness and overall medical conditions. He is requesting assistance with sleep. Notes prior use of ambien (several years ago) which worked well for him. He does note insomnia at home but it is fairly well controlled with OTC melatonin. He is also requesting privileges to get out of bed and go to the bathroom without supervision at night. I discussed rationale for these precautions but ultimately it is up to rehab to set his independency     ALLERGIES:  No Known Allergies    MEDICATIONS  (STANDING):  bisacodyl 5 milliGRAM(s) Oral every 12 hours  dexAMETHasone     Tablet 4 milliGRAM(s) Oral every 12 hours  enoxaparin Injectable 40 milliGRAM(s) SubCutaneous <User Schedule>  haloperidol     Tablet 2.5 milliGRAM(s) Oral <User Schedule>  haloperidol     Tablet 10 milliGRAM(s) Oral at bedtime  lacosamide Solution 100 milliGRAM(s) Oral two times a day  levETIRAcetam  Solution 1000 milliGRAM(s) Oral two times a day  melatonin 3 milliGRAM(s) Oral at bedtime  pantoprazole    Tablet 40 milliGRAM(s) Oral before breakfast  senna 2 Tablet(s) Oral at bedtime    MEDICATIONS  (PRN):  acetaminophen     Tablet .. 650 milliGRAM(s) Oral every 6 hours PRN Temp greater or equal to 38C (100.4F), Mild Pain (1 - 3)  bisacodyl Suppository 10 milliGRAM(s) Rectal daily PRN Constipation  zolpidem 5 milliGRAM(s) Oral at bedtime PRN Insomnia    Vital Signs Last 24 Hrs  T(F): 98 (02 Jan 2023 08:34), Max: 98 (02 Jan 2023 08:34)  HR: 77 (02 Jan 2023 08:34) (77 - 84)  BP: 113/80 (02 Jan 2023 08:34) (113/80 - 119/82)  RR: 16 (02 Jan 2023 08:34) (16 - 16)  SpO2: 96% (02 Jan 2023 08:34) (94% - 96%)  I&O's Summary    01 Jan 2023 07:01  -  02 Jan 2023 07:00  --------------------------------------------------------  IN: 0 mL / OUT: 1 mL / NET: -1 mL      BMI (kg/m2): 27.6 (12-30-22 @ 11:30)  PHYSICAL EXAM:  Gen: nad, resting in bed  Neuro: aaox3, no focal deficits  Heent: eomi b/l, no jvd, no oral exudates  Pulm: cta b/l, no w/r/r  CV: +s1s2, no m/r/g  Ab: soft, nt/nd, normoactive bs x 4  Extrem: no edema, pulses intact and equal      LABS:                        15.4   11.68 )-----------( 159      ( 31 Dec 2022 07:20 )             47.3       12-31    141  |  106  |  22  ----------------------------<  125  3.6   |  25  |  0.97    Ca    9.1      31 Dec 2022 07:20    TPro  6.3  /  Alb  2.6  /  TBili  0.7  /  DBili  x   /  AST  19  /  ALT  37  /  AlkPhos  56  12-31                                      COVID-19 PCR: NotDetec (12-29-22 @ 09:34)  COVID-19 PCR: NotDetec (12-29-22 @ 05:00)  COVID-19 PCR: NotDetec (12-28-22 @ 09:54)      RADIOLOGY & ADDITIONAL TESTS:    Care Discussed with Consultants/Other Providers:

## 2023-01-02 NOTE — PROGRESS NOTE ADULT - ASSESSMENT
Mr Ramos is a pleasant 62 year old male with PMH of GBM s/p resection in 10/22 on Keppra 750 bid followed by Dr. Turpin who presented to the ED at [facility] on 12/21/22 for change in mental status. While there he had a witnessed seizure and subsequently intubated for airway protection and admitted to NSICU.  Patient had MRI brain and MR SPECT on 12/22, which showed suspicious for progressive neoplasm in the left parietal postoperative bed compared with 10/5/2022 with MR spectroscopy. . Patient was found to be positive for COVID upon admission and isolated per protocol. Hospital course was also complicated by agitation, which resolved with standing haldol. He was evaluated for admission to acute inpatient rehab and admitted to [facility] on 12/30/22 with cognitive deficits, gait and ADL impairment.     Neuorology  GBM with seizures   - PT/OT/SLP per primary team  -no plan for radiation or chemo therapy during the period of acute rehab, but will need follow up after discharge from rehab  -Continue Dexamethasone 4mg BID  -Continue lacosamide 100mg BID  -Continue Keppra 100mg BID    Psych  Agitation  - haldol dosing changed this AM by rehab  -Monitor QTC, corrected   Insomnia  - if no improvement following haldol dose adjustment would start serqauil and monitor ECG daily for next several days  - may consider adding ambien if no improvement with above changes  - would get neuropsych to evaluate as well given emotional state this AM    Pulmonary  COVID  -Noted on 12/21   -Repeat negative on 12/28 and 12/29  -No treatment administered as he was asymptomatic     Hematology  Leukocytosis  - continue to monitor fever curve  - remains afebrile, check CBC on 1/3    Gastroenterology  Constipation  - Continue Senna at bedtime   - Continue bisacodyl 5mg BID  - Miralax PRN    Pain control: per primary team   DVT ppx: lovenox

## 2023-01-02 NOTE — PROGRESS NOTE ADULT - SUBJECTIVE AND OBJECTIVE BOX
As per 2:1 patient was up all night, restless and unable to sleep.  fell asleep a few minutes prior.   Patient opens eyes briefly, goes back to sleep.   No clinical concerns.   No other new ROS.  Has been tolerating rehabilitation program.    VITALS  T(C): 36.6 (01-01-23 @ 21:17), Max: 36.6 (01-01-23 @ 21:17)  HR: 84 (01-01-23 @ 21:17) (71 - 84)  BP: 119/82 (01-01-23 @ 21:17) (106/66 - 119/82)  RR: 16 (01-01-23 @ 21:17) (15 - 16)  SpO2: 94% (01-01-23 @ 21:17) (94% - 97%)  Wt(kg): --     MEDICATIONS   acetaminophen     Tablet .. 650 milliGRAM(s) every 6 hours PRN  bisacodyl 5 milliGRAM(s) every 12 hours  bisacodyl Suppository 10 milliGRAM(s) daily PRN  dexAMETHasone     Tablet 4 milliGRAM(s) every 12 hours  enoxaparin Injectable 40 milliGRAM(s) <User Schedule>  haloperidol     Tablet 5 milliGRAM(s) every 8 hours  lacosamide Solution 100 milliGRAM(s) two times a day  levETIRAcetam  Solution 1000 milliGRAM(s) two times a day  melatonin 3 milliGRAM(s) at bedtime  pantoprazole    Tablet 40 milliGRAM(s) before breakfast  senna 2 Tablet(s) at bedtime      RECENT LABS/IMAGING  No new labs back                   ------------------------------------------  PHYSICAL EXAM  Constitutional - NAD, Comfortable  Pulm - Breathing comfortably, No wheezing  Abd - Nondistended  Extremities - No edema  Neurologic Exam - Briefly awakens  Psychiatric - Fatigued    ASSESSMENT/PLAN  62y Male with impairments in mobility and ADLs   - Continue current rehabilitation program 3hrs a day   - Continue current medications, patient is medically stable - alter total dosage and increase for the night for sleep. May have increased restlessness in day today given limited sleep, may need prn dosage/increase dosage/alternate medication, pending ongoing behavior responses. Goal is to optimize sleep. Consider Seroquel.   - DVT prophylaxis  - Skin - OOB and mobilization daily

## 2023-01-03 LAB
ALBUMIN SERPL ELPH-MCNC: 2.5 G/DL — LOW (ref 3.3–5)
ALP SERPL-CCNC: 53 U/L — SIGNIFICANT CHANGE UP (ref 40–120)
ALT FLD-CCNC: 42 U/L — SIGNIFICANT CHANGE UP (ref 10–45)
ANION GAP SERPL CALC-SCNC: 6 MMOL/L — SIGNIFICANT CHANGE UP (ref 5–17)
AST SERPL-CCNC: 15 U/L — SIGNIFICANT CHANGE UP (ref 10–40)
BILIRUB SERPL-MCNC: 0.4 MG/DL — SIGNIFICANT CHANGE UP (ref 0.2–1.2)
BUN SERPL-MCNC: 20 MG/DL — SIGNIFICANT CHANGE UP (ref 7–23)
CALCIUM SERPL-MCNC: 8.9 MG/DL — SIGNIFICANT CHANGE UP (ref 8.4–10.5)
CHLORIDE SERPL-SCNC: 104 MMOL/L — SIGNIFICANT CHANGE UP (ref 96–108)
CO2 SERPL-SCNC: 28 MMOL/L — SIGNIFICANT CHANGE UP (ref 22–31)
CREAT SERPL-MCNC: 0.93 MG/DL — SIGNIFICANT CHANGE UP (ref 0.5–1.3)
EGFR: 91 ML/MIN/1.73M2 — SIGNIFICANT CHANGE UP
GLUCOSE SERPL-MCNC: 104 MG/DL — HIGH (ref 70–99)
HCT VFR BLD CALC: 42.5 % — SIGNIFICANT CHANGE UP (ref 39–50)
HGB BLD-MCNC: 13.7 G/DL — SIGNIFICANT CHANGE UP (ref 13–17)
MCHC RBC-ENTMCNC: 28.5 PG — SIGNIFICANT CHANGE UP (ref 27–34)
MCHC RBC-ENTMCNC: 32.2 GM/DL — SIGNIFICANT CHANGE UP (ref 32–36)
MCV RBC AUTO: 88.4 FL — SIGNIFICANT CHANGE UP (ref 80–100)
NRBC # BLD: 0 /100 WBCS — SIGNIFICANT CHANGE UP (ref 0–0)
PLATELET # BLD AUTO: 150 K/UL — SIGNIFICANT CHANGE UP (ref 150–400)
POTASSIUM SERPL-MCNC: 4 MMOL/L — SIGNIFICANT CHANGE UP (ref 3.5–5.3)
POTASSIUM SERPL-SCNC: 4 MMOL/L — SIGNIFICANT CHANGE UP (ref 3.5–5.3)
PROT SERPL-MCNC: 5.8 G/DL — LOW (ref 6–8.3)
RBC # BLD: 4.81 M/UL — SIGNIFICANT CHANGE UP (ref 4.2–5.8)
RBC # FLD: 14 % — SIGNIFICANT CHANGE UP (ref 10.3–14.5)
SODIUM SERPL-SCNC: 138 MMOL/L — SIGNIFICANT CHANGE UP (ref 135–145)
WBC # BLD: 9.8 K/UL — SIGNIFICANT CHANGE UP (ref 3.8–10.5)
WBC # FLD AUTO: 9.8 K/UL — SIGNIFICANT CHANGE UP (ref 3.8–10.5)

## 2023-01-03 PROCEDURE — 99232 SBSQ HOSP IP/OBS MODERATE 35: CPT

## 2023-01-03 RX ORDER — MIRTAZAPINE 45 MG/1
15 TABLET, ORALLY DISINTEGRATING ORAL AT BEDTIME
Refills: 0 | Status: DISCONTINUED | OUTPATIENT
Start: 2023-01-03 | End: 2023-01-03

## 2023-01-03 RX ORDER — ZOLPIDEM TARTRATE 10 MG/1
5 TABLET ORAL ONCE
Refills: 0 | Status: DISCONTINUED | OUTPATIENT
Start: 2023-01-03 | End: 2023-01-03

## 2023-01-03 RX ORDER — MIRTAZAPINE 45 MG/1
7.5 TABLET, ORALLY DISINTEGRATING ORAL AT BEDTIME
Refills: 0 | Status: DISCONTINUED | OUTPATIENT
Start: 2023-01-03 | End: 2023-01-18

## 2023-01-03 RX ADMIN — LACOSAMIDE 100 MILLIGRAM(S): 50 TABLET ORAL at 05:39

## 2023-01-03 RX ADMIN — ZOLPIDEM TARTRATE 5 MILLIGRAM(S): 10 TABLET ORAL at 00:28

## 2023-01-03 RX ADMIN — LEVETIRACETAM 1000 MILLIGRAM(S): 250 TABLET, FILM COATED ORAL at 05:38

## 2023-01-03 RX ADMIN — LACOSAMIDE 100 MILLIGRAM(S): 50 TABLET ORAL at 17:15

## 2023-01-03 RX ADMIN — Medication 4 MILLIGRAM(S): at 05:39

## 2023-01-03 RX ADMIN — Medication 1 TABLET(S): at 05:39

## 2023-01-03 RX ADMIN — HALOPERIDOL DECANOATE 2.5 MILLIGRAM(S): 100 INJECTION INTRAMUSCULAR at 07:57

## 2023-01-03 RX ADMIN — Medication 9 MILLIGRAM(S): at 21:55

## 2023-01-03 RX ADMIN — PANTOPRAZOLE SODIUM 40 MILLIGRAM(S): 20 TABLET, DELAYED RELEASE ORAL at 05:39

## 2023-01-03 RX ADMIN — Medication 4 MILLIGRAM(S): at 17:16

## 2023-01-03 RX ADMIN — MIRTAZAPINE 7.5 MILLIGRAM(S): 45 TABLET, ORALLY DISINTEGRATING ORAL at 21:55

## 2023-01-03 RX ADMIN — Medication 1 TABLET(S): at 17:19

## 2023-01-03 RX ADMIN — LEVETIRACETAM 1000 MILLIGRAM(S): 250 TABLET, FILM COATED ORAL at 17:15

## 2023-01-03 RX ADMIN — ENOXAPARIN SODIUM 40 MILLIGRAM(S): 100 INJECTION SUBCUTANEOUS at 17:16

## 2023-01-03 NOTE — DIETITIAN INITIAL EVALUATION ADULT - REASON FOR ADMISSION
Abdominal aneurysm    Blind one eye    COPD (chronic obstructive pulmonary disease)    ESRD (end stage renal disease)    Lung cancer
Malignant neoplasm of brain

## 2023-01-03 NOTE — DIETITIAN INITIAL EVALUATION ADULT - PERTINENT LABORATORY DATA
01-03    138  |  104  |  20  ----------------------------<  104<H>  4.0   |  28  |  0.93    Ca    8.9      03 Jan 2023 06:15    TPro  5.8<L>  /  Alb  2.5<L>  /  TBili  0.4  /  DBili  x   /  AST  15  /  ALT  42  /  AlkPhos  53  01-03  A1C with Estimated Average Glucose Result: 5.7 % (12-22-22 @ 02:21)

## 2023-01-03 NOTE — DIETITIAN INITIAL EVALUATION ADULT - PERTINENT MEDS FT
MEDICATIONS  (STANDING):  bisacodyl 5 milliGRAM(s) Oral every 12 hours  dexAMETHasone     Tablet 4 milliGRAM(s) Oral every 12 hours  enoxaparin Injectable 40 milliGRAM(s) SubCutaneous <User Schedule>  lacosamide Solution 100 milliGRAM(s) Oral two times a day  levETIRAcetam  Solution 1000 milliGRAM(s) Oral two times a day  melatonin 9 milliGRAM(s) Oral at bedtime  mirtazapine 7.5 milliGRAM(s) Oral at bedtime  pantoprazole    Tablet 40 milliGRAM(s) Oral before breakfast  senna 2 Tablet(s) Oral at bedtime  trimethoprim  160 mG/sulfamethoxazole 800 mG 1 Tablet(s) Oral two times a day    MEDICATIONS  (PRN):  acetaminophen     Tablet .. 650 milliGRAM(s) Oral every 6 hours PRN Temp greater or equal to 38C (100.4F), Mild Pain (1 - 3)  bisacodyl Suppository 10 milliGRAM(s) Rectal daily PRN Constipation

## 2023-01-03 NOTE — DIETITIAN INITIAL EVALUATION ADULT - ORAL INTAKE PTA/DIET HISTORY
Patient w/ good appetite and intake PTA. NKFA. Cooks all meals independently. Does not follow any specific meal patterns.

## 2023-01-03 NOTE — PROGRESS NOTE ADULT - SUBJECTIVE AND OBJECTIVE BOX
HPI:  62 year old male with PMH of GBM s/p resection in 10/22 on Keppra 750 bid followed by Dr. Turpin [facility] in mental status. Further history provided by HCP friend at bedside. Per HCP, she spoke to pt on the phone at 1230 on day of admit and he was not making sense. While en route to the hospital had witnessed RUE twitching and rigid lasting around 2 min.  Pt given decadron 8mg PO by HCP as per radiooncology PA recommendations over the phone en route, in ED found to have witnessed seizure, intubated for airway protection and admitted to NSICU. Patient was on VEEG and AEDs were optimized in NSCU. Patient had MRI brain and MR SPECT on , which showed suspicious for progressive neoplasm in the left parietal postoperative bed compared with 10/5/2022 with MR spectroscopy. Tiny focus of neoplasm also likely in the left brainstem, unchanged. Patient was extubated on . Patient was found to be positive for COVID upon admission and isolated per protocol. Patient has been asymptomatic during this admission and repeat COVID tests on  and  were negative. Hospital course was also complicated by agitation, which resolved with standing haldol. He was evaluated for admission to acute inpatient rehab and admitted to [facility] on 22.    (30 Dec 2022 13:10)          Subjective:  Pt. did not sleep last night despite taking Haldol 10mg,  received 5mg ambien.  Pt. states had some Restless legs at night.   c/o feeling exhausted and fatigued but cannot sleep.  reports urinary frequency but no burning or pain with urination.  No fever/chills. No agitation overnight.        VITALS  Vital Signs Last 24 Hrs  T(C): 36.3 (2023 07:59), Max: 36.3 (2023 20:32)  T(F): 97.4 (2023 07:59), Max: 97.4 (2023 07:59)  HR: 69 (2023 07:59) (69 - 98)  BP: 100/67 (2023 07:59) (100/67 - 106/75)  BP(mean): --  RR: 16 (2023 07:59) (16 - 16)  SpO2: 95% (2023 07:59) (95% - 95%)    Parameters below as of 2023 07:59  Patient On (Oxygen Delivery Method): room air        REVIEW OF SYMPTOMS  Neurological deficits      PHYSICAL EXAM  Mental Status - Patient is awake, oriented X3.   Speech is fluent, able to  name and repeat. following all commands.   Attention --Impaired-- unable to state Days of  week backward or serial 7's  Recall: 1/3 spontaneously after few Min.  2/3 with cat. cues.     Mood and affect  normal.    Cranial Nerves - PERRL--sluggish bilat;  EOMI, Visual fields impaired on right; V1-V3 intact, no gross facial asymmetry, no dysarthria, no dysphagia,  Motor Exam -   Right upper 5/5,   Right lower 5/5  Left upper   5/5,   Left lower  5/5    Normal muscle bulk/tone  Sensory    Intact to light touch and pinprick bilaterally.   Proprioception: intact bilat.   Coord: FTN--slight impairment on left & HTS intact bilaterally   No tremors  Tone: normal                                              GENERAL Exam:     Nontoxic , No Acute Distress 	  HEENT:  normocephalic, atraumatic	  LUNGS:	breathing comfortably on RA  HEART:	warm, well perfused   	  GI/ ABDOMEN:  Soft  Non tender  EXTREMITIES:   No Edema , no calf tenderness    RECENT LABS                        13.7   9.80  )-----------( 150      ( 2023 06:15 )             42.5     01-03    138  |  104  |  20  ----------------------------<  104<H>  4.0   |  28  |  0.93    Ca    8.9      2023 06:15    TPro  5.8<L>  /  Alb  2.5<L>  /  TBili  0.4  /  DBili  x   /  AST  15  /  ALT  42  /  AlkPhos  53  01-03      Urinalysis Basic - ( 2023 13:00 )    Color: Yellow / Appearance: Clear / S.025 / pH: x  Gluc: x / Ketone: Negative  / Bili: Negative / Urobili: 1   Blood: x / Protein: 30 mg/dL / Nitrite: Positive   Leuk Esterase: Moderate / RBC: 11-25 /HPF / WBC 26-50 /HPF   Sq Epi: x / Non Sq Epi: Neg.-Few / Bacteria: Many /HPF          RADIOLOGY/OTHER RESULTS      MEDICATIONS  (STANDING):  bisacodyl 5 milliGRAM(s) Oral every 12 hours  dexAMETHasone     Tablet 4 milliGRAM(s) Oral every 12 hours  enoxaparin Injectable 40 milliGRAM(s) SubCutaneous <User Schedule>  lacosamide Solution 100 milliGRAM(s) Oral two times a day  levETIRAcetam  Solution 1000 milliGRAM(s) Oral two times a day  melatonin 9 milliGRAM(s) Oral at bedtime  mirtazapine 7.5 milliGRAM(s) Oral at bedtime  pantoprazole    Tablet 40 milliGRAM(s) Oral before breakfast  senna 2 Tablet(s) Oral at bedtime  trimethoprim  160 mG/sulfamethoxazole 800 mG 1 Tablet(s) Oral two times a day    MEDICATIONS  (PRN):  acetaminophen     Tablet .. 650 milliGRAM(s) Oral every 6 hours PRN Temp greater or equal to 38C (100.4F), Mild Pain (1 - 3)  bisacodyl Suppository 10 milliGRAM(s) Rectal daily PRN Constipation

## 2023-01-03 NOTE — PROGRESS NOTE ADULT - ASSESSMENT
62 year old male with PMH of GBM s/p resection in 10/22 on Keppra 750 bid followed by Dr. Turpin who presented to the ED at [facility] on 12/21/22 for change in mental status. Patient with seizures, intubated for airway protection and admitted to NSICU.  Patient had MRI brain and MR SPECT on 12/22, which showed suspicious for progressive neoplasm in the left parietal postoperative bed compared with 10/5/2022 with MR spectroscopy. . Patient was found to be positive for COVID upon admission and isolated per protocol. Hospital course was also complicated by agitation, which resolved with standing haldol. He was evaluated for admission to acute inpatient rehab and admitted to [facility] on 12/30/22 with cognitive deficits, gait and ADL impairment.     #GBM with seizures now with Gait Instability, ADL impairments and Functional impairments  - Cont Comprehensive Rehab Program of PT/OT/SLP  -no plan for radiation or chemo therapy during the period of acute rehab, but will need follow up after discharge from rehab  -Continue Dexamethasone 4mg BID  -Continue lacosamide 100mg BID  -Continue Keppra 100mg BID    #Agitation  --No agitation last night--Not sleeping with qhs Haldol  --d/c Haldol  --Trial Remeron 7.5mg qhs to target sleep as improved sleep will help improve mood.    -can consider PRN Haldol 2.5mg or seroquel 25mg if needed.   -Monitor QTC    UTI--  --+u/a--on bactrim  --urine Culture pending.      Insomnia--  --trial REmeron 7.5 mg qhs  -- If RLS cont. to affect sleep, will add requipt.     #Covid  -Noted on 12/21   -Repeat negative on 12/28 and 12/29  -No treatment administered as he was asymptomatic     #Pain control  - Tylenol PRN    #GI/Bowel Mgmt   - Continue Senna at bedtime   -Continue bisacodyl 5mg BID  - Miralax PRN    #Bladder management  - voiding with low PVRs    #DVT ppx  - Lovenox      FEN   - Diet - Regular + Thins  [CCHO, DASH/TLC]    - Dysphagia  SLP - evaluation and treatment    Precautions / PROPHYLAXIS:   - Falls, Seizure

## 2023-01-03 NOTE — DIETITIAN INITIAL EVALUATION ADULT - ADD RECOMMEND
1. Continue Regular diet.   2. Monitor PO intake, GI tolerance, skin integrity, labs, weight, and bowel movement regularity.   3. Provide ongoing diet education as needed  4. RD remains available upon request and will follow-up per protocol

## 2023-01-03 NOTE — DIETITIAN INITIAL EVALUATION ADULT - OTHER INFO
Reason for Admission: GBM  62 year old male with PMH of GBM s/p resection in 10/22 on Keppra 750 bid followed by Dr. Turpin [facility]. Further history provided by HCP friend at bedside. Per HCP, she spoke to pt on the phone at 1230 on day of admit and he was not making sense. While en route to the hospital had witnessed RUE twitching and rigid lasting around 2 min.  Pt given decadron 8mg PO by HCP as per radiooncology PA recommendations over the phone en route, in ED found to have witnessed seizure, intubated for airway protection and admitted to NSICU. Patient was on VEEG and AEDs were optimized in NSCU. Patient had MRI brain and MR SPECT on 12/22, which showed suspicious for progressive neoplasm in the left parietal postoperative bed compared with 10/5/2022 with MR spectroscopy. Tiny focus of neoplasm also likely in the left brainstem, unchanged. Patient was extubated on 12/22. Patient was found to be positive for COVID upon admission and isolated per protocol. Patient has been asymptomatic during this admission and repeat COVID tests on 12/28 and 12/29 were negative. Hospital course was also complicated by agitation, which resolved with standing haldol. He was evaluated for admission to acute inpatient rehab and admitted to [facility] on 12/29/22.     At this time patient w/ adequate appetite/intake consuming % of meal tray. No issues w/ chewing and swallowing. Denies N/V/C/D, last BM 1/3 per patient report.

## 2023-01-03 NOTE — DIETITIAN INITIAL EVALUATION ADULT - REASON
Nutrition physical assessment not warranted - No overt visual signs of lean body muscle depletion or subcutaneous fat loss

## 2023-01-04 LAB
-  AMIKACIN: SIGNIFICANT CHANGE UP
-  AMOXICILLIN/CLAVULANIC ACID: SIGNIFICANT CHANGE UP
-  AMPICILLIN/SULBACTAM: SIGNIFICANT CHANGE UP
-  AMPICILLIN: SIGNIFICANT CHANGE UP
-  AZTREONAM: SIGNIFICANT CHANGE UP
-  CEFAZOLIN: SIGNIFICANT CHANGE UP
-  CEFEPIME: SIGNIFICANT CHANGE UP
-  CEFOXITIN: SIGNIFICANT CHANGE UP
-  CEFTRIAXONE: SIGNIFICANT CHANGE UP
-  CIPROFLOXACIN: SIGNIFICANT CHANGE UP
-  ERTAPENEM: SIGNIFICANT CHANGE UP
-  GENTAMICIN: SIGNIFICANT CHANGE UP
-  IMIPENEM: SIGNIFICANT CHANGE UP
-  LEVOFLOXACIN: SIGNIFICANT CHANGE UP
-  MEROPENEM: SIGNIFICANT CHANGE UP
-  NITROFURANTOIN: SIGNIFICANT CHANGE UP
-  PIPERACILLIN/TAZOBACTAM: SIGNIFICANT CHANGE UP
-  TOBRAMYCIN: SIGNIFICANT CHANGE UP
-  TRIMETHOPRIM/SULFAMETHOXAZOLE: SIGNIFICANT CHANGE UP
CULTURE RESULTS: SIGNIFICANT CHANGE UP
METHOD TYPE: SIGNIFICANT CHANGE UP
ORGANISM # SPEC MICROSCOPIC CNT: SIGNIFICANT CHANGE UP
ORGANISM # SPEC MICROSCOPIC CNT: SIGNIFICANT CHANGE UP
SPECIMEN SOURCE: SIGNIFICANT CHANGE UP

## 2023-01-04 PROCEDURE — 99232 SBSQ HOSP IP/OBS MODERATE 35: CPT

## 2023-01-04 RX ORDER — ROPINIROLE 8 MG/1
0.25 TABLET, FILM COATED, EXTENDED RELEASE ORAL AT BEDTIME
Refills: 0 | Status: DISCONTINUED | OUTPATIENT
Start: 2023-01-04 | End: 2023-01-04

## 2023-01-04 RX ORDER — ROPINIROLE 8 MG/1
0.25 TABLET, FILM COATED, EXTENDED RELEASE ORAL ONCE
Refills: 0 | Status: COMPLETED | OUTPATIENT
Start: 2023-01-04 | End: 2023-01-04

## 2023-01-04 RX ORDER — ROPINIROLE 8 MG/1
0.5 TABLET, FILM COATED, EXTENDED RELEASE ORAL
Refills: 0 | Status: DISCONTINUED | OUTPATIENT
Start: 2023-01-04 | End: 2023-01-06

## 2023-01-04 RX ADMIN — Medication 4 MILLIGRAM(S): at 06:05

## 2023-01-04 RX ADMIN — MIRTAZAPINE 7.5 MILLIGRAM(S): 45 TABLET, ORALLY DISINTEGRATING ORAL at 22:15

## 2023-01-04 RX ADMIN — Medication 1 TABLET(S): at 17:17

## 2023-01-04 RX ADMIN — Medication 10 MILLIGRAM(S): at 17:19

## 2023-01-04 RX ADMIN — ENOXAPARIN SODIUM 40 MILLIGRAM(S): 100 INJECTION SUBCUTANEOUS at 17:16

## 2023-01-04 RX ADMIN — Medication 9 MILLIGRAM(S): at 22:15

## 2023-01-04 RX ADMIN — LACOSAMIDE 100 MILLIGRAM(S): 50 TABLET ORAL at 17:19

## 2023-01-04 RX ADMIN — Medication 4 MILLIGRAM(S): at 17:16

## 2023-01-04 RX ADMIN — PANTOPRAZOLE SODIUM 40 MILLIGRAM(S): 20 TABLET, DELAYED RELEASE ORAL at 06:05

## 2023-01-04 RX ADMIN — LEVETIRACETAM 1000 MILLIGRAM(S): 250 TABLET, FILM COATED ORAL at 17:16

## 2023-01-04 RX ADMIN — Medication 1 TABLET(S): at 06:05

## 2023-01-04 RX ADMIN — LEVETIRACETAM 1000 MILLIGRAM(S): 250 TABLET, FILM COATED ORAL at 06:04

## 2023-01-04 RX ADMIN — ROPINIROLE 0.25 MILLIGRAM(S): 8 TABLET, FILM COATED, EXTENDED RELEASE ORAL at 01:05

## 2023-01-04 RX ADMIN — ROPINIROLE 0.5 MILLIGRAM(S): 8 TABLET, FILM COATED, EXTENDED RELEASE ORAL at 20:06

## 2023-01-04 RX ADMIN — LACOSAMIDE 100 MILLIGRAM(S): 50 TABLET ORAL at 06:04

## 2023-01-04 RX ADMIN — Medication 5 MILLIGRAM(S): at 06:05

## 2023-01-04 NOTE — PROGRESS NOTE ADULT - ASSESSMENT
62 year old male with PMH of GBM s/p resection in 10/22 on Keppra 750 bid followed by Dr. Turpin who presented to the ED at [facility] on 12/21/22 for change in mental status. Patient with seizures, intubated for airway protection and admitted to NSICU.  Patient had MRI brain and MR SPECT on 12/22, which showed suspicious for progressive neoplasm in the left parietal postoperative bed compared with 10/5/2022 with MR spectroscopy. . Patient was found to be positive for COVID upon admission and isolated per protocol. Hospital course was also complicated by agitation, which resolved with standing haldol. He was evaluated for admission to acute inpatient rehab and admitted to [facility] on 12/30/22 with cognitive deficits, gait and ADL impairment.     #GBM with seizures now with Gait Instability, ADL impairments and Functional impairments  - Cont Comprehensive Rehab Program of PT/OT/SLP  -no plan for radiation or chemo therapy during the period of acute rehab, but will need follow up after discharge from rehab  -Continue Dexamethasone 4mg BID  -Continue lacosamide 100mg BID  -Continue Keppra 100mg BID    #Insomnia  --Cont. Trial Remeron 7.5mg qhs to target sleep as improved sleep will help improve mood.    -Scheduled Requip 0.5mg  in evening 8pm      UTI--  --+u/a--on bactrim  --urine Culture: + Klebsiella        #Covid  -Noted on 12/21   -Repeat negative on 12/28 and 12/29  -No treatment administered as he was asymptomatic     #Pain control  - Tylenol PRN    #GI/Bowel Mgmt   - Continue Senna at bedtime   -Continue bisacodyl 5mg BID  - Miralax PRN    #Bladder management  - voiding with low PVRs    #DVT ppx  - Lovenox      FEN   - Diet - Regular + Thins  [CCHO, DASH/TLC]    - Dysphagia  SLP - evaluation and treatment    Precautions / PROPHYLAXIS:   - Falls, Seizure

## 2023-01-04 NOTE — PROGRESS NOTE ADULT - SUBJECTIVE AND OBJECTIVE BOX
HPI:  62 year old male with PMH of GBM s/p resection in 10/22 on Keppra 750 bid followed by Dr. [facility]. Further history provided by HCP friend at bedside. Per HCP, she spoke to pt on the phone at 1230 on day of admit and he was not making sense. While en route to the hospital had witnessed RUE twitching and rigid lasting around 2 min.  Pt given decadron 8mg PO by HCP as per radiooncology PA recommendations over the phone en route, in ED found to have witnessed seizure, intubated for airway protection and admitted to NSICU. Patient was on VEEG and AEDs were optimized in NSCU. Patient had MRI brain and MR SPECT on 12/22, which showed suspicious for progressive neoplasm in the left parietal postoperative bed compared with 10/5/2022 with MR spectroscopy. Tiny focus of neoplasm also likely in the left brainstem, unchanged. Patient was extubated on 12/22. Patient was found to be positive for COVID upon admission and isolated per protocol. Patient has been asymptomatic during this admission and repeat COVID tests on 12/28 and 12/29 were negative. Hospital course was also complicated by agitation, which resolved with standing haldol. He was evaluated for admission to acute inpatient rehab and admitted to [facility] on 12/29/22.    (30 Dec 2022 13:10)          Subjective:  Pt. frustrated as states he did not sleep last night.  Reports restless legs were bothering him.  Received dose of Requipt 0.25mg at 1am w/o benefit.  No agitation events reported.       VITALS  Vital Signs Last 24 Hrs  T(C): 36.5 (04 Jan 2023 07:23), Max: 36.5 (04 Jan 2023 07:23)  T(F): 97.7 (04 Jan 2023 07:23), Max: 97.7 (04 Jan 2023 07:23)  HR: 62 (04 Jan 2023 07:23) (62 - 78)  BP: 130/83 (04 Jan 2023 07:23) (115/77 - 130/83)  BP(mean): --  RR: 15 (04 Jan 2023 07:23) (15 - 15)  SpO2: 96% (04 Jan 2023 07:23) (93% - 96%)    Parameters below as of 04 Jan 2023 07:23  Patient On (Oxygen Delivery Method): room air        REVIEW OF SYMPTOMS  Neurological deficits--cognitive deficits        PHYSICAL EXAM  Mental Status - Patient is awake, oriented X3.   Speech is fluent, able to  name and repeat. following all commands.   Attention --Impaired-- unable to state Days of  week backward or serial 7's  Recall: 1/3 spontaneously after few Min.  2/3 with cat. cues.     Mood and affect  normal.    Cranial Nerves - PERRL--sluggish bilat;  EOMI, Visual fields impaired on right; V1-V3 intact, no gross facial asymmetry, no dysarthria, no dysphagia,  Motor Exam -   Right upper 5/5,   Right lower 5/5  Left upper   5/5,   Left lower  5/5    Normal muscle bulk/tone  Sensory    Intact to light touch and pinprick bilaterally.   Proprioception: intact bilat.   Coord: FTN--slight impairment on left & HTS intact bilaterally   No tremors  Tone: normal                                              GENERAL Exam:     Nontoxic , No Acute Distress 	  HEENT:  normocephalic, atraumatic	  LUNGS:	breathing comfortably on RA  HEART:	warm, well perfused   	  GI/ ABDOMEN:  Soft  Non tender  EXTREMITIES:   No Edema , no calf tenderness    RECENT LABS                        13.7   9.80  )-----------( 150      ( 03 Jan 2023 06:15 )             42.5     01-03    138  |  104  |  20  ----------------------------<  104<H>  4.0   |  28  |  0.93    Ca    8.9      03 Jan 2023 06:15    TPro  5.8<L>  /  Alb  2.5<L>  /  TBili  0.4  /  DBili  x   /  AST  15  /  ALT  42  /  AlkPhos  53  01-03            RADIOLOGY/OTHER RESULTS      MEDICATIONS  (STANDING):  bisacodyl 5 milliGRAM(s) Oral every 12 hours  dexAMETHasone     Tablet 4 milliGRAM(s) Oral every 12 hours  enoxaparin Injectable 40 milliGRAM(s) SubCutaneous <User Schedule>  lacosamide Solution 100 milliGRAM(s) Oral two times a day  levETIRAcetam  Solution 1000 milliGRAM(s) Oral two times a day  melatonin 9 milliGRAM(s) Oral at bedtime  mirtazapine 7.5 milliGRAM(s) Oral at bedtime  pantoprazole    Tablet 40 milliGRAM(s) Oral before breakfast  rOPINIRole 0.5 milliGRAM(s) Oral <User Schedule>  senna 2 Tablet(s) Oral at bedtime  trimethoprim  160 mG/sulfamethoxazole 800 mG 1 Tablet(s) Oral two times a day    MEDICATIONS  (PRN):  acetaminophen     Tablet .. 650 milliGRAM(s) Oral every 6 hours PRN Temp greater or equal to 38C (100.4F), Mild Pain (1 - 3)  bisacodyl Suppository 10 milliGRAM(s) Rectal daily PRN Constipation

## 2023-01-05 LAB
ALBUMIN SERPL ELPH-MCNC: 2.7 G/DL — LOW (ref 3.3–5)
ALP SERPL-CCNC: 60 U/L — SIGNIFICANT CHANGE UP (ref 40–120)
ALT FLD-CCNC: 60 U/L — HIGH (ref 10–45)
ANION GAP SERPL CALC-SCNC: 5 MMOL/L — SIGNIFICANT CHANGE UP (ref 5–17)
AST SERPL-CCNC: 28 U/L — SIGNIFICANT CHANGE UP (ref 10–40)
BILIRUB SERPL-MCNC: 0.4 MG/DL — SIGNIFICANT CHANGE UP (ref 0.2–1.2)
BUN SERPL-MCNC: 17 MG/DL — SIGNIFICANT CHANGE UP (ref 7–23)
CALCIUM SERPL-MCNC: 8.9 MG/DL — SIGNIFICANT CHANGE UP (ref 8.4–10.5)
CHLORIDE SERPL-SCNC: 105 MMOL/L — SIGNIFICANT CHANGE UP (ref 96–108)
CO2 SERPL-SCNC: 29 MMOL/L — SIGNIFICANT CHANGE UP (ref 22–31)
CREAT SERPL-MCNC: 1.25 MG/DL — SIGNIFICANT CHANGE UP (ref 0.5–1.3)
EGFR: 64 ML/MIN/1.73M2 — SIGNIFICANT CHANGE UP
GLUCOSE SERPL-MCNC: 131 MG/DL — HIGH (ref 70–99)
HCT VFR BLD CALC: 46.4 % — SIGNIFICANT CHANGE UP (ref 39–50)
HGB BLD-MCNC: 15 G/DL — SIGNIFICANT CHANGE UP (ref 13–17)
MCHC RBC-ENTMCNC: 29.1 PG — SIGNIFICANT CHANGE UP (ref 27–34)
MCHC RBC-ENTMCNC: 32.3 GM/DL — SIGNIFICANT CHANGE UP (ref 32–36)
MCV RBC AUTO: 89.9 FL — SIGNIFICANT CHANGE UP (ref 80–100)
NRBC # BLD: 0 /100 WBCS — SIGNIFICANT CHANGE UP (ref 0–0)
PLATELET # BLD AUTO: 182 K/UL — SIGNIFICANT CHANGE UP (ref 150–400)
POTASSIUM SERPL-MCNC: 4.7 MMOL/L — SIGNIFICANT CHANGE UP (ref 3.5–5.3)
POTASSIUM SERPL-SCNC: 4.7 MMOL/L — SIGNIFICANT CHANGE UP (ref 3.5–5.3)
PROT SERPL-MCNC: 6.2 G/DL — SIGNIFICANT CHANGE UP (ref 6–8.3)
RBC # BLD: 5.16 M/UL — SIGNIFICANT CHANGE UP (ref 4.2–5.8)
RBC # FLD: 14.1 % — SIGNIFICANT CHANGE UP (ref 10.3–14.5)
SODIUM SERPL-SCNC: 139 MMOL/L — SIGNIFICANT CHANGE UP (ref 135–145)
WBC # BLD: 10.96 K/UL — HIGH (ref 3.8–10.5)
WBC # FLD AUTO: 10.96 K/UL — HIGH (ref 3.8–10.5)

## 2023-01-05 PROCEDURE — 99232 SBSQ HOSP IP/OBS MODERATE 35: CPT

## 2023-01-05 RX ADMIN — ENOXAPARIN SODIUM 40 MILLIGRAM(S): 100 INJECTION SUBCUTANEOUS at 18:04

## 2023-01-05 RX ADMIN — LACOSAMIDE 100 MILLIGRAM(S): 50 TABLET ORAL at 18:04

## 2023-01-05 RX ADMIN — Medication 1 TABLET(S): at 06:01

## 2023-01-05 RX ADMIN — LEVETIRACETAM 1000 MILLIGRAM(S): 250 TABLET, FILM COATED ORAL at 06:01

## 2023-01-05 RX ADMIN — Medication 5 MILLIGRAM(S): at 06:02

## 2023-01-05 RX ADMIN — LACOSAMIDE 100 MILLIGRAM(S): 50 TABLET ORAL at 06:02

## 2023-01-05 RX ADMIN — Medication 1 TABLET(S): at 18:05

## 2023-01-05 RX ADMIN — Medication 4 MILLIGRAM(S): at 06:01

## 2023-01-05 RX ADMIN — SENNA PLUS 2 TABLET(S): 8.6 TABLET ORAL at 22:18

## 2023-01-05 RX ADMIN — PANTOPRAZOLE SODIUM 40 MILLIGRAM(S): 20 TABLET, DELAYED RELEASE ORAL at 06:01

## 2023-01-05 RX ADMIN — LEVETIRACETAM 1000 MILLIGRAM(S): 250 TABLET, FILM COATED ORAL at 18:04

## 2023-01-05 RX ADMIN — Medication 4 MILLIGRAM(S): at 18:05

## 2023-01-05 RX ADMIN — Medication 9 MILLIGRAM(S): at 22:17

## 2023-01-05 RX ADMIN — MIRTAZAPINE 7.5 MILLIGRAM(S): 45 TABLET, ORALLY DISINTEGRATING ORAL at 22:17

## 2023-01-05 RX ADMIN — ROPINIROLE 0.5 MILLIGRAM(S): 8 TABLET, FILM COATED, EXTENDED RELEASE ORAL at 19:47

## 2023-01-05 NOTE — PROGRESS NOTE ADULT - SUBJECTIVE AND OBJECTIVE BOX
HPI:  62 year old male with PMH of GBM s/p resection in 10/22 on Keppra 750 bid followed [facility] Dr[facility]. Further history provided by HCP friend at bedside. Per HCP, she spoke to pt on the phone at 1230 on day of admit and he was not making sense. While en route to the hospital had witnessed RUE twitching and rigid lasting around 2 min.  Pt given decadron 8mg PO by HCP as per radiooncology PA recommendations over the phone en route, in ED found to have witnessed seizure, intubated for airway protection and admitted to NSICU. Patient was on VEEG and AEDs were optimized in NSCU. Patient had MRI brain and MR SPECT on 12/22, which showed suspicious for progressive neoplasm in the left parietal postoperative bed compared with 10/5/2022 with MR spectroscopy. Tiny focus of neoplasm also likely in the left brainstem, unchanged. Patient was extubated on 12/22. Patient was found to be positive for COVID upon admission and isolated per protocol. Patient has been asymptomatic during this admission and repeat COVID tests on 12/28 and 12/29 were negative. Hospital course was also complicated by agitation, which resolved with standing haldol. He was evaluated for admission to acute inpatient rehab and admitted to [facility] on 12/29/22.    (30 Dec 2022 13:10)          Subjective:  Pt. had witnessed fall in bathroom this AM.  Was standing and trying to put on pants, lost balance and fell onto right knee and elbow.  Did not hit head.  Pt. denies any pain.  slept during the night as per nursing.  Restless leg was better.        VITALS  Vital Signs Last 24 Hrs  T(C): 36.3 (05 Jan 2023 07:53), Max: 36.3 (05 Jan 2023 07:53)  T(F): 97.4 (05 Jan 2023 07:53), Max: 97.4 (05 Jan 2023 07:53)  HR: 72 (05 Jan 2023 07:53) (72 - 92)  BP: 118/80 (05 Jan 2023 07:53) (117/79 - 118/80)  BP(mean): --  RR: 16 (05 Jan 2023 07:53) (16 - 16)  SpO2: 96% (05 Jan 2023 07:53) (95% - 96%)    Parameters below as of 05 Jan 2023 07:53  Patient On (Oxygen Delivery Method): room air        REVIEW OF SYMPTOMS  Neurological deficits--cognitive deficits        PHYSICAL EXAM  Mental Status - Patient is awake, oriented X3.   Speech is fluent, able to  name and repeat. following all commands.   Attention --Impaired-- unable to state Days of  week backward or serial 7's  Recall: impaired-- 1/3 spontaneously after few Min.  2/3 with cat. cues.     Mood and affect  normal.    Cranial Nerves - PERRL--sluggish bilat;  EOMI, Visual fields impaired on right; V1-V3 intact, no gross facial asymmetry, no dysarthria, no dysphagia,  Motor Exam -   Right upper 5/5,   Right lower 5/5  Left upper   5/5,   Left lower  5/5    Normal muscle bulk/tone  Sensory    Intact to light touch and pinprick bilaterally.   Proprioception: intact bilat.   Coord: FTN--slight impairment on left & HTS intact bilaterally   No tremors  Tone: normal                                              GENERAL Exam:     Nontoxic , No Acute Distress 	  HEENT:  normocephalic, atraumatic--no cranial tenderness on palpation. 	  LUNGS:	breathing comfortably on RA  HEART:	warm, well perfused   	  GI/ ABDOMEN:  Soft  Non tender  EXTREMITIES:   No Edema , no calf tenderness,    MSK: No Joint pain or bruising,  no spinal or hip tenderness or bruising.  Full ROM --non-painful.      RECENT LABS                        15.0   10.96 )-----------( 182      ( 05 Jan 2023 06:30 )             46.4     01-05    139  |  105  |  17  ----------------------------<  131<H>  4.7   |  29  |  1.25    Ca    8.9      05 Jan 2023 06:30    TPro  6.2  /  Alb  2.7<L>  /  TBili  0.4  /  DBili  x   /  AST  28  /  ALT  60<H>  /  AlkPhos  60  01-05            RADIOLOGY/OTHER RESULTS      MEDICATIONS  (STANDING):  bisacodyl 5 milliGRAM(s) Oral every 12 hours  dexAMETHasone     Tablet 4 milliGRAM(s) Oral every 12 hours  enoxaparin Injectable 40 milliGRAM(s) SubCutaneous <User Schedule>  lacosamide Solution 100 milliGRAM(s) Oral two times a day  levETIRAcetam  Solution 1000 milliGRAM(s) Oral two times a day  melatonin 9 milliGRAM(s) Oral at bedtime  mirtazapine 7.5 milliGRAM(s) Oral at bedtime  pantoprazole    Tablet 40 milliGRAM(s) Oral before breakfast  rOPINIRole 0.5 milliGRAM(s) Oral <User Schedule>  senna 2 Tablet(s) Oral at bedtime  trimethoprim  160 mG/sulfamethoxazole 800 mG 1 Tablet(s) Oral two times a day    MEDICATIONS  (PRN):  acetaminophen     Tablet .. 650 milliGRAM(s) Oral every 6 hours PRN Temp greater or equal to 38C (100.4F), Mild Pain (1 - 3)  bisacodyl Suppository 10 milliGRAM(s) Rectal daily PRN Constipation

## 2023-01-05 NOTE — PROGRESS NOTE ADULT - ASSESSMENT
62 year old male with PMH of GBM s/p resection in 10/22 on Keppra 750 bid followed by Dr. Turpin who presented to the ED at [facility] on 12/21/22 for change in mental status. Patient with seizures, intubated for airway protection and admitted to NSICU.  Patient had MRI brain and MR SPECT on 12/22, which showed suspicious for progressive neoplasm in the left parietal postoperative bed compared with 10/5/2022 with MR spectroscopy. . Patient was found to be positive for COVID upon admission and isolated per protocol. Hospital course was also complicated by agitation, which resolved with standing haldol. He was evaluated for admission to acute inpatient rehab and admitted to [facility] on 12/30/22 with cognitive deficits, gait and ADL impairment.     #GBM with seizures now with Gait Instability, ADL impairments and Functional impairments  - Cont Comprehensive Rehab Program of PT/OT/SLP  -no plan for radiation or chemo therapy during the period of acute rehab, but will need follow up after discharge from rehab  -Continue Dexamethasone 4mg BID  -Continue lacosamide 100mg BID  -Continue Keppra 100mg BID    Witnessed fall 1/5  --No injury,  no w/u studies needed  --Called pt's friend and HCP, Johnnie to inform, but no answer-- left VM to call back.      #Insomnia  --Cont.  Remeron 7.5mg qhs to target sleep as improved sleep will help improve mood.    -Cont. Scheduled Requip 0.5mg  in evening 8pm      UTI--  --+u/a--on bactrim--to be Completed 1/9/23.   --urine Culture: + Klebsiella--Sensitive to Bactrim    #Covid  -Noted on 12/21   -Repeat negative on 12/28 and 12/29  -No treatment administered as he was asymptomatic     #Pain control  - Tylenol PRN    #GI/Bowel Mgmt   - Continue Senna at bedtime   -Continue bisacodyl 5mg BID  - Miralax PRN    #Bladder management  - voiding with low PVRs    #DVT ppx  - Lovenox      FEN   - Diet - Regular + Thins  [CCHO, DASH/TLC]    - Dysphagia  SLP - evaluation and treatment    Precautions / PROPHYLAXIS:   - Falls, Seizure     IDT 1/5/23:  SW: Lives with mother in  with 3 SHAQ,  ? had aide  Speech: Reg/thin diet; Severe cognitive deficits-- Poor insight, decreased attention, working memory.  Min word retrieval  OT: Setup eating; CG ADLs and transfers,  Poor safety awareness  PT: CG transfers, ambulation 100ft RW, and 12 steps.  right VF deficit, poor insight and safety  Goals: 1. Ambulate to bathroom and toileting with supervision.  2. Consistent call bell and safety strategies with supervision  ELOS: 1/12/2023.

## 2023-01-06 PROCEDURE — 99232 SBSQ HOSP IP/OBS MODERATE 35: CPT

## 2023-01-06 RX ORDER — ROPINIROLE 8 MG/1
0.75 TABLET, FILM COATED, EXTENDED RELEASE ORAL
Refills: 0 | Status: DISCONTINUED | OUTPATIENT
Start: 2023-01-06 | End: 2023-01-18

## 2023-01-06 RX ORDER — OLANZAPINE 15 MG/1
2.5 TABLET, FILM COATED ORAL
Refills: 0 | Status: DISCONTINUED | OUTPATIENT
Start: 2023-01-06 | End: 2023-01-18

## 2023-01-06 RX ORDER — OLANZAPINE 15 MG/1
5 TABLET, FILM COATED ORAL AT BEDTIME
Refills: 0 | Status: DISCONTINUED | OUTPATIENT
Start: 2023-01-06 | End: 2023-01-18

## 2023-01-06 RX ADMIN — LEVETIRACETAM 1000 MILLIGRAM(S): 250 TABLET, FILM COATED ORAL at 17:52

## 2023-01-06 RX ADMIN — LACOSAMIDE 100 MILLIGRAM(S): 50 TABLET ORAL at 17:53

## 2023-01-06 RX ADMIN — Medication 4 MILLIGRAM(S): at 06:01

## 2023-01-06 RX ADMIN — PANTOPRAZOLE SODIUM 40 MILLIGRAM(S): 20 TABLET, DELAYED RELEASE ORAL at 06:01

## 2023-01-06 RX ADMIN — LEVETIRACETAM 1000 MILLIGRAM(S): 250 TABLET, FILM COATED ORAL at 06:01

## 2023-01-06 RX ADMIN — OLANZAPINE 5 MILLIGRAM(S): 15 TABLET, FILM COATED ORAL at 21:34

## 2023-01-06 RX ADMIN — Medication 4 MILLIGRAM(S): at 17:53

## 2023-01-06 RX ADMIN — MIRTAZAPINE 7.5 MILLIGRAM(S): 45 TABLET, ORALLY DISINTEGRATING ORAL at 21:35

## 2023-01-06 RX ADMIN — Medication 9 MILLIGRAM(S): at 21:34

## 2023-01-06 RX ADMIN — Medication 1 TABLET(S): at 06:00

## 2023-01-06 RX ADMIN — Medication 5 MILLIGRAM(S): at 06:00

## 2023-01-06 RX ADMIN — ENOXAPARIN SODIUM 40 MILLIGRAM(S): 100 INJECTION SUBCUTANEOUS at 17:53

## 2023-01-06 RX ADMIN — ROPINIROLE 0.75 MILLIGRAM(S): 8 TABLET, FILM COATED, EXTENDED RELEASE ORAL at 19:55

## 2023-01-06 RX ADMIN — LACOSAMIDE 100 MILLIGRAM(S): 50 TABLET ORAL at 06:07

## 2023-01-06 RX ADMIN — Medication 5 MILLIGRAM(S): at 17:52

## 2023-01-06 RX ADMIN — Medication 1 TABLET(S): at 17:52

## 2023-01-06 NOTE — PROGRESS NOTE ADULT - SUBJECTIVE AND OBJECTIVE BOX
HPI:  62 year old male with PMH of GBM s/p resection in 10/22 on Keppra 750 bid followed by Dr. Turpin [facility] in [facility]. Further history provided by HCP friend at bedside. Per HCP, she spoke to pt on the phone at 1230 on day of admit and he was not making sense. While en route to the hospital had witnessed RUE twitching and rigid lasting around 2 min.  Pt given decadron 8mg PO by HCP as per radiooncology PA recommendations over the phone en route, in ED found to have witnessed seizure, intubated for airway protection and admitted to NSICU. Patient was on VEEG and AEDs were optimized in NSCU. Patient had MRI brain and MR SPECT on 12/22, which showed suspicious for progressive neoplasm in the left parietal postoperative bed compared with 10/5/2022 with MR spectroscopy. Tiny focus of neoplasm also likely in the left brainstem, unchanged. Patient was extubated on 12/22. Patient was found to be positive for COVID upon admission and isolated per protocol. Patient has been asymptomatic during this admission and repeat COVID tests on 12/28 and 12/29 were negative. Hospital course was also complicated by agitation, which resolved with standing haldol. He was evaluated for admission to acute inpatient rehab and admitted to [facility] on 12/29/22.    (30 Dec 2022 13:10)          Subjective:  Seen during PT-- pt. participating.  Slept well overnight as per nursing.   Pt. reports not sleeping due to Restless leg.        VITALS  Vital Signs Last 24 Hrs  T(C): 36.3 (05 Jan 2023 19:45), Max: 36.3 (05 Jan 2023 19:45)  T(F): 97.3 (05 Jan 2023 19:45), Max: 97.3 (05 Jan 2023 19:45)  HR: 87 (05 Jan 2023 19:45) (87 - 87)  BP: 107/74 (05 Jan 2023 19:45) (107/74 - 107/74)  BP(mean): --  RR: 15 (05 Jan 2023 19:45) (15 - 15)  SpO2: 94% (05 Jan 2023 19:45) (94% - 94%)    Parameters below as of 05 Jan 2023 19:45  Patient On (Oxygen Delivery Method): room air        REVIEW OF SYMPTOMS  Neurological deficits-cognitive deficits        PHYSICAL EXAM  Mental Status - Patient is awake, oriented X3.   Speech is fluent, able to  name and repeat. following all commands.   Attention --Impaired-- unable to state Days of  week backward or serial 7's  Recall: impaired-- 1/3 spontaneously after few Min.  2/3 with cat. cues.     Mood and affect  normal.    Cranial Nerves - PERRL--sluggish bilat;  EOMI, Visual fields impaired on right; V1-V3 intact, no gross facial asymmetry, no dysarthria, no dysphagia,  Motor Exam -   Right upper 5/5,   Right lower 5/5  Left upper   5/5,   Left lower  5/5    Normal muscle bulk/tone  Sensory    Intact to light touch and pinprick bilaterally.   Proprioception: intact bilat.   Coord: FTN--slight impairment on left & HTS intact bilaterally   No tremors  Tone: normal                                              GENERAL Exam:     Nontoxic , No Acute Distress 	  HEENT:  normocephalic, atraumatic--no cranial tenderness on palpation. 	  LUNGS:	breathing comfortably on RA  HEART:	warm, well perfused   	  GI/ ABDOMEN:  Soft  Non tender  EXTREMITIES:   No Edema , no calf tenderness,    MSK: No Joint pain or bruising,  no spinal or hip tenderness or bruising.  Full ROM --non-painful.      RECENT LABS                        15.0   10.96 )-----------( 182      ( 05 Jan 2023 06:30 )             46.4     01-05    139  |  105  |  17  ----------------------------<  131<H>  4.7   |  29  |  1.25    Ca    8.9      05 Jan 2023 06:30    TPro  6.2  /  Alb  2.7<L>  /  TBili  0.4  /  DBili  x   /  AST  28  /  ALT  60<H>  /  AlkPhos  60  01-05            RADIOLOGY/OTHER RESULTS      MEDICATIONS  (STANDING):  bisacodyl 5 milliGRAM(s) Oral every 12 hours  dexAMETHasone     Tablet 4 milliGRAM(s) Oral every 12 hours  enoxaparin Injectable 40 milliGRAM(s) SubCutaneous <User Schedule>  lacosamide Solution 100 milliGRAM(s) Oral two times a day  levETIRAcetam  Solution 1000 milliGRAM(s) Oral two times a day  melatonin 9 milliGRAM(s) Oral at bedtime  mirtazapine 7.5 milliGRAM(s) Oral at bedtime  pantoprazole    Tablet 40 milliGRAM(s) Oral before breakfast  rOPINIRole 0.75 milliGRAM(s) Oral <User Schedule>  senna 2 Tablet(s) Oral at bedtime  trimethoprim  160 mG/sulfamethoxazole 800 mG 1 Tablet(s) Oral two times a day    MEDICATIONS  (PRN):  acetaminophen     Tablet .. 650 milliGRAM(s) Oral every 6 hours PRN Temp greater or equal to 38C (100.4F), Mild Pain (1 - 3)  bisacodyl Suppository 10 milliGRAM(s) Rectal daily PRN Constipation

## 2023-01-06 NOTE — BH CONSULTATION LIAISON ASSESSMENT NOTE - CURRENT MEDICATION
MEDICATIONS  (STANDING):  bisacodyl 5 milliGRAM(s) Oral every 12 hours  dexAMETHasone     Tablet 4 milliGRAM(s) Oral every 12 hours  enoxaparin Injectable 40 milliGRAM(s) SubCutaneous <User Schedule>  lacosamide Solution 100 milliGRAM(s) Oral two times a day  levETIRAcetam  Solution 1000 milliGRAM(s) Oral two times a day  melatonin 9 milliGRAM(s) Oral at bedtime  mirtazapine 7.5 milliGRAM(s) Oral at bedtime  OLANZapine 5 milliGRAM(s) Oral at bedtime  pantoprazole    Tablet 40 milliGRAM(s) Oral before breakfast  rOPINIRole 0.75 milliGRAM(s) Oral <User Schedule>  senna 2 Tablet(s) Oral at bedtime  trimethoprim  160 mG/sulfamethoxazole 800 mG 1 Tablet(s) Oral two times a day    MEDICATIONS  (PRN):  acetaminophen     Tablet .. 650 milliGRAM(s) Oral every 6 hours PRN Temp greater or equal to 38C (100.4F), Mild Pain (1 - 3)  bisacodyl Suppository 10 milliGRAM(s) Rectal daily PRN Constipation

## 2023-01-06 NOTE — BH CONSULTATION LIAISON ASSESSMENT NOTE - SUMMARY
62 year old male with PMH of GBM s/p resection in 10/22 on Keppra 750 bid followed by Dr. [facility]. Further history provided by HCP friend at bedside. Per HCP, she spoke to pt on the phone at 1230 on day of admit and he was not making sense. While en route to the hospital had witnessed RUE twitching and rigid lasting around 2 min.  Pt given decadron 8mg PO by HCP as per radiooncology PA recommendations over the phone en route, in ED found to have witnessed seizure, intubated for airway protection and admitted to NSICU. Patient was on VEEG and AEDs were optimized in NSCU. Patient had MRI brain and MR SPECT on 12/22, which showed suspicious for progressive neoplasm in the left parietal postoperative bed compared with 10/5/2022 with MR spectroscopy. Tiny focus of neoplasm also likely in the left brainstem, unchanged. Patient was extubated on 12/22. Patient was found to be positive for COVID upon admission and isolated per protocol. Patient has been asymptomatic during this admission and repeat COVID tests on 12/28 and 12/29 were negative. Hospital course was also complicated by agitation, which resolved with standing haldol. He was evaluated for admission to acute inpatient rehab and admitted to [facility] on 12/29/22. psychiatry asked to evaluate for agitation, capacity to make decision re D/C .patient with no prior psychiatric history has been taking care of his mother until got sick ,presented with mood instability and poor insight and judgment. He is unstable with his balance still ,but thinks that has no problem with it ,very unsafe.  In my opinion he is not capable making rational decision re his care , Discussed start zyprexa ,as a mood stabilizer and help with sleep . if gets agitated may have 2,5 BID PRN

## 2023-01-06 NOTE — BH CONSULTATION LIAISON ASSESSMENT NOTE - NSBHCHARTREVIEWVS_PSY_A_CORE FT
Vital Signs Last 24 Hrs  T(C): 37 (06 Jan 2023 17:57), Max: 37 (06 Jan 2023 17:57)  T(F): 98.6 (06 Jan 2023 17:57), Max: 98.6 (06 Jan 2023 17:57)  HR: 80 (06 Jan 2023 17:57) (80 - 87)  BP: 121/80 (06 Jan 2023 17:57) (107/74 - 121/80)  BP(mean): --  RR: 15 (06 Jan 2023 17:57) (15 - 15)  SpO2: 94% (06 Jan 2023 17:57) (94% - 94%)    Parameters below as of 06 Jan 2023 17:57  Patient On (Oxygen Delivery Method): room air

## 2023-01-06 NOTE — BH CONSULTATION LIAISON ASSESSMENT NOTE - NSBHCONSULTMEDNEW_PSY_A_CORE
Informed the patient, per Dr. De Luna, that her labs were normal. She had no further questions.  
yes

## 2023-01-06 NOTE — BH CONSULTATION LIAISON ASSESSMENT NOTE - PATIENT'S CHIEF COMPLAINT
I did not sleep last 3 nights, I am very tired, I would be better home, my aid can take care of me, I am stable, I can have better therapy at home;

## 2023-01-06 NOTE — PROGRESS NOTE ADULT - ASSESSMENT
62 year old male with PMH of GBM s/p resection in 10/22 on Keppra 750 bid followed by Dr. Turpin who presented to the ED at [facility] on 12/21/22 for change in mental status. Patient with seizures, intubated for airway protection and admitted to NSICU.  Patient had MRI brain and MR SPECT on 12/22, which showed suspicious for progressive neoplasm in the left parietal postoperative bed compared with 10/5/2022 with MR spectroscopy. . Patient was found to be positive for COVID upon admission and isolated per protocol. Hospital course was also complicated by agitation, which resolved with standing haldol. He was evaluated for admission to acute inpatient rehab and admitted to [facility] on 12/30/22 with cognitive deficits, gait and ADL impairment.     #GBM with seizures now with Gait Instability, ADL impairments and Functional impairments  - Cont Comprehensive Rehab Program of PT/OT/SLP  -no plan for radiation or chemo therapy during the period of acute rehab, but will need follow up after discharge from rehab  -Continue Dexamethasone 4mg BID  -Continue lacosamide 100mg BID  -Continue Keppra 100mg BID    Witnessed fall 1/5  --No injury,  no w/u studies needed  --Called pt's friend and HCP, Johnnie to inform, but no answer-- left VM to call back.      #Insomnia  --Cont.  Remeron 7.5mg qhs to target sleep as improved sleep will help improve mood.    -increased Scheduled Requip 0.75mg  in evening 8pm      UTI--  --+u/a--on bactrim--to be Completed 1/9/23.   --urine Culture: + Klebsiella--Sensitive to Bactrim    #Covid  -Noted on 12/21   -Repeat negative on 12/28 and 12/29  -No treatment administered as he was asymptomatic     #Pain control  - Tylenol PRN    #GI/Bowel Mgmt   - Continue Senna at bedtime   -Continue bisacodyl 5mg BID  - Miralax PRN    #Bladder management  - voiding independently dc'd PVR    #DVT ppx  - Lovenox      FEN   - Diet - Regular + Thins  [CCHO, DASH/TLC]    - Dysphagia  SLP - evaluation and treatment    Precautions / PROPHYLAXIS:   - Falls, Seizure     IDT 1/5/23:  SW: Lives with mother in  with 3 SHAQ,  Mother has a HHA for her care  Speech: Reg/thin diet; Severe cognitive deficits-- Poor insight, decreased attention, working memory.  Min word retrieval  OT: Setup eating; CG ADLs and transfers,  Poor safety awareness  PT: CG transfers, ambulation 100ft RW, and 12 steps.  right VF deficit, poor insight and safety  Goals: 1. Ambulate to bathroom and toileting with supervision.  2. Consistent call bell and safety strategies with supervision  ELOS: 1/12/2023.      62 year old male with PMH of GBM s/p resection in 10/22 on Keppra 750 bid followed by Dr. Turpin who presented to the ED at [facility] on 12/21/22 for change in mental status. Patient with seizures, intubated for airway protection and admitted to NSICU.  Patient had MRI brain and MR SPECT on 12/22, which showed suspicious for progressive neoplasm in the left parietal postoperative bed compared with 10/5/2022 with MR spectroscopy. . Patient was found to be positive for COVID upon admission and isolated per protocol. Hospital course was also complicated by agitation, which resolved with standing haldol. He was evaluated for admission to acute inpatient rehab and admitted to [facility] on 12/30/22 with cognitive deficits, gait and ADL impairment.     #GBM with seizures now with Gait Instability, ADL impairments and Functional impairments  - Cont Comprehensive Rehab Program of PT/OT/SLP  -no plan for radiation or chemo therapy during the period of acute rehab, but will need follow up after discharge from rehab  -Continue Dexamethasone 4mg BID  -Continue lacosamide 100mg BID  -Continue Keppra 100mg BID    Witnessed fall 1/5  --No injury,  no w/u studies needed  --Called pt's friend and HCP, Johnnie to inform, but no answer-- left VM to call back.      #Insomnia  --Cont.  Remeron 7.5mg qhs to target sleep as improved sleep will help improve mood.    -increased Scheduled Requip 0.75mg  in evening 8pm      UTI--  --+u/a--on bactrim--to be Completed 1/9/23.   --urine Culture: + Klebsiella--Sensitive to Bactrim    agitation--  -- Perseverating more today on going home-- mentioned signing out AMA,   --Psychiatry consulted-- spoke with Dr. De Los Santos regarding pt--  ---Psych notes RLS SE of haldol.   Rec. adding Zyprexa 5mg qhs for pt.    --cont. remeron 7.5mg and Requip    #Covid  -Noted on 12/21   -Repeat negative on 12/28 and 12/29  -No treatment administered as he was asymptomatic     #Pain control  - Tylenol PRN    #GI/Bowel Mgmt   - Continue Senna at bedtime   -Continue bisacodyl 5mg BID  - Miralax PRN    #Bladder management  - voiding independently dc'd PVR    #DVT ppx  - Lovenox      FEN   - Diet - Regular + Thins  [CCHO, DASH/TLC]    - Dysphagia  SLP - evaluation and treatment    Precautions / PROPHYLAXIS:   - Falls, Seizure     IDT 1/5/23:  SW: Lives with mother in  with 3 SHAQ,  Mother has a HHA for her care  Speech: Reg/thin diet; Severe cognitive deficits-- Poor insight, decreased attention, working memory.  Min word retrieval  OT: Setup eating; CG ADLs and transfers,  Poor safety awareness  PT: CG transfers, ambulation 100ft RW, and 12 steps.  right VF deficit, poor insight and safety  Goals: 1. Ambulate to bathroom and toileting with supervision.  2. Consistent call bell and safety strategies with supervision  ELOS: 1/12/2023.

## 2023-01-07 PROCEDURE — 99231 SBSQ HOSP IP/OBS SF/LOW 25: CPT

## 2023-01-07 RX ADMIN — LEVETIRACETAM 1000 MILLIGRAM(S): 250 TABLET, FILM COATED ORAL at 05:26

## 2023-01-07 RX ADMIN — PANTOPRAZOLE SODIUM 40 MILLIGRAM(S): 20 TABLET, DELAYED RELEASE ORAL at 05:26

## 2023-01-07 RX ADMIN — LACOSAMIDE 100 MILLIGRAM(S): 50 TABLET ORAL at 05:26

## 2023-01-07 RX ADMIN — MIRTAZAPINE 7.5 MILLIGRAM(S): 45 TABLET, ORALLY DISINTEGRATING ORAL at 22:28

## 2023-01-07 RX ADMIN — Medication 9 MILLIGRAM(S): at 22:26

## 2023-01-07 RX ADMIN — Medication 1 TABLET(S): at 17:45

## 2023-01-07 RX ADMIN — ROPINIROLE 0.75 MILLIGRAM(S): 8 TABLET, FILM COATED, EXTENDED RELEASE ORAL at 20:08

## 2023-01-07 RX ADMIN — Medication 1 TABLET(S): at 05:26

## 2023-01-07 RX ADMIN — OLANZAPINE 5 MILLIGRAM(S): 15 TABLET, FILM COATED ORAL at 22:28

## 2023-01-07 RX ADMIN — Medication 4 MILLIGRAM(S): at 05:26

## 2023-01-07 RX ADMIN — Medication 4 MILLIGRAM(S): at 17:45

## 2023-01-07 RX ADMIN — ENOXAPARIN SODIUM 40 MILLIGRAM(S): 100 INJECTION SUBCUTANEOUS at 17:45

## 2023-01-07 RX ADMIN — Medication 5 MILLIGRAM(S): at 17:45

## 2023-01-07 RX ADMIN — SENNA PLUS 2 TABLET(S): 8.6 TABLET ORAL at 22:27

## 2023-01-07 RX ADMIN — LEVETIRACETAM 1000 MILLIGRAM(S): 250 TABLET, FILM COATED ORAL at 17:45

## 2023-01-07 RX ADMIN — LACOSAMIDE 100 MILLIGRAM(S): 50 TABLET ORAL at 17:45

## 2023-01-07 NOTE — PROGRESS NOTE ADULT - SUBJECTIVE AND OBJECTIVE BOX
HPI:  62 year old male with PMH of GBM s/p resection in 10/22 on Keppra 750 bid followed by Dr[facility]. Further history provided by HCP friend at bedside. Per HCP, she spoke to pt on the phone at 1230 on day of admit and he was not making sense. While en route to the hospital had witnessed RUE twitching and rigid lasting around 2 min.  Pt given decadron 8mg PO by HCP as per radiooncology PA recommendations over the phone en route, in ED found to have witnessed seizure, intubated for airway protection and admitted to NSICU. Patient was on VEEG and AEDs were optimized in NSCU. Patient had MRI brain and MR SPECT on 12/22, which showed suspicious for progressive neoplasm in the left parietal postoperative bed compared with 10/5/2022 with MR spectroscopy. Tiny focus of neoplasm also likely in the left brainstem, unchanged. Patient was extubated on 12/22. Patient was found to be positive for COVID upon admission and isolated per protocol. Patient has been asymptomatic during this admission and repeat COVID tests on 12/28 and 12/29 were negative. Hospital course was also complicated by agitation, which resolved with standing haldol. He was evaluated for admission to acute inpatient rehab and admitted to [facility] on 12/29/22.    (30 Dec 2022 13:10)    Subjective:  Seen during PT-- pt. participating.  Slept well overnight. Calm this am.  Right heel pain    ICU Vital Signs Last 24 Hrs  T(C): 36.3 (06 Jan 2023 19:57), Max: 37 (06 Jan 2023 17:57)  T(F): 97.4 (06 Jan 2023 19:57), Max: 98.6 (06 Jan 2023 17:57)  HR: 78 (06 Jan 2023 19:57) (78 - 80)  BP: 105/70 (06 Jan 2023 19:57) (105/70 - 121/80)  RR: 15 (06 Jan 2023 19:57) (15 - 15)  SpO2: 95% (06 Jan 2023 19:57) (94% - 95%)    REVIEW OF SYMPTOMS  Neurological deficits-cognitive deficits    PHYSICAL EXAM  Mental Status - Patient is awake, oriented X3.   Speech is fluent, able to  name and repeat. following all commands.   Attention --Impaired-- unable to state Days of  week backward or serial 7's  Recall: impaired-- 1/3 spontaneously after few Min.  2/3 with cat. cues.     Mood and affect  normal.    Cranial Nerves - PERRL--sluggish bilat;  EOMI, Visual fields impaired on right; V1-V3 intact, no gross facial asymmetry, no dysarthria, no dysphagia,  Motor Exam -   Right upper 5/5,   Right lower 5/5  Left upper   5/5,   Left lower  5/5    Normal muscle bulk/tone  Sensory    Intact to light touch and pinprick bilaterally.   Proprioception: intact bilat.   Coord: FTN--slight impairment on left & HTS intact bilaterally   No tremors  Tone: normal                                              GENERAL Exam:     Nontoxic , No Acute Distress 	  HEENT:  normocephalic, atraumatic--no cranial tenderness on palpation. 	  LUNGS:	breathing comfortably on RA  HEART:	warm, well perfused   	  GI/ ABDOMEN:  Soft  Non tender  EXTREMITIES:   No Edema , no calf tenderness,  Right heel skin is intact  MSK: No Joint pain or bruising,  no spinal or hip tenderness or bruising.  Full ROM --non-painful.      RECENT LABS                        15.0   10.96 )-----------( 182      ( 05 Jan 2023 06:30 )             46.4     01-05    139  |  105  |  17  ----------------------------<  131<H>  4.7   |  29  |  1.25    Ca    8.9      05 Jan 2023 06:30    TPro  6.2  /  Alb  2.7<L>  /  TBili  0.4  /  DBili  x   /  AST  28  /  ALT  60<H>  /  AlkPhos  60  01-05    RADIOLOGY/OTHER RESULTS  MEDICATIONS  (STANDING):  bisacodyl 5 milliGRAM(s) Oral every 12 hours  dexAMETHasone     Tablet 4 milliGRAM(s) Oral every 12 hours  enoxaparin Injectable 40 milliGRAM(s) SubCutaneous <User Schedule>  lacosamide Solution 100 milliGRAM(s) Oral two times a day  levETIRAcetam  Solution 1000 milliGRAM(s) Oral two times a day  melatonin 9 milliGRAM(s) Oral at bedtime  mirtazapine 7.5 milliGRAM(s) Oral at bedtime  pantoprazole    Tablet 40 milliGRAM(s) Oral before breakfast  rOPINIRole 0.75 milliGRAM(s) Oral <User Schedule>  senna 2 Tablet(s) Oral at bedtime  trimethoprim  160 mG/sulfamethoxazole 800 mG 1 Tablet(s) Oral two times a day    MEDICATIONS  (PRN):  acetaminophen     Tablet .. 650 milliGRAM(s) Oral every 6 hours PRN Temp greater or equal to 38C (100.4F), Mild Pain (1 - 3)  bisacodyl Suppository 10 milliGRAM(s) Rectal daily PRN Constipation

## 2023-01-07 NOTE — PROGRESS NOTE ADULT - ASSESSMENT
62 year old male with PMH of GBM s/p resection in 10/22 on Keppra 750 bid followed by Dr. Turpin who presented to the ED at [facility] on 12/21/22 for change in mental status. Patient with seizures, intubated for airway protection and admitted to NSICU.  Patient had MRI brain and MR SPECT on 12/22, which showed suspicious for progressive neoplasm in the left parietal postoperative bed compared with 10/5/2022 with MR spectroscopy. . Patient was found to be positive for COVID upon admission and isolated per protocol. Hospital course was also complicated by agitation, which resolved with standing haldol. He was evaluated for admission to acute inpatient rehab and admitted to [facility] on 12/30/22 with cognitive deficits, gait and ADL impairment.     #GBM with seizures now with Gait Instability, ADL impairments and Functional impairments  - Cont Comprehensive Rehab Program of PT/OT/SLP  -no plan for radiation or chemo therapy during the period of acute rehab, but will need follow up after discharge from rehab  -Continue Dexamethasone 4mg BID  -Continue lacosamide 100mg BID  -Continue Keppra 100mg BID    Witnessed fall 1/5  --No injury,  no w/u studies needed  --Called pt's friend and HCP, Johnnie to inform, but no answer-- left VM to call back.    --no complaint of pain this am    #Insomnia  --Cont.  Remeron 7.5mg qhs to target sleep as improved sleep will help improve mood.    -increased Scheduled Requip 0.75mg  in evening 8pm  --slept well last PM      UTI--  --+u/a--on bactrim--to be Completed 1/9/23.   --urine Culture: + Klebsiella--Sensitive to Bactrim    agitation--  -- Perseverating more today on going home-- mentioned signing out AMA,   --Psychiatry consulted-- spoke with Dr. De Los Santos regarding pt--  ---Psych notes RLS SE of haldol.   Rec. adding Zyprexa 5mg qhs for pt.    --cont. remeron 7.5mg and Requip  --not agitated this AM    #Covid  -Noted on 12/21   -Repeat negative on 12/28 and 12/29  -No treatment administered as he was asymptomatic     #Pain control  - Tylenol PRN    #GI/Bowel Mgmt   - Continue Senna at bedtime   -Continue bisacodyl 5mg BID  - Miralax PRN    #Bladder management  - voiding independently dc'd PVR    #DVT ppx  - Lovenox      FEN   - Diet - Regular + Thins  [CCHO, DASH/TLC]    - Dysphagia  SLP - evaluation and treatment    Precautions / PROPHYLAXIS:   - Falls, Seizure     right heel pain  Patient asked to move feet and pump ankles  Will monitor  May need to elevate heels off bed with pillow under calf.

## 2023-01-08 PROCEDURE — 99231 SBSQ HOSP IP/OBS SF/LOW 25: CPT

## 2023-01-08 RX ADMIN — Medication 5 MILLIGRAM(S): at 06:01

## 2023-01-08 RX ADMIN — OLANZAPINE 5 MILLIGRAM(S): 15 TABLET, FILM COATED ORAL at 21:44

## 2023-01-08 RX ADMIN — LACOSAMIDE 100 MILLIGRAM(S): 50 TABLET ORAL at 06:02

## 2023-01-08 RX ADMIN — Medication 4 MILLIGRAM(S): at 17:21

## 2023-01-08 RX ADMIN — MIRTAZAPINE 7.5 MILLIGRAM(S): 45 TABLET, ORALLY DISINTEGRATING ORAL at 21:43

## 2023-01-08 RX ADMIN — LACOSAMIDE 100 MILLIGRAM(S): 50 TABLET ORAL at 17:22

## 2023-01-08 RX ADMIN — ROPINIROLE 0.75 MILLIGRAM(S): 8 TABLET, FILM COATED, EXTENDED RELEASE ORAL at 20:04

## 2023-01-08 RX ADMIN — Medication 1 TABLET(S): at 06:01

## 2023-01-08 RX ADMIN — LEVETIRACETAM 1000 MILLIGRAM(S): 250 TABLET, FILM COATED ORAL at 06:01

## 2023-01-08 RX ADMIN — Medication 4 MILLIGRAM(S): at 06:01

## 2023-01-08 RX ADMIN — Medication 9 MILLIGRAM(S): at 21:44

## 2023-01-08 RX ADMIN — Medication 5 MILLIGRAM(S): at 17:21

## 2023-01-08 RX ADMIN — ENOXAPARIN SODIUM 40 MILLIGRAM(S): 100 INJECTION SUBCUTANEOUS at 17:22

## 2023-01-08 RX ADMIN — Medication 1 TABLET(S): at 17:22

## 2023-01-08 RX ADMIN — PANTOPRAZOLE SODIUM 40 MILLIGRAM(S): 20 TABLET, DELAYED RELEASE ORAL at 06:01

## 2023-01-08 RX ADMIN — LEVETIRACETAM 1000 MILLIGRAM(S): 250 TABLET, FILM COATED ORAL at 17:21

## 2023-01-08 NOTE — PROGRESS NOTE ADULT - ASSESSMENT
62 year old male with PMH of GBM s/p resection in 10/22 on Keppra 750 bid followed by Dr. Turpin who presented to the ED at [facility] on 12/21/22 for change in mental status. Patient with seizures, intubated for airway protection and admitted to NSICU.  Patient had MRI brain and MR SPECT on 12/22, which showed suspicious for progressive neoplasm in the left parietal postoperative bed compared with 10/5/2022 with MR spectroscopy. . Patient was found to be positive for COVID upon admission and isolated per protocol. Hospital course was also complicated by agitation, which resolved with standing haldol. He was evaluated for admission to acute inpatient rehab and admitted to [facility] on 12/30/22 with cognitive deficits, gait and ADL impairment.     #GBM with seizures now with Gait Instability, ADL impairments and Functional impairments  - Cont Comprehensive Rehab Program of PT/OT/SLP  -no plan for radiation or chemo therapy during the period of acute rehab, but will need follow up after discharge from rehab  -Continue Dexamethasone 4mg BID  -Continue lacosamide 100mg BID  -Continue Keppra 100mg BID    Witnessed fall 1/5  --No injury,  no w/u studies needed  --Called pt's friend and HCP, Johnnie to inform, but no answer-- left VM to call back.    --no complaint of pain this am    #Insomnia  --Cont.  Remeron 7.5mg qhs to target sleep as improved sleep will help improve mood.    -increased Scheduled Requip 0.75mg  in evening 8pm  --slept well last PM      UTI--  --+u/a--on bactrim--to be Completed 1/9/23.   --urine Culture: + Klebsiella--Sensitive to Bactrim    agitation--  -- Calm this am   --Psychiatry consulted-- spoke with Dr. De Los Santos regarding pt--  ---Psych notes RLS SE of haldol.   Rec. adding Zyprexa 5mg qhs for pt.    --cont. remeron 7.5mg and Requip  --not agitated this AM    #Covid  -Noted on 12/21   -Repeat negative on 12/28 and 12/29  -No treatment administered as he was asymptomatic     #Pain control  - Tylenol PRN    #GI/Bowel Mgmt   - Continue Senna at bedtime   -Continue bisacodyl 5mg BID  - Miralax PRN    #Bladder management  - voiding independently dc'd PVR    #DVT ppx  - Lovenox      FEN   - Diet - Regular + Thins  [CCHO, DASH/TLC]    - Dysphagia  SLP - evaluation and treatment    Precautions / PROPHYLAXIS:   - Falls, Seizure     right heel pain  no heel pain this am  Will monitor  May need to elevate heels off bed with pillow under calf.

## 2023-01-08 NOTE — PROGRESS NOTE ADULT - SUBJECTIVE AND OBJECTIVE BOX
HPI:  62 year old male with PMH of GBM s/p resection in 10/22 on Keppra 750 bid followed by Dr. Turpin [facility] in [facility] status. Further history provided by HCP friend at bedside. Per HCP, she spoke to pt on the phone at 1230 on day of admit and he was not making sense. While en route to the hospital had witnessed RUE twitching and rigid lasting around 2 min.  Pt given decadron 8mg PO by HCP as per radiooncology PA recommendations over the phone en route, in ED found to have witnessed seizure, intubated for airway protection and admitted to NSICU. Patient was on VEEG and AEDs were optimized in NSCU. Patient had MRI brain and MR SPECT on 12/22, which showed suspicious for progressive neoplasm in the left parietal postoperative bed compared with 10/5/2022 with MR spectroscopy. Tiny focus of neoplasm also likely in the left brainstem, unchanged. Patient was extubated on 12/22. Patient was found to be positive for COVID upon admission and isolated per protocol. Patient has been asymptomatic during this admission and repeat COVID tests on 12/28 and 12/29 were negative. Hospital course was also complicated by agitation, which resolved with standing haldol. He was evaluated for admission to acute inpatient rehab and admitted to [facility] on 12/29/22.    (30 Dec 2022 13:10)    Subjective:  Slept well overnight. Calm this am.  Denies right heel pain    T(C): 36.4 (07 Jan 2023 22:23), Max: 36.4 (07 Jan 2023 22:23)  T(F): 97.6 (07 Jan 2023 22:23), Max: 97.6 (07 Jan 2023 22:23)  HR: 75 (07 Jan 2023 22:23) (75 - 75)  BP: 100/67 (07 Jan 2023 22:23) (100/67 - 100/67)  RR: 15 (07 Jan 2023 22:23) (15 - 15)  SpO2: 95% (07 Jan 2023 22:23) (95% - 95%)    REVIEW OF SYMPTOMS  Neurological deficits-cognitive deficits    PHYSICAL EXAM  Mental Status - Patient is awake, oriented X3.   Speech is fluent, able to  name and repeat. following all commands.   Attention --Impaired-- unable to state Days of  week backward or serial 7's  Recall: impaired-- 1/3 spontaneously after few Min.  2/3 with cat. cues.     Mood and affect  normal.    Cranial Nerves - PERRL--sluggish bilat;  EOMI, Visual fields impaired on right; V1-V3 intact, no gross facial asymmetry, no dysarthria, no dysphagia,  Motor Exam -   Right upper 5/5,   Right lower 5/5  Left upper   5/5,   Left lower  5/5    Normal muscle bulk/tone  Sensory    Intact to light touch and pinprick bilaterally.   Proprioception: intact bilat.   Coord: FTN--slight impairment on left & HTS intact bilaterally   No tremors  Tone: normal                                          GENERAL Exam:     Nontoxic , No Acute Distress 	  HEENT:  normocephalic, atraumatic--no cranial tenderness on palpation. 	  LUNGS:	breathing comfortably on RA  HEART:	warm, well perfused   	  GI/ ABDOMEN:  Soft  Non tender  EXTREMITIES:   No Edema , no calf tenderness,  Right heel skin is intact  MSK: No Joint pain or bruising,  no spinal or hip tenderness or bruising.  Full ROM --non-painful.      RECENT LABS                        15.0   10.96 )-----------( 182      ( 05 Jan 2023 06:30 )             46.4     01-05    139  |  105  |  17  ----------------------------<  131<H>  4.7   |  29  |  1.25    Ca    8.9      05 Jan 2023 06:30    TPro  6.2  /  Alb  2.7<L>  /  TBili  0.4  /  DBili  x   /  AST  28  /  ALT  60<H>  /  AlkPhos  60  01-05    RADIOLOGY/OTHER RESULTS  MEDICATIONS  (STANDING):  bisacodyl 5 milliGRAM(s) Oral every 12 hours  dexAMETHasone     Tablet 4 milliGRAM(s) Oral every 12 hours  enoxaparin Injectable 40 milliGRAM(s) SubCutaneous <User Schedule>  lacosamide Solution 100 milliGRAM(s) Oral two times a day  levETIRAcetam  Solution 1000 milliGRAM(s) Oral two times a day  melatonin 9 milliGRAM(s) Oral at bedtime  mirtazapine 7.5 milliGRAM(s) Oral at bedtime  pantoprazole    Tablet 40 milliGRAM(s) Oral before breakfast  rOPINIRole 0.75 milliGRAM(s) Oral <User Schedule>  senna 2 Tablet(s) Oral at bedtime  trimethoprim  160 mG/sulfamethoxazole 800 mG 1 Tablet(s) Oral two times a day    MEDICATIONS  (PRN):  acetaminophen     Tablet .. 650 milliGRAM(s) Oral every 6 hours PRN Temp greater or equal to 38C (100.4F), Mild Pain (1 - 3)  bisacodyl Suppository 10 milliGRAM(s) Rectal daily PRN Constipation

## 2023-01-09 PROCEDURE — 99232 SBSQ HOSP IP/OBS MODERATE 35: CPT

## 2023-01-09 RX ORDER — LACOSAMIDE 50 MG/1
100 TABLET ORAL
Refills: 0 | Status: DISCONTINUED | OUTPATIENT
Start: 2023-01-09 | End: 2023-01-18

## 2023-01-09 RX ADMIN — LACOSAMIDE 100 MILLIGRAM(S): 50 TABLET ORAL at 06:14

## 2023-01-09 RX ADMIN — Medication 5 MILLIGRAM(S): at 06:15

## 2023-01-09 RX ADMIN — LEVETIRACETAM 1000 MILLIGRAM(S): 250 TABLET, FILM COATED ORAL at 18:04

## 2023-01-09 RX ADMIN — ENOXAPARIN SODIUM 40 MILLIGRAM(S): 100 INJECTION SUBCUTANEOUS at 18:05

## 2023-01-09 RX ADMIN — Medication 5 MILLIGRAM(S): at 18:05

## 2023-01-09 RX ADMIN — Medication 9 MILLIGRAM(S): at 21:17

## 2023-01-09 RX ADMIN — LACOSAMIDE 100 MILLIGRAM(S): 50 TABLET ORAL at 18:05

## 2023-01-09 RX ADMIN — PANTOPRAZOLE SODIUM 40 MILLIGRAM(S): 20 TABLET, DELAYED RELEASE ORAL at 06:15

## 2023-01-09 RX ADMIN — LEVETIRACETAM 1000 MILLIGRAM(S): 250 TABLET, FILM COATED ORAL at 06:15

## 2023-01-09 RX ADMIN — Medication 4 MILLIGRAM(S): at 18:05

## 2023-01-09 RX ADMIN — MIRTAZAPINE 7.5 MILLIGRAM(S): 45 TABLET, ORALLY DISINTEGRATING ORAL at 21:17

## 2023-01-09 RX ADMIN — Medication 4 MILLIGRAM(S): at 06:15

## 2023-01-09 RX ADMIN — Medication 1 TABLET(S): at 06:15

## 2023-01-09 RX ADMIN — OLANZAPINE 5 MILLIGRAM(S): 15 TABLET, FILM COATED ORAL at 21:17

## 2023-01-09 RX ADMIN — ROPINIROLE 0.75 MILLIGRAM(S): 8 TABLET, FILM COATED, EXTENDED RELEASE ORAL at 20:20

## 2023-01-09 NOTE — PROGRESS NOTE ADULT - SUBJECTIVE AND OBJECTIVE BOX
Patient is a 62y old  Male who presents with a chief complaint of GBM (08 Jan 2023 08:04)      HPI:  62 year old male with PMH of GBM s/p resection in 10/22 on Keppra 750 bid followed by Dr. [facility]pin [facility] in [facility]. Further history provided by HCP friend at bedside. Per HCP, she spoke to pt on the phone at 1230 on day of admit and he was not making sense. While en route to the hospital had witnessed RUE twitching and rigid lasting around 2 min.  Pt given decadron 8mg PO by HCP as per radiooncology PA recommendations over the phone en route, in ED found to have witnessed seizure, intubated for airway protection and admitted to NSICU. Patient was on VEEG and AEDs were optimized in NSCU. Patient had MRI brain and MR SPECT on 12/22, which showed suspicious for progressive neoplasm in the left parietal postoperative bed compared with 10/5/2022 with MR spectroscopy. Tiny focus of neoplasm also likely in the left brainstem, unchanged. Patient was extubated on 12/22. Patient was found to be positive for COVID upon admission and isolated per protocol. Patient has been asymptomatic during this admission and repeat COVID tests on 12/28 and 12/29 were negative. Hospital course was also complicated by agitation, which resolved with standing haldol. He was evaluated for admission to acute inpatient rehab and admitted to [facility] on 12/29/22.    (30 Dec 2022 13:10)    SUBJECTIVE HISTORY:  Patient seen and evaluated at bedside rounds.  Patient reports sleeping better but continues to have restless leg.  He is eager to go home.  Discussed with him the need for and arranging of 24hr home health aid.  He denies pain.      REVIEW OF SYMPTOMS  Neurological deficits-cognitive deficits      PHYSICAL EXAM  GENERAL Exam:     Nontoxic , No Acute Distress 	  HEENT:  normocephalic, atraumatic--no cranial tenderness on palpation. 	  LUNGS:	breathing comfortably on RA  HEART:	warm, well perfused   	  GI/ ABDOMEN:  Soft  Non tender  EXTREMITIES:   No Edema , no calf tenderness,    MSK: No Joint pain or bruising,  no spinal or hip tenderness or bruising.  Full ROM --non-painful.  Mental Status - Patient is awake, oriented X3.      VITALS  62y  Vital Signs Last 24 Hrs  T(C): 36.4 (08 Jan 2023 20:05), Max: 36.5 (08 Jan 2023 17:16)  T(F): 97.6 (08 Jan 2023 20:05), Max: 97.7 (08 Jan 2023 17:16)  HR: 74 (08 Jan 2023 20:05) (74 - 89)  BP: 103/71 (08 Jan 2023 20:05) (103/71 - 113/78)  BP(mean): --  RR: 15 (08 Jan 2023 20:05) (15 - 15)  SpO2: 94% (08 Jan 2023 20:05) (94% - 98%)    Parameters below as of 08 Jan 2023 20:05  Patient On (Oxygen Delivery Method): room air      Daily     Daily         RECENT LABS:                      CAPILLARY BLOOD GLUCOSE          MEDICATIONS  (STANDING):  bisacodyl 5 milliGRAM(s) Oral every 12 hours  dexAMETHasone     Tablet 4 milliGRAM(s) Oral every 12 hours  enoxaparin Injectable 40 milliGRAM(s) SubCutaneous <User Schedule>  lacosamide 100 milliGRAM(s) Oral two times a day  levETIRAcetam  Solution 1000 milliGRAM(s) Oral two times a day  melatonin 9 milliGRAM(s) Oral at bedtime  mirtazapine 7.5 milliGRAM(s) Oral at bedtime  OLANZapine 5 milliGRAM(s) Oral at bedtime  pantoprazole    Tablet 40 milliGRAM(s) Oral before breakfast  rOPINIRole 0.75 milliGRAM(s) Oral <User Schedule>  senna 2 Tablet(s) Oral at bedtime    MEDICATIONS  (PRN):  acetaminophen     Tablet .. 650 milliGRAM(s) Oral every 6 hours PRN Temp greater or equal to 38C (100.4F), Mild Pain (1 - 3)  bisacodyl Suppository 10 milliGRAM(s) Rectal daily PRN Constipation  OLANZapine 2.5 milliGRAM(s) Oral two times a day PRN agitation           Patient is a 62y old  Male who presents with a chief complaint of GBM (08 Jan 2023 08:04)      HPI:  62 year old male with PMH of GBM s/p resection in 10/22 on Keppra 750 bid followed by Dr. [facility]. Further history provided by HCP friend at bedside. Per HCP, she spoke to pt on the phone at 1230 on day of admit and he was not making sense. While en route to the hospital had witnessed RUE twitching and rigid lasting around 2 min.  Pt given decadron 8mg PO by HCP as per radiooncology PA recommendations over the phone en route, in ED found to have witnessed seizure, intubated for airway protection and admitted to NSICU. Patient was on VEEG and AEDs were optimized in NSCU. Patient had MRI brain and MR SPECT on 12/22, which showed suspicious for progressive neoplasm in the left parietal postoperative bed compared with 10/5/2022 with MR spectroscopy. Tiny focus of neoplasm also likely in the left brainstem, unchanged. Patient was extubated on 12/22. Patient was found to be positive for COVID upon admission and isolated per protocol. Patient has been asymptomatic during this admission and repeat COVID tests on 12/28 and 12/29 were negative. Hospital course was also complicated by agitation, which resolved with standing haldol. He was evaluated for admission to acute inpatient rehab and admitted to [facility] on 12/29/22.    (30 Dec 2022 13:10)    SUBJECTIVE HISTORY:  Patient seen and evaluated at bedside rounds.  Patient reports sleeping better but continues to have restless leg.  He is eager to go home.  Discussed with him the need for and arranging of 24hr home health aid.  He denies pain.      REVIEW OF SYMPTOMS  Neurological deficits-cognitive deficits      PHYSICAL EXAM  GENERAL Exam:     Nontoxic , No Acute Distress 	  HEENT:  normocephalic, atraumatic--no cranial tenderness on palpation. 	  LUNGS:	breathing comfortably on RA  HEART:	warm, well perfused   	  GI/ ABDOMEN:  Soft  Non tender  EXTREMITIES:   No Edema , no calf tenderness,    MSK: No Joint pain or bruising,  no spinal or hip tenderness or bruising.  Full ROM --non-painful.  Mental Status - Patient is awake, oriented X3.      VITALS  62y  Vital Signs Last 24 Hrs  T(C): 36.4 (08 Jan 2023 20:05), Max: 36.5 (08 Jan 2023 17:16)  T(F): 97.6 (08 Jan 2023 20:05), Max: 97.7 (08 Jan 2023 17:16)  HR: 74 (08 Jan 2023 20:05) (74 - 89)  BP: 103/71 (08 Jan 2023 20:05) (103/71 - 113/78)  RR: 15 (08 Jan 2023 20:05) (15 - 15)  SpO2: 94% (08 Jan 2023 20:05) (94% - 98%)        MEDICATIONS  (STANDING):  bisacodyl 5 milliGRAM(s) Oral every 12 hours  dexAMETHasone     Tablet 4 milliGRAM(s) Oral every 12 hours  enoxaparin Injectable 40 milliGRAM(s) SubCutaneous <User Schedule>  lacosamide 100 milliGRAM(s) Oral two times a day  levETIRAcetam  Solution 1000 milliGRAM(s) Oral two times a day  melatonin 9 milliGRAM(s) Oral at bedtime  mirtazapine 7.5 milliGRAM(s) Oral at bedtime  OLANZapine 5 milliGRAM(s) Oral at bedtime  pantoprazole    Tablet 40 milliGRAM(s) Oral before breakfast  rOPINIRole 0.75 milliGRAM(s) Oral <User Schedule>  senna 2 Tablet(s) Oral at bedtime    MEDICATIONS  (PRN):  acetaminophen     Tablet .. 650 milliGRAM(s) Oral every 6 hours PRN Temp greater or equal to 38C (100.4F), Mild Pain (1 - 3)  bisacodyl Suppository 10 milliGRAM(s) Rectal daily PRN Constipation  OLANZapine 2.5 milliGRAM(s) Oral two times a day PRN agitation

## 2023-01-09 NOTE — PROGRESS NOTE ADULT - ASSESSMENT
62 year old male with PMH of GBM s/p resection in 10/22 on Keppra 750 bid followed by Dr. Turpin who presented to the ED at [facility] on 12/21/22 for change in mental status. Patient with seizures, intubated for airway protection and admitted to NSICU.  Patient had MRI brain and MR SPECT on 12/22, which showed suspicious for progressive neoplasm in the left parietal postoperative bed compared with 10/5/2022 with MR spectroscopy. . Patient was found to be positive for COVID upon admission and isolated per protocol. Hospital course was also complicated by agitation, which resolved with standing haldol. He was evaluated for admission to acute inpatient rehab and admitted to [facility] on 12/30/22 with cognitive deficits, gait and ADL impairment.     #GBM with seizures now with Gait Instability, ADL impairments and Functional impairments  - Cont Comprehensive Rehab Program of PT/OT/SLP  -no plan for radiation or chemo therapy during the period of acute rehab, but will need follow up after discharge from rehab  -Continue Dexamethasone 4mg BID  -Continue lacosamide 100mg BID  -Continue Keppra 100mg BID    Witnessed fall 1/5  --No injury,  no w/u studies needed  --Called pt's friend and HCP, Johnnie to inform, but no answer-- left VM to call back.      #Insomnia  --Cont.  Remeron 7.5mg qhs to target sleep as improved sleep will help improve mood.    -increased Scheduled Requip 0.75mg  in evening 8pm      UTI--resolved  --+u/a--on bactrim--to be Completed 1/9/23.   --urine Culture: + Klebsiella--Sensitive to Bactrim    agitation--  -- Perseverating more today on going home-- mentioned signing out AMA,   --Psychiatry consulted-- spoke with Dr. De Los Santos regarding pt--  ---Psych notes RLS SE of haldol.   Rec. adding Zyprexa 5mg qhs for pt.    --cont. remeron 7.5mg and Requip    #Covid  -Noted on 12/21   -Repeat negative on 12/28 and 12/29  -No treatment administered as he was asymptomatic     #Pain control  - Tylenol PRN    #GI/Bowel Mgmt   - Continue Senna at bedtime   -Continue bisacodyl 5mg BID  - Miralax PRN    #Bladder management  - voiding independently     #DVT ppx  - Lovenox      FEN   - Diet - Regular + Thins  [CCHO, DASH/TLC]    - Dysphagia  SLP - evaluation and treatment    Precautions / PROPHYLAXIS:   - Falls, Seizure     IDT 1/5/23:  SW: Lives with mother in  with 3 SHAQ,  Mother has a HHA for her care  Speech: Reg/thin diet; Severe cognitive deficits-- Poor insight, decreased attention, working memory.  Min word retrieval  OT: Setup eating; CG ADLs and transfers,  Poor safety awareness  PT: CG transfers, ambulation 100ft RW, and 12 steps.  right VF deficit, poor insight and safety  Goals: 1. Ambulate to bathroom and toileting with supervision.  2. Consistent call bell and safety strategies with supervision  ELOS: 1/12/2023.

## 2023-01-10 ENCOUNTER — TRANSCRIPTION ENCOUNTER (OUTPATIENT)
Age: 63
End: 2023-01-10

## 2023-01-10 LAB
ALBUMIN SERPL ELPH-MCNC: 2.6 G/DL — LOW (ref 3.3–5)
ALP SERPL-CCNC: 55 U/L — SIGNIFICANT CHANGE UP (ref 40–120)
ALT FLD-CCNC: 47 U/L — HIGH (ref 10–45)
ANION GAP SERPL CALC-SCNC: 8 MMOL/L — SIGNIFICANT CHANGE UP (ref 5–17)
AST SERPL-CCNC: 15 U/L — SIGNIFICANT CHANGE UP (ref 10–40)
BILIRUB SERPL-MCNC: 0.6 MG/DL — SIGNIFICANT CHANGE UP (ref 0.2–1.2)
BUN SERPL-MCNC: 23 MG/DL — SIGNIFICANT CHANGE UP (ref 7–23)
CALCIUM SERPL-MCNC: 9 MG/DL — SIGNIFICANT CHANGE UP (ref 8.4–10.5)
CHLORIDE SERPL-SCNC: 105 MMOL/L — SIGNIFICANT CHANGE UP (ref 96–108)
CO2 SERPL-SCNC: 28 MMOL/L — SIGNIFICANT CHANGE UP (ref 22–31)
CREAT SERPL-MCNC: 0.9 MG/DL — SIGNIFICANT CHANGE UP (ref 0.5–1.3)
EGFR: 95 ML/MIN/1.73M2 — SIGNIFICANT CHANGE UP
GLUCOSE SERPL-MCNC: 111 MG/DL — HIGH (ref 70–99)
HCT VFR BLD CALC: 46.1 % — SIGNIFICANT CHANGE UP (ref 39–50)
HGB BLD-MCNC: 14.9 G/DL — SIGNIFICANT CHANGE UP (ref 13–17)
MCHC RBC-ENTMCNC: 28.4 PG — SIGNIFICANT CHANGE UP (ref 27–34)
MCHC RBC-ENTMCNC: 32.3 GM/DL — SIGNIFICANT CHANGE UP (ref 32–36)
MCV RBC AUTO: 88 FL — SIGNIFICANT CHANGE UP (ref 80–100)
NRBC # BLD: 0 /100 WBCS — SIGNIFICANT CHANGE UP (ref 0–0)
PLATELET # BLD AUTO: 181 K/UL — SIGNIFICANT CHANGE UP (ref 150–400)
POTASSIUM SERPL-MCNC: 4.3 MMOL/L — SIGNIFICANT CHANGE UP (ref 3.5–5.3)
POTASSIUM SERPL-SCNC: 4.3 MMOL/L — SIGNIFICANT CHANGE UP (ref 3.5–5.3)
PROT SERPL-MCNC: 5.7 G/DL — LOW (ref 6–8.3)
RBC # BLD: 5.24 M/UL — SIGNIFICANT CHANGE UP (ref 4.2–5.8)
RBC # FLD: 14.5 % — SIGNIFICANT CHANGE UP (ref 10.3–14.5)
SODIUM SERPL-SCNC: 141 MMOL/L — SIGNIFICANT CHANGE UP (ref 135–145)
WBC # BLD: 6.74 K/UL — SIGNIFICANT CHANGE UP (ref 3.8–10.5)
WBC # FLD AUTO: 6.74 K/UL — SIGNIFICANT CHANGE UP (ref 3.8–10.5)

## 2023-01-10 PROCEDURE — 99232 SBSQ HOSP IP/OBS MODERATE 35: CPT

## 2023-01-10 RX ORDER — MIRTAZAPINE 45 MG/1
1 TABLET, ORALLY DISINTEGRATING ORAL
Qty: 0 | Refills: 0 | DISCHARGE
Start: 2023-01-10

## 2023-01-10 RX ORDER — PANTOPRAZOLE SODIUM 20 MG/1
1 TABLET, DELAYED RELEASE ORAL
Qty: 0 | Refills: 0 | DISCHARGE
Start: 2023-01-10

## 2023-01-10 RX ORDER — DEXAMETHASONE 0.5 MG/5ML
1 ELIXIR ORAL
Qty: 60 | Refills: 0
Start: 2023-01-10 | End: 2023-02-08

## 2023-01-10 RX ORDER — MIRTAZAPINE 45 MG/1
1 TABLET, ORALLY DISINTEGRATING ORAL
Qty: 30 | Refills: 0
Start: 2023-01-10 | End: 2023-02-08

## 2023-01-10 RX ORDER — ACETAMINOPHEN 500 MG
2 TABLET ORAL
Qty: 0 | Refills: 0 | DISCHARGE
Start: 2023-01-10

## 2023-01-10 RX ORDER — OLANZAPINE 15 MG/1
1 TABLET, FILM COATED ORAL
Qty: 30 | Refills: 0
Start: 2023-01-10 | End: 2023-02-08

## 2023-01-10 RX ORDER — LEVETIRACETAM 250 MG/1
10 TABLET, FILM COATED ORAL
Qty: 600 | Refills: 0
Start: 2023-01-10 | End: 2023-02-08

## 2023-01-10 RX ORDER — DEXAMETHASONE 0.5 MG/5ML
1 ELIXIR ORAL
Qty: 0 | Refills: 0 | DISCHARGE
Start: 2023-01-10

## 2023-01-10 RX ORDER — PANTOPRAZOLE SODIUM 20 MG/1
1 TABLET, DELAYED RELEASE ORAL
Qty: 30 | Refills: 0
Start: 2023-01-10 | End: 2023-02-08

## 2023-01-10 RX ORDER — LANOLIN ALCOHOL/MO/W.PET/CERES
3 CREAM (GRAM) TOPICAL
Qty: 0 | Refills: 0 | DISCHARGE
Start: 2023-01-10

## 2023-01-10 RX ORDER — DEXAMETHASONE 0.5 MG/5ML
1 ELIXIR ORAL
Qty: 0 | Refills: 0 | DISCHARGE
Start: 2023-01-10 | End: 2023-02-08

## 2023-01-10 RX ORDER — LACOSAMIDE 50 MG/1
1 TABLET ORAL
Qty: 0 | Refills: 0 | DISCHARGE
Start: 2023-01-10

## 2023-01-10 RX ORDER — ROPINIROLE 8 MG/1
3 TABLET, FILM COATED, EXTENDED RELEASE ORAL
Qty: 90 | Refills: 0
Start: 2023-01-10 | End: 2023-02-08

## 2023-01-10 RX ORDER — LEVETIRACETAM 250 MG/1
10 TABLET, FILM COATED ORAL
Qty: 0 | Refills: 0 | DISCHARGE
Start: 2023-01-10

## 2023-01-10 RX ORDER — ROPINIROLE 8 MG/1
3 TABLET, FILM COATED, EXTENDED RELEASE ORAL
Qty: 0 | Refills: 0 | DISCHARGE
Start: 2023-01-10 | End: 2023-02-08

## 2023-01-10 RX ORDER — ROPINIROLE 8 MG/1
3 TABLET, FILM COATED, EXTENDED RELEASE ORAL
Qty: 0 | Refills: 0 | DISCHARGE
Start: 2023-01-10

## 2023-01-10 RX ORDER — PANTOPRAZOLE SODIUM 20 MG/1
1 TABLET, DELAYED RELEASE ORAL
Qty: 0 | Refills: 0 | DISCHARGE
Start: 2023-01-10 | End: 2023-02-08

## 2023-01-10 RX ORDER — SENNA PLUS 8.6 MG/1
2 TABLET ORAL
Qty: 0 | Refills: 0 | DISCHARGE
Start: 2023-01-10

## 2023-01-10 RX ORDER — LACOSAMIDE 50 MG/1
1 TABLET ORAL
Qty: 60 | Refills: 0
Start: 2023-01-10 | End: 2023-02-08

## 2023-01-10 RX ORDER — OLANZAPINE 15 MG/1
1 TABLET, FILM COATED ORAL
Qty: 0 | Refills: 0 | DISCHARGE
Start: 2023-01-10

## 2023-01-10 RX ADMIN — Medication 5 MILLIGRAM(S): at 17:48

## 2023-01-10 RX ADMIN — LACOSAMIDE 100 MILLIGRAM(S): 50 TABLET ORAL at 05:49

## 2023-01-10 RX ADMIN — Medication 5 MILLIGRAM(S): at 05:49

## 2023-01-10 RX ADMIN — LEVETIRACETAM 1000 MILLIGRAM(S): 250 TABLET, FILM COATED ORAL at 06:12

## 2023-01-10 RX ADMIN — Medication 4 MILLIGRAM(S): at 17:49

## 2023-01-10 RX ADMIN — Medication 9 MILLIGRAM(S): at 22:12

## 2023-01-10 RX ADMIN — OLANZAPINE 5 MILLIGRAM(S): 15 TABLET, FILM COATED ORAL at 22:12

## 2023-01-10 RX ADMIN — LACOSAMIDE 100 MILLIGRAM(S): 50 TABLET ORAL at 17:49

## 2023-01-10 RX ADMIN — MIRTAZAPINE 7.5 MILLIGRAM(S): 45 TABLET, ORALLY DISINTEGRATING ORAL at 22:12

## 2023-01-10 RX ADMIN — ENOXAPARIN SODIUM 40 MILLIGRAM(S): 100 INJECTION SUBCUTANEOUS at 17:49

## 2023-01-10 RX ADMIN — ROPINIROLE 0.75 MILLIGRAM(S): 8 TABLET, FILM COATED, EXTENDED RELEASE ORAL at 20:20

## 2023-01-10 RX ADMIN — Medication 4 MILLIGRAM(S): at 05:50

## 2023-01-10 RX ADMIN — PANTOPRAZOLE SODIUM 40 MILLIGRAM(S): 20 TABLET, DELAYED RELEASE ORAL at 05:50

## 2023-01-10 RX ADMIN — LEVETIRACETAM 1000 MILLIGRAM(S): 250 TABLET, FILM COATED ORAL at 17:49

## 2023-01-10 NOTE — DISCHARGE NOTE PROVIDER - NSDCFUSCHEDAPPT_GEN_ALL_CORE_FT
[facility]  MRI  [facility]  Scheduled Appointment: 01/17/2023    Debby Torres  [facility]  NEUROLOGY [address]  Scheduled Appointment: 01/23/2023    Bereket Ayala  [facility]  RADMED [address]  Scheduled Appointment: 02/02/2023     Debby Torres  [facility]  NEUROLOGY [address]  Scheduled Appointment: 01/23/2023    Bereket Ayala  [facility]  RADMED [address]  Scheduled Appointment: 02/02/2023

## 2023-01-10 NOTE — DISCHARGE NOTE PROVIDER - NSDCCPCAREPLAN_GEN_ALL_CORE_FT
PRINCIPAL DISCHARGE DIAGNOSIS  Diagnosis: GBM (glioblastoma multiforme)  Assessment and Plan of Treatment: Follow up oncology and neurosurgery in 1-2 weeks. Continue Decadron with GI prophylaxis. Continue Vimapt and keppra.      SECONDARY DISCHARGE DIAGNOSES  Diagnosis: Seizures  Assessment and Plan of Treatment: Continue Vimpat and Keppra. Follow up Neurooncology and neurology.    Diagnosis: Insomnia  Assessment and Plan of Treatment: On Remeron and melatonin. Zyprexa added by psychiatry at rehab. Follow up outpt.    Diagnosis: Restless legs  Assessment and Plan of Treatment: Requip continues.

## 2023-01-10 NOTE — DISCHARGE NOTE PROVIDER - PROVIDER TOKENS
PROVIDER:[TOKEN:[[id number]]],PROVIDER:[TOKEN:[[id number]]],PROVIDER:[TOKEN:[[id number]]],PROVIDER:[TOKEN:[[id number]]] PROVIDER:[TOKEN:[[id number]]],PROVIDER:[TOKEN:[[id number]]],PROVIDER:[TOKEN:[[id number]]],PROVIDER:[TOKEN:[[id number]],FOLLOWUP:[1 month]]

## 2023-01-10 NOTE — DISCHARGE NOTE PROVIDER - NPI NUMBER (FOR SYSADMIN USE ONLY) :
[[id number]],[[id number]] [[id number]],[[id number]],[[id number]],[[id number]]

## 2023-01-10 NOTE — DISCHARGE NOTE PROVIDER - NSDCMRMEDTOKEN_GEN_ALL_CORE_FT
acetaminophen 325 mg oral tablet: 2 tab(s) orally every 6 hours, As needed, Temp greater or equal to 38C (100.4F), Mild Pain (1 - 3)  dexamethasone 4 mg oral tablet: 1 tab(s) orally every 12 hours  lacosamide 100 mg oral tablet: 1 tab(s) orally 2 times a day  levETIRAcetam 100 mg/mL oral solution: 10 milliliter(s) orally 2 times a day  melatonin 3 mg oral tablet: 3 tab(s) orally once a day (at bedtime)  mirtazapine 7.5 mg oral tablet: 1 tab(s) orally once a day (at bedtime)  OLANZapine 5 mg oral tablet: 1 tab(s) orally once a day (at bedtime)  pantoprazole 40 mg oral delayed release tablet: 1 tab(s) orally once a day (before a meal)  polyethylene glycol 3350 oral powder for reconstitution: 17 gram(s) orally 2 times a day  rOPINIRole 0.25 mg oral tablet: 3 tab(s) orally   senna leaf extract oral tablet: 2 tab(s) orally once a day (at bedtime)   acetaminophen 325 mg oral tablet: 2 tab(s) orally every 6 hours, As needed, Temp greater or equal to 38C (100.4F), Mild Pain (1 - 3)  dexamethasone 4 mg oral tablet: 1 tab(s) orally every 12 hours  lacosamide 100 mg oral tablet: 1 tab(s) orally 2 times a day MDD:2 tabs  levETIRAcetam 100 mg/mL oral solution: 10 milliliter(s) orally 2 times a day  melatonin 3 mg oral tablet: 3 tab(s) orally once a day (at bedtime)  mirtazapine 7.5 mg oral tablet: 1 tab(s) orally once a day (at bedtime)  OLANZapine 5 mg oral tablet: 1 tab(s) orally once a day (at bedtime)  pantoprazole 40 mg oral delayed release tablet: 1 tab(s) orally once a day (before a meal)  polyethylene glycol 3350 oral powder for reconstitution: 17 gram(s) orally 2 times a day  rOPINIRole 0.25 mg oral tablet: 3 tab(s) orally once a day   senna leaf extract oral tablet: 2 tab(s) orally once a day (at bedtime)   acetaminophen 325 mg oral tablet: 2 tab(s) orally every 6 hours, As needed, Temp greater or equal to 38C (100.4F), Mild Pain (1 - 3)  dexamethasone 4 mg oral tablet: 1 tab(s) orally every 12 hours  lacosamide 200 mg oral tablet: 0.5 tab(s) orally 2 times a day MDD:1 tab  levETIRAcetam 100 mg/mL oral solution: 10 milliliter(s) orally 2 times a day  melatonin 3 mg oral tablet: 3 tab(s) orally once a day (at bedtime)  mirtazapine 7.5 mg oral tablet: 1 tab(s) orally once a day (at bedtime)  OLANZapine 5 mg oral tablet: 1 tab(s) orally once a day (at bedtime)  pantoprazole 40 mg oral delayed release tablet: 1 tab(s) orally once a day (before a meal)  polyethylene glycol 3350 oral powder for reconstitution: 17 gram(s) orally 2 times a day  rOPINIRole 0.25 mg oral tablet: 3 tab(s) orally once a day   senna leaf extract oral tablet: 2 tab(s) orally once a day (at bedtime)

## 2023-01-10 NOTE — DISCHARGE NOTE PROVIDER - CARE PROVIDERS DIRECT ADDRESSES
[email or web address] ([email or web address][email or web address] ([facility] Center.[facility]ogarty Memorial HospitalForever [employer].[facility],[email or web address] ,[email or web address],DirectAddress_Unknown

## 2023-01-10 NOTE — PROGRESS NOTE ADULT - ASSESSMENT
62 year old male with PMH of GBM s/p resection in 10/22 on Keppra 750 bid followed by Dr. Turpin who presented to the ED at [facility] on 12/21/22 for change in mental status. Patient with seizures, intubated for airway protection and admitted to NSICU.  Patient had MRI brain and MR SPECT on 12/22, which showed suspicious for progressive neoplasm in the left parietal postoperative bed compared with 10/5/2022 with MR spectroscopy. . Patient was found to be positive for COVID upon admission and isolated per protocol. Hospital course was also complicated by agitation, which resolved with standing haldol. He was evaluated for admission to acute inpatient rehab and admitted to [facility] on 12/30/22 with cognitive deficits, gait and ADL impairment.     #GBM with seizures now with Gait Instability, ADL impairments and Functional impairments  - Cont Comprehensive Rehab Program of PT/OT/SLP  -no plan for radiation or chemo therapy during the period of acute rehab, but will need follow up after discharge from rehab  -Continue Dexamethasone 4mg BID  -Continue lacosamide 100mg BID  -Continue Keppra 100mg BID    Witnessed fall 1/5  --No injury,  no w/u studies needed  --Called pt's friend and HCP, Johnnie to inform, but no answer-- left VM to call back.      #Insomnia  --Cont.  Remeron 7.5mg qhs to target sleep as improved sleep will help improve mood.    -increased Scheduled Requip 0.75mg  in evening 8pm      UTI--resolved  --+u/a--on bactrim--to be Completed 1/9/23.   --urine Culture: + Klebsiella--Sensitive to Bactrim    agitation--  -- Perseverating more today on going home-- mentioned signing out AMA,   --Psychiatry consulted-- spoke with Dr. De Los Santos regarding pt--  ---Psych notes RLS SE of haldol.   Rec. adding Zyprexa 5mg qhs for pt.    --cont. remeron 7.5mg and Requip    #Covid  -Noted on 12/21   -Repeat negative on 12/28 and 12/29  -No treatment administered as he was asymptomatic     #Pain control  - Tylenol PRN    #GI/Bowel Mgmt   - Continue Senna at bedtime   -Continue bisacodyl 5mg BID  - Miralax PRN    #Bladder management  - voiding independently     #DVT ppx  - Lovenox      FEN   - Diet - Regular + Thins  [CCHO, DASH/TLC]    - Dysphagia  SLP - evaluation and treatment    Precautions / PROPHYLAXIS:   - Falls, Seizure     IDT 1/5/23:  SW: Lives with mother in  with 3 SHAQ,  Mother has a HHA for her care  Speech: Reg/thin diet; Severe cognitive deficits-- Poor insight, decreased attention, working memory.  Min word retrieval  OT: Setup eating; CG ADLs and transfers,  Poor safety awareness  PT: CG transfers, ambulation 100ft RW, and 12 steps.  right VF deficit, poor insight and safety  Goals: 1. Ambulate to bathroom and toileting with supervision.  2. Consistent call bell and safety strategies with supervision  ELOS: 1/12/2023.

## 2023-01-10 NOTE — PROGRESS NOTE ADULT - SUBJECTIVE AND OBJECTIVE BOX
Patient is a 62y old  Male who presents with a chief complaint of s/p GBM (10 Johnnie 2023 09:12)      HPI:  62 year old male with PMH of GBM s/p resection in 10/22 on Keppra 750 bid followed by Dr. Turpin [facility] in mental status. Further history provided by HCP friend at bedside. Per HCP, she spoke to pt on the phone at 1230 on day of admit and he was not making sense. While en route to the hospital had witnessed RUE twitching and rigid lasting around 2 min.  Pt given decadron 8mg PO by HCP as per radiooncology PA recommendations over the phone en route, in ED found to have witnessed seizure, intubated for airway protection and admitted to NSICU. Patient was on VEEG and AEDs were optimized in NSCU. Patient had MRI brain and MR SPECT on 12/22, which showed suspicious for progressive neoplasm in the left parietal postoperative bed compared with 10/5/2022 with MR spectroscopy. Tiny focus of neoplasm also likely in the left brainstem, unchanged. Patient was extubated on 12/22. Patient was found to be positive for COVID upon admission and isolated per protocol. Patient has been asymptomatic during this admission and repeat COVID tests on 12/28 and 12/29 were negative. Hospital course was also complicated by agitation, which resolved with standing haldol. He was evaluated for admission to acute inpatient rehab and admitted to [facility] on 12/29/22.    (30 Dec 2022 13:10)      SUBJECTIVE HISTORY:  Patient seen and evaluated at bedside.  Reports sleeping well last night.  He reported mild pain in bilateral feet but not currently bothering him.  He is otherwise without complaints    REVIEW OF SYSTEMS:  Denies abdominal pain, n/v, constipation  +restless leg      PHYSICAL EXAM  GENERAL Exam:     Nontoxic , No Acute Distress 	  HEENT:  normocephalic, atraumatic--no cranial tenderness on palpation. 	  LUNGS:	breathing comfortably on RA  HEART:	warm, well perfused   	  GI/ ABDOMEN:  Soft  Non tender  EXTREMITIES:   No Edema , no calf tenderness,    MSK: No Joint pain or bruising,  no spinal or hip tenderness or bruising.  Full ROM --non-painful.  Mental Status - Patient is awake, oriented X3.    VITALS  62y  Vital Signs Last 24 Hrs  T(C): 37 (10 Johnnie 2023 08:47), Max: 37 (10 Johnnie 2023 08:47)  T(F): 98.6 (10 Johnnie 2023 08:47), Max: 98.6 (10 Johnnie 2023 08:47)  HR: 88 (10 Johnnie 2023 08:47) (88 - 106)  BP: 120/77 (10 Johnnie 2023 08:47) (102/69 - 120/77)  BP(mean): --  RR: 16 (10 Johnnie 2023 08:47) (15 - 16)  SpO2: 97% (10 Johnnie 2023 08:47) (94% - 97%)    Parameters below as of 10 Johnnie 2023 08:47  Patient On (Oxygen Delivery Method): room air      Daily     Daily         RECENT LABS:                          14.9   6.74  )-----------( 181      ( 10 Johnnie 2023 06:45 )             46.1     01-10    141  |  105  |  23  ----------------------------<  111<H>  4.3   |  28  |  0.90    Ca    9.0      10 Johnnie 2023 06:45    TPro  5.7<L>  /  Alb  2.6<L>  /  TBili  0.6  /  DBili  x   /  AST  15  /  ALT  47<H>  /  AlkPhos  55  01-10    LIVER FUNCTIONS - ( 10 Johnnie 2023 06:45 )  Alb: 2.6 g/dL / Pro: 5.7 g/dL / ALK PHOS: 55 U/L / ALT: 47 U/L / AST: 15 U/L / GGT: x                   CAPILLARY BLOOD GLUCOSE          MEDICATIONS  (STANDING):  bisacodyl 5 milliGRAM(s) Oral every 12 hours  dexAMETHasone     Tablet 4 milliGRAM(s) Oral every 12 hours  enoxaparin Injectable 40 milliGRAM(s) SubCutaneous <User Schedule>  lacosamide 100 milliGRAM(s) Oral two times a day  levETIRAcetam  Solution 1000 milliGRAM(s) Oral two times a day  melatonin 9 milliGRAM(s) Oral at bedtime  mirtazapine 7.5 milliGRAM(s) Oral at bedtime  OLANZapine 5 milliGRAM(s) Oral at bedtime  pantoprazole    Tablet 40 milliGRAM(s) Oral before breakfast  rOPINIRole 0.75 milliGRAM(s) Oral <User Schedule>  senna 2 Tablet(s) Oral at bedtime    MEDICATIONS  (PRN):  acetaminophen     Tablet .. 650 milliGRAM(s) Oral every 6 hours PRN Temp greater or equal to 38C (100.4F), Mild Pain (1 - 3)  bisacodyl Suppository 10 milliGRAM(s) Rectal daily PRN Constipation  OLANZapine 2.5 milliGRAM(s) Oral two times a day PRN agitation           Patient is a 62y old  Male who presents with a chief complaint of s/p GBM (10 Johnnie 2023 09:12)      HPI:  62 year old male with PMH of GBM s/p resection in 10/22 on Keppra 750 bid followed by Dr. [facility]. Further history provided by HCP friend at bedside. Per HCP, she spoke to pt on the phone at 1230 on day of admit and he was not making sense. While en route to the hospital had witnessed RUE twitching and rigid lasting around 2 min.  Pt given decadron 8mg PO by HCP as per radiooncology PA recommendations over the phone en route, in ED found to have witnessed seizure, intubated for airway protection and admitted to NSICU. Patient was on VEEG and AEDs were optimized in NSCU. Patient had MRI brain and MR SPECT on 12/22, which showed suspicious for progressive neoplasm in the left parietal postoperative bed compared with 10/5/2022 with MR spectroscopy. Tiny focus of neoplasm also likely in the left brainstem, unchanged. Patient was extubated on 12/22. Patient was found to be positive for COVID upon admission and isolated per protocol. Patient has been asymptomatic during this admission and repeat COVID tests on 12/28 and 12/29 were negative. Hospital course was also complicated by agitation, which resolved with standing haldol. He was evaluated for admission to acute inpatient rehab and admitted to [facility] on 12/29/22.    (30 Dec 2022 13:10)      SUBJECTIVE HISTORY:  Patient seen and evaluated at bedside.  Reports sleeping well last night.  He reported mild pain in bilateral feet but not currently bothering him.  He is otherwise without complaints    REVIEW OF SYSTEMS:  Denies abdominal pain, n/v, constipation  +restless leg      PHYSICAL EXAM  GENERAL Exam: Nontoxic , No Acute Distress 	  HEENT:  normocephalic, atraumatic--no cranial tenderness on palpation. 	  LUNGS:	breathing comfortably on RA  HEART:	warm, well perfused   	  GI/ ABDOMEN:  Soft  Non tender  EXTREMITIES:   No Edema , no calf tenderness,    MSK: No Joint pain or bruising,  no spinal or hip tenderness or bruising.  Full ROM --non-painful.  Mental Status - Patient is awake, oriented X3.    VITALS  62y  Vital Signs Last 24 Hrs  T(C): 37 (10 Johnnie 2023 08:47), Max: 37 (10 Johnnie 2023 08:47)  T(F): 98.6 (10 Johnnie 2023 08:47), Max: 98.6 (10 Johnnie 2023 08:47)  HR: 88 (10 Johnnie 2023 08:47) (88 - 106)  BP: 120/77 (10 Johnnie 2023 08:47) (102/69 - 120/77)  RR: 16 (10 Johnnie 2023 08:47) (15 - 16)  SpO2: 97% (10 Johnnie 2023 08:47) (94% - 97%)      RECENT LABS:                          14.9   6.74  )-----------( 181      ( 10 Johnnie 2023 06:45 )             46.1     01-10    141  |  105  |  23  ----------------------------<  111<H>  4.3   |  28  |  0.90    Ca    9.0      10 Johnnie 2023 06:45    TPro  5.7<L>  /  Alb  2.6<L>  /  TBili  0.6  /  DBili  x   /  AST  15  /  ALT  47<H>  /  AlkPhos  55  01-10    LIVER FUNCTIONS - ( 10 Johnnie 2023 06:45 )  Alb: 2.6 g/dL / Pro: 5.7 g/dL / ALK PHOS: 55 U/L / ALT: 47 U/L / AST: 15 U/L / GGT: x               MEDICATIONS  (STANDING):  bisacodyl 5 milliGRAM(s) Oral every 12 hours  dexAMETHasone     Tablet 4 milliGRAM(s) Oral every 12 hours  enoxaparin Injectable 40 milliGRAM(s) SubCutaneous <User Schedule>  lacosamide 100 milliGRAM(s) Oral two times a day  levETIRAcetam  Solution 1000 milliGRAM(s) Oral two times a day  melatonin 9 milliGRAM(s) Oral at bedtime  mirtazapine 7.5 milliGRAM(s) Oral at bedtime  OLANZapine 5 milliGRAM(s) Oral at bedtime  pantoprazole    Tablet 40 milliGRAM(s) Oral before breakfast  rOPINIRole 0.75 milliGRAM(s) Oral <User Schedule>  senna 2 Tablet(s) Oral at bedtime    MEDICATIONS  (PRN):  acetaminophen     Tablet .. 650 milliGRAM(s) Oral every 6 hours PRN Temp greater or equal to 38C (100.4F), Mild Pain (1 - 3)  bisacodyl Suppository 10 milliGRAM(s) Rectal daily PRN Constipation  OLANZapine 2.5 milliGRAM(s) Oral two times a day PRN agitation

## 2023-01-10 NOTE — DISCHARGE NOTE PROVIDER - HOSPITAL COURSE
62 year old male with PMH of GBM s/p resection in 10/22 on Keppra 750 bid followed by Dr[facility]. Further history provided by HCP friend at bedside. Per HCP, she spoke to pt on the phone at 1230 on day of admit and he was not making sense. While en route to the hospital had witnessed RUE twitching and rigid lasting around 2 min.  Pt given decadron 8mg PO by HCP as per radiooncology PA recommendations over the phone en route, in ED found to have witnessed seizure, intubated for airway protection and admitted to NSICU. Patient was on VEEG and AEDs were optimized in NSCU. Patient had MRI brain and MR SPECT on 12/22, which showed suspicious for progressive neoplasm in the left parietal postoperative bed compared with 10/5/2022 with MR spectroscopy. Tiny focus of neoplasm also likely in the left brainstem, unchanged. Patient was extubated on 12/22. Patient was found to be positive for COVID upon admission and isolated per protocol. Patient has been asymptomatic during this admission and repeat COVID tests on 12/28 and 12/29 were negative. Hospital course was also complicated by agitation, which resolved with standing haldol. He was evaluated for admission to acute inpatient rehab and admitted to [facility] on 12/29/22.    (30 Dec 2022 13:10)     62 year old male with PMH of GBM s/p resection in 10/22 on Keppra 750 bid followed [facility] Dr[facility]. Further history provided by HCP friend at bedside. Per HCP, she spoke to pt on the phone at 1230 on day of admit and he was not making sense. While en route to the hospital had witnessed RUE twitching and rigid lasting around 2 min.  Pt given decadron 8mg PO by HCP as per radiooncology PA recommendations over the phone en route, in ED found to have witnessed seizure, intubated for airway protection and admitted to NSICU. Patient was on VEEG and AEDs were optimized in NSCU. Patient had MRI brain and MR SPECT on 12/22, which showed suspicious for progressive neoplasm in the left parietal postoperative bed compared with 10/5/2022 with MR spectroscopy. Tiny focus of neoplasm also likely in the left brainstem, unchanged. Patient was extubated on 12/22. Patient was found to be positive for COVID upon admission and isolated per protocol. Patient has been asymptomatic during this admission and repeat COVID tests on 12/28 and 12/29 were negative. Hospital course was also complicated by agitation, which resolved with standing haldol. He was evaluated for admission to acute inpatient rehab and admitted to [facility] on 12/29/22.    While at rehab patient completed comprehensive PT OT SLP program and reached his rehab goals on 1/12. While at rehab evaluated by medicine, and psychiatry. Zyprexa started. Off Haldol. Requip continues for RLS. Treated for Klebsiella UTI-abx completed. No seizures reported at rehab. Vimpat/Keppra/Decadron continues on DC. Outpt follow ups post rehab. Cleared for dc home with 24h assistance on 1/12. 62 year old male with PMH of GBM s/p resection in 10/22 on Keppra 750 bid followed [facility] Dr[facility]. Further history provided by HCP friend at bedside. Per HCP, she spoke to pt on the phone at 1230 on day of admit and he was not making sense. While en route to the hospital had witnessed RUE twitching and rigid lasting around 2 min.  Pt given decadron 8mg PO by HCP as per radiooncology PA recommendations over the phone en route, in ED found to have witnessed seizure, intubated for airway protection and admitted to NSICU. Patient was on VEEG and AEDs were optimized in NSCU. Patient had MRI brain and MR SPECT on 12/22, which showed suspicious for progressive neoplasm in the left parietal postoperative bed compared with 10/5/2022 with MR spectroscopy. Tiny focus of neoplasm also likely in the left brainstem, unchanged. Patient was extubated on 12/22. Patient was found to be positive for COVID upon admission and isolated per protocol. Patient has been asymptomatic during this admission and repeat COVID tests on 12/28 and 12/29 were negative. Hospital course was also complicated by agitation, which resolved with standing haldol. He was evaluated for admission to acute inpatient rehab and admitted to [facility] on 12/29/22.    While at rehab patient completed comprehensive PT OT SLP program and reached his rehab goals on 1/12. While at rehab evaluated by medicine, and psychiatry. Zyprexa started. Off Haldol. Requip continues for RLS. Treated for Klebsiella UTI-abx completed. No seizures reported at rehab. Vimpat/Keppra/Decadron continues on DC. Outpt follow ups post rehab. Cleared for dc home with 24h assistance on 1/12.     Discharge function:  Speech: Reg/thin diet; Severe cognitive deficits-- Poor insight, decreased judgment  OT: Independent eating; CS LBD; CG toilet transfer and toileting, Poor safety awareness  PT: CG transfers, ambulation 150ft RW, and 12 steps with 1 rail.  right VF deficit, poor insight and safety

## 2023-01-10 NOTE — DISCHARGE NOTE PROVIDER - CARE PROVIDER_API CALL
Debby Torres)  Neurology  [address] [place] [address]  Phone: [phone]  Fax: [phone]  Follow Up Time:     Bereket Ayala  RADIATION ONCOLOGY  [address] [place] [address] - [place] [address]  Phone: [phone]  Fax: [phone]  Follow Up Time:     Todd Turpin)  Neurosurgery  [address] [place] [address]  Phone: [phone]  Fax: [phone]  Follow Up Time:     Nelly Mccray (DO)  Brain Injury Medicine; PhysicalRehab Medicine  [address] [place] [address]  Phone: [phone]  Fax: [phone]  Follow Up Time:    Debby Torres)  Neurology  [address] [place] [address]  Phone: [phone]  Fax: [phone]  Follow Up Time:     Bereket Ayala  RADIATION ONCOLOGY  [address] [place] [address] - [place] [address]  Phone: [phone]  Fax: [phone]  Follow Up Time:     Todd Turpin)  Neurosurgery  [address] [place] [address]  Phone: [phone]  Fax: [phone]  Follow Up Time:     Kami Britton)  PhysicalRehab Medicine  [address] [place] [address], [address]  [place], [place] [id number]  Phone: [phone]  Fax: [phone]  Follow Up Time: 1 month

## 2023-01-11 PROCEDURE — 99232 SBSQ HOSP IP/OBS MODERATE 35: CPT

## 2023-01-11 RX ADMIN — Medication 4 MILLIGRAM(S): at 06:13

## 2023-01-11 RX ADMIN — Medication 9 MILLIGRAM(S): at 21:09

## 2023-01-11 RX ADMIN — LEVETIRACETAM 1000 MILLIGRAM(S): 250 TABLET, FILM COATED ORAL at 06:13

## 2023-01-11 RX ADMIN — Medication 5 MILLIGRAM(S): at 06:13

## 2023-01-11 RX ADMIN — Medication 5 MILLIGRAM(S): at 18:18

## 2023-01-11 RX ADMIN — ENOXAPARIN SODIUM 40 MILLIGRAM(S): 100 INJECTION SUBCUTANEOUS at 18:18

## 2023-01-11 RX ADMIN — LACOSAMIDE 100 MILLIGRAM(S): 50 TABLET ORAL at 18:19

## 2023-01-11 RX ADMIN — OLANZAPINE 5 MILLIGRAM(S): 15 TABLET, FILM COATED ORAL at 21:12

## 2023-01-11 RX ADMIN — ROPINIROLE 0.75 MILLIGRAM(S): 8 TABLET, FILM COATED, EXTENDED RELEASE ORAL at 19:43

## 2023-01-11 RX ADMIN — OLANZAPINE 2.5 MILLIGRAM(S): 15 TABLET, FILM COATED ORAL at 21:09

## 2023-01-11 RX ADMIN — MIRTAZAPINE 7.5 MILLIGRAM(S): 45 TABLET, ORALLY DISINTEGRATING ORAL at 21:09

## 2023-01-11 RX ADMIN — LACOSAMIDE 100 MILLIGRAM(S): 50 TABLET ORAL at 06:13

## 2023-01-11 RX ADMIN — Medication 4 MILLIGRAM(S): at 18:20

## 2023-01-11 RX ADMIN — SENNA PLUS 2 TABLET(S): 8.6 TABLET ORAL at 21:09

## 2023-01-11 RX ADMIN — PANTOPRAZOLE SODIUM 40 MILLIGRAM(S): 20 TABLET, DELAYED RELEASE ORAL at 06:13

## 2023-01-11 RX ADMIN — LEVETIRACETAM 1000 MILLIGRAM(S): 250 TABLET, FILM COATED ORAL at 18:19

## 2023-01-11 NOTE — PROGRESS NOTE ADULT - SUBJECTIVE AND OBJECTIVE BOX
HPI:  62 year old male with PMH of GBM s/p resection in 10/22 on Keppra 750 bid followed [facility] Dr[facility]pin [facility]. Further history provided by HCP friend at bedside. Per HCP, she spoke to pt on the phone at 1230 on day of admit and he was not making sense. While en route to the hospital had witnessed RUE twitching and rigid lasting around 2 min.  Pt given decadron 8mg PO by HCP as per radiooncology PA recommendations over the phone en route, in ED found to have witnessed seizure, intubated for airway protection and admitted to NSICU. Patient was on VEEG and AEDs were optimized in NSCU. Patient had MRI brain and MR SPECT on 12/22, which showed suspicious for progressive neoplasm in the left parietal postoperative bed compared with 10/5/2022 with MR spectroscopy. Tiny focus of neoplasm also likely in the left brainstem, unchanged. Patient was extubated on 12/22. Patient was found to be positive for COVID upon admission and isolated per protocol. Patient has been asymptomatic during this admission and repeat COVID tests on 12/28 and 12/29 were negative. Hospital course was also complicated by agitation, which resolved with standing haldol. He was evaluated for admission to acute inpatient rehab and admitted to [facility] on 12/29/22.       SUBJECTIVE HISTORY:  Patient seen and evaluated, NAD overnight. No seizures. Reports feeling hungry. No HA. To therapy.     REVIEW OF SYSTEMS:  Denies abdominal pain, n/v, constipation  +restless leg      VITALS  Vital Signs Last 24 Hrs  T(C): 36.6 (11 Jan 2023 08:57), Max: 36.6 (11 Jan 2023 08:57)  T(F): 97.9 (11 Jan 2023 08:57), Max: 97.9 (11 Jan 2023 08:57)  HR: 86 (11 Jan 2023 08:57) (86 - 96)  BP: 119/81 (11 Jan 2023 08:57) (100/64 - 119/81)  BP(mean): --  RR: 14 (11 Jan 2023 08:57) (14 - 15)  SpO2: 96% (11 Jan 2023 08:57) (94% - 96%)    Parameters below as of 11 Jan 2023 08:57  Patient On (Oxygen Delivery Method): room air      RECENT LABS                        14.9   6.74  )-----------( 181      ( 10 Johnnie 2023 06:45 )             46.1     01-10    141  |  105  |  23  ----------------------------<  111<H>  4.3   |  28  |  0.90    Ca    9.0      10 Johnnie 2023 06:45    TPro  5.7<L>  /  Alb  2.6<L>  /  TBili  0.6  /  DBili  x   /  AST  15  /  ALT  47<H>  /  AlkPhos  55  01-10      LIVER FUNCTIONS - ( 10 Johnnie 2023 06:45 )  Alb: 2.6 g/dL / Pro: 5.7 g/dL / ALK PHOS: 55 U/L / ALT: 47 U/L / AST: 15 U/L / GGT: x                 CURRENT MEDICATIONS  MEDICATIONS  (STANDING):  bisacodyl 5 milliGRAM(s) Oral every 12 hours  dexAMETHasone     Tablet 4 milliGRAM(s) Oral every 12 hours  enoxaparin Injectable 40 milliGRAM(s) SubCutaneous <User Schedule>  lacosamide 100 milliGRAM(s) Oral two times a day  levETIRAcetam  Solution 1000 milliGRAM(s) Oral two times a day  melatonin 9 milliGRAM(s) Oral at bedtime  mirtazapine 7.5 milliGRAM(s) Oral at bedtime  OLANZapine 5 milliGRAM(s) Oral at bedtime  pantoprazole    Tablet 40 milliGRAM(s) Oral before breakfast  rOPINIRole 0.75 milliGRAM(s) Oral <User Schedule>  senna 2 Tablet(s) Oral at bedtime    MEDICATIONS  (PRN):  acetaminophen     Tablet .. 650 milliGRAM(s) Oral every 6 hours PRN Temp greater or equal to 38C (100.4F), Mild Pain (1 - 3)  bisacodyl Suppository 10 milliGRAM(s) Rectal daily PRN Constipation  OLANZapine 2.5 milliGRAM(s) Oral two times a day PRN agitation      PHYSICAL EXAM  GENERAL Exam: Nontoxic , No Acute Distress 	  HEENT:  normocephalic, atraumatic--no cranial tenderness on palpation. 	  LUNGS:	breathing comfortably on RA  HEART:	warm, well perfused   	  GI/ ABDOMEN:  Soft  Non tender  EXTREMITIES:   No Edema , no calf tenderness,    MSK: No Joint pain or bruising,  no spinal or hip tenderness or bruising.  Full ROM --non-painful.  Mental Status - Patient is awake, oriented X3.      Continue comprehensive acute rehab program consisting of 3hrs/day of OT/PT and SLP.

## 2023-01-11 NOTE — PROGRESS NOTE ADULT - ASSESSMENT
62 year old male with PMH of GBM s/p resection in 10/22 on Keppra 750 bid followed by Dr. Turpin who presented to the ED at [facility] on 12/21/22 for change in mental status. Patient with seizures, intubated for airway protection and admitted to NSICU.  Patient had MRI brain and MR SPECT on 12/22, which showed suspicious for progressive neoplasm in the left parietal postoperative bed compared with 10/5/2022 with MR spectroscopy. . Patient was found to be positive for COVID upon admission and isolated per protocol. Hospital course was also complicated by agitation, which resolved with standing haldol. He was evaluated for admission to acute inpatient rehab and admitted to [facility] on 12/30/22 with cognitive deficits, gait and ADL impairment.     #GBM with seizures now with Gait Instability, ADL impairments and Functional impairments  - Cont Comprehensive Rehab Program of PT/OT/SLP  -no plan for radiation or chemo therapy during the period of acute rehab, but will need follow up after discharge from rehab  -Continue Dexamethasone 4mg BID  -Continue lacosamide 100mg BID  -Continue Keppra 100mg BID    Witnessed fall 1/5  --No injury,  no w/u studies needed  --Called pt's friend and HCP, Johnnie to inform, but no answer-- left VM to call back.      #Insomnia  --Cont.  Remeron 7.5mg qhs to target sleep as improved sleep will help improve mood.    -increased Scheduled Requip 0.75mg  in evening 8pm      UTI--resolved  --+u/a--on bactrim--to be Completed 1/9/23.   --urine Culture: + Klebsiella--Sensitive to Bactrim    agitation--  -- Perseverating more today on going home-- mentioned signing out AMA,   --Psychiatry consulted-- spoke with Dr. De Los Santos regarding pt--  ---Psych notes RLS SE of haldol.   Rec. adding Zyprexa 5mg qhs for pt.    --cont. remeron 7.5mg and Requip    #Covid  -Noted on 12/21   -Repeat negative on 12/28 and 12/29  -No treatment administered as he was asymptomatic     #Pain control  - Tylenol PRN    #GI/Bowel Mgmt   - Continue Senna at bedtime   -Continue bisacodyl 5mg BID  - Miralax PRN    #Bladder management  - voiding independently     #DVT ppx  - Lovenox      FEN   - Diet - Regular + Thins  [CCHO, DASH/TLC]    - Dysphagia  SLP - evaluation and treatment    Precautions / PROPHYLAXIS:   - Falls, Seizure     IDT 1/5/23:  SW: Lives with mother in  with 3 SH[place],  Mother has a HHA for her care  Speech: Reg/thin diet; Severe cognitive deficits-- Poor insight, decreased attention, working memory.  Min word retrieval  OT: Setup eating; CG ADLs and transfers,  Poor safety awareness  PT: CG transfers, ambulation 100ft RW, and 12 steps.  right VF deficit, poor insight and safety  Goals: 1. Ambulate to bathroom and toileting with supervision.  2. Consistent call bell and safety strategies with supervision  ELOS: 1/12/2023.

## 2023-01-12 LAB
ALBUMIN SERPL ELPH-MCNC: 2.6 G/DL — LOW (ref 3.3–5)
ALP SERPL-CCNC: 55 U/L — SIGNIFICANT CHANGE UP (ref 40–120)
ALT FLD-CCNC: 53 U/L — HIGH (ref 10–45)
ANION GAP SERPL CALC-SCNC: 6 MMOL/L — SIGNIFICANT CHANGE UP (ref 5–17)
AST SERPL-CCNC: 14 U/L — SIGNIFICANT CHANGE UP (ref 10–40)
BILIRUB SERPL-MCNC: 0.3 MG/DL — SIGNIFICANT CHANGE UP (ref 0.2–1.2)
BUN SERPL-MCNC: 24 MG/DL — HIGH (ref 7–23)
CALCIUM SERPL-MCNC: 9.3 MG/DL — SIGNIFICANT CHANGE UP (ref 8.4–10.5)
CHLORIDE SERPL-SCNC: 106 MMOL/L — SIGNIFICANT CHANGE UP (ref 96–108)
CO2 SERPL-SCNC: 30 MMOL/L — SIGNIFICANT CHANGE UP (ref 22–31)
CREAT SERPL-MCNC: 0.84 MG/DL — SIGNIFICANT CHANGE UP (ref 0.5–1.3)
EGFR: 97 ML/MIN/1.73M2 — SIGNIFICANT CHANGE UP
GLUCOSE SERPL-MCNC: 137 MG/DL — HIGH (ref 70–99)
HCT VFR BLD CALC: 43.1 % — SIGNIFICANT CHANGE UP (ref 39–50)
HGB BLD-MCNC: 14.1 G/DL — SIGNIFICANT CHANGE UP (ref 13–17)
MCHC RBC-ENTMCNC: 29 PG — SIGNIFICANT CHANGE UP (ref 27–34)
MCHC RBC-ENTMCNC: 32.7 GM/DL — SIGNIFICANT CHANGE UP (ref 32–36)
MCV RBC AUTO: 88.7 FL — SIGNIFICANT CHANGE UP (ref 80–100)
NRBC # BLD: 0 /100 WBCS — SIGNIFICANT CHANGE UP (ref 0–0)
PLATELET # BLD AUTO: 134 K/UL — LOW (ref 150–400)
POTASSIUM SERPL-MCNC: 4.3 MMOL/L — SIGNIFICANT CHANGE UP (ref 3.5–5.3)
POTASSIUM SERPL-SCNC: 4.3 MMOL/L — SIGNIFICANT CHANGE UP (ref 3.5–5.3)
PROT SERPL-MCNC: 5.5 G/DL — LOW (ref 6–8.3)
RBC # BLD: 4.86 M/UL — SIGNIFICANT CHANGE UP (ref 4.2–5.8)
RBC # FLD: 14.6 % — HIGH (ref 10.3–14.5)
SODIUM SERPL-SCNC: 142 MMOL/L — SIGNIFICANT CHANGE UP (ref 135–145)
WBC # BLD: 7.09 K/UL — SIGNIFICANT CHANGE UP (ref 3.8–10.5)
WBC # FLD AUTO: 7.09 K/UL — SIGNIFICANT CHANGE UP (ref 3.8–10.5)

## 2023-01-12 PROCEDURE — 99232 SBSQ HOSP IP/OBS MODERATE 35: CPT

## 2023-01-12 RX ADMIN — MIRTAZAPINE 7.5 MILLIGRAM(S): 45 TABLET, ORALLY DISINTEGRATING ORAL at 22:15

## 2023-01-12 RX ADMIN — PANTOPRAZOLE SODIUM 40 MILLIGRAM(S): 20 TABLET, DELAYED RELEASE ORAL at 06:11

## 2023-01-12 RX ADMIN — Medication 4 MILLIGRAM(S): at 17:46

## 2023-01-12 RX ADMIN — ENOXAPARIN SODIUM 40 MILLIGRAM(S): 100 INJECTION SUBCUTANEOUS at 17:46

## 2023-01-12 RX ADMIN — LACOSAMIDE 100 MILLIGRAM(S): 50 TABLET ORAL at 06:12

## 2023-01-12 RX ADMIN — Medication 5 MILLIGRAM(S): at 17:46

## 2023-01-12 RX ADMIN — ROPINIROLE 0.75 MILLIGRAM(S): 8 TABLET, FILM COATED, EXTENDED RELEASE ORAL at 19:52

## 2023-01-12 RX ADMIN — SENNA PLUS 2 TABLET(S): 8.6 TABLET ORAL at 22:15

## 2023-01-12 RX ADMIN — Medication 5 MILLIGRAM(S): at 06:12

## 2023-01-12 RX ADMIN — Medication 4 MILLIGRAM(S): at 06:12

## 2023-01-12 RX ADMIN — Medication 9 MILLIGRAM(S): at 22:15

## 2023-01-12 RX ADMIN — LACOSAMIDE 100 MILLIGRAM(S): 50 TABLET ORAL at 17:46

## 2023-01-12 RX ADMIN — OLANZAPINE 5 MILLIGRAM(S): 15 TABLET, FILM COATED ORAL at 22:15

## 2023-01-12 RX ADMIN — LEVETIRACETAM 1000 MILLIGRAM(S): 250 TABLET, FILM COATED ORAL at 17:46

## 2023-01-12 RX ADMIN — LEVETIRACETAM 1000 MILLIGRAM(S): 250 TABLET, FILM COATED ORAL at 06:11

## 2023-01-12 NOTE — PROGRESS NOTE ADULT - ASSESSMENT
62 year old male with PMH of GBM s/p resection in 10/22 on Keppra 750 bid followed by Dr. Turpin who presented to the ED at [facility] on 12/21/22 for change in mental status. Patient with seizures, intubated for airway protection and admitted to NSICU.  Patient had MRI brain and MR SPECT on 12/22, which showed suspicious for progressive neoplasm in the left parietal postoperative bed compared with 10/5/2022 with MR spectroscopy. . Patient was found to be positive for COVID upon admission and isolated per protocol. Hospital course was also complicated by agitation, which resolved with standing haldol. He was evaluated for admission to acute inpatient rehab and admitted to [facility] on 12/30/22 with cognitive deficits, gait and ADL impairment.     #GBM with seizures now with Gait Instability, ADL impairments and Functional impairments  - Cont Comprehensive Rehab Program of PT/OT/SLP  -no plan for radiation or chemo therapy during the period of acute rehab, but will need follow up after discharge from rehab  -Continue Dexamethasone 4mg BID  -Continue lacosamide 100mg BID  -Continue Keppra 100mg BID    Witnessed fall 1/5  --No injury,  no w/u studies needed  --Called pt's friend and HCP, Johnnie to inform, but no answer-- left VM to call back.      #Insomnia  --Cont.  Remeron 7.5mg qhs to target sleep as improved sleep will help improve mood.    -increased Scheduled Requip 0.75mg  in evening 8pm      UTI--resolved  --+u/a--on bactrim--to be Completed 1/9/23.   --urine Culture: + Klebsiella--Sensitive to Bactrim    agitation--  -- Perseverating more today on going home-- mentioned signing out AMA,   --Psychiatry consulted-- spoke with Dr. De Los Santos regarding pt--  ---Psych notes RLS SE of haldol.   Rec. adding Zyprexa 5mg qhs for pt.    --cont. remeron 7.5mg and Requip    #Covid  -Noted on 12/21   -Repeat negative on 12/28 and 12/29  -No treatment administered as he was asymptomatic     #Pain control  - Tylenol PRN    #GI/Bowel Mgmt   - Continue Senna at bedtime   -Continue bisacodyl 5mg BID  - Miralax PRN    #Bladder management  - voiding independently     #DVT ppx  - Lovenox      FEN   - Diet - Regular + Thins  [CCHO, DASH/TLC]    - Dysphagia  SLP - evaluation and treatment    Precautions / PROPHYLAXIS:   - Falls, Seizure

## 2023-01-12 NOTE — PROGRESS NOTE ADULT - SUBJECTIVE AND OBJECTIVE BOX
HPI:  62 year old male with PMH of GBM s/p resection in 10/22 on Keppra 750 bid followed by Dr. Turpin [facility]. Further history provided by HCP friend at bedside. Per HCP, she spoke to pt on the phone at 1230 on day of admit and he was not making sense. While en route to the hospital had witnessed RUE twitching and rigid lasting around 2 min.  Pt given decadron 8mg PO by HCP as per radiooncology PA recommendations over the phone en route, in ED found to have witnessed seizure, intubated for airway protection and admitted to NSICU. Patient was on VEEG and AEDs were optimized in NSCU. Patient had MRI brain and MR SPECT on 12/22, which showed suspicious for progressive neoplasm in the left parietal postoperative bed compared with 10/5/2022 with MR spectroscopy. Tiny focus of neoplasm also likely in the left brainstem, unchanged. Patient was extubated on 12/22. Patient was found to be positive for COVID upon admission and isolated per protocol. Patient has been asymptomatic during this admission and repeat COVID tests on 12/28 and 12/29 were negative. Hospital course was also complicated by agitation, which resolved with standing haldol. He was evaluated for admission to acute inpatient rehab and admitted to [facility] on 12/29/22.       SUBJECTIVE HISTORY:  Patient seen and evaluated today while participating in rehab and reported NAD overnight.  Patient anxious regarding discharge disposition in setting of upcoming holiday weekend.    REVIEW OF SYSTEMS:  Denies abdominal pain, n/v, constipation  +restless leg    ______________________________________________________________________________    Vital Signs Last 24 Hrs  T(C): 36.6 (12 Jan 2023 07:48), Max: 36.6 (12 Jan 2023 07:48)  T(F): 97.8 (12 Jan 2023 07:48), Max: 97.8 (12 Jan 2023 07:48)  HR: 102 (12 Jan 2023 07:48) (97 - 102)  BP: 102/72 (12 Jan 2023 07:48) (102/72 - 113/74)  RR: 14 (12 Jan 2023 07:48) (14 - 14)  SpO2: 97% (12 Jan 2023 07:48) (94% - 97%)    ______________________________________________________________________________    LAB                        14.1   7.09  )-----------( 134      ( 12 Jan 2023 06:30 )             43.1     01-12    142  |  106  |  24<H>  ----------------------------<  137<H>  4.3   |  30  |  0.84    Ca    9.3      12 Jan 2023 06:30    TPro  5.5<L>  /  Alb  2.6<L>  /  TBili  0.3  /  DBili  x   /  AST  14  /  ALT  53<H>  /  AlkPhos  55  01-12    LIVER FUNCTIONS - ( 12 Jan 2023 06:30 )  Alb: 2.6 g/dL / Pro: 5.5 g/dL / ALK PHOS: 55 U/L / ALT: 53 U/L / AST: 14 U/L / GGT: x           ______________________________________________________________________________    MEDICATIONS  (STANDING):  bisacodyl 5 milliGRAM(s) Oral every 12 hours  dexAMETHasone     Tablet 4 milliGRAM(s) Oral every 12 hours  enoxaparin Injectable 40 milliGRAM(s) SubCutaneous <User Schedule>  lacosamide 100 milliGRAM(s) Oral two times a day  levETIRAcetam  Solution 1000 milliGRAM(s) Oral two times a day  melatonin 9 milliGRAM(s) Oral at bedtime  mirtazapine 7.5 milliGRAM(s) Oral at bedtime  OLANZapine 5 milliGRAM(s) Oral at bedtime  pantoprazole    Tablet 40 milliGRAM(s) Oral before breakfast  rOPINIRole 0.75 milliGRAM(s) Oral <User Schedule>  senna 2 Tablet(s) Oral at bedtime    MEDICATIONS  (PRN):  acetaminophen     Tablet .. 650 milliGRAM(s) Oral every 6 hours PRN Temp greater or equal to 38C (100.4F), Mild Pain (1 - 3)  bisacodyl Suppository 10 milliGRAM(s) Rectal daily PRN Constipation  OLANZapine 2.5 milliGRAM(s) Oral two times a day PRN agitation    ______________________________________________________________________________    PHYSICAL EXAM  GENERAL Exam: Nontoxic , No Acute Distress 	  HEENT:  normocephalic, atraumatic--no cranial tenderness on palpation. 	  LUNGS:	breathing comfortably on RA  HEART:	warm, well perfused   	  GI/ ABDOMEN:  Soft  Non tender  EXTREMITIES:   No Edema , no calf tenderness,    MSK: No Joint pain or bruising,  no spinal or hip tenderness or bruising.  Full ROM --non-painful.  Mental Status - Patient is awake, oriented X3.    ______________________________________________________________________________    Continue comprehensive acute rehab program consisting of 3hrs/day of OT/PT and SLP.

## 2023-01-13 PROCEDURE — 99232 SBSQ HOSP IP/OBS MODERATE 35: CPT

## 2023-01-13 RX ADMIN — ENOXAPARIN SODIUM 40 MILLIGRAM(S): 100 INJECTION SUBCUTANEOUS at 17:25

## 2023-01-13 RX ADMIN — Medication 5 MILLIGRAM(S): at 06:22

## 2023-01-13 RX ADMIN — OLANZAPINE 5 MILLIGRAM(S): 15 TABLET, FILM COATED ORAL at 21:42

## 2023-01-13 RX ADMIN — Medication 4 MILLIGRAM(S): at 17:25

## 2023-01-13 RX ADMIN — Medication 4 MILLIGRAM(S): at 06:22

## 2023-01-13 RX ADMIN — MIRTAZAPINE 7.5 MILLIGRAM(S): 45 TABLET, ORALLY DISINTEGRATING ORAL at 21:41

## 2023-01-13 RX ADMIN — Medication 5 MILLIGRAM(S): at 17:25

## 2023-01-13 RX ADMIN — LEVETIRACETAM 1000 MILLIGRAM(S): 250 TABLET, FILM COATED ORAL at 06:23

## 2023-01-13 RX ADMIN — SENNA PLUS 2 TABLET(S): 8.6 TABLET ORAL at 21:41

## 2023-01-13 RX ADMIN — LACOSAMIDE 100 MILLIGRAM(S): 50 TABLET ORAL at 06:22

## 2023-01-13 RX ADMIN — LACOSAMIDE 100 MILLIGRAM(S): 50 TABLET ORAL at 17:25

## 2023-01-13 RX ADMIN — LEVETIRACETAM 1000 MILLIGRAM(S): 250 TABLET, FILM COATED ORAL at 17:25

## 2023-01-13 RX ADMIN — Medication 9 MILLIGRAM(S): at 21:41

## 2023-01-13 RX ADMIN — PANTOPRAZOLE SODIUM 40 MILLIGRAM(S): 20 TABLET, DELAYED RELEASE ORAL at 06:22

## 2023-01-13 NOTE — PROGRESS NOTE ADULT - SUBJECTIVE AND OBJECTIVE BOX
No events overnight  Denies chest pain, SOB    ALLERGIES:  No Known Allergies    MEDICATIONS  (STANDING):  bisacodyl 5 milliGRAM(s) Oral every 12 hours  dexAMETHasone     Tablet 4 milliGRAM(s) Oral every 12 hours  enoxaparin Injectable 40 milliGRAM(s) SubCutaneous <User Schedule>  haloperidol     Tablet 2.5 milliGRAM(s) Oral <User Schedule>  haloperidol     Tablet 10 milliGRAM(s) Oral at bedtime  lacosamide Solution 100 milliGRAM(s) Oral two times a day  levETIRAcetam  Solution 1000 milliGRAM(s) Oral two times a day  melatonin 3 milliGRAM(s) Oral at bedtime  pantoprazole    Tablet 40 milliGRAM(s) Oral before breakfast  senna 2 Tablet(s) Oral at bedtime    MEDICATIONS  (PRN):  acetaminophen     Tablet .. 650 milliGRAM(s) Oral every 6 hours PRN Temp greater or equal to 38C (100.4F), Mild Pain (1 - 3)  bisacodyl Suppository 10 milliGRAM(s) Rectal daily PRN Constipation  zolpidem 5 milliGRAM(s) Oral at bedtime PRN Insomnia    Vital Signs Last 24 Hrs  T(C): 36.6 (13 Jan 2023 08:15), Max: 36.6 (13 Jan 2023 08:15)  T(F): 97.8 (13 Jan 2023 08:15), Max: 97.8 (13 Jan 2023 08:15)  HR: 73 (13 Jan 2023 08:15) (73 - 96)  BP: 114/75 (13 Jan 2023 08:15) (107/76 - 114/75)  RR: 14 (13 Jan 2023 08:15) (14 - 14)  SpO2: 99% (13 Jan 2023 08:15) (96% - 99%)    Parameters below as of 13 Jan 2023 08:15  Patient On (Oxygen Delivery Method): room air    PHYSICAL EXAM:  Gen: nad, resting in bed  Neuro: aaox3, no focal deficits  Heent: eomi b/l, no jvd, no oral exudates  Pulm: cta b/l, no w/r/r  CV: +s1s2, no m/r/g  Ab: soft, nt/nd, normoactive bs x 4  Extrem: no edema, pulses intact and equal      LABS:                                   14.1   7.09  )-----------( 134      ( 12 Jan 2023 06:30 )             43.1   01-12    142  |  106  |  24<H>  ----------------------------<  137<H>  4.3   |  30  |  0.84    Ca    9.3      12 Jan 2023 06:30    TPro  5.5<L>  /  Alb  2.6<L>  /  TBili  0.3  /  DBili  x   /  AST  14  /  ALT  53<H>  /  AlkPhos  55  01-12    COVID-19 PCR: NotDetec (12-29-22 @ 09:34)  COVID-19 PCR: NotDetec (12-29-22 @ 05:00)  COVID-19 PCR: NotDetec (12-28-22 @ 09:54)

## 2023-01-13 NOTE — PROGRESS NOTE ADULT - SUBJECTIVE AND OBJECTIVE BOX
Patient is a 62y old  Male who presents with a chief complaint of GBM (12 Jan 2023 10:36)      HPI:  62 year old male with PMH of GBM s/p resection in 10/22 on Keppra 750 bid followed by Dr. Turpin [facility] status. Further history provided by HCP friend at bedside. Per HCP, she spoke to pt on the phone at 1230 on day of admit and he was not making sense. While en route to the hospital had witnessed RUE twitching and rigid lasting around 2 min.  Pt given decadron 8mg PO by HCP as per radiooncology PA recommendations over the phone en route, in ED found to have witnessed seizure, intubated for airway protection and admitted to NSICU. Patient was on VEEG and AEDs were optimized in NSCU. Patient had MRI brain and MR SPECT on 12/22, which showed suspicious for progressive neoplasm in the left parietal postoperative bed compared with 10/5/2022 with MR spectroscopy. Tiny focus of neoplasm also likely in the left brainstem, unchanged. Patient was extubated on 12/22. Patient was found to be positive for COVID upon admission and isolated per protocol. Patient has been asymptomatic during this admission and repeat COVID tests on 12/28 and 12/29 were negative. Hospital course was also complicated by agitation, which resolved with standing haldol. He was evaluated for admission to acute inpatient rehab and admitted to [facility] on 12/29/22.    (30 Dec 2022 13:10)      SUBJECTIVE HISTORY:  Patient seen on AM rounds.      REVIEW OF SYSTEMS:        PHYSICAL EXAM    VITALS  62y  Vital Signs Last 24 Hrs  T(C): 36.6 (13 Jan 2023 08:15), Max: 36.6 (13 Jan 2023 08:15)  T(F): 97.8 (13 Jan 2023 08:15), Max: 97.8 (13 Jan 2023 08:15)  HR: 73 (13 Jan 2023 08:15) (73 - 96)  BP: 114/75 (13 Jan 2023 08:15) (107/76 - 114/75)  BP(mean): --  RR: 14 (13 Jan 2023 08:15) (14 - 14)  SpO2: 99% (13 Jan 2023 08:15) (96% - 99%)    Parameters below as of 13 Jan 2023 08:15  Patient On (Oxygen Delivery Method): room air      Daily     Daily         RECENT LABS:                          14.1   7.09  )-----------( 134      ( 12 Jan 2023 06:30 )             43.1     01-12    142  |  106  |  24<H>  ----------------------------<  137<H>  4.3   |  30  |  0.84    Ca    9.3      12 Jan 2023 06:30    TPro  5.5<L>  /  Alb  2.6<L>  /  TBili  0.3  /  DBili  x   /  AST  14  /  ALT  53<H>  /  AlkPhos  55  01-12    LIVER FUNCTIONS - ( 12 Jan 2023 06:30 )  Alb: 2.6 g/dL / Pro: 5.5 g/dL / ALK PHOS: 55 U/L / ALT: 53 U/L / AST: 14 U/L / GGT: x                   CAPILLARY BLOOD GLUCOSE          MEDICATIONS  (STANDING):  bisacodyl 5 milliGRAM(s) Oral every 12 hours  dexAMETHasone     Tablet 4 milliGRAM(s) Oral every 12 hours  enoxaparin Injectable 40 milliGRAM(s) SubCutaneous <User Schedule>  lacosamide 100 milliGRAM(s) Oral two times a day  levETIRAcetam  Solution 1000 milliGRAM(s) Oral two times a day  melatonin 9 milliGRAM(s) Oral at bedtime  mirtazapine 7.5 milliGRAM(s) Oral at bedtime  OLANZapine 5 milliGRAM(s) Oral at bedtime  pantoprazole    Tablet 40 milliGRAM(s) Oral before breakfast  rOPINIRole 0.75 milliGRAM(s) Oral <User Schedule>  senna 2 Tablet(s) Oral at bedtime    MEDICATIONS  (PRN):  acetaminophen     Tablet .. 650 milliGRAM(s) Oral every 6 hours PRN Temp greater or equal to 38C (100.4F), Mild Pain (1 - 3)  bisacodyl Suppository 10 milliGRAM(s) Rectal daily PRN Constipation  OLANZapine 2.5 milliGRAM(s) Oral two times a day PRN agitation           Patient is a 62y old  Male who presents with a chief complaint of GBM (12 Jan 2023 10:36)      HPI:  62 year old male with PMH of GBM s/p resection in 10/22 on Keppra 750 bid followed by Dr. Turpin [facility]. Further history provided by HCP friend at bedside. Per HCP, she spoke to pt on the phone at 1230 on day of admit and he was not making sense. While en route to the hospital had witnessed RUE twitching and rigid lasting around 2 min.  Pt given decadron 8mg PO by HCP as per radiooncology PA recommendations over the phone en route, in ED found to have witnessed seizure, intubated for airway protection and admitted to NSICU. Patient was on VEEG and AEDs were optimized in NSCU. Patient had MRI brain and MR SPECT on 12/22, which showed suspicious for progressive neoplasm in the left parietal postoperative bed compared with 10/5/2022 with MR spectroscopy. Tiny focus of neoplasm also likely in the left brainstem, unchanged. Patient was extubated on 12/22. Patient was found to be positive for COVID upon admission and isolated per protocol. Patient has been asymptomatic during this admission and repeat COVID tests on 12/28 and 12/29 were negative. Hospital course was also complicated by agitation, which resolved with standing haldol. He was evaluated for admission to acute inpatient rehab and admitted to [facility] on 12/29/22.    (30 Dec 2022 13:10)      SUBJECTIVE/ROS  Patient seen on AM rounds.  NAD and tolerating rehab.  Discussed discharge pitfalls and he expressed understanding.          PHYSICAL EXAM    VITALS  62y  Vital Signs Last 24 Hrs  T(C): 36.6 (13 Jan 2023 08:15), Max: 36.6 (13 Jan 2023 08:15)  T(F): 97.8 (13 Jan 2023 08:15), Max: 97.8 (13 Jan 2023 08:15)  HR: 73 (13 Jan 2023 08:15) (73 - 96)  BP: 114/75 (13 Jan 2023 08:15) (107/76 - 114/75)  RR: 14 (13 Jan 2023 08:15) (14 - 14)  SpO2: 99% (13 Jan 2023 08:15) (96% - 99%)      RECENT LABS:                          14.1   7.09  )-----------( 134      ( 12 Jan 2023 06:30 )             43.1     01-12    142  |  106  |  24<H>  ----------------------------<  137<H>  4.3   |  30  |  0.84    Ca    9.3      12 Jan 2023 06:30    TPro  5.5<L>  /  Alb  2.6<L>  /  TBili  0.3  /  DBili  x   /  AST  14  /  ALT  53<H>  /  AlkPhos  55  01-12    LIVER FUNCTIONS - ( 12 Jan 2023 06:30 )  Alb: 2.6 g/dL / Pro: 5.5 g/dL / ALK PHOS: 55 U/L / ALT: 53 U/L / AST: 14 U/L / GGT: x                   MEDICATIONS  (STANDING):  bisacodyl 5 milliGRAM(s) Oral every 12 hours  dexAMETHasone     Tablet 4 milliGRAM(s) Oral every 12 hours  enoxaparin Injectable 40 milliGRAM(s) SubCutaneous <User Schedule>  lacosamide 100 milliGRAM(s) Oral two times a day  levETIRAcetam  Solution 1000 milliGRAM(s) Oral two times a day  melatonin 9 milliGRAM(s) Oral at bedtime  mirtazapine 7.5 milliGRAM(s) Oral at bedtime  OLANZapine 5 milliGRAM(s) Oral at bedtime  pantoprazole    Tablet 40 milliGRAM(s) Oral before breakfast  rOPINIRole 0.75 milliGRAM(s) Oral <User Schedule>  senna 2 Tablet(s) Oral at bedtime    MEDICATIONS  (PRN):  acetaminophen     Tablet .. 650 milliGRAM(s) Oral every 6 hours PRN Temp greater or equal to 38C (100.4F), Mild Pain (1 - 3)  bisacodyl Suppository 10 milliGRAM(s) Rectal daily PRN Constipation  OLANZapine 2.5 milliGRAM(s) Oral two times a day PRN agitation

## 2023-01-13 NOTE — PROGRESS NOTE ADULT - ASSESSMENT
Case of 62-year-old male with PMH of GBM s/p resection in 10/22 on Keppra 750 bid followed by Dr. Turpin who presented to the ED at [facility] on 12/21/22 for change in mental status. Patient with seizures, intubated for airway protection and admitted to NSICU.  Patient had MRI brain and MR SPECT on 12/22, which showed suspicious for progressive neoplasm in the left parietal postoperative bed compared with 10/5/2022 with MR spectroscopy. . Patient was found to be positive for COVID upon admission and isolated per protocol. Hospital course was also complicated by agitation, which resolved with standing haldol. He was evaluated for admission to acute inpatient rehab and admitted to [facility] on 12/30/22 with cognitive deficits, gait and ADL impairment.     #GBM with seizures now with Gait Instability, ADL impairments and Functional impairments  - Cont Comprehensive Rehab Program of PT/OT/SLP  -no plan for radiation or chemo therapy during the period of acute rehab, but will need follow up after discharge from rehab  -Continue Dexamethasone 4mg BID  -Continue lacosamide 100mg BID  -Continue Keppra 1000mg BID    Witnessed fall 1/5  --No injury,  no w/u studies needed  --Called pt's friend and HCP, Johnnie to inform, but no answer-- left VM to call back.      #Insomnia  --Cont.  Remeron 7.5mg qhs to target sleep as improved sleep will help improve mood.    -increased Scheduled Requip 0.75mg  in evening 8pm      UTI--resolved  --+u/a--on bactrim--to be Completed 1/9/23.   --urine Culture: + Klebsiella--Sensitive to Bactrim    #Covid  -Noted on 12/21   -Repeat negative on 12/28 and 12/29  -No treatment administered as he was asymptomatic     #Pain control  - Tylenol PRN    #GI/Bowel Mgmt   - Continue Senna at bedtime   -Continue bisacodyl 5mg BID  - Miralax PRN    #Bladder management  - voiding independently     #DVT ppx  - Lovenox      FEN   - Diet - Regular + Thins  [CCHO, DASH/TLC]    - Dysphagia  SLP - evaluation and treatment    Precautions / PROPHYLAXIS:   - Falls, Seizure

## 2023-01-13 NOTE — PROGRESS NOTE ADULT - ASSESSMENT
Mr Ramos is a pleasant 62 year old male with PMH of GBM s/p resection in 10/22 on Keppra 750 bid followed by Dr. Turpin who presented to the ED at [facility] on 12/21/22 for change in mental status. While there he had a witnessed seizure and subsequently intubated for airway protection and admitted to NSICU.  Patient had MRI brain and MR SPECT on 12/22, which showed suspicious for progressive neoplasm in the left parietal postoperative bed compared with 10/5/2022 with MR spectroscopy. . Patient was found to be positive for COVID upon admission and isolated per protocol. Hospital course was also complicated by agitation, which resolved with standing haldol. He was evaluated for admission to acute inpatient rehab and admitted to [facility] on 12/30/22 with cognitive deficits, gait and ADL impairment.     #GBM with seizures   -no plan for radiation or chemo therapy during the period of acute rehab, but will need follow up after discharge from rehab  -Continue Dexamethasone 4mg BID  -Continue lacosamide 100mg BID  -Continue Keppra 100mg BID    #Agitation, improved  #Insomnia  - C/w remeron, zyprexa    #H/o COVID  -Noted on 12/21   -Repeat negative on 12/28 and 12/29  -No treatment administered as he was asymptomatic     #Leukocytosis, resolved  - continue to monitor fever curve    DVT ppx: lovenox

## 2023-01-14 PROCEDURE — 99232 SBSQ HOSP IP/OBS MODERATE 35: CPT

## 2023-01-14 RX ADMIN — SENNA PLUS 2 TABLET(S): 8.6 TABLET ORAL at 21:41

## 2023-01-14 RX ADMIN — Medication 4 MILLIGRAM(S): at 17:30

## 2023-01-14 RX ADMIN — ENOXAPARIN SODIUM 40 MILLIGRAM(S): 100 INJECTION SUBCUTANEOUS at 17:29

## 2023-01-14 RX ADMIN — MIRTAZAPINE 7.5 MILLIGRAM(S): 45 TABLET, ORALLY DISINTEGRATING ORAL at 21:41

## 2023-01-14 RX ADMIN — ROPINIROLE 0.75 MILLIGRAM(S): 8 TABLET, FILM COATED, EXTENDED RELEASE ORAL at 19:58

## 2023-01-14 RX ADMIN — LEVETIRACETAM 1000 MILLIGRAM(S): 250 TABLET, FILM COATED ORAL at 17:30

## 2023-01-14 RX ADMIN — OLANZAPINE 5 MILLIGRAM(S): 15 TABLET, FILM COATED ORAL at 21:41

## 2023-01-14 RX ADMIN — LACOSAMIDE 100 MILLIGRAM(S): 50 TABLET ORAL at 05:24

## 2023-01-14 RX ADMIN — LEVETIRACETAM 1000 MILLIGRAM(S): 250 TABLET, FILM COATED ORAL at 05:23

## 2023-01-14 RX ADMIN — Medication 9 MILLIGRAM(S): at 21:41

## 2023-01-14 RX ADMIN — Medication 5 MILLIGRAM(S): at 05:24

## 2023-01-14 RX ADMIN — PANTOPRAZOLE SODIUM 40 MILLIGRAM(S): 20 TABLET, DELAYED RELEASE ORAL at 05:24

## 2023-01-14 RX ADMIN — Medication 4 MILLIGRAM(S): at 05:24

## 2023-01-14 RX ADMIN — LACOSAMIDE 100 MILLIGRAM(S): 50 TABLET ORAL at 17:29

## 2023-01-14 NOTE — PROGRESS NOTE ADULT - SUBJECTIVE AND OBJECTIVE BOX
Cc: Gait dysfunction    HPI: Patient with no new medical issues today.  Pain controlled, no chest pain, no N/V, no Fevers/Chills. No other new ROS  Has been tolerating rehabilitation program.    acetaminophen     Tablet .. 650 milliGRAM(s) Oral every 6 hours PRN  bisacodyl 5 milliGRAM(s) Oral every 12 hours  bisacodyl Suppository 10 milliGRAM(s) Rectal daily PRN  dexAMETHasone     Tablet 4 milliGRAM(s) Oral every 12 hours  enoxaparin Injectable 40 milliGRAM(s) SubCutaneous <User Schedule>  lacosamide 100 milliGRAM(s) Oral two times a day  levETIRAcetam  Solution 1000 milliGRAM(s) Oral two times a day  melatonin 9 milliGRAM(s) Oral at bedtime  mirtazapine 7.5 milliGRAM(s) Oral at bedtime  OLANZapine 5 milliGRAM(s) Oral at bedtime  OLANZapine 2.5 milliGRAM(s) Oral two times a day PRN  pantoprazole    Tablet 40 milliGRAM(s) Oral before breakfast  rOPINIRole 0.75 milliGRAM(s) Oral <User Schedule>  senna 2 Tablet(s) Oral at bedtime      T(C): 36.6 (01-14-23 @ 07:18), Max: 36.6 (01-14-23 @ 07:18)  HR: 64 (01-14-23 @ 07:18) (64 - 92)  BP: 100/63 (01-14-23 @ 07:18) (100/63 - 109/74)  RR: 16 (01-14-23 @ 07:18) (15 - 16)  SpO2: 96% (01-14-23 @ 07:18) (93% - 96%)    In NAD  HEENT- EOMI  Heart- No cyansosis  Lungs- No respiratory distress   Abd- + BS, NT  Ext- No calf pain  Neuro- Exam unchanged    Imp: Patient with diagnosis of GBM with seizures admitted for comprehensive acute rehabilitation.    Plan:  - Continue PT/OT/SLP  - DVT prophylaxis - Lovenox  - Skin- Turn q2h, check skin daily  - Continue current medications; patient medically stable.   -Active issues-   -Seizure -  keppra, vimpat   -Insomnia - melatonin   - Patient is stable to continue current rehabilitation program.

## 2023-01-14 NOTE — PROGRESS NOTE ADULT - ASSESSMENT
Mr Ramos is a pleasant 62 year old male with PMH of GBM s/p resection in 10/22 on Keppra 750 bid followed by Dr. Turpin who presented to the ED at [facility] on 12/21/22 for change in mental status. While there he had a witnessed seizure and subsequently intubated for airway protection and admitted to NSICU.  Patient had MRI brain and MR SPECT on 12/22, which showed suspicious for progressive neoplasm in the left parietal postoperative bed compared with 10/5/2022 with MR spectroscopy. . Patient was found to be positive for COVID upon admission and isolated per protocol. Hospital course was also complicated by agitation, which resolved with standing haldol. He was evaluated for admission to acute inpatient rehab and admitted to [facility] on 12/30/22 with cognitive deficits, gait and ADL impairment.     #GBM with seizures   -no plan for radiation or chemo therapy during the period of acute rehab, but will need follow up after discharge from rehab  -Continue Dexamethasone 4mg BID  -Continue lacosamide 100mg BID  -Continue Keppra 100mg BID    #Agitation  #Insomnia  - C/w remeron, zyprexa    #H/o COVID  -Noted on 12/21   -Repeat negative on 12/28 and 12/29  -No treatment administered as he was asymptomatic     DVT ppx: lovenox

## 2023-01-14 NOTE — PROGRESS NOTE ADULT - SUBJECTIVE AND OBJECTIVE BOX
Patient is a 62y old  Male who presents with a chief complaint of GBM (14 Jan 2023 11:36)      SUBJECTIVE / OVERNIGHT EVENTS:  Pt seen and examined at bedside. No acute events overnight.    Allergies    No Known Allergies    Intolerances        MEDICATIONS  (STANDING):  bisacodyl 5 milliGRAM(s) Oral every 12 hours  dexAMETHasone     Tablet 4 milliGRAM(s) Oral every 12 hours  enoxaparin Injectable 40 milliGRAM(s) SubCutaneous <User Schedule>  lacosamide 100 milliGRAM(s) Oral two times a day  levETIRAcetam  Solution 1000 milliGRAM(s) Oral two times a day  melatonin 9 milliGRAM(s) Oral at bedtime  mirtazapine 7.5 milliGRAM(s) Oral at bedtime  OLANZapine 5 milliGRAM(s) Oral at bedtime  pantoprazole    Tablet 40 milliGRAM(s) Oral before breakfast  rOPINIRole 0.75 milliGRAM(s) Oral <User Schedule>  senna 2 Tablet(s) Oral at bedtime    MEDICATIONS  (PRN):  acetaminophen     Tablet .. 650 milliGRAM(s) Oral every 6 hours PRN Temp greater or equal to 38C (100.4F), Mild Pain (1 - 3)  bisacodyl Suppository 10 milliGRAM(s) Rectal daily PRN Constipation  OLANZapine 2.5 milliGRAM(s) Oral two times a day PRN agitation      Vital Signs Last 24 Hrs  T(C): 36.6 (14 Jan 2023 07:18), Max: 36.6 (14 Jan 2023 07:18)  T(F): 97.8 (14 Jan 2023 07:18), Max: 97.8 (14 Jan 2023 07:18)  HR: 64 (14 Jan 2023 07:18) (64 - 92)  BP: 100/63 (14 Jan 2023 07:18) (100/63 - 109/74)  BP(mean): --  RR: 16 (14 Jan 2023 07:18) (15 - 16)  SpO2: 96% (14 Jan 2023 07:18) (93% - 96%)    Parameters below as of 14 Jan 2023 07:18  Patient On (Oxygen Delivery Method): room air      CAPILLARY BLOOD GLUCOSE        I&O's Summary      PHYSICAL EXAM:  GENERAL: NAD, well-developed male  HEAD:  Atraumatic, Normocephalic  NECK: Supple, No JVD  CHEST/LUNG: Clear to auscultation bilaterally; No wheeze, nonlabored breathing  HEART: Regular rate and rhythm; No murmurs, rubs, or gallops  ABDOMEN: Soft, Nontender, Nondistended; Bowel sounds present  EXTREMITIES:  No clubbing, cyanosis, or edema  PSYCH: calm, appropriate mood    LABS:                    RADIOLOGY & ADDITIONAL TESTS:  Results Reviewed:   Imaging Personally Reviewed:  Electrocardiogram Personally Reviewed:    COORDINATION OF CARE:  Care Discussed with Consultants/Other Providers [Y/N]:  Prior or Outpatient Records Reviewed [Y/N]:

## 2023-01-15 PROCEDURE — 99232 SBSQ HOSP IP/OBS MODERATE 35: CPT

## 2023-01-15 RX ADMIN — Medication 4 MILLIGRAM(S): at 05:23

## 2023-01-15 RX ADMIN — PANTOPRAZOLE SODIUM 40 MILLIGRAM(S): 20 TABLET, DELAYED RELEASE ORAL at 05:24

## 2023-01-15 RX ADMIN — ROPINIROLE 0.75 MILLIGRAM(S): 8 TABLET, FILM COATED, EXTENDED RELEASE ORAL at 20:17

## 2023-01-15 RX ADMIN — Medication 4 MILLIGRAM(S): at 18:03

## 2023-01-15 RX ADMIN — LACOSAMIDE 100 MILLIGRAM(S): 50 TABLET ORAL at 18:04

## 2023-01-15 RX ADMIN — LEVETIRACETAM 1000 MILLIGRAM(S): 250 TABLET, FILM COATED ORAL at 05:23

## 2023-01-15 RX ADMIN — ENOXAPARIN SODIUM 40 MILLIGRAM(S): 100 INJECTION SUBCUTANEOUS at 18:04

## 2023-01-15 RX ADMIN — MIRTAZAPINE 7.5 MILLIGRAM(S): 45 TABLET, ORALLY DISINTEGRATING ORAL at 21:37

## 2023-01-15 RX ADMIN — Medication 9 MILLIGRAM(S): at 21:38

## 2023-01-15 RX ADMIN — LACOSAMIDE 100 MILLIGRAM(S): 50 TABLET ORAL at 05:23

## 2023-01-15 RX ADMIN — SENNA PLUS 2 TABLET(S): 8.6 TABLET ORAL at 21:37

## 2023-01-15 RX ADMIN — OLANZAPINE 5 MILLIGRAM(S): 15 TABLET, FILM COATED ORAL at 21:37

## 2023-01-15 RX ADMIN — Medication 5 MILLIGRAM(S): at 18:03

## 2023-01-15 RX ADMIN — LEVETIRACETAM 1000 MILLIGRAM(S): 250 TABLET, FILM COATED ORAL at 18:03

## 2023-01-15 RX ADMIN — Medication 5 MILLIGRAM(S): at 05:25

## 2023-01-15 NOTE — PROGRESS NOTE ADULT - SUBJECTIVE AND OBJECTIVE BOX
Patient is a 62y old  Male who presents with a chief complaint of GBM (14 Jan 2023 11:42)      SUBJECTIVE / OVERNIGHT EVENTS:  Pt seen and examined at bedside. No acute events overnight.  Pt denies cp, palpitations, sob, abd pain, N/V, fever, chills.    ROS:  All other review of systems negative    Allergies    No Known Allergies    Intolerances        MEDICATIONS  (STANDING):  bisacodyl 5 milliGRAM(s) Oral every 12 hours  dexAMETHasone     Tablet 4 milliGRAM(s) Oral every 12 hours  enoxaparin Injectable 40 milliGRAM(s) SubCutaneous <User Schedule>  lacosamide 100 milliGRAM(s) Oral two times a day  levETIRAcetam  Solution 1000 milliGRAM(s) Oral two times a day  melatonin 9 milliGRAM(s) Oral at bedtime  mirtazapine 7.5 milliGRAM(s) Oral at bedtime  OLANZapine 5 milliGRAM(s) Oral at bedtime  pantoprazole    Tablet 40 milliGRAM(s) Oral before breakfast  rOPINIRole 0.75 milliGRAM(s) Oral <User Schedule>  senna 2 Tablet(s) Oral at bedtime    MEDICATIONS  (PRN):  acetaminophen     Tablet .. 650 milliGRAM(s) Oral every 6 hours PRN Temp greater or equal to 38C (100.4F), Mild Pain (1 - 3)  bisacodyl Suppository 10 milliGRAM(s) Rectal daily PRN Constipation  OLANZapine 2.5 milliGRAM(s) Oral two times a day PRN agitation      Vital Signs Last 24 Hrs  T(C): 36.5 (15 Johnnie 2023 08:10), Max: 36.5 (15 Johnnie 2023 08:10)  T(F): 97.7 (15 Johnnie 2023 08:10), Max: 97.7 (15 Johnnie 2023 08:10)  HR: 61 (15 Johnnie 2023 08:10) (61 - 74)  BP: 101/57 (15 Johnnie 2023 08:10) (100/62 - 101/57)  BP(mean): --  RR: 16 (15 Johnnie 2023 08:10) (16 - 16)  SpO2: 97% (15 Johnnie 2023 08:10) (95% - 97%)    Parameters below as of 15 Johnnie 2023 08:10  Patient On (Oxygen Delivery Method): room air      CAPILLARY BLOOD GLUCOSE        I&O's Summary      PHYSICAL EXAM:  GENERAL: NAD, well-developed male  HEAD:  Atraumatic, Normocephalic  NECK: Supple, No JVD  CHEST/LUNG: Clear to auscultation bilaterally; No wheeze, nonlabored breathing  HEART: Regular rate and rhythm; No murmurs, rubs, or gallops  ABDOMEN: Soft, Nontender, Nondistended; Bowel sounds present  EXTREMITIES:  No clubbing, cyanosis, or edema  PSYCH: calm, appropriate mood    LABS:                    RADIOLOGY & ADDITIONAL TESTS:  Results Reviewed:   Imaging Personally Reviewed:  Electrocardiogram Personally Reviewed:    COORDINATION OF CARE:  Care Discussed with Consultants/Other Providers [Y/N]:  Prior or Outpatient Records Reviewed [Y/N]:

## 2023-01-15 NOTE — PROGRESS NOTE ADULT - ASSESSMENT
Mr Ramos is a pleasant 62 year old male with PMH of GBM s/p resection in 10/22 on Keppra 750 bid followed by Dr. Turpin who presented to the ED at [facility] on 12/21/22 for change in mental status. While there he had a witnessed seizure and subsequently intubated for airway protection and admitted to NSICU.  Patient had MRI brain and MR SPECT on 12/22, which showed suspicious for progressive neoplasm in the left parietal postoperative bed compared with 10/5/2022 with MR spectroscopy. . Patient was found to be positive for COVID upon admission and isolated per protocol. Hospital course was also complicated by agitation, which resolved with standing haldol. He was evaluated for admission to acute inpatient rehab and admitted to [facility] on 12/30/22 with cognitive deficits, gait and ADL impairment.     #GBM with seizures   -no plan for radiation or chemo therapy during the period of acute rehab, but will need follow up after discharge from rehab  -Continue Dexamethasone 4mg BID  -Continue lacosamide 100mg BID  -Continue Keppra 100mg BID    #Agitation  #Insomnia  - C/w remeron, zyprexa    DVT ppx: lovenox

## 2023-01-15 NOTE — PROGRESS NOTE ADULT - SUBJECTIVE AND OBJECTIVE BOX
Cc: Impaired mobility     HPI: Patient with no new medical issues today.  Pain controlled.  Resting comfortably.    Has been tolerating rehabilitation program.    acetaminophen     Tablet .. 650 milliGRAM(s) Oral every 6 hours PRN  bisacodyl 5 milliGRAM(s) Oral every 12 hours  bisacodyl Suppository 10 milliGRAM(s) Rectal daily PRN  dexAMETHasone     Tablet 4 milliGRAM(s) Oral every 12 hours  enoxaparin Injectable 40 milliGRAM(s) SubCutaneous <User Schedule>  lacosamide 100 milliGRAM(s) Oral two times a day  levETIRAcetam  Solution 1000 milliGRAM(s) Oral two times a day  melatonin 9 milliGRAM(s) Oral at bedtime  mirtazapine 7.5 milliGRAM(s) Oral at bedtime  OLANZapine 5 milliGRAM(s) Oral at bedtime  OLANZapine 2.5 milliGRAM(s) Oral two times a day PRN  pantoprazole    Tablet 40 milliGRAM(s) Oral before breakfast  rOPINIRole 0.75 milliGRAM(s) Oral <User Schedule>  senna 2 Tablet(s) Oral at bedtime      T(C): 36.5 (01-15-23 @ 08:10), Max: 36.5 (01-15-23 @ 08:10)  HR: 61 (01-15-23 @ 08:10) (61 - 74)  BP: 101/57 (01-15-23 @ 08:10) (100/62 - 101/57)  RR: 16 (01-15-23 @ 08:10) (16 - 16)  SpO2: 97% (01-15-23 @ 08:10) (95% - 97%)      In NAD  HEENT- EOMI  Heart- No cyansosis  Lungs- No respiratory distress   Abd- + BS, NT  Ext- No calf pain  Neuro- Exam unchanged    Imp: Patient with diagnosis of GBM with seizures admitted for comprehensive acute rehabilitation.    Plan:  - Continue PT/OT/SLP  - DVT prophylaxis - Lovenox  - Skin- Turn q2h, check skin daily  - Continue current medications; patient medically stable.   -Active issues-   -Seizure -  keppra, vimpat   -Insomnia - melatonin   GBM - dexamethasone   - Patient is stable to continue current rehabilitation program.

## 2023-01-16 PROCEDURE — 99232 SBSQ HOSP IP/OBS MODERATE 35: CPT

## 2023-01-16 RX ADMIN — Medication 4 MILLIGRAM(S): at 06:29

## 2023-01-16 RX ADMIN — PANTOPRAZOLE SODIUM 40 MILLIGRAM(S): 20 TABLET, DELAYED RELEASE ORAL at 06:30

## 2023-01-16 RX ADMIN — LEVETIRACETAM 1000 MILLIGRAM(S): 250 TABLET, FILM COATED ORAL at 17:29

## 2023-01-16 RX ADMIN — OLANZAPINE 5 MILLIGRAM(S): 15 TABLET, FILM COATED ORAL at 21:02

## 2023-01-16 RX ADMIN — Medication 4 MILLIGRAM(S): at 17:29

## 2023-01-16 RX ADMIN — ROPINIROLE 0.75 MILLIGRAM(S): 8 TABLET, FILM COATED, EXTENDED RELEASE ORAL at 19:41

## 2023-01-16 RX ADMIN — LEVETIRACETAM 1000 MILLIGRAM(S): 250 TABLET, FILM COATED ORAL at 06:29

## 2023-01-16 RX ADMIN — LACOSAMIDE 100 MILLIGRAM(S): 50 TABLET ORAL at 17:29

## 2023-01-16 RX ADMIN — ENOXAPARIN SODIUM 40 MILLIGRAM(S): 100 INJECTION SUBCUTANEOUS at 17:29

## 2023-01-16 RX ADMIN — LACOSAMIDE 100 MILLIGRAM(S): 50 TABLET ORAL at 06:30

## 2023-01-16 RX ADMIN — MIRTAZAPINE 7.5 MILLIGRAM(S): 45 TABLET, ORALLY DISINTEGRATING ORAL at 21:02

## 2023-01-16 RX ADMIN — SENNA PLUS 2 TABLET(S): 8.6 TABLET ORAL at 21:01

## 2023-01-16 RX ADMIN — Medication 5 MILLIGRAM(S): at 17:30

## 2023-01-16 RX ADMIN — Medication 5 MILLIGRAM(S): at 06:29

## 2023-01-16 RX ADMIN — Medication 9 MILLIGRAM(S): at 21:02

## 2023-01-16 NOTE — PROGRESS NOTE ADULT - SUBJECTIVE AND OBJECTIVE BOX
Cc: Impaired mobility     HPI: Patient with no new medical issues today.  Pain controlled, no chest pain, no N/V, no Fevers/Chills. No other new ROS  Has been tolerating rehabilitation program.    acetaminophen     Tablet .. 650 milliGRAM(s) Oral every 6 hours PRN  bisacodyl 5 milliGRAM(s) Oral every 12 hours  bisacodyl Suppository 10 milliGRAM(s) Rectal daily PRN  dexAMETHasone     Tablet 4 milliGRAM(s) Oral every 12 hours  enoxaparin Injectable 40 milliGRAM(s) SubCutaneous <User Schedule>  lacosamide 100 milliGRAM(s) Oral two times a day  levETIRAcetam  Solution 1000 milliGRAM(s) Oral two times a day  melatonin 9 milliGRAM(s) Oral at bedtime  mirtazapine 7.5 milliGRAM(s) Oral at bedtime  OLANZapine 5 milliGRAM(s) Oral at bedtime  OLANZapine 2.5 milliGRAM(s) Oral two times a day PRN  pantoprazole    Tablet 40 milliGRAM(s) Oral before breakfast  rOPINIRole 0.75 milliGRAM(s) Oral <User Schedule>  senna 2 Tablet(s) Oral at bedtime      T(C): 36.4 (01-16-23 @ 08:14), Max: 36.4 (01-15-23 @ 20:19)  HR: 67 (01-16-23 @ 08:14) (67 - 81)  BP: 100/66 (01-16-23 @ 08:14) (100/66 - 106/67)  RR: 16 (01-16-23 @ 08:14) (15 - 16)  SpO2: 98% (01-16-23 @ 08:14) (96% - 98%)    In NAD  HEENT- EOMI  Heart- No cyanosis  Lungs- No respiratory distress   Abd- + BS, NT  Ext- No calf pain  Neuro- Exam unchanged    Imp: Patient with diagnosis of GBM with seizures admitted for comprehensive acute rehabilitation.    Plan:  - Continue PT/OT/SLP  - DVT prophylaxis - Lovenox  - Skin- Turn q2h, check skin daily  - Continue current medications; patient medically stable.   -Active issues-   -Seizure -  keppra, vimpat   -Insomnia - melatonin   -GBM - dexamethasone   -Constipation - senna, bisacodyl   - Patient is stable to continue current rehabilitation program.

## 2023-01-16 NOTE — PROGRESS NOTE ADULT - SUBJECTIVE AND OBJECTIVE BOX
Patient is a 62y old  Male who presents with a chief complaint of GBM (15 Johnnie 2023 11:23)      SUBJECTIVE / OVERNIGHT EVENTS:  Pt seen and examined at bedside. No acute events overnight.  Pt denies cp, palpitations, sob, abd pain, N/V, fever, chills.    ROS:  All other review of systems negative    Allergies    No Known Allergies    Intolerances        MEDICATIONS  (STANDING):  bisacodyl 5 milliGRAM(s) Oral every 12 hours  dexAMETHasone     Tablet 4 milliGRAM(s) Oral every 12 hours  enoxaparin Injectable 40 milliGRAM(s) SubCutaneous <User Schedule>  lacosamide 100 milliGRAM(s) Oral two times a day  levETIRAcetam  Solution 1000 milliGRAM(s) Oral two times a day  melatonin 9 milliGRAM(s) Oral at bedtime  mirtazapine 7.5 milliGRAM(s) Oral at bedtime  OLANZapine 5 milliGRAM(s) Oral at bedtime  pantoprazole    Tablet 40 milliGRAM(s) Oral before breakfast  rOPINIRole 0.75 milliGRAM(s) Oral <User Schedule>  senna 2 Tablet(s) Oral at bedtime    MEDICATIONS  (PRN):  acetaminophen     Tablet .. 650 milliGRAM(s) Oral every 6 hours PRN Temp greater or equal to 38C (100.4F), Mild Pain (1 - 3)  bisacodyl Suppository 10 milliGRAM(s) Rectal daily PRN Constipation  OLANZapine 2.5 milliGRAM(s) Oral two times a day PRN agitation      Vital Signs Last 24 Hrs  T(C): 36.4 (16 Jan 2023 08:14), Max: 36.4 (15 Johnnie 2023 20:19)  T(F): 97.5 (16 Jan 2023 08:14), Max: 97.6 (15 Johnnie 2023 20:19)  HR: 67 (16 Jan 2023 08:14) (67 - 81)  BP: 100/66 (16 Jan 2023 08:14) (100/66 - 106/67)  BP(mean): --  RR: 16 (16 Jan 2023 08:14) (15 - 16)  SpO2: 98% (16 Jan 2023 08:14) (96% - 98%)    Parameters below as of 16 Jan 2023 08:14  Patient On (Oxygen Delivery Method): room air      CAPILLARY BLOOD GLUCOSE        I&O's Summary    15 Johnnie 2023 07:01  -  16 Jan 2023 07:00  --------------------------------------------------------  IN: 0 mL / OUT: 1 mL / NET: -1 mL        PHYSICAL EXAM:  GENERAL: NAD, well-developed male  HEAD:  Atraumatic, Normocephalic  NECK: Supple, No JVD  CHEST/LUNG: Clear to auscultation bilaterally; No wheeze, nonlabored breathing  HEART: Regular rate and rhythm; No murmurs, rubs, or gallops  ABDOMEN: Soft, Nontender, Nondistended; Bowel sounds present  EXTREMITIES:  No clubbing, cyanosis, or edema  PSYCH: calm, appropriate mood    LABS:                    RADIOLOGY & ADDITIONAL TESTS:  Results Reviewed:   Imaging Personally Reviewed:  Electrocardiogram Personally Reviewed:    COORDINATION OF CARE:  Care Discussed with Consultants/Other Providers [Y/N]:  Prior or Outpatient Records Reviewed [Y/N]:

## 2023-01-16 NOTE — PROGRESS NOTE ADULT - ASSESSMENT
Mr Ramos is a pleasant 62 year old male with PMH of GBM s/p resection in 10/22 on Keppra 750 bid followed by Dr. Turpin who presented to the ED at [facility] on 12/21/22 for change in mental status. While there he had a witnessed seizure and subsequently intubated for airway protection and admitted to NSICU.  Patient had MRI brain and MR SPECT on 12/22, which showed suspicious for progressive neoplasm in the left parietal postoperative bed compared with 10/5/2022 with MR spectroscopy. . Patient was found to be positive for COVID upon admission and isolated per protocol. Hospital course was also complicated by agitation, which resolved with standing haldol. He was evaluated for admission to acute inpatient rehab and admitted to [facility] on 12/30/22 with cognitive deficits, gait and ADL impairment.     #GBM with seizures   -no plan for radiation or chemo therapy during the period of acute rehab, but will need follow up after discharge from rehab  -Continue Dexamethasone 4mg BID  -Continue lacosamide 100mg BID  -Continue Keppra 100mg BID    #Agitation  #Insomnia  - C/w remeron, zyprexa    DVT ppx: lovenox

## 2023-01-17 ENCOUNTER — APPOINTMENT (OUTPATIENT)
Dept: MRI IMAGING | Facility: IMAGING CENTER | Age: 63
End: 2023-01-17

## 2023-01-17 LAB
ALBUMIN SERPL ELPH-MCNC: 2.7 G/DL — LOW (ref 3.3–5)
ALP SERPL-CCNC: 60 U/L — SIGNIFICANT CHANGE UP (ref 40–120)
ALT FLD-CCNC: 40 U/L — SIGNIFICANT CHANGE UP (ref 10–45)
ANION GAP SERPL CALC-SCNC: 5 MMOL/L — SIGNIFICANT CHANGE UP (ref 5–17)
AST SERPL-CCNC: 14 U/L — SIGNIFICANT CHANGE UP (ref 10–40)
BILIRUB SERPL-MCNC: 0.2 MG/DL — SIGNIFICANT CHANGE UP (ref 0.2–1.2)
BUN SERPL-MCNC: 25 MG/DL — HIGH (ref 7–23)
CALCIUM SERPL-MCNC: 9 MG/DL — SIGNIFICANT CHANGE UP (ref 8.4–10.5)
CHLORIDE SERPL-SCNC: 103 MMOL/L — SIGNIFICANT CHANGE UP (ref 96–108)
CO2 SERPL-SCNC: 31 MMOL/L — SIGNIFICANT CHANGE UP (ref 22–31)
CREAT SERPL-MCNC: 0.73 MG/DL — SIGNIFICANT CHANGE UP (ref 0.5–1.3)
EGFR: 102 ML/MIN/1.73M2 — SIGNIFICANT CHANGE UP
GLUCOSE SERPL-MCNC: 120 MG/DL — HIGH (ref 70–99)
HCT VFR BLD CALC: 41.9 % — SIGNIFICANT CHANGE UP (ref 39–50)
HGB BLD-MCNC: 13.6 G/DL — SIGNIFICANT CHANGE UP (ref 13–17)
MCHC RBC-ENTMCNC: 28.5 PG — SIGNIFICANT CHANGE UP (ref 27–34)
MCHC RBC-ENTMCNC: 32.5 GM/DL — SIGNIFICANT CHANGE UP (ref 32–36)
MCV RBC AUTO: 87.7 FL — SIGNIFICANT CHANGE UP (ref 80–100)
NRBC # BLD: 0 /100 WBCS — SIGNIFICANT CHANGE UP (ref 0–0)
PLATELET # BLD AUTO: 131 K/UL — LOW (ref 150–400)
POTASSIUM SERPL-MCNC: 4 MMOL/L — SIGNIFICANT CHANGE UP (ref 3.5–5.3)
POTASSIUM SERPL-SCNC: 4 MMOL/L — SIGNIFICANT CHANGE UP (ref 3.5–5.3)
PROT SERPL-MCNC: 5.6 G/DL — LOW (ref 6–8.3)
RBC # BLD: 4.78 M/UL — SIGNIFICANT CHANGE UP (ref 4.2–5.8)
RBC # FLD: 15 % — HIGH (ref 10.3–14.5)
SODIUM SERPL-SCNC: 139 MMOL/L — SIGNIFICANT CHANGE UP (ref 135–145)
WBC # BLD: 10.36 K/UL — SIGNIFICANT CHANGE UP (ref 3.8–10.5)
WBC # FLD AUTO: 10.36 K/UL — SIGNIFICANT CHANGE UP (ref 3.8–10.5)

## 2023-01-17 PROCEDURE — 99232 SBSQ HOSP IP/OBS MODERATE 35: CPT

## 2023-01-17 RX ADMIN — Medication 5 MILLIGRAM(S): at 06:07

## 2023-01-17 RX ADMIN — LEVETIRACETAM 1000 MILLIGRAM(S): 250 TABLET, FILM COATED ORAL at 06:07

## 2023-01-17 RX ADMIN — LACOSAMIDE 100 MILLIGRAM(S): 50 TABLET ORAL at 06:07

## 2023-01-17 RX ADMIN — LACOSAMIDE 100 MILLIGRAM(S): 50 TABLET ORAL at 17:57

## 2023-01-17 RX ADMIN — Medication 4 MILLIGRAM(S): at 06:07

## 2023-01-17 RX ADMIN — MIRTAZAPINE 7.5 MILLIGRAM(S): 45 TABLET, ORALLY DISINTEGRATING ORAL at 21:50

## 2023-01-17 RX ADMIN — LEVETIRACETAM 1000 MILLIGRAM(S): 250 TABLET, FILM COATED ORAL at 17:57

## 2023-01-17 RX ADMIN — SENNA PLUS 2 TABLET(S): 8.6 TABLET ORAL at 21:49

## 2023-01-17 RX ADMIN — Medication 9 MILLIGRAM(S): at 21:49

## 2023-01-17 RX ADMIN — ENOXAPARIN SODIUM 40 MILLIGRAM(S): 100 INJECTION SUBCUTANEOUS at 17:58

## 2023-01-17 RX ADMIN — Medication 4 MILLIGRAM(S): at 17:58

## 2023-01-17 RX ADMIN — Medication 5 MILLIGRAM(S): at 17:56

## 2023-01-17 RX ADMIN — ROPINIROLE 0.75 MILLIGRAM(S): 8 TABLET, FILM COATED, EXTENDED RELEASE ORAL at 20:36

## 2023-01-17 RX ADMIN — OLANZAPINE 5 MILLIGRAM(S): 15 TABLET, FILM COATED ORAL at 21:50

## 2023-01-17 RX ADMIN — PANTOPRAZOLE SODIUM 40 MILLIGRAM(S): 20 TABLET, DELAYED RELEASE ORAL at 06:07

## 2023-01-17 NOTE — PROGRESS NOTE ADULT - ASSESSMENT
Case of 62-year-old male with PMH of GBM s/p resection in 10/22 on Keppra 750 bid followed by Dr. Turpin who presented to the ED at [facility] on 12/21/22 for change in mental status. Patient with seizures, intubated for airway protection and admitted to NSICU.  Patient had MRI brain and MR SPECT on 12/22, which showed suspicious for progressive neoplasm in the left parietal postoperative bed compared with 10/5/2022 with MR spectroscopy. . Patient was found to be positive for COVID upon admission and isolated per protocol. Hospital course was also complicated by agitation, which resolved with standing haldol. He was evaluated for admission to acute inpatient rehab and admitted to [facility] on 12/30/22 with cognitive deficits, gait and ADL impairment.     #GBM with seizures now with Gait Instability, ADL impairments and Functional impairments  - Cont Comprehensive Rehab Program of PT/OT/SLP  -no plan for radiation or chemo therapy during the period of acute rehab, but will need follow up after discharge from rehab  -Continue Dexamethasone 4mg BID  -Continue lacosamide 100mg BID  -Continue Keppra 1000mg BID    Witnessed fall 1/5  --No injury,  no w/u studies needed  --Called pt's friend and HCP, Johnnie to inform, but no answer-- left VM to call back.      #Insomnia - resolved  --Cont.  Remeron 7.5mg qhs to target sleep as improved sleep will help improve mood.    -increased Scheduled Requip 0.75mg  in evening 8pm      UTI--resolved  --+u/a--on bactrim--to be Completed 1/9/23.   --urine Culture: + Klebsiella--Sensitive to Bactrim    #Covid  -Noted on 12/21   -Repeat negative on 12/28 and 12/29  -No treatment administered as he was asymptomatic     #Pain control  - Tylenol PRN    #GI/Bowel Mgmt   - Continue Senna at bedtime   -Continue bisacodyl 5mg BID  - Miralax PRN    #Bladder management  - voiding independently     #DVT ppx  - Lovenox      FEN   - Diet - Regular + Thins  [CCHO, DASH/TLC]    - Dysphagia  SLP - evaluation and treatment    Precautions / PROPHYLAXIS:   - Falls, Seizure     IDT 1/12/23:  SW: Lives with mother in  with 3 SHAQ,  awaiting [facility] approval  Speech: Reg/thin diet; Severe cognitive deficits-- Poor insight, decreased judgment  OT: Independent eating; CS LBD; CG toilet transfer and toileting, Poor safety awareness  PT: CG transfers, ambulation 150ft RW, and 12 steps with 1 rail.  right VF deficit, poor insight and safety  Goals: 1. Ambulate to bathroom and toileting with supervision.  2. Consistent call bell and safety strategies with supervision  ELOS: 1/13/2023. pending A setup     Case of 62-year-old male with PMH of GBM s/p resection in 10/22 on Keppra 750 bid followed by Dr. Turpin who presented to the ED at [facility] on 12/21/22 for change in mental status. Patient with seizures, intubated for airway protection and admitted to NSICU.  Patient had MRI brain and MR SPECT on 12/22, which showed suspicious for progressive neoplasm in the left parietal postoperative bed compared with 10/5/2022 with MR spectroscopy. . Patient was found to be positive for COVID upon admission and isolated per protocol. Hospital course was also complicated by agitation, which resolved with standing haldol. He was evaluated for admission to acute inpatient rehab and admitted to [facility] on 12/30/22 with cognitive deficits, gait and ADL impairment.     #GBM with seizures now with Gait Instability, ADL impairments and Functional impairments  - Cont Comprehensive Rehab Program of PT/OT/SLP  -no plan for radiation or chemo therapy during the period of acute rehab, but will need follow up after discharge from rehab  -Continue Dexamethasone 4mg BID  -Continue lacosamide 100mg BID  -Continue Keppra 1000mg BID    Witnessed fall 1/5  --No injury,  no w/u studies needed  --Called pt's friend and HCP, Johnnie to inform, but no answer-- left VM to call back.      #Insomnia - resolved  --Cont.  Remeron 7.5mg qhs to target sleep as improved sleep will help improve mood.    -increased Scheduled Requip 0.75mg  in evening 8pm      UTI--resolved  --+u/a--on bactrim--to be Completed 1/9/23.   --urine Culture: + Klebsiella--Sensitive to Bactrim    #Covid  -Noted on 12/21   -Repeat negative on 12/28 and 12/29  -No treatment administered as he was asymptomatic     #Pain control  - Tylenol PRN    #GI/Bowel Mgmt   - Continue Senna at bedtime   -Continue bisacodyl 5mg BID  - Miralax PRN    #Bladder management  - voiding independently     #DVT ppx  - Lovenox      FEN   - Diet - Regular + Thins  [CCHO, DASH/TLC]    - Dysphagia  SLP - evaluation and treatment    Precautions / PROPHYLAXIS:   - Falls, Seizure     IDT 1/12/23:  SW: Lives with mother in  with 3 SHAQ,  awaiting [facility] approval  Speech: Reg/thin diet; Severe cognitive deficits-- Poor insight, decreased judgment  OT: Independent eating; CS LBD; CG toilet transfer and toileting, Poor safety awareness  PT: CG transfers, ambulation 150ft RW, and 12 steps with 1 rail.  right VF deficit, poor insight and safety  Goals: 1. Ambulate to bathroom and toileting with supervision.  2. Consistent call bell and safety strategies with supervision  ELOS: medical stable for discharge home pending HHA setup     Case of 62-year-old male with PMH of GBM s/p resection in 10/22 on Keppra 750 bid followed by Dr. Turpin who presented to the ED at [facility] on 12/21/22 for change in mental status. Patient with seizures, intubated for airway protection and admitted to NSICU.  Patient had MRI brain and MR SPECT on 12/22, which showed suspicious for progressive neoplasm in the left parietal postoperative bed compared with 10/5/2022 with MR spectroscopy. . Patient was found to be positive for COVID upon admission and isolated per protocol. Hospital course was also complicated by agitation, which resolved with standing haldol. He was evaluated for admission to acute inpatient rehab and admitted to [facility] on 12/30/22 with cognitive deficits, gait and ADL impairment.     #GBM with seizures now with Gait Instability, ADL impairments and Functional impairments  - Cont Comprehensive Rehab Program of PT/OT/SLP  -no plan for radiation or chemo therapy during the period of acute rehab, but will need follow up after discharge from rehab  -Continue Dexamethasone 4mg BID  -Continue lacosamide 100mg BID  -Continue Keppra 1000mg BID      #Insomnia - resolved  --Cont.  Remeron 7.5mg qhs to target sleep as improved sleep will help improve mood.    -cont. Requip 0.75mg  in evening 8pm      #Covid  -Noted on 12/21   -Repeat negative on 12/28 and 12/29  -No treatment administered as he was asymptomatic     #Pain control  - Tylenol PRN    #GI/Bowel Mgmt   - Continue Senna at bedtime   -Continue bisacodyl 5mg BID  - Miralax PRN    #Bladder management  - voiding independently     #DVT ppx  - Lovenox      FEN   - Diet - Regular + Thins  [CCHO, DASH/TLC]    - Dysphagia  SLP - evaluation and treatment    Precautions / PROPHYLAXIS:   - Falls, Seizure     IDT 1/12/23:  SW: Lives with mother in  with 3 SHAQ,  awaiting [facility] approval  Speech: Reg/thin diet; Severe cognitive deficits-- Poor insight, decreased judgment  OT: Independent eating; CS LBD; CG toilet transfer and toileting, Poor safety awareness  PT: CG transfers, ambulation 150ft RW, and 12 steps with 1 rail.  right VF deficit, poor insight and safety  Goals: 1. Ambulate to bathroom and toileting with supervision.  2. Consistent call bell and safety strategies with supervision  NANCY: medical stable for discharge home pending HHA setup

## 2023-01-17 NOTE — PROGRESS NOTE ADULT - ATTENDING COMMENTS
Patient was seen and evaluated today as well as by resident physician. Agree with documentation above as per resident with amendments made as appropriate. Patient medically stable. Making progress towards rehab goals.
Patient was seen and evaluated today while at gym participating in occupational therapy. Noted preoccupation regarding documentation for home care. Re-assured on it being provided expeditiously. Agree with documentation above as per resident with amendments made as appropriate. Patient medically stable. Making progress towards rehab goals.     GBM--   agitation--  -- Perseverating more today on planning prior to going home  -- Psychiatry consulted and pending
Patient seen and evaluated and all questions regarding discharge and care to be provided in the interim period. Expressed understanding and agreement
Pt. seen with resident.  Agree with documentation above as per resident with amendments made as appropriate. Patient medically stable. Making progress towards rehab goals.       GBM  stable.
Pt. seen with resident.  Agree with documentation above as per resident with amendments made as appropriate. Patient medically stable. Making progress towards rehab goals.     GBM--   agitation--  -- Perseverating more today on going home-- mentioned signing out AMA,   --Psychiatry consulted-- spoke with Dr. De Los Santos regarding pt--  ---Psych notes RLS SE of haldol.   Rec. adding Zyprexa 5mg qhs for pt.    --cont. remeron 7.5mg and Requip

## 2023-01-17 NOTE — CHART NOTE - NSCHARTNOTEFT_GEN_A_CORE
[facility] Plan of Care    REHABILITATION DIAGNOSIS:  Malignant neoplasm of brain          COMORBIDITIES/COMPLICATING CONDITIONS IMPACTING REHABILITATION:  HEALTH ISSUES - PROBLEM Dx:        PAST MEDICAL & SURGICAL HISTORY:  No pertinent past medical history      No significant past surgical history          Based upon consideration of the patient's impairments, functional status, complicating conditions and any other contributing factors and after information garnered from the assessments of all therapy disciplines involved in treating the patient and other pertinent clinicians:    INTERDISCIPLINARY REHABILITATION INTERVENTIONS:    [ X  ] Transfer Training  [ X  ] Bed Mobility  [ X  ] Therapeutic Exercise  [ X ] Balance/Coordination Exercises  [ X ] Locomotion retraining  [ X  ] Stairs  [  X ] Functional Transfer Training  [ X  ] Bowel/Bladder program  [ X  ] Pain Management  [ X  ] Skin/Wound Care  [ X  ] Visual/Perceptual Training  [ X  ] Therapeutic Recreation Activities  [  X ] Neuromuscular Re-education  [ X  ] Activities of Daily Living  [ X  ] Speech Exercise  [X   ] Swallowing Exercises  [   ] Vital Stim  [   ] Dietary Supplements  [   ] Calorie Count  [ X  ] Cognitive Exercises  [  X ] Congnitive/Linguistic Treatment  [  x ] Behavior Program  [  x ] Neuropsych Therapy  [ X  ] Patient/Family Counseling  [ X ] Family Training  [ X  ] Community Re-entry  [   ] Orthotic Evaluation  [   ] Prosthetic Eval/Training    MEDICAL PROGNOSIS: good      REHAB POTENTIAL:  Good    EXPECTED DAILY THERAPY:  3 hours/day           ESTIMATED LOS:  [  ] 5-7 Days  [ x ] 7-10 Days  [ x ] 10- 14 Days  [  ] 14- 18 Days  [  ] 18- 21 Days    ESTIMATED DISPOSITION:  [  ] Home   [ x ] Home with Outpatient Therapies  [ x ] Home with Home Therapies  [  ] Assisted Living  [  ] Nursing Home  [  ] Long Term Acute Care    INTERDISCIPLINARY FUNCTIONAL OUTCOMES/GOALS:         Gait/Mobility: 5       Transfers: 5       ADLs: 5       Functional Transfers: 5       Medication Management: 4       Communication: 5       Cognitive: 5       Dysphagia: 7       Bladder 7       Bowel: 7     Functional Independent Measures:   7 = Independent  6 = Modified Independent  5 = Supervision  4 = Minimal Assist/ Contact Guard  3 = Moderate Assistance  2 = Maximum Assistance  1 = Total Assistance  0 = Unable to assess
IDT 1/12/23:  SW: Lives with mother in  with 3 SH[facility],  awaiting HHA approval  Speech: Reg/thin diet; Severe cognitive deficits-- Poor insight, decreased judgment  OT: Independent eating; CS LBD; CG toilet transfer and toileting, Poor safety awareness  PT: CG transfers, ambulation 150ft RW, and 12 steps with 1 rail.  right VF deficit, poor insight and safety  Goals: 1. Ambulate to bathroom and toileting with supervision.  2. Consistent call bell and safety strategies with supervision  ELOS: 1/13/2023. pending HHA setup
Nutrition Follow Up Note  Hospital Course   (Per Electronic Medical Record)    Source:  Patient [X]  Nursing Staff [X]   Medical Record [X]      Diet: Diet, Regular (22 @ 15:06) [Active]    Nutrition Follow Up: Patient reports good appetite. Adequate PO intakes at this time, consuming % of meals per nursing flow sheets. No edema noted. Last BM noted on 11/15/23. Skin intact. No issues with chewing or swallowing. NKFA. Food preferences obtained and noted in patient diet kardex.     Enteral/Parenteral Nutrition: Not Applicable    Weight Trends:  Weight in k.7 (15 Johnnie 2023 00:00)  Weight in k.9 (2023 01:00)      Skin: Intact    Edema: none noted    Last Bowel Movement: 11/15/23    Pertinent Medications: MEDICATIONS  (STANDING):  bisacodyl 5 milliGRAM(s) Oral every 12 hours  dexAMETHasone     Tablet 4 milliGRAM(s) Oral every 12 hours  enoxaparin Injectable 40 milliGRAM(s) SubCutaneous <User Schedule>  lacosamide 100 milliGRAM(s) Oral two times a day  levETIRAcetam  Solution 1000 milliGRAM(s) Oral two times a day  melatonin 9 milliGRAM(s) Oral at bedtime  mirtazapine 7.5 milliGRAM(s) Oral at bedtime  OLANZapine 5 milliGRAM(s) Oral at bedtime  pantoprazole    Tablet 40 milliGRAM(s) Oral before breakfast  rOPINIRole 0.75 milliGRAM(s) Oral <User Schedule>  senna 2 Tablet(s) Oral at bedtime    MEDICATIONS  (PRN):  acetaminophen     Tablet .. 650 milliGRAM(s) Oral every 6 hours PRN Temp greater or equal to 38C (100.4F), Mild Pain (1 - 3)  bisacodyl Suppository 10 milliGRAM(s) Rectal daily PRN Constipation  OLANZapine 2.5 milliGRAM(s) Oral two times a day PRN agitation      Pertinent Labs:   Na139 mmol/L Glu 120 mg/dL<H> K+ 4.0 mmol/L Cr  0.73 mg/dL BUN 25 mg/dL<H>  Alb 2.7 g/dL<L>            Estimated Needs:   [X] No Change Since Previous Assessment    Previous Nutrition Diagnosis:   No active nutrition Dx at this present time    Nutrition Diagnosis is [X] Ongoing -     Interventions:   1. Recommend continue current nutrition plan of care as tolerated  2. Honor patients daily food preferences as feasible with prescribed diet     Monitoring & Evaluation:   [X] Weights   [X] PO Intake   [X] Skin Integrity   [X] Follow Up (Per Protocol)  [X] Tolerance to Diet Prescription   [X] Other: Labs & POCT    Registered Dietitian/Nutritionist Remains Available.  Todd Zapata RD, CDN    Phone# [phone]
Nutrition Follow Up Note  Hospital Course   (Per Electronic Medical Record)    Source:  Patient [X]  Nursing Staff [X]   Medical Record [X]      Diet: Diet, Regular (12-29-22 @ 15:06) [Active]    Nutrition Follow Up: Patient with adequate oral intakes at this time. Denies any chewing or swallowing difficulties. No known food allergies. Tolerating regular diet. Food preferences obtained. Recommend continue current nutrition plan of care as tolerated.    Enteral/Parenteral Nutrition: Not Applicable    START [id number]    141  |  105  |  23  ----------------------------<  111<H>  4.3   |  28  |  0.90    Ca    9.0      10 Johnnie 2023 06:45    TPro  5.7<L>  /  Alb  2.6<L>  /  TBili  0.6  /  DBili  x   /  AST  15  /  ALT  47<H>  /  AlkPhos  55  01-10  A1C with Estimated Average Glucose Result: 5.7 % (12-22-22 @ 02:21)  END CHEMFISH  START MEDSACTIVEMEDICATIONS  (STANDING):  bisacodyl 5 milliGRAM(s) Oral every 12 hours  dexAMETHasone     Tablet 4 milliGRAM(s) Oral every 12 hours  enoxaparin Injectable 40 milliGRAM(s) SubCutaneous <User Schedule>  lacosamide 100 milliGRAM(s) Oral two times a day  levETIRAcetam  Solution 1000 milliGRAM(s) Oral two times a day  melatonin 9 milliGRAM(s) Oral at bedtime  mirtazapine 7.5 milliGRAM(s) Oral at bedtime  OLANZapine 5 milliGRAM(s) Oral at bedtime  pantoprazole    Tablet 40 milliGRAM(s) Oral before breakfast  rOPINIRole 0.75 milliGRAM(s) Oral <User Schedule>  senna 2 Tablet(s) Oral at bedtime    MEDICATIONS  (PRN):  acetaminophen     Tablet .. 650 milliGRAM(s) Oral every 6 hours PRN Temp greater or equal to 38C (100.4F), Mild Pain (1 - 3)  bisacodyl Suppository 10 milliGRAM(s) Rectal daily PRN Constipation  OLANZapine 2.5 milliGRAM(s) Oral two times a day PRN agitation  END MEDSACTIVE  START DIETORDEREND DIETORDER  START ADMITDXMalignant neoplasm of brain    END ADMITDX  START IOFSEND IOFS  START SKINPUEND SKINPU    Pertinent Medications: MEDICATIONS  (STANDING):  bisacodyl 5 milliGRAM(s) Oral every 12 hours  dexAMETHasone     Tablet 4 milliGRAM(s) Oral every 12 hours  enoxaparin Injectable 40 milliGRAM(s) SubCutaneous <User Schedule>  lacosamide 100 milliGRAM(s) Oral two times a day  levETIRAcetam  Solution 1000 milliGRAM(s) Oral two times a day  melatonin 9 milliGRAM(s) Oral at bedtime  mirtazapine 7.5 milliGRAM(s) Oral at bedtime  OLANZapine 5 milliGRAM(s) Oral at bedtime  pantoprazole    Tablet 40 milliGRAM(s) Oral before breakfast  rOPINIRole 0.75 milliGRAM(s) Oral <User Schedule>  senna 2 Tablet(s) Oral at bedtime    MEDICATIONS  (PRN):  acetaminophen     Tablet .. 650 milliGRAM(s) Oral every 6 hours PRN Temp greater or equal to 38C (100.4F), Mild Pain (1 - 3)  bisacodyl Suppository 10 milliGRAM(s) Rectal daily PRN Constipation  OLANZapine 2.5 milliGRAM(s) Oral two times a day PRN agitation      Pertinent Labs:  01-10 Na141 mmol/L Glu 111 mg/dL<H> K+ 4.3 mmol/L Cr  0.90 mg/dL BUN 23 mg/dL 01-10 Alb 2.6 g/dL<L>          Weight Trends:  87.4 kg (12/30)      Skin: Intact    Edema: No edema noted    Last Bowel Movement: 1/9/23    Estimated Needs:   [X] No Change Since Previous Assessment    Previous Nutrition Diagnosis:   No active nutrition Dx at this present time    Nutrition Diagnosis is [X] Ongoing -       Interventions:   1. Recommend continue current nutrition plan of care as tolerated     Monitoring & Evaluation:   [X] Weights   [X] PO Intake   [X] Skin Integrity   [X] Follow Up (Per Protocol)  [X] Tolerance to Diet Prescription   [X] Other: Labs & POCT    Registered Dietitian/Nutritionist Remains Available.  Todd Zapata RD, CDN    Phone# [phone]

## 2023-01-17 NOTE — PROGRESS NOTE ADULT - ASSESSMENT
62 year old male with PMH of GBM s/p resection in 10/22 on Keppra 750 bid followed by Dr. Turpin who presented to the ED at [facility] on 12/21/22 for change in mental status. While there he had a witnessed seizure and subsequently intubated for airway protection and admitted to NSICU.  Patient had MRI brain and MR SPECT on 12/22, which showed suspicious for progressive neoplasm in the left parietal postoperative bed compared with 10/5/2022 with MR spectroscopy. . Patient was found to be positive for COVID upon admission and isolated per protocol. Hospital course was also complicated by agitation, which resolved with standing haldol. He was evaluated for admission to acute inpatient rehab and admitted to [facility] on 12/30/22 with cognitive deficits, gait and ADL impairment.     #GBM with seizures   - no plan for radiation or chemo therapy during the period of acute rehab, but will need follow up after discharge from rehab  - Continue Dexamethasone 4mg BID  - Continue lacosamide 100mg BID  - Continue Keppra 100mg BID    #Agitation  #Insomnia  - C/w remeron, zyprexa    #DVT ppx  - lovenox

## 2023-01-17 NOTE — PROGRESS NOTE ADULT - SUBJECTIVE AND OBJECTIVE BOX
Patient is a 62y old  Male who presents with a chief complaint of GBM (16 Jan 2023 10:32)      HPI:  62 year old male with PMH of GBM s/p resection in 10/22 on Keppra 750 bid followed by Dr. Turpin [facility] in [facility] status. Further history provided by HCP friend at bedside. Per HCP, she spoke to pt on the phone at 1230 on day of admit and he was not making sense. While en route to the hospital had witnessed RUE twitching and rigid lasting around 2 min.  Pt given decadron 8mg PO by HCP as per radiooncology PA recommendations over the phone en route, in ED found to have witnessed seizure, intubated for airway protection and admitted to NSICU. Patient was on VEEG and AEDs were optimized in NSCU. Patient had MRI brain and MR SPECT on 12/22, which showed suspicious for progressive neoplasm in the left parietal postoperative bed compared with 10/5/2022 with MR spectroscopy. Tiny focus of neoplasm also likely in the left brainstem, unchanged. Patient was extubated on 12/22. Patient was found to be positive for COVID upon admission and isolated per protocol. Patient has been asymptomatic during this admission and repeat COVID tests on 12/28 and 12/29 were negative. Hospital course was also complicated by agitation, which resolved with standing haldol. He was evaluated for admission to acute inpatient rehab and admitted to [facility] on 12/29/22.    (30 Dec 2022 13:10)      SUBJECTIVE HISTORY:  Patient seen at bedside during AM rounds.  Patient is frustrated and eager to go home.  Discussed the status of the home healthcare setup and the importance for it to be place prior to discharge.  Patient is otherwise without complaints    REVIEW OF SYSTEMS:  Denies abdominal pain; constipation  +restless leg      PHYSICAL EXAM  GENERAL Exam: Nontoxic , No Acute Distress 	  HEENT:  normocephalic, atraumatic--no cranial tenderness on palpation. 	  LUNGS:	breathing comfortably on RA  HEART:	warm, well perfused   	  GI/ ABDOMEN:  Soft  Non tender  EXTREMITIES:   No Edema , no calf tenderness,    MSK: No Joint pain or bruising,  no spinal or hip tenderness or bruising.  Full ROM --non-painful.  Mental Status - Patient is awake, oriented X3.      VITALS  62y  Vital Signs Last 24 Hrs  T(C): 36.3 (17 Jan 2023 07:17), Max: 36.8 (16 Jan 2023 19:41)  T(F): 97.3 (17 Jan 2023 07:17), Max: 98.2 (16 Jan 2023 19:41)  HR: 60 (17 Jan 2023 07:17) (60 - 82)  BP: 100/62 (17 Jan 2023 07:17) (100/62 - 112/69)  BP(mean): --  RR: 15 (17 Jan 2023 07:17) (14 - 15)  SpO2: 96% (17 Jan 2023 07:17) (96% - 96%)    Parameters below as of 17 Jan 2023 07:17  Patient On (Oxygen Delivery Method): room air      Daily     Daily         RECENT LABS:                          13.6   10.36 )-----------( 131      ( 17 Jan 2023 06:15 )             41.9     01-17    139  |  103  |  25<H>  ----------------------------<  120<H>  4.0   |  31  |  0.73    Ca    9.0      17 Jan 2023 06:15    TPro  5.6<L>  /  Alb  2.7<L>  /  TBili  0.2  /  DBili  x   /  AST  14  /  ALT  40  /  AlkPhos  60  01-17    LIVER FUNCTIONS - ( 17 Jan 2023 06:15 )  Alb: 2.7 g/dL / Pro: 5.6 g/dL / ALK PHOS: 60 U/L / ALT: 40 U/L / AST: 14 U/L / GGT: x                   CAPILLARY BLOOD GLUCOSE          MEDICATIONS  (STANDING):  bisacodyl 5 milliGRAM(s) Oral every 12 hours  dexAMETHasone     Tablet 4 milliGRAM(s) Oral every 12 hours  enoxaparin Injectable 40 milliGRAM(s) SubCutaneous <User Schedule>  lacosamide 100 milliGRAM(s) Oral two times a day  levETIRAcetam  Solution 1000 milliGRAM(s) Oral two times a day  melatonin 9 milliGRAM(s) Oral at bedtime  mirtazapine 7.5 milliGRAM(s) Oral at bedtime  OLANZapine 5 milliGRAM(s) Oral at bedtime  pantoprazole    Tablet 40 milliGRAM(s) Oral before breakfast  rOPINIRole 0.75 milliGRAM(s) Oral <User Schedule>  senna 2 Tablet(s) Oral at bedtime    MEDICATIONS  (PRN):  acetaminophen     Tablet .. 650 milliGRAM(s) Oral every 6 hours PRN Temp greater or equal to 38C (100.4F), Mild Pain (1 - 3)  bisacodyl Suppository 10 milliGRAM(s) Rectal daily PRN Constipation  OLANZapine 2.5 milliGRAM(s) Oral two times a day PRN agitation           Patient is a 62y old  Male who presents with a chief complaint of GBM (16 Jan 2023 10:32)      HPI:  62 year old male with PMH of GBM s/p resection in 10/22 on Keppra 750 bid followed by Dr. [facility]. Further history provided by HCP friend at bedside. Per HCP, she spoke to pt on the phone at 1230 on day of admit and he was not making sense. While en route to the hospital had witnessed RUE twitching and rigid lasting around 2 min.  Pt given decadron 8mg PO by HCP as per radiooncology PA recommendations over the phone en route, in ED found to have witnessed seizure, intubated for airway protection and admitted to NSICU. Patient was on VEEG and AEDs were optimized in NSCU. Patient had MRI brain and MR SPECT on 12/22, which showed suspicious for progressive neoplasm in the left parietal postoperative bed compared with 10/5/2022 with MR spectroscopy. Tiny focus of neoplasm also likely in the left brainstem, unchanged. Patient was extubated on 12/22. Patient was found to be positive for COVID upon admission and isolated per protocol. Patient has been asymptomatic during this admission and repeat COVID tests on 12/28 and 12/29 were negative. Hospital course was also complicated by agitation, which resolved with standing haldol. He was evaluated for admission to acute inpatient rehab and admitted to [facility] on 12/29/22.    (30 Dec 2022 13:10)      SUBJECTIVE HISTORY:  Patient seen at bedside during AM rounds.  Patient is frustrated and eager to go home.  Discussed the status of the home healthcare setup and the importance for it to be place prior to discharge.  Patient is otherwise without complaints.  slept during the night as per nursing    REVIEW OF SYSTEMS:  Denies abdominal pain; constipation  +restless leg      PHYSICAL EXAM  GENERAL Exam: Nontoxic , No Acute Distress 	  HEENT:  normocephalic, atraumatic--no cranial tenderness on palpation. 	  LUNGS:	breathing comfortably on RA  HEART:	warm, well perfused   	  GI/ ABDOMEN:  Soft  Non tender  EXTREMITIES:   No Edema , no calf tenderness,    MSK: No Joint pain or bruising,  no spinal or hip tenderness or bruising.  Full ROM --non-painful.  Mental Status - Patient is awake, oriented X3.      VITALS  62y  Vital Signs Last 24 Hrs  T(C): 36.3 (17 Jan 2023 07:17), Max: 36.8 (16 Jan 2023 19:41)  T(F): 97.3 (17 Jan 2023 07:17), Max: 98.2 (16 Jan 2023 19:41)  HR: 60 (17 Jan 2023 07:17) (60 - 82)  BP: 100/62 (17 Jan 2023 07:17) (100/62 - 112/69)  BP(mean): --  RR: 15 (17 Jan 2023 07:17) (14 - 15)  SpO2: 96% (17 Jan 2023 07:17) (96% - 96%)    Parameters below as of 17 Jan 2023 07:17  Patient On (Oxygen Delivery Method): room air      Daily     Daily         RECENT LABS:                          13.6   10.36 )-----------( 131      ( 17 Jan 2023 06:15 )             41.9     01-17    139  |  103  |  25<H>  ----------------------------<  120<H>  4.0   |  31  |  0.73    Ca    9.0      17 Jan 2023 06:15    TPro  5.6<L>  /  Alb  2.7<L>  /  TBili  0.2  /  DBili  x   /  AST  14  /  ALT  40  /  AlkPhos  60  01-17    LIVER FUNCTIONS - ( 17 Jan 2023 06:15 )  Alb: 2.7 g/dL / Pro: 5.6 g/dL / ALK PHOS: 60 U/L / ALT: 40 U/L / AST: 14 U/L / GGT: x                   CAPILLARY BLOOD GLUCOSE          MEDICATIONS  (STANDING):  bisacodyl 5 milliGRAM(s) Oral every 12 hours  dexAMETHasone     Tablet 4 milliGRAM(s) Oral every 12 hours  enoxaparin Injectable 40 milliGRAM(s) SubCutaneous <User Schedule>  lacosamide 100 milliGRAM(s) Oral two times a day  levETIRAcetam  Solution 1000 milliGRAM(s) Oral two times a day  melatonin 9 milliGRAM(s) Oral at bedtime  mirtazapine 7.5 milliGRAM(s) Oral at bedtime  OLANZapine 5 milliGRAM(s) Oral at bedtime  pantoprazole    Tablet 40 milliGRAM(s) Oral before breakfast  rOPINIRole 0.75 milliGRAM(s) Oral <User Schedule>  senna 2 Tablet(s) Oral at bedtime    MEDICATIONS  (PRN):  acetaminophen     Tablet .. 650 milliGRAM(s) Oral every 6 hours PRN Temp greater or equal to 38C (100.4F), Mild Pain (1 - 3)  bisacodyl Suppository 10 milliGRAM(s) Rectal daily PRN Constipation  OLANZapine 2.5 milliGRAM(s) Oral two times a day PRN agitation

## 2023-01-17 NOTE — PROGRESS NOTE ADULT - SUBJECTIVE AND OBJECTIVE BOX
Patient is a 62y old  Male who presents with a chief complaint of GBM (17 Jan 2023 11:18)      Patient seen and examined at bedside. No events overnight. Denies fever, chill, headache, changes in vision, chest pain, sob, abd pain. Very upset that he is still in rehab and wants to go home.    ALLERGIES:  No Known Allergies    MEDICATIONS  (STANDING):  bisacodyl 5 milliGRAM(s) Oral every 12 hours  dexAMETHasone     Tablet 4 milliGRAM(s) Oral every 12 hours  enoxaparin Injectable 40 milliGRAM(s) SubCutaneous <User Schedule>  lacosamide 100 milliGRAM(s) Oral two times a day  levETIRAcetam  Solution 1000 milliGRAM(s) Oral two times a day  melatonin 9 milliGRAM(s) Oral at bedtime  mirtazapine 7.5 milliGRAM(s) Oral at bedtime  OLANZapine 5 milliGRAM(s) Oral at bedtime  pantoprazole    Tablet 40 milliGRAM(s) Oral before breakfast  rOPINIRole 0.75 milliGRAM(s) Oral <User Schedule>  senna 2 Tablet(s) Oral at bedtime    MEDICATIONS  (PRN):  acetaminophen     Tablet .. 650 milliGRAM(s) Oral every 6 hours PRN Temp greater or equal to 38C (100.4F), Mild Pain (1 - 3)  bisacodyl Suppository 10 milliGRAM(s) Rectal daily PRN Constipation  OLANZapine 2.5 milliGRAM(s) Oral two times a day PRN agitation    Vital Signs Last 24 Hrs  T(F): 97.3 (17 Jan 2023 07:17), Max: 98.2 (16 Jan 2023 19:41)  HR: 60 (17 Jan 2023 07:17) (60 - 82)  BP: 100/62 (17 Jan 2023 07:17) (100/62 - 112/69)  RR: 15 (17 Jan 2023 07:17) (14 - 15)  SpO2: 96% (17 Jan 2023 07:17) (96% - 96%)  I&O's Summary      PHYSICAL EXAM:  GENERAL: NAD, sitting in chair  HEAD:  Atraumatic, Normocephalic  NECK: Supple, No JVD  CHEST/LUNG: Clear to auscultation bilaterally; No wheeze, nonlabored breathing  HEART: Regular rate and rhythm; No murmurs, rubs, or gallops  ABDOMEN: Soft, Nontender, Nondistended; Bowel sounds present  EXTREMITIES:  No clubbing, cyanosis, or edema  PSYCH: calm, appropriate mood    LABS:                        13.6   10.36 )-----------( 131      ( 17 Jan 2023 06:15 )             41.9     01-17    139  |  103  |  25  ----------------------------<  120  4.0   |  31  |  0.73    Ca    9.0      17 Jan 2023 06:15    TPro  5.6  /  Alb  2.7  /  TBili  0.2  /  DBili  x   /  AST  14  /  ALT  40  /  AlkPhos  60  01-17                                    COVID-19 PCR: NotDetec (12-29-22 @ 09:34)  COVID-19 PCR: NotDetec (12-29-22 @ 05:00)  COVID-19 PCR: NotDetec (12-28-22 @ 09:54)      RADIOLOGY & ADDITIONAL TESTS:    Care Discussed with Consultants/Other Providers:

## 2023-01-18 ENCOUNTER — TRANSCRIPTION ENCOUNTER (OUTPATIENT)
Age: 63
End: 2023-01-18

## 2023-01-18 VITALS
HEART RATE: 114 BPM | RESPIRATION RATE: 14 BRPM | DIASTOLIC BLOOD PRESSURE: 81 MMHG | SYSTOLIC BLOOD PRESSURE: 117 MMHG | OXYGEN SATURATION: 94 % | TEMPERATURE: 97 F

## 2023-01-18 PROCEDURE — 87186 SC STD MICRODIL/AGAR DIL: CPT

## 2023-01-18 PROCEDURE — 92523 SPEECH SOUND LANG COMPREHEN: CPT

## 2023-01-18 PROCEDURE — 97129 THER IVNTJ 1ST 15 MIN: CPT

## 2023-01-18 PROCEDURE — 85027 COMPLETE CBC AUTOMATED: CPT

## 2023-01-18 PROCEDURE — 97530 THERAPEUTIC ACTIVITIES: CPT

## 2023-01-18 PROCEDURE — 97110 THERAPEUTIC EXERCISES: CPT

## 2023-01-18 PROCEDURE — 97167 OT EVAL HIGH COMPLEX 60 MIN: CPT

## 2023-01-18 PROCEDURE — 97163 PT EVAL HIGH COMPLEX 45 MIN: CPT

## 2023-01-18 PROCEDURE — 97116 GAIT TRAINING THERAPY: CPT

## 2023-01-18 PROCEDURE — 81001 URINALYSIS AUTO W/SCOPE: CPT

## 2023-01-18 PROCEDURE — 36415 COLL VENOUS BLD VENIPUNCTURE: CPT

## 2023-01-18 PROCEDURE — 97112 NEUROMUSCULAR REEDUCATION: CPT

## 2023-01-18 PROCEDURE — 99232 SBSQ HOSP IP/OBS MODERATE 35: CPT

## 2023-01-18 PROCEDURE — 97535 SELF CARE MNGMENT TRAINING: CPT

## 2023-01-18 PROCEDURE — 80053 COMPREHEN METABOLIC PANEL: CPT

## 2023-01-18 PROCEDURE — 87077 CULTURE AEROBIC IDENTIFY: CPT

## 2023-01-18 PROCEDURE — 87086 URINE CULTURE/COLONY COUNT: CPT

## 2023-01-18 PROCEDURE — 93005 ELECTROCARDIOGRAM TRACING: CPT

## 2023-01-18 PROCEDURE — 92610 EVALUATE SWALLOWING FUNCTION: CPT

## 2023-01-18 PROCEDURE — 99238 HOSP IP/OBS DSCHRG MGMT 30/<: CPT

## 2023-01-18 PROCEDURE — 92507 TX SP LANG VOICE COMM INDIV: CPT

## 2023-01-18 PROCEDURE — 85025 COMPLETE CBC W/AUTO DIFF WBC: CPT

## 2023-01-18 RX ADMIN — LEVETIRACETAM 1000 MILLIGRAM(S): 250 TABLET, FILM COATED ORAL at 17:30

## 2023-01-18 RX ADMIN — LEVETIRACETAM 1000 MILLIGRAM(S): 250 TABLET, FILM COATED ORAL at 06:36

## 2023-01-18 RX ADMIN — LACOSAMIDE 100 MILLIGRAM(S): 50 TABLET ORAL at 06:36

## 2023-01-18 RX ADMIN — Medication 4 MILLIGRAM(S): at 06:35

## 2023-01-18 RX ADMIN — ROPINIROLE 0.75 MILLIGRAM(S): 8 TABLET, FILM COATED, EXTENDED RELEASE ORAL at 19:29

## 2023-01-18 RX ADMIN — LACOSAMIDE 100 MILLIGRAM(S): 50 TABLET ORAL at 17:29

## 2023-01-18 RX ADMIN — PANTOPRAZOLE SODIUM 40 MILLIGRAM(S): 20 TABLET, DELAYED RELEASE ORAL at 06:36

## 2023-01-18 RX ADMIN — ENOXAPARIN SODIUM 40 MILLIGRAM(S): 100 INJECTION SUBCUTANEOUS at 17:29

## 2023-01-18 RX ADMIN — Medication 4 MILLIGRAM(S): at 17:29

## 2023-01-18 NOTE — PROGRESS NOTE ADULT - SUBJECTIVE AND OBJECTIVE BOX
HPI:  62 year old male with PMH of GBM s/p resection in 10/22 on Keppra 750 bid followed by Dr. [facility]pin [facility] in mental status. Further history provided by HCP friend at bedside. Per HCP, she spoke to pt on the phone at 1230 on day of admit and he was not making sense. While en route to the hospital had witnessed RUE twitching and rigid lasting around 2 min.  Pt given decadron 8mg PO by HCP as per radiooncology PA recommendations over the phone en route, in ED found to have witnessed seizure, intubated for airway protection and admitted to NSICU. Patient was on VEEG and AEDs were optimized in NSCU. Patient had MRI brain and MR SPECT on 12/22, which showed suspicious for progressive neoplasm in the left parietal postoperative bed compared with 10/5/2022 with MR spectroscopy. Tiny focus of neoplasm also likely in the left brainstem, unchanged. Patient was extubated on 12/22. Patient was found to be positive for COVID upon admission and isolated per protocol. Patient has been asymptomatic during this admission and repeat COVID tests on 12/28 and 12/29 were negative. Hospital course was also complicated by agitation, which resolved with standing haldol. He was evaluated for admission to acute inpatient rehab and admitted to [facility] on 12/29/22.    (30 Dec 2022 13:10)          Subjective:  Pt. frustrated-- awaiting discharge.  no overnight issues.  Slept during the night.       VITALS  Vital Signs Last 24 Hrs  T(C): 36.4 (18 Jan 2023 08:13), Max: 36.5 (17 Jan 2023 20:37)  T(F): 97.6 (18 Jan 2023 08:13), Max: 97.7 (17 Jan 2023 20:37)  HR: 63 (18 Jan 2023 08:13) (63 - 90)  BP: 111/73 (18 Jan 2023 08:13) (107/67 - 111/73)  BP(mean): --  RR: 16 (18 Jan 2023 08:13) (16 - 16)  SpO2: 97% (18 Jan 2023 08:13) (94% - 97%)    Parameters below as of 18 Jan 2023 08:13  Patient On (Oxygen Delivery Method): room air        REVIEW OF SYMPTOMS  Neurological deficits--cognitive deficits      PHYSICAL EXAM  GENERAL Exam: Nontoxic , No Acute Distress 	  HEENT:  normocephalic, atraumatic--no cranial tenderness on palpation. 	  LUNGS:	breathing comfortably on RA  HEART:	warm, well perfused   	  GI/ ABDOMEN:  Soft  Non tender  EXTREMITIES:   No Edema , no calf tenderness,    MSK: No Joint pain or bruising,  no spinal or hip tenderness or bruising.  Full ROM --non-painful.  Mental Status - Patient is awake, oriented X3.     Motor : 5/5 bilat. UE and LEs    RECENT LABS                        13.6   10.36 )-----------( 131      ( 17 Jan 2023 06:15 )             41.9     01-17    139  |  103  |  25<H>  ----------------------------<  120<H>  4.0   |  31  |  0.73    Ca    9.0      17 Jan 2023 06:15    TPro  5.6<L>  /  Alb  2.7<L>  /  TBili  0.2  /  DBili  x   /  AST  14  /  ALT  40  /  AlkPhos  60  01-17            RADIOLOGY/OTHER RESULTS      MEDICATIONS  (STANDING):  bisacodyl 5 milliGRAM(s) Oral every 12 hours  dexAMETHasone     Tablet 4 milliGRAM(s) Oral every 12 hours  enoxaparin Injectable 40 milliGRAM(s) SubCutaneous <User Schedule>  lacosamide 100 milliGRAM(s) Oral two times a day  levETIRAcetam  Solution 1000 milliGRAM(s) Oral two times a day  melatonin 9 milliGRAM(s) Oral at bedtime  mirtazapine 7.5 milliGRAM(s) Oral at bedtime  OLANZapine 5 milliGRAM(s) Oral at bedtime  pantoprazole    Tablet 40 milliGRAM(s) Oral before breakfast  rOPINIRole 0.75 milliGRAM(s) Oral <User Schedule>  senna 2 Tablet(s) Oral at bedtime    MEDICATIONS  (PRN):  acetaminophen     Tablet .. 650 milliGRAM(s) Oral every 6 hours PRN Temp greater or equal to 38C (100.4F), Mild Pain (1 - 3)  bisacodyl Suppository 10 milliGRAM(s) Rectal daily PRN Constipation  OLANZapine 2.5 milliGRAM(s) Oral two times a day PRN agitation

## 2023-01-18 NOTE — PROGRESS NOTE ADULT - SUBJECTIVE AND OBJECTIVE BOX
Patient is a 62y old  Male who presents with a chief complaint of GBM (17 Jan 2023 13:27)      SUBJECTIVE / OVERNIGHT EVENTS:  Pt seen and examined at bedside. No acute events overnight.  Pt denies cp, palpitations, sob, abd pain, N/V, fever, chills.    ROS:  All other review of systems negative    Allergies    No Known Allergies    Intolerances        MEDICATIONS  (STANDING):  bisacodyl 5 milliGRAM(s) Oral every 12 hours  dexAMETHasone     Tablet 4 milliGRAM(s) Oral every 12 hours  enoxaparin Injectable 40 milliGRAM(s) SubCutaneous <User Schedule>  lacosamide 100 milliGRAM(s) Oral two times a day  levETIRAcetam  Solution 1000 milliGRAM(s) Oral two times a day  melatonin 9 milliGRAM(s) Oral at bedtime  mirtazapine 7.5 milliGRAM(s) Oral at bedtime  OLANZapine 5 milliGRAM(s) Oral at bedtime  pantoprazole    Tablet 40 milliGRAM(s) Oral before breakfast  rOPINIRole 0.75 milliGRAM(s) Oral <User Schedule>  senna 2 Tablet(s) Oral at bedtime    MEDICATIONS  (PRN):  acetaminophen     Tablet .. 650 milliGRAM(s) Oral every 6 hours PRN Temp greater or equal to 38C (100.4F), Mild Pain (1 - 3)  bisacodyl Suppository 10 milliGRAM(s) Rectal daily PRN Constipation  OLANZapine 2.5 milliGRAM(s) Oral two times a day PRN agitation      Vital Signs Last 24 Hrs  T(C): 36.4 (18 Jan 2023 08:13), Max: 36.5 (17 Jan 2023 20:37)  T(F): 97.6 (18 Jan 2023 08:13), Max: 97.7 (17 Jan 2023 20:37)  HR: 63 (18 Jan 2023 08:13) (63 - 90)  BP: 111/73 (18 Jan 2023 08:13) (107/67 - 111/73)  BP(mean): --  RR: 16 (18 Jan 2023 08:13) (16 - 16)  SpO2: 97% (18 Jan 2023 08:13) (94% - 97%)    Parameters below as of 18 Jan 2023 08:13  Patient On (Oxygen Delivery Method): room air      CAPILLARY BLOOD GLUCOSE        I&O's Summary    17 Jan 2023 07:01  -  18 Jan 2023 07:00  --------------------------------------------------------  IN: 240 mL / OUT: 0 mL / NET: 240 mL        PHYSICAL EXAM:  GENERAL: NAD, well-developed male  HEAD:  Atraumatic, Normocephalic  NECK: Supple, No JVD  CHEST/LUNG: Clear to auscultation bilaterally; No wheeze, nonlabored breathing  HEART: Regular rate and rhythm; No murmurs, rubs, or gallops  ABDOMEN: Soft, Nontender, Nondistended; Bowel sounds present  EXTREMITIES:  No clubbing, cyanosis, or edema  PSYCH: calm, appropriate mood    LABS:                        13.6   10.36 )-----------( 131      ( 17 Jan 2023 06:15 )             41.9     01-17    139  |  103  |  25<H>  ----------------------------<  120<H>  4.0   |  31  |  0.73    Ca    9.0      17 Jan 2023 06:15    TPro  5.6<L>  /  Alb  2.7<L>  /  TBili  0.2  /  DBili  x   /  AST  14  /  ALT  40  /  AlkPhos  60  01-17              RADIOLOGY & ADDITIONAL TESTS:  Results Reviewed:   Imaging Personally Reviewed:  Electrocardiogram Personally Reviewed:    COORDINATION OF CARE:  Care Discussed with Consultants/Other Providers [Y/N]:  Prior or Outpatient Records Reviewed [Y/N]:

## 2023-01-18 NOTE — PROGRESS NOTE ADULT - ASSESSMENT
Mr Ramos is a pleasant 62 year old male with PMH of GBM s/p resection in 10/22 on Keppra 750 bid followed by Dr. Turpin who presented to the ED at [facility] on 12/21/22 for change in mental status. While there he had a witnessed seizure and subsequently intubated for airway protection and admitted to NSICU.  Patient had MRI brain and MR SPECT on 12/22, which showed suspicious for progressive neoplasm in the left parietal postoperative bed compared with 10/5/2022 with MR spectroscopy. . Patient was found to be positive for COVID upon admission and isolated per protocol. Hospital course was also complicated by agitation, which resolved with standing haldol. He was evaluated for admission to acute inpatient rehab and admitted to [facility] on 12/30/22 with cognitive deficits, gait and ADL impairment.     #GBM with seizures   -no plan for radiation or chemo therapy during the period of acute rehab, but will need follow up after discharge from rehab  -Continue Dexamethasone 4mg BID  -Continue lacosamide 100mg BID  -Continue Keppra 100mg BID    #Agitation  #Insomnia  - C/w remeron, zyprexa    DVT ppx: lovenox

## 2023-01-18 NOTE — PROGRESS NOTE ADULT - PROVIDER SPECIALTY LIST ADULT
Hospitalist
Rehab Medicine
Hospitalist
Physiatry
Physiatry
Rehab Medicine
Hospitalist
Hospitalist
Physiatry
Physiatry
Rehab Medicine
Hospitalist
Physiatry

## 2023-01-18 NOTE — DISCHARGE NOTE NURSING/CASE MANAGEMENT/SOCIAL WORK - PATIENT PORTAL LINK FT
You can access the FollowMyHealth Patient Portal offered by [facility] by registering at the following website: [email or web address]. By joining [facility]’s FollowMyHealth portal, you will also be able to view your health information using other applications (apps) compatible with our system.

## 2023-01-18 NOTE — PROGRESS NOTE ADULT - ASSESSMENT
Case of 62-year-old male with PMH of GBM s/p resection in 10/22 on Keppra 750 bid followed by Dr. Turpin who presented to the ED at [facility] on 12/21/22 for change in mental status. Patient with seizures, intubated for airway protection and admitted to NSICU.  Patient had MRI brain and MR SPECT on 12/22, which showed suspicious for progressive neoplasm in the left parietal postoperative bed compared with 10/5/2022 with MR spectroscopy. . Patient was found to be positive for COVID upon admission and isolated per protocol. Hospital course was also complicated by agitation, which resolved with standing haldol. He was evaluated for admission to acute inpatient rehab and admitted to [facility] on 12/30/22 with cognitive deficits, gait and ADL impairment.     #GBM with seizures now with Gait Instability, ADL impairments and Functional impairments  - Cont Comprehensive Rehab Program of PT/OT/SLP  -no plan for radiation or chemo therapy during the period of acute rehab, but will need follow up after discharge from rehab  -Continue Dexamethasone 4mg BID  -Continue lacosamide 100mg BID  -Continue Keppra 1000mg BID      #Insomnia - resolved  --Cont.  Remeron 7.5mg qhs to target sleep as improved sleep will help improve mood.    -cont. Requip 0.75mg  in evening 8pm        #Pain control  - Tylenol PRN    #GI/Bowel Mgmt   - Continue Senna at bedtime   -Continue bisacodyl 5mg BID  - Miralax PRN    #Bladder management  - voiding independently     #DVT ppx  - Lovenox      FEN   - Diet - Regular + Thins  [CCHO, DASH/TLC]    - Dysphagia  SLP - evaluation and treatment    Precautions / PROPHYLAXIS:   - Falls, Seizure     IDT 1/12/23:  SW: Lives with mother in  with 3 SHAQ,  awaiting A approval  Speech: Reg/thin diet; Severe cognitive deficits-- Poor insight, decreased judgment  OT: Independent eating; CS LBD; CG toilet transfer and toileting, Poor safety awareness  PT: CG transfers, ambulation 150ft RW, and 12 steps with 1 rail.  right VF deficit, poor insight and safety  Goals: 1. Ambulate to bathroom and toileting with supervision.  2. Consistent call bell and safety strategies with supervision  ELOS: medical stable for discharge home pending HHA setup

## 2023-01-18 NOTE — DISCHARGE NOTE NURSING/CASE MANAGEMENT/SOCIAL WORK - NSDCPEFALRISK_GEN_ALL_CORE
For information on Fall & Injury Prevention, visit: [email or web address] OR  [email or web address] OR  https://www.cdc.gov/steadi/patient.html

## 2023-01-19 RX ORDER — LEVETIRACETAM 250 MG/1
1 TABLET, FILM COATED ORAL
Qty: 60 | Refills: 0
Start: 2023-01-19 | End: 2023-02-17

## 2023-01-19 RX ORDER — LACOSAMIDE 50 MG/1
0.5 TABLET ORAL
Qty: 30 | Refills: 0
Start: 2023-01-19 | End: 2023-02-17

## 2023-01-19 RX ORDER — LACOSAMIDE 50 MG/1
1 TABLET ORAL
Qty: 60 | Refills: 0
Start: 2023-01-19 | End: 2023-02-17

## 2023-01-20 RX ORDER — LACOSAMIDE 100 MG/1
100 TABLET ORAL TWICE DAILY
Qty: 60 | Refills: 0 | Status: DISCONTINUED | COMMUNITY
Start: 2023-01-19 | End: 2023-01-20

## 2023-01-20 RX ORDER — LACOSAMIDE 50 MG/1
1 TABLET ORAL
Qty: 60 | Refills: 0
Start: 2023-01-20 | End: 2023-02-18

## 2023-01-23 ENCOUNTER — APPOINTMENT (OUTPATIENT)
Dept: NEUROLOGY | Facility: CLINIC | Age: 63
End: 2023-01-23
Payer: MEDICAID

## 2023-01-23 PROCEDURE — 99496 TRANSJ CARE MGMT HIGH F2F 7D: CPT

## 2023-01-23 NOTE — DISCUSSION/SUMMARY
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

## 2023-02-01 ENCOUNTER — RESULT REVIEW (OUTPATIENT)
Age: 63
End: 2023-02-01

## 2023-02-01 ENCOUNTER — APPOINTMENT (OUTPATIENT)
Dept: NEUROLOGY | Facility: CLINIC | Age: 63
End: 2023-02-01
Payer: MEDICAID

## 2023-02-01 ENCOUNTER — OUTPATIENT (OUTPATIENT)
Dept: OUTPATIENT SERVICES | Facility: HOSPITAL | Age: 63
LOS: 1 days | Discharge: HOME | End: 2023-02-01

## 2023-02-01 ENCOUNTER — APPOINTMENT (OUTPATIENT)
Dept: MRI IMAGING | Facility: CLINIC | Age: 63
End: 2023-02-01
Payer: MEDICAID

## 2023-02-01 ENCOUNTER — APPOINTMENT (OUTPATIENT)
Dept: HEMATOLOGY ONCOLOGY | Facility: CLINIC | Age: 63
End: 2023-02-01

## 2023-02-01 ENCOUNTER — OUTPATIENT (OUTPATIENT)
Dept: OUTPATIENT SERVICES | Facility: HOSPITAL | Age: 63
LOS: 1 days | End: 2023-02-01
Payer: MEDICAID

## 2023-02-01 VITALS
TEMPERATURE: 98 F | SYSTOLIC BLOOD PRESSURE: 118 MMHG | HEART RATE: 96 BPM | BODY MASS INDEX: 28.35 KG/M2 | OXYGEN SATURATION: 98 % | WEIGHT: 198 LBS | HEIGHT: 70 IN | DIASTOLIC BLOOD PRESSURE: 76 MMHG | RESPIRATION RATE: 16 BRPM

## 2023-02-01 DIAGNOSIS — G93.9 DISORDER OF BRAIN, UNSPECIFIED: ICD-10-CM

## 2023-02-01 DIAGNOSIS — C71.9 MALIGNANT NEOPLASM OF BRAIN, UNSPECIFIED: ICD-10-CM

## 2023-02-01 LAB
BASOPHILS # BLD AUTO: 0.05 K/UL — SIGNIFICANT CHANGE UP (ref 0–0.2)
BASOPHILS NFR BLD AUTO: 0.5 % — SIGNIFICANT CHANGE UP (ref 0–2)
EOSINOPHIL # BLD AUTO: 0 K/UL — SIGNIFICANT CHANGE UP (ref 0–0.5)
EOSINOPHIL NFR BLD AUTO: 0 % — SIGNIFICANT CHANGE UP (ref 0–6)
HCT VFR BLD CALC: 45.7 % — SIGNIFICANT CHANGE UP (ref 39–50)
HGB BLD-MCNC: 15.1 G/DL — SIGNIFICANT CHANGE UP (ref 13–17)
IMM GRANULOCYTES NFR BLD AUTO: 2.9 % — HIGH (ref 0–0.9)
LYMPHOCYTES # BLD AUTO: 0.31 K/UL — LOW (ref 1–3.3)
LYMPHOCYTES # BLD AUTO: 3.1 % — LOW (ref 13–44)
MCHC RBC-ENTMCNC: 28.5 PG — SIGNIFICANT CHANGE UP (ref 27–34)
MCHC RBC-ENTMCNC: 33 G/DL — SIGNIFICANT CHANGE UP (ref 32–36)
MCV RBC AUTO: 86.4 FL — SIGNIFICANT CHANGE UP (ref 80–100)
MONOCYTES # BLD AUTO: 0.29 K/UL — SIGNIFICANT CHANGE UP (ref 0–0.9)
MONOCYTES NFR BLD AUTO: 2.9 % — SIGNIFICANT CHANGE UP (ref 2–14)
NEUTROPHILS # BLD AUTO: 9.06 K/UL — HIGH (ref 1.8–7.4)
NEUTROPHILS NFR BLD AUTO: 90.6 % — HIGH (ref 43–77)
NRBC # BLD: 0 /100 WBCS — SIGNIFICANT CHANGE UP (ref 0–0)
PLATELET # BLD AUTO: 135 K/UL — LOW (ref 150–400)
RBC # BLD: 5.29 M/UL — SIGNIFICANT CHANGE UP (ref 4.2–5.8)
RBC # FLD: 16.5 % — HIGH (ref 10.3–14.5)
WBC # BLD: 10 K/UL — SIGNIFICANT CHANGE UP (ref 3.8–10.5)
WBC # FLD AUTO: 10 K/UL — SIGNIFICANT CHANGE UP (ref 3.8–10.5)

## 2023-02-01 PROCEDURE — 70553 MRI BRAIN STEM W/O & W/DYE: CPT | Mod: 26

## 2023-02-01 PROCEDURE — A9585: CPT

## 2023-02-01 PROCEDURE — 99215 OFFICE O/P EST HI 40 MIN: CPT

## 2023-02-01 PROCEDURE — 70553 MRI BRAIN STEM W/O & W/DYE: CPT

## 2023-02-01 RX ORDER — LEVETIRACETAM 1000 MG/1
1000 TABLET, FILM COATED ORAL TWICE DAILY
Qty: 60 | Refills: 2 | Status: DISCONTINUED | COMMUNITY
Start: 2023-01-19 | End: 2023-02-01

## 2023-02-02 ENCOUNTER — APPOINTMENT (OUTPATIENT)
Dept: RADIATION ONCOLOGY | Facility: CLINIC | Age: 63
End: 2023-02-02
Payer: MEDICAID

## 2023-02-02 VITALS
HEIGHT: 70 IN | DIASTOLIC BLOOD PRESSURE: 80 MMHG | OXYGEN SATURATION: 96 % | TEMPERATURE: 96.98 F | HEART RATE: 112 BPM | SYSTOLIC BLOOD PRESSURE: 117 MMHG | BODY MASS INDEX: 28.3 KG/M2 | RESPIRATION RATE: 17 BRPM | WEIGHT: 197.64 LBS

## 2023-02-02 PROCEDURE — 99024 POSTOP FOLLOW-UP VISIT: CPT

## 2023-02-02 NOTE — REVIEW OF SYSTEMS
[Recent Change In Weight] : ~T recent weight change [Visual Disturbances] : visual disturbances [Negative] : Eyes [Fever] : no fever [Chills] : no chills [Night Sweats] : no night sweats [FreeTextEntry2] : intentional weight loss 20 lbs [FreeTextEntry3] : right side visual field comprised, improved some

## 2023-02-02 NOTE — HISTORY OF PRESENT ILLNESS
[FreeTextEntry1] : Mr. Del Ramos presented initially with Glioblastoma, WHO 4, IDH WT for initial consultation of post-op RT on 10/18/2022.\par He completed 40045 cgy to the left partial brain in 30 fractions from 11/8/2022-12/20/2022\par \par ONCOLOGY HISTORY\par Mr. Ramos with no significant PMH presented with severe headache and right vision changes since 9/19/22.\par MRI brain 9/27 shows an irregularly enhancing mass in the left temporal-occipital region measuring 2.6 cm transversely and 8.9 cm cranial - caudal with surrounding vasogenic edema.\par \par Patient underwent Left craniotomy for tumor resection on 10/3 by Dr. Turpin.  Pathology - show a high grade glioma with nuclear pleomorphism, increased mitotic activity, microvascular proliferation and  necrosis. Immunostains are performed on block 2B and show tumor cells are positive for GFAP and EGFR. A subset of tumor cell nuclei are highlighted by p53. There is no ATRX loss in the tumor. The MIB-1 (Ki-67) labeling index is markedly elevated (15-25%). pHH3 shows scattered mitoses. IDH1 (by anti-IDH1   R132H antibody) immunostain is negative.  - Molecular studies pending.\par \par Post-operative MRI 10/5: LEFT parietal occipital craniotomy with near complete resection of the of the neoplasm in the LEFT occipital/posterior temporal lobes. Minimal residual neoplasm is seen in the anterior part of the neoplasm, which abuts the ependymal surface of the atrium of the LEFT ventricle. Moderate postsurgical hemorrhage is seen within the surgical cavity. Moderate surrounding vasogenic edema is unchanged with effacement of the posterior horn of the LEFT lateral ventricle. Abnormal signal is also seen within the LEFT cerebral peduncle with a 4 mm focus of central enhancement and 1.6 cm region of rim enhancement, which is suspicious for a secondary focus of neoplasm or extension from the primary along the white matter tracts. \par \par On 10/18/2022, he has light frontal headache 1/10, no seizure, no focal deficit.  Off Decadron since 10/12/2022 , still taking Keppra 500mg bid. \par \par 2/2/2023- Mr. Ramos presents today for follow up. \par Notably he was admitted 12/21-12/29/2022 with seizures.\par MRI brain 12/22/2022 showed : Findings suspicious for progressive neoplasm in the left parietal postoperative bed compared with 10/5/2022 with MR spectroscopy. Tiny focus of neoplasm also likely in the left brainstem, unchanged.\par  [facility] 12/30/2022-1/18/2023\par MRI done 2/1 without final read but appears improved. \par \par No headaches. very overwhelmed. no nausea. remains with some speech trouble. no seizures since home from rehab. vision improved some.\par

## 2023-02-02 NOTE — PHYSICAL EXAM
[Sclera] : the sclera and conjunctiva were normal [Extraocular Movements] : extraocular movements were intact [Normal] : oriented to person, place and time, the affect was normal, the mood was normal and not anxious [] : no respiratory distress [de-identified] : Right peripheral visual field comprised

## 2023-02-03 RX ORDER — LACOSAMIDE 50 MG/1
50 TABLET ORAL TWICE DAILY
Qty: 120 | Refills: 0 | Status: DISCONTINUED | COMMUNITY
Start: 2023-01-20 | End: 2023-02-03

## 2023-02-03 NOTE — HISTORY OF PRESENT ILLNESS
[FreeTextEntry1] : Mr. Ramos is being seen in neuro oncologic evaluation for a new diagnosis of brain tumor.\par \par History obtained via discussion with patient and chart review, including personal review of all neuroimaging.\par Mr. 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 He returned on 926 with worsening headache and underwent MRI scan of his brain:\par \par MRI brain 9/27 shows an irregularly enhancing mass in the left temporal-occipital region measuring 2.6 cm transversely and 8.9 cm cranial - caudal with surrounding vasogenic edema.\par \par CT C/A/P 9/26 unremarkable.\par \par Dr. Turpin performed a large resection of this mass on 10/3.\par 10/3 Pathology - Glioblastoma, WHO 4, IDH wt - MGMT methylated.\par \par Post-operative MRI 10/5: LEFT parietal occipital craniotomy with near complete resection of the of the neoplasm in the LEFT occipital/posterior temporal lobes. Minimal residual neoplasm is seen in the anterior part of the neoplasm, which abuts the ependymal surface of the atrium of the LEFT ventricle. Moderate postsurgical hemorrhage is seen within the surgical cavity. Moderate surrounding vasogenic edema is unchanged with effacement of the posterior horn of the LEFT lateral ventricle. Abnormal signal is also seen within the LEFT cerebral peduncle with a 4 mm focus of central enhancement and 1.6 cm region of rim enhancement, which is suspicious for a secondary focus of neoplasm or extension from the primary along the white matter tracts.\par 10/25- Had an episode of loss of vision X 15-20 minutes , episode resolved on its own - Restarted Dexamethasone 2 mg daily, Keppra raised to 750 mg bid\par 11/2/2022- He continues to have headaches even waking him from sleep. Raised dexamethasone to 4 mg daily\par 11/7/2022 - CHEMORT started\par 11/14/2022 - Reports when we raised the Dexamethasone on the 2nd , he was not taking the 4 mg daily, after discussing with HCP started on 11/7, headache didn’t resolve so on 11/10- Dr Ayala raised to 4 mg bid. Since then he reports no further headaches. Vision has not improved but has not worsened. \par 12/20/2022 - Completed ChemoRT \par 12/30/2022 - a friend called him and he was "not making sense.' Friend went to see him and bring him to the hospital - noted shaking of right UE and rigidity. In the ER, he reportedly had a GTC seizures - he was intubated. He was also found to be COVID positive. He was discharged to rehab on 12/29 and then discharged home on 1/18.\par 2/1/2023- Here for a follow up along with a new MRI. Friend reports patient has some cognitive decline , " not as sharp as he was", he also reports sense of dizziness while ambulating . Uses walker and denies any falls, headaches. He remains on Dexamethasone 4 mg bid\par \par  \par

## 2023-02-03 NOTE — DISCUSSION/SUMMARY
[FreeTextEntry1] : Patient seen and examined\par GLIOMA - Neurologically not worse\par MRI reviewed from today , comparing with the MRI from 12/2022 there is a slight improvement to the enhancing area in the left posterior surgical bed site \par Will do CBC, CMP today\par Will order TMZ - Pending blood work \par HEADACHES - Take dexamethasone at 4-2 mg . GI Prophylaxis with pantoprazole.\par SEIZURES- Continue Keppra 1000 mg bid and Vimpat 100 mg bid\par FOLLOW UP - Will do a clinical follow up along with an MRI in a month. In the interim, if any concerns patient knows to call our office. \par

## 2023-02-03 NOTE — PHYSICAL EXAM
[General Appearance - Alert] : alert [General Appearance - In No Acute Distress] : in no acute distress [] : no respiratory distress [Respiration, Rhythm And Depth] : normal respiratory rhythm and effort [Exaggerated Use Of Accessory Muscles For Inspiration] : no accessory muscle use [Edema] : there was no peripheral edema [FreeTextEntry1] : Awake and alert - names well and follows commands across the midline\par Oriented to person, place, month, and year.\par EOMI, RIght VF defect - UQ more than lower - stable\par Face is symmetric and tongue protrudes midline\par No drift noted\par UE strength full bilaterally\par Bilateral proximal 5-/5 LE weakness - rest is full.\par He is walking with a walker.\par No LE edema noted.

## 2023-02-06 ENCOUNTER — NON-APPOINTMENT (OUTPATIENT)
Age: 63
End: 2023-02-06

## 2023-02-08 ENCOUNTER — APPOINTMENT (OUTPATIENT)
Dept: NEUROSURGERY | Facility: CLINIC | Age: 63
End: 2023-02-08
Payer: MEDICAID

## 2023-02-08 LAB
ALBUMIN SERPL ELPH-MCNC: 3.8 G/DL
ALP BLD-CCNC: 80 U/L
ALT SERPL-CCNC: 54 U/L
ANION GAP SERPL CALC-SCNC: 14 MMOL/L
AST SERPL-CCNC: 16 U/L
BILIRUB SERPL-MCNC: 0.5 MG/DL
BUN SERPL-MCNC: 17 MG/DL
CALCIUM SERPL-MCNC: 9.3 MG/DL
CHLORIDE SERPL-SCNC: 100 MMOL/L
CO2 SERPL-SCNC: 25 MMOL/L
CREAT SERPL-MCNC: 0.75 MG/DL
EGFR: 101 ML/MIN/1.73M2
GLUCOSE SERPL-MCNC: 196 MG/DL
HAV IGM SER QL: NONREACTIVE
HBV CORE IGM SER QL: NONREACTIVE
HBV SURFACE AB SER QL: REACTIVE
HBV SURFACE AG SER QL: NONREACTIVE
HCV AB SER QL: NONREACTIVE
HCV RNA FLD QL NAA+PROBE: NORMAL
HCV RNA SPEC QL PROBE+SIG AMP: NOT DETECTED
HCV S/CO RATIO: 0.18 S/CO
POTASSIUM SERPL-SCNC: 4 MMOL/L
PROT SERPL-MCNC: 6.2 G/DL
SODIUM SERPL-SCNC: 139 MMOL/L

## 2023-02-08 PROCEDURE — 99213 OFFICE O/P EST LOW 20 MIN: CPT

## 2023-02-08 NOTE — ASSESSMENT
[FreeTextEntry1] : Plan: \par \par Continue TMZ per Dr. Torres\par Await MRI 3/7; will review at that time and RTC\par Continue dex taper; re-eval symptoms when off, consider pharmacotherapy for dizziness at that time\par \par Patient seen and examined with attending, Dr. Cevallos\par Spent a total of 60 minutes with patient\par

## 2023-02-08 NOTE — HISTORY OF PRESENT ILLNESS
[FreeTextEntry1] : 62M hx of Glioblastoma, WHO 4, IDH WT MGMT methylated s/p L craniotomy 10/3 for resection, nitial consultation of post-op RT on 10/18/2022. He completed 56419 cgy to the left partial brain in 30 fractions from 11/8/2022-12/20/2022 along with TMZ. \par \par Of note, admitted with seizures treated with AED adjustment last monht. Recovering well. Patient complatins of restless leg syndrome (baseline, worsened on dex), proximal thigh weakness ( on steroid taper), and dizziness when upright for more than a few hours.\par \par \par MRI 2/1 shows slight decrease in enhancement from 12/22, moderate decrease in flair. \par \par Neuro Exam:\par \par Right inferior temporal field cut, mild WFD, otherwise intact\par Incision healing well\par

## 2023-02-15 ENCOUNTER — INPATIENT (INPATIENT)
Facility: HOSPITAL | Age: 63
LOS: 1 days | Discharge: ANOTHER HOSPITAL | DRG: 100 | End: 2023-02-17
Attending: SURGERY | Admitting: SURGERY
Payer: MEDICARE

## 2023-02-15 VITALS — HEIGHT: 70 IN | WEIGHT: 208.12 LBS

## 2023-02-15 DIAGNOSIS — R56.9 UNSPECIFIED CONVULSIONS: ICD-10-CM

## 2023-02-15 DIAGNOSIS — Z98.890 OTHER SPECIFIED POSTPROCEDURAL STATES: Chronic | ICD-10-CM

## 2023-02-15 DIAGNOSIS — Z20.822 CONTACT WITH AND (SUSPECTED) EXPOSURE TO COVID-19: ICD-10-CM

## 2023-02-15 DIAGNOSIS — G93.6 CEREBRAL EDEMA: ICD-10-CM

## 2023-02-15 DIAGNOSIS — Z92.21 PERSONAL HISTORY OF ANTINEOPLASTIC CHEMOTHERAPY: ICD-10-CM

## 2023-02-15 DIAGNOSIS — G40.901 EPILEPSY, UNSPECIFIED, NOT INTRACTABLE, WITH STATUS EPILEPTICUS: ICD-10-CM

## 2023-02-15 DIAGNOSIS — R41.82 ALTERED MENTAL STATUS, UNSPECIFIED: ICD-10-CM

## 2023-02-15 DIAGNOSIS — Z92.3 PERSONAL HISTORY OF IRRADIATION: ICD-10-CM

## 2023-02-15 DIAGNOSIS — C71.9 MALIGNANT NEOPLASM OF BRAIN, UNSPECIFIED: ICD-10-CM

## 2023-02-15 LAB
ALBUMIN SERPL ELPH-MCNC: 3 G/DL — LOW (ref 3.3–5)
ALP SERPL-CCNC: 67 U/L — SIGNIFICANT CHANGE UP (ref 40–120)
ALT FLD-CCNC: 61 U/L — SIGNIFICANT CHANGE UP (ref 12–78)
ANION GAP SERPL CALC-SCNC: 4 MMOL/L — LOW (ref 5–17)
APTT BLD: 25.6 SEC — LOW (ref 27.5–35.5)
AST SERPL-CCNC: 17 U/L — SIGNIFICANT CHANGE UP (ref 15–37)
BASOPHILS # BLD AUTO: 0 K/UL — SIGNIFICANT CHANGE UP (ref 0–0.2)
BASOPHILS NFR BLD AUTO: 0 % — SIGNIFICANT CHANGE UP (ref 0–2)
BILIRUB SERPL-MCNC: 0.4 MG/DL — SIGNIFICANT CHANGE UP (ref 0.2–1.2)
BUN SERPL-MCNC: 18 MG/DL — SIGNIFICANT CHANGE UP (ref 7–23)
CALCIUM SERPL-MCNC: 9.3 MG/DL — SIGNIFICANT CHANGE UP (ref 8.5–10.1)
CHLORIDE SERPL-SCNC: 105 MMOL/L — SIGNIFICANT CHANGE UP (ref 96–108)
CO2 SERPL-SCNC: 29 MMOL/L — SIGNIFICANT CHANGE UP (ref 22–31)
CREAT SERPL-MCNC: 0.97 MG/DL — SIGNIFICANT CHANGE UP (ref 0.5–1.3)
EGFR: 87 ML/MIN/1.73M2 — SIGNIFICANT CHANGE UP
EOSINOPHIL # BLD AUTO: 0 K/UL — SIGNIFICANT CHANGE UP (ref 0–0.5)
EOSINOPHIL NFR BLD AUTO: 0 % — SIGNIFICANT CHANGE UP (ref 0–6)
FLUAV AG NPH QL: SIGNIFICANT CHANGE UP
FLUBV AG NPH QL: SIGNIFICANT CHANGE UP
GLUCOSE SERPL-MCNC: 116 MG/DL — HIGH (ref 70–99)
HCT VFR BLD CALC: 46 % — SIGNIFICANT CHANGE UP (ref 39–50)
HGB BLD-MCNC: 15 G/DL — SIGNIFICANT CHANGE UP (ref 13–17)
INR BLD: 1.03 RATIO — SIGNIFICANT CHANGE UP (ref 0.88–1.16)
LYMPHOCYTES # BLD AUTO: 1.12 K/UL — SIGNIFICANT CHANGE UP (ref 1–3.3)
LYMPHOCYTES # BLD AUTO: 9 % — LOW (ref 13–44)
MCHC RBC-ENTMCNC: 29 PG — SIGNIFICANT CHANGE UP (ref 27–34)
MCHC RBC-ENTMCNC: 32.6 GM/DL — SIGNIFICANT CHANGE UP (ref 32–36)
MCV RBC AUTO: 89 FL — SIGNIFICANT CHANGE UP (ref 80–100)
MONOCYTES # BLD AUTO: 1.12 K/UL — HIGH (ref 0–0.9)
MONOCYTES NFR BLD AUTO: 9 % — SIGNIFICANT CHANGE UP (ref 2–14)
NEUTROPHILS # BLD AUTO: 10.04 K/UL — HIGH (ref 1.8–7.4)
NEUTROPHILS NFR BLD AUTO: 80 % — HIGH (ref 43–77)
NRBC # BLD: SIGNIFICANT CHANGE UP /100 WBCS (ref 0–0)
PLATELET # BLD AUTO: 143 K/UL — LOW (ref 150–400)
POTASSIUM SERPL-MCNC: 4.3 MMOL/L — SIGNIFICANT CHANGE UP (ref 3.5–5.3)
POTASSIUM SERPL-SCNC: 4.3 MMOL/L — SIGNIFICANT CHANGE UP (ref 3.5–5.3)
PROT SERPL-MCNC: 6.8 GM/DL — SIGNIFICANT CHANGE UP (ref 6–8.3)
PROTHROM AB SERPL-ACNC: 11.9 SEC — SIGNIFICANT CHANGE UP (ref 10.5–13.4)
RBC # BLD: 5.17 M/UL — SIGNIFICANT CHANGE UP (ref 4.2–5.8)
RBC # FLD: 17.5 % — HIGH (ref 10.3–14.5)
RSV RNA NPH QL NAA+NON-PROBE: SIGNIFICANT CHANGE UP
SARS-COV-2 RNA SPEC QL NAA+PROBE: SIGNIFICANT CHANGE UP
SODIUM SERPL-SCNC: 138 MMOL/L — SIGNIFICANT CHANGE UP (ref 135–145)
TROPONIN I, HIGH SENSITIVITY RESULT: 8.13 NG/L — SIGNIFICANT CHANGE UP
WBC # BLD: 12.4 K/UL — HIGH (ref 3.8–10.5)
WBC # FLD AUTO: 12.4 K/UL — HIGH (ref 3.8–10.5)

## 2023-02-15 PROCEDURE — 95718 EEG PHYS/QHP 2-12 HR W/VEEG: CPT

## 2023-02-15 PROCEDURE — 36415 COLL VENOUS BLD VENIPUNCTURE: CPT

## 2023-02-15 PROCEDURE — 70496 CT ANGIOGRAPHY HEAD: CPT | Mod: 26,MA

## 2023-02-15 PROCEDURE — 95816 EEG AWAKE AND DROWSY: CPT | Mod: 26

## 2023-02-15 PROCEDURE — 0042T: CPT | Mod: MA

## 2023-02-15 PROCEDURE — 85027 COMPLETE CBC AUTOMATED: CPT

## 2023-02-15 PROCEDURE — 83735 ASSAY OF MAGNESIUM: CPT

## 2023-02-15 PROCEDURE — 95816 EEG AWAKE AND DROWSY: CPT

## 2023-02-15 PROCEDURE — 99285 EMERGENCY DEPT VISIT HI MDM: CPT

## 2023-02-15 PROCEDURE — 70498 CT ANGIOGRAPHY NECK: CPT | Mod: 26,MA

## 2023-02-15 PROCEDURE — 80048 BASIC METABOLIC PNL TOTAL CA: CPT

## 2023-02-15 PROCEDURE — 84100 ASSAY OF PHOSPHORUS: CPT

## 2023-02-15 PROCEDURE — 99221 1ST HOSP IP/OBS SF/LOW 40: CPT

## 2023-02-15 RX ORDER — LEVETIRACETAM 250 MG/1
1000 TABLET, FILM COATED ORAL ONCE
Refills: 0 | Status: COMPLETED | OUTPATIENT
Start: 2023-02-15 | End: 2023-02-15

## 2023-02-15 RX ORDER — LACOSAMIDE 50 MG/1
200 TABLET ORAL ONCE
Refills: 0 | Status: DISCONTINUED | OUTPATIENT
Start: 2023-02-15 | End: 2023-02-15

## 2023-02-15 RX ORDER — DEXAMETHASONE 0.5 MG/5ML
10 ELIXIR ORAL ONCE
Refills: 0 | Status: COMPLETED | OUTPATIENT
Start: 2023-02-15 | End: 2023-02-15

## 2023-02-15 RX ORDER — DEXAMETHASONE 0.5 MG/5ML
4 ELIXIR ORAL DAILY
Refills: 0 | Status: DISCONTINUED | OUTPATIENT
Start: 2023-02-15 | End: 2023-02-17

## 2023-02-15 RX ORDER — LEVETIRACETAM 250 MG/1
1000 TABLET, FILM COATED ORAL EVERY 12 HOURS
Refills: 0 | Status: DISCONTINUED | OUTPATIENT
Start: 2023-02-15 | End: 2023-02-17

## 2023-02-15 RX ORDER — DEXAMETHASONE 0.5 MG/5ML
2 ELIXIR ORAL DAILY
Refills: 0 | Status: DISCONTINUED | OUTPATIENT
Start: 2023-02-15 | End: 2023-02-17

## 2023-02-15 RX ORDER — SODIUM CHLORIDE 9 MG/ML
1000 INJECTION, SOLUTION INTRAVENOUS
Refills: 0 | Status: DISCONTINUED | OUTPATIENT
Start: 2023-02-15 | End: 2023-02-17

## 2023-02-15 RX ORDER — LACOSAMIDE 50 MG/1
100 TABLET ORAL
Refills: 0 | Status: DISCONTINUED | OUTPATIENT
Start: 2023-02-15 | End: 2023-02-15

## 2023-02-15 RX ORDER — LACOSAMIDE 50 MG/1
200 TABLET ORAL
Refills: 0 | Status: DISCONTINUED | OUTPATIENT
Start: 2023-02-15 | End: 2023-02-17

## 2023-02-15 RX ORDER — ROPINIROLE 8 MG/1
0.75 TABLET, FILM COATED, EXTENDED RELEASE ORAL DAILY
Refills: 0 | Status: DISCONTINUED | OUTPATIENT
Start: 2023-02-15 | End: 2023-02-17

## 2023-02-15 RX ADMIN — Medication 2 MILLIGRAM(S): at 14:43

## 2023-02-15 RX ADMIN — Medication 102 MILLIGRAM(S): at 21:14

## 2023-02-15 RX ADMIN — SODIUM CHLORIDE 50 MILLILITER(S): 9 INJECTION, SOLUTION INTRAVENOUS at 21:55

## 2023-02-15 RX ADMIN — LACOSAMIDE 140 MILLIGRAM(S): 50 TABLET ORAL at 14:50

## 2023-02-15 RX ADMIN — LEVETIRACETAM 400 MILLIGRAM(S): 250 TABLET, FILM COATED ORAL at 12:09

## 2023-02-15 RX ADMIN — Medication 2 MILLIGRAM(S): at 10:55

## 2023-02-15 NOTE — ED PROVIDER NOTE - PHYSICAL EXAMINATION
Severe expressive aphasia.  The patient has some degree of receptive aphasia but is able to follow simple commands.  The patient does not have any pronator drift.  The patient has 5 out of 5 strength in all 4 extremities.

## 2023-02-15 NOTE — CONSULT NOTE ADULT - ASSESSMENT
62 year old man with PMH of GBM  and seizure s/p resection with Dr. Lopez at [facility] on 10/3/23 presented with seizure.  CT head stable with no signs of hemorrhage or new tumor growth   Pt's decadron dose was recently tapered.     PLAN-  Given that patients primary surgical, neurology and oncology teams are at [facility] recommend transfer to [facility]   No acute neurosurgical intervention at this time  Continue Keppra and Vimpat as per neurology     Discussed with Dr. Childs and Dr. Breen in the ED who agree with plan

## 2023-02-15 NOTE — ED ADULT NURSE NOTE - NSIMPLEMENTINTERV_GEN_ALL_ED
Implemented All Fall with Harm Risk Interventions:  [place] to call system. Call bell, personal items and telephone within reach. Instruct patient to call for assistance. Room bathroom lighting operational. Non-slip footwear when patient is off stretcher. Physically safe environment: no spills, clutter or unnecessary equipment. Stretcher in lowest position, wheels locked, appropriate side rails in place. Provide visual cue, wrist band, yellow gown, etc. Monitor gait and stability. Monitor for mental status changes and reorient to person, place, and time. Review medications for side effects contributing to fall risk. Reinforce activity limits and safety measures with patient and family. Provide visual clues: red socks.

## 2023-02-15 NOTE — ED PROVIDER NOTE - PROGRESS NOTE DETAILS
neurology at bedside.  They are performing an EEG.  Neurosurgery PA has been informed and will evaluate the patient at the bedside.  Patient will receive a loading dose of Keppra 1000 mg and has received Ativan 2 mg IV for seizures.  We will give the patient a loading dose of dexamethasone.   the patient is not a candidate for tPA given the history of recent resection of brain tumor.  The results of the CT angio are pending.  Nathan Breen, DO Received a call from the transfer center stating that the patient cannot be transferred ED to ED to [place] due to crowding.    They note that the patient's neurosurgeon will be able to admit the patient to the floor bed  better will not be available until tomorrow.  I have contacted the ICU PA to evaluate the patient as they will need to be admitted to at least a stepdown level of care prior to transfer.  Their evaluation is pending.  The patient has been signed out to Dr. Kaplan.  Nathan Breen, DO

## 2023-02-15 NOTE — ED STATDOCS - ATTENDING APP SHARED VISIT CONTRIBUTION OF CARE
severe expressive aphasia; lungs CTAB; RRR/S1/S2; ambulatory; 5/5 strength x 4 exts.     Nathan Breen, DO

## 2023-02-15 NOTE — ED ADULT NURSE REASSESSMENT NOTE - NS ED NURSE REASSESS COMMENT FT1
Assumed care of Pt from NATHALIE HUBBARD Pt received resting in stretcher. Pt's friend at bedside. Vital signs reassessed and stable at this time. Administered ativan and vimpat as per order, pending decadron IVPB from pharmacy. No additional requests or complaints. Patient safety maintained. Will continue to monitor.

## 2023-02-15 NOTE — CONSULT NOTE ADULT - SUBJECTIVE AND OBJECTIVE BOX
Patient is a 62y old  Male who presents with a chief complaint of seizure     HPI: PT is a 62 year old man with a PMH of GBM s/p resection with Dr. Lopez at [facility] on 10/3/22 discharged 5 days later, he was then diagnoses with seizures on 12/21 and was readmitted to [facility]. on 1/18/23 he went to [facility] for Rehab. He is s/p chemo and radiation.  He was recently discharged but cannot be left alone at home. Today pt was noted to have AMS and possible seizure so his health care proxy called EMS and the patient was transported to [facility]. A code stroke was called, CT was done, and Dr. Samayoa saw the patient and suspected seizure over stroke- EEG was started, pt was given Lorazepam, Keppra, and Vimpat. Neurosurgery was called to consult.     Pt seen and examined in the ED was care givers at bedside, they state this is not his normal mental state and can normally have a conversation at baseline. Pt has good strength in all extremities- +expressive and receptive aphasia. No acute hemorrhage on CT. Pt is s/p chemo and radiation for GBM, has been told no more surgery is appropriate by Dr. Lopez- his primary neurology and oncology team is at [facility]. Of note, pts decadron dose was recently tapered.     PAST MEDICAL & SURGICAL HISTORY:  Glioblastoma  Seizures  s/p LEFT craniotomy and resection of GBM 10/3/22 @ [facility]    FAMILY HISTORY:  No pertinent family history in first degree relatives    Social Hx:  Nonsmoker, no drug or alcohol use    MEDICATIONS  (STANDING):  dexAMETHasone  IVPB 10 milliGRAM(s) IV Intermittent Once  lacosamide IVPB 200 milliGRAM(s) IV Intermittent once     Allergies:  No Known Allergies    ROS: unable to review due to aphasia     Vital Signs Last 24 Hrs  T(C): --  T(F): --  HR: --  BP: --  BP(mean): --  RR: --  SpO2: --    PHYSICAL EXAM-  Constitutional: awake and alert, difficulty following commands, +aphasia   HEENT: PERRLA, EOMI, EEG in place   Neck: Supple  Respiratory: Breath sounds are clear bilaterally  Cardiovascular: S1 and S2, regular rhythm  Gastrointestinal: soft, nontender  Extremities:  no edema  Musculoskeletal: no joint swelling/tenderness, no abnormal movements  Skin: No rashes    Neurological exam:  HF: Awake and alert. Unable to answer questions appropriately. + Expressive and receptive aphasia. Needs visual cues to follow commands  CN: MICHELLE, EOMI, unable to fully test visual fields, no NLFD, tongue midline, Palate moves equally, SCM equal bilaterally  Motor: No pronator drift, Strength 5/5 in all 4 ext, normal bulk and tone, no tremor, rigidity or bradykinesia.    Sens: Intact to light touch  Reflexes: Symmetric and normal . BJ 1+, BR 1+, KJ 1+, downgoing toes b/l  Coord:  unable to test  Gait/Balance: Not tested    NIHSS: 5      Labs:                        15.0   12.40 )-----------( 143      ( 15 Feb 2023 11:46 )             46.0     02-15    138  |  105  |  18  ----------------------------<  116<H>  4.3   |  29  |  0.97    Ca    9.3      15 Feb 2023 11:46    TPro  6.8  /  Alb  3.0<L>  /  TBili  0.4  /  DBili  x   /  AST  17  /  ALT  61  /  AlkPhos  67  02-15    PT/INR - ( 15 Feb 2023 11:46 )   PT: 11.9 sec;   INR: 1.03 ratio      PTT - ( 15 Feb 2023 11:46 )  PTT:25.6 sec    RADIOLOGY:  < from: CT Brain Stroke Protocol (02.15.23 @ 11:41) >  IMPRESSION:  Reidentified is a large area of confluent hypoattenuation within the left   posterior parietal and temporallobes, compatible with known GBM tumor,   without significant change since prior exam when accounting for   differences in technique from prior MRI brain. Mass effect upon the left   ventricle is unchanged. No midline shift.

## 2023-02-15 NOTE — STROKE CODE NOTE - NSMDCONSULT QTN_Y FT
Patient is a 62y old  Male who presents with a chief complaint of difficulty speaking.    HPI: Mr. Ramos is a 62 year old man who was diagnosed with a  brain tumor (GBM) in September 2021 after ophthalmology noted a field cut.  On 10/5 he underwent left parietal-occipital craniotomy with near complete resection of the tumor.  In late December he was admitted to the hospital with seizures.  Today he was with his friends and began to look dazed, and started to have difficulty speaking.      PAST MEDICAL & SURGICAL HISTORY:  Glioblastoma      Seizures      No significant past surgical history          FAMILY HISTORY:  No pertinent family history in first degree relatives        Social Hx:  Nonsmoker, no drug or alcohol use    MEDICATIONS  (STANDING):  dexAMETHasone  IVPB 10 milliGRAM(s) IV Intermittent Once  lacosamide IVPB 200 milliGRAM(s) IV Intermittent once       Allergies    No Known Allergies    Intolerances        ROS: Pertinent positives in HPI, all other ROS were reviewed and are negative.      Vital Signs Last 24 Hrs  T(C): --  T(F): --  HR: --  BP: --  BP(mean): --  RR: --  SpO2: --            Constitutional: awake and alert.  HEENT: PERRLA, EOMI,   Neck: Supple.  Respiratory: Breath sounds are clear bilaterally  Cardiovascular: S1 and S2, regular / irregular rhythm  Gastrointestinal: soft, nontender  Extremities:  no edema  Musculoskeletal: no joint swelling/tenderness, no abnormal movements  Skin: No rashes    Neurological exam:  HF: Awake and alert. Unable to answer questions appropriately. + Expressive and receptive aphasia. Needs visual cues to follow commands  CN: CHAPIN, EOMI, VFF, facial sensation normal, no NLFD, tongue midline, Palate moves equally, SCM equal bilaterally  Motor: No pronator drift, Strength 5/5 in all 4 ext, normal bulk and tone, no tremor, rigidity or bradykinesia.    Sens: Intact to light touch  Reflexes: Symmetric and normal . BJ 1+, BR 1+, KJ 1+, downgoing toes b/l  Coord:  unable to test  Gait/Balance: Not tested    NIHSS: 5          Labs:   02-15    138  |  105  |  18  ----------------------------<  116<H>  4.3   |  29  |  0.97    Ca    9.3      15 Feb 2023 11:46    TPro  6.8  /  Alb  3.0<L>  /  TBili  0.4  /  DBili  x   /  AST  17  /  ALT  61  /  AlkPhos  67  02-15                              15.0   12.40 )-----------( 143      ( 15 Feb 2023 11:46 )             46.0       Radiology:  CT head 2/15/23:  Reidentified is a large area of confluent hypoattenuation within the left   posterior parietal and temporal lobes, compatible with known GBM tumor,   without significant change since prior exam when accounting for   differences in technique from prior MRI brain. Mass effect upon the left   ventricle is unchanged. No midline shift.    CTA head and neck, CT perfusion 2/15/23:     CTA brain: No hemodynamically significant stenosis    CTA carotid/vertebral artery circulation: No hemodynamically significant   stenosis    CT Perfusion: Normal    A/P: Mr. Ramos is a 62 year old man who was diagnosed with a  brain tumor (GBM) in September 2021 after ophthalmology noted a field cut.  On 10/5 he underwent left parietal-occipital craniotomy with near complete resection of the tumor.  In late December he was admitted to the hospital with seizures.  Today he was with his friends and began to look dazed, and started to have difficulty speaking.    Due to suspected seizures, an EEG was placed in the ED which shows continuous seizure activity in the left hemisphere.  -Lorazepam 2 mg IV was given  -Give Keppra 1000 mg IV x 1  -Stat dose of Vimpat 200 mg IV    -Continue EEG recording  -May need to raise steroids  -Keppra 1000 mg BID  -Raise standing dose of Vimpat to 200 mg BID    Will follow Patient is a 62y old  Male who presents with a chief complaint of difficulty speaking.    HPI: Mr. Ramos is a 62 year old man who was diagnosed with a  brain tumor (GBM) in September 2021 after ophthalmology noted a field cut.  On 10/5 he underwent left parietal-occipital craniotomy with near complete resection of the tumor.  In late December he was admitted to the hospital with seizures.  Today he was with his friends and began to look dazed, and started to have difficulty speaking.      PAST MEDICAL & SURGICAL HISTORY:  Glioblastoma      Seizures      No significant past surgical history          FAMILY HISTORY:  No pertinent family history in first degree relatives        Social Hx:  Nonsmoker, no drug or alcohol use    MEDICATIONS  (STANDING):  dexAMETHasone  IVPB 10 milliGRAM(s) IV Intermittent Once  lacosamide IVPB 200 milliGRAM(s) IV Intermittent once       Allergies    No Known Allergies    Intolerances        ROS: Pertinent positives in HPI, all other ROS were reviewed and are negative.      Vital Signs Last 24 Hrs  T(C): --  T(F): --  HR: --  BP: --  BP(mean): --  RR: --  SpO2: --            Constitutional: awake and alert.  HEENT: PERRLA, EOMI,   Neck: Supple.  Respiratory: Breath sounds are clear bilaterally  Cardiovascular: S1 and S2, regular / irregular rhythm  Gastrointestinal: soft, nontender  Extremities:  no edema  Musculoskeletal: no joint swelling/tenderness, no abnormal movements  Skin: No rashes    Neurological exam:  HF: Awake and alert. Unable to answer questions appropriately. + Expressive and receptive aphasia. Needs visual cues to follow commands  CN: CHAPIN, EOMI, VFF, facial sensation normal, no NLFD, tongue midline, Palate moves equally, SCM equal bilaterally  Motor: No pronator drift, Strength 5/5 in all 4 ext, normal bulk and tone, no tremor, rigidity or bradykinesia.    Sens: Intact to light touch  Reflexes: Symmetric and normal . BJ 1+, BR 1+, KJ 1+, downgoing toes b/l  Coord:  unable to test  Gait/Balance: Not tested    NIHSS: 5          Labs:   02-15    138  |  105  |  18  ----------------------------<  116<H>  4.3   |  29  |  0.97    Ca    9.3      15 Feb 2023 11:46    TPro  6.8  /  Alb  3.0<L>  /  TBili  0.4  /  DBili  x   /  AST  17  /  ALT  61  /  AlkPhos  67  02-15                              15.0   12.40 )-----------( 143      ( 15 Feb 2023 11:46 )             46.0       Radiology:  CT head 2/15/23:  Reidentified is a large area of confluent hypoattenuation within the left   posterior parietal and temporal lobes, compatible with known GBM tumor,   without significant change since prior exam when accounting for   differences in technique from prior MRI brain. Mass effect upon the left   ventricle is unchanged. No midline shift.    CTA head and neck, CT perfusion 2/15/23:     CTA brain: No hemodynamically significant stenosis    CTA carotid/vertebral artery circulation: No hemodynamically significant   stenosis    CT Perfusion: Normal    A/P: Mr. Ramos is a 62 year old man who was diagnosed with a  brain tumor (GBM) in September 2021 after ophthalmology noted a field cut.  On 10/5 he underwent left parietal-occipital craniotomy with near complete resection of the tumor.  In late December he was admitted to the hospital with seizures.  Today he was with his friends and began to look dazed, and started to have difficulty speaking.  He was not given thrombolytic therapy since seizure, rather than stroke was suspected.    Due to suspected seizures, an EEG was placed in the ED which shows continuous seizure activity in the left hemisphere.  -Lorazepam 2 mg IV was given  -Give Keppra 1000 mg IV x 1  -Stat dose of Vimpat 200 mg IV    -Continue EEG recording  -May need to raise steroids  -Keppra 1000 mg BID  -Raise standing dose of Vimpat to 200 mg BID    Will follow

## 2023-02-15 NOTE — PHARMACOTHERAPY INTERVENTION NOTE - COMMENTS
Medication reconciliation completed.  Patient was unable to provide medication information, spoke to pt's HCP Johnnie Johnson RN at bedside and they provided current medication list; confirmed with Dr. First [facility].  Pt taking 310mg Temozolomide for 5 days every month, pt finished dose Friday 2-10 and scheduled to restart 3-7.

## 2023-02-15 NOTE — ED PROVIDER NOTE - IV ALTEPLASE INCLUSION HIDDEN
Assisted OOB & ambulated to BR & on unit. Tolerated activity without difficulty.  Mid sternal site without bleeding or hematoma
Discharge instructions given per orders, voiced good understanding of post loop removal care, medications & follow up care. Denies any questions.  Discharged to home via wheel chair
Patient pre-assessment complete for Loop removal with Dr Azucena Sanders scheduled for 18 at 10am, arrival time 8am. Patient verified using . Patient instructed to bring all home medications in labeled bottles on the day of procedure. NPO status reinforced. Instructed they can take all other medications excluding vitamins & supplements. Patient verbalizes understanding of all instructions & denies any questions at this time.
Patient received to [address] [place] [address] room # 10  Ambulatory from [facility]. Patient scheduled for Loop removal today with Dr Nic Muhammad  . Procedure reviewed & questions answered, voiced good understanding consent obtained & placed on chart. All medications and medical history reviewed. Will prep patient per orders. Patient & family updated on plan of care.       The patient has a fraility score of 3-MANAGING WELL, based on ability to perform ADLS
Report received from Quentin Polo. Procedural findings communicated. Intra procedural  medication administration reviewed. Progression of care discussed.      Patient received into [facility] post Loop removal.     Routine post procedural vital signs initiated
show

## 2023-02-15 NOTE — ED ADULT NURSE NOTE - OBJECTIVE STATEMENT
Pt presents to ED c/o difficulty speaking. symptoms started one hour PTA. Pt has brain tumor. pt was at bank and all of a sudden started having difficulty finding his words. Pts Friend noticed pt was confused yesterday. Code stroke called at 1135. . 94.4KG

## 2023-02-15 NOTE — ED STATDOCS - NS ED ATTENDING STATEMENT MOD
This was a shared visit with the DUNIA. I reviewed and verified the documentation and independently performed the documented:

## 2023-02-15 NOTE — ED PROVIDER NOTE - CLINICAL SUMMARY MEDICAL DECISION MAKING FREE TEXT BOX
62-year-old male with history of left temporal/parietal glioblastoma status postresection and seizures on Vimpat and Keppra presents with expressive aphasia.  The patient was designated a code stroke initially however not a tPA candidate due to the fact that he is recent history of resection of a brain tumor.  CTA did not show any large vessel occlusion.  The patient was evaluated by neurology and neurosurgery.  The patient had a bedside EEG and was found to be in status epilepticus.  He received a loading dose of Keppra and Vimpat as well as 2 mg of Ativan twice.  The patient was initially scheduled to be transferred to [facility] for continuity of care with his neurosurgeon and oncologist however there is overcrowding at the facility and the patient will be admitted to the stepdown or ICU level care pending the ICU PA's evaluation.

## 2023-02-15 NOTE — ED PROVIDER NOTE - OBJECTIVE STATEMENT
62-year-old male with history of left temporal/parietal glioblastoma status post resection at [facility], seizures on Vimpat and Keppra presents for evaluation of altered mental status and expressive aphasia that was noted at approximately 730 this morning.  The patient's healthcare proxy notes that the symptoms were the initial presentation of when he had seizures in the past and was in status epilepticus.  At that time he required intubation and was transferred to [facility].  The patient has undergone chemotherapy and radiation.  The patient is  on a Decadron taper and currently taking 4 mg in the morning and 2 mg in the afternoon.  The patient is unable to provide any review of systems or HPI on his own due to his expressive aphasia.  A code stroke was called at triage by the intake provider.  The neurologist Dr. Samayoa  Was also evaluating the patient at the bedside.

## 2023-02-15 NOTE — H&P ADULT - HISTORY OF PRESENT ILLNESS
62M with a histroy of recent resection of a left sided Glioblastoma in 10/22 at [facility]. His initial presentation at that time was for expressive aphasia and seizures. He was treated with Vimpat/Keppra at the time. In addition to his surgical resection he underwent Chemo TX and RT.   He now presents with Expressive Aphasia. CT/CTA were negative for thrombotic/vascular event. He was not a candidate for TPA. Additionally the degree of cerebral edema noted was not significantly different from his last study. An EEG done in the ED showed   active seizure activity involving the surgical side( the Left hemispheric Temporal/Parietal/Occipital areas). He was loaded with Keppra and the seizure activity ceased.     He was initially to be transferred back to [facility] for further treatment, as his whole team of physicians is there. However, there is no bed availability at this time and he will need to be admitted here at  in the interim.     At present he is awake, follows simple commands, has no sign of active seizure activity. he is confused at baseline, with persistent expressive aphasia though.

## 2023-02-15 NOTE — ED STATDOCS - PROGRESS NOTE DETAILS
62 M with known brain tumor, recently started on chemotherapy presents with expressive aphasia x 1 hour. Pt moving all extremities, unable to follow commands. Pt is verbal, but unable to appropriately respond to asked questions. Will activate code stroke protocol. patient not TPA candidate. - Eber Reece PA-C

## 2023-02-15 NOTE — ED ADULT NURSE REASSESSMENT NOTE - NS ED NURSE REASSESS COMMENT FT1
Pt's friend Johnnie Johnson is Pt's healthcare proxy and wishes to be notified of Pt status, [phone].

## 2023-02-15 NOTE — H&P ADULT - ASSESSMENT
62M with a histroy of recent resection of a left sided Glioblastoma in 10/22 at [facility]. His initial presentation at that time was for expressive aphasia and seizures. He was treated with Vimpat/Keppra at the time. In addition to his surgical resection he underwent Chemo TX and RT.   He now presents with Expressive Aphasia. CT/CTA were negative for thrombotic/vascular event. He was not a candidate for TPA. Additionally the degree of cerebral edema noted was not significantly different from his last study. An EEG done in the ED showed   active seizure activity involving the surgical side( the Left hemispheric Temporal/Parietal/Occipital areas). He was loaded with Keppra and the seizure activity ceased.     He was initially to be transferred back to [facility] for further treatment, as his whole team of physicians is there. However, there is no bed availability at this time and he will need to be admitted here at  in the interim.       Imp/Plan:  1) Seizures: Likely related to cerebral edema and surgical trauma secondary to his recent Glioblastoma resection. Controlled with Keppra Load. Will admit to the Step Down unit. Will continue Keppra 1000mg Q 12H and home Vimpat dose. NPO for now with seizure precautions. Q 2H Neuro checks and Aspiration precautions. Will also continue Decadron for cerebral Edema, change to IV route for now. F/U with Neurology/Neuro-surgery. Notes from both appreciated. Awaiting transfer to [facility]. DVT prophylaxis with SCD.     2) Glioblastoma: S/P Resection. As above         62M with a histroy of recent resection of a left sided Glioblastoma in 10/22 at [facility]. His initial presentation at that time was for expressive aphasia and seizures. He was treated with Vimpat/Keppra at the time. In addition to his surgical resection he underwent Chemo TX and RT.   He now presents with Expressive Aphasia. CT/CTA were negative for thrombotic/vascular event. He was not a candidate for TPA. Additionally the degree of cerebral edema noted was not significantly different from his last study. An EEG done in the ED showed   active seizure activity involving the surgical side( the Left hemispheric Temporal/Parietal/Occipital areas). He was loaded with Keppra and the seizure activity ceased.     He was initially to be transferred back to [facility] for further treatment, as his whole team of physicians is there. However, there is no bed availability at this time and he will need to be admitted here at  in the interim.       Imp/Plan:  1) Seizures: Likely related to cerebral edema and surgical trauma secondary to his recent Glioblastoma resection. Controlled with Keppra Load. Will admit to the Step Down unit. Will continue Keppra 1000mg Q 12H and home Vimpat dose. NPO for now with seizure precautions. Q 2H Neuro checks and Aspiration precautions. Will also continue Decadron for cerebral Edema, change to IV route for now. F/U with Neurology/Neuro-surgery. Notes from both appreciated. Awaiting transfer to [facility]. DVT prophylaxis with SCD.     2) Glioblastoma: S/P Resection. As above        Atotal of 45 mis was spent evaluating and treating this critical patient, including review of the EMR, speaking with staff and documenting

## 2023-02-16 ENCOUNTER — TRANSCRIPTION ENCOUNTER (OUTPATIENT)
Age: 63
End: 2023-02-16

## 2023-02-16 LAB
ANION GAP SERPL CALC-SCNC: 5 MMOL/L — SIGNIFICANT CHANGE UP (ref 5–17)
BUN SERPL-MCNC: 16 MG/DL — SIGNIFICANT CHANGE UP (ref 7–23)
CALCIUM SERPL-MCNC: 9.3 MG/DL — SIGNIFICANT CHANGE UP (ref 8.5–10.1)
CHLORIDE SERPL-SCNC: 103 MMOL/L — SIGNIFICANT CHANGE UP (ref 96–108)
CO2 SERPL-SCNC: 30 MMOL/L — SIGNIFICANT CHANGE UP (ref 22–31)
CREAT SERPL-MCNC: 0.73 MG/DL — SIGNIFICANT CHANGE UP (ref 0.5–1.3)
EGFR: 102 ML/MIN/1.73M2 — SIGNIFICANT CHANGE UP
GLUCOSE SERPL-MCNC: 124 MG/DL — HIGH (ref 70–99)
HCT VFR BLD CALC: 42.6 % — SIGNIFICANT CHANGE UP (ref 39–50)
HGB BLD-MCNC: 14.3 G/DL — SIGNIFICANT CHANGE UP (ref 13–17)
MAGNESIUM SERPL-MCNC: 2.3 MG/DL — SIGNIFICANT CHANGE UP (ref 1.6–2.6)
MCHC RBC-ENTMCNC: 29.3 PG — SIGNIFICANT CHANGE UP (ref 27–34)
MCHC RBC-ENTMCNC: 33.6 GM/DL — SIGNIFICANT CHANGE UP (ref 32–36)
MCV RBC AUTO: 87.3 FL — SIGNIFICANT CHANGE UP (ref 80–100)
PHOSPHATE SERPL-MCNC: 5.2 MG/DL — HIGH (ref 2.5–4.5)
PLATELET # BLD AUTO: 134 K/UL — LOW (ref 150–400)
POTASSIUM SERPL-MCNC: 4.6 MMOL/L — SIGNIFICANT CHANGE UP (ref 3.5–5.3)
POTASSIUM SERPL-SCNC: 4.6 MMOL/L — SIGNIFICANT CHANGE UP (ref 3.5–5.3)
RBC # BLD: 4.88 M/UL — SIGNIFICANT CHANGE UP (ref 4.2–5.8)
RBC # FLD: 17.2 % — HIGH (ref 10.3–14.5)
SODIUM SERPL-SCNC: 138 MMOL/L — SIGNIFICANT CHANGE UP (ref 135–145)
WBC # BLD: 9.26 K/UL — SIGNIFICANT CHANGE UP (ref 3.8–10.5)
WBC # FLD AUTO: 9.26 K/UL — SIGNIFICANT CHANGE UP (ref 3.8–10.5)

## 2023-02-16 PROCEDURE — 99222 1ST HOSP IP/OBS MODERATE 55: CPT

## 2023-02-16 PROCEDURE — 95816 EEG AWAKE AND DROWSY: CPT | Mod: 26

## 2023-02-16 PROCEDURE — 99232 SBSQ HOSP IP/OBS MODERATE 35: CPT

## 2023-02-16 RX ADMIN — LACOSAMIDE 200 MILLIGRAM(S): 50 TABLET ORAL at 01:51

## 2023-02-16 RX ADMIN — Medication 2 MILLIGRAM(S): at 17:43

## 2023-02-16 RX ADMIN — Medication 4 MILLIGRAM(S): at 08:43

## 2023-02-16 RX ADMIN — ROPINIROLE 0.75 MILLIGRAM(S): 8 TABLET, FILM COATED, EXTENDED RELEASE ORAL at 11:03

## 2023-02-16 RX ADMIN — LEVETIRACETAM 400 MILLIGRAM(S): 250 TABLET, FILM COATED ORAL at 00:03

## 2023-02-16 RX ADMIN — LACOSAMIDE 200 MILLIGRAM(S): 50 TABLET ORAL at 14:25

## 2023-02-16 RX ADMIN — LACOSAMIDE 200 MILLIGRAM(S): 50 TABLET ORAL at 21:15

## 2023-02-16 RX ADMIN — SODIUM CHLORIDE 50 MILLILITER(S): 9 INJECTION, SOLUTION INTRAVENOUS at 17:43

## 2023-02-16 RX ADMIN — LEVETIRACETAM 400 MILLIGRAM(S): 250 TABLET, FILM COATED ORAL at 11:03

## 2023-02-16 RX ADMIN — LEVETIRACETAM 400 MILLIGRAM(S): 250 TABLET, FILM COATED ORAL at 21:15

## 2023-02-16 NOTE — PROGRESS NOTE ADULT - NS ATTEND AMEND GEN_ALL_CORE FT
Patient seen with MARTITA Rivera  Patient s/p resection of previous Glioblastoma  Presented with expressive aphasia  CTA was negative  EEG showed status epilepticus  on keppra and dose of Vimpat was increased  EEG better this am  Patient to be transferred to Epilepsy unit at [facility] where his neuro team is located

## 2023-02-16 NOTE — PROGRESS NOTE ADULT - ASSESSMENT
Mr. Ramos is a 62 year old man who was diagnosed with a  brain tumor (GBM) in September 2021 after ophthalmology noted a field cut.  On 10/5 he underwent left parietal-occipital craniotomy with near complete resection of the tumor.  In late December he was admitted to the hospital with seizures.  On 2/15 he was with his friends and began to look dazed, and started to have difficulty speaking.  He was not given thrombolytic therapy since seizure, rather than stroke was suspected.    Due to suspected seizures, an EEG was placed in the ED which shows continuous seizure activity in the left hemisphere.  -Lorazepam 2 mg IV was given  -Give Keppra 1000 mg IV x 1  -Stat dose of Vimpat 200 mg IV    EEG was disconnected on 2/15 due to plans for transfer to [facility] at patient's request. However, he ended up staying at [facility] due to a lack of beds.    This morning he is more alert, but still with mild confusion.  Repeat EEG shows slowing in the left temporal, parietal, occipital regions but no ongoing epileptiform activity.          -Continue Keppra 1000 mg BID  -Continue Vimpat to 200 mg BID (increased from home dose of 100 mg BID)    Plan is for transfer to [facility] epilepsy monitoring unit later today when a bed is available.    Discussed with Dr. Stewart and with Dr. Rogers at [facility]

## 2023-02-16 NOTE — DISCHARGE NOTE PROVIDER - HOSPITAL COURSE
BRIEF HOSPITAL COURSE: 61yo male with with a histroy of recent resection of a left sided Glioblastoma in 10/22 at [facility]. His initial presentation at that time was for expressive aphasia and seizures. He was treated with Vimpat/Keppra at the time. In addition to his surgical resection he underwent Chemo TX and RT.   He presents with Expressive Aphasia.   CT/CTA were negative for thrombotic/vascular event. He was not a candidate for TPA. Additionally the degree of cerebral edema noted was not significantly different from his last study.   An EEG done in the ED showed active seizure activity involving the surgical side( the Left hemispheric Temporal/Parietal/Occipital areas). He was loaded with Keppra and the seizure activity ceased.   He was initially to be transferred back to [facility] for further treatment, as his whole team of physicians is there. However, there is no bed availability at  time of admission and he will need to be admitted here at  in the interim.     Admitted for Seizures with history of recent glioblastoma resection  s/p IV ativan, IV vimpat, IV keppra load 1gm, IV decadron 10 x1 in ED  EEG overnight with no seizure activity, patient mental status and speech improving  Vimpat dose increased to 200mg bid (home dose 100mg bid), cont keppra 1gm bid  Given PO decadron 4mg in AM and 2mg in PM   Tolerating diet  no chemical DVT ppx d/t bleeding risk    Dispo: Plan  to transfer to [facility] for continuity of care, awaiting bed.      BRIEF HOSPITAL COURSE: 63yo male with with a histroy of recent resection of a left sided Glioblastoma in 10/22 at [facility]. His initial presentation at that time was for expressive aphasia and seizures. He was treated with Vimpat/Keppra at the time. In addition to his surgical resection he underwent Chemo TX and RT.   He presents with Expressive Aphasia.   CT/CTA were negative for thrombotic/vascular event. He was not a candidate for TPA. Additionally the degree of cerebral edema noted was not significantly different from his last study.   An EEG done in the ED showed active seizure activity involving the surgical side( the Left hemispheric Temporal/Parietal/Occipital areas). He was loaded with Keppra and the seizure activity ceased.   He was initially to be transferred back to [facility] for further treatment, as his whole team of physicians is there. However, there is no bed availability at  time of admission and he will need to be admitted here at  in the interim.     Admitted for Seizures with history of recent glioblastoma resection  s/p IV ativan, IV vimpat, IV keppra load 1gm, IV decadron 10 x1 in ED  EEG overnight with no seizure activity, patient mental status and speech improving  Vimpat dose increased to 200mg bid (from home dose 100mg bid), cont keppra 1gm bid  Given PO decadron 4mg in AM and 2mg in PM   Tolerating diet  no chemical DVT ppx d/t bleeding risk    This evening, pt was accepted to [facility]. Pt and friend Johnnie updated and agreeable. D/w Dr. Stewart.    Dispo: Plan to transfer to [facility] for continuity of care    Time spent on discharge >30 mins

## 2023-02-16 NOTE — DISCHARGE NOTE PROVIDER - NSDCMRMEDTOKEN_GEN_ALL_CORE_FT
acetaminophen 325 mg oral tablet: 2 tab(s) orally every 6 hours, As needed, Temp greater or equal to 38C (100.4F), Mild Pain (1 - 3)  dexamethasone 2 mg oral tablet: 1 tab(s) orally once a day (in the afternoon)  dexamethasone 4 mg oral tablet: 1 tab(s) orally once a day (in the morning)  lacosamide 100 mg oral tablet: 1 tab(s) orally 2 times a day MDD:200mg  levETIRAcetam 1000 mg oral tablet: 1 tab(s) orally 2 times a day  melatonin 3 mg oral tablet: 3 tab(s) orally once a day (at bedtime), As Needed  mirtazapine 7.5 mg oral tablet: 1 tab(s) orally once a day (at bedtime), As Needed  ondansetron 8 mg oral tablet: 1 tab(s) orally once a day before chemo for 5 days  ***pt takes 310mg TMZ for 5 days every month***  pantoprazole 40 mg oral delayed release tablet: 1 tab(s) orally once a day (in the afternoon)  polyethylene glycol 3350 oral powder for reconstitution: 17 gram(s) orally 2 times a day, As Needed  rOPINIRole 0.25 mg oral tablet: 3 tab(s) orally once a day (in the evening)  senna leaf extract oral tablet: 2 tab(s) orally once a day (at bedtime), As Needed  temozolomide 20 mg oral capsule: 3 cap(s) orally once a day (at bedtime) for 5 days  ***take with TMZ 250mg for TDD = 310mg***  Next dose scheduled to restart on 3-7-23  temozolomide 250 mg oral capsule: 1 cap(s) orally once a day (at bedtime) for 5 days  ***take with 3x TMZ 20mg for TDD = 310mg***  Next dose scheduled to restart on 3-7-23   dexamethasone 6 mg/25 mL-NaCl 0.9% intravenous solution: 8.33 milliliter(s) intravenous once a day  dexamethasone 6 mg/25 mL-NaCl 0.9% intravenous solution: 16.67 milliliter(s) intravenous once a day  lacosamide 200 mg oral tablet: 1 tab(s) orally 2 times a day  levETIRAcetam 100 mg/mL intravenous solution: 10 milliliter(s) intravenous every 12 hours  rOPINIRole 0.25 mg oral tablet: 3 tab(s) orally once a day   Decadron: 2 milligram(s) injectable once a day at 6pm  Decadron 4 mg/mL injectable solution: 4 milligram(s) injectable once a day at 8am   lacosamide 200 mg oral tablet: 1 tab(s) orally 2 times a day  levETIRAcetam 100 mg/mL intravenous solution: 10 milliliter(s) intravenous every 12 hours  rOPINIRole 0.25 mg oral tablet: 3 tab(s) orally once a day

## 2023-02-16 NOTE — PROGRESS NOTE ADULT - ASSESSMENT
ASSESSMENT   61yo male with with a histroy of recent resection of a left sided Glioblastoma in 10/22 at [facility]. His initial presentation at that time was for expressive aphasia and seizures. He was treated with Vimpat/Keppra at the time. In addition to his surgical resection he underwent Chemo TX and RT. He presents with Expressive Aphasia.   CT/CTA were negative for thrombotic/vascular event. He was not a candidate for TPA. Additionally the degree of cerebral edema noted was not significantly different from his last study.   An EEG done in the ED showed active seizure activity involving the surgical side( the Left hemispheric Temporal/Parietal/Occipital areas). He was loaded with Keppra and the seizure activity ceased.     Admitted for  1. AMS 2/2 seizure  2. Known GBM lesion s/p recent surgical resection    s/p IV ativan, IV vimpat, IV keppra load 1gm, IV decadron 10 x1 in ED    PLAN  - Awake and agitated. AAOx2-3 ( self, place) does not recall events leading to hospitalization.   - EEG overnight with no seizure activity  - cont vimpat 200mg bid (increased from home dose), cont keppra 1gm bid  - on decadron 4mg in AM and 2mg in PM   - PO diet  - awaiting bed at [facility]. called transfer center this am, still no beds available. will f/u again this afternoon to check for bed availability in epilepsy unit.   - no chemical DVT ppx d/t bleeding risk    Dispo: Transfer to [facility] for continuity of care, awaiting bed. seizure precautions. keppra, vimpat, decadron    Will discuss with Dr. Stewart

## 2023-02-16 NOTE — DISCHARGE NOTE PROVIDER - NSDCFUSCHEDAPPT_GEN_ALL_CORE_FT
[facility]  MRI  [facility]  Scheduled Appointment: 03/07/2023    Debby Torres  [facility]  NEUROLOGY [address] [facility]  Scheduled Appointment: 03/07/2023

## 2023-02-16 NOTE — DISCHARGE NOTE PROVIDER - NSDCCPCAREPLAN_GEN_ALL_CORE_FT
PRINCIPAL DISCHARGE DIAGNOSIS  Diagnosis: Seizures  Assessment and Plan of Treatment: Transfer to [facility] for continuity of care with patient's neuro team.      SECONDARY DISCHARGE DIAGNOSES  Diagnosis: Glioblastoma  Assessment and Plan of Treatment:      PRINCIPAL DISCHARGE DIAGNOSIS  Diagnosis: Seizures  Assessment and Plan of Treatment: Transfer to [facility] for continuity of care with patient's neuro team. On vimpat 200mg po bid and keppra 1g bid at rec of neurology. Has been getting decadron 4mg ivpb daily at 8am and then decadron 2mg ivpb daily at 6pm.      SECONDARY DISCHARGE DIAGNOSES  Diagnosis: Glioblastoma  Assessment and Plan of Treatment: s/p resection

## 2023-02-16 NOTE — PATIENT PROFILE ADULT - FUNCTIONAL ASSESSMENT - BASIC MOBILITY 6.
3-calculated by average/Not able to assess (calculate score using [facility] averaging method)

## 2023-02-16 NOTE — DISCHARGE NOTE PROVIDER - CARE PROVIDER_API CALL
Todd Turpin)  Neurosurgery  [address] [place] [address]  Phone: [phone]  Fax: [phone]  Follow Up Time:

## 2023-02-16 NOTE — DISCHARGE NOTE PROVIDER - NSDCCAREPROVSEEN_GEN_ALL_CORE_FT
Danita, Rahel Ley, Judie Solorio, Lissette Stewart, Robert Aguero, Todd Hernandez, Panda Orta, Marco Cohen, Elma Sullivan, Dasha ROCA Danita, Rahel Ley, Judie Solorio, Lissette Stewart, Robert Aguero, Todd Hernandez, Panda Orta, Marco Cohen, Elma Sullivan, Dasha Stewart, Robert

## 2023-02-17 ENCOUNTER — NON-APPOINTMENT (OUTPATIENT)
Age: 63
End: 2023-02-17

## 2023-02-17 ENCOUNTER — TRANSCRIPTION ENCOUNTER (OUTPATIENT)
Age: 63
End: 2023-02-17

## 2023-02-17 ENCOUNTER — INPATIENT (INPATIENT)
Facility: HOSPITAL | Age: 63
LOS: 3 days | Discharge: HOME | DRG: 100 | End: 2023-02-21
Attending: NEUROLOGICAL SURGERY | Admitting: NEUROLOGICAL SURGERY
Payer: MEDICARE

## 2023-02-17 VITALS
SYSTOLIC BLOOD PRESSURE: 113 MMHG | HEART RATE: 102 BPM | DIASTOLIC BLOOD PRESSURE: 82 MMHG | OXYGEN SATURATION: 94 % | RESPIRATION RATE: 18 BRPM | TEMPERATURE: 98 F

## 2023-02-17 VITALS
HEART RATE: 95 BPM | RESPIRATION RATE: 20 BRPM | SYSTOLIC BLOOD PRESSURE: 97 MMHG | DIASTOLIC BLOOD PRESSURE: 64 MMHG | OXYGEN SATURATION: 94 %

## 2023-02-17 DIAGNOSIS — G40.89 OTHER SEIZURES: ICD-10-CM

## 2023-02-17 DIAGNOSIS — Z98.890 OTHER SPECIFIED POSTPROCEDURAL STATES: Chronic | ICD-10-CM

## 2023-02-17 LAB
ANION GAP SERPL CALC-SCNC: 3 MMOL/L — LOW (ref 5–17)
BUN SERPL-MCNC: 22 MG/DL — SIGNIFICANT CHANGE UP (ref 7–23)
CALCIUM SERPL-MCNC: 8.8 MG/DL — SIGNIFICANT CHANGE UP (ref 8.5–10.1)
CHLORIDE SERPL-SCNC: 106 MMOL/L — SIGNIFICANT CHANGE UP (ref 96–108)
CO2 SERPL-SCNC: 29 MMOL/L — SIGNIFICANT CHANGE UP (ref 22–31)
CREAT SERPL-MCNC: 0.71 MG/DL — SIGNIFICANT CHANGE UP (ref 0.5–1.3)
EGFR: 102 ML/MIN/1.73M2 — SIGNIFICANT CHANGE UP
GLUCOSE SERPL-MCNC: 106 MG/DL — HIGH (ref 70–99)
HCT VFR BLD CALC: 38.4 % — LOW (ref 39–50)
HGB BLD-MCNC: 12.8 G/DL — LOW (ref 13–17)
LEVETIRACETAM SERPL-MCNC: 58.1 UG/ML — HIGH (ref 10–40)
MAGNESIUM SERPL-MCNC: 2.2 MG/DL — SIGNIFICANT CHANGE UP (ref 1.6–2.6)
MCHC RBC-ENTMCNC: 29 PG — SIGNIFICANT CHANGE UP (ref 27–34)
MCHC RBC-ENTMCNC: 33.3 GM/DL — SIGNIFICANT CHANGE UP (ref 32–36)
MCV RBC AUTO: 87.1 FL — SIGNIFICANT CHANGE UP (ref 80–100)
PHOSPHATE SERPL-MCNC: 3.9 MG/DL — SIGNIFICANT CHANGE UP (ref 2.5–4.5)
PLATELET # BLD AUTO: 124 K/UL — LOW (ref 150–400)
POTASSIUM SERPL-MCNC: 3.9 MMOL/L — SIGNIFICANT CHANGE UP (ref 3.5–5.3)
POTASSIUM SERPL-SCNC: 3.9 MMOL/L — SIGNIFICANT CHANGE UP (ref 3.5–5.3)
RBC # BLD: 4.41 M/UL — SIGNIFICANT CHANGE UP (ref 4.2–5.8)
RBC # FLD: 17.4 % — HIGH (ref 10.3–14.5)
SODIUM SERPL-SCNC: 138 MMOL/L — SIGNIFICANT CHANGE UP (ref 135–145)
WBC # BLD: 6.83 K/UL — SIGNIFICANT CHANGE UP (ref 3.8–10.5)
WBC # FLD AUTO: 6.83 K/UL — SIGNIFICANT CHANGE UP (ref 3.8–10.5)

## 2023-02-17 PROCEDURE — 99232 SBSQ HOSP IP/OBS MODERATE 35: CPT

## 2023-02-17 RX ORDER — ROPINIROLE 8 MG/1
0.75 TABLET, FILM COATED, EXTENDED RELEASE ORAL DAILY
Refills: 0 | Status: DISCONTINUED | OUTPATIENT
Start: 2023-02-17 | End: 2023-02-21

## 2023-02-17 RX ORDER — DEXAMETHASONE 0.5 MG/5ML
8.33 ELIXIR ORAL
Qty: 0 | Refills: 0 | DISCHARGE
Start: 2023-02-17

## 2023-02-17 RX ORDER — ROPINIROLE 8 MG/1
3 TABLET, FILM COATED, EXTENDED RELEASE ORAL
Qty: 0 | Refills: 0 | DISCHARGE
Start: 2023-02-17

## 2023-02-17 RX ORDER — ONDANSETRON 8 MG/1
1 TABLET, FILM COATED ORAL
Qty: 0 | Refills: 0 | DISCHARGE

## 2023-02-17 RX ORDER — ENOXAPARIN SODIUM 100 MG/ML
40 INJECTION SUBCUTANEOUS
Refills: 0 | Status: DISCONTINUED | OUTPATIENT
Start: 2023-02-17 | End: 2023-02-21

## 2023-02-17 RX ORDER — LACOSAMIDE 50 MG/1
200 TABLET ORAL
Refills: 0 | Status: DISCONTINUED | OUTPATIENT
Start: 2023-02-17 | End: 2023-02-21

## 2023-02-17 RX ORDER — LEVETIRACETAM 250 MG/1
1 TABLET, FILM COATED ORAL
Qty: 0 | Refills: 0 | DISCHARGE

## 2023-02-17 RX ORDER — DEXAMETHASONE 0.5 MG/5ML
1 ELIXIR ORAL
Qty: 0 | Refills: 0 | DISCHARGE

## 2023-02-17 RX ORDER — POLYETHYLENE GLYCOL 3350 17 G/17G
17 POWDER, FOR SOLUTION ORAL DAILY
Refills: 0 | Status: DISCONTINUED | OUTPATIENT
Start: 2023-02-17 | End: 2023-02-21

## 2023-02-17 RX ORDER — ONDANSETRON 8 MG/1
4 TABLET, FILM COATED ORAL EVERY 6 HOURS
Refills: 0 | Status: DISCONTINUED | OUTPATIENT
Start: 2023-02-17 | End: 2023-02-21

## 2023-02-17 RX ORDER — DEXAMETHASONE 0.5 MG/5ML
4 ELIXIR ORAL
Refills: 0 | Status: DISCONTINUED | OUTPATIENT
Start: 2023-02-17 | End: 2023-02-21

## 2023-02-17 RX ORDER — TEMOZOLOMIDE 140 MG/1
1 CAPSULE ORAL
Qty: 0 | Refills: 0 | DISCHARGE

## 2023-02-17 RX ORDER — DEXAMETHASONE 0.5 MG/5ML
2 ELIXIR ORAL
Refills: 0 | Status: DISCONTINUED | OUTPATIENT
Start: 2023-02-17 | End: 2023-02-21

## 2023-02-17 RX ORDER — SENNA PLUS 8.6 MG/1
2 TABLET ORAL AT BEDTIME
Refills: 0 | Status: DISCONTINUED | OUTPATIENT
Start: 2023-02-17 | End: 2023-02-21

## 2023-02-17 RX ORDER — LEVETIRACETAM 250 MG/1
10 TABLET, FILM COATED ORAL
Qty: 0 | Refills: 0 | DISCHARGE
Start: 2023-02-17

## 2023-02-17 RX ORDER — TEMOZOLOMIDE 140 MG/1
3 CAPSULE ORAL
Qty: 0 | Refills: 0 | DISCHARGE

## 2023-02-17 RX ORDER — LACOSAMIDE 50 MG/1
1 TABLET ORAL
Qty: 0 | Refills: 0 | DISCHARGE
Start: 2023-02-17

## 2023-02-17 RX ORDER — ACETAMINOPHEN 500 MG
650 TABLET ORAL EVERY 6 HOURS
Refills: 0 | Status: DISCONTINUED | OUTPATIENT
Start: 2023-02-17 | End: 2023-02-21

## 2023-02-17 RX ORDER — DEXAMETHASONE 0.5 MG/5ML
16.67 ELIXIR ORAL
Qty: 0 | Refills: 0 | DISCHARGE
Start: 2023-02-17

## 2023-02-17 RX ORDER — LEVETIRACETAM 250 MG/1
1000 TABLET, FILM COATED ORAL EVERY 12 HOURS
Refills: 0 | Status: DISCONTINUED | OUTPATIENT
Start: 2023-02-17 | End: 2023-02-19

## 2023-02-17 RX ADMIN — Medication 4 MILLIGRAM(S): at 09:08

## 2023-02-17 RX ADMIN — LACOSAMIDE 200 MILLIGRAM(S): 50 TABLET ORAL at 21:03

## 2023-02-17 RX ADMIN — LEVETIRACETAM 400 MILLIGRAM(S): 250 TABLET, FILM COATED ORAL at 09:20

## 2023-02-17 RX ADMIN — ROPINIROLE 0.75 MILLIGRAM(S): 8 TABLET, FILM COATED, EXTENDED RELEASE ORAL at 16:51

## 2023-02-17 RX ADMIN — LACOSAMIDE 200 MILLIGRAM(S): 50 TABLET ORAL at 09:09

## 2023-02-17 RX ADMIN — LEVETIRACETAM 400 MILLIGRAM(S): 250 TABLET, FILM COATED ORAL at 21:03

## 2023-02-17 NOTE — H&P ADULT - NSHPPHYSICALEXAM_GEN_ALL_CORE
AOx2-3, R homonymous hemianopsia, difficulty finger counting b/l, FC, PERRL, EOMI, no facial, 5/5 throughout, no drift

## 2023-02-17 NOTE — PROGRESS NOTE ADULT - ASSESSMENT
63yo male with with a histroy of recent resection of a left sided Glioblastoma in 10/22 at [facility]. His initial presentation at that time was for expressive aphasia and seizures. He was treated with Vimpat/Keppra at the time. In addition to his surgical resection he underwent Chemo TX and RT. He presents with Expressive Aphasia.   CT/CTA were negative for thrombotic/vascular event. He was not a candidate for TPA. Additionally the degree of cerebral edema noted was not significantly different from his last study.   An EEG done in the ED showed active seizure activity involving the surgical side( the Left hemispheric Temporal/Parietal/Occipital areas). He was loaded with Keppra and the seizure activity ceased.     Admitted for  1. AMS 2/2 seizure  2. Known GBM lesion s/p recent surgical resection     PLAN  - Awake and agitated. AAOx2-3 ( self, place) does not recall events leading to hospitalization.   - EEG overnight with no seizure activity  - cont vimpat 200mg bid (increased from home dose), cont keppra 1gm bid  - on decadron    - PO diet  - awaiting bed at [facility]. called transfer center this am, can be downgraded if no ped        61yo male with with a histroy of recent resection of a left sided Glioblastoma in 10/22 at [facility]. His initial presentation at that time was for expressive aphasia and seizures. He was treated with Vimpat/Keppra at the time. In addition to his surgical resection he underwent Chemo TX and RT. He presents with Expressive Aphasia.   CT/CTA were negative for thrombotic/vascular event. He was not a candidate for TPA. Additionally the degree of cerebral edema noted was not significantly different from his last study.   An EEG done in the ED showed active seizure activity involving the surgical side( the Left hemispheric Temporal/Parietal/Occipital areas). He was loaded with Keppra and the seizure activity ceased.     Admitted for  1. AMS 2/2 seizure  2. Known GBM lesion s/p recent surgical resection     PLAN  - Awake and agitated. AAOx2-3 ( self, place) does not recall events leading to hospitalization.   - EEG overnight with no seizure activity  - cont vimpat 200mg bid (increased from home dose), cont keppra 1gm bid  - on decadron    - PO diet  - addendum: no seizures under control denied transfer to [place], will need, PT , rehab eventual placement     will transfer to floor, s/o to hospitalist

## 2023-02-17 NOTE — PROGRESS NOTE ADULT - ASSESSMENT
Mr. Ramos is a 62 year old man who was diagnosed with a  brain tumor (GBM) in September 2021 after ophthalmology noted a field cut.  On 10/5 he underwent left parietal-occipital craniotomy with near complete resection of the tumor.  In late December he was admitted to the hospital with seizures.  On 2/15 he was with his friends and began to look dazed, and started to have difficulty speaking.  He was not given thrombolytic therapy since seizure, rather than stroke was suspected.    Due to suspected seizures, an EEG was placed in the ED which shows continuous seizure activity in the left hemisphere.  -Lorazepam 2 mg IV was given  -Give Keppra 1000 mg IV x 1  -Stat dose of Vimpat 200 mg IV    EEG was disconnected on 2/15 due to plans for transfer to [facility] at patient's request. However, he ended up staying at [facility] due to a lack of beds.    Still with mild aphasia, confusion.  Repeat EEG on 2/16 showed slowing in the left temporal, parietal, occipital regions but no ongoing epileptiform activity.      -Continue Keppra 1000 mg BID  -Continue Vimpat to 200 mg BID (increased from home dose of 100 mg BID)    If further care is needed from neurosurgical standpoint, he will be transferred to [facility] when bed is available.

## 2023-02-17 NOTE — H&P ADULT - ASSESSMENT
62M hx GBM s/p rxn 10/2022. Direct floor xfer from [facility] for sz. Adm to [facility] 2/15 for difficulty speaking/looking dazed. EEG at [facility] showed sz activity in L hemisphere; 2/16 EEG report: slowing in L temp/parietal/occ regions but no ongoing sz activity. CTH: 2/15 stable compared to prior. Exam: AOx2-3, R homonymous hemianopsia, difficulty finger counting b/l, FC, PERRL, EOMI, no facial, 5/5 throughout, no drift  -4C, q4h neuro checks  -Cont dex 4mg AM and 2mg PM  -Keppra 1000BID and Vimpat 200BID  -EEG  -Ophtho consult in AM

## 2023-02-17 NOTE — PATIENT PROFILE ADULT - FALL HARM RISK - HARM RISK INTERVENTIONS

## 2023-02-17 NOTE — DISCHARGE NOTE NURSING/CASE MANAGEMENT/SOCIAL WORK - NSDCPEFALRISK_GEN_ALL_CORE
For information on Fall & Injury Prevention, visit: [email or web address] OR  [email or web address] OR  https://www.cdc.gov/steadi/patient.html

## 2023-02-17 NOTE — PROGRESS NOTE ADULT - SUBJECTIVE AND OBJECTIVE BOX
Patient is a 62y old  Male who presents with a chief complaint of     BRIEF HOSPITAL COURSE: 63yo male with with a histroy of recent resection of a left sided Glioblastoma in 10/22 at [facility]. His initial presentation at that time was for expressive aphasia and seizures. He was treated with Vimpat/Keppra at the time. In addition to his surgical resection he underwent Chemo TX and RT.   He presents with Expressive Aphasia.   CT/CTA were negative for thrombotic/vascular event. He was not a candidate for TPA. Additionally the degree of cerebral edema noted was not significantly different from his last study.   An EEG done in the ED showed active seizure activity involving the surgical side( the Left hemispheric Temporal/Parietal/Occipital areas). He was loaded with Keppra and the seizure activity ceased.     He was initially to be transferred back to [facility] for further treatment, as his whole team of physicians is there. However, there is no bed availability at  time of admission and he will need to be admitted here at  in the interim.       2/16 no seizure activity overnight. pt agitated, complaining he hasnt eaten and that transfer is taking long    PAST MEDICAL & SURGICAL HISTORY:  Glioblastoma  Seizures  S/P craniotomy    Medications:  lacosamide 200 milliGRAM(s) Oral two times a day  levETIRAcetam  IVPB 1000 milliGRAM(s) IV Intermittent every 12 hours  rOPINIRole 0.75 milliGRAM(s) Oral daily  dexAMETHasone  Injectable 2 milliGRAM(s) IV Push daily  dexAMETHasone  Injectable 4 milliGRAM(s) IV Push daily  lactated ringers. 1000 milliLiter(s) IV Continuous <Continuous>        ICU Vital Signs Last 24 Hrs  T(C): 36.6 (16 Feb 2023 09:22), Max: 36.8 (16 Feb 2023 06:00)  T(F): 97.8 (16 Feb 2023 09:22), Max: 98.2 (16 Feb 2023 06:00)  HR: 70 (16 Feb 2023 10:00) (56 - 104)  BP: 130/99 (16 Feb 2023 10:00) (109/76 - 137/97)  BP(mean): 110 (16 Feb 2023 10:00) (87 - 110)  ABP: --  ABP(mean): --  RR: 15 (16 Feb 2023 10:00) (15 - 21)  SpO2: 95% (16 Feb 2023 10:00) (92% - 100%)    O2 Parameters below as of 15 Feb 2023 18:46  Patient On (Oxygen Delivery Method): room air                I&O's Detail    15 Feb 2023 07:01  -  16 Feb 2023 07:00  --------------------------------------------------------  IN:    IV PiggyBack: 100 mL    Lactated Ringers: 400 mL  Total IN: 500 mL    OUT:    Voided (mL): 1300 mL  Total OUT: 1300 mL    Total NET: -800 mL            LABS:                        14.3   9.26  )-----------( 134      ( 16 Feb 2023 08:28 )             42.6     02-16    138  |  103  |  16  ----------------------------<  124<H>  4.6   |  30  |  0.73    Ca    9.3      16 Feb 2023 08:28  Phos  5.2     02-16  Mg     2.3     02-16    TPro  6.8  /  Alb  3.0<L>  /  TBili  0.4  /  DBili  x   /  AST  17  /  ALT  61  /  AlkPhos  67  02-15          CAPILLARY BLOOD GLUCOSE  106 (15 Feb 2023 12:38)      POCT Blood Glucose.: 106 mg/dL (15 Feb 2023 11:35)    PT/INR - ( 15 Feb 2023 11:46 )   PT: 11.9 sec;   INR: 1.03 ratio         PTT - ( 15 Feb 2023 11:46 )  PTT:25.6 sec    CULTURES:    Physical Examination:    General: No acute distress. awake, agitated     HEENT: Pupils equal, reactive to light.  Symmetric.  PULM: Clear to auscultation bilaterally  CVS: Regular rate and rhythm, no murmurs  ABD: Soft, nondistended, nontender, normoactive bowel sounds  EXT: No LE edema, calf nontender  SKIN: Warm and well perfused, no rashes noted.  NEURO: Alert, oriented x 2-3, slow to respond but answers simple questions appropriately.    DEVICES:     RADIOLOGY:     
Interval History:  2/17/23: Expressing frustration, "Call [facility]    MEDICATIONS  (STANDING):  dexAMETHasone  Injectable 2 milliGRAM(s) IV Push daily  dexAMETHasone  Injectable 4 milliGRAM(s) IV Push daily  lacosamide 200 milliGRAM(s) Oral two times a day  lactated ringers. 1000 milliLiter(s) (50 mL/Hr) IV Continuous <Continuous>  levETIRAcetam  IVPB 1000 milliGRAM(s) IV Intermittent every 12 hours  rOPINIRole 0.75 milliGRAM(s) Oral daily    MEDICATIONS  (PRN):      Allergies    No Known Allergies    Intolerances        PHYSICAL EXAM:  Vital Signs Last 24 Hrs  T(F): 97.9 (02-17-23 @ 09:12)  HR: 85 (02-17-23 @ 10:00)  BP: 127/81 (02-17-23 @ 10:00)  RR: 19 (02-17-23 @ 10:00)    GENERAL: NAD, well-groomed  HEAD:  Atraumatic, Normocephalic  Neuro:  Awake, alert, no aphasia. Oriented to person. Not oriented to time  CN: PERRL, EOMI, no nystagmus, right hemianopsia, no facial weakness, tongue protrudes in the midline  motor: normal tone, no pronator drift, full strength in all four extremities  sensory: intact to light touch  coordination: finger to nose intact bilaterally  DTRs: symmetric, plantar responses flexor bilaterally      LABS:                        12.8   6.83  )-----------( 124      ( 17 Feb 2023 06:27 )             38.4     02-17    138  |  106  |  22  ----------------------------<  106<H>  3.9   |  29  |  0.71    Ca    8.8      17 Feb 2023 06:27  Phos  3.9     02-17  Mg     2.2     02-17    TPro  6.8  /  Alb  3.0<L>  /  TBili  0.4  /  DBili  x   /  AST  17  /  ALT  61  /  AlkPhos  67  02-15    PT/INR - ( 15 Feb 2023 11:46 )   PT: 11.9 sec;   INR: 1.03 ratio         PTT - ( 15 Feb 2023 11:46 )  PTT:25.6 sec      RADIOLOGY & ADDITIONAL STUDIES:      CT head 2/15/23:  Reidentified is a large area of confluent hypoattenuation within the left   posterior parietal and temporal lobes, compatible with known GBM tumor,   without significant change since prior exam when accounting for   differences in technique from prior MRI brain. Mass effect upon the left   ventricle is unchanged. No midline shift.    CTA head and neck, CT perfusion 2/15/23:     CTA brain: No hemodynamically significant stenosis    CTA carotid/vertebral artery circulation: No hemodynamically significant   stenosis    CT Perfusion: Normal      
Events Overnight:  no further seizures, agitated at times , expressive aphasia    HPI:      63yo male with with a histroy of recent resection of a left sided Glioblastoma in 10/22 at [facility]. His initial presentation at that time was for expressive aphasia and seizures. He was treated with Vimpat/Keppra at the time. In addition to his surgical resection he underwent Chemo TX and RT.   He presented with Expressive Aphasia.   CT/CTA were negative for thrombotic/vascular event.  Additionally the degree of cerebral edema noted was not significantly different from his last study.   An EEG done in the ED showed active seizure activity involving the surgical side( the Left hemispheric Temporal/Parietal/Occipital areas). He was loaded with Keppra and the seizure activity ceased.     2/17 no seizure activity overnight. pt agitated,      ROS no specific complaints, but ROS difficult to obtain due to  aphasia      PMH:        as above     MEDICATIONS  (STANDING):  dexAMETHasone  Injectable 2 milliGRAM(s) IV Push daily  dexAMETHasone  Injectable 4 milliGRAM(s) IV Push daily  lacosamide 200 milliGRAM(s) Oral two times a day  lactated ringers. 1000 milliLiter(s) (50 mL/Hr) IV Continuous <Continuous>  levETIRAcetam  IVPB 1000 milliGRAM(s) IV Intermittent every 12 hours  rOPINIRole 0.75 milliGRAM(s) Oral daily    ICU Vital Signs Last 24 Hrs  T(C): 36.6 (17 Feb 2023 09:12), Max: 36.7 (16 Feb 2023 14:25)  T(F): 97.9 (17 Feb 2023 09:12), Max: 98.1 (16 Feb 2023 14:25)  HR: 85 (17 Feb 2023 10:00) (63 - 104)  BP: 127/81 (17 Feb 2023 10:00) (97/76 - 142/88)  BP(mean): 95 (17 Feb 2023 10:00) (79 - 108)  ABP: --  ABP(mean): --  RR: 19 (17 Feb 2023 10:00) (14 - 22)  SpO2: 95% (17 Feb 2023 10:00) (93% - 98%)    Physical Exam    General: No acute distress. awake, agitated     HEENT: Pupils equal, reactive to light.  no facial droop  PULM: Clear    CVS: Regular rate and rhythm, no murmurs  ABD: Soft, nondistended, nontender, normoactive bowel sounds  EXT: No LE edema, calf nontender  SKIN: Warm and well perfused, no rashes noted.  NEURO: Alert, oriented x 2-3, slow to respond , expressive aphasia        I&O's Summary    16 Feb 2023 07:01  -  17 Feb 2023 07:00  --------------------------------------------------------  IN: 550 mL / OUT: 1100 mL / NET: -550 mL    17 Feb 2023 07:01  -  17 Feb 2023 11:16  --------------------------------------------------------  IN: 0 mL / OUT: 400 mL / NET: -400 mL                        12.8   6.83  )-----------( 124      ( 17 Feb 2023 06:27 )             38.4     02-17    138  |  106  |  22  ----------------------------<  106<H>  3.9   |  29  |  0.71    Ca    8.8      17 Feb 2023 06:27  Phos  3.9     02-17  Mg     2.2     02-17    TPro  6.8  /  Alb  3.0<L>  /  TBili  0.4  /  DBili  x   /  AST  17  /  ALT  61  /  AlkPhos  67  02-15                                                                                        
Interval History:    MEDICATIONS  (STANDING):  dexAMETHasone  Injectable 2 milliGRAM(s) IV Push daily  dexAMETHasone  Injectable 4 milliGRAM(s) IV Push daily  lacosamide 200 milliGRAM(s) Oral two times a day  lactated ringers. 1000 milliLiter(s) (50 mL/Hr) IV Continuous <Continuous>  levETIRAcetam  IVPB 1000 milliGRAM(s) IV Intermittent every 12 hours  rOPINIRole 0.75 milliGRAM(s) Oral daily    MEDICATIONS  (PRN):      Allergies    No Known Allergies    Intolerances        PHYSICAL EXAM:  Vital Signs Last 24 Hrs  T(F): 97.8 (02-16-23 @ 09:22)  HR: 70 (02-16-23 @ 10:00)  BP: 130/99 (02-16-23 @ 10:00)  RR: 15 (02-16-23 @ 10:00)    GENERAL: NAD, well-groomed, well-developed  HEAD:  Atraumatic, Normocephalic  Neuro:  Awake, alert, no aphasia. Oriented to person. States he is "in my room in a doctor's office." Does not know the date  CN: PERRL, EOMI, no nystagmus, right hemianopsia, no facial weakness, tongue protrudes in the midline  motor: normal tone, no pronator drift, full strength in all four extremities  sensory: intact to light touch  coordination: finger to nose intact bilaterally  DTRs: symmetric, plantar responses flexor bilaterally    LABS:                        14.3   9.26  )-----------( 134      ( 16 Feb 2023 08:28 )             42.6     02-16    138  |  103  |  16  ----------------------------<  124<H>  4.6   |  30  |  0.73    Ca    9.3      16 Feb 2023 08:28  Phos  5.2     02-16  Mg     2.3     02-16    TPro  6.8  /  Alb  3.0<L>  /  TBili  0.4  /  DBili  x   /  AST  17  /  ALT  61  /  AlkPhos  67  02-15    PT/INR - ( 15 Feb 2023 11:46 )   PT: 11.9 sec;   INR: 1.03 ratio         PTT - ( 15 Feb 2023 11:46 )  PTT:25.6 sec      RADIOLOGY & ADDITIONAL STUDIES:    CT head 2/15/23:  Reidentified is a large area of confluent hypoattenuation within the left   posterior parietal and temporal lobes, compatible with known GBM tumor,   without significant change since prior exam when accounting for   differences in technique from prior MRI brain. Mass effect upon the left   ventricle is unchanged. No midline shift.    CTA head and neck, CT perfusion 2/15/23:     CTA brain: No hemodynamically significant stenosis    CTA carotid/vertebral artery circulation: No hemodynamically significant   stenosis    CT Perfusion: Normal

## 2023-02-17 NOTE — CONSULT NOTE ADULT - SUBJECTIVE AND OBJECTIVE BOX
62yM was admitted on 02-15    Patient is a 62y old  Male who presents with a chief complaint of seizures (17 Feb 2023 11:15)    HPI:  62M with a histroy of recent resection of a left sided Glioblastoma in 10/22 at [facility]. His initial presentation at that time was for expressive aphasia and seizures. He was treated with Vimpat/Keppra at the time. In addition to his surgical resection he underwent Chemo TX and RT.   He now presents with Expressive Aphasia. CT/CTA were negative for thrombotic/vascular event. He was not a candidate for TPA. Additionally the degree of cerebral edema noted was not significantly different from his last study. An EEG done in the ED showed   active seizure activity involving the surgical side( the Left hemispheric Temporal/Parietal/Occipital areas). He was loaded with Keppra and the seizure activity ceased.     He was initially to be transferred back to [facility] for further treatment, as his whole team of physicians is there. However, there is no bed availability at this time and he will need to be admitted here at  in the interim.     At present he is awake, follows simple commands, has no sign of active seizure activity. he is confused at baseline, with persistent expressive aphasia though.     Imaging performed:    IMPRESSION:    CTA brain: 2/15/2023 No hemodynamically significant stenosis  CTA carotid/vertebral artery circulation 2/15/2023: No hemodynamically significant stenosis  CT Perfusion: 2/15/2023 Normal    Head CT 2/15/2023.  Reidentified is a large area of confluent hypoattenuation within the left posterior parietal and temporal lobes, compatible with known GBM tumor, without significant change since prior exam when accounting for differences in technique from prior MRI brain. Mass effect upon the left ventricle is unchanged. No midline shift.    REVIEW OF SYSTEMS  Constitutional - No fever, No weight loss, No fatigue  HEENT - No eye pain, No visual disturbances, No difficulty hearing, No tinnitus, No vertigo, No neck pain  Respiratory - No cough, No wheezing, No shortness of breath  Cardiovascular - No chest pain, No palpitations  Gastrointestinal - No abdominal pain, No nausea, No vomiting, No diarrhea, No constipation  Genitourinary - No dysuria, No frequency, No hematuria, No incontinence  Neurological - No headaches, No memory loss, No loss of strength, No numbness, No tremors  Skin - No itching, No rashes, No lesions   Endocrine - No temperature intolerance  Musculoskeletal - No joint pain, No joint swelling, No muscle pain  Psychiatric - No depression, No anxiety    VITALS  T(C): 36.6 (02-17-23 @ 09:12), Max: 36.7 (02-16-23 @ 14:25)  HR: 101 (02-17-23 @ 13:00) (63 - 104)  BP: 131/92 (02-17-23 @ 12:00) (97/76 - 142/88)  RR: 17 (02-17-23 @ 13:00) (14 - 26)  SpO2: 95% (02-17-23 @ 13:00) (93% - 98%)  Wt(kg): --    PAST MEDICAL & SURGICAL HISTORY  No pertinent past medical history    Glioblastoma    Seizures    No significant past surgical history    S/P craniotomy    SOCIAL HISTORY - as per documentation/history  Smoking - None  EtOH - None  Drugs - None    FUNCTIONAL HISTORY  Lives   Independent    CURRENT FUNCTIONAL STATUS      FAMILY HISTORY   No pertinent family history in first degree relatives        RECENT LABS - Reviewed  CBC Full  -  ( 17 Feb 2023 06:27 )  WBC Count : 6.83 K/uL  RBC Count : 4.41 M/uL  Hemoglobin : 12.8 g/dL  Hematocrit : 38.4 %  Platelet Count - Automated : 124 K/uL  Mean Cell Volume : 87.1 fl  Mean Cell Hemoglobin : 29.0 pg  Mean Cell Hemoglobin Concentration : 33.3 gm/dL  Auto Neutrophil # : x  Auto Lymphocyte # : x  Auto Monocyte # : x  Auto Eosinophil # : x  Auto Basophil # : x  Auto Neutrophil % : x  Auto Lymphocyte % : x  Auto Monocyte % : x  Auto Eosinophil % : x  Auto Basophil % : x    02-17    138  |  106  |  22  ----------------------------<  106<H>  3.9   |  29  |  0.71    Ca    8.8      17 Feb 2023 06:27  Phos  3.9     02-17  Mg     2.2     02-17          ALLERGIES  No Known Allergies      MEDICATIONS   dexAMETHasone  Injectable 2 milliGRAM(s) IV Push daily  dexAMETHasone  Injectable 4 milliGRAM(s) IV Push daily  lacosamide 200 milliGRAM(s) Oral two times a day  lactated ringers. 1000 milliLiter(s) IV Continuous <Continuous>  levETIRAcetam  IVPB 1000 milliGRAM(s) IV Intermittent every 12 hours  rOPINIRole 0.75 milliGRAM(s) Oral daily      ----------------------------------------------------------------------------------------  PHYSICAL EXAM  Constitutional - NAD, Comfortable  HEENT - NCAT, EOMI  Neck - Supple, No limited ROM  Chest - Breathing comfortably, No wheezing  Cardiovascular - S1S2   Abdomen - Soft   Extremities - No C/C/E, No calf tenderness   Neurologic Exam -                    Cognitive - AAO to self, place, date, year, situation     Communication - Fluent, No dysarthria     Cranial Nerves - CN 2-12 intact     Motor - No focal deficits                    LEFT    UE - ShAB 5/5, EF 5/5, EE 5/5, WE 5/5,  5/5                    RIGHT UE - ShAB 5/5, EF 5/5, EE 5/5, WE 5/5,  5/5                    LEFT    LE - HF 5/5, KE 5/5, DF 5/5, PF 5/5                    RIGHT LE - HF 5/5, KE 5/5, DF 5/5, PF 5/5        Sensory - Intact to LT     Reflexes - DTR Intact, No primitive reflexive     Coordination - FTN intact     OculoVestibular - No saccades, No nystagmus, VOR         Balance - WNL Static  Psychiatric - Mood stable, Affect WNL  ----------------------------------------------------------------------------------------    Continue bedside therapy as well as OOB throughout the day with mobilization by staff to maintain cardiopulmonary function and prevention of secondary complications related to debility.     Discussed with rehab team.  62yM was admitted on 02-15    Patient is a 62y old  Male who presents with a chief complaint of seizures (17 Feb 2023 11:15)    HPI:  62M with a histroy of recent resection of a left sided Glioblastoma in 10/22 at [facility]. His initial presentation at that time was for expressive aphasia and seizures. He was treated with Vimpat/Keppra at the time. In addition to his surgical resection he underwent Chemo TX and RT.   He now presents with Expressive Aphasia. CT/CTA were negative for thrombotic/vascular event. He was not a candidate for TPA. Additionally the degree of cerebral edema noted was not significantly different from his last study. An EEG done in the ED showed   active seizure activity involving the surgical side( the Left hemispheric Temporal/Parietal/Occipital areas). He was loaded with Keppra and the seizure activity ceased.     He was initially to be transferred back to [facility] for further treatment, as his whole team of physicians is there. However, there is no bed availability at this time and he will need to be admitted here at  in the interim.     At present he is awake, follows simple commands, has no sign of active seizure activity. he is confused at baseline, with persistent expressive aphasia though.     Imaging performed:    IMPRESSION:    CTA brain: 2/15/2023 No hemodynamically significant stenosis  CTA carotid/vertebral artery circulation 2/15/2023: No hemodynamically significant stenosis  CT Perfusion: 2/15/2023 Normal    Head CT 2/15/2023.  Reidentified is a large area of confluent hypoattenuation within the left posterior parietal and temporal lobes, compatible with known GBM tumor, without significant change since prior exam when accounting for differences in technique from prior MRI brain. Mass effect upon the left ventricle is unchanged. No midline shift.    REVIEW OF SYSTEMS  Constitutional - No fever, No weight loss, No fatigue  HEENT - No eye pain, No visual disturbances, No difficulty hearing, No tinnitus, No vertigo, No neck pain  Respiratory - No cough, No wheezing, No shortness of breath  Cardiovascular - No chest pain, No palpitations  Gastrointestinal - No abdominal pain, No nausea, No vomiting, No diarrhea, No constipation  Genitourinary - No dysuria, No frequency, No hematuria, No incontinence  Neurological - No headaches, No memory loss, No loss of strength, No numbness, No tremors  Skin - No itching, No rashes, No lesions   Endocrine - No temperature intolerance  Musculoskeletal - No joint pain, No joint swelling, No muscle pain  Psychiatric - No depression, No anxiety    VITALS  T(C): 36.6 (02-17-23 @ 09:12), Max: 36.7 (02-16-23 @ 14:25)  HR: 101 (02-17-23 @ 13:00) (63 - 104)  BP: 131/92 (02-17-23 @ 12:00) (97/76 - 142/88)  RR: 17 (02-17-23 @ 13:00) (14 - 26)  SpO2: 95% (02-17-23 @ 13:00) (93% - 98%)  Wt(kg): --    PAST MEDICAL & SURGICAL HISTORY  No pertinent past medical history    Glioblastoma    Seizures    No significant past surgical history    S/P craniotomy    SOCIAL HISTORY - as per documentation/history  Smoking - None  EtOH - None  Drugs - None    FUNCTIONAL HISTORY  Lives   Independent    CURRENT FUNCTIONAL STATUS      FAMILY HISTORY   No pertinent family history in first degree relatives        RECENT LABS - Reviewed  CBC Full  -  ( 17 Feb 2023 06:27 )  WBC Count : 6.83 K/uL  RBC Count : 4.41 M/uL  Hemoglobin : 12.8 g/dL  Hematocrit : 38.4 %  Platelet Count - Automated : 124 K/uL  Mean Cell Volume : 87.1 fl  Mean Cell Hemoglobin : 29.0 pg  Mean Cell Hemoglobin Concentration : 33.3 gm/dL  Auto Neutrophil # : x  Auto Lymphocyte # : x  Auto Monocyte # : x  Auto Eosinophil # : x  Auto Basophil # : x  Auto Neutrophil % : x  Auto Lymphocyte % : x  Auto Monocyte % : x  Auto Eosinophil % : x  Auto Basophil % : x    02-17    138  |  106  |  22  ----------------------------<  106<H>  3.9   |  29  |  0.71    Ca    8.8      17 Feb 2023 06:27  Phos  3.9     02-17  Mg     2.2     02-17          ALLERGIES  No Known Allergies      MEDICATIONS   dexAMETHasone  Injectable 2 milliGRAM(s) IV Push daily  dexAMETHasone  Injectable 4 milliGRAM(s) IV Push daily  lacosamide 200 milliGRAM(s) Oral two times a day  lactated ringers. 1000 milliLiter(s) IV Continuous <Continuous>  levETIRAcetam  IVPB 1000 milliGRAM(s) IV Intermittent every 12 hours  rOPINIRole 0.75 milliGRAM(s) Oral daily      ----------------------------------------------------------------------------------------  PHYSICAL EXAM  Constitutional - NAD, Comfortable  HEENT - NCAT, EOMI  Neck - Supple, No limited ROM  Chest - Breathing comfortably, No wheezing  Cardiovascular - S1S2   Abdomen - Soft   Extremities - No C/C/E, No calf tenderness   Neurologic Exam -                    Cognitive - AAO to self, place, date, year, situation     Communication - Fluent, No dysarthria     Cranial Nerves - CN 2-12 intact     Motor - No focal deficits                    LEFT    UE - ShAB 5/5, EF 5/5, EE 5/5, WE 5/5,  5/5                    RIGHT UE - ShAB 5/5, EF 5/5, EE 5/5, WE 5/5,  5/5                    LEFT    LE - HF 5/5, KE 5/5, DF 5/5, PF 5/5                    RIGHT LE - HF 5/5, KE 5/5, DF 5/5, PF 5/5        Sensory - Intact to LT     Reflexes - DTR Intact, No primitive reflexive     Coordination - FTN intact     OculoVestibular - No saccades, No nystagmus, VOR         Balance - WNL Static  Psychiatric - Mood stable, Affect WNL  ----------------------------------------------------------------------------------------

## 2023-02-17 NOTE — H&P ADULT - HISTORY OF PRESENT ILLNESS
62M hx GBM s/p rxn 10/2022. Direct floor xfer from [facility] for sz. Adm to [facility] 2/15 for difficulty speaking/looking dazed. EEG at [facility] showed sz activity in L hemisphere; 2/16 EEG report: slowing in L temp/parietal/occ regions but no ongoing sz activity. CTH: 2/15 stable compared to prior. Exam: AOx2-3, R homonymous hemianopsia, difficulty finger counting b/l, FC, PERRL, EOMI, no facial, 5/5 throughout, no drift    ICU Vital Signs Last 24 Hrs  T(C): 36.4 (17 Feb 2023 22:32), Max: 36.8 (17 Feb 2023 14:38)  T(F): 97.5 (17 Feb 2023 22:32), Max: 98.3 (17 Feb 2023 14:38)  HR: 102 (17 Feb 2023 22:32) (63 - 114)  BP: 113/82 (17 Feb 2023 22:32) (97/64 - 142/88)  BP(mean): 75 (17 Feb 2023 20:00) (71 - 111)  ABP: --  ABP(mean): --  RR: 18 (17 Feb 2023 22:32) (14 - 31)  SpO2: 94% (17 Feb 2023 22:32) (91% - 98%)    O2 Parameters below as of 17 Feb 2023 22:32  Patient On (Oxygen Delivery Method): room air        02-17    138  |  106  |  22  ----------------------------<  106<H>  3.9   |  29  |  0.71    Ca    8.8      17 Feb 2023 06:27  Phos  3.9     02-17  Mg     2.2     02-17                          12.8   6.83  )-----------( 124      ( 17 Feb 2023 06:27 )             38.4

## 2023-02-17 NOTE — DISCHARGE NOTE NURSING/CASE MANAGEMENT/SOCIAL WORK - PATIENT PORTAL LINK FT
You can access the FollowMyHealth Patient Portal offered by [facility] by registering at the following website: [email or web address]. By joining [facility]’s FollowMyHealth portal, you will also be able to view your health information using other applications (apps) compatible with our system.

## 2023-02-17 NOTE — H&P ADULT - NSHPSOURCEINFORD_GEN_ALL_CORE
"https://www.cdc.gov/parasites/lice/head/gen_info/faqs.html\"> https://www.cdc.gov/parasites/lice/head/gen_info/faqs_treat.html\"> American [employer], 10(19), 208-396. Retrieved from https://www.aafp.org/afp/2019/0515/p635.html\">   Lice, Adult         Lice are tiny insects with claws on the ends of their legs. They are parasites, which means they need to live off another animal to survive. Lice lane from little round eggs (nits) that are attached to the base of hairs.  There are three different types of lice:  · Head lice. These lice live on the hair on a person's head.  · Body lice. These lice live on body hair.  · Pubic lice. These lice live on pubic hair.  Lice can spread from one person to another. Pets do not play a role in transmission. Lice crawl. They do not fly or jump. Lice cause skin irritation and intense itching in the area of the infested hair. Although having lice can be annoying, it is not dangerous. Lice do not spread diseases. Treatment will usually eliminate symptoms within a few days.  What are the causes?  This condition may be caused by:  · Very close contact with an infested person.  · Sharing infested items that touch the skin and hair. These include personal items, such as hats, chase, brushes, towels, clothing, pillowcases, or sheets.  Pubic lice are spread through sexual contact.  What increases the risk?  Although having lice is more common among young children, anyone can get lice. Warm weather increases the risk of getting lice.  What are the signs or symptoms?  Symptoms of this condition include:  · Itchiness in the affected area.  · Skin irritation.  · Rash or sores on the skin.  · The feeling of something moving in the hair.  · Tiny flakes or sacs near the scalp. These may be white, yellow, or tan.  · Tiny bugs crawling on the hair, scalp, or genital area.  · Trouble sleeping, because lice are most active in the dark.  How is this diagnosed?  This condition is diagnosed based " Botox injection 4/26/22 at 8:15 am   on:  · Signs and symptoms.  · A physical exam.  ? Your health care provider will examine the affected area closely for live lice, nits, and empty nit cases.  ? Nits are typically yellow or white in color. Empty nit cases are white. Lice are gray or brown in color and about the size of a sesame seed.  How is this treated?  Treatment for this condition includes:  · Using a hair rinse that contains a mild insecticide to kill lice. Your health care provider will recommend a prescription or an over-the-counter hair rinse.  · Removing lice, nits, and nit cases using a comb or tweezers.  · Washing items such as clothes, towels, and bed linens in hot water. Dry them afterward.  · Bagging items that cannot be washed or vacuumed.  Pregnant women should not use medicated shampoo or cream without first talking to a health care provider.  Follow these instructions at home:  Using medicated rinse  Apply medicated hair rinse as told by your health care provider or as per the package instructions. Follow the label instructions carefully. General instructions for applying rinses may include these steps:  1. Put on an old shirt or underwear, or use an old towel in case of staining from the hair rinse.  2. Wash and towel-dry your head or pubic area before applying the rinse if directed to do so.  3. When your hair is dry, apply the rinse. Leave the rinse in your hair for the amount of time specified in the instructions.  4. Rinse the area with water.  5. Comb your wet hair with a fine-tooth comb. Comb it close to the skin. Comb from the root down to the ends, removing any lice, nits, or nit cases. A lice comb may be included with the medicated rinse.  6. Do not wash the infested hair for 2 days while the medicine kills the lice.  7. After the treatment, repeat combing out your hair and removing lice, nits, or nit cases from the hair every 2-3 days. Do this for about 2-3 weeks. After treatment, the remaining lice should be moving more  slowly.  8. Repeat the treatment in 7-10 days if necessary.    Removing lice from other items    · Use hot water to wash all towels, hats, scarves, jackets, bedding, and clothing that you have recently used. Dry the items using the hot air cycle.  · Put any non-washable items that may have been exposed into plastic bags. Keep the bags tightly closed for 2 weeks to kill any remaining lice. Make sure that there are no holes in the bags.  · Soak all chase and brushes in hot water for 5 to 10 minutes.  · Vacuum carpets, mattresses, and upholstered furniture to remove any loose hair. There is no need to use chemicals, which can be poisonous (toxic). Lice survive for only 1-2 days away from human skin. Nits may survive for up to 1 week.    General instructions  · Remove any remaining lice, nits, or nit cases from hair using a fine-tooth comb.  · For head lice, ask your health care provider if other family members or close contacts should be examined or treated as well.  · For pubic lice, tell any sexual partners to seek treatment.  · Keep all follow-up visits as told by your health care provider. This is important.  Contact a health care provider if:  · You develop sores that look infected.  · Your rash or sores do not go away in 1 week.  · The lice or nits return or do not go away after treatment.  Summary  · Lice are tiny parasitic insects that live on the human body. A parasite needs to live off another animal to survive.  · Lice can spread from one person to another through close contact with an infested person or by sharing personal items, such as chase, brushes, or hats. Pubic lice are spread through sexual contact.  · Lice can be treated with a medicated hair rinse. Follow your health care provider's instructions, or instructions on the package label, if you are being treated with this medicine.  · For head lice, ask your health care provider if other family members or close contacts should be examined or treated  as well. For pubic lice, tell any sexual partners to seek treatment.  This information is not intended to replace advice given to you by your health care provider. Make sure you discuss any questions you have with your health care provider.  Document Revised: 01/06/2021 Document Reviewed: 01/06/2021  Elsevier Patient Education © 2021 [employer]     Chart(s)/Patient

## 2023-02-17 NOTE — CONSULT NOTE ADULT - ASSESSMENT
ASSESSMENT/PLAN  62yMale with functional deficits after  Pain - Tylenol  DVT PPX - SCDs  Rehab -    Recommend ACUTE inpatient rehabilitation for the functional deficits consisting of 3 hours of therapy/day & 24 hour RN/daily PMR physician for comorbid medical management. Patient will be able to tolerate 3 hours a day.   Recommend [facility], patient DOES NOT meet acute inpatient rehabilitation criteria. Patient needs a more prolonged stay to achieve transition to community.    Expect patient to achieve functional goals for DC HOME with OUTPATIENT   Expect patient to achieve functional goals for DC HOME with HOME CARE   Follow up with CONCUSSION [facility] - Call [phone] for an appointment    Will continue to follow. Functional progress will determine ongoing rehab dispo recommendations, which may change.   ASSESSMENT/PLAN  62yMale with functional deficits after  Pain - Tylenol  DVT PPX - SCDs  Rehab -    Recommend [facility].  Patient needs a more prolonged stay to achieve transition to community.   Will continue to follow. Functional progress will determine ongoing rehab dispo recommendations, which may change.  Continue bedside therapy as well as OOB throughout the day with mobilization by staff to maintain cardiopulmonary function and prevention of secondary complications related to debility.

## 2023-02-18 LAB
ANION GAP SERPL CALC-SCNC: 12 MMOL/L — SIGNIFICANT CHANGE UP (ref 5–17)
APTT BLD: 23.2 SEC — LOW (ref 27.5–35.5)
BUN SERPL-MCNC: 24 MG/DL — HIGH (ref 7–23)
CALCIUM SERPL-MCNC: 8.9 MG/DL — SIGNIFICANT CHANGE UP (ref 8.4–10.5)
CHLORIDE SERPL-SCNC: 106 MMOL/L — SIGNIFICANT CHANGE UP (ref 96–108)
CO2 SERPL-SCNC: 25 MMOL/L — SIGNIFICANT CHANGE UP (ref 22–31)
CREAT SERPL-MCNC: 0.77 MG/DL — SIGNIFICANT CHANGE UP (ref 0.5–1.3)
EGFR: 100 ML/MIN/1.73M2 — SIGNIFICANT CHANGE UP
GLUCOSE SERPL-MCNC: 99 MG/DL — SIGNIFICANT CHANGE UP (ref 70–99)
HCT VFR BLD CALC: 38.8 % — LOW (ref 39–50)
HGB BLD-MCNC: 12.6 G/DL — LOW (ref 13–17)
INR BLD: 1.03 RATIO — SIGNIFICANT CHANGE UP (ref 0.88–1.16)
MCHC RBC-ENTMCNC: 29 PG — SIGNIFICANT CHANGE UP (ref 27–34)
MCHC RBC-ENTMCNC: 32.5 GM/DL — SIGNIFICANT CHANGE UP (ref 32–36)
MCV RBC AUTO: 89.4 FL — SIGNIFICANT CHANGE UP (ref 80–100)
NRBC # BLD: 0 /100 WBCS — SIGNIFICANT CHANGE UP (ref 0–0)
PLATELET # BLD AUTO: 120 K/UL — LOW (ref 150–400)
POTASSIUM SERPL-MCNC: 3.8 MMOL/L — SIGNIFICANT CHANGE UP (ref 3.5–5.3)
POTASSIUM SERPL-SCNC: 3.8 MMOL/L — SIGNIFICANT CHANGE UP (ref 3.5–5.3)
PROTHROM AB SERPL-ACNC: 12 SEC — SIGNIFICANT CHANGE UP (ref 10.5–13.4)
RBC # BLD: 4.34 M/UL — SIGNIFICANT CHANGE UP (ref 4.2–5.8)
RBC # FLD: 17.8 % — HIGH (ref 10.3–14.5)
SODIUM SERPL-SCNC: 143 MMOL/L — SIGNIFICANT CHANGE UP (ref 135–145)
WBC # BLD: 6.34 K/UL — SIGNIFICANT CHANGE UP (ref 3.8–10.5)
WBC # FLD AUTO: 6.34 K/UL — SIGNIFICANT CHANGE UP (ref 3.8–10.5)

## 2023-02-18 PROCEDURE — 95720 EEG PHY/QHP EA INCR W/VEEG: CPT

## 2023-02-18 PROCEDURE — 99223 1ST HOSP IP/OBS HIGH 75: CPT

## 2023-02-18 PROCEDURE — 99232 SBSQ HOSP IP/OBS MODERATE 35: CPT

## 2023-02-18 RX ADMIN — Medication 4 MILLIGRAM(S): at 08:56

## 2023-02-18 RX ADMIN — Medication 2 MILLIGRAM(S): at 17:05

## 2023-02-18 RX ADMIN — LEVETIRACETAM 400 MILLIGRAM(S): 250 TABLET, FILM COATED ORAL at 21:27

## 2023-02-18 RX ADMIN — ENOXAPARIN SODIUM 40 MILLIGRAM(S): 100 INJECTION SUBCUTANEOUS at 21:26

## 2023-02-18 RX ADMIN — LEVETIRACETAM 400 MILLIGRAM(S): 250 TABLET, FILM COATED ORAL at 08:56

## 2023-02-18 RX ADMIN — SENNA PLUS 2 TABLET(S): 8.6 TABLET ORAL at 21:27

## 2023-02-18 RX ADMIN — LACOSAMIDE 200 MILLIGRAM(S): 50 TABLET ORAL at 05:00

## 2023-02-18 RX ADMIN — ROPINIROLE 0.75 MILLIGRAM(S): 8 TABLET, FILM COATED, EXTENDED RELEASE ORAL at 11:52

## 2023-02-18 RX ADMIN — LACOSAMIDE 200 MILLIGRAM(S): 50 TABLET ORAL at 17:06

## 2023-02-18 NOTE — OCCUPATIONAL THERAPY INITIAL EVALUATION ADULT - SIT-TO-STAND BALANCE
fair plus

## 2023-02-18 NOTE — PHYSICAL THERAPY INITIAL EVALUATION ADULT - PERTINENT HX OF CURRENT PROBLEM, REHAB EVAL
Pt is 62M admitted 2/17/23 PMHx GBM s/p rxn 10/2022. Direct floor xfer from T for sz. Adm to T 2/15 for difficulty speaking/looking dazed. EEG at [facility] showed sz activity in L hemisphere; 2/16 EEG report: slowing in L temp/parietal/occ regions but no ongoing sz activity. CTH: 2/15 stable compared to prior. Exam: AOx2-3, R homonymous hemianopsia, difficulty finger counting b/l, FC, PERRL, EOMI, no facial, 5/5 throughout, no drift.    CTA brain: No hemodynamically significant stenosis. CTA carotid/vertebral artery circulation: No hemodynamically significant   stenosis. CT Perfusion: Normal

## 2023-02-18 NOTE — CONSULT NOTE ADULT - ATTENDING COMMENTS
62y (1960) man with GBM (diag 2021, 10/5 left parietal-occipital craniotomy with near complete resection of the tumor, residual RHH), seizures on [id number] BID and Keppra 1000 BID transferred from [facility] for confusion.   Per chart review, on 2/15 patient was noted to be dazed and started to have difficulty speaking. On exam patient was noted to be confused and mildly aphasic. EEG in ED showed continuous seizure activity in the left hemisphere. Vimpat was increased to 200 mg BID and keppra continued at 1000 mg BID, repeat EEG showed slowing in the left temporal, parietal, occipital regions but no ongoing epileptiform activity. Patient was transferred to neurosurgery at [facility] for further management. Patient currently reports having difficulty remembering events. He is aware he was transferred from [place]. Denies tongue biting, urinary incontinence, weakness, numbness, changes to speech.     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 He was discharged with vimpat 100 BID and keppra 1000 BID.     Vimpat now increased to 200 mg bid. He still has naming difficulty, which is not normal for him. Would continue vEEG and adjust medications per epilepsy service.   Would not taper decadron further at this time.

## 2023-02-18 NOTE — PHYSICAL THERAPY INITIAL EVALUATION ADULT - GENERAL OBSERVATIONS, REHAB EVAL
recevied semisupine in bed, A&OX4, following commands, requiring increased time for processing, emotional & tearful t/o, h/o GBM, a/w seizure, +vEEG.

## 2023-02-18 NOTE — CONSULT NOTE ADULT - ASSESSMENT
Assessment and Recommendations:  62y male with a past medical history GBM s/p rxn 10/2022, no known ocular history consulted to evaluate vision changes.     #Monocular Diplopia OS  -Va 20/20 OU, perrl no apd, IOP wnl, color plates wnl.   -EOM full.   -Lens well centered. No phacodonesis.   -Has surface dryness in both eyes  -Monocular diplopia can be caused by surface dryness  -Pt endorses that he had a similar double vision after his surgery in 10/22. States it persisted and objects began to appear distorted  -Other than surface dryness, no intraocular etiology for double vision noted on exam.   -Recommend MRI brain and orbits w/ w/o contrast to evaluate for any intraorbital or intracranial cause for diplopia.     #Right Homonymous Hemianopsia   -Unclear if new or his baseline since GBM resection  -Recommend MRI brain and orbits as above  -Will need [facility] Tovar as outpatient.       Outpatient Follow-up: Patient should follow-up with his/her ophthalmologist or with [facility] Department of Ophthalmology within 1 week of after discharge at:    [address]  [place] [address]  [phone]    Mayte Glez MD PGY 2  Available on Microsoft Teams Assessment and Recommendations:  62y male with a past medical history GBM s/p rxn 10/2022, no known ocular history consulted to evaluate vision changes.     #Monocular Diplopia OS  -Va 20/20 OU, perrl no apd, IOP wnl, color plates wnl.   -EOM full.   -Lens well centered. No phacodonesis.   -Has surface dryness in both eyes  -Monocular diplopia can be caused by surface dryness  -Pt endorses that he had a similar double vision after his surgery in 10/22. States it persisted and objects began to appear distorted  -Other than surface dryness, no intraocular etiology for double vision noted on exam.   -Recommend MRI brain and orbits w/ w/o contrast to evaluate for any intraorbital or intracranial cause for diplopia.     #Right Homonymous Hemianopsia   -Unclear if new or his baseline since GBM resection  -Recommend MRI brain and orbits as above  -Will need [facility] Tovar as outpatient.     KEN Narvaez, neuroop    Outpatient Follow-up: Patient should follow-up with his/her ophthalmologist or with [facility] Department of Ophthalmology within 1 week of after discharge at:    [address]  [place] [address]  [phone]    Mayte Glez MD PGY 2  Available on Microsoft Teams Assessment and Recommendations:  62y male with a past medical history GBM s/p rxn 10/2022, no known ocular history consulted to evaluate vision changes.     #Monocular Diplopia OS  -Va 20/20 OU, perrl no apd, IOP wnl, color plates wnl.   -EOM full.   -Lens well centered. No phacodonesis.   -Has surface dryness in both eyes  -Monocular diplopia can be caused by surface dryness  -Pt endorses that he had a similar double vision after his surgery in 10/22. States it persisted and objects began to appear distorted  -Other than surface dryness, no intraocular etiology for double vision noted on exam.   -Recommend MRI brain and orbits w/ w/o contrast to evaluate for any intraorbital or intracranial cause for diplopia.     #Right Homonymous Hemianopsia   -Unclear if new or his baseline since GBM resection  -Per patient the "darkness" on the right side of his vision seems to be worsening. Given progression of disease seen on prior MRI, his sx may represent extension/worsening of homonymous hemianopsia.   -Recommend MRI brain and orbits as above  -Will need Murphy [facility] Tovar as outpatient.     DW Dr. Narvaez, neuroophthalmology    Outpatient Follow-up: Patient should follow-up with his/her ophthalmologist or with [facility] Department of Ophthalmology within 1 week of after discharge at:    [address]  [place] [address] [phone]    Mayte Glez MD PGY 2  Available on Microsoft Teams

## 2023-02-18 NOTE — OCCUPATIONAL THERAPY INITIAL EVALUATION ADULT - PERTINENT HX OF CURRENT PROBLEM, REHAB EVAL
Pt is 62M admitted 2/17/23 PMHx GBM s/p rxn 10/2022. Direct floor xfer from [facility] for sz. Adm to [facility] 2/15 for difficulty speaking/looking dazed. EEG at T showed sz activity in L hemisphere; 2/16 EEG report: slowing in L temp/parietal/occ regions but no ongoing sz activity. CTH: 2/15 stable compared to prior. Exam: AOx2-3, R homonymous hemianopsia, difficulty finger counting b/l, FC, PERRL, EOMI, no facial, 5/5 throughout, no drift.

## 2023-02-18 NOTE — PHYSICAL THERAPY INITIAL EVALUATION ADULT - PLANNED THERAPY INTERVENTIONS, PT EVAL
GOALS: Pt will negotiate 12 steps with handrail & step to pattern with supervision in 4wks/bed mobility training/gait training/transfer training

## 2023-02-18 NOTE — PROGRESS NOTE ADULT - ASSESSMENT
HPI:    62M hx GBM s/p rxn 10/2022. Direct floor xfer from [facility] for sz. Adm to [facility] 2/15 for difficulty speaking/looking dazed. EEG at [facility] showed sz activity in L hemisphere; 2/16 EEG report: slowing in L temp/parietal/occ regions but no ongoing sz activity. CTH: 2/15 stable compared to prior. Exam: AOx2-3, R homonymous hemianopsia, difficulty finger counting b/l, FC, PERRL, EOMI, no facial, 5/5 throughout, no drift    ICU Vital Signs Last 24 Hrs  T(C): 36.4 (17 Feb 2023 22:32), Max: 36.8 (17 Feb 2023 14:38)  T(F): 97.5 (17 Feb 2023 22:32), Max: 98.3 (17 Feb 2023 14:38)  HR: 102 (17 Feb 2023 22:32) (63 - 114)  BP: 113/82 (17 Feb 2023 22:32) (97/64 - 142/88)  BP(mean): 75 (17 Feb 2023 20:00) (71 - 111)  ABP: --  ABP(mean): --  RR: 18 (17 Feb 2023 22:32) (14 - 31)  SpO2: 94% (17 Feb 2023 22:32) (91% - 98%)    O2 Parameters below as of 17 Feb 2023 22:32  Patient On (Oxygen Delivery Method): room air        02-17    138  |  106  |  22  ----------------------------<  106<H>  3.9   |  29  |  0.71    Ca    8.8      17 Feb 2023 06:27  Phos  3.9     02-17  Mg     2.2     02-17                          12.8   6.83  )-----------( 124      ( 17 Feb 2023 06:27 )             38.4        (17 Feb 2023 23:28)    PROCEDURE:    POD#    PLAN:  Neuro: Cont HMV drain.  DC PCA and Pryor to TOV. Inc activity/OOB.     Cardio-    Medicine-    Respiratory: Patient instructed to use incentive spirometer [ X] YES [ ] NO              DVT ppx: [X ] SQL [ ] SQH and Venodynes [ ] Left [ ] Right [ X] Bilateral    Discharge Planning:  The patient was evaluated by PT and recommended out patient PT/A. Rehab/S. Acute Rehab.   She/He was subsequently DC on /2018 in stable condition.    More than 30 minutes spent on total encounter: more than 50% of the visit was spent on educating the patient and family regarding condition, medications, follow up plans, signs and symptoms to be concerned with, preparing paperwork, and questions answered regarding discharge.       62M hx GBM s/p rxn 10/2022. Direct floor xfer from T for sz. Adm to T 2/15 for difficulty speaking/looking dazed. EEG at [facility] showed sz activity in L hemisphere; 2/16 EEG report: slowing in L temp/parietal/occ regions but no ongoing sz activity. CTH: 2/15 stable compared to prior. Exam: AOx2-3, R homonymous hemianopsia, difficulty finger counting b/l, FC, PERRL, EOMI, no facial, 5/5 throughout, no drift    PROCEDURE:  Adm 2/17 Hx GBM Rsx'd 10/3/22-Chemo/RT. 2/17 Tx from [place] due to digg speaking and appears dazed.     POD#[id number]    PLAN:  Neuro: VEEG on. Need MRI Brain Sun/Mon once EEG Off. On Dex 4mg AM and 2mg PM. Cont Keppra, Vimpat. 2/18 Neuro was called to see pt today for Seizure/AED mgt and to see pt from a Neuro/Onco prospective for Dr Torres.  Thrombocytopenia-monitor. Pt HCP is Sandi a  at [facility] whom I discussed the above with today via phone. Further recomm after MRI done.    Optho called this AM for eval of blurry vision FU.    Neuro/Onco Dr Torres notified of pt admission via Teams FU.    2/18 EEG Rpt-This is an abnormal EEG record. There is evidence of potentially epileptogenic region of structural abnormality in the left posterior temporal region.  There is a skull defect in the same region. No seizures noted during the 6h recording period.     Neurology note of 2/18-62y (1960) man with GBM (diag 2021, 10/5 left parietal-occipital craniotomy with near complete resection of the tumor, residual RHH), seizures on [id number] BID and Keppra 1000 BID transferred from [facility] for confusion. Per chart review, on 2/15 patient was noted to be dazed and started to have difficulty speaking. On exam patient was noted to be confused and mildly aphasic. EEG in ED showed continuous seizure activity in the left hemisphere. Vimpat was increased to 200 mg BID and keppra continued at 1000 mg BID, repeat EEG showed slowing in the left temporal, parietal, occipital regions but no ongoing epileptiform activity. Patient was transferred to neurosurgery at [facility] for further management. Patient currently reports having difficulty remembering events. He is aware he was transferred from [place]. Denies tongue biting, urinary incontinence, weakness, numbness, changes to speech. 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 He was discharged with vimpat 100 BID and keppra 1000 BID. Vimpat now increased to 200 mg bid. He still has naming difficulty, which is not normal for him. Would continue vEEG and adjust medications per epilepsy service. Would not taper decadron further at this time.     Respiratory: Patient instructed to use incentive spirometer [ ] YES [ ] NO              DVT ppx: [X ] SQL [ ] SQH and Venodynes [ ] Left [ ] Right [ X] Bilateral    Discharge Planning:  The patient was evaluated by PT progressing towards Home PT. OT Eval-P FU.  He was subsequently DC on >>> in stable condition.    More than 30 minutes spent on total encounter: more than 50% of the visit was spent on educating the patient and family regarding condition, medications, follow up plans, signs and symptoms to be concerned with, preparing paperwork, and questions answered regarding discharge.

## 2023-02-18 NOTE — OCCUPATIONAL THERAPY INITIAL EVALUATION ADULT - ADDITIONAL COMMENTS
Pt lvies in private home with elderly mother, 7 SHAQ and 4 steps down to private residence area. Pt has a walk in shower, no DME.  PTA pt requiring assist with ADL's & mobility from friends and family, has RW occasionally uses

## 2023-02-18 NOTE — CONSULT NOTE ADULT - SUBJECTIVE AND OBJECTIVE BOX
Neurology - Consult Note    -  Spectra: [phone] ([facility]), [phone] ([facility])  -    HPI: Patient SERJIO TIDWELL is a 62y (1960) man with GBM (diag 2021, 10/5 left parietal-occipital craniotomy with near complete resection of the tumor, residual RHH), seizures on [id number] BID and Keppra 1000 BID transferred from [facility] for eeg showing   Per chart review, on 2/15 patient was noted to be dazed and started to have difficulty speaking. On exam patient was noted to be confused and mildly aphasic. EEG in ED showed continuous seizure activity in the left hemisphere. Vimpat was increased to 200 mg BID and keppra continued at 1000 mg BID, repeat EEG showed slowing in the left temporal, parietal, occipital regions but no ongoing epileptiform activity. Patient was transferred to neurosurgery at [facility] for further management. Patient currently reports having difficulty remembering events. He is aware he was transferred from [place]. Denies tongue biting, urinary incontinence.         Review of Systems:    All other review of systems is negative unless indicated above.    Allergies:      PMHx/PSHx/Family Hx: As above, otherwise see below   No pertinent past medical history    Glioblastoma    Seizures        Social Hx:  No current use of tobacco, alcohol, or illicit drugs    Medications:  MEDICATIONS  (STANDING):  dexAMETHasone  Injectable 4 milliGRAM(s) IV Push <User Schedule>  dexAMETHasone  Injectable 2 milliGRAM(s) IV Push <User Schedule>  enoxaparin Injectable 40 milliGRAM(s) SubCutaneous <User Schedule>  lacosamide 200 milliGRAM(s) Oral two times a day  levETIRAcetam  IVPB 1000 milliGRAM(s) IV Intermittent every 12 hours  polyethylene glycol 3350 17 Gram(s) Oral daily  rOPINIRole 0.75 milliGRAM(s) Oral daily  senna 2 Tablet(s) Oral at bedtime    MEDICATIONS  (PRN):  acetaminophen     Tablet .. 650 milliGRAM(s) Oral every 6 hours PRN Temp greater or equal to 38C (100.4F), Mild Pain (1 - 3)  ondansetron Injectable 4 milliGRAM(s) IV Push every 6 hours PRN Nausea and/or Vomiting      Vitals:  T(C): 36.4 (02-18-23 @ 08:30), Max: 36.8 (02-17-23 @ 14:38)  HR: 71 (02-18-23 @ 08:30) (70 - 114)  BP: 128/85 (02-18-23 @ 08:30) (97/64 - 138/104)  RR: 18 (02-18-23 @ 08:30) (17 - 31)  SpO2: 97% (02-18-23 @ 08:30) (91% - 97%)    Physical Examination:   General - NAD  Cardiovascular - Peripheral pulses palpable, no edema  Eyes Fundoscopy not performed due to safety precautions in the setting of the COVID-19 pandemic    Neurologic Exam:  Mental status - Awake, Alert, Oriented to person, place (Hospital, doesn't know [facility]), and time. Speech fluent, repetition and naming intact. Follows simple and complex commands. Attention/concentration, recent and remote memory (including registration and recall), and fund of knowledge intact    Cranial nerves - PERRLA, VFF, EOMI, face sensation (V1-V3) intact b/l, facial strength intact without asymmetry b/l, hearing intact b/l, palate with symmetric elevation, trapezius OR sternocleidomastiod 5/5 strength b/l, tongue midline on protrusion with full lateral movement    Motor - Normal bulk and tone throughout. No pronator drift.  Strength testing            Deltoid      Biceps      Triceps     Wrist Extension    Wrist Flexion     Interossei         R            5                 5               5                     5                              5                        5                 5  L             5                 5               5                     5                              5                        5                 5              Hip Flexion    Hip Extension    Knee Flexion    Knee Extension    Dorsiflexion    Plantar Flexion  R              5                           5                       5                           5                            5                          5  L              5                           5                        5                           5                            5                          5    Sensation - Light touch/temperature OR pain/vibration intact throughout    DTR's -             Biceps      Triceps     Brachioradialis      Patellar    Ankle    Toes/plantar response  R             2+             2+                  2+                       2+            2+                 Down  L              2+             2+                 2+                        2+           2+                 Down    Coordination - Finger to Nose intact b/l. No tremors appreciated    Gait and station - Normal casual gait. Romberg (-)    Labs:                        12.6   6.34  )-----------( 120      ( 18 Feb 2023 06:06 )             38.8     02-18    143  |  106  |  24<H>  ----------------------------<  99  3.8   |  25  |  0.77    Ca    8.9      18 Feb 2023 06:06  Phos  3.9     02-17  Mg     2.2     02-17      CAPILLARY BLOOD GLUCOSE            PT/INR - ( 18 Feb 2023 06:06 )   PT: 12.0 sec;   INR: 1.03 ratio         PTT - ( 18 Feb 2023 06:06 )  PTT:23.2 sec  CSF:                  Radiology:     Neurology - Consult Note    -  Spectra: [phone] ([facility]), [phone] ([facility])  -    HPI: Patient SERJIO TIDWELL is a 62y (1960) man with GBM (diag 2021, 10/5 left parietal-occipital craniotomy with near complete resection of the tumor, residual RHH), seizures on [id number] BID and Keppra 1000 BID transferred from [facility] for eeg showing   Per chart review, on 2/15 patient was noted to be dazed and started to have difficulty speaking. On exam patient was noted to be confused and mildly aphasic. EEG in ED showed continuous seizure activity in the left hemisphere. Vimpat was increased to 200 mg BID and keppra continued at 1000 mg BID, repeat EEG showed slowing in the left temporal, parietal, occipital regions but no ongoing epileptiform activity. Patient was transferred to neurosurgery at [facility] for further management. Patient currently reports having difficulty remembering events. He is aware he was transferred from [place]. Denies tongue biting, urinary incontinence, weakness, numbness, changes to speech.         Review of Systems:    All other review of systems is negative unless indicated above.    Allergies:      PMHx/PSHx/Family Hx: As above, otherwise see below   No pertinent past medical history    Glioblastoma    Seizures        Social Hx:  No current use of tobacco, alcohol, or illicit drugs    Medications:  MEDICATIONS  (STANDING):  dexAMETHasone  Injectable 4 milliGRAM(s) IV Push <User Schedule>  dexAMETHasone  Injectable 2 milliGRAM(s) IV Push <User Schedule>  enoxaparin Injectable 40 milliGRAM(s) SubCutaneous <User Schedule>  lacosamide 200 milliGRAM(s) Oral two times a day  levETIRAcetam  IVPB 1000 milliGRAM(s) IV Intermittent every 12 hours  polyethylene glycol 3350 17 Gram(s) Oral daily  rOPINIRole 0.75 milliGRAM(s) Oral daily  senna 2 Tablet(s) Oral at bedtime    MEDICATIONS  (PRN):  acetaminophen     Tablet .. 650 milliGRAM(s) Oral every 6 hours PRN Temp greater or equal to 38C (100.4F), Mild Pain (1 - 3)  ondansetron Injectable 4 milliGRAM(s) IV Push every 6 hours PRN Nausea and/or Vomiting      Vitals:  T(C): 36.4 (02-18-23 @ 08:30), Max: 36.8 (02-17-23 @ 14:38)  HR: 71 (02-18-23 @ 08:30) (70 - 114)  BP: 128/85 (02-18-23 @ 08:30) (97/64 - 138/104)  RR: 18 (02-18-23 @ 08:30) (17 - 31)  SpO2: 97% (02-18-23 @ 08:30) (91% - 97%)    Physical Examination:   General - NAD  Cardiovascular - Peripheral pulses palpable, no edema  Eyes Fundoscopy not performed due to safety precautions in the setting of the COVID-19 pandemic    Neurologic Exam:  Mental status - Awake, Alert, Oriented to person, place (Hospital, doesn't know [facility]), and time. Speech fluent, repetition and naming intact. Follows simple and complex commands. Attention/concentration intact, recalls 0/3 items, knows current president and previous, doesn't know, can spell world, but spells backwards as "DROW, " says he cannot do the serial 7's      Cranial nerves - PERRL, RHH, EOMI, face sensation (V1-V3) intact b/l, facial strength intact without asymmetry b/l, hearing intact b/l, palate with symmetric elevation,  sternocleidomastiod 5/5 strength b/l, tongue midline on protrusion with full lateral movement  Motor - Normal bulk and tone throughout. No pronator drift.  Strength testing            Deltoid      Biceps      Triceps     Wrist Extension                 Wrist Flexion     Interossei         R            5                 5               5                     5                              5                        5                 5  L             5                 5               5                     5                              5                        5                 5              Hip Flexion    Hip Extension      Knee Flexion          Knee Extension      Dorsiflexion    Plantar Flexion  R              5                           5                       5                           5                            5                          5  L              5                           5                        5                           5                            5                          5    Sensation - Light touch intact throughout    DTR's -             Biceps      Triceps     Brachioradialis             Patellar    Ankle    Toes/plantar response  R             2+             1+                  1+                       1+            1+                 Down  L              2+             1+                 1+                        1+           1+                 Down    Coordination - Finger to Nose intact b/l. No tremors appreciated    Gait and station - deffered, on v eeg    Labs:                        12.6   6.34  )-----------( 120      ( 18 Feb 2023 06:06 )             38.8     02-18    143  |  106  |  24<H>  ----------------------------<  99  3.8   |  25  |  0.77    Ca    8.9      18 Feb 2023 06:06  Phos  3.9     02-17  Mg     2.2     02-17      CAPILLARY BLOOD GLUCOSE            PT/INR - ( 18 Feb 2023 06:06 )   PT: 12.0 sec;   INR: 1.03 ratio         PTT - ( 18 Feb 2023 06:06 )  PTT:23.2 sec    Radiology:  `< from: CT Angio Neck Stroke Protocol w/ IV Cont (02.15.23 @ 12:01) >  IMPRESSION:    CTA brain: No hemodynamically significant stenosis    CTA carotid/vertebral artery circulation: No hemodynamically significant   stenosis    CT Perfusion: Normal    `< from: CT Brain Stroke Protocol (02.15.23 @ 11:41) >    Reidentified is a large area of confluent hypoattenuation within the left   posterior parietal and temporallobes, compatible with known GBM tumor,   without significant change since prior exam when accounting for   differences in technique from prior MRI brain. Mass effect upon the left   ventricle is unchanged. No midline shift.    < from: MR Spectroscopy (12.22.22 @ 17:20) >    IMPRESSION: Findings suspicious for progressive neoplasm in the left   parietal postoperative bed compared with 10/5/2022 with MR spectroscopy.   Tiny focus of neoplasm also likely in the left brainstem, unchanged.    < from: MR Head w/wo IV Cont (12.22.22 @ 17:19) >  IMPRESSION: Findings suspicious for progressive neoplasm in the left   parietal postoperative bed compared with 10/5/2022 with MR spectroscopy.   Tiny focus of neoplasm also likely in the left brainstem, unchanged.    < end of copied text >    EEG 2/18  This is an abnormal EEG record. There is evidence of potentially epileptogenic region of structural abnormality in the left posterior temporal region.  There is a skull defect in the same region. No seizures noted during the 6h recording period.          Neurology - Consult Note    -  Spectra: [phone] ([facility]), [phone] ([facility])  -    HPI: Patient SERJIO TIDWELL is a 62y (1960) man with GBM (diag 2021, 10/5 left parietal-occipital craniotomy with near complete resection of the tumor, residual RHH), seizures on [id number] BID and Keppra 1000 BID transferred from [facility] for eeg showing   Per chart review, on 2/15 patient was noted to be dazed and started to have difficulty speaking. On exam patient was noted to be confused and mildly aphasic. EEG in ED showed continuous seizure activity in the left hemisphere. Vimpat was increased to 200 mg BID and keppra continued at 1000 mg BID, repeat EEG showed slowing in the left temporal, parietal, occipital regions but no ongoing epileptiform activity. Patient was transferred to neurosurgery at [facility] for further management. Patient currently reports having difficulty remembering events. He is aware he was transferred from [place]. Denies tongue biting, urinary incontinence, weakness, numbness, changes to speech.     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 He was discharged with vimpat 100 BID and keppra 1000 TID.       Review of Systems:    All other review of systems is negative unless indicated above.    Allergies:      PMHx/PSHx/Family Hx: As above, otherwise see below   No pertinent past medical history    Glioblastoma    Seizures        Social Hx:  No current use of tobacco, alcohol, or illicit drugs    Medications:  MEDICATIONS  (STANDING):  dexAMETHasone  Injectable 4 milliGRAM(s) IV Push <User Schedule>  dexAMETHasone  Injectable 2 milliGRAM(s) IV Push <User Schedule>  enoxaparin Injectable 40 milliGRAM(s) SubCutaneous <User Schedule>  lacosamide 200 milliGRAM(s) Oral two times a day  levETIRAcetam  IVPB 1000 milliGRAM(s) IV Intermittent every 12 hours  polyethylene glycol 3350 17 Gram(s) Oral daily  rOPINIRole 0.75 milliGRAM(s) Oral daily  senna 2 Tablet(s) Oral at bedtime    MEDICATIONS  (PRN):  acetaminophen     Tablet .. 650 milliGRAM(s) Oral every 6 hours PRN Temp greater or equal to 38C (100.4F), Mild Pain (1 - 3)  ondansetron Injectable 4 milliGRAM(s) IV Push every 6 hours PRN Nausea and/or Vomiting      Vitals:  T(C): 36.4 (02-18-23 @ 08:30), Max: 36.8 (02-17-23 @ 14:38)  HR: 71 (02-18-23 @ 08:30) (70 - 114)  BP: 128/85 (02-18-23 @ 08:30) (97/64 - 138/104)  RR: 18 (02-18-23 @ 08:30) (17 - 31)  SpO2: 97% (02-18-23 @ 08:30) (91% - 97%)    Physical Examination:   General - NAD  Cardiovascular - Peripheral pulses palpable, no edema  Eyes Fundoscopy not performed due to safety precautions in the setting of the COVID-19 pandemic    Neurologic Exam:  Mental status - Awake, Alert, Oriented to person, place (Hospital, doesn't know [facility]), and time. Speech fluent, repetition and naming intact. Follows simple and complex commands. Attention/concentration intact, recalls 0/3 items, knows current president and previous, doesn't know, can spell world, but spells backwards as "DROW, " says he cannot do the serial 7's      Cranial nerves - PERRL, RHH, EOMI, face sensation (V1-V3) intact b/l, facial strength intact without asymmetry b/l, hearing intact b/l, palate with symmetric elevation,  sternocleidomastiod 5/5 strength b/l, tongue midline on protrusion with full lateral movement  Motor - Normal bulk and tone throughout. No pronator drift.  Strength testing            Deltoid      Biceps      Triceps     Wrist Extension                 Wrist Flexion     Interossei         R            5                 5               5                     5                              5                        5                 5  L             5                 5               5                     5                              5                        5                 5              Hip Flexion    Hip Extension      Knee Flexion          Knee Extension      Dorsiflexion    Plantar Flexion  R              5                           5                       5                           5                            5                          5  L              5                           5                        5                           5                            5                          5    Sensation - Light touch intact throughout    DTR's -             Biceps      Triceps     Brachioradialis             Patellar    Ankle    Toes/plantar response  R             2+             1+                  1+                       1+            1+                 Down  L              2+             1+                 1+                        1+           1+                 Down    Coordination - Finger to Nose intact b/l. No tremors appreciated    Gait and station - deffered, on v eeg    Labs:                        12.6   6.34  )-----------( 120      ( 18 Feb 2023 06:06 )             38.8     02-18    143  |  106  |  24<H>  ----------------------------<  99  3.8   |  25  |  0.77    Ca    8.9      18 Feb 2023 06:06  Phos  3.9     02-17  Mg     2.2     02-17      CAPILLARY BLOOD GLUCOSE            PT/INR - ( 18 Feb 2023 06:06 )   PT: 12.0 sec;   INR: 1.03 ratio         PTT - ( 18 Feb 2023 06:06 )  PTT:23.2 sec    Radiology:  `< from: CT Angio Neck Stroke Protocol w/ IV Cont (02.15.23 @ 12:01) >  IMPRESSION:    CTA brain: No hemodynamically significant stenosis    CTA carotid/vertebral artery circulation: No hemodynamically significant   stenosis    CT Perfusion: Normal    `< from: CT Brain Stroke Protocol (02.15.23 @ 11:41) >    Reidentified is a large area of confluent hypoattenuation within the left   posterior parietal and temporallobes, compatible with known GBM tumor,   without significant change since prior exam when accounting for   differences in technique from prior MRI brain. Mass effect upon the left   ventricle is unchanged. No midline shift.    < from: MR Spectroscopy (12.22.22 @ 17:20) >    IMPRESSION: Findings suspicious for progressive neoplasm in the left   parietal postoperative bed compared with 10/5/2022 with MR spectroscopy.   Tiny focus of neoplasm also likely in the left brainstem, unchanged.    < from: MR Head w/wo IV Cont (12.22.22 @ 17:19) >  IMPRESSION: Findings suspicious for progressive neoplasm in the left   parietal postoperative bed compared with 10/5/2022 with MR spectroscopy.   Tiny focus of neoplasm also likely in the left brainstem, unchanged.    < end of copied text >    EEG 2/18  This is an abnormal EEG record. There is evidence of potentially epileptogenic region of structural abnormality in the left posterior temporal region.  There is a skull defect in the same region. No seizures noted during the 6h recording period.          Neurology - Consult Note    -  Spectra: [phone] ([facility]), [phone] ([facility])  -    HPI: Patient SERJIO TIDWELL is a 62y (1960) man with GBM (diag 2021, 10/5 left parietal-occipital craniotomy with near complete resection of the tumor, residual RHH), seizures on [id number] BID and Keppra 1000 BID transferred from [facility] for eeg showing   Per chart review, on 2/15 patient was noted to be dazed and started to have difficulty speaking. On exam patient was noted to be confused and mildly aphasic. EEG in ED showed continuous seizure activity in the left hemisphere. Vimpat was increased to 200 mg BID and keppra continued at 1000 mg BID, repeat EEG showed slowing in the left temporal, parietal, occipital regions but no ongoing epileptiform activity. Patient was transferred to neurosurgery at [facility] for further management. Patient currently reports having difficulty remembering events. He is aware he was transferred from [place]. Denies tongue biting, urinary incontinence, weakness, numbness, changes to speech.     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 He was discharged with vimpat 100 BID and keppra 1000 BID.       Review of Systems:    All other review of systems is negative unless indicated above.    Allergies:      PMHx/PSHx/Family Hx: As above, otherwise see below   No pertinent past medical history    Glioblastoma    Seizures        Social Hx:  No current use of tobacco, alcohol, or illicit drugs    Medications:  MEDICATIONS  (STANDING):  dexAMETHasone  Injectable 4 milliGRAM(s) IV Push <User Schedule>  dexAMETHasone  Injectable 2 milliGRAM(s) IV Push <User Schedule>  enoxaparin Injectable 40 milliGRAM(s) SubCutaneous <User Schedule>  lacosamide 200 milliGRAM(s) Oral two times a day  levETIRAcetam  IVPB 1000 milliGRAM(s) IV Intermittent every 12 hours  polyethylene glycol 3350 17 Gram(s) Oral daily  rOPINIRole 0.75 milliGRAM(s) Oral daily  senna 2 Tablet(s) Oral at bedtime    MEDICATIONS  (PRN):  acetaminophen     Tablet .. 650 milliGRAM(s) Oral every 6 hours PRN Temp greater or equal to 38C (100.4F), Mild Pain (1 - 3)  ondansetron Injectable 4 milliGRAM(s) IV Push every 6 hours PRN Nausea and/or Vomiting      Vitals:  T(C): 36.4 (02-18-23 @ 08:30), Max: 36.8 (02-17-23 @ 14:38)  HR: 71 (02-18-23 @ 08:30) (70 - 114)  BP: 128/85 (02-18-23 @ 08:30) (97/64 - 138/104)  RR: 18 (02-18-23 @ 08:30) (17 - 31)  SpO2: 97% (02-18-23 @ 08:30) (91% - 97%)    Physical Examination:   General - NAD  Cardiovascular - Peripheral pulses palpable, no edema  Eyes Fundoscopy not performed due to safety precautions in the setting of the COVID-19 pandemic    Neurologic Exam:  Mental status - Awake, Alert, Oriented to person, place (Hospital, doesn't know [facility]), and time. Speech fluent, repetition and naming intact. Follows simple and complex commands. Attention/concentration intact, recalls 0/3 items, knows current president and previous, doesn't know, can spell world, but spells backwards as "DROW, " says he cannot do the serial 7's      Cranial nerves - PERRL, RHH, EOMI, face sensation (V1-V3) intact b/l, facial strength intact without asymmetry b/l, hearing intact b/l, palate with symmetric elevation,  sternocleidomastiod 5/5 strength b/l, tongue midline on protrusion with full lateral movement  Motor - Normal bulk and tone throughout. No pronator drift.  Strength testing            Deltoid      Biceps      Triceps     Wrist Extension                 Wrist Flexion     Interossei         R            5                 5               5                     5                              5                        5                 5  L             5                 5               5                     5                              5                        5                 5              Hip Flexion    Hip Extension      Knee Flexion          Knee Extension      Dorsiflexion    Plantar Flexion  R              5                           5                       5                           5                            5                          5  L              5                           5                        5                           5                            5                          5    Sensation - Light touch intact throughout    DTR's -             Biceps      Triceps     Brachioradialis             Patellar    Ankle    Toes/plantar response  R             2+             1+                  1+                       1+            1+                 Down  L              2+             1+                 1+                        1+           1+                 Down    Coordination - Finger to Nose intact b/l. No tremors appreciated    Gait and station - deffered, on v eeg    Labs:                        12.6   6.34  )-----------( 120      ( 18 Feb 2023 06:06 )             38.8     02-18    143  |  106  |  24<H>  ----------------------------<  99  3.8   |  25  |  0.77    Ca    8.9      18 Feb 2023 06:06  Phos  3.9     02-17  Mg     2.2     02-17      CAPILLARY BLOOD GLUCOSE            PT/INR - ( 18 Feb 2023 06:06 )   PT: 12.0 sec;   INR: 1.03 ratio         PTT - ( 18 Feb 2023 06:06 )  PTT:23.2 sec    Radiology:  `< from: CT Angio Neck Stroke Protocol w/ IV Cont (02.15.23 @ 12:01) >  IMPRESSION:    CTA brain: No hemodynamically significant stenosis    CTA carotid/vertebral artery circulation: No hemodynamically significant   stenosis    CT Perfusion: Normal    `< from: CT Brain Stroke Protocol (02.15.23 @ 11:41) >    Reidentified is a large area of confluent hypoattenuation within the left   posterior parietal and temporallobes, compatible with known GBM tumor,   without significant change since prior exam when accounting for   differences in technique from prior MRI brain. Mass effect upon the left   ventricle is unchanged. No midline shift.    < from: MR Spectroscopy (12.22.22 @ 17:20) >    IMPRESSION: Findings suspicious for progressive neoplasm in the left   parietal postoperative bed compared with 10/5/2022 with MR spectroscopy.   Tiny focus of neoplasm also likely in the left brainstem, unchanged.    < from: MR Head w/wo IV Cont (12.22.22 @ 17:19) >  IMPRESSION: Findings suspicious for progressive neoplasm in the left   parietal postoperative bed compared with 10/5/2022 with MR spectroscopy.   Tiny focus of neoplasm also likely in the left brainstem, unchanged.    < end of copied text >    EEG 2/18  This is an abnormal EEG record. There is evidence of potentially epileptogenic region of structural abnormality in the left posterior temporal region.  There is a skull defect in the same region. No seizures noted during the 6h recording period.          Neurology - Consult Note    -  Spectra: [id number] ([facility]), [id number] ([facility])  -    HPI: Patient SERJIO TIDWELL is a 62y (1960) man with GBM (diag 2021, 10/5 left parietal-occipital craniotomy with near complete resection of the tumor, residual RHH), seizures on [id number] BID and Keppra 1000 BID transferred from [facility] for eeg showing   Per chart review, on 2/15 patient was noted to be dazed and started to have difficulty speaking. On exam patient was noted to be confused and mildly aphasic. EEG in ED showed continuous seizure activity in the left hemisphere. Vimpat was increased to 200 mg BID and keppra continued at 1000 mg BID, repeat EEG showed slowing in the left temporal, parietal, occipital regions but no ongoing epileptiform activity. Patient was transferred to neurosurgery at [facility] for further management. Patient currently reports having difficulty remembering events. He is aware he was transferred from [place]. Denies tongue biting, urinary incontinence, weakness, numbness, changes to speech.     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 He was discharged with vimpat 100 BID and keppra 1000 BID.       Review of Systems:    All other review of systems is negative unless indicated above.    Allergies:      PMHx/PSHx/Family Hx: As above, otherwise see below   No pertinent past medical history    Glioblastoma    Seizures        Social Hx:  No current use of tobacco, alcohol, or illicit drugs    Medications:  MEDICATIONS  (STANDING):  dexAMETHasone  Injectable 4 milliGRAM(s) IV Push <User Schedule>  dexAMETHasone  Injectable 2 milliGRAM(s) IV Push <User Schedule>  enoxaparin Injectable 40 milliGRAM(s) SubCutaneous <User Schedule>  lacosamide 200 milliGRAM(s) Oral two times a day  levETIRAcetam  IVPB 1000 milliGRAM(s) IV Intermittent every 12 hours  polyethylene glycol 3350 17 Gram(s) Oral daily  rOPINIRole 0.75 milliGRAM(s) Oral daily  senna 2 Tablet(s) Oral at bedtime    MEDICATIONS  (PRN):  acetaminophen     Tablet .. 650 milliGRAM(s) Oral every 6 hours PRN Temp greater or equal to 38C (100.4F), Mild Pain (1 - 3)  ondansetron Injectable 4 milliGRAM(s) IV Push every 6 hours PRN Nausea and/or Vomiting      Vitals:  T(C): 36.4 (02-18-23 @ 08:30), Max: 36.8 (02-17-23 @ 14:38)  HR: 71 (02-18-23 @ 08:30) (70 - 114)  BP: 128/85 (02-18-23 @ 08:30) (97/64 - 138/104)  RR: 18 (02-18-23 @ 08:30) (17 - 31)  SpO2: 97% (02-18-23 @ 08:30) (91% - 97%)    Physical Examination:   General - NAD  Cardiovascular - Peripheral pulses palpable, no edema  Eyes Fundoscopy not performed due to safety precautions in the setting of the COVID-19 pandemic    Neurologic Exam:  Mental status - Awake, Alert, Oriented to person, place (Hospital, doesn't know [facility]), and time. Speech fluent, repetition and naming intact. Follows simple and complex commands. Attention/concentration intact, recalls 0/3 items, knows current president and previous, doesn't know, can spell world, but spells backwards as "DROW, " says he cannot do the serial 7's      Cranial nerves - PERRL, RHH, EOMI, face sensation (V1-V3) intact b/l, facial strength intact without asymmetry b/l, hearing intact b/l, palate with symmetric elevation,  sternocleidomastiod 5/5 strength b/l, tongue midline on protrusion with full lateral movement  Motor - Normal bulk and tone throughout. No pronator drift.  Strength testing            Deltoid      Biceps      Triceps     Wrist Extension                 Wrist Flexion     Interossei         R            5                 5               5                     5                              5                        5                 5  L             5                 5               5                     5                              5                        5                 5              Hip Flexion    Hip Extension      Knee Flexion          Knee Extension      Dorsiflexion    Plantar Flexion  R              5                           5                       5                           5                            5                          5  L              5                           5                        5                           5                            5                          5    Sensation - Light touch intact throughout    DTR's -             Biceps      Triceps     Brachioradialis             Patellar    Ankle    Toes/plantar response  R             2+             1+                  1+                       1+            1+                 Down  L              2+             1+                 1+                        1+           1+                 Down    Coordination - Finger to Nose intact b/l. No tremors appreciated    Gait and station - deffered, on v eeg    Labs:                        12.6   6.34  )-----------( 120      ( 18 Feb 2023 06:06 )             38.8     02-18    143  |  106  |  24<H>  ----------------------------<  99  3.8   |  25  |  0.77    Ca    8.9      18 Feb 2023 06:06  Phos  3.9     02-17  Mg     2.2     02-17      CAPILLARY BLOOD GLUCOSE            PT/INR - ( 18 Feb 2023 06:06 )   PT: 12.0 sec;   INR: 1.03 ratio         PTT - ( 18 Feb 2023 06:06 )  PTT:23.2 sec    Radiology:  `< from: CT Angio Neck Stroke Protocol w/ IV Cont (02.15.23 @ 12:01) >  IMPRESSION:    CTA brain: No hemodynamically significant stenosis    CTA carotid/vertebral artery circulation: No hemodynamically significant   stenosis    CT Perfusion: Normal    `< from: CT Brain Stroke Protocol (02.15.23 @ 11:41) >    Reidentified is a large area of confluent hypoattenuation within the left   posterior parietal and temporallobes, compatible with known GBM tumor,   without significant change since prior exam when accounting for   differences in technique from prior MRI brain. Mass effect upon the left   ventricle is unchanged. No midline shift.    < from: MR Spectroscopy (12.22.22 @ 17:20) >    IMPRESSION: Findings suspicious for progressive neoplasm in the left   parietal postoperative bed compared with 10/5/2022 with MR spectroscopy.   Tiny focus of neoplasm also likely in the left brainstem, unchanged.    < from: MR Head w/wo IV Cont (12.22.22 @ 17:19) >  IMPRESSION: Findings suspicious for progressive neoplasm in the left   parietal postoperative bed compared with 10/5/2022 with MR spectroscopy.   Tiny focus of neoplasm also likely in the left brainstem, unchanged.    < end of copied text >    EEG 2/18  This is an abnormal EEG record. There is evidence of potentially epileptogenic region of structural abnormality in the left posterior temporal region.  There is a skull defect in the same region. No seizures noted during the 6h recording period.          Neurology - Consult Note    -  Spectra: [id number] ([facility]), [id number] ([facility])  -    HPI: Patient SERJIO TIDWELL is a 62y (1960) man with GBM (diag 2021, 10/5 left parietal-occipital craniotomy with near complete resection of the tumor, residual RHH), seizures on [id number] BID and Keppra 1000 BID transferred from [facility] for confusion.   Per chart review, on 2/15 patient was noted to be dazed and started to have difficulty speaking. On exam patient was noted to be confused and mildly aphasic. EEG in ED showed continuous seizure activity in the left hemisphere. Vimpat was increased to 200 mg BID and keppra continued at 1000 mg BID, repeat EEG showed slowing in the left temporal, parietal, occipital regions but no ongoing epileptiform activity. Patient was transferred to neurosurgery at [facility] for further management. Patient currently reports having difficulty remembering events. He is aware he was transferred from [place]. Denies tongue biting, urinary incontinence, weakness, numbness, changes to speech.     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 He was discharged with vimpat 100 BID and keppra 1000 BID.       Review of Systems:    All other review of systems is negative unless indicated above.    Allergies:    PMHx/PSHx/Family Hx: As above, otherwise see below   No pertinent past medical history    Glioblastoma    Seizures        Social Hx:  No current use of tobacco, alcohol, or illicit drugs    Medications:  MEDICATIONS  (STANDING):  dexAMETHasone  Injectable 4 milliGRAM(s) IV Push <User Schedule>  dexAMETHasone  Injectable 2 milliGRAM(s) IV Push <User Schedule>  enoxaparin Injectable 40 milliGRAM(s) SubCutaneous <User Schedule>  lacosamide 200 milliGRAM(s) Oral two times a day  levETIRAcetam  IVPB 1000 milliGRAM(s) IV Intermittent every 12 hours  polyethylene glycol 3350 17 Gram(s) Oral daily  rOPINIRole 0.75 milliGRAM(s) Oral daily  senna 2 Tablet(s) Oral at bedtime    MEDICATIONS  (PRN):  acetaminophen     Tablet .. 650 milliGRAM(s) Oral every 6 hours PRN Temp greater or equal to 38C (100.4F), Mild Pain (1 - 3)  ondansetron Injectable 4 milliGRAM(s) IV Push every 6 hours PRN Nausea and/or Vomiting      Vitals:  T(C): 36.4 (02-18-23 @ 08:30), Max: 36.8 (02-17-23 @ 14:38)  HR: 71 (02-18-23 @ 08:30) (70 - 114)  BP: 128/85 (02-18-23 @ 08:30) (97/64 - 138/104)  RR: 18 (02-18-23 @ 08:30) (17 - 31)  SpO2: 97% (02-18-23 @ 08:30) (91% - 97%)    Physical Examination:   General - NAD  Cardiovascular - Peripheral pulses palpable, no edema  Eyes Fundoscopy not performed due to safety precautions in the setting of the COVID-19 pandemic    Neurologic Exam:  Mental status - Awake, Alert, Oriented to person, place (Hospital, doesn't know [facility]), and time. Speech fluent, repetition and naming intact. Follows simple and complex commands. Attention/concentration intact, recalls 0/3 items, knows current president and previous, doesn't know, can spell world, but spells backwards as "DROW, " says he cannot do the serial 7's      Cranial nerves - PERRL, RHH, EOMI, face sensation (V1-V3) intact b/l, facial strength intact without asymmetry b/l, hearing intact b/l, palate with symmetric elevation,  sternocleidomastiod 5/5 strength b/l, tongue midline on protrusion with full lateral movement  Motor - Normal bulk and tone throughout. No pronator drift.  Strength testing            Deltoid      Biceps      Triceps     Wrist Extension                 Wrist Flexion     Interossei         R            5                 5               5                     5                              5                        5                 5  L             5                 5               5                     5                              5                        5                 5              Hip Flexion    Hip Extension      Knee Flexion          Knee Extension      Dorsiflexion    Plantar Flexion  R              5                           5                       5                           5                            5                          5  L              5                           5                        5                           5                            5                          5    Sensation - Light touch intact throughout    DTR's -             Biceps      Triceps     Brachioradialis             Patellar    Ankle    Toes/plantar response  R             2+             1+                  1+                       1+            1+                 Down  L              2+             1+                 1+                        1+           1+                 Down    Coordination - Finger to Nose intact b/l. No tremors appreciated    Gait and station - deffered, on v eeg    Labs:                        12.6   6.34  )-----------( 120      ( 18 Feb 2023 06:06 )             38.8     02-18    143  |  106  |  24<H>  ----------------------------<  99  3.8   |  25  |  0.77    Ca    8.9      18 Feb 2023 06:06  Phos  3.9     02-17  Mg     2.2     02-17      CAPILLARY BLOOD GLUCOSE            PT/INR - ( 18 Feb 2023 06:06 )   PT: 12.0 sec;   INR: 1.03 ratio         PTT - ( 18 Feb 2023 06:06 )  PTT:23.2 sec    Radiology:  `< from: CT Angio Neck Stroke Protocol w/ IV Cont (02.15.23 @ 12:01) >  IMPRESSION:    CTA brain: No hemodynamically significant stenosis    CTA carotid/vertebral artery circulation: No hemodynamically significant   stenosis    CT Perfusion: Normal    `< from: CT Brain Stroke Protocol (02.15.23 @ 11:41) >    Reidentified is a large area of confluent hypoattenuation within the left   posterior parietal and temporallobes, compatible with known GBM tumor,   without significant change since prior exam when accounting for   differences in technique from prior MRI brain. Mass effect upon the left   ventricle is unchanged. No midline shift.    < from: MR Spectroscopy (12.22.22 @ 17:20) >    IMPRESSION: Findings suspicious for progressive neoplasm in the left   parietal postoperative bed compared with 10/5/2022 with MR spectroscopy.   Tiny focus of neoplasm also likely in the left brainstem, unchanged.    < from: MR Head w/wo IV Cont (12.22.22 @ 17:19) >  IMPRESSION: Findings suspicious for progressive neoplasm in the left   parietal postoperative bed compared with 10/5/2022 with MR spectroscopy.   Tiny focus of neoplasm also likely in the left brainstem, unchanged.    < end of copied text >    EEG 2/18  This is an abnormal EEG record. There is evidence of potentially epileptogenic region of structural abnormality in the left posterior temporal region.  There is a skull defect in the same region. No seizures noted during the 6h recording period.          Neurology - Consult Note    -  Spectra: [phone] ([facility]), [phone] ([facility])  -    HPI: Patient SERJIO TIDWELL is a 62y (1960) man with GBM (diag 2021, 10/5 left parietal-occipital craniotomy with near complete resection of the tumor, residual RHH), seizures on [id number] BID and Keppra 1000 BID transferred from [facility] for confusion.   Per chart review, on 2/15 patient was noted to be dazed and started to have difficulty speaking. On exam patient was noted to be confused and mildly aphasic. EEG in ED showed continuous seizure activity in the left hemisphere. Vimpat was increased to 200 mg BID and keppra continued at 1000 mg BID, repeat EEG showed slowing in the left temporal, parietal, occipital regions but no ongoing epileptiform activity. Patient was transferred to neurosurgery at [facility] for further management. Patient currently reports having difficulty remembering events. He is aware he was transferred from [place]. Denies tongue biting, urinary incontinence, weakness, numbness, changes to speech.     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 He was discharged with vimpat 100 BID and keppra 1000 BID.       Review of Systems:    All other review of systems is negative unless indicated above.    Allergies:    PMHx/PSHx/Family Hx: As above, otherwise see below   No pertinent past medical history    Glioblastoma    Seizures        Social Hx:  No current use of tobacco, alcohol, or illicit drugs    Medications:  MEDICATIONS  (STANDING):  dexAMETHasone  Injectable 4 milliGRAM(s) IV Push <User Schedule>  dexAMETHasone  Injectable 2 milliGRAM(s) IV Push <User Schedule>  enoxaparin Injectable 40 milliGRAM(s) SubCutaneous <User Schedule>  lacosamide 200 milliGRAM(s) Oral two times a day  levETIRAcetam  IVPB 1000 milliGRAM(s) IV Intermittent every 12 hours  polyethylene glycol 3350 17 Gram(s) Oral daily  rOPINIRole 0.75 milliGRAM(s) Oral daily  senna 2 Tablet(s) Oral at bedtime    MEDICATIONS  (PRN):  acetaminophen     Tablet .. 650 milliGRAM(s) Oral every 6 hours PRN Temp greater or equal to 38C (100.4F), Mild Pain (1 - 3)  ondansetron Injectable 4 milliGRAM(s) IV Push every 6 hours PRN Nausea and/or Vomiting      Vitals:  T(C): 36.4 (02-18-23 @ 08:30), Max: 36.8 (02-17-23 @ 14:38)  HR: 71 (02-18-23 @ 08:30) (70 - 114)  BP: 128/85 (02-18-23 @ 08:30) (97/64 - 138/104)  RR: 18 (02-18-23 @ 08:30) (17 - 31)  SpO2: 97% (02-18-23 @ 08:30) (91% - 97%)    Physical Examination:   General - NAD  Cardiovascular - Peripheral pulses palpable, no edema  Eyes Fundoscopy not performed due to safety precautions in the setting of the COVID-19 pandemic    Neurologic Exam:  Mental status - Awake, Alert, Oriented to person, place (Hospital, doesn't know [facility]), and time. Speech halting, repetition and naming somewhat impaired (with perseveration). Follows simple and complex commands. Attention/concentration intact, recalls 0/3 items, knows current president and previous, doesn't know, can spell world, but spells backwards as "DROW, " says he cannot do the serial 7's      Cranial nerves - PERRL, RHH, EOMI, face sensation (V1-V3) intact b/l, facial strength intact without asymmetry b/l, hearing intact b/l, palate with symmetric elevation,  sternocleidomastiod 5/5 strength b/l, tongue midline on protrusion with full lateral movement  Motor - Normal bulk and tone throughout. No pronator drift.  Strength testing            Deltoid      Biceps      Triceps     Wrist Extension                 Wrist Flexion     Interossei         R            5                 5               5                     5                              5                        5                 5  L             5                 5               5                     5                              5                        5                 5              Hip Flexion    Hip Extension      Knee Flexion          Knee Extension      Dorsiflexion    Plantar Flexion  R              5                           5                       5                           5                            5                          5  L              5                           5                        5                           5                            5                          5    Sensation - Light touch intact throughout    DTR's -             Biceps      Triceps     Brachioradialis             Patellar    Ankle    Toes/plantar response  R             2+             1+                  1+                       1+            1+                 Down  L              2+             1+                 1+                        1+           1+                 Down    Coordination - Finger to Nose intact b/l. No tremors appreciated    Gait and station - deffered, on v eeg    Labs:                        12.6   6.34  )-----------( 120      ( 18 Feb 2023 06:06 )             38.8     02-18    143  |  106  |  24<H>  ----------------------------<  99  3.8   |  25  |  0.77    Ca    8.9      18 Feb 2023 06:06  Phos  3.9     02-17  Mg     2.2     02-17      CAPILLARY BLOOD GLUCOSE            PT/INR - ( 18 Feb 2023 06:06 )   PT: 12.0 sec;   INR: 1.03 ratio         PTT - ( 18 Feb 2023 06:06 )  PTT:23.2 sec    Radiology:  `< from: CT Angio Neck Stroke Protocol w/ IV Cont (02.15.23 @ 12:01) >  IMPRESSION:    CTA brain: No hemodynamically significant stenosis    CTA carotid/vertebral artery circulation: No hemodynamically significant   stenosis    CT Perfusion: Normal    `< from: CT Brain Stroke Protocol (02.15.23 @ 11:41) >    Reidentified is a large area of confluent hypoattenuation within the left   posterior parietal and temporallobes, compatible with known GBM tumor,   without significant change since prior exam when accounting for   differences in technique from prior MRI brain. Mass effect upon the left   ventricle is unchanged. No midline shift.    < from: MR Spectroscopy (12.22.22 @ 17:20) >    IMPRESSION: Findings suspicious for progressive neoplasm in the left   parietal postoperative bed compared with 10/5/2022 with MR spectroscopy.   Tiny focus of neoplasm also likely in the left brainstem, unchanged.    < from: MR Head w/wo IV Cont (12.22.22 @ 17:19) >  IMPRESSION: Findings suspicious for progressive neoplasm in the left   parietal postoperative bed compared with 10/5/2022 with MR spectroscopy.   Tiny focus of neoplasm also likely in the left brainstem, unchanged.    < end of copied text >    EEG 2/18  This is an abnormal EEG record. There is evidence of potentially epileptogenic region of structural abnormality in the left posterior temporal region.  There is a skull defect in the same region. No seizures noted during the 6h recording period.

## 2023-02-18 NOTE — CONSULT NOTE ADULT - ASSESSMENT
Patient SERJIO TIDWELL is a 62y (1960) man with GBM (diag 2021, 10/5 left parietal-occipital craniotomy with near complete resection of the tumor, residual RHH), seizures on [id number] BID and Keppra 1000 BID transferred from [facility] for eeg showing   Per chart review, on 2/15 patient was noted to be dazed and started to have difficulty speaking. On exam patient was noted to be confused and mildly aphasic. EEG in ED showed continuous seizure activity in the left hemisphere. Vimpat was increased to 200 mg BID and keppra continued at 1000 mg BID, repeat EEG showed slowing in the left temporal, parietal, occipital regions but no ongoing epileptiform activity. Patient was transferred to neurosurgery at [facility] for further management. Patient currently reports having difficulty remembering events. He is aware he was transferred from [facility]. Denies tongue biting, urinary incontinence, weakness, numbness, changes to speech. Exam nonfocal. EEG potentially epileptogenic region of structural abnormality in the left posterior temporal region, no seizures. CTH large area of confluent hypoattenuation within the left posterior parietal and temporal lobes, compatible with known GBM tumor, without significant change.    Impression: Transient episodes of confusion and mild aphasia due to left posterior temporal region due to GBM       Recommendation:INCOMPLETE  Labs/Studies:  [] Continue Video EEG to evaluate for subclinical seizures  [x] MRI brain w/w/o  con reviewed    Meds:  [] Continue with patient's home AEDs  [] Seizure rescue medications for a generalized tonic clonic episode lasting >3 min or significant derangement of vital signs: Ativan 1 mg IV (Max dose 0.1 mg/kg)    Other:   [] Telemetry monitoring; Neurochecks/VS per unit protocol  [] Seizure, fall and aspiration precautions  [] Please note: if patient has a convulsion, please document accurately the time of onset, any derangement of vital signs, length of episode, and duration of postictal period.   --> Please describe what occurred and  specifically what the patient was doing, paying attention to progression of limb involvement (upper/lower; R/L) if any, eye opening vs closure, head turn, gaze deviation, shaking of extremities, tongue bite, urinary/bowel incontinence.  [] Given concern for seizure, advise patient to not drive, operate heavy machinery, avoid heights, pools, bathtubs, locked doors until cleared by further follow up outpatient by neurology.     Case to be seen and discussed with attending.      Patient SERJIO TIDWELL is a 62y (1960) man with GBM (diag 2021, 10/5 left parietal-occipital craniotomy with near complete resection of the tumor, residual RHH), seizures on [id number] BID and Keppra 1000 BID transferred from [facility] for eeg showing   Per chart review, on 2/15 patient was noted to be dazed and started to have difficulty speaking. On exam patient was noted to be confused and mildly aphasic. EEG in ED showed continuous seizure activity in the left hemisphere. Vimpat was increased to 200 mg BID and keppra continued at 1000 mg BID, repeat EEG showed slowing in the left temporal, parietal, occipital regions but no ongoing epileptiform activity. Patient was transferred to neurosurgery at [facility] for further management. Patient currently reports having difficulty remembering events. He is aware he was transferred from [place]. Exam nonfocal. EEG 2/18, spikes focal left posterior temporal region, continuous polymorphic slowing focal left posterior temporal region. CTH large area of confluent hypoattenuation within the left posterior parietal and temporal lobes, compatible with known GBM tumor, without significant change.    Impression: Transient episodes of confusion and mild aphasia due to left posterior temporal region due to GBM     Recommendation: INCOMPLETE  Labs/Studies:  [] Continue Video EEG   [x] MRI brain w/w/o  con reviewed    Meds:  []   [] Seizure rescue medications for a generalized tonic clonic episode lasting >3 min or significant derangement of vital signs: Ativan 1 mg IV (Max dose 0.1 mg/kg)    Other:   [] Telemetry monitoring; Neurochecks/VS per unit protocol  [] Seizure, fall and aspiration precautions  [] Please note: if patient has a convulsion, please document accurately the time of onset, any derangement of vital signs, length of episode, and duration of postictal period.   --> Please describe what occurred and  specifically what the patient was doing, paying attention to progression of limb involvement (upper/lower; R/L) if any, eye opening vs closure, head turn, gaze deviation, shaking of extremities, tongue bite, urinary/bowel incontinence.  [] Given concern for seizure, advise patient to not drive, operate heavy machinery, avoid heights, pools, bathtubs, locked doors until cleared by further follow up outpatient by neurology.     Case to be seen and discussed with attending.      Patient SERJIO TIDWELL is a 62y (1960) man with GBM (diag 2021, 10/5 left parietal-occipital craniotomy with near complete resection of the tumor, residual RHH), seizures on [id number] BID and Keppra 1000 BID transferred from [facility] for eeg showing   Per chart review, on 2/15 patient was noted to be dazed and started to have difficulty speaking. On exam patient was noted to be confused and mildly aphasic. EEG in ED showed continuous seizure activity in the left hemisphere. Vimpat was increased to 200 mg BID and keppra continued at 1000 mg BID, repeat EEG showed slowing in the left temporal, parietal, occipital regions but no ongoing epileptiform activity. Patient was transferred to neurosurgery at [facility] for further management. Patient currently reports having difficulty remembering events. He is aware he was transferred from [place]. Exam nonfocal. EEG 2/18, spikes focal left posterior temporal region, continuous polymorphic slowing focal left posterior temporal region. CTH large area of confluent hypoattenuation within the left posterior parietal and temporal lobes, compatible with known GBM tumor, without significant change.    Impression: Confusion and Anomia due to left posterior temporal region due to GBM     Recommendation:   Labs/Studies:  [] Continue Video EEG   [x] MRI brain w/w/o con reviewed    Meds:  [] Continue current AEDs: Keppra 1000 mg BID and Vimpat 200 mg BID  [] Seizure rescue medications for a generalized tonic clonic episode lasting >3 min or significant derangement of vital signs: Ativan 1 mg IV (Max dose 0.1 mg/kg)    Other:   [] Telemetry monitoring; Neurochecks/VS per unit protocol  [] Seizure, fall and aspiration precautions  [] Please note: if patient has a convulsion, please document accurately the time of onset, any derangement of vital signs, length of episode, and duration of postictal period.   --> Please describe what occurred and  specifically what the patient was doing, paying attention to progression of limb involvement (upper/lower; R/L) if any, eye opening vs closure, head turn, gaze deviation, shaking of extremities, tongue bite, urinary/bowel incontinence.  [] Given concern for seizure, advise patient to not drive, operate heavy machinery, avoid heights, pools, bathtubs, locked doors until cleared by further follow up outpatient by neurology.     Seen and discussed with attending and Dr. Abel.

## 2023-02-18 NOTE — PHYSICAL THERAPY INITIAL EVALUATION ADULT - GAIT DEVIATIONS NOTED, PT EVAL
decreased griffin/increased time in double stance/decreased velocity of limb motion/decreased step length/decreased weight-shifting ability

## 2023-02-18 NOTE — PHYSICAL THERAPY INITIAL EVALUATION ADULT - ADDITIONAL COMMENTS
Pt resides in private home with elderly mother, 4 steps to enter, flight within, PTA pt requiring assist with ADL's & mobility from HHA & family, has RW occasionally uses

## 2023-02-18 NOTE — EEG REPORT - NS EEG TEXT BOX
EPILEPSY MONITORING UNIT REPORT   [facility]: [address], [place] [address], Ph#: [phone] [facility]: [address], [place] [address], Ph#: [phone] Office: [address] [place] [address] [address], [place] [address] Ph#: [phone]  [facility]: [address] [place] [address], Phone [phone] [place]  Office: [address] [place] [address], Phone [phone]  Patient Name: Del Ramos   Age: 62 year, : 1960 MRN #: [id number] [facility]  Referring Physician: OSH transfer – Nikos  Study Started: 2023 1:26:50 AM              Study Information:  EEG Recording Technique: The patient underwent continuous Video-EEG monitoring, using Telemetry System hardware on the XLTek Digital System. EEG and video data were stored on a computer hard drive with important events saved in digital archive files. The material was reviewed by a physician (electroencephalographer / epileptologist) on a daily basis. Anthony and seizure detection algorithms were utilized and reviewed. An EEG Technician attended to the patient, and was available throughout daytime work hours.  The epilepsy center neurologist was available in person or on call 24-hours per day.  EEG Placement and Labeling of Electrodes: The EEG was performed utilizing 20 channel referential EEG connections (coronal over temporal over parasagittal montage) using all standard 10-20 electrode placements with EKG, with additional electrodes placed in the inferior temporal region using the modified 10-10 montage electrode placements for elective admissions, or if deemed necessary. Recording was at a sampling rate of 256 samples per second per channel. Time synchronized digital video recording was done simultaneously with EEG recording. A low light infrared camera was used for low light recording.   History:  62M hx GBM s/p rxn 10/2022. Direct floor xfer from [facility] for sz. Adm to [facility] 2/15 for difficulty speaking/looking dazed. EEG at [facility] showed sz activity in L hemisphere;  EEG report: slowing in L temp/parietal/occ regions but no ongoing sz activity. CTH: 2/15 stable compared to prior, L par mass s/p crani.CTA neg.  Exam: AOx2-3, R homonymous hemianopsia, difficulty finger counting b/l, FC, PERRL, EOMI, no facial, 5/5 throughout, no drift  Home Antiepileptic Medication and Device Started LEV 1000 q12 and  q12 at [facility].   Interpretation:  Day 1 – 	Start: 2023  1:26:50 AM    	End: 2023  08:00 	Duration: 6 hr  33 min  Daily EEG Visual Analysis  FINDINGS:  The background was continuous, symmetric, spontaneously variable and reactive. During wakefulness, the posterior dominant rhythm consisted of asymmetric, attenuated over left, well-modulated 8.5 Hz activity, with amplitude to 30 uV, that attenuated to eye opening.  Low amplitude frontal beta was noted in wakefulness. Anterior to posterior gradient is present. There is breach activity in the left parietal region  Background Slowing: No generalized background slowing was present.  Focal Slowing:  There is continuous polymorphic delta slowing in the left posterior temporal region.  Sleep Background: Drowsiness was characterized by fragmentation, attenuation, and slowing of the background activity.   Sleep was characterized by the presence of vertex waves, symmetric sleep spindles and K-complexes.  Other Non-Epileptiform Findings: Breach effect in left parietal characterized by higher amplitude, sharply contoured waves and fast activities.  Activation Procedures:  Hyperventilation was not performed.   Photic stimulation was not performed.  Interictal Epileptiform Activity:  There are frequent spike in the left posterior temporal region.  Events: No events or seizures recorded.  Artifacts: Intermittent myogenic and movement artifacts were noted.  ECG: The heart rate on single channel ECG was predominantly between 60-70 BPM.  AEDs: LEV 1000 q12;  q12  EEG Summary:  Abnormal EEG in the awake, drowsy and asleep states.  Spikes, focal, left posterior temporal region Continuous polymorphic slowing, focal, left posterior temporal region   Impression/Clinical Correlate:  This is an abnormal EEG record. There is evidence of potentially epileptogenic region of structural abnormality in the left posterior temporal region.  There is a skull defect in the same region. No seizures noted during the 6h recording period.        Juan Antonio Abel MD, PhD Director, Epilepsy Division, [facility]  ------------------------------------ EEG Reading Room: [phone] On Call Service After Hours: [phone]

## 2023-02-18 NOTE — CONSULT NOTE ADULT - SUBJECTIVE AND OBJECTIVE BOX
[facility] DEPARTMENT OF OPHTHALMOLOGY - INITIAL ADULT CONSULT  -----------------------------------------------------------------------------------------------------------------  Mayte Glez MD PGY 2  Contact via [email or web address] Teams  -----------------------------------------------------------------------------------------------------------------    HPI: Per primary team    62M hx GBM s/p rxn 10/2022. Direct floor xfer from [facility] for sz. Adm to [facility] 2/15 for difficulty speaking/looking dazed. EEG at T showed sz activity in L hemisphere; 2/16 EEG report: slowing in L temp/parietal/occ regions but no ongoing sz activity. CTH: 2/15 stable compared to prior. Exam: AOx2-3, R homonymous hemianopsia, difficulty finger counting b/l, FC, PERRL, EOMI, no facial, 5/5 throughout, no drift    ICU Vital Signs Last 24 Hrs  T(C): 36.4 (17 Feb 2023 22:32), Max: 36.8 (17 Feb 2023 14:38)  T(F): 97.5 (17 Feb 2023 22:32), Max: 98.3 (17 Feb 2023 14:38)  HR: 102 (17 Feb 2023 22:32) (63 - 114)  BP: 113/82 (17 Feb 2023 22:32) (97/64 - 142/88)  BP(mean): 75 (17 Feb 2023 20:00) (71 - 111)  ABP: --  ABP(mean): --  RR: 18 (17 Feb 2023 22:32) (14 - 31)  SpO2: 94% (17 Feb 2023 22:32) (91% - 98%)    O2 Parameters below as of 17 Feb 2023 22:32  Patient On (Oxygen Delivery Method): room air        02-17    138  |  106  |  22  ----------------------------<  106<H>  3.9   |  29  |  0.71    Ca    8.8      17 Feb 2023 06:27  Phos  3.9     02-17  Mg     2.2     02-17                          12.8   6.83  )-----------( 124      ( 17 Feb 2023 06:27 )             38.4        (17 Feb 2023 23:28)    Interval History: Ophthalmology consulted for evaluation of visual changes. Pt endorses double vision. Denies blurry vision. States the double vision is present with both eyes open AND both eyes closed. States it is in his left eye, demonstrates this by covering his right eye. States it is worse when he "looks to his side". Denies any eye pain. Denies any headache. Denies any flashes/floaters/curtains. Of note pt has documented visual field defects.     PAST MEDICAL & SURGICAL HISTORY:  Glioblastoma      Seizures      S/P craniotomy        Past Ocular History: visual field defects    Family History:  FAMILY HISTORY:  No pertinent family history in first degree relatives        Ophthalmic Medications: none    MEDICATIONS  (STANDING):  bisacodyl 5 milliGRAM(s) Oral at bedtime  dexAMETHasone  Injectable 4 milliGRAM(s) IV Push <User Schedule>  dexAMETHasone  Injectable 2 milliGRAM(s) IV Push <User Schedule>  enoxaparin Injectable 40 milliGRAM(s) SubCutaneous <User Schedule>  lacosamide 200 milliGRAM(s) Oral two times a day  levETIRAcetam  IVPB 1000 milliGRAM(s) IV Intermittent every 12 hours  polyethylene glycol 3350 17 Gram(s) Oral daily  rOPINIRole 0.75 milliGRAM(s) Oral daily  senna 2 Tablet(s) Oral at bedtime    MEDICATIONS  (PRN):  acetaminophen     Tablet .. 650 milliGRAM(s) Oral every 6 hours PRN Temp greater or equal to 38C (100.4F), Mild Pain (1 - 3)  ondansetron Injectable 4 milliGRAM(s) IV Push every 6 hours PRN Nausea and/or Vomiting    Allergies & Intolerances:     Review of Systems:  Constitutional: No fever, chills  Eyes: +double vision, left eye only. No blurry vision, flashes, floaters, FBS, erythema, discharge, OU  Neuro: No tremors  Cardiovascular: No chest pain, palpitations  Respiratory: No SOB, no cough  GI: No nausea, vomiting, abdominal pain  : No dysuria  Skin: no rash  Psych: no depression  Endocrine: no polyuria, polydipsia  Heme/lymph: no swelling    VITALS: T(C): 36.5 (02-18-23 @ 12:15)  T(F): 97.7 (02-18-23 @ 12:15), Max: 97.9 (02-18-23 @ 04:30)  HR: 81 (02-18-23 @ 12:15) (70 - 114)  BP: 131/86 (02-18-23 @ 12:15) (97/64 - 138/104)  RR:  (17 - 31)  SpO2:  (92% - 97%)  Wt(kg): --  General: AAO x 3, appropriate mood and affect    Ophthalmology Exam:  Visual acuity (sc): 20/20 OU.  Pupils: PERRL OU, no APD  Tonometry:  19 OU  Extraocular movements (EOMs): Full OU, no pain, no diplopia.  Confrontational Visual Field (CVF): RIGHT temporal field constriction, c/w homonymous hemionopsia.   Color Plates: 12/12 OU. Difficulty with right digit with either eye.     Pen Light Exam (PLE)  External: Normal OU.  Lids/Lashes/Lacrimal Ducts: Flat OU.  Sclera/Conjunctiva: White and quiet OU.  Cornea: Trace SPK OU.  Anterior Chamber: Deep and formed OU.    Iris: Flat OU.  Lens: NS OU.    Fundus Exam: dilated with 1% tropicamide and 2.5% phenylephrine  Approval obtained from primary team for dilation  Patient aware that pupils can remained dilated for at least 4-6 hours.  Exam performed with 20 D lens    Vitreous: wnl OU  Disc, cup/disc: sharp and pink, 0.3 OU  Macula: wnl OU  Vessels: wnl OU  Periphery: Inferotemporal lattice OS. Otherwise flat.     Labs/Imaging:  < from: CT Angio Neck Stroke Protocol w/ IV Cont (02.15.23 @ 12:01) >  IMPRESSION:    CTA brain: No hemodynamically significant stenosis    CTA carotid/vertebral artery circulation: No hemodynamically significant   stenosis    CT Perfusion: Normal    --- End of Report ---    < end of copied text >

## 2023-02-18 NOTE — PROGRESS NOTE ADULT - SUBJECTIVE AND OBJECTIVE BOX
SUBJECTIVE: This is my initial visit with this pt. Comfortable w/o complaints. VEEG on.    OVERNIGHT EVENTS: None    Vital Signs Last 24 Hrs  T(C): 36.5 (18 Feb 2023 12:15), Max: 36.8 (17 Feb 2023 14:38)  T(F): 97.7 (18 Feb 2023 12:15), Max: 98.3 (17 Feb 2023 14:38)  HR: 81 (18 Feb 2023 12:15) (70 - 114)  BP: 131/86 (18 Feb 2023 12:15) (97/64 - 138/104)  BP(mean): 75 (17 Feb 2023 20:00) (71 - 111)  RR: 18 (18 Feb 2023 12:15) (17 - 31)  SpO2: 96% (18 Feb 2023 12:15) (91% - 97%)    Parameters below as of 18 Feb 2023 12:15  Patient On (Oxygen Delivery Method): room air      IVF: [ ] IVL [ ] NS+K@   DIET: [ ] Regular [ ] CCD [ ] Renal [ ] Puree [ ] Dysphagia [ ] Tube Feeds:   PCA: [ ] YES [ ] NO   SOLOMON: [ ] YES [ ] NO [ ] VOID   BM: [ ] YES [ ] NO     DRAINS: [ ] ANDRE (cc/24h) [ ] HMV (cc/24h)    PHYSICAL EXAM:    General: No Acute Distress     Neurological: Awake, alert oriented to person, place and time, Following Commands, PERRL, EOMI, Face Symmetrical, Speech Fluent, Moving all extremities, Muscle Strength normal in all four extremities, No Drift, Sensation to Light Touch Intact    Pulmonary: Clear to Auscultation, No Rales, No Rhonchi, No Wheezes     Cardiovascular: S1, S2, Regular Rate and Rhythm     Gastrointestinal: Soft, Nontender, Nondistended     Incision:     LABS:                        12.6   6.34  )-----------( 120      ( 18 Feb 2023 06:06 )             38.8    02-18    143  |  106  |  24<H>  ----------------------------<  99  3.8   |  25  |  0.77    Ca    8.9      18 Feb 2023 06:06  Phos  3.9     02-17  Mg     2.2     02-17    PT/INR - ( 18 Feb 2023 06:06 )   PT: 12.0 sec;   INR: 1.03 ratio         PTT - ( 18 Feb 2023 06:06 )  PTT:23.2 sec      02-18 @ 07:01  -  02-18 @ 14:17  --------------------------------------------------------  IN: 0 mL / OUT: 1 mL / NET: -1 mL      IMAGING:     MEDICATIONS  (STANDING):  dexAMETHasone  Injectable 4 milliGRAM(s) IV Push <User Schedule>  dexAMETHasone  Injectable 2 milliGRAM(s) IV Push <User Schedule>  enoxaparin Injectable 40 milliGRAM(s) SubCutaneous <User Schedule>  lacosamide 200 milliGRAM(s) Oral two times a day  levETIRAcetam  IVPB 1000 milliGRAM(s) IV Intermittent every 12 hours  polyethylene glycol 3350 17 Gram(s) Oral daily  rOPINIRole 0.75 milliGRAM(s) Oral daily  senna 2 Tablet(s) Oral at bedtime    MEDICATIONS  (PRN):  acetaminophen     Tablet .. 650 milliGRAM(s) Oral every 6 hours PRN Temp greater or equal to 38C (100.4F), Mild Pain (1 - 3)  ondansetron Injectable 4 milliGRAM(s) IV Push every 6 hours PRN Nausea and/or Vomiting   SUBJECTIVE: This is my initial visit with this pt. Comfortable w/o complaints. VEEG on.    OVERNIGHT EVENTS: None    Vital Signs Last 24 Hrs  T(C): 36.5 (18 Feb 2023 12:15), Max: 36.8 (17 Feb 2023 14:38)  T(F): 97.7 (18 Feb 2023 12:15), Max: 98.3 (17 Feb 2023 14:38)  HR: 81 (18 Feb 2023 12:15) (70 - 114)  BP: 131/86 (18 Feb 2023 12:15) (97/64 - 138/104)  BP(mean): 75 (17 Feb 2023 20:00) (71 - 111)  RR: 18 (18 Feb 2023 12:15) (17 - 31)  SpO2: 96% (18 Feb 2023 12:15) (91% - 97%)    Parameters below as of 18 Feb 2023 12:15  Patient On (Oxygen Delivery Method): room air    IVF: [X ] IVL [ ] NS+K@   DIET: [X ] Regular [ ] CCD [ ] Renal [ ] Puree [ ] Dysphagia [ ] Tube Feeds:   PCA: [ ] YES [ X] NO   SOLOMON: [ ] YES [X ] NO [X ] VOID   BM: [ X] YES-on 2/18    DRAINS: NA    PHYSICAL EXAM:    General: No Acute Distress     Neurological: AAO x2-3. Following Commands, PERRL 4mm OU, EOMI, Face Symmetrical, Speech clear and appropriate. Moving all extremities, Muscle Strength normal in all four extremities, No Drift, Sensation to Light Touch Intact    Pulmonary: Clear to Auscultation, No Rales, No Rhonchi, No Wheezes     Cardiovascular: S1, S2, Regular Rate and Rhythm     Gastrointestinal: Soft, Nontender, Nondistended     Incision: VEEG on    LABS:                        12.6   6.34  )-----------( 120      ( 18 Feb 2023 06:06 )             38.8    02-18    143  |  106  |  24<H>  ----------------------------<  99  3.8   |  25  |  0.77    Ca    8.9      18 Feb 2023 06:06  Phos  3.9     02-17  Mg     2.2     02-17    PT/INR - ( 18 Feb 2023 06:06 )   PT: 12.0 sec;   INR: 1.03 ratio    PTT - ( 18 Feb 2023 06:06 )  PTT:23.2 sec    COVID-19 PCR: NotDetec (29 Dec 2022 09:34)  COVID-19 PCR: NotDetec (29 Dec 2022 05:00)  COVID-19 PCR: NotDetec (28 Dec 2022 09:54)    02-18 @ 07:01  -  02-18 @ 14:17  --------------------------------------------------------  IN: 0 mL / OUT: 1 mL / NET: -1 mL    IMAGING:   < from: CT Angio Neck Stroke Protocol w/ IV Cont (02.15.23 @ 12:01) >    CTA brain: No hemodynamically significant stenosis    CTA carotid/vertebral artery circulation: No hemodynamically significant   stenosis    CT Perfusion: Normal    < end of copied text >    MEDICATIONS  (STANDING):  dexAMETHasone  Injectable 4 milliGRAM(s) IV Push <User Schedule>  dexAMETHasone  Injectable 2 milliGRAM(s) IV Push <User Schedule>  enoxaparin Injectable 40 milliGRAM(s) SubCutaneous <User Schedule>  lacosamide 200 milliGRAM(s) Oral two times a day  levETIRAcetam  IVPB 1000 milliGRAM(s) IV Intermittent every 12 hours  polyethylene glycol 3350 17 Gram(s) Oral daily  rOPINIRole 0.75 milliGRAM(s) Oral daily  senna 2 Tablet(s) Oral at bedtime    MEDICATIONS  (PRN):  acetaminophen     Tablet .. 650 milliGRAM(s) Oral every 6 hours PRN Temp greater or equal to 38C (100.4F), Mild Pain (1 - 3)  ondansetron Injectable 4 milliGRAM(s) IV Push every 6 hours PRN Nausea and/or Vomiting

## 2023-02-18 NOTE — OCCUPATIONAL THERAPY INITIAL EVALUATION ADULT - ASR WT BEARING STATUS EVAL
breathing is un-labored without accessory muscle use, normal breath sounds
no weight-bearing restrictions

## 2023-02-19 PROCEDURE — 99232 SBSQ HOSP IP/OBS MODERATE 35: CPT

## 2023-02-19 PROCEDURE — 95720 EEG PHY/QHP EA INCR W/VEEG: CPT

## 2023-02-19 RX ORDER — LEVETIRACETAM 250 MG/1
1000 TABLET, FILM COATED ORAL
Refills: 0 | Status: DISCONTINUED | OUTPATIENT
Start: 2023-02-19 | End: 2023-02-21

## 2023-02-19 RX ORDER — NYSTATIN CREAM 100000 [USP'U]/G
1 CREAM TOPICAL
Refills: 0 | Status: DISCONTINUED | OUTPATIENT
Start: 2023-02-19 | End: 2023-02-21

## 2023-02-19 RX ADMIN — LEVETIRACETAM 400 MILLIGRAM(S): 250 TABLET, FILM COATED ORAL at 09:17

## 2023-02-19 RX ADMIN — ROPINIROLE 0.75 MILLIGRAM(S): 8 TABLET, FILM COATED, EXTENDED RELEASE ORAL at 11:33

## 2023-02-19 RX ADMIN — LEVETIRACETAM 1000 MILLIGRAM(S): 250 TABLET, FILM COATED ORAL at 22:05

## 2023-02-19 RX ADMIN — Medication 2 MILLIGRAM(S): at 17:27

## 2023-02-19 RX ADMIN — ENOXAPARIN SODIUM 40 MILLIGRAM(S): 100 INJECTION SUBCUTANEOUS at 22:04

## 2023-02-19 RX ADMIN — LACOSAMIDE 200 MILLIGRAM(S): 50 TABLET ORAL at 17:27

## 2023-02-19 RX ADMIN — Medication 4 MILLIGRAM(S): at 08:20

## 2023-02-19 RX ADMIN — NYSTATIN CREAM 1 APPLICATION(S): 100000 CREAM TOPICAL at 05:12

## 2023-02-19 RX ADMIN — Medication 1 DROP(S): at 17:27

## 2023-02-19 RX ADMIN — NYSTATIN CREAM 1 APPLICATION(S): 100000 CREAM TOPICAL at 17:28

## 2023-02-19 RX ADMIN — LACOSAMIDE 200 MILLIGRAM(S): 50 TABLET ORAL at 05:12

## 2023-02-19 NOTE — PROGRESS NOTE ADULT - SUBJECTIVE AND OBJECTIVE BOX
Neurology - Consult Note    -  Spectra: [phone] ([facility]), [phone] ([facility])  -    HPI: Patient SERJIO TIDWELL is a 62y (1960) man with GBM (diag 2021, 10/5 left parietal-occipital craniotomy with near complete resection of the tumor, residual RHH), seizures on [id number] BID and Keppra 1000 BID transferred from [facility] for confusion.   Per chart review, on 2/15 patient was noted to be dazed and started to have difficulty speaking. On exam patient was noted to be confused and mildly aphasic. EEG in ED showed continuous seizure activity in the left hemisphere. Vimpat was increased to 200 mg BID and keppra continued at 1000 mg BID, repeat EEG showed slowing in the left temporal, parietal, occipital regions but no ongoing epileptiform activity. Patient was transferred to neurosurgery at [facility] for further management. Patient currently reports having difficulty remembering events. He is aware he was transferred from [place]. Denies tongue biting, urinary incontinence, weakness, numbness, changes to speech.     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 He was discharged with vimpat 100 BID and keppra 1000 BID.     Today, feels improved, still some word-finding difficulties.     MEDICATIONS  (STANDING):  bisacodyl 5 milliGRAM(s) Oral at bedtime  dexAMETHasone  Injectable 4 milliGRAM(s) IV Push <User Schedule>  dexAMETHasone  Injectable 2 milliGRAM(s) IV Push <User Schedule>  enoxaparin Injectable 40 milliGRAM(s) SubCutaneous <User Schedule>  lacosamide 200 milliGRAM(s) Oral two times a day  levETIRAcetam  IVPB 1000 milliGRAM(s) IV Intermittent every 12 hours  nystatin Powder 1 Application(s) Topical two times a day  polyethylene glycol 3350 17 Gram(s) Oral daily  rOPINIRole 0.75 milliGRAM(s) Oral daily  senna 2 Tablet(s) Oral at bedtime      Vital Signs Last 24 Hrs  T(C): 36.8 (19 Feb 2023 09:10), Max: 36.8 (19 Feb 2023 09:10)  T(F): 98.2 (19 Feb 2023 09:10), Max: 98.2 (19 Feb 2023 09:10)  HR: 86 (19 Feb 2023 09:10) (67 - 107)  BP: 119/78 (19 Feb 2023 09:10) (104/67 - 131/86)  BP(mean): --  RR: 20 (19 Feb 2023 09:10) (17 - 20)  SpO2: 98% (19 Feb 2023 09:10) (94% - 99%)    Parameters below as of 19 Feb 2023 09:10  Patient On (Oxygen Delivery Method): room air      Physical Examination:   General - NAD  Cardiovascular - Peripheral pulses palpable, no edema    Neurologic Exam:  Mental status - Awake, Alert, Oriented to person, place (Hospital, doesn't know [facility]), and time. Speech halting, repetition and naming somewhat impaired (with perseveration), but improved. Follows simple and complex commands. Attention/concentration intact, recalls 0/3 items, knows current president and previous, doesn't know, can spell world, but spells backwards as "DROW, " says he cannot do the serial 7's      Cranial nerves - PERRL, RHH, EOMI, face sensation (V1-V3) intact b/l, facial strength intact without asymmetry b/l, hearing intact b/l, palate with symmetric elevation,  sternocleidomastiod 5/5 strength b/l, tongue midline on protrusion with full lateral movement  Motor - Normal bulk and tone throughout. No pronator drift.  Strength testing            Deltoid      Biceps      Triceps     Wrist Extension                 Wrist Flexion     Interossei         R            5                 5               5                     5                              5                        5                 5  L             5                 5               5                     5                              5                        5                 5              Hip Flexion    Hip Extension      Knee Flexion          Knee Extension      Dorsiflexion    Plantar Flexion  R              5                           5                       5                           5                            5                          5  L              5                           5                        5                           5                            5                          5    Sensation - Light touch intact throughout    DTR's -             Biceps      Triceps     Brachioradialis             Patellar    Ankle    Toes/plantar response  R             2+             1+                  1+                       1+            1+                 Down  L              2+             1+                 1+                        1+           1+                 Down    Coordination - Finger to Nose intact b/l. No tremors appreciated    Gait and station - deferred, on v eeg    Labs:                                   12.6   6.34  )-----------( 120      ( 18 Feb 2023 06:06 )             38.8     02-18    143  |  106  |  24<H>  ----------------------------<  99  3.8   |  25  |  0.77    Ca    8.9      18 Feb 2023 06:06        EEG 2/18  This is an abnormal EEG record. There is evidence of potentially epileptogenic region of structural abnormality in the left posterior temporal region.  There is a skull defect in the same region. No seizures noted during the 6h recording period.

## 2023-02-19 NOTE — PROGRESS NOTE ADULT - ASSESSMENT
HPI:  62M hx GBM s/p rxn 10/2022. Direct floor xfer from [facility] for sz. Adm to [facility]NT 2/15 for difficulty speaking/looking dazed. EEG at HNT showed sz activity in L hemisphere; 2/16 EEG report: slowing in L temp/parietal/occ regions but no ongoing sz activity. CTH: 2/15 stable compared to prior. Exam: AOx2-3, R homonymous hemianopsia, difficulty finger counting b/l, FC, PERRL, EOMI, no facial, 5/5 throughout, no drift      PLAN:  Neuro:   - f/u EEG report  - continue keppra  1g bid and vimpat 200 bid (increased dose)  - Neurology following  - continue decadron 4mg am and 2 mg pm  - MRI brain and orbits once VEEG complete  - Opthalmology consult appreciated- hypo-tears started  - on home med- requip    Respiratory:   - satting well on room air  CV:  - vitals stable  Heme/Onc:        - pt is followed by Dr Torres as outpatient  DVT ppx:   - sq lovenox, venodynes  GI:    - bowel regimen  PT/OT:   - Home PT      [facility] # [id number]

## 2023-02-19 NOTE — PROGRESS NOTE ADULT - TIME BILLING
Chart review, examination, and discussion of plan
More than 30 minutes spent on total encounter: more than 50% of the visit was spent on educating the patient and family regarding condition, medications, follow up plans, signs and symptoms to be concerned with, preparing paperwork, and questions answered regarding discharge.

## 2023-02-19 NOTE — PROGRESS NOTE ADULT - ASSESSMENT
Patient SERJIO TIDWELL is a 62y (1960) man with GBM (diag 2021, 10/5 left parietal-occipital craniotomy with near complete resection of the tumor, residual RHH), seizures on [id number] BID and Keppra 1000 BID transferred from [facility] for eeg showing   Per chart review, on 2/15 patient was noted to be dazed and started to have difficulty speaking. On exam patient was noted to be confused and mildly aphasic. EEG in ED showed continuous seizure activity in the left hemisphere. Vimpat was increased to 200 mg BID and keppra continued at 1000 mg BID, repeat EEG showed slowing in the left temporal, parietal, occipital regions but no ongoing epileptiform activity. Patient was transferred to neurosurgery at [facility] for further management. Patient currently reports having difficulty remembering events. He is aware he was transferred from [place]. Exam nonfocal. EEG 2/18, spikes focal left posterior temporal region, continuous polymorphic slowing focal left posterior temporal region. CTH large area of confluent hypoattenuation within the left posterior parietal and temporal lobes, compatible with known GBM tumor, without significant change.    Impression: Confusion and Anomia due to left posterior temporal region due to GBM, improved today    Recommendation:   Labs/Studies:  [] Continue Video EEG   [x] MRI brain w/w/o con reviewed    Meds:  [] Continue current AEDs: Keppra 1000 mg BID and Vimpat 200 mg BID  [] Seizure rescue medications for a generalized tonic clonic episode lasting >3 min or significant derangement of vital signs: Ativan 1 mg IV (Max dose 0.1 mg/kg)  [] Would not taper decadron further at this time.    Other:   [] Telemetry monitoring; Neurochecks/VS per unit protocol  [] Seizure, fall and aspiration precautions  [] Please note: if patient has a convulsion, please document accurately the time of onset, any derangement of vital signs, length of episode, and duration of postictal period.   --> Please describe what occurred and  specifically what the patient was doing, paying attention to progression of limb involvement (upper/lower; R/L) if any, eye opening vs closure, head turn, gaze deviation, shaking of extremities, tongue bite, urinary/bowel incontinence.  [] Given concern for seizure, advise patient to not drive, operate heavy machinery, avoid heights, pools, bathtubs, locked doors until cleared by further follow up outpatient by neurology.

## 2023-02-19 NOTE — PROGRESS NOTE ADULT - SUBJECTIVE AND OBJECTIVE BOX
SUBJECTIVE: Pt seen and examined, resting in bed, VEEG in place, crying this morning     OVERNIGHT EVENTS: none    Vital Signs Last 24 Hrs  T(C): 36.3 (19 Feb 2023 12:34), Max: 36.8 (19 Feb 2023 09:10)  T(F): 97.4 (19 Feb 2023 12:34), Max: 98.2 (19 Feb 2023 09:10)  HR: 88 (19 Feb 2023 12:34) (67 - 107)  BP: 108/75 (19 Feb 2023 12:34) (104/67 - 119/78)  BP(mean): --  RR: 18 (19 Feb 2023 12:34) (17 - 20)  SpO2: 95% (19 Feb 2023 12:34) (94% - 99%)    Parameters below as of 19 Feb 2023 12:34  Patient On (Oxygen Delivery Method): room air        PHYSICAL EXAM:    General: No Acute Distress     Neurological: Awake, alert oriented to self, hospital and month,  Following Commands,  Face Symmetrical, Speech Fluent, Moving all extremities 5/5,  No Drift, Sensation to Light Touch Intact    Pulmonary: Clear to Auscultation, No Rales, No Rhonchi, No Wheezes     Cardiovascular: S1, S2, Regular Rate and Rhythm     Gastrointestinal: Soft, Nontender, Nondistended     Incision: EEG head wrap in place    LABS:                        12.6   6.34  )-----------( 120      ( 18 Feb 2023 06:06 )             38.8    02-18    143  |  106  |  24<H>  ----------------------------<  99  3.8   |  25  |  0.77    Ca    8.9      18 Feb 2023 06:06    PT/INR - ( 18 Feb 2023 06:06 )   PT: 12.0 sec;   INR: 1.03 ratio         PTT - ( 18 Feb 2023 06:06 )  PTT:23.2 sec      02-18 @ 07:01  -  02-19 @ 07:00  --------------------------------------------------------  IN: 100 mL / OUT: 701 mL / NET: -601 mL    02-19 @ 07:01  -  02-19 @ 13:05  --------------------------------------------------------  IN: 0 mL / OUT: 401 mL / NET: -401 mL        MEDICATIONS:  Antibiotics:    Neuro:  acetaminophen     Tablet .. 650 milliGRAM(s) Oral every 6 hours PRN Temp greater or equal to 38C (100.4F), Mild Pain (1 - 3)  lacosamide 200 milliGRAM(s) Oral two times a day  levETIRAcetam  IVPB 1000 milliGRAM(s) IV Intermittent every 12 hours  ondansetron Injectable 4 milliGRAM(s) IV Push every 6 hours PRN Nausea and/or Vomiting  rOPINIRole 0.75 milliGRAM(s) Oral daily    Cardiac:    Pulm:    GI/:  bisacodyl 5 milliGRAM(s) Oral at bedtime  polyethylene glycol 3350 17 Gram(s) Oral daily  senna 2 Tablet(s) Oral at bedtime    Other:   dexAMETHasone  Injectable 4 milliGRAM(s) IV Push <User Schedule>  dexAMETHasone  Injectable 2 milliGRAM(s) IV Push <User Schedule>  enoxaparin Injectable 40 milliGRAM(s) SubCutaneous <User Schedule>  nystatin Powder 1 Application(s) Topical two times a day    DIET: [x] Regular [] CCD [] Renal [] Puree [] Dysphagia [] Tube Feeds:     IMAGING:   EEG Summary:    Abnormal EEG in the awake, drowsy and asleep states.    Spikes, focal, left posterior temporal region  Continuous polymorphic slowing, focal, left posterior temporal region      Impression/Clinical Correlate:    This is an abnormal EEG record. There is evidence of potentially epileptogenic region of structural abnormality in the left posterior temporal region.  There is a skull defect in the same region. No seizures noted during the 6h recording period.       < from: CT Angio Neck Stroke Protocol w/ IV Cont (02.15.23 @ 12:01) >    IMPRESSION:    CTA brain: No hemodynamically significant stenosis    CTA carotid/vertebral artery circulation: No hemodynamically significant   stenosis    CT Perfusion: Normal    --- End of Report ---    < end of copied text >

## 2023-02-19 NOTE — EEG REPORT - NS EEG TEXT BOX
Study Started: 2/18/2023 08:00        Study End: 02/19/2023 08:00       Interpretation:  FINDINGS:  The background was continuous, symmetric, spontaneously variable and reactive. During wakefulness, the posterior dominant rhythm consisted of asymmetric, attenuated over left, well-modulated 8.5 Hz activity, with amplitude to 30 uV, that attenuated to eye opening.  Low amplitude frontal beta was noted in wakefulness. Anterior to posterior gradient is present. There is breach activity in the left parietal region  Background Slowing: No generalized background slowing was present.  Focal Slowing:  There is continuous polymorphic delta slowing in the left posterior temporal region.  Sleep Background: Drowsiness was characterized by fragmentation, attenuation, and slowing of the background activity.   Sleep was characterized by the presence of vertex waves, symmetric sleep spindles and K-complexes.  Other Non-Epileptiform Findings: Breach effect in left parietal characterized by higher amplitude, sharply contoured waves and fast activities.  Activation Procedures:  Hyperventilation was not performed.   Photic stimulation was not performed.  Interictal Epileptiform Activity:  There are frequent spike in the left posterior temporal region.  Events: No events or seizures recorded.  Artifacts: Intermittent myogenic and movement artifacts were noted.  ECG: The heart rate on single channel ECG was predominantly between 60-70 BPM.  AEDs: LEV 1000 q12;  q12  EEG Summary:  Abnormal EEG in the awake, drowsy and asleep states.  Spikes, focal, left posterior temporal region Continuous polymorphic slowing, focal, left posterior temporal region   Impression/Clinical Correlate:  This is an abnormal EEG record. There is evidence of potentially epileptogenic region of structural abnormality in the left posterior temporal region.  There is a skull defect in the same region. No seizures during the recording period.        Juan Antonio Abel MD, PhD Director, Epilepsy Division, [facility]  ------------------------------------ EEG Reading Room: [phone] On Call Service After Hours: [phone]

## 2023-02-20 PROCEDURE — 99232 SBSQ HOSP IP/OBS MODERATE 35: CPT

## 2023-02-20 PROCEDURE — 99233 SBSQ HOSP IP/OBS HIGH 50: CPT

## 2023-02-20 PROCEDURE — 95718 EEG PHYS/QHP 2-12 HR W/VEEG: CPT

## 2023-02-20 RX ORDER — LANOLIN ALCOHOL/MO/W.PET/CERES
5 CREAM (GRAM) TOPICAL AT BEDTIME
Refills: 0 | Status: DISCONTINUED | OUTPATIENT
Start: 2023-02-20 | End: 2023-02-21

## 2023-02-20 RX ADMIN — Medication 4 MILLIGRAM(S): at 07:38

## 2023-02-20 RX ADMIN — Medication 1 DROP(S): at 11:07

## 2023-02-20 RX ADMIN — Medication 5 MILLIGRAM(S): at 23:28

## 2023-02-20 RX ADMIN — LEVETIRACETAM 1000 MILLIGRAM(S): 250 TABLET, FILM COATED ORAL at 11:04

## 2023-02-20 RX ADMIN — Medication 2 MILLIGRAM(S): at 17:13

## 2023-02-20 RX ADMIN — LACOSAMIDE 200 MILLIGRAM(S): 50 TABLET ORAL at 05:12

## 2023-02-20 RX ADMIN — NYSTATIN CREAM 1 APPLICATION(S): 100000 CREAM TOPICAL at 17:13

## 2023-02-20 RX ADMIN — POLYETHYLENE GLYCOL 3350 17 GRAM(S): 17 POWDER, FOR SOLUTION ORAL at 11:08

## 2023-02-20 RX ADMIN — ROPINIROLE 0.75 MILLIGRAM(S): 8 TABLET, FILM COATED, EXTENDED RELEASE ORAL at 11:07

## 2023-02-20 RX ADMIN — Medication 5 MILLIGRAM(S): at 21:13

## 2023-02-20 RX ADMIN — Medication 1 DROP(S): at 21:14

## 2023-02-20 RX ADMIN — ENOXAPARIN SODIUM 40 MILLIGRAM(S): 100 INJECTION SUBCUTANEOUS at 21:13

## 2023-02-20 RX ADMIN — NYSTATIN CREAM 1 APPLICATION(S): 100000 CREAM TOPICAL at 05:12

## 2023-02-20 RX ADMIN — LACOSAMIDE 200 MILLIGRAM(S): 50 TABLET ORAL at 17:15

## 2023-02-20 RX ADMIN — Medication 1 DROP(S): at 17:14

## 2023-02-20 RX ADMIN — LEVETIRACETAM 1000 MILLIGRAM(S): 250 TABLET, FILM COATED ORAL at 21:13

## 2023-02-20 RX ADMIN — SENNA PLUS 2 TABLET(S): 8.6 TABLET ORAL at 21:13

## 2023-02-20 RX ADMIN — Medication 1 DROP(S): at 05:13

## 2023-02-20 NOTE — PROGRESS NOTE ADULT - SUBJECTIVE AND OBJECTIVE BOX
NEURO-ONCOLOGY    63m  left temporal GBM, MGMT methylated  diagnosed 10/22, s/p STR, RT/TMZ, post RT course complicated by seizures in 12/22  maintained on vimpat/lvt  started cycle 1 TMZ after recent MRI 2/1/23 showed improved enhancement suggestive of pseudoprogression  admitted with AMS, seizures at OSH  CT head unchanged    INTERVAL HISTORY:  EEG clear.  feels emotional on meds.      MEDICATIONS  (STANDING):  artificial  tears Solution 1 Drop(s) Both EYES every 6 hours  bisacodyl 5 milliGRAM(s) Oral at bedtime  dexAMETHasone  Injectable 4 milliGRAM(s) IV Push <User Schedule>  dexAMETHasone  Injectable 2 milliGRAM(s) IV Push <User Schedule>  enoxaparin Injectable 40 milliGRAM(s) SubCutaneous <User Schedule>  lacosamide 200 milliGRAM(s) Oral two times a day  levETIRAcetam 1000 milliGRAM(s) Oral two times a day  nystatin Powder 1 Application(s) Topical two times a day  polyethylene glycol 3350 17 Gram(s) Oral daily  rOPINIRole 0.75 milliGRAM(s) Oral daily  senna 2 Tablet(s) Oral at bedtime    Vital Signs Last 24 Hrs  T(C): 36.6 (20 Feb 2023 16:19), Max: 36.7 (20 Feb 2023 08:55)  T(F): 97.8 (20 Feb 2023 16:19), Max: 98 (20 Feb 2023 08:55)  HR: 75 (20 Feb 2023 16:19) (64 - 87)  BP: 119/71 (20 Feb 2023 16:19) (105/69 - 122/84)  BP(mean): --  RR: 18 (20 Feb 2023 16:19) (16 - 18)  SpO2: 97% (20 Feb 2023 16:19) (95% - 97%)    Parameters below as of 20 Feb 2023 13:04  Patient On (Oxygen Delivery Method): room air    (Exam as noted with exceptions in parentheses)    General:  cushingoid appearing Male well groomed and without deformities. KPS is 60    Mental Status:  Awake, alert and attentive. Oriented to person, place and time. Recent and remote memory intact. Normal concentration. Fluent spontaneous speech with intact naming and repetition. Normal fund of knowledge.  (mild expressive aphasia)    Cranial Nerves: II: Full visual fields. III,IV,VI:Pupils round, reactive to light. Full extraocular movements. No nystagmus. V: Normal bilateral bite, facial sensation. VII: No facial weakness. VIII: Hearing intact. IX,X: Palate midline, intact gag. XI:  Sternocleidomastoids normal. XII: Tongue protrudes midline. No dysarthria.     Motor:  Normal tone, bulk and power throughout including arms and legs, proximal and distal. No pronator drift. No abnormal movements.     Sensation: Normal in arms, legs and trunk to pin, proprioception and vibration. Negative Romberg.     Coordination: No dysmetria or dysdiadochokinesis bilaterally. Normal heel-shin testing.     Gait: Normal including heel and toe walking. Normal station.     Reflexes: Normoactive and symmetric throughout. Absent Babinski bilaterally.       NEUROIMAGING:  all imaging personally reviewed including admission CT head recent MRI 2/1/23 and prior from 12/22

## 2023-02-20 NOTE — PROGRESS NOTE ADULT - ASSESSMENT
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     DC VEEG  Doubt need for new MRI given just had one 3 weeks ago and CT stable  home decadron   continue keppra 1000 bid  vimpat increased 200 bid  dispo planning

## 2023-02-20 NOTE — EEG REPORT - NS EEG TEXT BOX
Study Started: 2/19/2023 08:00        Study End: 02/20/2023 08:00       Interpretation:  FINDINGS:  The background was continuous, symmetric, spontaneously variable and reactive. During wakefulness, the posterior dominant rhythm consisted of asymmetric, attenuated over left, well-modulated 8.5 Hz activity, with amplitude to 30 uV, that attenuated to eye opening.  Low amplitude frontal beta was noted in wakefulness. Anterior to posterior gradient is present. There is breach activity in the left parietal region  Background Slowing: No generalized background slowing was present.  Focal Slowing:  There is continuous polymorphic delta slowing in the left posterior temporal region.  Sleep Background: Drowsiness was characterized by fragmentation, attenuation, and slowing of the background activity.   Sleep was characterized by the presence of vertex waves, symmetric sleep spindles and K-complexes.  Other Non-Epileptiform Findings: Breach effect in left parietal characterized by higher amplitude, sharply contoured waves and fast activities.  Activation Procedures:  Hyperventilation was not performed.   Photic stimulation was not performed.  Interictal Epileptiform Activity:  There are frequent spike in the left posterior temporal region.  Events: No events or seizures recorded.  Artifacts: Intermittent myogenic and movement artifacts were noted.  ECG: The heart rate on single channel ECG was predominantly between 60-70 BPM.  AEDs: LEV 1000 q12;  q12  EEG Summary:  Abnormal EEG in the awake, drowsy and asleep states.  Spikes, focal, left posterior temporal region Continuous polymorphic slowing, focal, left posterior temporal region   Impression/Clinical Correlate:  This is an abnormal EEG record. There is evidence of potentially epileptogenic region of structural abnormality in the left posterior temporal region.  There is a skull defect in the same region. No seizures during the recording period.        Juan Antonio Abel MD, PhD Director, Epilepsy Division, [facility]  ------------------------------------ EEG Reading Room: [facility] On Call Service After Hours: [phone]     Study Started: 2/19/2023 08:00        Study End: 02/20/2023 16:37      Duration: 32h 37m  Interpretation:  FINDINGS:  The background was continuous, symmetric, spontaneously variable and reactive. During wakefulness, the posterior dominant rhythm consisted of asymmetric, attenuated over left, well-modulated 8.5 Hz activity, with amplitude to 30 uV, that attenuated to eye opening.  Low amplitude frontal beta was noted in wakefulness. Anterior to posterior gradient is present. There is breach activity in the left parietal region  Background Slowing: No generalized background slowing was present.  Focal Slowing:  There is continuous polymorphic delta slowing in the left posterior temporal region.  Sleep Background: Drowsiness was characterized by fragmentation, attenuation, and slowing of the background activity.   Sleep was characterized by the presence of vertex waves, symmetric sleep spindles and K-complexes.  Other Non-Epileptiform Findings: Breach effect in left parietal characterized by higher amplitude, sharply contoured waves and fast activities.  Activation Procedures:  Hyperventilation was not performed.   Photic stimulation was not performed.  Interictal Epileptiform Activity:  There are frequent spike in the left posterior temporal region.  Events: No events or seizures recorded.  Artifacts: Intermittent myogenic and movement artifacts were noted.  ECG: The heart rate on single channel ECG was predominantly between 60-70 BPM.  AEDs: LEV 1000 q12;  q12  EEG Summary:  Abnormal EEG in the awake, drowsy and asleep states.  Spikes, focal, left posterior temporal region Continuous polymorphic slowing, focal, left posterior temporal region   Impression/Clinical Correlate:  This is an abnormal EEG record. There is evidence of potentially epileptogenic region of structural abnormality in the left posterior temporal region.  There is a skull defect in the same region. No seizures during the recording period.        Juan Antonio Abel MD, PhD Director, Epilepsy Division, [facility]  ------------------------------------ EEG Reading Room: [facility]-[id number] On Call Service After Hours: [phone]

## 2023-02-20 NOTE — PROGRESS NOTE ADULT - ASSESSMENT
62M hx GBM s/p rxn 10/2022. Direct floor xfer from HNT for sz. Adm to HNT 2/15 for difficulty speaking/looking dazed. EEG at HNT showed sz activity in L hemisphere; 2/16 EEG report: slowing in L temp/parietal/occ regions but no ongoing sz activity. CTH: 2/15 stable compared to prior. Exam: AOx2-3, R homonymous hemianopsia, difficulty finger counting b/l, FC, PERRL, EOMI, no facial, 5/5 throughout, no drift      PLAN:  Neuro:   - f/u EEG report  - continue keppra  1g bid and vimpat 200 bid (increased dose)  - Neurology following  - continue decadron 4mg am and 2 mg pm  - MRI brain and orbits once VEEG complete  - Opthalmology consult appreciated- hypo-tears started  - on home med- requip    Respiratory:   - satting well on room air  CV:  - vitals stable  Heme/Onc:        - pt is followed by Dr Torres as outpatient  DVT ppx:   - sq lovenox, venodynes  GI:    - bowel regimen  PT/OT:   - Home PT      [facility] # [id number]

## 2023-02-21 ENCOUNTER — TRANSCRIPTION ENCOUNTER (OUTPATIENT)
Age: 63
End: 2023-02-21

## 2023-02-21 VITALS
SYSTOLIC BLOOD PRESSURE: 120 MMHG | RESPIRATION RATE: 18 BRPM | TEMPERATURE: 98 F | DIASTOLIC BLOOD PRESSURE: 81 MMHG | HEART RATE: 99 BPM | OXYGEN SATURATION: 95 %

## 2023-02-21 PROCEDURE — 86901 BLOOD TYPING SEROLOGIC RH(D): CPT

## 2023-02-21 PROCEDURE — 95713 VEEG 2-12 HR CONT MNTR: CPT

## 2023-02-21 PROCEDURE — 85610 PROTHROMBIN TIME: CPT

## 2023-02-21 PROCEDURE — 36415 COLL VENOUS BLD VENIPUNCTURE: CPT

## 2023-02-21 PROCEDURE — 85027 COMPLETE CBC AUTOMATED: CPT

## 2023-02-21 PROCEDURE — 97110 THERAPEUTIC EXERCISES: CPT

## 2023-02-21 PROCEDURE — 86850 RBC ANTIBODY SCREEN: CPT

## 2023-02-21 PROCEDURE — 95700 EEG CONT REC W/VID EEG TECH: CPT

## 2023-02-21 PROCEDURE — 80048 BASIC METABOLIC PNL TOTAL CA: CPT

## 2023-02-21 PROCEDURE — 97116 GAIT TRAINING THERAPY: CPT

## 2023-02-21 PROCEDURE — 85730 THROMBOPLASTIN TIME PARTIAL: CPT

## 2023-02-21 PROCEDURE — 95716 VEEG EA 12-26HR CONT MNTR: CPT

## 2023-02-21 PROCEDURE — 99233 SBSQ HOSP IP/OBS HIGH 50: CPT

## 2023-02-21 PROCEDURE — 97165 OT EVAL LOW COMPLEX 30 MIN: CPT

## 2023-02-21 PROCEDURE — 99222 1ST HOSP IP/OBS MODERATE 55: CPT

## 2023-02-21 PROCEDURE — 97162 PT EVAL MOD COMPLEX 30 MIN: CPT

## 2023-02-21 PROCEDURE — 86900 BLOOD TYPING SEROLOGIC ABO: CPT

## 2023-02-21 RX ORDER — LACOSAMIDE 50 MG/1
200 TABLET ORAL
Qty: 60 | Refills: 0
Start: 2023-02-21 | End: 2023-03-22

## 2023-02-21 RX ORDER — LANOLIN ALCOHOL/MO/W.PET/CERES
1 CREAM (GRAM) TOPICAL
Qty: 0 | Refills: 0 | DISCHARGE
Start: 2023-02-21

## 2023-02-21 RX ORDER — DEXAMETHASONE 0.5 MG/5ML
4 ELIXIR ORAL
Qty: 0 | Refills: 0 | DISCHARGE

## 2023-02-21 RX ORDER — LEVETIRACETAM 250 MG/1
1 TABLET, FILM COATED ORAL
Qty: 60 | Refills: 0 | DISCHARGE
Start: 2023-02-21

## 2023-02-21 RX ORDER — PANTOPRAZOLE SODIUM 20 MG/1
1 TABLET, DELAYED RELEASE ORAL
Qty: 90 | Refills: 0
Start: 2023-02-21 | End: 2023-05-21

## 2023-02-21 RX ORDER — PANTOPRAZOLE SODIUM 20 MG/1
1 TABLET, DELAYED RELEASE ORAL
Refills: 0 | DISCHARGE
Start: 2023-02-21

## 2023-02-21 RX ORDER — CALCIUM CARBONATE 500(1250)
1 TABLET ORAL
Qty: 0 | Refills: 0 | DISCHARGE
Start: 2023-02-21

## 2023-02-21 RX ORDER — LEVETIRACETAM 250 MG/1
1 TABLET, FILM COATED ORAL
Qty: 60 | Refills: 0
Start: 2023-02-21 | End: 2023-03-22

## 2023-02-21 RX ORDER — ACETAMINOPHEN 500 MG
2 TABLET ORAL
Qty: 0 | Refills: 0 | DISCHARGE
Start: 2023-02-21

## 2023-02-21 RX ORDER — DEXAMETHASONE 0.5 MG/5ML
1 ELIXIR ORAL
Qty: 90 | Refills: 0
Start: 2023-02-21 | End: 2023-03-22

## 2023-02-21 RX ORDER — LACOSAMIDE 50 MG/1
2 TABLET ORAL
Qty: 0 | Refills: 0 | DISCHARGE
Start: 2023-02-21 | End: 2023-03-22

## 2023-02-21 RX ORDER — POLYETHYLENE GLYCOL 3350 17 G/17G
17 POWDER, FOR SOLUTION ORAL
Qty: 0 | Refills: 0 | DISCHARGE
Start: 2023-02-21

## 2023-02-21 RX ORDER — CALCIUM CARBONATE 500(1250)
1 TABLET ORAL THREE TIMES A DAY
Refills: 0 | Status: DISCONTINUED | OUTPATIENT
Start: 2023-02-21 | End: 2023-02-21

## 2023-02-21 RX ORDER — DEXAMETHASONE 0.5 MG/5ML
2 ELIXIR ORAL
Qty: 0 | Refills: 0 | DISCHARGE
Start: 2023-02-21 | End: 2023-03-22

## 2023-02-21 RX ORDER — DEXAMETHASONE 0.5 MG/5ML
2 ELIXIR ORAL
Qty: 0 | Refills: 0 | DISCHARGE

## 2023-02-21 RX ORDER — LACOSAMIDE 50 MG/1
200 TABLET ORAL
Qty: 12000 | Refills: 0
Start: 2023-02-21 | End: 2023-03-22

## 2023-02-21 RX ORDER — SENNA PLUS 8.6 MG/1
2 TABLET ORAL
Qty: 0 | Refills: 0 | DISCHARGE
Start: 2023-02-21

## 2023-02-21 RX ADMIN — ROPINIROLE 0.75 MILLIGRAM(S): 8 TABLET, FILM COATED, EXTENDED RELEASE ORAL at 12:04

## 2023-02-21 RX ADMIN — NYSTATIN CREAM 1 APPLICATION(S): 100000 CREAM TOPICAL at 17:25

## 2023-02-21 RX ADMIN — NYSTATIN CREAM 1 APPLICATION(S): 100000 CREAM TOPICAL at 05:29

## 2023-02-21 RX ADMIN — Medication 1 TABLET(S): at 02:49

## 2023-02-21 RX ADMIN — Medication 4 MILLIGRAM(S): at 09:36

## 2023-02-21 RX ADMIN — LEVETIRACETAM 1000 MILLIGRAM(S): 250 TABLET, FILM COATED ORAL at 09:36

## 2023-02-21 RX ADMIN — Medication 1 DROP(S): at 05:29

## 2023-02-21 RX ADMIN — LACOSAMIDE 200 MILLIGRAM(S): 50 TABLET ORAL at 17:25

## 2023-02-21 RX ADMIN — Medication 1 DROP(S): at 17:25

## 2023-02-21 RX ADMIN — LACOSAMIDE 200 MILLIGRAM(S): 50 TABLET ORAL at 05:29

## 2023-02-21 RX ADMIN — Medication 1 DROP(S): at 12:04

## 2023-02-21 RX ADMIN — Medication 2 MILLIGRAM(S): at 17:25

## 2023-02-21 NOTE — DISCHARGE NOTE PROVIDER - NSDCCPCAREPLAN_GEN_ALL_CORE_FT
PRINCIPAL DISCHARGE DIAGNOSIS  Diagnosis: Seizure  Assessment and Plan of Treatment: You were placed on Video EEG to monitor for seizures and was found to be negative for seizures. Please continue to take Keppra 1000mg twice a day and Vimpat 200mg twice a day. Keppra helps control and prevent seizures.  The most common side effects include diarrhea or constipation, excessive sleepiness, irritability and mood changes.  Rare and sometimes serious side effects include rash. Vimpat helps control and prevent certain seizures.  The most common side effects include nausea, vomiting, diarrhea, blurred or double vision, headache, itching.  Arare but seious side effect is a rash.  Please notify your doctor if you notice any severe common side effects. Please make an appointment and follow up with Dr. Turpin (Neurosurgery) in 1 week after discharge. Please make an appointment and follow up with Dr. Torres / Dr. Vallejo (Neuro Oncology) in 1 week after discharge.      SECONDARY DISCHARGE DIAGNOSES  Diagnosis: GBM (glioblastoma multiforme)  Assessment and Plan of Treatment: Please make an appointment and follow up with Dr. Turpin in 1 week after discharge and Please make an appointment and follow up with Dr. Vallejo / Dr. Torres in 1 week after discharge. Please continue to take Decadron 4mg in the morning and Decadron 2mg in the evening . You may take Tylenol for pain control as needed.    Diagnosis: Right homonymous hemianopsia  Assessment and Plan of Treatment: Please make an appointment and follow up with Neuro-Opthomology Dr. Narvaez in one week after discharge.

## 2023-02-21 NOTE — DISCHARGE NOTE PROVIDER - CARE PROVIDERS DIRECT ADDRESSES
[email or web address]

## 2023-02-21 NOTE — DISCHARGE NOTE PROVIDER - HOSPITAL COURSE
62 year old male hx GBM s/p rxn 10/2022. Direct floor xfer from HNT for sz. Adm to T 2/15 for difficulty speaking/looking dazed. EEG at [facility] showed sz activity in L hemisphere; 2/16 EEG report: slowing in L temp/parietal/occ regions but no ongoing sz activity. CTH: 2/15 stable compared to prior. Patient admitted to [facility] under Dr. Turpin and was subsequently hooked up to EEG for monitoring for seizures, patient placed on seizure medications: Keppra 100mg BID and Vimpat 200mg BID. Dr. Vallejo, neuro-oncology was following patient for seizure management. Patient continued on Decadron 4mg in the morning and 2mg at night. Patient seen by Opthalmology for his right homonymous hemianopsia and recommended outpatient follow up with NeuroOpthalmology. Patient was also evaluated by Physical Therapy and deemed a candidate for home PT. On day of discharge patient is neurologically and hemodynamically stable.

## 2023-02-21 NOTE — DISCHARGE NOTE PROVIDER - NSDCFUADDAPPT_GEN_ALL_CORE_FT
APPTS ARE READY TO BE MADE: [x ] YES    Best Family or Patient Contact (if needed):    Additional Information about above appointments (if needed):    1: Dr. Turpin, neurosurgery, in 1 week  2: Dr. Torres / Dr. Vallejo, neurology, in 1 week  3: Dr. Elias Valdes, opthalmology, in 1 week    Other comments or requests:    APPTS ARE READY TO BE MADE: [x ] YES    Best Family or Patient Contact (if needed):    Additional Information about above appointments (if needed):    1: Dr. Turpin, neurosurgery, in 1 week  2: Dr. Torres / Dr. Vallejo, neurology, in 1 week  3: Dr. Elias Valdes, opthalmology, in 1 week    Other comments or requests:   Patient was previously scheduled with Dr. Torres on 3/7/23  4pm at [address] [place] [address]    3 attempts were made to reach patient, which have been unsuccessful. 3 Voice mails have been left ( 2/22, 2/23, 2/24). Will await call back from patient to coordinate follow up care .

## 2023-02-21 NOTE — DISCHARGE NOTE NURSING/CASE MANAGEMENT/SOCIAL WORK - PATIENT PORTAL LINK FT
You can access the FollowMyHealth Patient Portal offered by [facility] by registering at the following website: [email or web address]. By joining [employer]’s FollowMyHealth portal, you will also be able to view your health information using other applications (apps) compatible with our system.

## 2023-02-21 NOTE — DISCHARGE NOTE NURSING/CASE MANAGEMENT/SOCIAL WORK - NSDCFUADDAPPT_GEN_ALL_CORE_FT
APPTS ARE READY TO BE MADE: [x ] YES    Best Family or Patient Contact (if needed):    Additional Information about above appointments (if needed):    1: Dr. Turpin, neurosurgery, in 1 week  2: Dr. Torres / Dr. Vallejo, neurology, in 1 week  3: Dr. Elias Valdes, opthalmology, in 1 week    Other comments or requests:

## 2023-02-21 NOTE — PROGRESS NOTE ADULT - SUBJECTIVE AND OBJECTIVE BOX
SUBJECTIVE: HPI:    62M hx GBM s/p rxn 10/2022. Direct floor xfer from HNT for sz. Adm to HNT 2/15 for difficulty speaking/looking dazed. EEG at T showed sz activity in L hemisphere; 2/16 EEG report: slowing in L temp/parietal/occ regions but no ongoing sz activity. CTH: 2/15 stable compared to prior. Exam: AOx2-3, R homonymous hemianopsia, difficulty finger counting b/l, FC, PERRL, EOMI, no facial, 5/5 throughout, no drift      OVERNIGHT EVENTS: No acute events overnight, patient seen and evaluated this morning, feeling tearful I explained to him that he had no seizures however he will continue to take his current AEDs and his current steroid regimen. I spoke with his HCP Johnnie and gave her updates, she had stated wanting to speak with neuro oncology I gave her Dr. Morgan number and she was able to speak with him. Dr. Gupta is also aware about speaking with patient and HCP.     Vital Signs Last 24 Hrs  T(C): 36.3 (21 Feb 2023 09:16), Max: 36.7 (21 Feb 2023 00:49)  T(F): 97.4 (21 Feb 2023 09:16), Max: 98.1 (21 Feb 2023 00:49)  HR: 107 (21 Feb 2023 09:16) (74 - 108)  BP: 131/91 (21 Feb 2023 09:16) (100/66 - 131/91)  BP(mean): --  RR: 18 (21 Feb 2023 09:16) (18 - 18)  SpO2: 95% (21 Feb 2023 09:16) (95% - 97%)    Parameters below as of 21 Feb 2023 09:16  Patient On (Oxygen Delivery Method): room air        PHYSICAL EXAM:    General: No Acute Distress     Neurological: Awake, alert, oriented x 3 (name, place, date), right homonymous hemianopsia, blurry vision, following commands, uppers 5/5, no drift lowers 5/5    Pulmonary: Clear to Auscultation, No Rales, No Rhonchi, No Wheezes     Cardiovascular: S1, S2, Regular Rate and Rhythm     Gastrointestinal: Soft, Nontender, Nondistended     Incision: None    LABS:             02-20 @ 07:01 - 02-21 @ 07:00  --------------------------------------------------------  IN: 250 mL / OUT: 730 mL / NET: -480 mL    02-21 @ 07:01  - 02-21 @ 13:02  --------------------------------------------------------  IN: 680 mL / OUT: 0 mL / NET: 680 mL      DRAINS: None    MEDICATIONS:  Antibiotics:    Neuro:  acetaminophen     Tablet .. 650 milliGRAM(s) Oral every 6 hours PRN Temp greater or equal to 38C (100.4F), Mild Pain (1 - 3)  lacosamide 200 milliGRAM(s) Oral two times a day  levETIRAcetam 1000 milliGRAM(s) Oral two times a day  melatonin 5 milliGRAM(s) Oral at bedtime  ondansetron Injectable 4 milliGRAM(s) IV Push every 6 hours PRN Nausea and/or Vomiting  rOPINIRole 0.75 milliGRAM(s) Oral daily    Cardiac:    Pulm:    GI/:  bisacodyl 5 milliGRAM(s) Oral at bedtime  calcium carbonate    500 mG (Tums) Chewable 1 Tablet(s) Chew three times a day PRN Dyspepsia  polyethylene glycol 3350 17 Gram(s) Oral daily  senna 2 Tablet(s) Oral at bedtime    Other:   artificial  tears Solution 1 Drop(s) Both EYES every 6 hours  dexAMETHasone  Injectable 4 milliGRAM(s) IV Push <User Schedule>  dexAMETHasone  Injectable 2 milliGRAM(s) IV Push <User Schedule>  enoxaparin Injectable 40 milliGRAM(s) SubCutaneous <User Schedule>  nystatin Powder 1 Application(s) Topical two times a day    DIET: [x] Regular [] CCD [] Renal [] Puree [] Dysphagia [] Tube Feeds:     IMAGING:   < from: CT Brain Perfusion Maps Stroke (02.15.23 @ 12:00) >  IMPRESSION:    CTA brain: No hemodynamically significant stenosis    CTA carotid/vertebral artery circulation: No hemodynamically significant   stenosis    CT Perfusion: Normal    --- End of Report ---      TOSHA RINCON MD; Attending Radiologist  This document has been electronically signed. Feb 15 2023 12:08PM    < end of copied text >

## 2023-02-21 NOTE — DISCHARGE NOTE PROVIDER - NSRESEARCHGRANT_PROPHYLAXISRECOMFT_GEN_A_CORE
Principal Discharge DX:	Fall  Secondary Diagnosis:	Knee pain, bilateral  Secondary Diagnosis:	Neck pain
This is a surgical and/or non-medical patient.

## 2023-02-21 NOTE — DISCHARGE NOTE PROVIDER - PROVIDER TOKENS
PROVIDER:[TOKEN:[[id number]]],PROVIDER:[TOKEN:[[id number]]],PROVIDER:[TOKEN:[[id number]]],PROVIDER:[TOKEN:[[id number]]]

## 2023-02-21 NOTE — DISCHARGE NOTE PROVIDER - NSDCFUSCHEDAPPT_GEN_ALL_CORE_FT
[facility]  MRI  [place]  Scheduled Appointment: 03/07/2023    Debby Torres  [place] [facility]  NEUROLOGY [address] [facility]  Scheduled Appointment: 03/07/2023

## 2023-02-21 NOTE — PROGRESS NOTE ADULT - ASSESSMENT
ASSESSMENT AND PLAN: 62M hx GBM s/p rxn 10/2022. Direct floor xfer from HNT for sz. Adm to HNT 2/15 for difficulty speaking/looking dazed. EEG at [facility] showed sz activity in L hemisphere; 2/16 EEG report: slowing in L temp/parietal/occ regions but no ongoing sz activity. CTH: 2/15 stable compared to prior.    NEURO:   - Continue neuro checks q 4  - Was on EEG - negative for seizures >48hrs  - Continue Keppra 1000mg BID and Vimpat 200mg BID for seizures  - Continue Decadron 4mg in am and 2mg in the evening for cerebral edema  - Continue Ropinarole   - Patient will follow up with Dr. Torres / Dr. Vallejo as outpatient  - Per Dr. Vallejo - no need for MRI as he has one 3 weeks prior and recently has stable CT  - Patient seen by Opthalmology for his right homonymous hemianopsia, patient to follow up with Neuro-Opthalmology as outpatient, continue artificial tears  - Continue Tylenol as needed for pain control  - Melatonin for sleep aid  - PT - home PT    PULM:   - On room air, O2Sat>95%  - Incentive spirometry    CV:  - SBP , no issues    ENDO:   - Goal euglycemia, no issues    HEME/ONC:             2/18 CBC - stable         DVT ppx: SQL, SCDs, 12/23 LE Dopp - negative    RENAL:   - IVL  - 2/18 BMP stable    ID:   - Afebrile  - 2/15 COVID neg    GI:    - Continue oral diet  - Continue senna, miralax, dulcolax for bowel regimen, last BM 2/20  - Continue Tums as needed for dyspepsia    DISCHARGE PLANNING:   PT - home PT    Plan to be discussed w/ Dr. Turpin  [id number]

## 2023-02-21 NOTE — DISCHARGE NOTE PROVIDER - NSDCQMPCI_CARD_ALL_CORE
[facility]  [address] [place] [address]  [phone]        4/27/2017      Darell Mullins      This is to certify that the above named patient had an appointment at this office for professional attention on 4/27/17.      Please excuse him/her:  (X)    FROM:   (X)    DUE TO:    X    Work      Injury       School  X    Illness       Gym      Other       Other             Thank you,       SIGNATURE:____________________________________________________                                                                  Maximiliano Austin DO        
No

## 2023-02-21 NOTE — PROGRESS NOTE ADULT - SUBJECTIVE AND OBJECTIVE BOX
[facility] DEPARTMENT OF OPHTHALMOLOGY  ------------------------------------------------------------------------------  Mildred Plata MD PGY-3  ------------------------------------------------------------------------------    Interval History: No acute events overnight. Today patient denying blurred vision, eye pain, flashes, floaters, FBS, erythema, or discharge.     MEDICATIONS  (STANDING):  artificial  tears Solution 1 Drop(s) Both EYES every 6 hours  bisacodyl 5 milliGRAM(s) Oral at bedtime  dexAMETHasone  Injectable 4 milliGRAM(s) IV Push <User Schedule>  dexAMETHasone  Injectable 2 milliGRAM(s) IV Push <User Schedule>  enoxaparin Injectable 40 milliGRAM(s) SubCutaneous <User Schedule>  lacosamide 200 milliGRAM(s) Oral two times a day  levETIRAcetam 1000 milliGRAM(s) Oral two times a day  melatonin 5 milliGRAM(s) Oral at bedtime  nystatin Powder 1 Application(s) Topical two times a day  polyethylene glycol 3350 17 Gram(s) Oral daily  rOPINIRole 0.75 milliGRAM(s) Oral daily  senna 2 Tablet(s) Oral at bedtime    MEDICATIONS  (PRN):  acetaminophen     Tablet .. 650 milliGRAM(s) Oral every 6 hours PRN Temp greater or equal to 38C (100.4F), Mild Pain (1 - 3)  calcium carbonate    500 mG (Tums) Chewable 1 Tablet(s) Chew three times a day PRN Dyspepsia  ondansetron Injectable 4 milliGRAM(s) IV Push every 6 hours PRN Nausea and/or Vomiting      VITALS: T(C): 36.3 (02-21-23 @ 09:16)  T(F): 97.4 (02-21-23 @ 09:16), Max: 98.1 (02-21-23 @ 00:49)  HR: 107 (02-21-23 @ 09:16) (74 - 108)  BP: 131/91 (02-21-23 @ 09:16) (100/66 - 131/91)  RR:  (18 - 18)  SpO2:  (95% - 97%)  Wt(kg): --  General: AAOx 3    Ophthalmology Exam:  Visual acuity (sc): 20/20 OD, 20/25 OS  Pupils: PERRL OU, no APD  Extraocular movements (EOMs): Full OU, no pain, no diplopia.  Confrontational Visual Field (CVF): right homonymous heminanopsia    Pen Light Exam (PLE)  External: Normal OU.  Lids/Lashes/Lacrimal Ducts: Flat OU.  Sclera/Conjunctiva: White and quiet OU.  Cornea: Clear OU  Anterior Chamber: Deep and formed OU.    Iris: Flat OU.  Lens: NS OU.         [facility] DEPARTMENT OF OPHTHALMOLOGY  ------------------------------------------------------------------------------  Mildred Plata MD PGY-3  ------------------------------------------------------------------------------    Interval History: No acute events overnight.    MEDICATIONS  (STANDING):  artificial  tears Solution 1 Drop(s) Both EYES every 6 hours  bisacodyl 5 milliGRAM(s) Oral at bedtime  dexAMETHasone  Injectable 4 milliGRAM(s) IV Push <User Schedule>  dexAMETHasone  Injectable 2 milliGRAM(s) IV Push <User Schedule>  enoxaparin Injectable 40 milliGRAM(s) SubCutaneous <User Schedule>  lacosamide 200 milliGRAM(s) Oral two times a day  levETIRAcetam 1000 milliGRAM(s) Oral two times a day  melatonin 5 milliGRAM(s) Oral at bedtime  nystatin Powder 1 Application(s) Topical two times a day  polyethylene glycol 3350 17 Gram(s) Oral daily  rOPINIRole 0.75 milliGRAM(s) Oral daily  senna 2 Tablet(s) Oral at bedtime    MEDICATIONS  (PRN):  acetaminophen     Tablet .. 650 milliGRAM(s) Oral every 6 hours PRN Temp greater or equal to 38C (100.4F), Mild Pain (1 - 3)  calcium carbonate    500 mG (Tums) Chewable 1 Tablet(s) Chew three times a day PRN Dyspepsia  ondansetron Injectable 4 milliGRAM(s) IV Push every 6 hours PRN Nausea and/or Vomiting      VITALS: T(C): 36.3 (02-21-23 @ 09:16)  T(F): 97.4 (02-21-23 @ 09:16), Max: 98.1 (02-21-23 @ 00:49)  HR: 107 (02-21-23 @ 09:16) (74 - 108)  BP: 131/91 (02-21-23 @ 09:16) (100/66 - 131/91)  RR:  (18 - 18)  SpO2:  (95% - 97%)  Wt(kg): --  General: AAOx 3    Ophthalmology Exam:  Visual acuity (sc): 20/20 OD, 20/25 OS  Pupils: PERRL OU, no APD  Extraocular movements (EOMs): Full OU, no pain, no diplopia.  Confrontational Visual Field (CVF): right homonymous heminanopsia    Pen Light Exam (PLE)  External: Normal OU.  Lids/Lashes/Lacrimal Ducts: Flat OU.  Sclera/Conjunctiva: White and quiet OU.  Cornea: Clear OU  Anterior Chamber: Deep and formed OU.    Iris: Flat OU.  Lens: NS OU.

## 2023-02-21 NOTE — DISCHARGE NOTE PROVIDER - CARE PROVIDER_API CALL
Todd Turpin)  Neurosurgery  [address] [place] [address]  Phone: [phone]  Fax: [phone]  Follow Up Time:     Debby Torres)  Neurology  [address] [place] [address]  Phone: [phone]  Fax: [phone]  Follow Up Time:     Kd Vallejo)  Neurology  [address] [place] [address]  Phone: [phone]  Fax: [phone]  Follow Up Time:     Elias Narvaez)  Ophthalmology  [address] [place] [address], [facility] [address]  [place] [address]  Phone: [phone]  Fax: [phone]  Follow Up Time:

## 2023-02-21 NOTE — DISCHARGE NOTE PROVIDER - NSDCFUADDINST_GEN_ALL_CORE_FT
Please make all necessary appointments and follow up. Please DO NOT take any NSAIDs (Advil, Aleve, Motrin, Ibuprofen) until cleared by your Neurosurgeon. Please DO NOT do any heavy lifting, bending, twisting and straining. Please come to the emergency room for any of the following: altered mental status, seizures, pain uncontrolled by pain medications, fevers,  chest pain and shortness of breath.

## 2023-02-21 NOTE — DISCHARGE NOTE NURSING/CASE MANAGEMENT/SOCIAL WORK - NSDCPEFALRISK_GEN_ALL_CORE
For information on Fall & Injury Prevention, visit: [email or web address] OR  [email or web address] OR  https://www.cdc.gov/steadi/patient.html

## 2023-02-21 NOTE — PROGRESS NOTE ADULT - ASSESSMENT
Assessment and Recommendations:  62y male with a past medical history GBM s/p rxn 10/2022, no known ocular history consulted to evaluate vision changes.     #Monocular Diplopia OS  -Va 20/20 OU, perrl no apd, IOP wnl, color plates wnl. EOMs full  -Pt endorses that he had a similar double vision after his surgery in 10/22. States it persisted and objects began to appear distorted  -Other than surface dryness, no intraocular etiology for double vision noted on exam.   -Recommend MRI brain and orbits w/ w/o contrast to evaluate for any intraorbital or intracranial cause for diplopia.     #Right Homonymous Hemianopsia   -Unclear if new or his baseline since GBM resection  -Per patient the "darkness" on the right side of his vision seems to be worsening. Given progression of disease seen on prior MRI, his sx may represent extension/worsening of homonymous hemianopsia.   -Recommend MRI brain and orbits as above  -Will need Murphy Visual Tovar as outpatient.     Pt seen and discussed with Dr. Cason, neuro-ophthalmology attending.     Outpatient Follow-up: Patient should follow-up with his/her ophthalmologist or with [facility] Department of Ophthalmology within 1 week of after discharge at:    [address] [facility]  [place] [address]  [phone]   Assessment and Recommendations:  62y male with a past medical history GBM s/p rxn 10/2022, no known ocular history consulted to evaluate vision changes.     #Monocular Diplopia OS  -Va 20/20 OU, perrl no apd, IOP wnl, color plates wnl. EOMs full  -Pt endorses that he had a similar double vision after his surgery in 10/22. States it persisted and objects began to appear distorted  -Other than surface dryness, no intraocular etiology for double vision noted on exam.   -Recommend MRI brain and orbits w/ w/o contrast to evaluate for any intraorbital or intracranial cause for diplopia.     #Right Homonymous Hemianopsia   -Unclear if new or his baseline since GBM resection  -Per patient the "darkness" on the right side of his vision seems to be worsening. Given progression of disease seen on prior MRI, his sx may represent extension/worsening of homonymous hemianopsia.   -Recommend MRI brain and orbits as above  -Will need Murphy Visual Tovar as outpatient.     Pt seen and discussed with Dr. Cason, neuro-ophthalmology attending.     Outpatient Follow-up: Patient should follow-up with his/her ophthalmologist or with [facility] Department of Ophthalmology within 1 week of after discharge at:    [address] [facility]  [place] [address]  [phone]   Assessment and Recommendations:  62y male with a past medical history GBM s/p rxn 10/2022, no known ocular history consulted to evaluate vision changes.     #Monocular Diplopia OS  -Va 20/20 OU, perrl no apd, IOP wnl, color plates wnl. EOMs full  -Pt endorses that he had a similar double vision after his surgery in 10/22. States it persisted and objects began to appear distorted  -Other than surface dryness, no intraocular etiology for double vision noted on exam.   -Recommend MRI brain and orbits w/ w/o contrast to evaluate for any intraorbital or intracranial cause for diplopia.     #Right Homonymous Hemianopsia   -Unclear if new or his baseline since GBM resection  -Per patient the "darkness" on the right side of his vision seems to be worsening. Given progression of disease seen on prior MRI, his sx may represent extension/worsening of homonymous hemianopsia.   -Recommend MRI brain and orbits as above  -Will need Murphy Visual Tovar as outpatient.     Pt seen and discussed with Dr. Cason, neuro-ophthalmology attending.     Outpatient Follow-up: Patient should follow-up with his/her ophthalmologist or with [facility] Department of Ophthalmology within 1 week of after discharge at:    [address] [facility]  [place] [address]  [phone]

## 2023-02-22 PROBLEM — C71.9 MALIGNANT NEOPLASM OF BRAIN, UNSPECIFIED: Chronic | Status: ACTIVE | Noted: 2023-02-15

## 2023-02-22 PROBLEM — R56.9 UNSPECIFIED CONVULSIONS: Chronic | Status: ACTIVE | Noted: 2023-02-15

## 2023-02-24 ENCOUNTER — NON-APPOINTMENT (OUTPATIENT)
Age: 63
End: 2023-02-24

## 2023-03-06 ENCOUNTER — RX RENEWAL (OUTPATIENT)
Age: 63
End: 2023-03-06

## 2023-03-07 ENCOUNTER — APPOINTMENT (OUTPATIENT)
Dept: NEUROLOGY | Facility: CLINIC | Age: 63
End: 2023-03-07

## 2023-03-07 ENCOUNTER — EMERGENCY (EMERGENCY)
Facility: HOSPITAL | Age: 63
LOS: 0 days | Discharge: HOME | End: 2023-03-07
Attending: EMERGENCY MEDICINE
Payer: MEDICAID

## 2023-03-07 ENCOUNTER — APPOINTMENT (OUTPATIENT)
Dept: MRI IMAGING | Facility: IMAGING CENTER | Age: 63
End: 2023-03-07

## 2023-03-07 ENCOUNTER — INPATIENT (INPATIENT)
Facility: HOSPITAL | Age: 63
LOS: 1 days | Discharge: HOME HEALTH | DRG: 176 | End: 2023-03-09
Attending: INTERNAL MEDICINE | Admitting: FAMILY MEDICINE
Payer: MEDICARE

## 2023-03-07 VITALS
TEMPERATURE: 97 F | RESPIRATION RATE: 18 BRPM | DIASTOLIC BLOOD PRESSURE: 78 MMHG | OXYGEN SATURATION: 98 % | SYSTOLIC BLOOD PRESSURE: 104 MMHG | HEART RATE: 87 BPM

## 2023-03-07 VITALS
HEART RATE: 106 BPM | SYSTOLIC BLOOD PRESSURE: 120 MMHG | DIASTOLIC BLOOD PRESSURE: 68 MMHG | HEIGHT: 70 IN | RESPIRATION RATE: 20 BRPM | OXYGEN SATURATION: 92 % | TEMPERATURE: 99 F

## 2023-03-07 VITALS
WEIGHT: 240.08 LBS | SYSTOLIC BLOOD PRESSURE: 118 MMHG | OXYGEN SATURATION: 92 % | HEIGHT: 70 IN | DIASTOLIC BLOOD PRESSURE: 89 MMHG | TEMPERATURE: 98 F | HEART RATE: 134 BPM | RESPIRATION RATE: 20 BRPM

## 2023-03-07 DIAGNOSIS — R09.89 OTHER SPECIFIED SYMPTOMS AND SIGNS INVOLVING THE CIRCULATORY AND RESPIRATORY SYSTEMS: ICD-10-CM
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
## 2023-03-07 DIAGNOSIS — Z98.890 OTHER SPECIFIED POSTPROCEDURAL STATES: Chronic | ICD-10-CM

## 2023-03-07 DIAGNOSIS — Z78.9 OTHER SPECIFIED HEALTH STATUS: ICD-10-CM

## 2023-03-07 DIAGNOSIS — Z00.00 ENCOUNTER FOR GENERAL ADULT MEDICAL EXAMINATION WITHOUT ABNORMAL FINDINGS: ICD-10-CM

## 2023-03-07 DIAGNOSIS — N39.0 URINARY TRACT INFECTION, SITE NOT SPECIFIED: ICD-10-CM

## 2023-03-07 DIAGNOSIS — I26.99 OTHER PULMONARY EMBOLISM WITHOUT ACUTE COR PULMONALE: ICD-10-CM

## 2023-03-07 DIAGNOSIS — Z86.69 PERSONAL HISTORY OF OTHER DISEASES OF THE NERVOUS SYSTEM AND SENSE ORGANS: ICD-10-CM

## 2023-03-07 DIAGNOSIS — Z85.841 PERSONAL HISTORY OF MALIGNANT NEOPLASM OF BRAIN: ICD-10-CM

## 2023-03-07 LAB
ALBUMIN SERPL ELPH-MCNC: 2.6 G/DL — LOW (ref 3.3–5)
ALBUMIN SERPL ELPH-MCNC: 3 G/DL — LOW (ref 3.3–5)
ALP SERPL-CCNC: 55 U/L — SIGNIFICANT CHANGE UP (ref 40–120)
ALP SERPL-CCNC: 62 U/L — SIGNIFICANT CHANGE UP (ref 40–120)
ALT FLD-CCNC: 48 U/L — SIGNIFICANT CHANGE UP (ref 12–78)
ALT FLD-CCNC: 52 U/L — SIGNIFICANT CHANGE UP (ref 12–78)
ANION GAP SERPL CALC-SCNC: 4 MMOL/L — LOW (ref 5–17)
ANION GAP SERPL CALC-SCNC: 4 MMOL/L — LOW (ref 5–17)
APPEARANCE UR: ABNORMAL
APTT BLD: 102.8 SEC — HIGH (ref 27.5–35.5)
APTT BLD: 26.3 SEC — LOW (ref 27.5–35.5)
AST SERPL-CCNC: 11 U/L — LOW (ref 15–37)
AST SERPL-CCNC: 12 U/L — LOW (ref 15–37)
BACTERIA # UR AUTO: ABNORMAL
BASOPHILS # BLD AUTO: 0.02 K/UL — SIGNIFICANT CHANGE UP (ref 0–0.2)
BASOPHILS # BLD AUTO: 0.03 K/UL — SIGNIFICANT CHANGE UP (ref 0–0.2)
BASOPHILS NFR BLD AUTO: 0.2 % — SIGNIFICANT CHANGE UP (ref 0–2)
BASOPHILS NFR BLD AUTO: 0.3 % — SIGNIFICANT CHANGE UP (ref 0–2)
BILIRUB SERPL-MCNC: 0.6 MG/DL — SIGNIFICANT CHANGE UP (ref 0.2–1.2)
BILIRUB SERPL-MCNC: 0.7 MG/DL — SIGNIFICANT CHANGE UP (ref 0.2–1.2)
BILIRUB UR-MCNC: NEGATIVE — SIGNIFICANT CHANGE UP
BUN SERPL-MCNC: 13 MG/DL — SIGNIFICANT CHANGE UP (ref 7–23)
BUN SERPL-MCNC: 14 MG/DL — SIGNIFICANT CHANGE UP (ref 7–23)
CALCIUM SERPL-MCNC: 9.1 MG/DL — SIGNIFICANT CHANGE UP (ref 8.5–10.1)
CALCIUM SERPL-MCNC: 9.2 MG/DL — SIGNIFICANT CHANGE UP (ref 8.5–10.1)
CHLORIDE SERPL-SCNC: 107 MMOL/L — SIGNIFICANT CHANGE UP (ref 96–108)
CHLORIDE SERPL-SCNC: 108 MMOL/L — SIGNIFICANT CHANGE UP (ref 96–108)
CO2 SERPL-SCNC: 27 MMOL/L — SIGNIFICANT CHANGE UP (ref 22–31)
CO2 SERPL-SCNC: 29 MMOL/L — SIGNIFICANT CHANGE UP (ref 22–31)
COLOR SPEC: YELLOW — SIGNIFICANT CHANGE UP
CREAT SERPL-MCNC: 0.85 MG/DL — SIGNIFICANT CHANGE UP (ref 0.5–1.3)
CREAT SERPL-MCNC: 0.89 MG/DL — SIGNIFICANT CHANGE UP (ref 0.5–1.3)
DIFF PNL FLD: ABNORMAL
EGFR: 95 ML/MIN/1.73M2 — SIGNIFICANT CHANGE UP
EGFR: 96 ML/MIN/1.73M2 — SIGNIFICANT CHANGE UP
EOSINOPHIL # BLD AUTO: 0 K/UL — SIGNIFICANT CHANGE UP (ref 0–0.5)
EOSINOPHIL # BLD AUTO: 0 K/UL — SIGNIFICANT CHANGE UP (ref 0–0.5)
EOSINOPHIL NFR BLD AUTO: 0 % — SIGNIFICANT CHANGE UP (ref 0–6)
EOSINOPHIL NFR BLD AUTO: 0 % — SIGNIFICANT CHANGE UP (ref 0–6)
EPI CELLS # UR: SIGNIFICANT CHANGE UP
FLUAV AG NPH QL: SIGNIFICANT CHANGE UP
FLUBV AG NPH QL: SIGNIFICANT CHANGE UP
GLUCOSE SERPL-MCNC: 145 MG/DL — HIGH (ref 70–99)
GLUCOSE SERPL-MCNC: 97 MG/DL — SIGNIFICANT CHANGE UP (ref 70–99)
GLUCOSE UR QL: NEGATIVE — SIGNIFICANT CHANGE UP
HCT VFR BLD CALC: 37.5 % — LOW (ref 39–50)
HCT VFR BLD CALC: 41.3 % — SIGNIFICANT CHANGE UP (ref 39–50)
HCT VFR BLD CALC: 43.6 % — SIGNIFICANT CHANGE UP (ref 39–50)
HGB BLD-MCNC: 12.4 G/DL — LOW (ref 13–17)
HGB BLD-MCNC: 13.4 G/DL — SIGNIFICANT CHANGE UP (ref 13–17)
HGB BLD-MCNC: 13.9 G/DL — SIGNIFICANT CHANGE UP (ref 13–17)
IMM GRANULOCYTES NFR BLD AUTO: 1.4 % — HIGH (ref 0–0.9)
IMM GRANULOCYTES NFR BLD AUTO: 1.9 % — HIGH (ref 0–0.9)
INR BLD: 1.12 RATIO — SIGNIFICANT CHANGE UP (ref 0.88–1.16)
KETONES UR-MCNC: NEGATIVE — SIGNIFICANT CHANGE UP
LEUKOCYTE ESTERASE UR-ACNC: ABNORMAL
LYMPHOCYTES # BLD AUTO: 0.53 K/UL — LOW (ref 1–3.3)
LYMPHOCYTES # BLD AUTO: 0.6 K/UL — LOW (ref 1–3.3)
LYMPHOCYTES # BLD AUTO: 5.4 % — LOW (ref 13–44)
LYMPHOCYTES # BLD AUTO: 6.6 % — LOW (ref 13–44)
MAGNESIUM SERPL-MCNC: 2.1 MG/DL — SIGNIFICANT CHANGE UP (ref 1.6–2.6)
MCHC RBC-ENTMCNC: 29.3 PG — SIGNIFICANT CHANGE UP (ref 27–34)
MCHC RBC-ENTMCNC: 30 PG — SIGNIFICANT CHANGE UP (ref 27–34)
MCHC RBC-ENTMCNC: 30.2 PG — SIGNIFICANT CHANGE UP (ref 27–34)
MCHC RBC-ENTMCNC: 31.9 GM/DL — LOW (ref 32–36)
MCHC RBC-ENTMCNC: 32.4 GM/DL — SIGNIFICANT CHANGE UP (ref 32–36)
MCHC RBC-ENTMCNC: 33.1 GM/DL — SIGNIFICANT CHANGE UP (ref 32–36)
MCV RBC AUTO: 91.2 FL — SIGNIFICANT CHANGE UP (ref 80–100)
MCV RBC AUTO: 92 FL — SIGNIFICANT CHANGE UP (ref 80–100)
MCV RBC AUTO: 92.6 FL — SIGNIFICANT CHANGE UP (ref 80–100)
MONOCYTES # BLD AUTO: 0.33 K/UL — SIGNIFICANT CHANGE UP (ref 0–0.9)
MONOCYTES # BLD AUTO: 0.45 K/UL — SIGNIFICANT CHANGE UP (ref 0–0.9)
MONOCYTES NFR BLD AUTO: 3.6 % — SIGNIFICANT CHANGE UP (ref 2–14)
MONOCYTES NFR BLD AUTO: 4.6 % — SIGNIFICANT CHANGE UP (ref 2–14)
NEUTROPHILS # BLD AUTO: 7.99 K/UL — HIGH (ref 1.8–7.4)
NEUTROPHILS # BLD AUTO: 8.59 K/UL — HIGH (ref 1.8–7.4)
NEUTROPHILS NFR BLD AUTO: 87.7 % — HIGH (ref 43–77)
NEUTROPHILS NFR BLD AUTO: 88.3 % — HIGH (ref 43–77)
NITRITE UR-MCNC: NEGATIVE — SIGNIFICANT CHANGE UP
NT-PROBNP SERPL-SCNC: 206 PG/ML — HIGH (ref 0–125)
PH UR: 7 — SIGNIFICANT CHANGE UP (ref 5–8)
PLATELET # BLD AUTO: 101 K/UL — LOW (ref 150–400)
PLATELET # BLD AUTO: 101 K/UL — LOW (ref 150–400)
PLATELET # BLD AUTO: 109 K/UL — LOW (ref 150–400)
POTASSIUM SERPL-MCNC: 3.8 MMOL/L — SIGNIFICANT CHANGE UP (ref 3.5–5.3)
POTASSIUM SERPL-MCNC: 4 MMOL/L — SIGNIFICANT CHANGE UP (ref 3.5–5.3)
POTASSIUM SERPL-SCNC: 3.8 MMOL/L — SIGNIFICANT CHANGE UP (ref 3.5–5.3)
POTASSIUM SERPL-SCNC: 4 MMOL/L — SIGNIFICANT CHANGE UP (ref 3.5–5.3)
PROT SERPL-MCNC: 6.2 GM/DL — SIGNIFICANT CHANGE UP (ref 6–8.3)
PROT SERPL-MCNC: 6.8 GM/DL — SIGNIFICANT CHANGE UP (ref 6–8.3)
PROT UR-MCNC: NEGATIVE — SIGNIFICANT CHANGE UP
PROTHROM AB SERPL-ACNC: 13 SEC — SIGNIFICANT CHANGE UP (ref 10.5–13.4)
RBC # BLD: 4.11 M/UL — LOW (ref 4.2–5.8)
RBC # BLD: 4.46 M/UL — SIGNIFICANT CHANGE UP (ref 4.2–5.8)
RBC # BLD: 4.74 M/UL — SIGNIFICANT CHANGE UP (ref 4.2–5.8)
RBC # FLD: 18.4 % — HIGH (ref 10.3–14.5)
RBC # FLD: 18.5 % — HIGH (ref 10.3–14.5)
RBC # FLD: 18.8 % — HIGH (ref 10.3–14.5)
RBC CASTS # UR COMP ASSIST: ABNORMAL /HPF (ref 0–4)
RSV RNA NPH QL NAA+NON-PROBE: SIGNIFICANT CHANGE UP
SARS-COV-2 RNA SPEC QL NAA+PROBE: SIGNIFICANT CHANGE UP
SODIUM SERPL-SCNC: 139 MMOL/L — SIGNIFICANT CHANGE UP (ref 135–145)
SODIUM SERPL-SCNC: 140 MMOL/L — SIGNIFICANT CHANGE UP (ref 135–145)
SP GR SPEC: 1.01 — SIGNIFICANT CHANGE UP (ref 1.01–1.02)
TROPONIN I, HIGH SENSITIVITY RESULT: 8.27 NG/L — SIGNIFICANT CHANGE UP
TROPONIN I, HIGH SENSITIVITY RESULT: 9.97 NG/L — SIGNIFICANT CHANGE UP
UROBILINOGEN FLD QL: 1
WBC # BLD: 8.48 K/UL — SIGNIFICANT CHANGE UP (ref 3.8–10.5)
WBC # BLD: 9.11 K/UL — SIGNIFICANT CHANGE UP (ref 3.8–10.5)
WBC # BLD: 9.74 K/UL — SIGNIFICANT CHANGE UP (ref 3.8–10.5)
WBC # FLD AUTO: 8.48 K/UL — SIGNIFICANT CHANGE UP (ref 3.8–10.5)
WBC # FLD AUTO: 9.11 K/UL — SIGNIFICANT CHANGE UP (ref 3.8–10.5)
WBC # FLD AUTO: 9.74 K/UL — SIGNIFICANT CHANGE UP (ref 3.8–10.5)
WBC UR QL: ABNORMAL /HPF (ref 0–5)

## 2023-03-07 PROCEDURE — 71045 X-RAY EXAM CHEST 1 VIEW: CPT | Mod: 26,76

## 2023-03-07 PROCEDURE — 71275 CT ANGIOGRAPHY CHEST: CPT | Mod: 26,MA

## 2023-03-07 PROCEDURE — 97116 GAIT TRAINING THERAPY: CPT | Mod: GP

## 2023-03-07 PROCEDURE — 85610 PROTHROMBIN TIME: CPT

## 2023-03-07 PROCEDURE — 99285 EMERGENCY DEPT VISIT HI MDM: CPT

## 2023-03-07 PROCEDURE — 93010 ELECTROCARDIOGRAM REPORT: CPT | Mod: 76

## 2023-03-07 PROCEDURE — 85730 THROMBOPLASTIN TIME PARTIAL: CPT

## 2023-03-07 PROCEDURE — 70450 CT HEAD/BRAIN W/O DYE: CPT | Mod: 26,MA

## 2023-03-07 PROCEDURE — 93005 ELECTROCARDIOGRAM TRACING: CPT

## 2023-03-07 PROCEDURE — 80053 COMPREHEN METABOLIC PANEL: CPT

## 2023-03-07 PROCEDURE — 93970 EXTREMITY STUDY: CPT

## 2023-03-07 PROCEDURE — 93970 EXTREMITY STUDY: CPT | Mod: 26

## 2023-03-07 PROCEDURE — 72125 CT NECK SPINE W/O DYE: CPT | Mod: MA

## 2023-03-07 PROCEDURE — 93306 TTE W/DOPPLER COMPLETE: CPT

## 2023-03-07 PROCEDURE — 83735 ASSAY OF MAGNESIUM: CPT

## 2023-03-07 PROCEDURE — 96360 HYDRATION IV INFUSION INIT: CPT

## 2023-03-07 PROCEDURE — 97162 PT EVAL MOD COMPLEX 30 MIN: CPT | Mod: GP

## 2023-03-07 PROCEDURE — 99223 1ST HOSP IP/OBS HIGH 75: CPT

## 2023-03-07 PROCEDURE — 36415 COLL VENOUS BLD VENIPUNCTURE: CPT

## 2023-03-07 PROCEDURE — 80048 BASIC METABOLIC PNL TOTAL CA: CPT

## 2023-03-07 PROCEDURE — 84100 ASSAY OF PHOSPHORUS: CPT

## 2023-03-07 PROCEDURE — 70450 CT HEAD/BRAIN W/O DYE: CPT | Mod: MA

## 2023-03-07 PROCEDURE — 81001 URINALYSIS AUTO W/SCOPE: CPT

## 2023-03-07 PROCEDURE — 72125 CT NECK SPINE W/O DYE: CPT | Mod: 26,MA

## 2023-03-07 PROCEDURE — 97530 THERAPEUTIC ACTIVITIES: CPT | Mod: GP

## 2023-03-07 PROCEDURE — 70450 CT HEAD/BRAIN W/O DYE: CPT | Mod: 26,MA,77

## 2023-03-07 PROCEDURE — 85025 COMPLETE CBC W/AUTO DIFF WBC: CPT

## 2023-03-07 PROCEDURE — 99285 EMERGENCY DEPT VISIT HI MDM: CPT | Mod: 25

## 2023-03-07 PROCEDURE — 85027 COMPLETE CBC AUTOMATED: CPT

## 2023-03-07 PROCEDURE — 71045 X-RAY EXAM CHEST 1 VIEW: CPT | Mod: 26

## 2023-03-07 PROCEDURE — 80177 DRUG SCRN QUAN LEVETIRACETAM: CPT

## 2023-03-07 PROCEDURE — 71045 X-RAY EXAM CHEST 1 VIEW: CPT

## 2023-03-07 PROCEDURE — 84484 ASSAY OF TROPONIN QUANT: CPT

## 2023-03-07 PROCEDURE — 93010 ELECTROCARDIOGRAM REPORT: CPT

## 2023-03-07 RX ORDER — ONDANSETRON 8 MG/1
4 TABLET, FILM COATED ORAL EVERY 8 HOURS
Refills: 0 | Status: DISCONTINUED | OUTPATIENT
Start: 2023-03-07 | End: 2023-03-09

## 2023-03-07 RX ORDER — HEPARIN SODIUM 5000 [USP'U]/ML
INJECTION INTRAVENOUS; SUBCUTANEOUS
Qty: 25000 | Refills: 0 | Status: DISCONTINUED | OUTPATIENT
Start: 2023-03-07 | End: 2023-03-08

## 2023-03-07 RX ORDER — HEPARIN SODIUM 5000 [USP'U]/ML
3500 INJECTION INTRAVENOUS; SUBCUTANEOUS EVERY 6 HOURS
Refills: 0 | Status: DISCONTINUED | OUTPATIENT
Start: 2023-03-07 | End: 2023-03-07

## 2023-03-07 RX ORDER — CEFTRIAXONE 500 MG/1
1000 INJECTION, POWDER, FOR SOLUTION INTRAMUSCULAR; INTRAVENOUS ONCE
Refills: 0 | Status: DISCONTINUED | OUTPATIENT
Start: 2023-03-07 | End: 2023-03-07

## 2023-03-07 RX ORDER — MIRTAZAPINE 45 MG/1
7.5 TABLET, ORALLY DISINTEGRATING ORAL AT BEDTIME
Refills: 0 | Status: DISCONTINUED | OUTPATIENT
Start: 2023-03-07 | End: 2023-03-09

## 2023-03-07 RX ORDER — ROPINIROLE 8 MG/1
0.75 TABLET, FILM COATED, EXTENDED RELEASE ORAL AT BEDTIME
Refills: 0 | Status: DISCONTINUED | OUTPATIENT
Start: 2023-03-07 | End: 2023-03-09

## 2023-03-07 RX ORDER — HEPARIN SODIUM 5000 [USP'U]/ML
7500 INJECTION INTRAVENOUS; SUBCUTANEOUS EVERY 6 HOURS
Refills: 0 | Status: DISCONTINUED | OUTPATIENT
Start: 2023-03-07 | End: 2023-03-07

## 2023-03-07 RX ORDER — LEVETIRACETAM 250 MG/1
1000 TABLET, FILM COATED ORAL
Refills: 0 | Status: DISCONTINUED | OUTPATIENT
Start: 2023-03-07 | End: 2023-03-09

## 2023-03-07 RX ORDER — SODIUM CHLORIDE 9 MG/ML
500 INJECTION INTRAMUSCULAR; INTRAVENOUS; SUBCUTANEOUS ONCE
Refills: 0 | Status: COMPLETED | OUTPATIENT
Start: 2023-03-07 | End: 2023-03-07

## 2023-03-07 RX ORDER — SODIUM CHLORIDE 9 MG/ML
1000 INJECTION INTRAMUSCULAR; INTRAVENOUS; SUBCUTANEOUS ONCE
Refills: 0 | Status: COMPLETED | OUTPATIENT
Start: 2023-03-07 | End: 2023-03-07

## 2023-03-07 RX ORDER — CEFTRIAXONE 500 MG/1
1000 INJECTION, POWDER, FOR SOLUTION INTRAMUSCULAR; INTRAVENOUS EVERY 24 HOURS
Refills: 0 | Status: DISCONTINUED | OUTPATIENT
Start: 2023-03-07 | End: 2023-03-09

## 2023-03-07 RX ORDER — ACETAMINOPHEN 500 MG
650 TABLET ORAL EVERY 6 HOURS
Refills: 0 | Status: DISCONTINUED | OUTPATIENT
Start: 2023-03-07 | End: 2023-03-09

## 2023-03-07 RX ORDER — SODIUM CHLORIDE 9 MG/ML
1000 INJECTION INTRAMUSCULAR; INTRAVENOUS; SUBCUTANEOUS
Refills: 0 | Status: DISCONTINUED | OUTPATIENT
Start: 2023-03-07 | End: 2023-03-09

## 2023-03-07 RX ORDER — DEXAMETHASONE 0.5 MG/5ML
2 ELIXIR ORAL DAILY
Refills: 0 | Status: DISCONTINUED | OUTPATIENT
Start: 2023-03-07 | End: 2023-03-09

## 2023-03-07 RX ORDER — LANOLIN ALCOHOL/MO/W.PET/CERES
3 CREAM (GRAM) TOPICAL AT BEDTIME
Refills: 0 | Status: DISCONTINUED | OUTPATIENT
Start: 2023-03-07 | End: 2023-03-09

## 2023-03-07 RX ORDER — LACOSAMIDE 50 MG/1
200 TABLET ORAL
Refills: 0 | Status: DISCONTINUED | OUTPATIENT
Start: 2023-03-07 | End: 2023-03-09

## 2023-03-07 RX ORDER — PANTOPRAZOLE SODIUM 20 MG/1
40 TABLET, DELAYED RELEASE ORAL
Refills: 0 | Status: DISCONTINUED | OUTPATIENT
Start: 2023-03-07 | End: 2023-03-09

## 2023-03-07 RX ORDER — HEPARIN SODIUM 5000 [USP'U]/ML
7500 INJECTION INTRAVENOUS; SUBCUTANEOUS ONCE
Refills: 0 | Status: DISCONTINUED | OUTPATIENT
Start: 2023-03-07 | End: 2023-03-07

## 2023-03-07 RX ORDER — CEPHALEXIN 500 MG
1 CAPSULE ORAL
Qty: 14 | Refills: 0
Start: 2023-03-07 | End: 2023-03-13

## 2023-03-07 RX ADMIN — SODIUM CHLORIDE 1000 MILLILITER(S): 9 INJECTION INTRAMUSCULAR; INTRAVENOUS; SUBCUTANEOUS at 05:44

## 2023-03-07 RX ADMIN — SODIUM CHLORIDE 500 MILLILITER(S): 9 INJECTION INTRAMUSCULAR; INTRAVENOUS; SUBCUTANEOUS at 05:00

## 2023-03-07 RX ADMIN — CEFTRIAXONE 1000 MILLIGRAM(S): 500 INJECTION, POWDER, FOR SOLUTION INTRAMUSCULAR; INTRAVENOUS at 20:43

## 2023-03-07 RX ADMIN — SODIUM CHLORIDE 75 MILLILITER(S): 9 INJECTION INTRAMUSCULAR; INTRAVENOUS; SUBCUTANEOUS at 20:44

## 2023-03-07 RX ADMIN — ROPINIROLE 0.75 MILLIGRAM(S): 8 TABLET, FILM COATED, EXTENDED RELEASE ORAL at 22:51

## 2023-03-07 RX ADMIN — HEPARIN SODIUM 1700 UNIT(S)/HR: 5000 INJECTION INTRAVENOUS; SUBCUTANEOUS at 17:05

## 2023-03-07 RX ADMIN — HEPARIN SODIUM 1700 UNIT(S)/HR: 5000 INJECTION INTRAVENOUS; SUBCUTANEOUS at 21:37

## 2023-03-07 RX ADMIN — HEPARIN SODIUM 1500 UNIT(S)/HR: 5000 INJECTION INTRAVENOUS; SUBCUTANEOUS at 23:45

## 2023-03-07 RX ADMIN — MIRTAZAPINE 7.5 MILLIGRAM(S): 45 TABLET, ORALLY DISINTEGRATING ORAL at 22:52

## 2023-03-07 RX ADMIN — SODIUM CHLORIDE 500 MILLILITER(S): 9 INJECTION INTRAMUSCULAR; INTRAVENOUS; SUBCUTANEOUS at 04:00

## 2023-03-07 RX ADMIN — LEVETIRACETAM 1000 MILLIGRAM(S): 250 TABLET, FILM COATED ORAL at 22:52

## 2023-03-07 RX ADMIN — LACOSAMIDE 200 MILLIGRAM(S): 50 TABLET ORAL at 22:51

## 2023-03-07 NOTE — ED PROVIDER NOTE - CLINICAL SUMMARY MEDICAL DECISION MAKING FREE TEXT BOX
pt with ho gbm fell oob tonight will get labs, ct head r/o ich, r/o electrolyte abnormality will hydrate and get ua r/o uti

## 2023-03-07 NOTE — ED PROVIDER NOTE - OBJECTIVE STATEMENT
64 y/o male PMHx seizures and Glioblastoma s/p craniotomy on chemo, presents to ED from home s/p mechanical slip and fall out of bed. Pt reports he is not using the walker he is supposed to use and could not get up off the floor. No head strike, no LOC. Pt also complaining of persistent weakness and dyspnea on exertion. Pt seen at [facility] and d/c'd this morning for similar symptoms. 62 y/o male PMHx seizures and Glioblastoma s/p craniotomy on chemo, presents to ED from home s/p mechanical slip and fall out of bed. Pt reports he is not using the walker he is supposed to use and could not get up off the floor. No head strike, no LOC. Pt also complaining of persistent weakness and dyspnea on exertion. Pt seen at [facility] and d/c'd this morning for similar symptoms. was dx with UTI

## 2023-03-07 NOTE — ED PROVIDER NOTE - NSFOLLOWUPINSTRUCTIONS_ED_ALL_ED_FT
1. return for worsening symptoms or anything concerning to you  2. take all home meds as prescribed  3. follow up with your pmd call to make an appointment  4. take keflex for UTI as directed    Urinary Tract Infection, Adult  ImageA urinary tract infection (UTI) is an infection of any part of the urinary tract, which includes the kidneys, ureters, bladder, and urethra. These organs make, store, and get rid of urine in the body. UTI can be a bladder infection (cystitis) or kidney infection (pyelonephritis).    What are the causes?  This infection may be caused by fungi, viruses, or bacteria. Bacteria are the most common cause of UTIs. This condition can also be caused by repeated incomplete emptying of the bladder during urination.    What increases the risk?  This condition is more likely to develop if:    You ignore your need to urinate or hold urine for long periods of time.  You do not empty your bladder completely during urination.  You wipe back to front after urinating or having a bowel movement, if you are female.  You are uncircumcised, if you are male.  You are constipated.  You have a urinary catheter that stays in place (indwelling).  You have a weak defense (immune) system.  You have a medical condition that affects your bowels, kidneys, or bladder.  You have diabetes.  You take antibiotic medicines frequently or for long periods of time, and the antibiotics no longer work well against certain types of infections (antibiotic resistance).  You take medicines that irritate your urinary tract.  You are exposed to chemicals that irritate your urinary tract.  You are female.    What are the signs or symptoms?  Symptoms of this condition include:    Fever.  Frequent urination or passing small amounts of urine frequently.  Needing to urinate urgently.  Pain or burning with urination.  Urine that smells bad or unusual.  Cloudy urine.  Pain in the lower abdomen or back.  Trouble urinating.  Blood in the urine.  Vomiting or being less hungry than normal.  Diarrhea or abdominal pain.  Vaginal discharge, if you are female.    How is this diagnosed?  This condition is diagnosed with a medical history and physical exam. You will also need to provide a urine sample to test your urine. Other tests may be done, including:    Blood tests.  Sexually transmitted disease (STD) testing.    If you have had more than one UTI, a cystoscopy or imaging studies may be done to determine the cause of the infections.    How is this treated?  Treatment for this condition often includes a combination of two or more of the following:    Antibiotic medicine.  Other medicines to treat less common causes of UTI.  Over-the-counter medicines to treat pain.  Drinking enough water to stay hydrated.    Follow these instructions at home:  Take over-the-counter and prescription medicines only as told by your health care provider.  If you were prescribed an antibiotic, take it as told by your health care provider. Do not stop taking the antibiotic even if you start to feel better.  Avoid alcohol, caffeine, tea, and carbonated beverages. They can irritate your bladder.  Drink enough fluid to keep your urine clear or pale yellow.  Keep all follow-up visits as told by your health care provider. This is important.  ImageMake sure to:    Empty your bladder often and completely. Do not hold urine for long periods of time.  Empty your bladder before and after sex.  Wipe from front to back after a bowel movement if you are female. Use each tissue one time when you wipe.    Contact a health care provider if:  You have back pain.  You have a fever.  You feel nauseous or vomit.  Your symptoms do not get better after 3 days.  Your symptoms go away and then return.  Get help right away if:  You have severe back pain or lower abdominal pain.  You are vomiting and cannot keep down any medicines or water.  This information is not intended to replace advice given to you by your health care provider. Make sure you discuss any questions you have with your health care provider.

## 2023-03-07 NOTE — H&P ADULT - NSHPLABSRESULTS_GEN_ALL_CORE
Lab Results:  CBC  CBC Full  -  ( 07 Mar 2023 13:42 )  WBC Count : 9.74 K/uL  RBC Count : 4.46 M/uL  Hemoglobin : 13.4 g/dL  Hematocrit : 41.3 %  Platelet Count - Automated : 109 K/uL  Mean Cell Volume : 92.6 fl  Mean Cell Hemoglobin : 30.0 pg  Mean Cell Hemoglobin Concentration : 32.4 gm/dL  Auto Neutrophil # : 8.59 K/uL  Auto Lymphocyte # : 0.53 K/uL  Auto Monocyte # : 0.45 K/uL  Auto Eosinophil # : 0.00 K/uL  Auto Basophil # : 0.03 K/uL  Auto Neutrophil % : 88.3 %  Auto Lymphocyte % : 5.4 %  Auto Monocyte % : 4.6 %  Auto Eosinophil % : 0.0 %  Auto Basophil % : 0.3 %    .		Differential:	[] Automated		[] Manual  Chemistry                        13.4   9.74  )-----------( 109      ( 07 Mar 2023 13:42 )             41.3     03-07    139  |  108  |  14  ----------------------------<  145<H>  3.8   |  27  |  0.85    Ca    9.1      07 Mar 2023 13:42  Mg     2.1     03-07    TPro  6.2  /  Alb  2.6<L>  /  TBili  0.7  /  DBili  x   /  AST  12<L>  /  ALT  48  /  AlkPhos  55  03-07    LIVER FUNCTIONS - ( 07 Mar 2023 13:42 )  Alb: 2.6 g/dL / Pro: 6.2 gm/dL / ALK PHOS: 55 U/L / ALT: 48 U/L / AST: 12 U/L / GGT: x           PT/INR - ( 07 Mar 2023 15:42 )   PT: 13.0 sec;   INR: 1.12 ratio         PTT - ( 07 Mar 2023 15:42 )  PTT:26.3 sec  Urinalysis Basic - ( 07 Mar 2023 04:17 )    Color: Yellow / Appearance: Slightly Turbid / S.010 / pH: x  Gluc: x / Ketone: Negative  / Bili: Negative / Urobili: 1   Blood: x / Protein: Negative / Nitrite: Negative   Leuk Esterase: Trace / RBC: 11-25 /HPF / WBC 26-50 /HPF   Sq Epi: x / Non Sq Epi: Few / Bacteria: [facility]      RADIOLOGY RESULTS:    < from: Xray Chest 1 View- PORTABLE-Urgent (23 @ 13:31) >  IMPRESSION: Persistent bibasilar atelectases.  < from: CT Angio Chest PE Protocol w/ IV Cont (23 @ 14:11) >  IMPRESSION:.  Right lower lobe distal segmental pulmonary embolism.    Vasogenic edema involving the left posterior frontal/parietal region is   identified. This is compatible with an area of vasogenic edema. These   findings correspond to areas of abnormal peripheral enhancement seen on   prior contrast enhanced brain MRI.    There is localized mass effect seen consisting sulcal effacement. No   significant shift or herniation is seen.    < from: CT Cervical Spine No Cont (23 @ 03:35) >    IMPRESSION:  No acute intracranial pathology identified.  Reidentified is a large area of confluent hypoattenuation within the left   posterior parietal and temporal lobes, compatible with known GBM tumor.   Mass effect upon the left ventricle is unchanged. No midline shift.

## 2023-03-07 NOTE — H&P ADULT - ASSESSMENT
#Syncope 2ndry to UTI and PE  -Admit to medical floors  -IVF  -continue IV rocephin  -FU urine and Blood cx   -FU TTE   -Continue IV heparin    #GBM  -Hemeonc consult       #Syncope 2ndry to UTI and PE  #DEL TORO 2ndry to PE  -Admit to medical floors with remote tele  -FU Doppler of LE  -IVF  -continue IV rocephin  -FU urine and Blood cx   -FU TTE   -Continue heparin Drip  -Home o2 eval on discharge     #Hx of GBM/seizures  -CT head NAD  -continue Dexamethason, Keppra   -confirm VImpat dose   -Hemeonc consult    #VTE ppx  SCDS    #ADVanced Directives - Discussed goals of care and at this time he is undecided

## 2023-03-07 NOTE — ED ADULT NURSE NOTE - OBJECTIVE STATEMENT
Patient c/o dyspnea on exertion and weakness. Patient had a mechanical slip and fall twice today. Was seen in ED earlier for first fall and diagnosed with a UTI and discharged home. Patient has a history of brain ca and is on chemo. was supposed to start another round today. Is a little confused in ED. Patient denies CP. Did not strike head or lose consciousness.

## 2023-03-07 NOTE — H&P ADULT - NSHPREVIEWOFSYSTEMS_GEN_ALL_CORE
REVIEW OF SYSTEMS:    CONSTITUTIONAL: No weakness, fevers or chills  EYES/ENT: No visual changes; vertigo or throat pain   NECK: No pain or stiffness  RESPIRATORY: No cough, wheezing, hemoptysis or shortness of breath  CARDIOVASCULAR: No chest pain or palpitations  GASTROINTESTINAL: No abdominal or epigastric pain. No nausea, vomiting, or hematemesis; No diarrhea or constipation. No melena or hematochezia.  GENITOURINARY: No dysuria, urinary frequency or hematuria  NEUROLOGICAL: No numbness or weakness  EXTREMITIES: No swelling or tenderness  SKIN: No itching, burning, rashes, or lesions   All other review of systems is negative unless indicated above. REVIEW OF SYSTEMS:    CONSTITUTIONAL: + weakness No fevers or chills  EYES/ENT: No visual changes; vertigo or throat pain   NECK: No pain or stiffness  RESPIRATORY: No cough, wheezing, hemoptysis + shortness of breath  CARDIOVASCULAR: No chest pain or palpitations  GASTROINTESTINAL: No abdominal or epigastric pain. No nausea, vomiting, or hematemesis; No diarrhea or constipation. No melena or hematochezia.  GENITOURINARY: No dysuria, urinary frequency or hematuria  NEUROLOGICAL: No numbness or weakness  EXTREMITIES: No swelling or tenderness  SKIN: No itching, burning, rashes, or lesions   All other review of systems is negative unless indicated above.

## 2023-03-07 NOTE — ED PROVIDER NOTE - NS ED ROS FT
Constitutional: No fever or chills. + weakness, + s/p slip and fall.  Eyes: No visual changes  HEENT: No throat pain  CV: No chest pain  Resp: No SOB no cough. + DEL TORO.  GI: No abd pain, nausea or vomiting  : No dysuria  MSK: No musculoskeletal pain  Skin: No rash  Neuro: No headache

## 2023-03-07 NOTE — ED PROVIDER NOTE - PROGRESS NOTE DETAILS
risk benefit alternatives discussed with patient family and patients oncologist regarding heparin given GBM. Pts onc OK with giving heparin. patient and family understands risk benefit and opt to start heparin at this time. Markus Ball M.D., Attending Physician

## 2023-03-07 NOTE — ED ADULT TRIAGE NOTE - CHIEF COMPLAINT QUOTE
Pt arrives to ED from home complaining of dyspnea on exertion. Pt states he had mechanical slip and slide out of bed this morning without any complications. Hx brain tumor. pt seen and d/c this morning for same symptoms. respirations even, unlabored, regular.

## 2023-03-07 NOTE — ED PROVIDER NOTE - NS_ ATTENDINGSCRIBEDETAILS _ED_A_ED_FT
I, Markus Ball MD,  performed the initial face to face bedside interview with this patient regarding history of present illness, review of symptoms and relevant past medical, social and family history.  I completed an independent physical examination.  I was the initial provider who evaluated this patient.   I personally saw the patient and performed a substantive portion of the visit including all aspects of the medical decision making.  The history, relevant review of systems, past medical and surgical history, medical decision making, and physical examination was documented by the scribe in my presence and I attest to the accuracy of the documentation.

## 2023-03-07 NOTE — ED PROVIDER NOTE - PROGRESS NOTE DETAILS
Patient was signed out to me pending urine.  Urine positive for UTI.  Patient is feeling better.  I reviewed labs and imaging.  CT negative for ich or fx Labs otherwise unremarkable.  Patient denies chest pain shortness of breath or pain anywhere else.  Exam without any obvious signs of significant traumatic injury.  Patient is ambulatory with walker at baseline without any issue in the ED does not feel lightheaded or dizzy.  Vital signs are stable.  Discussed work-up findings with patient wants to go home will take antibiotics at home will discharge with follow-up and strict return precautions.

## 2023-03-07 NOTE — ED ADULT NURSE NOTE - NSICDXFAMILYHX_GEN_ALL_CORE_FT
FAMILY HISTORY:  No pertinent family history in first degree relatives    
You can access the FollowMyHealth Patient Portal offered by [facility] by registering at the following website: [email or web address]. By joining "[employer]’s FollowMyHealth portal, you will also be able to view your health information using other applications (apps) compatible with our system.

## 2023-03-07 NOTE — ED PROVIDER NOTE - PHYSICAL EXAMINATION
Constitutional: NAD AAOx3  Eyes: PERRLA EOMI  Head: Normocephalic atraumatic  ENT: No friend sign, no raccoon eyes, no hemotympanum, no csf rhinorrhea, no nasal septal hematoma  Mouth: MMM  Cardiac: regular rate   Resp: Lungs CTAB  GI: Abd s/nt/nd  Neuro: CN2-12 intact GCS 15 no neuro deficits  Skin: No rashes, no bruising to chest, back, abdomen or extremities  Msk: No midline spinal ttp, full ROM of neck, c-collar cleared clinically and with provocative testing, no ttp of facial bones, no ttp to chest wall, pelvis stable, full ROM of all extremities without any ttp of extremities Constitutional: NAD AAOx3  Eyes: PERRLA EOMI  Head: Normocephalic atraumatic  ENT: No friend sign, no raccoon eyes   Mouth: MMM  Cardiac: regular rate   Resp: Lungs CTAB  GI: Abd s/nt/nd  Neuro: CN2-12 intact GCS 15 no neuro deficits  Skin: No rashes, no bruising to chest, back, abdomen or extremities  Msk: No midline spinal ttp, full ROM of neck, c-collar cleared clinically and with provocative testing, no ttp of facial bones, no ttp to chest wall, pelvis stable, full ROM of all extremities without any ttp of extremities

## 2023-03-07 NOTE — ED PROVIDER NOTE - PATIENT PORTAL LINK FT
You can access the FollowMyHealth Patient Portal offered by [facility] by registering at the following website: [email or web address]. By joining [email or web address]’s FollowMyHealth portal, you will also be able to view your health information using other applications (apps) compatible with our system.

## 2023-03-07 NOTE — H&P ADULT - SOCIAL HISTORY
Left message for patient in regards to CT lung cancer screen demonstrating no nodules.  Stable coronary artery calcifications noted continues repeat CT lung cancer screen in 1 year.  Clinic number provided should patient have further questions.    No

## 2023-03-07 NOTE — ED ADULT NURSE NOTE - COVID-19 RESULT
Anesthesia Pre Eval Note    Anesthesia ROS/Med Hx          Pulmonary Review:    Positive for COPD History of: asthma -     Cardiovascular Review:    Positive for hypertension    GI/HEPATIC/RENAL Review:    Positive for GERD Positive for liver disease   Positive for renal disease    End/Other Review:  Positive for diabetes      Relevant Problems   No relevant active problems       Physical Exam     Airway   Mallampati: II  TM Distance: >3 FB  Neck ROM: Full    Cardiovascular  Cardiovascular exam normal    Pulmonary Exam  Pulmonary exam normal    Abdominal Exam    Patient Demonstrates:  Obese      Anesthesia Plan    ASA Status: 3    Anesthesia Type: MAC        Informed Consent  The proposed anesthetic plan, including its risks and benefits, have been discussed with the Patient  - along with the risks and benefits of alternatives.  Questions were encouraged and answered and the patient and/or representative understands and agrees to proceed.       
NEGATIVE

## 2023-03-07 NOTE — ED PROVIDER NOTE - OBJECTIVE STATEMENT
63-year-old male with history of GBM brought in by ambulance from home with fall out of bed tonight.  Wife states that she heard a thump went to her 's bedroom and saw him on his knees next to the bed no visible injury patient states he did not drink much today and that may be why he feels weak

## 2023-03-07 NOTE — ED ADULT NURSE NOTE - NSIMPLEMENTINTERV_GEN_ALL_ED
Implemented All Fall with Harm Risk Interventions:  [place] to call system. Call bell, personal items and telephone within reach. Instruct patient to call for assistance. Room bathroom lighting operational. Non-slip footwear when patient is off stretcher. Physically safe environment: no spills, clutter or unnecessary equipment. Stretcher in lowest position, wheels locked, appropriate side rails in place. Provide visual cue, wrist band, yellow gown, etc. Monitor gait and stability. Monitor for mental status changes and reorient to person, place, and time. Review medications for side effects contributing to fall risk. Reinforce activity limits and safety measures with patient and family. Provide visual clues: red socks.

## 2023-03-07 NOTE — H&P ADULT - NSHPPHYSICALEXAM_GEN_ALL_CORE
PHYSICAL EXAM:  Constitutional: NAD, awake and alert  Neurological: AAO x 3, no focal deficits  HEENT: PERRLA, EOMI, MMM  Neck: Soft and supple, No LAD, No JVD  Respiratory: Breath sounds are clear bilaterally, No wheezing, rales or rhonchi  Cardiovascular: S1 and S2, regular rate and rhythm; no Murmurs, gallops or rubs  Gastrointestinal: Bowel Sounds present, soft, nontender, nondistended, no guarding, no rebound tenderness  Back: No CVA tenderness   Extremities: No peripheral edema  Vascular: 2+ peripheral pulses  Musculoskeletal: 5/5 strength b/l upper and lower extremities  Skin: No rashes  Breast: Deferred  Rectal: Deferred PHYSICAL EXAM:  Constitutional: NAD, awake and alert Obese   Neurological: AAO x 3, no focal deficits  HEENT: PERRLA, EOMI, MMM  Neck: Soft and supple, No LAD, No JVD  Respiratory: Breath sounds are clear bilaterally, No wheezing, rales or rhonchi  Cardiovascular: S1 and S2, regular rate and rhythm; no Murmurs, gallops or rubs  Gastrointestinal: Bowel Sounds present, soft, nontender, nondistended, no guarding, no rebound tenderness  Back: No CVA tenderness   Extremities: No peripheral edema  Vascular: 2+ peripheral pulses  Musculoskeletal: 5/5 strength b/l upper and lower extremities  Skin: No rashes  Breast: Deferred  Rectal: Deferred

## 2023-03-07 NOTE — ED PROVIDER NOTE - CLINICAL SUMMARY MEDICAL DECISION MAKING FREE TEXT BOX
64 y/o male presents s/p mechanical fall out of bed. States he wasn't using walker he was supposed to use. Did not hit head. No LOC. Pt also notes dyspnea which is chronic but noticed today. Exam is non-focal. Pt was seen in ED this morning s/p fall and ambulated without issue at that time, was treated for UTI. Will CT head to r/o bleed given underlying medical condition, CT chest to r/o PE and reassess. 62 y/o male presents s/p mechanical fall out of bed. States he wasn't using walker he was supposed to use. Did not hit head. No LOC. Pt also notes dyspnea which is chronic but noticed today. Exam is non-focal. Pt was seen in ED this morning s/p fall and ambulated without issue at that time, was treated for UTI. Will CT head to r/o bleed given underlying medical condition, CT chest to r/o PE and reassess.      All labs and imaging reviewed by myself.  CT chest discussed with radiologist patient with small PE.  Case discussed with patient's oncologist who states that it is okay to give heparin.  We reviewed the CT of the head as well as the labs and the CT of the chest.  Again she states okay to give heparin at this time.  Case discussed with the hospitalist accepts.  Patient is being treated now for PE and UTI. Dr. Traylor

## 2023-03-07 NOTE — H&P ADULT - HISTORY OF PRESENT ILLNESS
62 y/o male PMHx seizures and Glioblastoma s/p craniotomy on chemo, presents to ED from home s/p mechanical slip and fall out of bed. Pt reports he is not using the walker he is supposed to use and could not get up off the floor. No head strike, no LOC. Pt also complaining of persistent weakness and dyspnea on exertion. Pt seen at [facility] and d/c'd this morning for similar symptoms. was dx with UTI    IN the ED patient was started on IV Rocephin and Er physician spoke with patient hemeoncologist who cleared him to start IV heparin 62 y/o male with PMHx of seizures and Glioblastoma s/p craniotomy on chemo was seen at [facility] this morning, DX with UTI and d/c'd. Patient returns  to ED from home after having another mechanical slip and fall out of bed. Pt reports he is not using the walker he is supposed to use and could not get up off the floor. Positive weakness, DEL TORO and palpitations. NO CP, LOC or head trauma.  IN the ED patient was started on IV Rocephin and Er physician spoke with patients  hemeoncologist who cleared him to start IV heparin for pulmonary embolism    Social History: Lives with mother and has 24 hr nursing care ; NO tobacco alcohol or illicit drug use  Family History: patient is adopted

## 2023-03-07 NOTE — ED PROVIDER NOTE - PHYSICAL EXAMINATION
Gen:  Well appearing in NAD  Head:  NC/AT  HEENT: pupils perrl,no pharyngeal erythema, uvula midline  Cardiac: S1S2, RRR  Abd: Soft, non tender  Resp: No distress, CTA   musculoskeletal:: no deformities, no swelling, strength +3/+5  Skin: warm and dry as visualized, no rashes  Neuro: LOUISE, aao x 4  Psych:alert, cooperative, appropriate mood and affect for situation

## 2023-03-07 NOTE — ED ADULT TRIAGE NOTE - CHIEF COMPLAINT QUOTE
Patient BIBEMS from [place] s/p mechanical fall trying to get out of bed. A&Ox3, patient hit his side. -head strike, -LOC. Hx of seizure and brain tumor. Patient wanted to get looked at. Denies pain. Patietn found to be tachycardic in triage 130s. O2 91%RA. Denies chest pain, and SOB.

## 2023-03-07 NOTE — ED ADULT NURSE REASSESSMENT NOTE - NS ED NURSE REASSESS COMMENT FT1
Patient can be discharged if he is able to ambulate; Friend - Johnnie Johnson [phone] for 
Patient returned from CT, patient more alert and speech much clearer. Stated that he felt better and needed to void. Assistance provided for using the urinal. Patient placed back on cardiac monitor, still Tachy to 110-120bpm. Denies any others complaints at this time.
Report received from NATHALIE Jose. Pt denies pain at this time. Pt ambulated in room successfully with the assistance of a NA & walker. Pt described no weakness when ambulating. Pt is comfortable in bed with all comfort and safety precautions in place.

## 2023-03-08 LAB
ALBUMIN SERPL ELPH-MCNC: 2.3 G/DL — LOW (ref 3.3–5)
ALP SERPL-CCNC: 52 U/L — SIGNIFICANT CHANGE UP (ref 40–120)
ALT FLD-CCNC: 35 U/L — SIGNIFICANT CHANGE UP (ref 12–78)
ANION GAP SERPL CALC-SCNC: 4 MMOL/L — LOW (ref 5–17)
APTT BLD: 137.9 SEC — CRITICAL HIGH (ref 27.5–35.5)
AST SERPL-CCNC: 11 U/L — LOW (ref 15–37)
BILIRUB SERPL-MCNC: 0.5 MG/DL — SIGNIFICANT CHANGE UP (ref 0.2–1.2)
BUN SERPL-MCNC: 13 MG/DL — SIGNIFICANT CHANGE UP (ref 7–23)
CALCIUM SERPL-MCNC: 8.6 MG/DL — SIGNIFICANT CHANGE UP (ref 8.5–10.1)
CHLORIDE SERPL-SCNC: 108 MMOL/L — SIGNIFICANT CHANGE UP (ref 96–108)
CO2 SERPL-SCNC: 27 MMOL/L — SIGNIFICANT CHANGE UP (ref 22–31)
CREAT SERPL-MCNC: 0.62 MG/DL — SIGNIFICANT CHANGE UP (ref 0.5–1.3)
EGFR: 106 ML/MIN/1.73M2 — SIGNIFICANT CHANGE UP
GLUCOSE SERPL-MCNC: 106 MG/DL — HIGH (ref 70–99)
HCT VFR BLD CALC: 35.4 % — LOW (ref 39–50)
HGB BLD-MCNC: 11.5 G/DL — LOW (ref 13–17)
MAGNESIUM SERPL-MCNC: 2.1 MG/DL — SIGNIFICANT CHANGE UP (ref 1.6–2.6)
MCHC RBC-ENTMCNC: 29.9 PG — SIGNIFICANT CHANGE UP (ref 27–34)
MCHC RBC-ENTMCNC: 32.5 GM/DL — SIGNIFICANT CHANGE UP (ref 32–36)
MCV RBC AUTO: 91.9 FL — SIGNIFICANT CHANGE UP (ref 80–100)
PHOSPHATE SERPL-MCNC: 3.1 MG/DL — SIGNIFICANT CHANGE UP (ref 2.5–4.5)
PLATELET # BLD AUTO: 107 K/UL — LOW (ref 150–400)
POTASSIUM SERPL-MCNC: 3.6 MMOL/L — SIGNIFICANT CHANGE UP (ref 3.5–5.3)
POTASSIUM SERPL-SCNC: 3.6 MMOL/L — SIGNIFICANT CHANGE UP (ref 3.5–5.3)
PROT SERPL-MCNC: 5.6 GM/DL — LOW (ref 6–8.3)
RBC # BLD: 3.85 M/UL — LOW (ref 4.2–5.8)
RBC # FLD: 18.2 % — HIGH (ref 10.3–14.5)
SODIUM SERPL-SCNC: 139 MMOL/L — SIGNIFICANT CHANGE UP (ref 135–145)
WBC # BLD: 7.31 K/UL — SIGNIFICANT CHANGE UP (ref 3.8–10.5)
WBC # FLD AUTO: 7.31 K/UL — SIGNIFICANT CHANGE UP (ref 3.8–10.5)

## 2023-03-08 PROCEDURE — 99232 SBSQ HOSP IP/OBS MODERATE 35: CPT

## 2023-03-08 PROCEDURE — 99223 1ST HOSP IP/OBS HIGH 75: CPT

## 2023-03-08 RX ORDER — APIXABAN 2.5 MG/1
10 TABLET, FILM COATED ORAL EVERY 12 HOURS
Refills: 0 | Status: DISCONTINUED | OUTPATIENT
Start: 2023-03-08 | End: 2023-03-09

## 2023-03-08 RX ORDER — APIXABAN 2.5 MG/1
2 TABLET, FILM COATED ORAL
Qty: 100 | Refills: 0
Start: 2023-03-08 | End: 2023-03-13

## 2023-03-08 RX ADMIN — LACOSAMIDE 200 MILLIGRAM(S): 50 TABLET ORAL at 21:38

## 2023-03-08 RX ADMIN — HEPARIN SODIUM 0 UNIT(S)/HR: 5000 INJECTION INTRAVENOUS; SUBCUTANEOUS at 07:34

## 2023-03-08 RX ADMIN — MIRTAZAPINE 7.5 MILLIGRAM(S): 45 TABLET, ORALLY DISINTEGRATING ORAL at 21:27

## 2023-03-08 RX ADMIN — APIXABAN 10 MILLIGRAM(S): 2.5 TABLET, FILM COATED ORAL at 16:13

## 2023-03-08 RX ADMIN — HEPARIN SODIUM 0 UNIT(S)/HR: 5000 INJECTION INTRAVENOUS; SUBCUTANEOUS at 06:55

## 2023-03-08 RX ADMIN — Medication 2 MILLIGRAM(S): at 05:21

## 2023-03-08 RX ADMIN — CEFTRIAXONE 1000 MILLIGRAM(S): 500 INJECTION, POWDER, FOR SOLUTION INTRAMUSCULAR; INTRAVENOUS at 21:29

## 2023-03-08 RX ADMIN — Medication 2 MILLIGRAM(S): at 16:13

## 2023-03-08 RX ADMIN — HEPARIN SODIUM 0 UNIT(S)/HR: 5000 INJECTION INTRAVENOUS; SUBCUTANEOUS at 07:28

## 2023-03-08 RX ADMIN — LEVETIRACETAM 1000 MILLIGRAM(S): 250 TABLET, FILM COATED ORAL at 13:24

## 2023-03-08 RX ADMIN — PANTOPRAZOLE SODIUM 40 MILLIGRAM(S): 20 TABLET, DELAYED RELEASE ORAL at 05:21

## 2023-03-08 RX ADMIN — ROPINIROLE 0.75 MILLIGRAM(S): 8 TABLET, FILM COATED, EXTENDED RELEASE ORAL at 21:27

## 2023-03-08 RX ADMIN — APIXABAN 10 MILLIGRAM(S): 2.5 TABLET, FILM COATED ORAL at 21:28

## 2023-03-08 RX ADMIN — LACOSAMIDE 200 MILLIGRAM(S): 50 TABLET ORAL at 12:17

## 2023-03-08 RX ADMIN — LEVETIRACETAM 1000 MILLIGRAM(S): 250 TABLET, FILM COATED ORAL at 21:27

## 2023-03-08 NOTE — PHYSICAL THERAPY INITIAL EVALUATION ADULT - NSPTDMEREC_GEN_A_CORE
pt owns a cane , will progress amb to least restrictive assistive device as tolerated , pt states he can walk on level surfaces at baseline without device,uses cane for support on stairs entering/exiting domicile

## 2023-03-08 NOTE — PHYSICAL THERAPY INITIAL EVALUATION ADULT - ACTIVE RANGE OF MOTION EXAMINATION, REHAB EVAL
nikita. upper extremity Active ROM was WNL (within normal limits)/bilateral lower extremity Active ROM was WNL (within normal limits)

## 2023-03-08 NOTE — PHYSICAL THERAPY INITIAL EVALUATION ADULT - MARITAL STATUS
closest relative is his 98 year old mother who lives in [place]; emergency contact is friend Johnnie , pt is currently staying with Johnnie at her home in [place] and she assists him as needed ,/Single

## 2023-03-08 NOTE — CONSULT NOTE ADULT - SUBJECTIVE AND OBJECTIVE BOX
HPI:    64 y/o M with PMHx of seizures, Glioblastoma s/p craniotomy on chemo recently seen at ED, dx with UTI and d/c'd but Patient returned from home after experiencing a mechanical slip and fall out of bed. He reported that he is not using the walker he is supposed to use and could not get up off the floor; endorsed weakness, DEL TORO and palpitations but denied any other acute c/o at the time. He was found to have PE and DVT, ED attending spoke with patient's primary Neuro/Onc Dr Debby Torres who cleared him to start IV Heparin. (07 Mar 2023 18:07)      2023:      PAST MEDICAL & SURGICAL HISTORY:    Glioblastoma    Seizures    S/P craniotomy        MEDICATIONS  (STANDING):    cefTRIAXone Injectable. 1000 milliGRAM(s) IV Push every 24 hours  dexAMETHasone     Tablet 2 milliGRAM(s) Oral daily  dexAMETHasone     Tablet 2 milliGRAM(s) Oral daily  lacosamide 200 milliGRAM(s) Oral two times a day  levETIRAcetam 1000 milliGRAM(s) Oral two times a day  mirtazapine 7.5 milliGRAM(s) Oral at bedtime  pantoprazole    Tablet 40 milliGRAM(s) Oral before breakfast  rOPINIRole 0.75 milliGRAM(s) Oral at bedtime  sodium chloride 0.9%. 1000 milliLiter(s) (75 mL/Hr) IV Continuous <Continuous>      MEDICATIONS  (PRN):    acetaminophen     Tablet .. 650 milliGRAM(s) Oral every 6 hours PRN Temp greater or equal to 38C (100.4F), Mild Pain (1 - 3)  aluminum hydroxide/magnesium hydroxide/simethicone Suspension 30 milliLiter(s) Oral every 4 hours PRN Dyspepsia  melatonin 3 milliGRAM(s) Oral at bedtime PRN Insomnia  ondansetron Injectable 4 milliGRAM(s) IV Push every 8 hours PRN Nausea and/or Vomiting      ALLERGIES:    No Known Allergies        FAMILY HISTORY:    Non-contributory; Adopted      REVIEW OF SYSTEMS:    Constitutional, Eyes, ENT, Cardiovascular, Respiratory, Gastrointestinal, Genitourinary, Musculoskeletal, Integumentary, Neurological, Psychiatric, Endocrine, Heme/Lymph and Allergic/Immunologic review of systems are otherwise negative except as noted in HPI.       VITALS:    T(C): 36.3 (08 Mar 2023 08:50), Max: 37 (07 Mar 2023 12:52)  T(F): 97.3 (08 Mar 2023 08:50), Max: 98.6 (07 Mar 2023 12:52)  HR: 83 (08 Mar 2023 08:50) (77 - 106)  BP: 117/61 (08 Mar 2023 08:50) (95/64 - 120/68)  BP(mean): 94 (07 Mar 2023 21:12) (94 - 94)  RR: 18 (08 Mar 2023 08:50) (18 - 20)  SpO2: 95% (08 Mar 2023 08:50) (92% - 97%)    Parameters below as of 08 Mar 2023 08:50  Patient On (Oxygen Delivery Method): room air        PHYSICAL:        LABS:    CBC Full  -  ( 08 Mar 2023 05:43 )  WBC Count : 7.31 K/uL  RBC Count : 3.85 M/uL  Hemoglobin : 11.5 g/dL  Hematocrit : 35.4 %  Platelet Count - Automated : 107 K/uL  Mean Cell Volume : 91.9 fl  Mean Cell Hemoglobin : 29.9 pg  Mean Cell Hemoglobin Concentration : 32.5 gm/dL  Auto Neutrophil # : x  Auto Lymphocyte # : x  Auto Monocyte # : x  Auto Eosinophil # : x  Auto Basophil # : x  Auto Neutrophil % : x  Auto Lymphocyte % : x  Auto Monocyte % : x  Auto Eosinophil % : x  Auto Basophil % : x    03-08    139  |  108  |  13  ----------------------------<  106<H>  3.6   |  27  |  0.62    Ca    8.6      08 Mar 2023 05:43  Phos  3.1     03-08  Mg     2.1     03-08    TPro  5.6<L>  /  Alb  2.3<L>  /  TBili  0.5  /  DBili  x   /  AST  11<L>  /  ALT  35  /  AlkPhos  52  03-08    PT/INR - ( 07 Mar 2023 15:42 )   PT: 13.0 sec;   INR: 1.12 ratio         PTT - ( 08 Mar 2023 05:43 )  PTT:137.9 sec  Urinalysis Basic - ( 07 Mar 2023 04:17 )    Color: Yellow / Appearance: Slightly Turbid / S.010 / pH: x  Gluc: x / Ketone: Negative  / Bili: Negative / Urobili: 1   Blood: x / Protein: Negative / Nitrite: Negative   Leuk Esterase: Trace / RBC: 11-25 /HPF / WBC 26-50 /HPF   Sq Epi: x / Non Sq Epi: Few / Bacteria: TNTC        RADIOLOGY & ADDITIONAL STUDIES:        CT Head No Cont (23 @ 03:33)    IMPRESSION:  No acute intracranial pathology identified.  Reidentified is a large area of confluent hypoattenuation within the left   posterior parietal and temporal lobes, compatible with known GBM tumor.   Mass effect upon the left ventricle is unchanged. No midline shift.          CT Angio Chest PE Protocol w/ IV Cont (23 @ 14:11)    IMPRESSION:.    Right lower lobe distal segmental pulmonary embolism.        US Duplex Venous Lower Ext Complete, Bilateral (23 @ 20:42)    IMPRESSION:  Right femoral vein and left peroneal vein acute deep vein thrombosis  Acute deep venous thrombosis: above and below the knee.       HPI:    62 y/o M with PMHx of seizures, Glioblastoma s/p craniotomy on chemo recently seen at ED, dx with UTI and d/c'd but Patient returned from home after experiencing a mechanical slip and fall out of bed. He reported that he is not using the walker he is supposed to use and could not get up off the floor; endorsed weakness, DEL TORO and palpitations but denied any other acute c/o at the time. He was found to have PE and DVT, ED attending spoke with patient's primary Neuro/Onc Dr Debby Torres who cleared him to start IV Heparin. (07 Mar 2023 18:07)      2023: Seen at bedside, no acute distress but tearful, expressing sadness surrounding the past 6 months of his life with new dx, losing brother, other life stressors that he is currently dealing with      PAST MEDICAL & SURGICAL HISTORY:    Glioblastoma    Seizures    S/P craniotomy        MEDICATIONS  (STANDING):    cefTRIAXone Injectable. 1000 milliGRAM(s) IV Push every 24 hours  dexAMETHasone     Tablet 2 milliGRAM(s) Oral daily  dexAMETHasone     Tablet 2 milliGRAM(s) Oral daily  lacosamide 200 milliGRAM(s) Oral two times a day  levETIRAcetam 1000 milliGRAM(s) Oral two times a day  mirtazapine 7.5 milliGRAM(s) Oral at bedtime  pantoprazole    Tablet 40 milliGRAM(s) Oral before breakfast  rOPINIRole 0.75 milliGRAM(s) Oral at bedtime  sodium chloride 0.9%. 1000 milliLiter(s) (75 mL/Hr) IV Continuous <Continuous>      MEDICATIONS  (PRN):    acetaminophen     Tablet .. 650 milliGRAM(s) Oral every 6 hours PRN Temp greater or equal to 38C (100.4F), Mild Pain (1 - 3)  aluminum hydroxide/magnesium hydroxide/simethicone Suspension 30 milliLiter(s) Oral every 4 hours PRN Dyspepsia  melatonin 3 milliGRAM(s) Oral at bedtime PRN Insomnia  ondansetron Injectable 4 milliGRAM(s) IV Push every 8 hours PRN Nausea and/or Vomiting      ALLERGIES:    No Known Allergies        FAMILY HISTORY:    Non-contributory; Adopted      REVIEW OF SYSTEMS:    Constitutional, Eyes, ENT, Cardiovascular, Respiratory, Gastrointestinal, Genitourinary, Musculoskeletal, Integumentary, Neurological, Psychiatric, Endocrine, Heme/Lymph and Allergic/Immunologic review of systems are otherwise negative except as noted in HPI.       VITALS:    T(C): 36.3 (08 Mar 2023 08:50), Max: 37 (07 Mar 2023 12:52)  T(F): 97.3 (08 Mar 2023 08:50), Max: 98.6 (07 Mar 2023 12:52)  HR: 83 (08 Mar 2023 08:50) (77 - 106)  BP: 117/61 (08 Mar 2023 08:50) (95/64 - 120/68)  BP(mean): 94 (07 Mar 2023 21:12) (94 - 94)  RR: 18 (08 Mar 2023 08:50) (18 - 20)  SpO2: 95% (08 Mar 2023 08:50) (92% - 97%)    Parameters below as of 08 Mar 2023 08:50  Patient On (Oxygen Delivery Method): room air        PHYSICAL:    Constitutional: no acute distress but upset/crying  Eyes: PERRL, EOMI  ENT: pharynx is unremarkable  Neck: supple without JVD  Pulmonary: clear to auscultation bilaterally, no dullness, no wheezing  Cardiac: RRR, normal S1S2  Vascular: no calf tenderness, venous stasis changes, varices  Abdomen: normoactive bowel sounds, soft and nontender, no hepatosplenomegaly or masses appreciated  Lymphatic: no peripheral adenopathy appreciated  Musculoskeletal: full range of motion and no deformities appreciated  Skin: normal appearance, no rash/erythema  Neurology: grossly intact, no obvious focal deficits; A&Ox3 (person, place, time)  Psychiatric: affect/mood/insight/judgment appropriate       LABS:    CBC Full  -  ( 08 Mar 2023 05:43 )  WBC Count : 7.31 K/uL  RBC Count : 3.85 M/uL  Hemoglobin : 11.5 g/dL  Hematocrit : 35.4 %  Platelet Count - Automated : 107 K/uL  Mean Cell Volume : 91.9 fl  Mean Cell Hemoglobin : 29.9 pg  Mean Cell Hemoglobin Concentration : 32.5 gm/dL  Auto Neutrophil # : x  Auto Lymphocyte # : x  Auto Monocyte # : x  Auto Eosinophil # : x  Auto Basophil # : x  Auto Neutrophil % : x  Auto Lymphocyte % : x  Auto Monocyte % : x  Auto Eosinophil % : x  Auto Basophil % : x    03-08    139  |  108  |  13  ----------------------------<  106<H>  3.6   |  27  |  0.62    Ca    8.6      08 Mar 2023 05:43  Phos  3.1     03-08  Mg     2.1     03-08    TPro  5.6<L>  /  Alb  2.3<L>  /  TBili  0.5  /  DBili  x   /  AST  11<L>  /  ALT  35  /  AlkPhos  52  03-08    PT/INR - ( 07 Mar 2023 15:42 )   PT: 13.0 sec;   INR: 1.12 ratio         PTT - ( 08 Mar 2023 05:43 )  PTT:137.9 sec  Urinalysis Basic - ( 07 Mar 2023 04:17 )    Color: Yellow / Appearance: Slightly Turbid / S.010 / pH: x  Gluc: x / Ketone: Negative  / Bili: Negative / Urobili: 1   Blood: x / Protein: Negative / Nitrite: Negative   Leuk Esterase: Trace / RBC: 11-25 /HPF / WBC 26-50 /HPF   Sq Epi: x / Non Sq Epi: Few / Bacteria: TNTC        RADIOLOGY & ADDITIONAL STUDIES:        CT Head No Cont (23 @ 03:33)    IMPRESSION:  No acute intracranial pathology identified.  Reidentified is a large area of confluent hypoattenuation within the left   posterior parietal and temporal lobes, compatible with known GBM tumor.   Mass effect upon the left ventricle is unchanged. No midline shift.          CT Angio Chest PE Protocol w/ IV Cont (23 @ 14:11)    IMPRESSION:.    Right lower lobe distal segmental pulmonary embolism.        US Duplex Venous Lower Ext Complete, Bilateral (23 @ 20:42)    IMPRESSION:  Right femoral vein and left peroneal vein acute deep vein thrombosis  Acute deep venous thrombosis: above and below the knee.       HPI:    62 y/o M with PMHx of seizures, Glioblastoma s/p craniotomy on chemo recently seen at ED, dx with UTI and d/c'd but Patient returned from home after experiencing a mechanical slip and fall out of bed. He reported that he is not using the walker he is supposed to use and could not get up off the floor; endorsed weakness, DEL TORO and palpitations but denied any other acute c/o at the time. He was found to have PE and DVT, ED attending spoke with patient's primary Neuro/Onc Dr Debby Torres who cleared him to start IV Heparin. (07 Mar 2023 18:07)      2023: Seen at bedside, no acute distress but tearful, expressing sadness surrounding the past 6 months of his life with new dx, losing brother, other life stressors that he is currently dealing with      PAST MEDICAL & SURGICAL HISTORY:    Glioblastoma    Seizures    S/P craniotomy        MEDICATIONS  (STANDING):    cefTRIAXone Injectable. 1000 milliGRAM(s) IV Push every 24 hours  dexAMETHasone     Tablet 2 milliGRAM(s) Oral daily  dexAMETHasone     Tablet 2 milliGRAM(s) Oral daily  lacosamide 200 milliGRAM(s) Oral two times a day  levETIRAcetam 1000 milliGRAM(s) Oral two times a day  mirtazapine 7.5 milliGRAM(s) Oral at bedtime  pantoprazole    Tablet 40 milliGRAM(s) Oral before breakfast  rOPINIRole 0.75 milliGRAM(s) Oral at bedtime  sodium chloride 0.9%. 1000 milliLiter(s) (75 mL/Hr) IV Continuous <Continuous>      MEDICATIONS  (PRN):    acetaminophen     Tablet .. 650 milliGRAM(s) Oral every 6 hours PRN Temp greater or equal to 38C (100.4F), Mild Pain (1 - 3)  aluminum hydroxide/magnesium hydroxide/simethicone Suspension 30 milliLiter(s) Oral every 4 hours PRN Dyspepsia  melatonin 3 milliGRAM(s) Oral at bedtime PRN Insomnia  ondansetron Injectable 4 milliGRAM(s) IV Push every 8 hours PRN Nausea and/or Vomiting      ALLERGIES:    No Known Allergies        FAMILY HISTORY:    Non-contributory; Adopted      REVIEW OF SYSTEMS:    Constitutional, Eyes, ENT, Cardiovascular, Respiratory, Gastrointestinal, Genitourinary, Musculoskeletal, Integumentary, Neurological, Psychiatric, Endocrine, Heme/Lymph and Allergic/Immunologic review of systems are otherwise negative except as noted in HPI.       VITALS:    T(C): 36.3 (08 Mar 2023 08:50), Max: 37 (07 Mar 2023 12:52)  T(F): 97.3 (08 Mar 2023 08:50), Max: 98.6 (07 Mar 2023 12:52)  HR: 83 (08 Mar 2023 08:50) (77 - 106)  BP: 117/61 (08 Mar 2023 08:50) (95/64 - 120/68)  BP(mean): 94 (07 Mar 2023 21:12) (94 - 94)  RR: 18 (08 Mar 2023 08:50) (18 - 20)  SpO2: 95% (08 Mar 2023 08:50) (92% - 97%)    Parameters below as of 08 Mar 2023 08:50  Patient On (Oxygen Delivery Method): room air        PHYSICAL:    Constitutional: no acute distress but upset/crying  Eyes: PERRL, EOMI  ENT: pharynx is unremarkable  Neck: supple without JVD  Pulmonary: clear to auscultation bilaterally, no dullness, no wheezing  Cardiac: RRR, normal S1S2  Vascular: no calf tenderness, venous stasis changes, varices  Abdomen: normoactive bowel sounds, soft and nontender, no hepatosplenomegaly or masses appreciated  Lymphatic: no peripheral adenopathy appreciated  Musculoskeletal: full range of motion and no deformities appreciated  Skin: normal appearance, no rash/erythema  Neurology: grossly intact, no obvious focal deficits; A&Ox3 (person, place, time)  Psychiatric: affect/mood/insight/judgment appropriate       LABS:    CBC Full  -  ( 08 Mar 2023 05:43 )  WBC Count : 7.31 K/uL  RBC Count : 3.85 M/uL  Hemoglobin : 11.5 g/dL  Hematocrit : 35.4 %  Platelet Count - Automated : 107 K/uL  Mean Cell Volume : 91.9 fl    03-08    139  |  108  |  13  ----------------------------<  106<H>  3.6   |  27  |  0.62    Ca    8.6      08 Mar 2023 05:43  Phos  3.1     03-08  Mg     2.1     03-08    TPro  5.6<L>  /  Alb  2.3<L>  /  TBili  0.5  /  DBili  x   /  AST  11<L>  /  ALT  35  /  AlkPhos  52  03-08    PT/INR - ( 07 Mar 2023 15:42 )   PT: 13.0 sec;   INR: 1.12 ratio         PTT - ( 08 Mar 2023 05:43 )  PTT:137.9 sec  Urinalysis Basic - ( 07 Mar 2023 04:17 )    Color: Yellow / Appearance: Slightly Turbid / S.010 / pH: x  Gluc: x / Ketone: Negative  / Bili: Negative / Urobili: 1   Blood: x / Protein: Negative / Nitrite: Negative   Leuk Esterase: Trace / RBC: 11-25 /HPF / WBC 26-50 /HPF   Sq Epi: x / Non Sq Epi: Few / Bacteria: TNTC        RADIOLOGY & ADDITIONAL STUDIES:        CT Head No Cont (23 @ 03:33)    IMPRESSION:  No acute intracranial pathology identified.  Reidentified is a large area of confluent hypoattenuation within the left   posterior parietal and temporal lobes, compatible with known GBM tumor.   Mass effect upon the left ventricle is unchanged. No midline shift.          CT Angio Chest PE Protocol w/ IV Cont (23 @ 14:11)    IMPRESSION:.    Right lower lobe distal segmental pulmonary embolism.        US Duplex Venous Lower Ext Complete, Bilateral (23 @ 20:42)    IMPRESSION:  Right femoral vein and left peroneal vein acute deep vein thrombosis  Acute deep venous thrombosis: above and below the knee.

## 2023-03-08 NOTE — PATIENT PROFILE ADULT - FALL HARM RISK - HARM RISK INTERVENTIONS

## 2023-03-08 NOTE — PROGRESS NOTE ADULT - ASSESSMENT
#Syncope due to PE  #DEL TORO due  to PE  -Admit to medical floors with remote tele  -FU Doppler of LE  -IVF  -continue IV rocephin  -FU urine and Blood cx   -FU TTE   -Continue heparin Drip  -Home o2 eval on discharge     #UTI, suspected   follow-up UCX     #Hx of GBM/seizures  -CT head NAD  -continue Dexamethason, Keppra   -confirm VImpat dose   -Hemeonc consult    #VTE ppx  SCDS    #GOC - currently undecided and therefore full code    #Syncope due to PE  #DEL TORO due  to PE  -Admit to medical floors with remote tele  -FU Doppler of LE  -IVF  -continue IV rocephin  -FU urine and Blood cx   -FU TTE   -Continue heparin Drip  -Home o2 eval on discharge     #UTI, suspected   follow-up UCX     #Hx of GBM/seizures  -CT head NAD  -continue Dexamethason, Keppra   -confirm VImpat dose   -Hemeonc consult    #VTE ppx  SCDS    #GOC - [facility] is is his HCP - currently Full Code, see details above

## 2023-03-08 NOTE — PROGRESS NOTE ADULT - CONVERSATION DETAILS
DIscussed with [facility] - the patient lives with Johnnie and he has an aide.  We discussed GOC incl CPR and ETT   They have discussed this in the past.  She states that patient had an adopted brother who committed suicide.  His adopted mother has dementia and is 98 yrs old.  He wants to outlive her so she doesn't have to go through the pain of seeing another son die.  He wants CPR and ETT at this time.   He has been told that his cancer is in remission.

## 2023-03-08 NOTE — PHYSICAL THERAPY INITIAL EVALUATION ADULT - GENERAL OBSERVATIONS, REHAB EVAL
reclined in bed , awake,alert, Ox4, wears eyeglasses ,speech clear/fluent ,Viraj volitionally against gravity

## 2023-03-08 NOTE — CONSULT NOTE ADULT - ASSESSMENT
62 y/o M with a PMhx of GMB s/p craniotomy, seizures, follows with Neuro/Onc Dr Debby Torres, found to have both PE and DVT, started on Heparin gtt.      # Glioblastoma s/p Craniotomy (WHO 4, IDH wt - MGMT methylated)    - 09/27/2022 MRI brain revealed: irregularly enhancing mass in the left temporal-occipital region measuring 2.6 cm transversely and 8.9 cm cranial - caudal with surrounding vasogenic edema  - 10/03/2022: NeuroSurg Dr Turpin performed a large resection of this mass with pathology --> Glioblastoma, WHO 4, IDH wt - MGMT methylated  - 10/05/2022: Post op MRI revealed LEFT parietal occipital craniotomy with near complete resection of the of the neoplasm in the LEFT occipital/posterior temporal lobes. Minimal residual neoplasm is seen in the anterior part of the neoplasm, which abuts the ependymal surface of the atrium of the LEFT ventricle. Moderate postsurgical hemorrhage is seen within the surgical cavity. Moderate surrounding vasogenic edema is unchanged with effacement of the posterior horn of the LEFT lateral ventricle. Abnormal signal is also seen within the LEFT cerebral peduncle with a 4 mm focus of central enhancement and 1.6 cm region of rim enhancement, which is suspicious for a secondary focus of neoplasm or extension from the primary along the white matter tracts.  - 11/07/2022: ChemoRT started  - 12/20/2022: ChemoRT completed  - 12/30/2022: close friend noted shaking of right UE and rigidity. In the ER, he reportedly had a GTC seizures, he was intubated. He was also found to be COVID positive. He was discharged to rehab on 12/29/2022 and then discharged home on 01/18/2023  - 02/01/2023: Repeat MRI, compared to 12/2022 there is a slight improvement to the enhancing area in the left posterior surgical bed site; Plan to continue dexamethasone at 4mg in am and 2mg at night, pantoprazole 40mg, Keppra 1000mg BID, Vimpat 100mg BID & to start Temozolomide  - Dr Torres planning a clinical follow up & MRI in a month      # PE/DVT    - LE Doppler revealed: Right femoral vein and left peroneal vein acute deep vein thrombosis. Acute deep venous thrombosis: above and below the knee  - CTA Chest imaging revealed: Right lower lobe distal segmental pulmonary embolism  - Currently receiving Heparin gtt, okay to transition to Lovenox  - Will speak with attending Dr Bauman more regarding DOAC  - Continue supportive measures   62 y/o M with a PMhx of GMB s/p craniotomy, seizures, follows with Neuro/Onc Dr Debby Torres, found to have both PE and DVT, started on Heparin gtt.      # Glioblastoma s/p Craniotomy (WHO 4, IDH wt - MGMT methylated)    - 09/27/2022 MRI brain revealed: irregularly enhancing mass in the left temporal-occipital region measuring 2.6 cm transversely and 8.9 cm cranial - caudal with surrounding vasogenic edema  - 10/03/2022: NeuroSurg Dr Turpin performed a large resection of this mass with pathology --> Glioblastoma, WHO 4, IDH wt - MGMT methylated  - 10/05/2022: Post op MRI revealed LEFT parietal occipital craniotomy with near complete resection of the of the neoplasm in the LEFT occipital/posterior temporal lobes. Minimal residual neoplasm is seen in the anterior part of the neoplasm, which abuts the ependymal surface of the atrium of the LEFT ventricle. Moderate postsurgical hemorrhage is seen within the surgical cavity. Moderate surrounding vasogenic edema is unchanged with effacement of the posterior horn of the LEFT lateral ventricle. Abnormal signal is also seen within the LEFT cerebral peduncle with a 4 mm focus of central enhancement and 1.6 cm region of rim enhancement, which is suspicious for a secondary focus of neoplasm or extension from the primary along the white matter tracts.  - 11/07/2022: ChemoRT started  - 12/20/2022: ChemoRT completed  - 12/30/2022: close friend noted shaking of right UE and rigidity. In the ER, he reportedly had a GTC seizures, he was intubated. He was also found to be COVID positive. He was discharged to rehab on 12/29/2022 and then discharged home on 01/18/2023  - 02/01/2023: Repeat MRI, compared to 12/2022 there is a slight improvement to the enhancing area in the left posterior surgical bed site; Plan to continue dexamethasone at 4mg in am and 2mg at night, pantoprazole 40mg, Keppra 1000mg BID, Vimpat 100mg BID & to start Temozolomide  - Dr Torres planning a clinical follow up & MRI in a month      # PE/DVT    - LE Doppler revealed: Right femoral vein and left peroneal vein acute deep vein thrombosis. Acute deep venous thrombosis: above and below the knee  - CTA Chest imaging revealed: Right lower lobe distal segmental pulmonary embolism  - Started on Heparin gtt; can transition to DOAC (Eliquis)  - Continue supportive measures   62 y/o M with a PMhx of GMB s/p craniotomy, seizures, follows with Neuro/Onc Dr Debby Torres, found to have both PE and DVT, started on Heparin gtt.      # Glioblastoma s/p Craniotomy (WHO 4, IDH wt - MGMT methylated)    - 09/27/2022 MRI brain revealed: irregularly enhancing mass in the left temporal-occipital region measuring 2.6 cm transversely and 8.9 cm cranial - caudal with surrounding vasogenic edema  - 10/03/2022: NeuroSurg Dr Turpin performed a large resection of this mass with pathology --> Glioblastoma, WHO 4, IDH wt - MGMT methylated  - 10/05/2022: Post op MRI revealed LEFT parietal occipital craniotomy with near complete resection of the of the neoplasm in the LEFT occipital/posterior temporal lobes. Minimal residual neoplasm is seen in the anterior part of the neoplasm, which abuts the ependymal surface of the atrium of the LEFT ventricle. Moderate postsurgical hemorrhage is seen within the surgical cavity. Moderate surrounding vasogenic edema is unchanged with effacement of the posterior horn of the LEFT lateral ventricle. Abnormal signal is also seen within the LEFT cerebral peduncle with a 4 mm focus of central enhancement and 1.6 cm region of rim enhancement, which is suspicious for a secondary focus of neoplasm or extension from the primary along the white matter tracts.  - 11/07/2022: ChemoRT started  - 12/20/2022: ChemoRT completed  - 12/30/2022: close friend noted shaking of right UE and rigidity. In the ER, he reportedly had a GTC seizures, he was intubated. He was also found to be COVID positive. He was discharged to rehab on 12/29/2022 and then discharged home on 01/18/2023  - 02/01/2023: Repeat MRI, compared to 12/2022 there is a slight improvement to the enhancing area in the left posterior surgical bed site; Plan to continue dexamethasone at 4mg in am and 2mg at night, pantoprazole 40mg, Keppra 1000mg BID, Vimpat 100mg BID & to start Temozolomide  - Dr Torres planning a clinical follow up & MRI in a month      # PE/DVT    - LE Doppler revealed: Right femoral vein and left peroneal vein acute deep vein thrombosis. Acute deep venous thrombosis: above and below the knee  - CTA Chest imaging revealed: Right lower lobe distal segmental pulmonary embolism  - Started on Heparin gtt; can transition to DOAC (Eliquis) standard dosing; 10mg BID for 1 week then 5mg BID  - Continue supportive measures   64 y/o M with a PMhx of GMB s/p craniotomy, seizures, follows with Neuro/Onc Dr Debby Torres, found to have both PE and DVT, started on Heparin gtt.      # Glioblastoma s/p Craniotomy (WHO 4, IDH wt - MGMT methylated)    - 09/27/2022 MRI brain revealed: irregularly enhancing mass in the left temporal-occipital region measuring 2.6 cm transversely and 8.9 cm cranial - caudal with surrounding vasogenic edema  - 10/03/2022: NeuroSurg Dr Turpin performed a large resection of this mass with pathology --> Glioblastoma, WHO 4, IDH wt - MGMT methylated  - 10/05/2022: Post op MRI revealed LEFT parietal occipital craniotomy with near complete resection of the of the neoplasm in the LEFT occipital/posterior temporal lobes. Minimal residual neoplasm is seen in the anterior part of the neoplasm, which abuts the ependymal surface of the atrium of the LEFT ventricle. Moderate postsurgical hemorrhage is seen within the surgical cavity. Moderate surrounding vasogenic edema is unchanged with effacement of the posterior horn of the LEFT lateral ventricle. Abnormal signal is also seen within the LEFT cerebral peduncle with a 4 mm focus of central enhancement and 1.6 cm region of rim enhancement, which is suspicious for a secondary focus of neoplasm or extension from the primary along the white matter tracts.  - 11/07/2022: adjuvant RT with temodar  started  - 12/20/2022: ChemoRT completed  - 12/30/2022: close friend noted shaking of right UE and rigidity. In the ER, he reportedly had a GTC seizures, he was intubated. He was also found to be COVID positive. He was discharged to rehab on 12/29/2022 and then discharged home on 01/18/2023  - 02/01/2023: Repeat MRI, compared to 12/2022 there is a slight improvement to the enhancing area in the left posterior surgical bed site; Plan to continue dexamethasone at 4mg in am and 2mg at night, pantoprazole 40mg, Keppra 1000mg BID, Vimpat 100mg BID & to start Temozolomide  - Dr Torres planning a clinical follow up & MRI in a month      # PE/DVT    - LE Doppler revealed: Right femoral vein and left peroneal vein acute deep vein thrombosis. Acute deep venous thrombosis: above and below the knee  - CTA Chest imaging revealed: Right lower lobe distal segmental pulmonary embolism  - Started on Heparin gtt; can transition to DOAC (Eliquis) standard dosing; 10mg BID for 1 week then 5mg BID  -    Addendum Hem Onc attending :  Patient with GBM s/p surgical resection, adjuvant  RT with temozolomide with plan to start adjuvant temozolomide  ( Neurooncology Dr Torres)  Admitted with acute bilat LE DVT and segmental RLL PE.  Thrombophilia  due to malignancy. Extended thrombophilia w/up not indicated.  There is very good data regarding efficacy and safety of LMWH and more recently DOAC  for treatment of acute VTE in patients with primary brain tumors  and also treated metastatic brain tumors.  Both options:  LMWH and DOAC were equally effective, risk of serious bleeding complications very low.

## 2023-03-09 ENCOUNTER — TRANSCRIPTION ENCOUNTER (OUTPATIENT)
Age: 63
End: 2023-03-09

## 2023-03-09 VITALS
DIASTOLIC BLOOD PRESSURE: 77 MMHG | OXYGEN SATURATION: 97 % | TEMPERATURE: 98 F | SYSTOLIC BLOOD PRESSURE: 106 MMHG | RESPIRATION RATE: 17 BRPM | HEART RATE: 87 BPM

## 2023-03-09 LAB
ANION GAP SERPL CALC-SCNC: 4 MMOL/L — LOW (ref 5–17)
BUN SERPL-MCNC: 15 MG/DL — SIGNIFICANT CHANGE UP (ref 7–23)
CALCIUM SERPL-MCNC: 8.7 MG/DL — SIGNIFICANT CHANGE UP (ref 8.5–10.1)
CHLORIDE SERPL-SCNC: 107 MMOL/L — SIGNIFICANT CHANGE UP (ref 96–108)
CO2 SERPL-SCNC: 26 MMOL/L — SIGNIFICANT CHANGE UP (ref 22–31)
CREAT SERPL-MCNC: 0.68 MG/DL — SIGNIFICANT CHANGE UP (ref 0.5–1.3)
EGFR: 103 ML/MIN/1.73M2 — SIGNIFICANT CHANGE UP
GLUCOSE SERPL-MCNC: 119 MG/DL — HIGH (ref 70–99)
HCT VFR BLD CALC: 34.9 % — LOW (ref 39–50)
HGB BLD-MCNC: 11.5 G/DL — LOW (ref 13–17)
MCHC RBC-ENTMCNC: 29.9 PG — SIGNIFICANT CHANGE UP (ref 27–34)
MCHC RBC-ENTMCNC: 33 GM/DL — SIGNIFICANT CHANGE UP (ref 32–36)
MCV RBC AUTO: 90.9 FL — SIGNIFICANT CHANGE UP (ref 80–100)
PLATELET # BLD AUTO: 99 K/UL — LOW (ref 150–400)
POTASSIUM SERPL-MCNC: 3.8 MMOL/L — SIGNIFICANT CHANGE UP (ref 3.5–5.3)
POTASSIUM SERPL-SCNC: 3.8 MMOL/L — SIGNIFICANT CHANGE UP (ref 3.5–5.3)
RBC # BLD: 3.84 M/UL — LOW (ref 4.2–5.8)
RBC # FLD: 17.8 % — HIGH (ref 10.3–14.5)
SODIUM SERPL-SCNC: 137 MMOL/L — SIGNIFICANT CHANGE UP (ref 135–145)
WBC # BLD: 6.13 K/UL — SIGNIFICANT CHANGE UP (ref 3.8–10.5)
WBC # FLD AUTO: 6.13 K/UL — SIGNIFICANT CHANGE UP (ref 3.8–10.5)

## 2023-03-09 PROCEDURE — 99239 HOSP IP/OBS DSCHRG MGMT >30: CPT

## 2023-03-09 PROCEDURE — 99232 SBSQ HOSP IP/OBS MODERATE 35: CPT

## 2023-03-09 RX ORDER — ROPINIROLE 8 MG/1
3 TABLET, FILM COATED, EXTENDED RELEASE ORAL
Qty: 90 | Refills: 0
Start: 2023-03-09 | End: 2023-04-07

## 2023-03-09 RX ORDER — CEPHALEXIN 500 MG
1 CAPSULE ORAL
Qty: 2 | Refills: 0
Start: 2023-03-09 | End: 2023-03-09

## 2023-03-09 RX ADMIN — Medication 2 MILLIGRAM(S): at 13:27

## 2023-03-09 RX ADMIN — Medication 2 MILLIGRAM(S): at 05:34

## 2023-03-09 RX ADMIN — PANTOPRAZOLE SODIUM 40 MILLIGRAM(S): 20 TABLET, DELAYED RELEASE ORAL at 05:33

## 2023-03-09 RX ADMIN — APIXABAN 10 MILLIGRAM(S): 2.5 TABLET, FILM COATED ORAL at 10:10

## 2023-03-09 RX ADMIN — LACOSAMIDE 200 MILLIGRAM(S): 50 TABLET ORAL at 10:11

## 2023-03-09 RX ADMIN — LEVETIRACETAM 1000 MILLIGRAM(S): 250 TABLET, FILM COATED ORAL at 10:12

## 2023-03-09 NOTE — DISCHARGE NOTE PROVIDER - CARE PROVIDER_API CALL
Debby Torres)  Neurology  [address] [place] [address]  Phone: [phone]  Fax: [phone]  Follow Up Time: 2 weeks

## 2023-03-09 NOTE — PROGRESS NOTE ADULT - ASSESSMENT
62 y/o M with a PMhx of GMB s/p craniotomy, seizures, follows with Neuro/Onc Dr Debby Torres, found to have both PE and DVT, started on Heparin gtt.      # Glioblastoma s/p Craniotomy (WHO 4, IDH wt - MGMT methylated)    - 09/27/2022 MRI brain revealed: irregularly enhancing mass in the left temporal-occipital region measuring 2.6 cm transversely and 8.9 cm cranial - caudal with surrounding vasogenic edema  - 10/03/2022: NeuroSurg Dr Turpin performed a large resection of this mass with pathology --> Glioblastoma, WHO 4, IDH wt - MGMT methylated  - 10/05/2022: Post op MRI revealed LEFT parietal occipital craniotomy with near complete resection of the of the neoplasm in the LEFT occipital/posterior temporal lobes. Minimal residual neoplasm is seen in the anterior part of the neoplasm, which abuts the ependymal surface of the atrium of the LEFT ventricle. Moderate postsurgical hemorrhage is seen within the surgical cavity. Moderate surrounding vasogenic edema is unchanged with effacement of the posterior horn of the LEFT lateral ventricle. Abnormal signal is also seen within the LEFT cerebral peduncle with a 4 mm focus of central enhancement and 1.6 cm region of rim enhancement, which is suspicious for a secondary focus of neoplasm or extension from the primary along the white matter tracts.  - 11/07/2022: adjuvant RT with temodar started  - 12/20/2022: ChemoRT completed  - 12/30/2022: close friend noted shaking of right UE and rigidity. In the ER, he reportedly had a GTC seizures, he was intubated. He was also found to be COVID positive. He was discharged to rehab on 12/29/2022 and then discharged home on 01/18/2023  - 02/01/2023: Repeat MRI, compared to 12/2022 there is a slight improvement to the enhancing area in the left posterior surgical bed site; Plan to continue dexamethasone at 4mg in am and 2mg at night, pantoprazole 40mg, Keppra 1000mg BID, Vimpat 100mg BID & to start Temozolomide  - Dr Torres planning a clinical follow up & MRI in a month      # PE/DVT    - LE Doppler revealed: Right femoral vein and left peroneal vein acute deep vein thrombosis. Acute deep venous thrombosis: above and below the knee  - CTA Chest imaging revealed: Right lower lobe distal segmental pulmonary embolism  - S/P Heparin gtt; now on Eliquis 10mg BID for 1 week to be transitioned to 5mg BID  -Thrombophilia  due to malignancy. Extended thrombophilia w/up not indicated.  There is very good data regarding efficacy and safety of LMWH and more recently DOAC for treatment of acute VTE in patients with primary brain tumors and also treated metastatic brain tumors. Both options: LMWH and DOAC were equally effective, risk of serious bleeding complications very low.        64 y/o M with a PMhx of GMB s/p craniotomy, seizures, follows with Neuro/Onc Dr Debby Torres, found to have both PE and DVT.      # Glioblastoma s/p Craniotomy (WHO 4, IDH wt - MGMT methylated)    - 09/27/2022 MRI brain revealed: irregularly enhancing mass in the left temporal-occipital region measuring 2.6 cm transversely and 8.9 cm cranial - caudal with surrounding vasogenic edema  - 10/03/2022: NeuroSurg Dr Turpin performed a large resection of this mass with pathology --> Glioblastoma, WHO 4, IDH wt - MGMT methylated  - 10/05/2022: Post op MRI revealed LEFT parietal occipital craniotomy with near complete resection of the of the neoplasm in the LEFT occipital/posterior temporal lobes. Minimal residual neoplasm is seen in the anterior part of the neoplasm, which abuts the ependymal surface of the atrium of the LEFT ventricle. Moderate postsurgical hemorrhage is seen within the surgical cavity. Moderate surrounding vasogenic edema is unchanged with effacement of the posterior horn of the LEFT lateral ventricle. Abnormal signal is also seen within the LEFT cerebral peduncle with a 4 mm focus of central enhancement and 1.6 cm region of rim enhancement, which is suspicious for a secondary focus of neoplasm or extension from the primary along the white matter tracts.  - 11/07/2022: adjuvant RT with temodar started  - 12/20/2022: ChemoRT completed  - 12/30/2022: close friend noted shaking of right UE and rigidity. In the ER, he reportedly had a GTC seizures, he was intubated. He was also found to be COVID positive. He was discharged to rehab on 12/29/2022 and then discharged home on 01/18/2023  - 02/01/2023: Repeat MRI, compared to 12/2022 there is a slight improvement to the enhancing area in the left posterior surgical bed site; Plan to continue dexamethasone at 4mg in am and 2mg at night, pantoprazole 40mg, Keppra 1000mg BID, Vimpat 100mg BID & to start Temozolomide        # PE/DVT    - LE Doppler revealed: Right femoral vein and left peroneal vein acute deep vein thrombosis. Acute deep venous thrombosis: above and below the knee  - CTA Chest imaging revealed: Right lower lobe distal segmental pulmonary embolism  - S/P Heparin gtt; now on Eliquis 10mg BID for 1 week to be transitioned to 5mg BID  -Thrombophilia  due to malignancy. Extended thrombophilia w/up not indicated.  There is very good data regarding efficacy and safety of LMWH and more recently DOAC for treatment of acute VTE in patients with primary brain tumors and also treated metastatic brain tumors. Both options: LMWH and DOAC were equally effective, risk of serious bleeding complications very low.    Patient will be discharged on Eliquis . Follow up with Dr Torres as planned

## 2023-03-09 NOTE — DISCHARGE NOTE NURSING/CASE MANAGEMENT/SOCIAL WORK - PATIENT PORTAL LINK FT
You can access the FollowMyHealth Patient Portal offered by [facility] by registering at the following website: [email or web address]. By joining [employer]’s FollowMyHealth portal, you will also be able to view your health information using other applications (apps) compatible with our system.

## 2023-03-09 NOTE — DISCHARGE NOTE PROVIDER - NSDCMRMEDTOKEN_GEN_ALL_CORE_FT
acetaminophen 325 mg oral tablet: 2 tab(s) orally every 6 hours, As needed, Temp greater or equal to 38C (100.4F), Mild Pain (1 - 3)  apixaban 5 mg oral tablet: 2 tab(s) orally every 12 hours for 6 days, then taper to 1 tab po every 12hrs. Contact your physician before you run out of this medication   bisacodyl 5 mg oral delayed release tablet: 1 tab(s) orally once a day (at bedtime), As Needed  calcium carbonate 500 mg (200 mg elemental calcium) oral tablet, chewable: 1 tab(s) orally 3 times a day, As needed, Dyspepsia  cephalexin 500 mg oral tablet: 1 tab(s) orally 2 times a day for 1 day  dexamethasone 2 mg oral tablet: 2 tab(s) orally once a day (in the morning)  dexamethasone 2 mg oral tablet: 1 tab(s) orally once a day (in the afternoon)  lacosamide 200 mg oral tablet: 2 tab(s) orally 2 times a day  levETIRAcetam 1000 mg oral tablet: 1 tab(s) orally 2 times a day  mirtazapine 7.5 mg oral tablet: 1 tab(s) orally once a day (at bedtime), As Needed  ocular lubricant ophthalmic solution: 1 drop(s) to each affected eye every 6 hours, As Needed  ondansetron 8 mg oral tablet: 1 tab(s) orally 3 times a day, As Needed  ***pt takes daily when taking Temozolomide***  pantoprazole 40 mg oral delayed release tablet: 1 tab(s) orally once a day (in the afternoon)  rOPINIRole 0.25 mg oral tablet: 3 tab(s) orally once a day (at bedtime)  ***pt out of supply at home***  temozolomide 20 mg oral capsule: 3 cap(s) orally once a day (at bedtime) for 5 days out of the month, pt was scheduled to resume TODAY PM  ***take with 150mg for TDD = 310mg***  temozolomide 250 mg oral capsule: 1 cap(s) orally once a day (at bedtime) for 5 days out of the month, pt was scheduled to resume TODAY PM  ***take with 3x 20mg for TDD = 310mg***

## 2023-03-09 NOTE — PROGRESS NOTE ADULT - SUBJECTIVE AND OBJECTIVE BOX
CC: 62 y/o male with PMHx of seizures and Glioblastoma s/p craniotomy on chemo was seen at [facility] this morning, DX with UTI and d/c'd. Patient returns  to ED from home after having another mechanical slip and fall out of bed. Pt reports he is not using the walker he is supposed to use and could not get up off the floor. Positive weakness, DEL TORO and palpitations. NO CP, LOC or head trauma.  IN the ED patient was started on IV Rocephin and Er physician spoke with patients  hemeoncologist who cleared him to start IV heparin for pulmonary embolism    Vital Signs Last 24 Hrs  T(C): 36.3 (08 Mar 2023 08:50), Max: 36.8 (07 Mar 2023 21:12)  T(F): 97.3 (08 Mar 2023 08:50), Max: 98.3 (07 Mar 2023 21:12)  HR: 83 (08 Mar 2023 08:50) (77 - 91)  BP: 117/61 (08 Mar 2023 08:50) (95/64 - 117/61)  BP(mean): 94 (07 Mar 2023 21:12) (94 - 94)  RR: 18 (08 Mar 2023 08:50) (18 - 19)  SpO2: 95% (08 Mar 2023 08:50) (95% - 97%)/RA   Constitutional: NAD, awake and alert  HEENT: PERR, EOMI  Neck: Soft and supple,  No JVD  Respiratory: Breath sounds are clear bilaterally, No wheezing, rales or rhonchi  Cardiovascular: S1 and S2, regular rate and rhythm, no Murmurs  Gastrointestinal: Bowel Sounds present, soft, nontender, nondistended  Extremities: No peripheral edema  Vascular: 2+ peripheral pulses  Neurological: A/O x 3, no focal deficits  Musculoskeletal: 5/5 strength b/l upper and lower extremities  Skin: No rashes    MEDICATIONS:  MEDICATIONS  (STANDING):  apixaban 10 milliGRAM(s) Oral every 12 hours  cefTRIAXone Injectable. 1000 milliGRAM(s) IV Push every 24 hours  dexAMETHasone     Tablet 2 milliGRAM(s) Oral daily  dexAMETHasone     Tablet 2 milliGRAM(s) Oral daily  lacosamide 200 milliGRAM(s) Oral two times a day  levETIRAcetam 1000 milliGRAM(s) Oral two times a day  mirtazapine 7.5 milliGRAM(s) Oral at bedtime  pantoprazole    Tablet 40 milliGRAM(s) Oral before breakfast  rOPINIRole 0.75 milliGRAM(s) Oral at bedtime  sodium chloride 0.9%. 1000 milliLiter(s) (75 mL/Hr) IV Continuous <Continuous>      LABS: All Labs Reviewed:                        11.5   7.31  )-----------( 107      ( 08 Mar 2023 05:43 )             35.4     03-08    139  |  108  |  13  ----------------------------<  106<H>  3.6   |  27  |  0.62    Ca    8.6      08 Mar 2023 05:43  Phos  3.1     03-08  Mg     2.1     03-08      RADIOLOGY/EKG:  < from: US Duplex Venous Lower Ext Complete, Bilateral (03.07.23 @ 20:42) >  Right femoral vein and left peroneal vein acute deep vein thrombosis  Acute deep venous thrombosis: above and below the knee.    < from: CT Angio Chest PE Protocol w/ IV Cont (03.07.23 @ 14:11) >  Right lower lobe distal segmental pulmonary embolism.      
HPI:    62 y/o M with PMHx of seizures, Glioblastoma s/p craniotomy on chemo recently seen at ED, dx with UTI and d/c'd but Patient returned from home after experiencing a mechanical slip and fall out of bed. He reported that he is not using the walker he is supposed to use and could not get up off the floor; endorsed weakness, DEL TORO and palpitations but denied any other acute c/o at the time. He was found to have PE and DVT, ED attending spoke with patient's primary Neuro/Onc Dr Debby Torres who cleared him to start IV Heparin. (07 Mar 2023 18:07)      2023: Seen at bedside, no acute distress but tearful, expressing sadness surrounding the past 6 months of his life with new dx, losing brother, other life stressors that he is currently dealing with    3/9/2023- Seen at bedside, OOB to bathroom, in no acute distress. C/O constipation, asking something to move bowels. Also want to go home today and ant the team to inform his partner. PT to evaluate      PAST MEDICAL & SURGICAL HISTORY:    Glioblastoma    Seizures    S/P craniotomy        MEDICATIONS  (STANDING):    cefTRIAXone Injectable. 1000 milliGRAM(s) IV Push every 24 hours  dexAMETHasone     Tablet 2 milliGRAM(s) Oral daily  dexAMETHasone     Tablet 2 milliGRAM(s) Oral daily  lacosamide 200 milliGRAM(s) Oral two times a day  levETIRAcetam 1000 milliGRAM(s) Oral two times a day  mirtazapine 7.5 milliGRAM(s) Oral at bedtime  pantoprazole    Tablet 40 milliGRAM(s) Oral before breakfast  rOPINIRole 0.75 milliGRAM(s) Oral at bedtime  sodium chloride 0.9%. 1000 milliLiter(s) (75 mL/Hr) IV Continuous <Continuous>      MEDICATIONS  (PRN):    acetaminophen     Tablet .. 650 milliGRAM(s) Oral every 6 hours PRN Temp greater or equal to 38C (100.4F), Mild Pain (1 - 3)  aluminum hydroxide/magnesium hydroxide/simethicone Suspension 30 milliLiter(s) Oral every 4 hours PRN Dyspepsia  melatonin 3 milliGRAM(s) Oral at bedtime PRN Insomnia  ondansetron Injectable 4 milliGRAM(s) IV Push every 8 hours PRN Nausea and/or Vomiting      ALLERGIES:    No Known Allergies        FAMILY HISTORY:    Non-contributory; Adopted      REVIEW OF SYSTEMS:    Constitutional, Eyes, ENT, Cardiovascular, Respiratory, Gastrointestinal, Genitourinary, Musculoskeletal, Integumentary, Neurological, Psychiatric, Endocrine, Heme/Lymph and Allergic/Immunologic review of systems are otherwise negative except as noted in HPI.       VITALS:    Vital Signs Last 24 Hrs  T(C): 36.7 (09 Mar 2023 05:27), Max: 36.7 (09 Mar 2023 05:27)  T(F): 98.1 (09 Mar 2023 05:27), Max: 98.1 (09 Mar 2023 05:27)  HR: 75 (09 Mar 2023 05:27) (75 - 92)  BP: 90/60 (09 Mar 2023 05:) (90/60 - 117/61)  BP(mean): 67 (09 Mar 2023 05:) (67 - 67)  RR: 18 (09 Mar 2023 05:) (18 - 18)  SpO2: 96% (09 Mar 2023 05:27) (95% - 100%)    Parameters below as of 09 Mar 2023 05:27  Patient On (Oxygen Delivery Method): room air      PHYSICAL:    Constitutional: no acute distress  Eyes: PERRL, EOMI  ENT: pharynx is unremarkable  Neck: supple without JVD  Pulmonary: clear to auscultation bilaterally, no dullness, no wheezing  Cardiac: RRR, normal S1S2  Vascular: no calf tenderness, venous stasis changes, varices  Abdomen: normoactive bowel sounds, soft and nontender, no hepatosplenomegaly or masses appreciated  Lymphatic: no peripheral adenopathy appreciated  Musculoskeletal: full range of motion and no deformities appreciated  Skin: normal appearance, no rash/erythema  Neurology: grossly intact, no obvious focal deficits; A & Ox3 (person, place, time)  Psychiatric: affect/mood/insight/judgment appropriate       LABS:                          11.5   6.13  )-----------( 99       ( 09 Mar 2023 07:23 )             34.9     03-08    139  |  108  |  13  ----------------------------<  106<H>  3.6   |  27  |  0.62    Ca    8.6      08 Mar 2023 05:43  Phos  3.1     03-08  Mg     2.1     03-08    TPro  5.6<L>  /  Alb  2.3<L>  /  TBili  0.5  /  DBili  x   /  AST  11<L>  /  ALT  35  /  AlkPhos  52  03-08    PT/INR - ( 07 Mar 2023 15:42 )   PT: 13.0 sec;   INR: 1.12 ratio    PTT - ( 08 Mar 2023 05:43 )  PTT:137.9 sec    Urinalysis Basic - ( 07 Mar 2023 04:17 )    Color: Yellow / Appearance: Slightly Turbid / S.010 / pH: x  Gluc: x / Ketone: Negative  / Bili: Negative / Urobili: 1   Blood: x / Protein: Negative / Nitrite: Negative   Leuk Esterase: Trace / RBC: 11-25 /HPF / WBC 26-50 /HPF   Sq Epi: x / Non Sq Epi: Few / Bacteria: TNTC        RADIOLOGY & ADDITIONAL STUDIES:        CT Head No Cont (23 @ 03:33)    IMPRESSION:  No acute intracranial pathology identified.  Reidentified is a large area of confluent hypoattenuation within the left   posterior parietal and temporal lobes, compatible with known GBM tumor.   Mass effect upon the left ventricle is unchanged. No midline shift.          CT Angio Chest PE Protocol w/ IV Cont (23 @ 14:11)    IMPRESSION:.    Right lower lobe distal segmental pulmonary embolism.        US Duplex Venous Lower Ext Complete, Bilateral (23 @ 20:42)    IMPRESSION:  Right femoral vein and left peroneal vein acute deep vein thrombosis  Acute deep venous thrombosis: above and below the knee.

## 2023-03-09 NOTE — DISCHARGE NOTE PROVIDER - NSDCHHATTENDCERT_GEN_ALL_CORE
My signature below certifies that the above stated patient is homebound and upon completion of the Face-To-Face encounter, has the need for intermittent skilled nursing, physical therapy and/or speech or occupational therapy services in their home for their current diagnosis as outlined in their initial plan of care. These services will continue to be monitored by myself or another physician. 2 = assistive person

## 2023-03-09 NOTE — DISCHARGE NOTE NURSING/CASE MANAGEMENT/SOCIAL WORK - NSDCCRNAME_GEN_ALL_CORE_FT
Community resource folder provided, fall precaution teaching and seizure precaution teaching at bedside.

## 2023-03-09 NOTE — DISCHARGE NOTE PROVIDER - HOSPITAL COURSE
CC: 62 y/o male with PMHx of seizures and Glioblastoma s/p craniotomy on chemo was seen at [facility] this morning, DX with UTI and d/c'd. Patient returns  to ED from home after having another mechanical slip and fall out of bed. Pt reports he is not using the walker he is supposed to use and could not get up off the floor. Positive weakness, DEL TORO and palpitations. NO CP, LOC or head trauma.  IN the ED patient was started on IV Rocephin and Er physician spoke with patients  hemeoncologist who cleared him to start IV heparin for pulmonary embolism    HOSPITAL COURSE:   #Syncope due to PE  #DEL TORO due  to PE  transition from IV HEP GTT  to PO ELIQUIS - loading dose followed by  maintenance dose  pt counseled re risks benefits and precautions to take while on AC  see Dr Torres as OP     #UTI, suspected in ED  s/p IV rocephin here, complete the cephalexin two more doses which you have at home  (total 3d rx)     #Hx of GBM/seizures  -CT head NAD  -continue Dexamethasone, Keppra   -confirm VImpat dose   -Hemeonc consult, seen by Dr Bauman    #VTE ppx  SCDS    #GOC - Johnnie is is his HCP - currently Full Code      OTHER DETAILS:   3/9 - no cp palps sob abdo pain tingling or numbness     PHYSICAL EXAM:  T(F): 97.9 (09 Mar 2023 15:10), Max: 98.1 (09 Mar 2023 05:27)  HR: 87 (09 Mar 2023 15:10) (75 - 92)  BP: 106/77 (09 Mar 2023 15:10) (90/60 - 123/87)  BP(mean): 67 (09 Mar 2023 05:27) (67 - 67)  RR: 17 (09 Mar 2023 15:10) (17 - 18)  SpO2: 97% (09 Mar 2023 15:10) (96% - 99%)/RA   GENERAL: NAD, able to lie flat in bed  HEAD:  Atraumatic, Normocephalic  EYES: EOMI, PERRLA, normal sclera  ENT: Moist mucous membranes  NECK: Supple, No JVD, no nuchal rigidity  CHEST/LUNG: Clear to auscultation bilaterally; No rales, rhonchi, wheezing, or rubs. Unlabored respirations  HEART: Regular rate and rhythm; No murmurs, rubs, or gallops  ABDOMEN: Bowel sounds present; Soft, Nontender, Nondistended. No hepatomegaly  EXTREMITIES:  no pitting bilaterally  NERVOUS SYSTEM:  Alert & Oriented X3, speech clear. No focal motor or sensory deficits  MSK: FROM all 4 extremities, full and equal strength  SKIN: No rashes or lesions      RADIOLOGY/EKG:  < from: US Duplex Venous Lower Ext Complete, Bilateral (03.07.23 @ 20:42) >  Right femoral vein and left peroneal vein acute deep vein thrombosis  Acute deep venous thrombosis: above and below the knee.    < from: CT Angio Chest PE Protocol w/ IV Cont (03.07.23 @ 14:11) >  Right lower lobe distal segmental pulmonary embolism.                           11.5   7.31  )-----------( 107      ( 08 Mar 2023 05:43 )             35.4     time spent on discharge - 50 mins

## 2023-03-09 NOTE — DISCHARGE NOTE PROVIDER - NSDCCPCAREPLAN_GEN_ALL_CORE_FT
PRINCIPAL DISCHARGE DIAGNOSIS  Diagnosis: Pulmonary embolism  Assessment and Plan of Treatment: Please continue eliquis as prescribed   As you are on a blood thinner, take precautions as discussed, monitor yourself for bleeding.  If you notice bleeding, call your PCP or return to the ER.  If you develop chest pain, palpitations, lightheadedness or  trouble breathing, call your PCP or go to the ER.  You can take tylenol for pain but please avoid NSAIDS (examples are ibuprofen, advil, motrin) as they can cause bleeding.        SECONDARY DISCHARGE DIAGNOSES  Diagnosis: Suspected UTI  Assessment and Plan of Treatment: complete on more day of antibiotics ( As prescribed by ER - cephelaexin - take a dose this evenign and a dose tomorrow morning. After that, you can stop)

## 2023-03-11 LAB — LEVETIRACETAM SERPL-MCNC: 20.7 UG/ML — SIGNIFICANT CHANGE UP (ref 10–40)

## 2023-03-14 DIAGNOSIS — C71.9 MALIGNANT NEOPLASM OF BRAIN, UNSPECIFIED: ICD-10-CM

## 2023-03-14 DIAGNOSIS — R55 SYNCOPE AND COLLAPSE: ICD-10-CM

## 2023-03-14 DIAGNOSIS — Z79.52 LONG TERM (CURRENT) USE OF SYSTEMIC STEROIDS: ICD-10-CM

## 2023-03-14 DIAGNOSIS — I26.93 SINGLE SUBSEGMENTAL PULMONARY EMBOLISM WITHOUT ACUTE COR PULMONALE: ICD-10-CM

## 2023-03-14 DIAGNOSIS — Z79.2 LONG TERM (CURRENT) USE OF ANTIBIOTICS: ICD-10-CM

## 2023-03-14 DIAGNOSIS — Z86.16 PERSONAL HISTORY OF COVID-19: ICD-10-CM

## 2023-03-14 DIAGNOSIS — Z98.890 OTHER SPECIFIED POSTPROCEDURAL STATES: ICD-10-CM

## 2023-03-14 DIAGNOSIS — I82.411 ACUTE EMBOLISM AND THROMBOSIS OF RIGHT FEMORAL VEIN: ICD-10-CM

## 2023-03-14 DIAGNOSIS — I82.452 ACUTE EMBOLISM AND THROMBOSIS OF LEFT PERONEAL VEIN: ICD-10-CM

## 2023-03-14 DIAGNOSIS — Z79.899 OTHER LONG TERM (CURRENT) DRUG THERAPY: ICD-10-CM

## 2023-03-15 ENCOUNTER — OUTPATIENT (OUTPATIENT)
Dept: OUTPATIENT SERVICES | Facility: HOSPITAL | Age: 63
LOS: 1 days | End: 2023-03-15
Payer: MEDICAID

## 2023-03-15 ENCOUNTER — APPOINTMENT (OUTPATIENT)
Dept: NEUROLOGY | Facility: CLINIC | Age: 63
End: 2023-03-15
Payer: MEDICAID

## 2023-03-15 ENCOUNTER — RESULT REVIEW (OUTPATIENT)
Age: 63
End: 2023-03-15

## 2023-03-15 ENCOUNTER — APPOINTMENT (OUTPATIENT)
Dept: MRI IMAGING | Facility: IMAGING CENTER | Age: 63
End: 2023-03-15
Payer: MEDICAID

## 2023-03-15 ENCOUNTER — APPOINTMENT (OUTPATIENT)
Dept: HEMATOLOGY ONCOLOGY | Facility: CLINIC | Age: 63
End: 2023-03-15

## 2023-03-15 VITALS
SYSTOLIC BLOOD PRESSURE: 126 MMHG | DIASTOLIC BLOOD PRESSURE: 80 MMHG | OXYGEN SATURATION: 98 % | BODY MASS INDEX: 28.63 KG/M2 | HEART RATE: 92 BPM | HEIGHT: 70 IN | RESPIRATION RATE: 16 BRPM | TEMPERATURE: 97.6 F | WEIGHT: 200 LBS

## 2023-03-15 DIAGNOSIS — Z98.890 OTHER SPECIFIED POSTPROCEDURAL STATES: Chronic | ICD-10-CM

## 2023-03-15 DIAGNOSIS — C71.9 MALIGNANT NEOPLASM OF BRAIN, UNSPECIFIED: ICD-10-CM

## 2023-03-15 DIAGNOSIS — Z00.8 ENCOUNTER FOR OTHER GENERAL EXAMINATION: ICD-10-CM

## 2023-03-15 LAB
BASOPHILS # BLD AUTO: 0.04 K/UL — SIGNIFICANT CHANGE UP (ref 0–0.2)
BASOPHILS NFR BLD AUTO: 0.4 % — SIGNIFICANT CHANGE UP (ref 0–2)
EOSINOPHIL # BLD AUTO: 0 K/UL — SIGNIFICANT CHANGE UP (ref 0–0.5)
EOSINOPHIL NFR BLD AUTO: 0 % — SIGNIFICANT CHANGE UP (ref 0–6)
HCT VFR BLD CALC: 41.6 % — SIGNIFICANT CHANGE UP (ref 39–50)
HGB BLD-MCNC: 13.7 G/DL — SIGNIFICANT CHANGE UP (ref 13–17)
IMM GRANULOCYTES NFR BLD AUTO: 3.8 % — HIGH (ref 0–0.9)
LYMPHOCYTES # BLD AUTO: 1.07 K/UL — SIGNIFICANT CHANGE UP (ref 1–3.3)
LYMPHOCYTES # BLD AUTO: 12 % — LOW (ref 13–44)
MCHC RBC-ENTMCNC: 30 PG — SIGNIFICANT CHANGE UP (ref 27–34)
MCHC RBC-ENTMCNC: 32.9 G/DL — SIGNIFICANT CHANGE UP (ref 32–36)
MCV RBC AUTO: 91.2 FL — SIGNIFICANT CHANGE UP (ref 80–100)
MONOCYTES # BLD AUTO: 0.17 K/UL — SIGNIFICANT CHANGE UP (ref 0–0.9)
MONOCYTES NFR BLD AUTO: 1.9 % — LOW (ref 2–14)
NEUTROPHILS # BLD AUTO: 7.28 K/UL — SIGNIFICANT CHANGE UP (ref 1.8–7.4)
NEUTROPHILS NFR BLD AUTO: 81.9 % — HIGH (ref 43–77)
NRBC # BLD: 0 /100 WBCS — SIGNIFICANT CHANGE UP (ref 0–0)
PLATELET # BLD AUTO: 152 K/UL — SIGNIFICANT CHANGE UP (ref 150–400)
RBC # BLD: 4.56 M/UL — SIGNIFICANT CHANGE UP (ref 4.2–5.8)
RBC # FLD: 18 % — HIGH (ref 10.3–14.5)
WBC # BLD: 8.9 K/UL — SIGNIFICANT CHANGE UP (ref 3.8–10.5)
WBC # FLD AUTO: 8.9 K/UL — SIGNIFICANT CHANGE UP (ref 3.8–10.5)

## 2023-03-15 PROCEDURE — 70553 MRI BRAIN STEM W/O & W/DYE: CPT

## 2023-03-15 PROCEDURE — A9585: CPT

## 2023-03-15 PROCEDURE — 99215 OFFICE O/P EST HI 40 MIN: CPT

## 2023-03-15 PROCEDURE — 70553 MRI BRAIN STEM W/O & W/DYE: CPT | Mod: 26

## 2023-03-15 NOTE — DISCUSSION/SUMMARY
[FreeTextEntry1] : Patient seen and examined\par GLIOMA - Neurologically not worse\par MRI reviewed from today , comparing with the MRI from 2/1/2023, I do not see any change  to the enhancing area in the left posterior surgical bed site \par Will do CBC, CMP today\par Will order TMZ - Pending blood work \par CEREBRAL EDEMA  - Take dexamethasone at 4-2 mg. GI Prophylaxis with pantoprazole. Discussed adding IV Avastin as patient is steroid dependant and for a successful taper. Risk and benefits explained in detail\par DVT/PE - Continue Eliquis 5 mg bid\par SEIZURES- Continue Keppra 1000 mg bid and Vimpat 200 mg bid\par FOLLOW UP - Will do a clinical follow up prior to the first IV Avastin. In the interim, if any concerns patient knows to call our office.

## 2023-03-15 NOTE — HISTORY OF PRESENT ILLNESS
[FreeTextEntry1] : Mr. Ramos is being seen in neuro oncologic evaluation for a new diagnosis of brain tumor.\par \par History obtained via discussion with patient and chart review, including personal review of all neuroimaging.\par Mr. 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 He returned on 926 with worsening headache and underwent MRI scan of his brain:\par \par MRI brain 9/27 shows an irregularly enhancing mass in the left temporal-occipital region measuring 2.6 cm transversely and 8.9 cm cranial - caudal with surrounding vasogenic edema.\par \par CT C/A/P 9/26 unremarkable.\par \par Dr. Turpin performed a large resection of this mass on 10/3.\par 10/3 Pathology - Glioblastoma, WHO 4, IDH wt - MGMT methylated.\par \par Post-operative MRI 10/5: LEFT parietal occipital craniotomy with near complete resection of the of the neoplasm in the LEFT occipital/posterior temporal lobes. Minimal residual neoplasm is seen in the anterior part of the neoplasm, which abuts the ependymal surface of the atrium of the LEFT ventricle. Moderate postsurgical hemorrhage is seen within the surgical cavity. Moderate surrounding vasogenic edema is unchanged with effacement of the posterior horn of the LEFT lateral ventricle. Abnormal signal is also seen within the LEFT cerebral peduncle with a 4 mm focus of central enhancement and 1.6 cm region of rim enhancement, which is suspicious for a secondary focus of neoplasm or extension from the primary along the white matter tracts.\par 10/25- Had an episode of loss of vision X 15-20 minutes , episode resolved on its own - Restarted Dexamethasone 2 mg daily, Keppra raised to 750 mg bid\par 11/2/2022- He continues to have headaches even waking him from sleep. Raised dexamethasone to 4 mg daily\par 11/7/2022 - CHEMORT started\par 11/14/2022 - Reports when we raised the Dexamethasone on the 2nd , he was not taking the 4 mg daily, after discussing with HCP started on 11/7, headache didn’t resolve so on 11/10- Dr Ayala raised to 4 mg bid. Since then he reports no further headaches. Vision has not improved but has not worsened. \par 12/20/2022 - Completed ChemoRT \par 12/30/2022 - a friend called him and he was "not making sense.' Friend went to see him and bring him to the hospital - noted shaking of right UE and rigidity. In the ER, he reportedly had a GTC seizures - he was intubated. He was also found to be COVID positive. He was discharged to rehab on 12/29 and then discharged home on 1/18.\par 2/1/2023- MRI with slight improvement - Tapered Dexamethasone to 4-2 mg\par 2/6/2023- 2/10- TMZ first cycle\par 2/15-2/17 ( At [facility] ) and then transferred to [facility] and stayed inpatient from 2/17-2/21/2023  - Admitted   sec to seizures, and expressive aphasia. discharged on Keppra 1000 mg bid and Vimpat 200 mg bid\par 3/7-3/9/2023 - Admitted at [facility] after a mechanical fall, he was found to have a PE bleed and UTI - He was treated with Rocephin for UTI, Started on IV Heparin and then transitioned to Eliquis and then was discharged home\par 3/15/2023- Here for a follow up along with a new MRI. Patient states he do not feel great. Denies headaches, seizures\par

## 2023-03-15 NOTE — PHYSICAL EXAM
[General Appearance - Alert] : alert [General Appearance - In No Acute Distress] : in no acute distress [General Appearance - Well Nourished] : well nourished [Oriented To Time, Place, And Person] : oriented to person, place, and time [] : no respiratory distress [Respiration, Rhythm And Depth] : normal respiratory rhythm and effort [Exaggerated Use Of Accessory Muscles For Inspiration] : no accessory muscle use [Edema] : there was no peripheral edema [FreeTextEntry1] : Awake and alert - names well and follows commands across the midline\par Oriented to person, place, month, and year.\par EOMI, RIght VF defect - UQ more than lower - stable\par Face is symmetric and tongue protrudes midline\par No drift noted\par UE strength full bilaterally\par Bilateral proximal 5-/5 LE weakness - rest is full.\par He is walking with a cane\par No LE edema noted.

## 2023-03-16 ENCOUNTER — EMERGENCY (EMERGENCY)
Facility: HOSPITAL | Age: 63
LOS: 1 days | Discharge: HOME | End: 2023-03-16
Attending: EMERGENCY MEDICINE
Payer: MEDICAID

## 2023-03-16 VITALS
DIASTOLIC BLOOD PRESSURE: 72 MMHG | HEART RATE: 78 BPM | OXYGEN SATURATION: 98 % | TEMPERATURE: 98 F | RESPIRATION RATE: 18 BRPM | SYSTOLIC BLOOD PRESSURE: 107 MMHG

## 2023-03-16 VITALS
SYSTOLIC BLOOD PRESSURE: 126 MMHG | OXYGEN SATURATION: 96 % | HEIGHT: 70 IN | TEMPERATURE: 98 F | RESPIRATION RATE: 20 BRPM | WEIGHT: 199.96 LBS | HEART RATE: 119 BPM | DIASTOLIC BLOOD PRESSURE: 94 MMHG

## 2023-03-16 DIAGNOSIS — Z98.890 OTHER SPECIFIED POSTPROCEDURAL STATES: Chronic | ICD-10-CM

## 2023-03-16 LAB
ALBUMIN SERPL ELPH-MCNC: 3.8 G/DL — SIGNIFICANT CHANGE UP (ref 3.3–5)
ALP SERPL-CCNC: 62 U/L — SIGNIFICANT CHANGE UP (ref 40–120)
ALT FLD-CCNC: 37 U/L — SIGNIFICANT CHANGE UP (ref 10–45)
ANION GAP SERPL CALC-SCNC: 10 MMOL/L — SIGNIFICANT CHANGE UP (ref 5–17)
ANISOCYTOSIS BLD QL: SLIGHT — SIGNIFICANT CHANGE UP
APTT BLD: 25.5 SEC — LOW (ref 27.5–35.5)
AST SERPL-CCNC: 13 U/L — SIGNIFICANT CHANGE UP (ref 10–40)
BASOPHILS # BLD AUTO: 0 K/UL — SIGNIFICANT CHANGE UP (ref 0–0.2)
BASOPHILS NFR BLD AUTO: 0 % — SIGNIFICANT CHANGE UP (ref 0–2)
BILIRUB SERPL-MCNC: 0.3 MG/DL — SIGNIFICANT CHANGE UP (ref 0.2–1.2)
BUN SERPL-MCNC: 11 MG/DL — SIGNIFICANT CHANGE UP (ref 7–23)
CALCIUM SERPL-MCNC: 9.2 MG/DL — SIGNIFICANT CHANGE UP (ref 8.4–10.5)
CHLORIDE SERPL-SCNC: 102 MMOL/L — SIGNIFICANT CHANGE UP (ref 96–108)
CO2 SERPL-SCNC: 27 MMOL/L — SIGNIFICANT CHANGE UP (ref 22–31)
CREAT SERPL-MCNC: 0.68 MG/DL — SIGNIFICANT CHANGE UP (ref 0.5–1.3)
DACRYOCYTES BLD QL SMEAR: SLIGHT — SIGNIFICANT CHANGE UP
EGFR: 103 ML/MIN/1.73M2 — SIGNIFICANT CHANGE UP
ELLIPTOCYTES BLD QL SMEAR: SLIGHT — SIGNIFICANT CHANGE UP
EOSINOPHIL # BLD AUTO: 0 K/UL — SIGNIFICANT CHANGE UP (ref 0–0.5)
EOSINOPHIL NFR BLD AUTO: 0 % — SIGNIFICANT CHANGE UP (ref 0–6)
GLUCOSE SERPL-MCNC: 150 MG/DL — HIGH (ref 70–99)
HCT VFR BLD CALC: 42.8 % — SIGNIFICANT CHANGE UP (ref 39–50)
HGB BLD-MCNC: 13.7 G/DL — SIGNIFICANT CHANGE UP (ref 13–17)
INR BLD: 1.1 RATIO — SIGNIFICANT CHANGE UP (ref 0.88–1.16)
LYMPHOCYTES # BLD AUTO: 0.83 K/UL — LOW (ref 1–3.3)
LYMPHOCYTES # BLD AUTO: 9.3 % — LOW (ref 13–44)
MACROCYTES BLD QL: SLIGHT — SIGNIFICANT CHANGE UP
MANUAL SMEAR VERIFICATION: SIGNIFICANT CHANGE UP
MCHC RBC-ENTMCNC: 29.5 PG — SIGNIFICANT CHANGE UP (ref 27–34)
MCHC RBC-ENTMCNC: 32 GM/DL — SIGNIFICANT CHANGE UP (ref 32–36)
MCV RBC AUTO: 92.2 FL — SIGNIFICANT CHANGE UP (ref 80–100)
METAMYELOCYTES # FLD: 1.7 % — HIGH (ref 0–0)
MICROCYTES BLD QL: SLIGHT — SIGNIFICANT CHANGE UP
MONOCYTES # BLD AUTO: 0.3 K/UL — SIGNIFICANT CHANGE UP (ref 0–0.9)
MONOCYTES NFR BLD AUTO: 3.4 % — SIGNIFICANT CHANGE UP (ref 2–14)
MYELOCYTES NFR BLD: 1.7 % — HIGH (ref 0–0)
NEUTROPHILS # BLD AUTO: 7.45 K/UL — HIGH (ref 1.8–7.4)
NEUTROPHILS NFR BLD AUTO: 83.9 % — HIGH (ref 43–77)
NT-PROBNP SERPL-SCNC: 77 PG/ML — SIGNIFICANT CHANGE UP (ref 0–300)
OVALOCYTES BLD QL SMEAR: SLIGHT — SIGNIFICANT CHANGE UP
PLAT MORPH BLD: NORMAL — SIGNIFICANT CHANGE UP
PLATELET # BLD AUTO: 187 K/UL — SIGNIFICANT CHANGE UP (ref 150–400)
POLYCHROMASIA BLD QL SMEAR: SLIGHT — SIGNIFICANT CHANGE UP
POTASSIUM SERPL-MCNC: 4 MMOL/L — SIGNIFICANT CHANGE UP (ref 3.5–5.3)
POTASSIUM SERPL-SCNC: 4 MMOL/L — SIGNIFICANT CHANGE UP (ref 3.5–5.3)
PROT SERPL-MCNC: 6.5 G/DL — SIGNIFICANT CHANGE UP (ref 6–8.3)
PROTHROM AB SERPL-ACNC: 12.7 SEC — SIGNIFICANT CHANGE UP (ref 10.5–13.4)
RBC # BLD: 4.64 M/UL — SIGNIFICANT CHANGE UP (ref 4.2–5.8)
RBC # FLD: 18.1 % — HIGH (ref 10.3–14.5)
RBC BLD AUTO: ABNORMAL
SODIUM SERPL-SCNC: 139 MMOL/L — SIGNIFICANT CHANGE UP (ref 135–145)
TROPONIN T, HIGH SENSITIVITY RESULT: 14 NG/L — SIGNIFICANT CHANGE UP (ref 0–51)
WBC # BLD: 8.88 K/UL — SIGNIFICANT CHANGE UP (ref 3.8–10.5)
WBC # FLD AUTO: 8.88 K/UL — SIGNIFICANT CHANGE UP (ref 3.8–10.5)

## 2023-03-16 PROCEDURE — 71275 CT ANGIOGRAPHY CHEST: CPT | Mod: 26,MA

## 2023-03-16 PROCEDURE — 71275 CT ANGIOGRAPHY CHEST: CPT | Mod: MA

## 2023-03-16 PROCEDURE — 80053 COMPREHEN METABOLIC PANEL: CPT

## 2023-03-16 PROCEDURE — 84484 ASSAY OF TROPONIN QUANT: CPT

## 2023-03-16 PROCEDURE — 83880 ASSAY OF NATRIURETIC PEPTIDE: CPT

## 2023-03-16 PROCEDURE — 71045 X-RAY EXAM CHEST 1 VIEW: CPT | Mod: 26

## 2023-03-16 PROCEDURE — 71045 X-RAY EXAM CHEST 1 VIEW: CPT

## 2023-03-16 PROCEDURE — 85730 THROMBOPLASTIN TIME PARTIAL: CPT

## 2023-03-16 PROCEDURE — 85025 COMPLETE CBC W/AUTO DIFF WBC: CPT

## 2023-03-16 PROCEDURE — 99285 EMERGENCY DEPT VISIT HI MDM: CPT | Mod: 25

## 2023-03-16 PROCEDURE — 93005 ELECTROCARDIOGRAM TRACING: CPT

## 2023-03-16 PROCEDURE — 85610 PROTHROMBIN TIME: CPT

## 2023-03-16 PROCEDURE — 99284 EMERGENCY DEPT VISIT MOD MDM: CPT

## 2023-03-16 RX ORDER — TEMOZOLOMIDE 250 MG/1
250 CAPSULE ORAL
Qty: 5 | Refills: 0 | Status: DISCONTINUED | COMMUNITY
Start: 2023-02-02 | End: 2023-03-16

## 2023-03-16 RX ORDER — TEMOZOLOMIDE 20 MG/1
20 CAPSULE ORAL
Qty: 15 | Refills: 0 | Status: DISCONTINUED | COMMUNITY
Start: 2023-02-02 | End: 2023-03-16

## 2023-03-16 NOTE — ED PROVIDER NOTE - NSFOLLOWUPINSTRUCTIONS_ED_ALL_ED_FT
Shortness of breath    Shortness of breath (dyspnea) means you have trouble breathing and could indicate a medical problem. Causes include lung disease, heart disease, low amount of red blood cells (anemia), poor physical fitness, being overweight, smoking, etc. Your health care provider today may not be able to find a cause for your shortness of breath after your exam. In this case, it is important to have a follow-up exam with your primary care physician as instructed. If medicines were prescribed, take them as directed for the full length of time directed. Refrain from tobacco products.    SEEK IMMEDIATE MEDICAL CARE IF YOU HAVE ANY OF THE FOLLOWING SYMPTOMS: worsening shortness of breath, chest pain, back pain, abdominal pain, fever, coughing up blood, lightheadedness/dizziness.     The results of any blood tests and imaging studies completed during your visit today were discussed and explained to you and a copy provided with your discharge instructions. Please follow up with your primary care doctor within 48 hours.     Continue taking your eliquis and other medications.

## 2023-03-16 NOTE — ED ADULT NURSE REASSESSMENT NOTE - NS ED NURSE REASSESS COMMENT FT1
Report received from NATHALIE Vu. PT is resting comfortably in bed, breathing unlabored on room air, and speaking in complete sentences. Updated PT on plan of care. Awaiting CT, transport here to take PT to CT at this time. Safety and comfort maintained. Call bell within reach. Family at the bedside.

## 2023-03-16 NOTE — ED PROVIDER NOTE - ATTENDING CONTRIBUTION TO CARE
63-year-old male patient past medical history of seizures and glioblastoma status postcraniotomy on chemo and steroids, PE and DVT on Eliquis who presents to the ED for persistent shortness of breath, leg edema, elevated hr from a new doctor he saw today. Patient currently has no complaints satting 99% on room air.  Repeat CTA was ordered in light of his worsening symptoms to look for worsening clot burden although he has been compliant with his Eliquis twice daily today as a for s/p 2 or down on his dose. 63-year-old male patient past medical history of seizures and glioblastoma status postcraniotomy on chemo and steroids, PE and DVT on Eliquis who presents to the ED for persistent shortness of breath, leg edema, elevated hr from a new doctor he saw today. Patient currently has no complaints satting 99% on room air.  Repeat CTA was ordered in light of his worsening symptoms to look for worsening clot burden although he has been compliant with his Eliquis twice daily.

## 2023-03-16 NOTE — ED ADULT NURSE NOTE - CAS DISCH TRANSFER METHOD
CARE PROVIDERS:  Accepting Physician: Idalmis Centeno  Access Services: Leticia Nam  Administration: Andrei Naidu  Administration: Aubrey Reaves  Admitting: Idalmis Centeno  Attending: Idalmis Centeno  Case Management: Brant Lomax  Consultant: Jose Velasquez  Consultant: Keisha Pride  Consultant: Samuel Alcazar  Consultant: Harris Funez  Consultant: Abelardo Richardson  Consultant: Violette Mckeon  Consultant: Lola Garza  Consultant: Williams Leonardo  Consultant: Rehan Dickinson  Consultant: Saravanan Oleary  Consultant: Rolly Isaac  Consultant: Kenroy Johns  Consultant: John Harvey  Consultant: Weil, Patricia  Consultant: Claude Ortiz  Consultant: Idalmis Jenkins  Consultant: Andreina Laughlin  Consultant: Abelardo Alexander  Consultant: Patricia Olivarez  Covering Team: Nazia Lala  Covering Team: Kaur Estrada  Covering Team: Alexi Hobbs  Covering Team: Amos Cuevas  ED ACP: Yamini Campuzano  ED Attending: Jorden Owens ED Nurse: Ellen Frias  Nurse: Kenya Teixeira  Nurse: Devorah James  Nurse: Kenney Magana  Nurse: Jose M Rowe  Nurse: Rowan Vyas  Ordered: Doctor, Unknown  Ordered: ADM, User  Ordered: ServiceAccount, [facility]MLM  Outpatient Provider: Harpal Grider  Outpatient Provider: Saravanan Oleary  Override: Kenney Magana  Override: Allison James  Override: Jose M Rowe  Override: Kenya Teixeira  Override: Orjiako, Victory  Override: Carlee Rai  PCA/Nursing Assistant: Georgia Palomino  Primary Team: Idalmis Centeno  Primary Team: Kati Boswell  Primary Team: Poppy Sawyer  Primary Team: Magalis Mckinley  Primary Team: Kira Perez  Registered Dietitian: Peggy Lechuga  Respiratory Therapy: Leda Negron  Team: [facility], Team  Team: [facility], Team   Private car

## 2023-03-16 NOTE — ED PROVIDER NOTE - PROGRESS NOTE DETAILS
Bethel FLORES: Pt is stable and ready for discharge. Strict return precautions given. All questions answered. Pt abd is benign w/o RUQ tenderness, no clinica concern for pancreatitis at this time despite CT read. Pt remains asymptomatic.

## 2023-03-16 NOTE — ED ADULT TRIAGE NOTE - CCCP TRG CHIEF CMPLNT
RAPID hr, swelling to legs,tremors/shortness of breath RAPID hr, swelling to legs ,tremors/shortness of breath

## 2023-03-16 NOTE — ED PROVIDER NOTE - PATIENT PORTAL LINK FT
You can access the FollowMyHealth Patient Portal offered by [facility] by registering at the following website: [email or web address]. By joining [facility]’s FollowMyHealth portal, you will also be able to view your health information using other applications (apps) compatible with our system.

## 2023-03-16 NOTE — ED PROVIDER NOTE - CLINICAL SUMMARY MEDICAL DECISION MAKING FREE TEXT BOX
63-year-old male patient past medical history of glioblastoma status postcraniotomy on steroids and chemo, DVT and PE on Eliquis who presents to the ED for persistent shortness of breath, leg edema, facial edema, tremors.  Patient seen by a new PCP today and endorsed to visit the ED for concerns of worsening PE/DVT.  On exam, patient found tachycardic, on inspection has moon face and bilateral leg edema, clear breath sounds.  Patient most likely experiencing steroid side effects versus heart failure/cardiomyopathy as a consequence to PE versus worsening PE.  Will assess with scan, blood work, EKG.

## 2023-03-16 NOTE — ED ADULT NURSE NOTE - OBJECTIVE STATEMENT
62 yo M AOx4 from home with PMH of seizures, glioblastoma s/p craniotomy (on chemo & steroids), PE and DVT (on Eliquis) who presents to ED for intermittent sob that gets worse when lying and exertion, leg edema, tremors, and swelling of left eye since 2 weeks. Pt states swelling and sob progressively has got worse prompting arrival to ED. Pt states pt has increased difficulty walking due to the increased swelling in legs. Pt denies c/p, abd pain, N/V/D, dysuria, blood in urine and stool. 20G placed on left hand at bedside by RN.

## 2023-03-16 NOTE — ED PROVIDER NOTE - PHYSICAL EXAMINATION
GENERAL: Awake, alert, NAD  HEENT: NC/AT, moist mucous membranes, EOMI. facial edema  LUNGS: CTAB, no wheezes or crackles   CARDIAC: Tachycardic   ABDOMEN: Soft, non tender, non distended, no rebound, no guarding  BACK: No midline spinal tenderness, no CVA tenderness  EXT: No edema, no calf tenderness, 2+ PT pulses bilaterally, no deformities.  NEURO: A&Ox3. Moving all extremities.  SKIN: Warm and dry. No rash.  PSYCH: Normal affect.

## 2023-03-22 ENCOUNTER — RESULT REVIEW (OUTPATIENT)
Age: 63
End: 2023-03-22

## 2023-03-22 ENCOUNTER — APPOINTMENT (OUTPATIENT)
Dept: INFUSION THERAPY | Facility: HOSPITAL | Age: 63
End: 2023-03-22

## 2023-03-22 ENCOUNTER — APPOINTMENT (OUTPATIENT)
Dept: NEUROLOGY | Facility: CLINIC | Age: 63
End: 2023-03-22
Payer: MEDICAID

## 2023-03-22 ENCOUNTER — APPOINTMENT (OUTPATIENT)
Dept: HEMATOLOGY ONCOLOGY | Facility: CLINIC | Age: 63
End: 2023-03-22

## 2023-03-22 VITALS
RESPIRATION RATE: 16 BRPM | DIASTOLIC BLOOD PRESSURE: 84 MMHG | OXYGEN SATURATION: 98 % | SYSTOLIC BLOOD PRESSURE: 132 MMHG | HEIGHT: 70 IN | WEIGHT: 200 LBS | HEART RATE: 111 BPM | BODY MASS INDEX: 28.63 KG/M2

## 2023-03-22 LAB
BASOPHILS # BLD AUTO: 0.04 K/UL — SIGNIFICANT CHANGE UP (ref 0–0.2)
BASOPHILS NFR BLD AUTO: 0.4 % — SIGNIFICANT CHANGE UP (ref 0–2)
EOSINOPHIL # BLD AUTO: 0 K/UL — SIGNIFICANT CHANGE UP (ref 0–0.5)
EOSINOPHIL NFR BLD AUTO: 0 % — SIGNIFICANT CHANGE UP (ref 0–6)
HCT VFR BLD CALC: 42.3 % — SIGNIFICANT CHANGE UP (ref 39–50)
HGB BLD-MCNC: 14.3 G/DL — SIGNIFICANT CHANGE UP (ref 13–17)
IMM GRANULOCYTES NFR BLD AUTO: 2.6 % — HIGH (ref 0–0.9)
LYMPHOCYTES # BLD AUTO: 1.58 K/UL — SIGNIFICANT CHANGE UP (ref 1–3.3)
LYMPHOCYTES # BLD AUTO: 14.3 % — SIGNIFICANT CHANGE UP (ref 13–44)
MCHC RBC-ENTMCNC: 30.4 PG — SIGNIFICANT CHANGE UP (ref 27–34)
MCHC RBC-ENTMCNC: 33.8 G/DL — SIGNIFICANT CHANGE UP (ref 32–36)
MCV RBC AUTO: 89.8 FL — SIGNIFICANT CHANGE UP (ref 80–100)
MONOCYTES # BLD AUTO: 0.55 K/UL — SIGNIFICANT CHANGE UP (ref 0–0.9)
MONOCYTES NFR BLD AUTO: 5 % — SIGNIFICANT CHANGE UP (ref 2–14)
NEUTROPHILS # BLD AUTO: 8.62 K/UL — HIGH (ref 1.8–7.4)
NEUTROPHILS NFR BLD AUTO: 77.7 % — HIGH (ref 43–77)
NRBC # BLD: 0 /100 WBCS — SIGNIFICANT CHANGE UP (ref 0–0)
PLATELET # BLD AUTO: 184 K/UL — SIGNIFICANT CHANGE UP (ref 150–400)
RBC # BLD: 4.71 M/UL — SIGNIFICANT CHANGE UP (ref 4.2–5.8)
RBC # FLD: 17.6 % — HIGH (ref 10.3–14.5)
WBC # BLD: 11.08 K/UL — HIGH (ref 3.8–10.5)
WBC # FLD AUTO: 11.08 K/UL — HIGH (ref 3.8–10.5)

## 2023-03-22 PROCEDURE — 99215 OFFICE O/P EST HI 40 MIN: CPT

## 2023-03-22 NOTE — DISCUSSION/SUMMARY
[FreeTextEntry1] : Patient seen and examined\par GLIOMA - Neurologically not worse- MRI with POD \par Awaiting TMZ\par Starting IV Avastin today\par CEREBRAL EDEMA - Take dexamethasone at 4-2 mg. GI Prophylaxis with pantoprazole. \par DVT/PE - Continue Eliquis 5 mg bid\par SEIZURES- Continue Keppra 1000 mg bid and Vimpat 200 mg bid\par FOLLOW UP - Will do a clinical follow up prior to the second IV Avastin. In the interim, if any concerns patient knows to call our office.

## 2023-03-22 NOTE — PHYSICAL EXAM
[General Appearance - Alert] : alert [General Appearance - In No Acute Distress] : in no acute distress [] : no respiratory distress [Respiration, Rhythm And Depth] : normal respiratory rhythm and effort [Exaggerated Use Of Accessory Muscles For Inspiration] : no accessory muscle use [FreeTextEntry1] : Awake and alert - names well and follows commands across the midline\par Oriented to person, place, month, and year.\par EOMI, RIght VF defect - UQ more than lower - stable\par Face is symmetric and tongue protrudes midline\par No drift noted\par UE strength full bilaterally\par Bilateral proximal 5-/5 LE weakness - rest is full.\par He is walking with a cane\par No LE edema noted.

## 2023-03-22 NOTE — HISTORY OF PRESENT ILLNESS
[FreeTextEntry1] : Mr. Ramos is being seen in neuro oncologic evaluation for a new diagnosis of brain tumor.\par \par History obtained via discussion with patient and chart review, including personal review of all neuroimaging.\par Mr. 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 He returned on 926 with worsening headache and underwent MRI scan of his brain:\par \par MRI brain 9/27 shows an irregularly enhancing mass in the left temporal-occipital region measuring 2.6 cm transversely and 8.9 cm cranial - caudal with surrounding vasogenic edema.\par \par CT C/A/P 9/26 unremarkable.\par \par Dr. Turpin performed a large resection of this mass on 10/3.\par 10/3 Pathology - Glioblastoma, WHO 4, IDH wt - MGMT methylated.\par \par Post-operative MRI 10/5: LEFT parietal occipital craniotomy with near complete resection of the of the neoplasm in the LEFT occipital/posterior temporal lobes. Minimal residual neoplasm is seen in the anterior part of the neoplasm, which abuts the ependymal surface of the atrium of the LEFT ventricle. Moderate postsurgical hemorrhage is seen within the surgical cavity. Moderate surrounding vasogenic edema is unchanged with effacement of the posterior horn of the LEFT lateral ventricle. Abnormal signal is also seen within the LEFT cerebral peduncle with a 4 mm focus of central enhancement and 1.6 cm region of rim enhancement, which is suspicious for a secondary focus of neoplasm or extension from the primary along the white matter tracts.\par 10/25- Had an episode of loss of vision X 15-20 minutes , episode resolved on its own - Restarted Dexamethasone 2 mg daily, Keppra raised to 750 mg bid\par 11/2/2022- He continues to have headaches even waking him from sleep. Raised dexamethasone to 4 mg daily\par 11/7/2022 - CHEMORT started\par 11/14/2022 - Reports when we raised the Dexamethasone on the 2nd , he was not taking the 4 mg daily, after discussing with HCP started on 11/7, headache didn’t resolve so on 11/10- Dr Ayala raised to 4 mg bid. Since then he reports no further headaches. Vision has not improved but has not worsened. \par 12/20/2022 - Completed ChemoRT \par 12/30/2022 - a friend called him and he was "not making sense.' Friend went to see him and bring him to the hospital - noted shaking of right UE and rigidity. In the ER, he reportedly had a GTC seizures - he was intubated. He was also found to be COVID positive. He was discharged to rehab on 12/29 and then discharged home on 1/18.\par 2/1/2023- MRI with slight improvement - Tapered Dexamethasone to 4-2 mg\par 2/6/2023- 2/10- TMZ first cycle\par 2/15-2/17 ( At [facility] ) and then transferred to [facility] and stayed inpatient from 2/17-2/21/2023 - Admitted sec to seizures, and expressive aphasia. discharged on Keppra 1000 mg bid and Vimpat 200 mg bid\par 3/7-3/9/2023 - Admitted at [facility] after a mechanical fall, he was found to have a PE bleed and UTI - He was treated with Rocephin for UTI, Started on IV Heparin and then transitioned to Eliquis and then was discharged home\par 3/15/2023- MRI with POD - Plan was made to continue TMZ and add IV AVastin\par 3/22/2023- Patient here for a follow up prior to the 1st Avastin infusion clear to auscultation bilaterally/no wheezes/no rales/no rhonchi/no respiratory distress/airway patent/breath sounds equal/good air movement/respirations non-labored

## 2023-03-23 DIAGNOSIS — C71.9 MALIGNANT NEOPLASM OF BRAIN, UNSPECIFIED: ICD-10-CM

## 2023-03-23 DIAGNOSIS — Z51.11 ENCOUNTER FOR ANTINEOPLASTIC CHEMOTHERAPY: ICD-10-CM

## 2023-03-23 LAB
ALBUMIN SERPL ELPH-MCNC: 3.9 G/DL — SIGNIFICANT CHANGE UP (ref 3.3–5)
ALP SERPL-CCNC: 65 U/L — SIGNIFICANT CHANGE UP (ref 40–120)
ALT FLD-CCNC: 42 U/L — SIGNIFICANT CHANGE UP (ref 10–45)
ANION GAP SERPL CALC-SCNC: 23 MMOL/L — HIGH (ref 5–17)
APPEARANCE UR: ABNORMAL
AST SERPL-CCNC: 17 U/L — SIGNIFICANT CHANGE UP (ref 10–40)
BACTERIA # UR AUTO: ABNORMAL
BILIRUB SERPL-MCNC: 0.3 MG/DL — SIGNIFICANT CHANGE UP (ref 0.2–1.2)
BILIRUB UR-MCNC: NEGATIVE — SIGNIFICANT CHANGE UP
BUN SERPL-MCNC: 17 MG/DL — SIGNIFICANT CHANGE UP (ref 7–23)
CALCIUM SERPL-MCNC: 9.3 MG/DL — SIGNIFICANT CHANGE UP (ref 8.4–10.5)
CHLORIDE SERPL-SCNC: 97 MMOL/L — SIGNIFICANT CHANGE UP (ref 96–108)
CO2 SERPL-SCNC: 22 MMOL/L — SIGNIFICANT CHANGE UP (ref 22–31)
COLOR SPEC: YELLOW — SIGNIFICANT CHANGE UP
CREAT SERPL-MCNC: 0.78 MG/DL — SIGNIFICANT CHANGE UP (ref 0.5–1.3)
DIFF PNL FLD: NEGATIVE — SIGNIFICANT CHANGE UP
EGFR: 99 ML/MIN/1.73M2 — SIGNIFICANT CHANGE UP
EPI CELLS # UR: 0 /HPF — SIGNIFICANT CHANGE UP (ref 0–5)
GLUCOSE SERPL-MCNC: 144 MG/DL — HIGH (ref 70–99)
GLUCOSE UR QL: ABNORMAL
HYALINE CASTS # UR AUTO: 1 /LPF — SIGNIFICANT CHANGE UP (ref 0–7)
KETONES UR-MCNC: NEGATIVE — SIGNIFICANT CHANGE UP
LEUKOCYTE ESTERASE UR-ACNC: ABNORMAL
NITRITE UR-MCNC: NEGATIVE — SIGNIFICANT CHANGE UP
PH UR: 6 — SIGNIFICANT CHANGE UP (ref 5–8)
POTASSIUM SERPL-MCNC: 3.1 MMOL/L — LOW (ref 3.5–5.3)
POTASSIUM SERPL-SCNC: 3.1 MMOL/L — LOW (ref 3.5–5.3)
PROT SERPL-MCNC: 5.8 G/DL — LOW (ref 6–8.3)
PROT UR-MCNC: SIGNIFICANT CHANGE UP
RBC CASTS # UR COMP ASSIST: 2 /HPF — SIGNIFICANT CHANGE UP (ref 0–4)
SODIUM SERPL-SCNC: 142 MMOL/L — SIGNIFICANT CHANGE UP (ref 135–145)
SP GR SPEC: 1.02 — SIGNIFICANT CHANGE UP (ref 1.01–1.02)
UROBILINOGEN FLD QL: SIGNIFICANT CHANGE UP
WBC UR QL: 168 /HPF — HIGH (ref 0–5)

## 2023-03-28 LAB
ALBUMIN SERPL ELPH-MCNC: 3.8 G/DL
ALP BLD-CCNC: 62 U/L
ALT SERPL-CCNC: 42 U/L
ANION GAP SERPL CALC-SCNC: 13 MMOL/L
AST SERPL-CCNC: 15 U/L
BILIRUB SERPL-MCNC: 0.4 MG/DL
BUN SERPL-MCNC: 14 MG/DL
CALCIUM SERPL-MCNC: 9.6 MG/DL
CHLORIDE SERPL-SCNC: 99 MMOL/L
CO2 SERPL-SCNC: 24 MMOL/L
CREAT SERPL-MCNC: 0.85 MG/DL
EGFR: 96 ML/MIN/1.73M2
GLUCOSE SERPL-MCNC: 163 MG/DL
POTASSIUM SERPL-SCNC: 4.4 MMOL/L
PROT SERPL-MCNC: 6.2 G/DL
SODIUM SERPL-SCNC: 136 MMOL/L

## 2023-04-01 ENCOUNTER — OUTPATIENT (OUTPATIENT)
Dept: OUTPATIENT SERVICES | Facility: HOSPITAL | Age: 63
LOS: 1 days | Discharge: HOME | End: 2023-04-01

## 2023-04-01 DIAGNOSIS — G93.9 DISORDER OF BRAIN, UNSPECIFIED: ICD-10-CM

## 2023-04-01 DIAGNOSIS — Z98.890 OTHER SPECIFIED POSTPROCEDURAL STATES: Chronic | ICD-10-CM

## 2023-04-05 ENCOUNTER — APPOINTMENT (OUTPATIENT)
Dept: HEMATOLOGY ONCOLOGY | Facility: CLINIC | Age: 63
End: 2023-04-05

## 2023-04-05 ENCOUNTER — APPOINTMENT (OUTPATIENT)
Dept: NEUROLOGY | Facility: CLINIC | Age: 63
End: 2023-04-05
Payer: MEDICAID

## 2023-04-05 ENCOUNTER — RESULT REVIEW (OUTPATIENT)
Age: 63
End: 2023-04-05

## 2023-04-05 ENCOUNTER — APPOINTMENT (OUTPATIENT)
Dept: INFUSION THERAPY | Facility: HOSPITAL | Age: 63
End: 2023-04-05

## 2023-04-05 ENCOUNTER — NON-APPOINTMENT (OUTPATIENT)
Age: 63
End: 2023-04-05

## 2023-04-05 DIAGNOSIS — E87.6 HYPOKALEMIA: ICD-10-CM

## 2023-04-05 LAB
BASOPHILS # BLD AUTO: 0.05 K/UL — SIGNIFICANT CHANGE UP (ref 0–0.2)
BASOPHILS NFR BLD AUTO: 0.3 % — SIGNIFICANT CHANGE UP (ref 0–2)
EOSINOPHIL # BLD AUTO: 0 K/UL — SIGNIFICANT CHANGE UP (ref 0–0.5)
EOSINOPHIL NFR BLD AUTO: 0 % — SIGNIFICANT CHANGE UP (ref 0–6)
HCT VFR BLD CALC: 41.7 % — SIGNIFICANT CHANGE UP (ref 39–50)
HGB BLD-MCNC: 13.8 G/DL — SIGNIFICANT CHANGE UP (ref 13–17)
IMM GRANULOCYTES NFR BLD AUTO: 2.1 % — HIGH (ref 0–0.9)
LYMPHOCYTES # BLD AUTO: 1.03 K/UL — SIGNIFICANT CHANGE UP (ref 1–3.3)
LYMPHOCYTES # BLD AUTO: 6.4 % — LOW (ref 13–44)
MCHC RBC-ENTMCNC: 30.6 PG — SIGNIFICANT CHANGE UP (ref 27–34)
MCHC RBC-ENTMCNC: 33.1 G/DL — SIGNIFICANT CHANGE UP (ref 32–36)
MCV RBC AUTO: 92.5 FL — SIGNIFICANT CHANGE UP (ref 80–100)
MONOCYTES # BLD AUTO: 1.08 K/UL — HIGH (ref 0–0.9)
MONOCYTES NFR BLD AUTO: 6.7 % — SIGNIFICANT CHANGE UP (ref 2–14)
NEUTROPHILS # BLD AUTO: 13.59 K/UL — HIGH (ref 1.8–7.4)
NEUTROPHILS NFR BLD AUTO: 84.5 % — HIGH (ref 43–77)
NRBC # BLD: 0 /100 WBCS — SIGNIFICANT CHANGE UP (ref 0–0)
PLATELET # BLD AUTO: 166 K/UL — SIGNIFICANT CHANGE UP (ref 150–400)
RBC # BLD: 4.51 M/UL — SIGNIFICANT CHANGE UP (ref 4.2–5.8)
RBC # FLD: 17.3 % — HIGH (ref 10.3–14.5)
WBC # BLD: 16.08 K/UL — HIGH (ref 3.8–10.5)
WBC # FLD AUTO: 16.08 K/UL — HIGH (ref 3.8–10.5)

## 2023-04-05 PROCEDURE — 99214 OFFICE O/P EST MOD 30 MIN: CPT

## 2023-04-05 RX ORDER — TEMOZOLOMIDE 140 MG/1
1 CAPSULE ORAL
Qty: 0 | Refills: 0 | DISCHARGE

## 2023-04-05 RX ORDER — TEMOZOLOMIDE 140 MG/1
3 CAPSULE ORAL
Qty: 0 | Refills: 0 | DISCHARGE

## 2023-04-05 RX ORDER — DEXAMETHASONE 0.5 MG/5ML
1 ELIXIR ORAL
Qty: 0 | Refills: 0 | DISCHARGE

## 2023-04-05 NOTE — HISTORY OF PRESENT ILLNESS
[FreeTextEntry1] : Mr. Ramos is being seen in neuro oncologic evaluation for a new diagnosis of brain tumor.\par \par History obtained via discussion with patient and chart review, including personal review of all neuroimaging.\par Mr. 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 He returned on 926 with worsening headache and underwent MRI scan of his brain:\par \par MRI brain 9/27 shows an irregularly enhancing mass in the left temporal-occipital region measuring 2.6 cm transversely and 8.9 cm cranial - caudal with surrounding vasogenic edema.\par \par CT C/A/P 9/26 unremarkable.\par \par Dr. Turpin performed a large resection of this mass on 10/3.\par 10/3 Pathology - Glioblastoma, WHO 4, IDH wt - MGMT methylated.\par \par Post-operative MRI 10/5: LEFT parietal occipital craniotomy with near complete resection of the of the neoplasm in the LEFT occipital/posterior temporal lobes. Minimal residual neoplasm is seen in the anterior part of the neoplasm, which abuts the ependymal surface of the atrium of the LEFT ventricle. Moderate postsurgical hemorrhage is seen within the surgical cavity. Moderate surrounding vasogenic edema is unchanged with effacement of the posterior horn of the LEFT lateral ventricle. Abnormal signal is also seen within the LEFT cerebral peduncle with a 4 mm focus of central enhancement and 1.6 cm region of rim enhancement, which is suspicious for a secondary focus of neoplasm or extension from the primary along the white matter tracts.\par 10/25- Had an episode of loss of vision X 15-20 minutes , episode resolved on its own - Restarted Dexamethasone 2 mg daily, Keppra raised to 750 mg bid\par 11/2/2022- He continues to have headaches even waking him from sleep. Raised dexamethasone to 4 mg daily\par 11/7/2022 - CHEMORT started\par 11/14/2022 - Reports when we raised the Dexamethasone on the 2nd , he was not taking the 4 mg daily, after discussing with HCP started on 11/7, headache didn’t resolve so on 11/10- Dr Ayala raised to 4 mg bid. Since then he reports no further headaches. Vision has not improved but has not worsened. \par 12/20/2022 - Completed ChemoRT \par 12/30/2022 - a friend called him and he was "not making sense.' Friend went to see him and bring him to the hospital - noted shaking of right UE and rigidity. In the ER, he reportedly had a GTC seizures - he was intubated. He was also found to be COVID positive. He was discharged to rehab on 12/29 and then discharged home on 1/18.\par 2/1/2023- MRI with slight improvement - Tapered Dexamethasone to 4-2 mg\par 2/6/2023- 2/10- TMZ first cycle\par 2/15-2/17 ( At [facility] ) and then transferred to [facility] and stayed inpatient from 2/17-2/21/2023 - Admitted sec to seizures, and expressive aphasia. discharged on Keppra 1000 mg bid and Vimpat 200 mg bid\par 3/7-3/9/2023 - Admitted at [facility] after a mechanical fall, he was found to have a PE bleed and UTI - He was treated with Rocephin for UTI, Started on IV Heparin and then transitioned to Eliquis and then was discharged home\par 3/15/2023- MRI with POD - Plan was made to continue TMZ and add IV AVastin\par 3/22/2023- 1st Avastin infusion \par 3/25/2023 - 3/29/2023 - TMZ second cycle - he reports he tolerated this well - has remained seizure free.\par He feels that his speech is improving.\par He denies any headaches.

## 2023-04-05 NOTE — DISCUSSION/SUMMARY
[FreeTextEntry1] : Patient here for 2nd infusion of Avastin.\par WIll reduce Decadron to 4 mg in am.\par Encouraged walking.\par Check blood work. Follow up in one month.\par Any interval issues, he knows to call me.

## 2023-04-05 NOTE — PHYSICAL EXAM
[FreeTextEntry1] : He is awake and alert - names well and follows commands across the midline\par Oriented to person, place, month, and year.\par EOMI, RIght VF defect - UQ more than lower - stable\par Face is symmetric and tongue protrudes midline\par No drift noted\par UE strength full bilaterally\par Bilateral proximal 5-/5 LE weakness - rest is full.\par He is walking with a cane today.\par No LE edema noted.

## 2023-04-06 DIAGNOSIS — C71.9 MALIGNANT NEOPLASM OF BRAIN, UNSPECIFIED: ICD-10-CM

## 2023-04-06 DIAGNOSIS — Z51.11 ENCOUNTER FOR ANTINEOPLASTIC CHEMOTHERAPY: ICD-10-CM

## 2023-04-06 PROBLEM — E87.6 HYPOKALEMIA: Status: ACTIVE | Noted: 2023-04-06

## 2023-04-06 LAB
APPEARANCE UR: ABNORMAL
BACTERIA # UR AUTO: ABNORMAL /HPF
BILIRUB UR-MCNC: NEGATIVE — SIGNIFICANT CHANGE UP
CAST: 3 /LPF — SIGNIFICANT CHANGE UP (ref 0–4)
COLOR SPEC: SIGNIFICANT CHANGE UP
DIFF PNL FLD: ABNORMAL
GLUCOSE UR QL: NEGATIVE MG/DL — SIGNIFICANT CHANGE UP
KETONES UR-MCNC: NEGATIVE MG/DL — SIGNIFICANT CHANGE UP
LEUKOCYTE ESTERASE UR-ACNC: ABNORMAL
NITRITE UR-MCNC: POSITIVE
PH UR: 6 — SIGNIFICANT CHANGE UP (ref 5–8)
PROT UR-MCNC: 30 MG/DL
RBC CASTS # UR COMP ASSIST: 7 /HPF — HIGH (ref 0–4)
REVIEW: SIGNIFICANT CHANGE UP
SP GR SPEC: 1.02 — SIGNIFICANT CHANGE UP (ref 1–1.03)
SQUAMOUS # UR AUTO: 0 /HPF — SIGNIFICANT CHANGE UP (ref 0–5)
UROBILINOGEN FLD QL: 0.2 MG/DL — SIGNIFICANT CHANGE UP (ref 0.2–1)
WBC UR QL: 1190 /HPF — HIGH (ref 0–5)

## 2023-04-13 ENCOUNTER — RESULT REVIEW (OUTPATIENT)
Age: 63
End: 2023-04-13

## 2023-04-13 ENCOUNTER — APPOINTMENT (OUTPATIENT)
Dept: HEMATOLOGY ONCOLOGY | Facility: CLINIC | Age: 63
End: 2023-04-13

## 2023-04-13 LAB
BASOPHILS # BLD AUTO: 0.03 K/UL — SIGNIFICANT CHANGE UP (ref 0–0.2)
BASOPHILS NFR BLD AUTO: 0.3 % — SIGNIFICANT CHANGE UP (ref 0–2)
EOSINOPHIL # BLD AUTO: 0 K/UL — SIGNIFICANT CHANGE UP (ref 0–0.5)
EOSINOPHIL NFR BLD AUTO: 0 % — SIGNIFICANT CHANGE UP (ref 0–6)
HCT VFR BLD CALC: 42.6 % — SIGNIFICANT CHANGE UP (ref 39–50)
HGB BLD-MCNC: 14.2 G/DL — SIGNIFICANT CHANGE UP (ref 13–17)
IMM GRANULOCYTES NFR BLD AUTO: 3.1 % — HIGH (ref 0–0.9)
LYMPHOCYTES # BLD AUTO: 1.2 K/UL — SIGNIFICANT CHANGE UP (ref 1–3.3)
LYMPHOCYTES # BLD AUTO: 12.6 % — LOW (ref 13–44)
MCHC RBC-ENTMCNC: 30.7 PG — SIGNIFICANT CHANGE UP (ref 27–34)
MCHC RBC-ENTMCNC: 33.3 G/DL — SIGNIFICANT CHANGE UP (ref 32–36)
MCV RBC AUTO: 92 FL — SIGNIFICANT CHANGE UP (ref 80–100)
MONOCYTES # BLD AUTO: 0.3 K/UL — SIGNIFICANT CHANGE UP (ref 0–0.9)
MONOCYTES NFR BLD AUTO: 3.2 % — SIGNIFICANT CHANGE UP (ref 2–14)
NEUTROPHILS # BLD AUTO: 7.67 K/UL — HIGH (ref 1.8–7.4)
NEUTROPHILS NFR BLD AUTO: 80.8 % — HIGH (ref 43–77)
NRBC # BLD: 0 /100 WBCS — SIGNIFICANT CHANGE UP (ref 0–0)
PLATELET # BLD AUTO: 163 K/UL — SIGNIFICANT CHANGE UP (ref 150–400)
RBC # BLD: 4.63 M/UL — SIGNIFICANT CHANGE UP (ref 4.2–5.8)
RBC # FLD: 16.1 % — HIGH (ref 10.3–14.5)
WBC # BLD: 9.49 K/UL — SIGNIFICANT CHANGE UP (ref 3.8–10.5)
WBC # FLD AUTO: 9.49 K/UL — SIGNIFICANT CHANGE UP (ref 3.8–10.5)

## 2023-04-14 ENCOUNTER — APPOINTMENT (OUTPATIENT)
Dept: UROLOGY | Facility: CLINIC | Age: 63
End: 2023-04-14
Payer: MEDICAID

## 2023-04-14 VITALS — SYSTOLIC BLOOD PRESSURE: 115 MMHG | DIASTOLIC BLOOD PRESSURE: 86 MMHG | HEART RATE: 111 BPM

## 2023-04-14 DIAGNOSIS — R56.9 UNSPECIFIED CONVULSIONS: ICD-10-CM

## 2023-04-14 PROCEDURE — 99204 OFFICE O/P NEW MOD 45 MIN: CPT

## 2023-04-14 NOTE — ASSESSMENT
[FreeTextEntry1] : patient with hx of uti -- 6m ago and recently and lilia sherwood abx- macrobid x 10 days\par no hx of hematuria, no dysuria\par was found to have uti due to increased frequency and nocturia -- luts since brain tumro removal sept 2022\par admits to a lot of water intake - clear urine \par bowel habits frequenct with some constipatio early on\par no fever or N/V \par no hesitancy or strain to void , feels empty after void \par here for further eval :\par 1- recommend repeat urne- did it today but dropped it so will do it later \par 2- recommend ALTAF  and bladder us -- kleber due to elev pvr\par 3- recommend CYSTO after ALTAF \par \par \par \par

## 2023-04-14 NOTE — HISTORY OF PRESENT ILLNESS
[FreeTextEntry1] : patient with hx of uti -- 6m ago and recently and lilia sherwood abx- macrobid x 10 days\par no hx of hematuria, no dysuria\par was found to have uti due to increased frequency and nocturia -- luts since brain tumro removal sept 2022\par admits to a lot of water intake - clear urine \par bowel habits frequenct with some constipatio early on\par no fever or N/V \par no hesitancy or strain to void , feels empty after void \par here for further eval :

## 2023-04-14 NOTE — PHYSICAL EXAM
[General Appearance - Well Developed] : well developed [General Appearance - Well Nourished] : well nourished [Normal Appearance] : normal appearance [Well Groomed] : well groomed [General Appearance - In No Acute Distress] : no acute distress [Edema] : no peripheral edema [Respiration, Rhythm And Depth] : normal respiratory rhythm and effort [Exaggerated Use Of Accessory Muscles For Inspiration] : no accessory muscle use [Abdomen Soft] : soft [Abdomen Tenderness] : non-tender [Costovertebral Angle Tenderness] : no ~M costovertebral angle tenderness [Normal Station and Gait] : the gait and station were normal for the patient's age [] : no rash [No Focal Deficits] : no focal deficits [Oriented To Time, Place, And Person] : oriented to person, place, and time [Affect] : the affect was normal [Mood] : the mood was normal [Not Anxious] : not anxious [No Palpable Adenopathy] : no palpable adenopathy [FreeTextEntry1] : pvr-130ml

## 2023-04-15 ENCOUNTER — NON-APPOINTMENT (OUTPATIENT)
Age: 63
End: 2023-04-15

## 2023-04-17 ENCOUNTER — APPOINTMENT (OUTPATIENT)
Dept: ULTRASOUND IMAGING | Facility: CLINIC | Age: 63
End: 2023-04-17
Payer: MEDICAID

## 2023-04-17 ENCOUNTER — OUTPATIENT (OUTPATIENT)
Dept: OUTPATIENT SERVICES | Facility: HOSPITAL | Age: 63
LOS: 1 days | End: 2023-04-17
Payer: MEDICAID

## 2023-04-17 DIAGNOSIS — C71.9 MALIGNANT NEOPLASM OF BRAIN, UNSPECIFIED: ICD-10-CM

## 2023-04-17 DIAGNOSIS — N39.0 URINARY TRACT INFECTION, SITE NOT SPECIFIED: ICD-10-CM

## 2023-04-17 DIAGNOSIS — Z98.890 OTHER SPECIFIED POSTPROCEDURAL STATES: Chronic | ICD-10-CM

## 2023-04-17 DIAGNOSIS — R56.9 UNSPECIFIED CONVULSIONS: ICD-10-CM

## 2023-04-17 LAB
APPEARANCE: ABNORMAL
BACTERIA: ABNORMAL
BILIRUBIN URINE: NEGATIVE
BLOOD URINE: ABNORMAL
COLOR: YELLOW
GLUCOSE QUALITATIVE U: NEGATIVE
HYALINE CASTS: 1 /LPF
KETONES URINE: NORMAL
LEUKOCYTE ESTERASE URINE: ABNORMAL
MICROSCOPIC-UA: NORMAL
NITRITE URINE: POSITIVE
PH URINE: 6
PROTEIN URINE: ABNORMAL
RED BLOOD CELLS URINE: 3 /HPF
SPECIFIC GRAVITY URINE: 1.02
SQUAMOUS EPITHELIAL CELLS: 0 /HPF
UROBILINOGEN URINE: NORMAL
WHITE BLOOD CELLS URINE: 194 /HPF

## 2023-04-17 PROCEDURE — 76770 US EXAM ABDO BACK WALL COMP: CPT

## 2023-04-17 PROCEDURE — 76770 US EXAM ABDO BACK WALL COMP: CPT | Mod: 26

## 2023-04-18 NOTE — ED ADULT NURSE NOTE - NS ED PATIENT SAFETY CONCERN
Abilify Maintena 400 mg intramuscular injection, extended release: 1 application intramuscular every 4 weeks   Unable to assess due to medical condition busPIRone 15 mg oral tablet: 1 tab(s) orally 3 times a day  mirtazapine 30 mg oral tablet: 1 tab(s) orally once a day (at bedtime)  OLANZapine 10 mg oral tablet: 1 tab(s) orally once a day (at bedtime)  OLANZapine 405 mg intramuscular injection, extended release: 405 milligram(s) intramuscular once

## 2023-04-19 ENCOUNTER — RESULT REVIEW (OUTPATIENT)
Age: 63
End: 2023-04-19

## 2023-04-19 ENCOUNTER — APPOINTMENT (OUTPATIENT)
Dept: HEMATOLOGY ONCOLOGY | Facility: CLINIC | Age: 63
End: 2023-04-19

## 2023-04-19 ENCOUNTER — APPOINTMENT (OUTPATIENT)
Dept: INFUSION THERAPY | Facility: HOSPITAL | Age: 63
End: 2023-04-19

## 2023-04-19 LAB
ALBUMIN SERPL ELPH-MCNC: 4.2 G/DL — SIGNIFICANT CHANGE UP (ref 3.3–5)
ALP SERPL-CCNC: 55 U/L — SIGNIFICANT CHANGE UP (ref 40–120)
ALT FLD-CCNC: 35 U/L — SIGNIFICANT CHANGE UP (ref 10–45)
ANION GAP SERPL CALC-SCNC: 16 MMOL/L — SIGNIFICANT CHANGE UP (ref 5–17)
AST SERPL-CCNC: 17 U/L — SIGNIFICANT CHANGE UP (ref 10–40)
BASOPHILS # BLD AUTO: 0.03 K/UL — SIGNIFICANT CHANGE UP (ref 0–0.2)
BASOPHILS NFR BLD AUTO: 0.3 % — SIGNIFICANT CHANGE UP (ref 0–2)
BILIRUB SERPL-MCNC: 0.3 MG/DL — SIGNIFICANT CHANGE UP (ref 0.2–1.2)
BUN SERPL-MCNC: 11 MG/DL — SIGNIFICANT CHANGE UP (ref 7–23)
CALCIUM SERPL-MCNC: 9.6 MG/DL — SIGNIFICANT CHANGE UP (ref 8.4–10.5)
CHLORIDE SERPL-SCNC: 104 MMOL/L — SIGNIFICANT CHANGE UP (ref 96–108)
CO2 SERPL-SCNC: 22 MMOL/L — SIGNIFICANT CHANGE UP (ref 22–31)
CREAT SERPL-MCNC: 0.83 MG/DL — SIGNIFICANT CHANGE UP (ref 0.5–1.3)
EGFR: 97 ML/MIN/1.73M2 — SIGNIFICANT CHANGE UP
EOSINOPHIL # BLD AUTO: 0.01 K/UL — SIGNIFICANT CHANGE UP (ref 0–0.5)
EOSINOPHIL NFR BLD AUTO: 0.1 % — SIGNIFICANT CHANGE UP (ref 0–6)
GLUCOSE SERPL-MCNC: 91 MG/DL — SIGNIFICANT CHANGE UP (ref 70–99)
HCT VFR BLD CALC: 44.5 % — SIGNIFICANT CHANGE UP (ref 39–50)
HGB BLD-MCNC: 14.9 G/DL — SIGNIFICANT CHANGE UP (ref 13–17)
IMM GRANULOCYTES NFR BLD AUTO: 1.4 % — HIGH (ref 0–0.9)
LYMPHOCYTES # BLD AUTO: 1.04 K/UL — SIGNIFICANT CHANGE UP (ref 1–3.3)
LYMPHOCYTES # BLD AUTO: 11 % — LOW (ref 13–44)
MCHC RBC-ENTMCNC: 30.7 PG — SIGNIFICANT CHANGE UP (ref 27–34)
MCHC RBC-ENTMCNC: 33.5 G/DL — SIGNIFICANT CHANGE UP (ref 32–36)
MCV RBC AUTO: 91.6 FL — SIGNIFICANT CHANGE UP (ref 80–100)
MONOCYTES # BLD AUTO: 0.36 K/UL — SIGNIFICANT CHANGE UP (ref 0–0.9)
MONOCYTES NFR BLD AUTO: 3.8 % — SIGNIFICANT CHANGE UP (ref 2–14)
NEUTROPHILS # BLD AUTO: 7.87 K/UL — HIGH (ref 1.8–7.4)
NEUTROPHILS NFR BLD AUTO: 83.4 % — HIGH (ref 43–77)
NRBC # BLD: 0 /100 WBCS — SIGNIFICANT CHANGE UP (ref 0–0)
PLATELET # BLD AUTO: 146 K/UL — LOW (ref 150–400)
POTASSIUM SERPL-MCNC: 4.2 MMOL/L — SIGNIFICANT CHANGE UP (ref 3.5–5.3)
POTASSIUM SERPL-SCNC: 4.2 MMOL/L — SIGNIFICANT CHANGE UP (ref 3.5–5.3)
PROT SERPL-MCNC: 6.3 G/DL — SIGNIFICANT CHANGE UP (ref 6–8.3)
RBC # BLD: 4.86 M/UL — SIGNIFICANT CHANGE UP (ref 4.2–5.8)
RBC # FLD: 15.9 % — HIGH (ref 10.3–14.5)
SODIUM SERPL-SCNC: 142 MMOL/L — SIGNIFICANT CHANGE UP (ref 135–145)
WBC # BLD: 9.44 K/UL — SIGNIFICANT CHANGE UP (ref 3.8–10.5)
WBC # FLD AUTO: 9.44 K/UL — SIGNIFICANT CHANGE UP (ref 3.8–10.5)

## 2023-04-20 DIAGNOSIS — C71.3 MALIGNANT NEOPLASM OF PARIETAL LOBE: ICD-10-CM

## 2023-04-20 LAB
APPEARANCE UR: CLEAR — SIGNIFICANT CHANGE UP
BACTERIA # UR AUTO: ABNORMAL
BACTERIA UR CULT: ABNORMAL
BILIRUB UR-MCNC: NEGATIVE — SIGNIFICANT CHANGE UP
COLOR SPEC: YELLOW — SIGNIFICANT CHANGE UP
DIFF PNL FLD: ABNORMAL
EPI CELLS # UR: 0 /HPF — SIGNIFICANT CHANGE UP (ref 0–5)
GLUCOSE UR QL: NEGATIVE — SIGNIFICANT CHANGE UP
HYALINE CASTS # UR AUTO: 3 /LPF — SIGNIFICANT CHANGE UP (ref 0–7)
KETONES UR-MCNC: NEGATIVE — SIGNIFICANT CHANGE UP
LEUKOCYTE ESTERASE UR-ACNC: ABNORMAL
NITRITE UR-MCNC: POSITIVE
PH UR: 6 — SIGNIFICANT CHANGE UP (ref 5–8)
PROT UR-MCNC: SIGNIFICANT CHANGE UP
RBC CASTS # UR COMP ASSIST: 4 /HPF — SIGNIFICANT CHANGE UP (ref 0–4)
SP GR SPEC: 1.02 — SIGNIFICANT CHANGE UP (ref 1.01–1.02)
UROBILINOGEN FLD QL: SIGNIFICANT CHANGE UP
WBC UR QL: 122 /HPF — HIGH (ref 0–5)

## 2023-04-25 ENCOUNTER — APPOINTMENT (OUTPATIENT)
Dept: NEUROLOGY | Facility: CLINIC | Age: 63
End: 2023-04-25

## 2023-04-26 ENCOUNTER — APPOINTMENT (OUTPATIENT)
Dept: HEMATOLOGY ONCOLOGY | Facility: CLINIC | Age: 63
End: 2023-04-26

## 2023-04-26 ENCOUNTER — RESULT REVIEW (OUTPATIENT)
Age: 63
End: 2023-04-26

## 2023-04-26 ENCOUNTER — OUTPATIENT (OUTPATIENT)
Dept: OUTPATIENT SERVICES | Facility: HOSPITAL | Age: 63
LOS: 1 days | End: 2023-04-26
Payer: MEDICAID

## 2023-04-26 ENCOUNTER — APPOINTMENT (OUTPATIENT)
Dept: MRI IMAGING | Facility: IMAGING CENTER | Age: 63
End: 2023-04-26
Payer: MEDICAID

## 2023-04-26 ENCOUNTER — APPOINTMENT (OUTPATIENT)
Dept: NEUROLOGY | Facility: CLINIC | Age: 63
End: 2023-04-26
Payer: MEDICAID

## 2023-04-26 VITALS
WEIGHT: 209 LBS | HEIGHT: 70 IN | TEMPERATURE: 98.1 F | DIASTOLIC BLOOD PRESSURE: 90 MMHG | OXYGEN SATURATION: 95 % | BODY MASS INDEX: 29.92 KG/M2 | SYSTOLIC BLOOD PRESSURE: 132 MMHG | RESPIRATION RATE: 16 BRPM | HEART RATE: 102 BPM

## 2023-04-26 DIAGNOSIS — Z98.890 OTHER SPECIFIED POSTPROCEDURAL STATES: Chronic | ICD-10-CM

## 2023-04-26 DIAGNOSIS — C71.9 MALIGNANT NEOPLASM OF BRAIN, UNSPECIFIED: ICD-10-CM

## 2023-04-26 LAB
BASOPHILS # BLD AUTO: 0.04 K/UL — SIGNIFICANT CHANGE UP (ref 0–0.2)
BASOPHILS NFR BLD AUTO: 0.5 % — SIGNIFICANT CHANGE UP (ref 0–2)
EOSINOPHIL # BLD AUTO: 0 K/UL — SIGNIFICANT CHANGE UP (ref 0–0.5)
EOSINOPHIL NFR BLD AUTO: 0 % — SIGNIFICANT CHANGE UP (ref 0–6)
HCT VFR BLD CALC: 44.1 % — SIGNIFICANT CHANGE UP (ref 39–50)
HGB BLD-MCNC: 14.6 G/DL — SIGNIFICANT CHANGE UP (ref 13–17)
IMM GRANULOCYTES NFR BLD AUTO: 2.1 % — HIGH (ref 0–0.9)
LYMPHOCYTES # BLD AUTO: 0.96 K/UL — LOW (ref 1–3.3)
LYMPHOCYTES # BLD AUTO: 11 % — LOW (ref 13–44)
MCHC RBC-ENTMCNC: 30.5 PG — SIGNIFICANT CHANGE UP (ref 27–34)
MCHC RBC-ENTMCNC: 33.1 G/DL — SIGNIFICANT CHANGE UP (ref 32–36)
MCV RBC AUTO: 92.3 FL — SIGNIFICANT CHANGE UP (ref 80–100)
MONOCYTES # BLD AUTO: 0.29 K/UL — SIGNIFICANT CHANGE UP (ref 0–0.9)
MONOCYTES NFR BLD AUTO: 3.3 % — SIGNIFICANT CHANGE UP (ref 2–14)
NEUTROPHILS # BLD AUTO: 7.23 K/UL — SIGNIFICANT CHANGE UP (ref 1.8–7.4)
NEUTROPHILS NFR BLD AUTO: 83.1 % — HIGH (ref 43–77)
NRBC # BLD: 0 /100 WBCS — SIGNIFICANT CHANGE UP (ref 0–0)
PLATELET # BLD AUTO: 157 K/UL — SIGNIFICANT CHANGE UP (ref 150–400)
RBC # BLD: 4.78 M/UL — SIGNIFICANT CHANGE UP (ref 4.2–5.8)
RBC # FLD: 15.4 % — HIGH (ref 10.3–14.5)
WBC # BLD: 8.7 K/UL — SIGNIFICANT CHANGE UP (ref 3.8–10.5)
WBC # FLD AUTO: 8.7 K/UL — SIGNIFICANT CHANGE UP (ref 3.8–10.5)

## 2023-04-26 PROCEDURE — 99215 OFFICE O/P EST HI 40 MIN: CPT

## 2023-04-26 PROCEDURE — 70553 MRI BRAIN STEM W/O & W/DYE: CPT

## 2023-04-26 PROCEDURE — A9585: CPT

## 2023-04-26 PROCEDURE — 70553 MRI BRAIN STEM W/O & W/DYE: CPT | Mod: 26

## 2023-04-26 RX ORDER — NITROFURANTOIN (MONOHYDRATE/MACROCRYSTALS) 25; 75 MG/1; MG/1
100 CAPSULE ORAL
Qty: 10 | Refills: 0 | Status: DISCONTINUED | COMMUNITY
Start: 2023-03-23 | End: 2023-04-26

## 2023-04-26 RX ORDER — APIXABAN 5 MG/1
5 TABLET, FILM COATED ORAL
Qty: 30 | Refills: 0 | Status: ACTIVE | COMMUNITY
Start: 2023-04-26

## 2023-04-26 NOTE — PHYSICAL EXAM
[General Appearance - Alert] : alert [General Appearance - In No Acute Distress] : in no acute distress [General Appearance - Well Nourished] : well nourished [] : no respiratory distress [Respiration, Rhythm And Depth] : normal respiratory rhythm and effort [Exaggerated Use Of Accessory Muscles For Inspiration] : no accessory muscle use [Edema] : there was no peripheral edema [FreeTextEntry1] : Awake and alert - names well and follows commands across the midline\par Oriented to person, place, month, and year.\par EOMI, RIght VF defect - UQ more than lower - stable\par Face is symmetric and tongue protrudes midline\par Memory at 3 minutes \par No drift noted\par UE strength full bilaterally\par Bilateral proximal 5-/5 LE weakness - rest is full.\par He is walking with a cane today.\par No LE edema noted.

## 2023-04-26 NOTE — DISCUSSION/SUMMARY
[FreeTextEntry1] : Patient seen and examined\par GLIOMA - Neurologically improved expressive ability- MRI from today reviewed and left temporal parietal area appears stable if not better.. No new area of enhancement noted.\par Continue IV Avastin and TMZ\par Ordered 3rd cycle of TMZ today \par CEREBRAL EDEMA - Taper  dexamethasone to 2 mg daily. GI Prophylaxis with pantoprazole. \par DVT/PE - Continue Eliquis 5 mg bid\par SEIZURES- Continue Keppra 1000 mg bid and Vimpat 200 mg bid\par FOLLOW UP - Will do a clinical follow up in a month. In the interim, if any concerns patient knows to call our office. \par

## 2023-04-26 NOTE — HISTORY OF PRESENT ILLNESS
[FreeTextEntry1] : Mr. Ramos is being seen in neuro oncologic evaluation for a new diagnosis of brain tumor.\par \par History obtained via discussion with patient and chart review, including personal review of all neuroimaging.\par Mr. 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 He returned on 926 with worsening headache and underwent MRI scan of his brain:\par \par MRI brain 9/27 shows an irregularly enhancing mass in the left temporal-occipital region measuring 2.6 cm transversely and 8.9 cm cranial - caudal with surrounding vasogenic edema.\par \par CT C/A/P 9/26 unremarkable.\par \par Dr. Turpin performed a large resection of this mass on 10/3.\par 10/3 Pathology - Glioblastoma, WHO 4, IDH wt - MGMT methylated.\par \par Post-operative MRI 10/5: LEFT parietal occipital craniotomy with near complete resection of the of the neoplasm in the LEFT occipital/posterior temporal lobes. Minimal residual neoplasm is seen in the anterior part of the neoplasm, which abuts the ependymal surface of the atrium of the LEFT ventricle. Moderate postsurgical hemorrhage is seen within the surgical cavity. Moderate surrounding vasogenic edema is unchanged with effacement of the posterior horn of the LEFT lateral ventricle. Abnormal signal is also seen within the LEFT cerebral peduncle with a 4 mm focus of central enhancement and 1.6 cm region of rim enhancement, which is suspicious for a secondary focus of neoplasm or extension from the primary along the white matter tracts.\par 10/25- Had an episode of loss of vision X 15-20 minutes , episode resolved on its own - Restarted Dexamethasone 2 mg daily, Keppra raised to 750 mg bid\par 11/2/2022- He continues to have headaches even waking him from sleep. Raised dexamethasone to 4 mg daily\par 11/7/2022 - CHEMORT started\par 11/14/2022 - Reports when we raised the Dexamethasone on the 2nd , he was not taking the 4 mg daily, after discussing with HCP started on 11/7, headache didn’t resolve so on 11/10- Dr Ayala raised to 4 mg bid. Since then he reports no further headaches. Vision has not improved but has not worsened. \par 12/20/2022 - Completed ChemoRT \par 12/30/2022 - a friend called him and he was "not making sense.' Friend went to see him and bring him to the hospital - noted shaking of right UE and rigidity. In the ER, he reportedly had a GTC seizures - he was intubated. He was also found to be COVID positive. He was discharged to rehab on 12/29 and then discharged home on 1/18.\par 2/1/2023- MRI with slight improvement - Tapered Dexamethasone to 4-2 mg\par 2/6/2023- 2/10- TMZ first cycle\par 2/15-2/17 ( At [facility] ) and then transferred to [facility] and stayed inpatient from 2/17-2/21/2023 - Admitted sec to seizures, and expressive aphasia. discharged on Keppra 1000 mg bid and Vimpat 200 mg bid\par 3/7-3/9/2023 - Admitted at [facility] after a mechanical fall, he was found to have a PE bleed and UTI - He was treated with Rocephin for UTI, Started on IV Heparin and then transitioned to Eliquis and then was discharged home\par 3/15/2023- MRI with POD - Plan was made to continue TMZ and add IV AVastin\par 3/22/2023- 1st Avastin infusion \par 3/25/2023 - 3/29/2023 - TMZ second cycle \par 4/26/2023- Here for a follow up along with a new MRI. Patient had 3 IV Avastin's thus far. Per patient he has sob even at times he rest. He remains on Eliquis and admits compliance. According to Johnnie ( Patients HCP) , patient has increased cognitive decline, had episodes of urinary( X 2) and fecal incontinence X 1. He has trouble at times following simple directions. Johnnie attributes his SOB to anxiety. He is completed a course of  Bactrim for UTI.

## 2023-05-03 ENCOUNTER — APPOINTMENT (OUTPATIENT)
Dept: INFUSION THERAPY | Facility: HOSPITAL | Age: 63
End: 2023-05-03

## 2023-05-03 ENCOUNTER — RESULT REVIEW (OUTPATIENT)
Age: 63
End: 2023-05-03

## 2023-05-03 ENCOUNTER — APPOINTMENT (OUTPATIENT)
Dept: UROLOGY | Facility: CLINIC | Age: 63
End: 2023-05-03
Payer: MEDICARE

## 2023-05-03 ENCOUNTER — APPOINTMENT (OUTPATIENT)
Dept: NEUROLOGY | Facility: CLINIC | Age: 63
End: 2023-05-03

## 2023-05-03 ENCOUNTER — APPOINTMENT (OUTPATIENT)
Dept: HEMATOLOGY ONCOLOGY | Facility: CLINIC | Age: 63
End: 2023-05-03

## 2023-05-03 DIAGNOSIS — R30.0 DYSURIA: ICD-10-CM

## 2023-05-03 LAB
APPEARANCE UR: CLEAR — SIGNIFICANT CHANGE UP
BASOPHILS # BLD AUTO: 0.05 K/UL — SIGNIFICANT CHANGE UP (ref 0–0.2)
BASOPHILS NFR BLD AUTO: 0.6 % — SIGNIFICANT CHANGE UP (ref 0–2)
BILIRUB UR-MCNC: NEGATIVE — SIGNIFICANT CHANGE UP
COLOR SPEC: SIGNIFICANT CHANGE UP
DIFF PNL FLD: NEGATIVE — SIGNIFICANT CHANGE UP
EOSINOPHIL # BLD AUTO: 0.06 K/UL — SIGNIFICANT CHANGE UP (ref 0–0.5)
EOSINOPHIL NFR BLD AUTO: 0.7 % — SIGNIFICANT CHANGE UP (ref 0–6)
GLUCOSE UR QL: NEGATIVE MG/DL — SIGNIFICANT CHANGE UP
HCT VFR BLD CALC: 44.5 % — SIGNIFICANT CHANGE UP (ref 39–50)
HGB BLD-MCNC: 15 G/DL — SIGNIFICANT CHANGE UP (ref 13–17)
IMM GRANULOCYTES NFR BLD AUTO: 1.4 % — HIGH (ref 0–0.9)
KETONES UR-MCNC: NEGATIVE MG/DL — SIGNIFICANT CHANGE UP
LEUKOCYTE ESTERASE UR-ACNC: ABNORMAL
LYMPHOCYTES # BLD AUTO: 1.47 K/UL — SIGNIFICANT CHANGE UP (ref 1–3.3)
LYMPHOCYTES # BLD AUTO: 18.2 % — SIGNIFICANT CHANGE UP (ref 13–44)
MCHC RBC-ENTMCNC: 30.9 PG — SIGNIFICANT CHANGE UP (ref 27–34)
MCHC RBC-ENTMCNC: 33.7 G/DL — SIGNIFICANT CHANGE UP (ref 32–36)
MCV RBC AUTO: 91.6 FL — SIGNIFICANT CHANGE UP (ref 80–100)
MONOCYTES # BLD AUTO: 0.63 K/UL — SIGNIFICANT CHANGE UP (ref 0–0.9)
MONOCYTES NFR BLD AUTO: 7.8 % — SIGNIFICANT CHANGE UP (ref 2–14)
NEUTROPHILS # BLD AUTO: 5.76 K/UL — SIGNIFICANT CHANGE UP (ref 1.8–7.4)
NEUTROPHILS NFR BLD AUTO: 71.3 % — SIGNIFICANT CHANGE UP (ref 43–77)
NITRITE UR-MCNC: NEGATIVE — SIGNIFICANT CHANGE UP
NRBC # BLD: 0 /100 WBCS — SIGNIFICANT CHANGE UP (ref 0–0)
PH UR: 5.5 — SIGNIFICANT CHANGE UP (ref 5–8)
PLATELET # BLD AUTO: 157 K/UL — SIGNIFICANT CHANGE UP (ref 150–400)
PROT UR-MCNC: NEGATIVE MG/DL — SIGNIFICANT CHANGE UP
RBC # BLD: 4.86 M/UL — SIGNIFICANT CHANGE UP (ref 4.2–5.8)
RBC # FLD: 15.2 % — HIGH (ref 10.3–14.5)
SP GR SPEC: 1.02 — SIGNIFICANT CHANGE UP (ref 1–1.03)
UROBILINOGEN FLD QL: 0.2 MG/DL — SIGNIFICANT CHANGE UP (ref 0.2–1)
WBC # BLD: 8.08 K/UL — SIGNIFICANT CHANGE UP (ref 3.8–10.5)
WBC # FLD AUTO: 8.08 K/UL — SIGNIFICANT CHANGE UP (ref 3.8–10.5)

## 2023-05-03 PROCEDURE — 99214 OFFICE O/P EST MOD 30 MIN: CPT

## 2023-05-03 NOTE — HISTORY OF PRESENT ILLNESS
[FreeTextEntry1] : patient with hx of frequency and dysira \par hx of uti\par recent abx for kleb- d/w jan ( HCP)\par here for repeat urine \par ALTAF done : no hydro , no stones ,no masses\par bladder us : prostate normal size - 37 ml , elevated residual \par states nocturia better if Sz not taken \par nocturia 2-3 x \par slow flow \par dec fluid intake to dec voids\par feels bloated but BM ok \par here for f/u urine as HCP leaving out of state:

## 2023-05-03 NOTE — ASSESSMENT
[FreeTextEntry1] : patient with hx of frequency and dysira \par hx of uti\par recent abx for kleb- d/w jan ( HCP)\par here for repeat urine \par ALTAF done : no hydro , no stones ,no masses\par bladder us : prostate normal size - 37 ml , elevated residual \par states nocturia better if Sz not taken \par nocturia 2-3 x \par slow flow \par dec fluid intake to dec voids\par feels bloated \par here for f/u urine as HCP leaving out of state:\par 1- check urine\par 2- recommend UDS /cysto to eval bladder function for luts and hx of uti \par 3- discussed above with HCP: Johnnie who agrees to proceed - will schedule

## 2023-05-04 LAB
ALBUMIN SERPL ELPH-MCNC: 4.5 G/DL — SIGNIFICANT CHANGE UP (ref 3.3–5)
ALP SERPL-CCNC: 55 U/L — SIGNIFICANT CHANGE UP (ref 40–120)
ALT FLD-CCNC: 44 U/L — SIGNIFICANT CHANGE UP (ref 10–45)
ANION GAP SERPL CALC-SCNC: 17 MMOL/L — SIGNIFICANT CHANGE UP (ref 5–17)
AST SERPL-CCNC: 56 U/L — HIGH (ref 10–40)
BACTERIA # UR AUTO: NEGATIVE /HPF — SIGNIFICANT CHANGE UP
BILIRUB SERPL-MCNC: 0.2 MG/DL — SIGNIFICANT CHANGE UP (ref 0.2–1.2)
BUN SERPL-MCNC: 12 MG/DL — SIGNIFICANT CHANGE UP (ref 7–23)
CALCIUM SERPL-MCNC: 9.3 MG/DL — SIGNIFICANT CHANGE UP (ref 8.4–10.5)
CAST: 0 /LPF — SIGNIFICANT CHANGE UP (ref 0–4)
CHLORIDE SERPL-SCNC: 104 MMOL/L — SIGNIFICANT CHANGE UP (ref 96–108)
CO2 SERPL-SCNC: 22 MMOL/L — SIGNIFICANT CHANGE UP (ref 22–31)
CREAT SERPL-MCNC: 0.97 MG/DL — SIGNIFICANT CHANGE UP (ref 0.5–1.3)
EGFR: 87 ML/MIN/1.73M2 — SIGNIFICANT CHANGE UP
GLUCOSE SERPL-MCNC: 76 MG/DL — SIGNIFICANT CHANGE UP (ref 70–99)
POTASSIUM SERPL-MCNC: 5.6 MMOL/L — HIGH (ref 3.5–5.3)
POTASSIUM SERPL-SCNC: 5.6 MMOL/L — HIGH (ref 3.5–5.3)
PROT SERPL-MCNC: 7.1 G/DL — SIGNIFICANT CHANGE UP (ref 6–8.3)
RBC CASTS # UR COMP ASSIST: 0 /HPF — SIGNIFICANT CHANGE UP (ref 0–4)
SODIUM SERPL-SCNC: 143 MMOL/L — SIGNIFICANT CHANGE UP (ref 135–145)
SQUAMOUS # UR AUTO: 1 /HPF — SIGNIFICANT CHANGE UP (ref 0–5)
WBC UR QL: 11 /HPF — HIGH (ref 0–5)

## 2023-05-05 LAB — BACTERIA UR CULT: NORMAL

## 2023-05-12 ENCOUNTER — NON-APPOINTMENT (OUTPATIENT)
Age: 63
End: 2023-05-12

## 2023-05-16 ENCOUNTER — APPOINTMENT (OUTPATIENT)
Dept: NEUROLOGY | Facility: CLINIC | Age: 63
End: 2023-05-16

## 2023-05-17 ENCOUNTER — RESULT REVIEW (OUTPATIENT)
Age: 63
End: 2023-05-17

## 2023-05-17 ENCOUNTER — APPOINTMENT (OUTPATIENT)
Dept: INFUSION THERAPY | Facility: HOSPITAL | Age: 63
End: 2023-05-17

## 2023-05-17 LAB
APPEARANCE UR: ABNORMAL
BASOPHILS # BLD AUTO: 0.02 K/UL — SIGNIFICANT CHANGE UP (ref 0–0.2)
BASOPHILS NFR BLD AUTO: 0.2 % — SIGNIFICANT CHANGE UP (ref 0–2)
BILIRUB UR-MCNC: NEGATIVE — SIGNIFICANT CHANGE UP
COLOR SPEC: YELLOW — SIGNIFICANT CHANGE UP
DIFF PNL FLD: ABNORMAL
EOSINOPHIL # BLD AUTO: 0.01 K/UL — SIGNIFICANT CHANGE UP (ref 0–0.5)
EOSINOPHIL NFR BLD AUTO: 0.1 % — SIGNIFICANT CHANGE UP (ref 0–6)
GLUCOSE UR QL: NEGATIVE MG/DL — SIGNIFICANT CHANGE UP
HCT VFR BLD CALC: 45.3 % — SIGNIFICANT CHANGE UP (ref 39–50)
HGB BLD-MCNC: 15.1 G/DL — SIGNIFICANT CHANGE UP (ref 13–17)
IMM GRANULOCYTES NFR BLD AUTO: 0.8 % — SIGNIFICANT CHANGE UP (ref 0–0.9)
KETONES UR-MCNC: ABNORMAL MG/DL
LEUKOCYTE ESTERASE UR-ACNC: ABNORMAL
LYMPHOCYTES # BLD AUTO: 1.23 K/UL — SIGNIFICANT CHANGE UP (ref 1–3.3)
LYMPHOCYTES # BLD AUTO: 11.1 % — LOW (ref 13–44)
MCHC RBC-ENTMCNC: 30.8 PG — SIGNIFICANT CHANGE UP (ref 27–34)
MCHC RBC-ENTMCNC: 33.3 G/DL — SIGNIFICANT CHANGE UP (ref 32–36)
MCV RBC AUTO: 92.3 FL — SIGNIFICANT CHANGE UP (ref 80–100)
MONOCYTES # BLD AUTO: 0.76 K/UL — SIGNIFICANT CHANGE UP (ref 0–0.9)
MONOCYTES NFR BLD AUTO: 6.8 % — SIGNIFICANT CHANGE UP (ref 2–14)
NEUTROPHILS # BLD AUTO: 9.02 K/UL — HIGH (ref 1.8–7.4)
NEUTROPHILS NFR BLD AUTO: 81 % — HIGH (ref 43–77)
NITRITE UR-MCNC: POSITIVE
NRBC # BLD: 0 /100 WBCS — SIGNIFICANT CHANGE UP (ref 0–0)
PH UR: 5.5 — SIGNIFICANT CHANGE UP (ref 5–8)
PLATELET # BLD AUTO: 194 K/UL — SIGNIFICANT CHANGE UP (ref 150–400)
PROT UR-MCNC: SIGNIFICANT CHANGE UP MG/DL
RBC # BLD: 4.91 M/UL — SIGNIFICANT CHANGE UP (ref 4.2–5.8)
RBC # FLD: 14.8 % — HIGH (ref 10.3–14.5)
SP GR SPEC: 1.02 — SIGNIFICANT CHANGE UP (ref 1–1.03)
UROBILINOGEN FLD QL: 0.2 MG/DL — SIGNIFICANT CHANGE UP (ref 0.2–1)
WBC # BLD: 11.13 K/UL — HIGH (ref 3.8–10.5)
WBC # FLD AUTO: 11.13 K/UL — HIGH (ref 3.8–10.5)

## 2023-05-18 ENCOUNTER — APPOINTMENT (OUTPATIENT)
Dept: DERMATOLOGY | Facility: CLINIC | Age: 63
End: 2023-05-18
Payer: MEDICARE

## 2023-05-18 DIAGNOSIS — D48.9 NEOPLASM OF UNCERTAIN BEHAVIOR, UNSPECIFIED: ICD-10-CM

## 2023-05-18 LAB
ALBUMIN SERPL ELPH-MCNC: 4.2 G/DL — SIGNIFICANT CHANGE UP (ref 3.3–5)
ALP SERPL-CCNC: 49 U/L — SIGNIFICANT CHANGE UP (ref 40–120)
ALT FLD-CCNC: 38 U/L — SIGNIFICANT CHANGE UP (ref 10–45)
ANION GAP SERPL CALC-SCNC: 13 MMOL/L — SIGNIFICANT CHANGE UP (ref 5–17)
AST SERPL-CCNC: 27 U/L — SIGNIFICANT CHANGE UP (ref 10–40)
BACTERIA # UR AUTO: ABNORMAL /HPF
BILIRUB SERPL-MCNC: 0.3 MG/DL — SIGNIFICANT CHANGE UP (ref 0.2–1.2)
BUN SERPL-MCNC: 10 MG/DL — SIGNIFICANT CHANGE UP (ref 7–23)
CALCIUM SERPL-MCNC: 9.9 MG/DL — SIGNIFICANT CHANGE UP (ref 8.4–10.5)
CAST: 1 /LPF — SIGNIFICANT CHANGE UP (ref 0–4)
CHLORIDE SERPL-SCNC: 105 MMOL/L — SIGNIFICANT CHANGE UP (ref 96–108)
CO2 SERPL-SCNC: 22 MMOL/L — SIGNIFICANT CHANGE UP (ref 22–31)
CREAT SERPL-MCNC: 0.79 MG/DL — SIGNIFICANT CHANGE UP (ref 0.5–1.3)
EGFR: 99 ML/MIN/1.73M2 — SIGNIFICANT CHANGE UP
GLUCOSE SERPL-MCNC: 109 MG/DL — HIGH (ref 70–99)
POTASSIUM SERPL-MCNC: 4.8 MMOL/L — SIGNIFICANT CHANGE UP (ref 3.5–5.3)
POTASSIUM SERPL-SCNC: 4.8 MMOL/L — SIGNIFICANT CHANGE UP (ref 3.5–5.3)
PROT SERPL-MCNC: 6.8 G/DL — SIGNIFICANT CHANGE UP (ref 6–8.3)
RBC CASTS # UR COMP ASSIST: 1 /HPF — SIGNIFICANT CHANGE UP (ref 0–4)
SODIUM SERPL-SCNC: 141 MMOL/L — SIGNIFICANT CHANGE UP (ref 135–145)
SQUAMOUS # UR AUTO: 0 /HPF — SIGNIFICANT CHANGE UP (ref 0–5)
WBC UR QL: 390 /HPF — HIGH (ref 0–5)

## 2023-05-18 PROCEDURE — 11102 TANGNTL BX SKIN SINGLE LES: CPT

## 2023-05-18 NOTE — HISTORY OF PRESENT ILLNESS
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

## 2023-05-18 NOTE — PHYSICAL EXAM
[Alert] : alert [Oriented x 3] : ~L oriented x 3 [Well Nourished] : well nourished [FreeTextEntry3] : Posterior crown of scalp: 7 x 4 mm hyperkeratotic plaque

## 2023-05-24 ENCOUNTER — APPOINTMENT (OUTPATIENT)
Dept: NEUROLOGY | Facility: CLINIC | Age: 63
End: 2023-05-24
Payer: MEDICARE

## 2023-05-24 VITALS
TEMPERATURE: 97.5 F | HEART RATE: 104 BPM | RESPIRATION RATE: 18 BRPM | DIASTOLIC BLOOD PRESSURE: 99 MMHG | OXYGEN SATURATION: 98 % | HEIGHT: 70 IN | BODY MASS INDEX: 30.64 KG/M2 | SYSTOLIC BLOOD PRESSURE: 133 MMHG | WEIGHT: 214 LBS

## 2023-05-24 PROCEDURE — 99215 OFFICE O/P EST HI 40 MIN: CPT

## 2023-05-25 NOTE — DISCUSSION/SUMMARY
[FreeTextEntry1] : In summary, Mr. Ramos is neurologically stable, perhaps slightly improved on treatment for his malignant brain tumor.\par Cycle 4 due @ June 1st. Will check CBC and CMP today and if ok will order - Johnnie (friend) knows to call once TMZ received to check if cleared to begin. MRI in 4 weeks with clinical follow up at that time.

## 2023-05-25 NOTE — PHYSICAL EXAM
[FreeTextEntry1] : He is awake and alert - oriented - expressive speech is better\par Reception is intact\par EOMI\par RUQ VF defect\par Less so in LQ\par Face symmetric and tongue midline\par Bilateral right more than left extension tremor\par UE strong bilaterally\par LE 5-/5 bilaterally\par Ambulating - holds a cane but does not rely on it.

## 2023-05-25 NOTE — HISTORY OF PRESENT ILLNESS
[FreeTextEntry1] : \par Mr. Ramos is seen in neuro oncologic follow up - he is actively being treated with Temodar and Avastin for his left temporal occipital GBM, IDH wt, MGMT methylated diagnosed on 10/3/2022.\par \par TMZ actually taken beginning 5/4 (cycle 3).\par \par He appears to have increased fluency today - he still has trouble with reading but this is not worse. He has had no interval seizures and reports excellent compliance with Keppra 1000 mg bid and Lacosamide 200 mg bid.\par He remains on Decadron 2 mg daily.

## 2023-05-26 ENCOUNTER — NON-APPOINTMENT (OUTPATIENT)
Age: 63
End: 2023-05-26

## 2023-05-27 ENCOUNTER — NON-APPOINTMENT (OUTPATIENT)
Age: 63
End: 2023-05-27

## 2023-05-31 ENCOUNTER — RESULT REVIEW (OUTPATIENT)
Age: 63
End: 2023-05-31

## 2023-05-31 ENCOUNTER — APPOINTMENT (OUTPATIENT)
Dept: HEMATOLOGY ONCOLOGY | Facility: CLINIC | Age: 63
End: 2023-05-31

## 2023-05-31 ENCOUNTER — APPOINTMENT (OUTPATIENT)
Dept: INFUSION THERAPY | Facility: HOSPITAL | Age: 63
End: 2023-05-31

## 2023-05-31 LAB
ALBUMIN SERPL ELPH-MCNC: 4.1 G/DL — SIGNIFICANT CHANGE UP (ref 3.3–5)
ALP SERPL-CCNC: 53 U/L — SIGNIFICANT CHANGE UP (ref 40–120)
ALT FLD-CCNC: 43 U/L — SIGNIFICANT CHANGE UP (ref 10–45)
ANION GAP SERPL CALC-SCNC: 16 MMOL/L — SIGNIFICANT CHANGE UP (ref 5–17)
AST SERPL-CCNC: 27 U/L — SIGNIFICANT CHANGE UP (ref 10–40)
BASOPHILS # BLD AUTO: 0.06 K/UL — SIGNIFICANT CHANGE UP (ref 0–0.2)
BASOPHILS NFR BLD AUTO: 0.6 % — SIGNIFICANT CHANGE UP (ref 0–2)
BILIRUB SERPL-MCNC: 0.3 MG/DL — SIGNIFICANT CHANGE UP (ref 0.2–1.2)
BUN SERPL-MCNC: 8 MG/DL — SIGNIFICANT CHANGE UP (ref 7–23)
CALCIUM SERPL-MCNC: 9.2 MG/DL — SIGNIFICANT CHANGE UP (ref 8.4–10.5)
CHLORIDE SERPL-SCNC: 104 MMOL/L — SIGNIFICANT CHANGE UP (ref 96–108)
CO2 SERPL-SCNC: 22 MMOL/L — SIGNIFICANT CHANGE UP (ref 22–31)
CREAT SERPL-MCNC: 0.79 MG/DL — SIGNIFICANT CHANGE UP (ref 0.5–1.3)
EGFR: 99 ML/MIN/1.73M2 — SIGNIFICANT CHANGE UP
EOSINOPHIL # BLD AUTO: 0.05 K/UL — SIGNIFICANT CHANGE UP (ref 0–0.5)
EOSINOPHIL NFR BLD AUTO: 0.5 % — SIGNIFICANT CHANGE UP (ref 0–6)
GLUCOSE SERPL-MCNC: 107 MG/DL — HIGH (ref 70–99)
HCT VFR BLD CALC: 44.8 % — SIGNIFICANT CHANGE UP (ref 39–50)
HGB BLD-MCNC: 14.9 G/DL — SIGNIFICANT CHANGE UP (ref 13–17)
IMM GRANULOCYTES NFR BLD AUTO: 2.4 % — HIGH (ref 0–0.9)
LYMPHOCYTES # BLD AUTO: 1.64 K/UL — SIGNIFICANT CHANGE UP (ref 1–3.3)
LYMPHOCYTES # BLD AUTO: 16.7 % — SIGNIFICANT CHANGE UP (ref 13–44)
MCHC RBC-ENTMCNC: 30.5 PG — SIGNIFICANT CHANGE UP (ref 27–34)
MCHC RBC-ENTMCNC: 33.3 G/DL — SIGNIFICANT CHANGE UP (ref 32–36)
MCV RBC AUTO: 91.8 FL — SIGNIFICANT CHANGE UP (ref 80–100)
MONOCYTES # BLD AUTO: 0.73 K/UL — SIGNIFICANT CHANGE UP (ref 0–0.9)
MONOCYTES NFR BLD AUTO: 7.4 % — SIGNIFICANT CHANGE UP (ref 2–14)
NEUTROPHILS # BLD AUTO: 7.1 K/UL — SIGNIFICANT CHANGE UP (ref 1.8–7.4)
NEUTROPHILS NFR BLD AUTO: 72.4 % — SIGNIFICANT CHANGE UP (ref 43–77)
NRBC # BLD: 0 /100 WBCS — SIGNIFICANT CHANGE UP (ref 0–0)
PLATELET # BLD AUTO: 136 K/UL — LOW (ref 150–400)
POTASSIUM SERPL-MCNC: 4.2 MMOL/L — SIGNIFICANT CHANGE UP (ref 3.5–5.3)
POTASSIUM SERPL-SCNC: 4.2 MMOL/L — SIGNIFICANT CHANGE UP (ref 3.5–5.3)
PROT SERPL-MCNC: 6.4 G/DL — SIGNIFICANT CHANGE UP (ref 6–8.3)
RBC # BLD: 4.88 M/UL — SIGNIFICANT CHANGE UP (ref 4.2–5.8)
RBC # FLD: 14.4 % — SIGNIFICANT CHANGE UP (ref 10.3–14.5)
SODIUM SERPL-SCNC: 143 MMOL/L — SIGNIFICANT CHANGE UP (ref 135–145)
WBC # BLD: 9.82 K/UL — SIGNIFICANT CHANGE UP (ref 3.8–10.5)
WBC # FLD AUTO: 9.82 K/UL — SIGNIFICANT CHANGE UP (ref 3.8–10.5)

## 2023-05-31 RX ORDER — MIRTAZAPINE 45 MG/1
1 TABLET, ORALLY DISINTEGRATING ORAL
Qty: 0 | Refills: 0 | DISCHARGE

## 2023-05-31 RX ORDER — ONDANSETRON 8 MG/1
1 TABLET, FILM COATED ORAL
Qty: 0 | Refills: 0 | DISCHARGE

## 2023-06-01 ENCOUNTER — NON-APPOINTMENT (OUTPATIENT)
Age: 63
End: 2023-06-01

## 2023-06-05 ENCOUNTER — NON-APPOINTMENT (OUTPATIENT)
Age: 63
End: 2023-06-05

## 2023-06-05 ENCOUNTER — APPOINTMENT (OUTPATIENT)
Dept: UROLOGY | Facility: CLINIC | Age: 63
End: 2023-06-05
Payer: MEDICARE

## 2023-06-05 DIAGNOSIS — R35.0 FREQUENCY OF MICTURITION: ICD-10-CM

## 2023-06-05 PROCEDURE — 99214 OFFICE O/P EST MOD 30 MIN: CPT

## 2023-06-05 NOTE — HISTORY OF PRESENT ILLNESS
Clinical Pharmacy- Warfarin Discharge Note  This patient is currently on warfarin for the treatment of Atrial fibrillation.  INR Goal= 2-3  Expected length of therapy undetermined.    Warfarin PTA Regimen based on documentation: 2.5 mg Tues, Thurs, Sat; 1.25 mg rest of week.      Anticoagulation Dose History     Recent Dosing and Labs Latest Ref Rng & Units 12/17/2020 1/7/2021 2/4/2021 2/15/2021 2/16/2021 2/17/2021 2/18/2021    Warfarin 1.25 mg - - - - - 1.25 mg 1.25 mg -    Warfarin 2.5 mg - - - - - 2.5 mg - -    INR 0.86 - 1.14 2.20(H) 2.00(H) 2.00(H) 2.19(H) - 2.36(H) 2.50(H)    INR 0.86 - 1.14 - - - - - - -          Vitamin K doses administered during the last 7 days: None known  FFP administered during the last 7 days: None known  Recommend discharging the patient on his documented prior to admission warfarin regimen of 2.5 mg Tues, Thurs, Sat; 1.25 mg rest of week.    The patient should have an INR checked when directed by his anticoagulation clinic.   [FreeTextEntry1] : patient here with HCP- Jan \par sxs of frequency and nocturia with some dribbling kleber at night\par had dysura - reslved- states CIRC\par fininshed augmentn for possible uti - was unable to drop off urine cx\par to have UDS this Friday\par will renew AUgmentn until final cx back \par ALTAF showed no hydro bt + PVR as on exam today \par here to submit urine test due to Luts:

## 2023-06-05 NOTE — PHYSICAL EXAM
[General Appearance - Well Developed] : well developed [General Appearance - Well Nourished] : well nourished [Normal Appearance] : normal appearance [Well Groomed] : well groomed [General Appearance - In No Acute Distress] : no acute distress [Abdomen Soft] : soft [Abdomen Tenderness] : non-tender [Costovertebral Angle Tenderness] : no ~M costovertebral angle tenderness [FreeTextEntry1] : pvr 110 ml

## 2023-06-05 NOTE — ASSESSMENT
[FreeTextEntry1] : patient here with HCP- Jan \par sxs of frequency and nocturia with some dribbling kleber at night\par had dysura - reslved\par fininshed augmentn for possible uti - was unable to drop off urine cx\par to have UDS this Friday\par will renew AUgmentn until final cx back \par ALTAF showed no hydro bt + PVR as on exam today \par here to submit urine test due to Luts:\par 1- check urine \par 2- augmentn renewed - to start befoe UDS - wed \par 3-discussed UDS to determine if luts and PVR related to BPH- then start flomax or weak bladder - nothing to do \par

## 2023-06-07 LAB
APPEARANCE: CLEAR
BACTERIA UR CULT: NORMAL
BACTERIA: NEGATIVE /HPF
BILIRUBIN URINE: NEGATIVE
BLOOD URINE: NEGATIVE
CAST: 0 /LPF
COLOR: YELLOW
EPITHELIAL CELLS: 1 /HPF
GLUCOSE QUALITATIVE U: NEGATIVE MG/DL
KETONES URINE: NEGATIVE MG/DL
LEUKOCYTE ESTERASE URINE: ABNORMAL
MICROSCOPIC-UA: NORMAL
NITRITE URINE: NEGATIVE
PH URINE: 5.5
PROTEIN URINE: NORMAL MG/DL
RED BLOOD CELLS URINE: 0 /HPF
SPECIFIC GRAVITY URINE: 1.02
UROBILINOGEN URINE: 1 MG/DL
WHITE BLOOD CELLS URINE: 2 /HPF

## 2023-06-09 ENCOUNTER — APPOINTMENT (OUTPATIENT)
Dept: UROLOGY | Facility: CLINIC | Age: 63
End: 2023-06-09
Payer: MEDICARE

## 2023-06-09 ENCOUNTER — OUTPATIENT (OUTPATIENT)
Dept: OUTPATIENT SERVICES | Facility: HOSPITAL | Age: 63
LOS: 1 days | End: 2023-06-09
Payer: MEDICARE

## 2023-06-09 DIAGNOSIS — Z98.890 OTHER SPECIFIED POSTPROCEDURAL STATES: Chronic | ICD-10-CM

## 2023-06-09 DIAGNOSIS — R35.0 FREQUENCY OF MICTURITION: ICD-10-CM

## 2023-06-09 PROCEDURE — 51797 INTRAABDOMINAL PRESSURE TEST: CPT | Mod: 26

## 2023-06-09 PROCEDURE — 51784 ANAL/URINARY MUSCLE STUDY: CPT

## 2023-06-09 PROCEDURE — 51728 CYSTOMETROGRAM W/VP: CPT | Mod: 26

## 2023-06-09 PROCEDURE — 51784 ANAL/URINARY MUSCLE STUDY: CPT | Mod: 26

## 2023-06-09 PROCEDURE — 51728 CYSTOMETROGRAM W/VP: CPT

## 2023-06-09 PROCEDURE — 51797 INTRAABDOMINAL PRESSURE TEST: CPT

## 2023-06-09 PROCEDURE — 51741 ELECTRO-UROFLOWMETRY FIRST: CPT

## 2023-06-09 PROCEDURE — 51741 ELECTRO-UROFLOWMETRY FIRST: CPT | Mod: 26

## 2023-06-09 RX ORDER — TAMSULOSIN HYDROCHLORIDE 0.4 MG/1
0.4 CAPSULE ORAL
Qty: 30 | Refills: 6 | Status: ACTIVE | COMMUNITY
Start: 2023-06-09 | End: 1900-01-01

## 2023-06-09 RX ORDER — LACOSAMIDE 200 MG/1
200 TABLET ORAL
Qty: 60 | Refills: 4 | Status: ACTIVE | COMMUNITY
Start: 2023-01-20 | End: 1900-01-01

## 2023-06-10 ENCOUNTER — OUTPATIENT (OUTPATIENT)
Dept: OUTPATIENT SERVICES | Facility: HOSPITAL | Age: 63
LOS: 1 days | Discharge: HOME | End: 2023-06-10

## 2023-06-10 DIAGNOSIS — Z98.890 OTHER SPECIFIED POSTPROCEDURAL STATES: Chronic | ICD-10-CM

## 2023-06-10 DIAGNOSIS — G93.9 DISORDER OF BRAIN, UNSPECIFIED: ICD-10-CM

## 2023-06-12 ENCOUNTER — NON-APPOINTMENT (OUTPATIENT)
Age: 63
End: 2023-06-12

## 2023-06-12 ENCOUNTER — APPOINTMENT (OUTPATIENT)
Dept: DERMATOLOGY | Facility: CLINIC | Age: 63
End: 2023-06-12
Payer: MEDICARE

## 2023-06-12 DIAGNOSIS — L57.0 ACTINIC KERATOSIS: ICD-10-CM

## 2023-06-12 DIAGNOSIS — C44.42 SQUAMOUS CELL CARCINOMA OF SKIN OF SCALP AND NECK: ICD-10-CM

## 2023-06-12 PROCEDURE — 17311 MOHS 1 STAGE H/N/HF/G: CPT

## 2023-06-13 DIAGNOSIS — R33.9 RETENTION OF URINE, UNSPECIFIED: ICD-10-CM

## 2023-06-13 DIAGNOSIS — N32.0 BLADDER-NECK OBSTRUCTION: ICD-10-CM

## 2023-06-14 ENCOUNTER — RESULT REVIEW (OUTPATIENT)
Age: 63
End: 2023-06-14

## 2023-06-14 ENCOUNTER — OUTPATIENT (OUTPATIENT)
Dept: OUTPATIENT SERVICES | Facility: HOSPITAL | Age: 63
LOS: 1 days | End: 2023-06-14
Payer: MEDICAID

## 2023-06-14 ENCOUNTER — NON-APPOINTMENT (OUTPATIENT)
Age: 63
End: 2023-06-14

## 2023-06-14 ENCOUNTER — APPOINTMENT (OUTPATIENT)
Dept: INFUSION THERAPY | Facility: HOSPITAL | Age: 63
End: 2023-06-14

## 2023-06-14 ENCOUNTER — APPOINTMENT (OUTPATIENT)
Dept: HEMATOLOGY ONCOLOGY | Facility: CLINIC | Age: 63
End: 2023-06-14

## 2023-06-14 ENCOUNTER — APPOINTMENT (OUTPATIENT)
Dept: CT IMAGING | Facility: IMAGING CENTER | Age: 63
End: 2023-06-14
Payer: MEDICARE

## 2023-06-14 DIAGNOSIS — R10.9 UNSPECIFIED ABDOMINAL PAIN: ICD-10-CM

## 2023-06-14 LAB
BASOPHILS # BLD AUTO: 0.02 K/UL — SIGNIFICANT CHANGE UP (ref 0–0.2)
BASOPHILS NFR BLD AUTO: 0.3 % — SIGNIFICANT CHANGE UP (ref 0–2)
CREAT ?TM UR-MCNC: 106 MG/DL — SIGNIFICANT CHANGE UP
EOSINOPHIL # BLD AUTO: 0.02 K/UL — SIGNIFICANT CHANGE UP (ref 0–0.5)
EOSINOPHIL NFR BLD AUTO: 0.3 % — SIGNIFICANT CHANGE UP (ref 0–6)
HCT VFR BLD CALC: 43.8 % — SIGNIFICANT CHANGE UP (ref 39–50)
HGB BLD-MCNC: 14.5 G/DL — SIGNIFICANT CHANGE UP (ref 13–17)
IMM GRANULOCYTES NFR BLD AUTO: 0.5 % — SIGNIFICANT CHANGE UP (ref 0–0.9)
LYMPHOCYTES # BLD AUTO: 1.09 K/UL — SIGNIFICANT CHANGE UP (ref 1–3.3)
LYMPHOCYTES # BLD AUTO: 15 % — SIGNIFICANT CHANGE UP (ref 13–44)
MCHC RBC-ENTMCNC: 30.6 PG — SIGNIFICANT CHANGE UP (ref 27–34)
MCHC RBC-ENTMCNC: 33.1 G/DL — SIGNIFICANT CHANGE UP (ref 32–36)
MCV RBC AUTO: 92.4 FL — SIGNIFICANT CHANGE UP (ref 80–100)
MONOCYTES # BLD AUTO: 0.45 K/UL — SIGNIFICANT CHANGE UP (ref 0–0.9)
MONOCYTES NFR BLD AUTO: 6.2 % — SIGNIFICANT CHANGE UP (ref 2–14)
NEUTROPHILS # BLD AUTO: 5.67 K/UL — SIGNIFICANT CHANGE UP (ref 1.8–7.4)
NEUTROPHILS NFR BLD AUTO: 77.7 % — HIGH (ref 43–77)
NRBC # BLD: 0 /100 WBCS — SIGNIFICANT CHANGE UP (ref 0–0)
PLATELET # BLD AUTO: 179 K/UL — SIGNIFICANT CHANGE UP (ref 150–400)
PROT ?TM UR-MCNC: 12 MG/DL — SIGNIFICANT CHANGE UP (ref 0–12)
PROT/CREAT UR-RTO: 0.1 RATIO — SIGNIFICANT CHANGE UP (ref 0–0.2)
RBC # BLD: 4.74 M/UL — SIGNIFICANT CHANGE UP (ref 4.2–5.8)
RBC # FLD: 14.4 % — SIGNIFICANT CHANGE UP (ref 10.3–14.5)
WBC # BLD: 7.29 K/UL — SIGNIFICANT CHANGE UP (ref 3.8–10.5)
WBC # FLD AUTO: 7.29 K/UL — SIGNIFICANT CHANGE UP (ref 3.8–10.5)

## 2023-06-14 PROCEDURE — 74150 CT ABDOMEN W/O CONTRAST: CPT | Mod: 26,MH

## 2023-06-14 PROCEDURE — 74150 CT ABDOMEN W/O CONTRAST: CPT

## 2023-06-22 ENCOUNTER — APPOINTMENT (OUTPATIENT)
Dept: NEUROLOGY | Facility: CLINIC | Age: 63
End: 2023-06-22
Payer: MEDICARE

## 2023-06-22 ENCOUNTER — APPOINTMENT (OUTPATIENT)
Dept: INFUSION THERAPY | Facility: HOSPITAL | Age: 63
End: 2023-06-22

## 2023-06-22 ENCOUNTER — RESULT REVIEW (OUTPATIENT)
Age: 63
End: 2023-06-22

## 2023-06-22 ENCOUNTER — APPOINTMENT (OUTPATIENT)
Dept: HEMATOLOGY ONCOLOGY | Facility: CLINIC | Age: 63
End: 2023-06-22

## 2023-06-22 ENCOUNTER — OUTPATIENT (OUTPATIENT)
Dept: OUTPATIENT SERVICES | Facility: HOSPITAL | Age: 63
LOS: 1 days | End: 2023-06-22
Payer: MEDICARE

## 2023-06-22 ENCOUNTER — APPOINTMENT (OUTPATIENT)
Dept: MRI IMAGING | Facility: IMAGING CENTER | Age: 63
End: 2023-06-22
Payer: MEDICARE

## 2023-06-22 ENCOUNTER — EMERGENCY (EMERGENCY)
Facility: HOSPITAL | Age: 63
LOS: 1 days | Discharge: HOME | End: 2023-06-22
Attending: PERSONAL EMERGENCY RESPONSE ATTENDANT
Payer: MEDICARE

## 2023-06-22 VITALS
DIASTOLIC BLOOD PRESSURE: 74 MMHG | HEART RATE: 74 BPM | RESPIRATION RATE: 16 BRPM | SYSTOLIC BLOOD PRESSURE: 107 MMHG | OXYGEN SATURATION: 96 %

## 2023-06-22 VITALS
OXYGEN SATURATION: 96 % | RESPIRATION RATE: 16 BRPM | DIASTOLIC BLOOD PRESSURE: 73 MMHG | SYSTOLIC BLOOD PRESSURE: 98 MMHG | HEART RATE: 68 BPM | TEMPERATURE: 98.8 F

## 2023-06-22 VITALS
WEIGHT: 190.04 LBS | TEMPERATURE: 98 F | RESPIRATION RATE: 17 BRPM | SYSTOLIC BLOOD PRESSURE: 114 MMHG | DIASTOLIC BLOOD PRESSURE: 84 MMHG | OXYGEN SATURATION: 98 % | HEART RATE: 75 BPM | HEIGHT: 69 IN

## 2023-06-22 DIAGNOSIS — Z98.890 OTHER SPECIFIED POSTPROCEDURAL STATES: Chronic | ICD-10-CM

## 2023-06-22 DIAGNOSIS — C71.9 MALIGNANT NEOPLASM OF BRAIN, UNSPECIFIED: ICD-10-CM

## 2023-06-22 LAB
ALBUMIN SERPL ELPH-MCNC: 3.7 G/DL — SIGNIFICANT CHANGE UP (ref 3.3–5)
ALP SERPL-CCNC: 51 U/L — SIGNIFICANT CHANGE UP (ref 40–120)
ALT FLD-CCNC: 29 U/L — SIGNIFICANT CHANGE UP (ref 10–45)
ANION GAP SERPL CALC-SCNC: 15 MMOL/L — SIGNIFICANT CHANGE UP (ref 5–17)
AST SERPL-CCNC: 19 U/L — SIGNIFICANT CHANGE UP (ref 10–40)
BASOPHILS # BLD AUTO: 0.02 K/UL — SIGNIFICANT CHANGE UP (ref 0–0.2)
BASOPHILS # BLD AUTO: 0.02 K/UL — SIGNIFICANT CHANGE UP (ref 0–0.2)
BASOPHILS NFR BLD AUTO: 0.3 % — SIGNIFICANT CHANGE UP (ref 0–2)
BASOPHILS NFR BLD AUTO: 0.3 % — SIGNIFICANT CHANGE UP (ref 0–2)
BILIRUB SERPL-MCNC: 0.4 MG/DL — SIGNIFICANT CHANGE UP (ref 0.2–1.2)
BUN SERPL-MCNC: 9 MG/DL — SIGNIFICANT CHANGE UP (ref 7–23)
CALCIUM SERPL-MCNC: 9.6 MG/DL — SIGNIFICANT CHANGE UP (ref 8.4–10.5)
CHLORIDE SERPL-SCNC: 106 MMOL/L — SIGNIFICANT CHANGE UP (ref 96–108)
CO2 SERPL-SCNC: 19 MMOL/L — LOW (ref 22–31)
CREAT SERPL-MCNC: 0.9 MG/DL — SIGNIFICANT CHANGE UP (ref 0.5–1.3)
EGFR: 95 ML/MIN/1.73M2 — SIGNIFICANT CHANGE UP
EOSINOPHIL # BLD AUTO: 0.03 K/UL — SIGNIFICANT CHANGE UP (ref 0–0.5)
EOSINOPHIL # BLD AUTO: 0.05 K/UL — SIGNIFICANT CHANGE UP (ref 0–0.5)
EOSINOPHIL NFR BLD AUTO: 0.5 % — SIGNIFICANT CHANGE UP (ref 0–6)
EOSINOPHIL NFR BLD AUTO: 0.9 % — SIGNIFICANT CHANGE UP (ref 0–6)
GLUCOSE SERPL-MCNC: 94 MG/DL — SIGNIFICANT CHANGE UP (ref 70–99)
HCT VFR BLD CALC: 45 % — SIGNIFICANT CHANGE UP (ref 39–50)
HCT VFR BLD CALC: 45.6 % — SIGNIFICANT CHANGE UP (ref 39–50)
HGB BLD-MCNC: 14.5 G/DL — SIGNIFICANT CHANGE UP (ref 13–17)
HGB BLD-MCNC: 15.1 G/DL — SIGNIFICANT CHANGE UP (ref 13–17)
IMM GRANULOCYTES NFR BLD AUTO: 0.7 % — SIGNIFICANT CHANGE UP (ref 0–0.9)
IMM GRANULOCYTES NFR BLD AUTO: 0.9 % — SIGNIFICANT CHANGE UP (ref 0–0.9)
LYMPHOCYTES # BLD AUTO: 1.02 K/UL — SIGNIFICANT CHANGE UP (ref 1–3.3)
LYMPHOCYTES # BLD AUTO: 1.18 K/UL — SIGNIFICANT CHANGE UP (ref 1–3.3)
LYMPHOCYTES # BLD AUTO: 17.5 % — SIGNIFICANT CHANGE UP (ref 13–44)
LYMPHOCYTES # BLD AUTO: 20.3 % — SIGNIFICANT CHANGE UP (ref 13–44)
MCHC RBC-ENTMCNC: 30.3 PG — SIGNIFICANT CHANGE UP (ref 27–34)
MCHC RBC-ENTMCNC: 30.3 PG — SIGNIFICANT CHANGE UP (ref 27–34)
MCHC RBC-ENTMCNC: 31.8 GM/DL — LOW (ref 32–36)
MCHC RBC-ENTMCNC: 33.6 G/DL — SIGNIFICANT CHANGE UP (ref 32–36)
MCV RBC AUTO: 90.2 FL — SIGNIFICANT CHANGE UP (ref 80–100)
MCV RBC AUTO: 95.4 FL — SIGNIFICANT CHANGE UP (ref 80–100)
MONOCYTES # BLD AUTO: 0.58 K/UL — SIGNIFICANT CHANGE UP (ref 0–0.9)
MONOCYTES # BLD AUTO: 0.63 K/UL — SIGNIFICANT CHANGE UP (ref 0–0.9)
MONOCYTES NFR BLD AUTO: 10 % — SIGNIFICANT CHANGE UP (ref 2–14)
MONOCYTES NFR BLD AUTO: 10.8 % — SIGNIFICANT CHANGE UP (ref 2–14)
NEUTROPHILS # BLD AUTO: 3.93 K/UL — SIGNIFICANT CHANGE UP (ref 1.8–7.4)
NEUTROPHILS # BLD AUTO: 4.09 K/UL — SIGNIFICANT CHANGE UP (ref 1.8–7.4)
NEUTROPHILS NFR BLD AUTO: 67.6 % — SIGNIFICANT CHANGE UP (ref 43–77)
NEUTROPHILS NFR BLD AUTO: 70.2 % — SIGNIFICANT CHANGE UP (ref 43–77)
NRBC # BLD: 0 /100 WBCS — SIGNIFICANT CHANGE UP (ref 0–0)
NRBC # BLD: 0 /100 WBCS — SIGNIFICANT CHANGE UP (ref 0–0)
NT-PROBNP SERPL-SCNC: <36 PG/ML — SIGNIFICANT CHANGE UP (ref 0–300)
PLATELET # BLD AUTO: 139 K/UL — LOW (ref 150–400)
PLATELET # BLD AUTO: 152 K/UL — SIGNIFICANT CHANGE UP (ref 150–400)
POTASSIUM SERPL-MCNC: 3.7 MMOL/L — SIGNIFICANT CHANGE UP (ref 3.5–5.3)
POTASSIUM SERPL-SCNC: 3.7 MMOL/L — SIGNIFICANT CHANGE UP (ref 3.5–5.3)
PROT SERPL-MCNC: 6.5 G/DL — SIGNIFICANT CHANGE UP (ref 6–8.3)
RBC # BLD: 4.78 M/UL — SIGNIFICANT CHANGE UP (ref 4.2–5.8)
RBC # BLD: 4.99 M/UL — SIGNIFICANT CHANGE UP (ref 4.2–5.8)
RBC # FLD: 13.9 % — SIGNIFICANT CHANGE UP (ref 10.3–14.5)
RBC # FLD: 14.1 % — SIGNIFICANT CHANGE UP (ref 10.3–14.5)
SODIUM SERPL-SCNC: 140 MMOL/L — SIGNIFICANT CHANGE UP (ref 135–145)
TROPONIN T, HIGH SENSITIVITY RESULT: 27 NG/L — SIGNIFICANT CHANGE UP (ref 0–51)
TROPONIN T, HIGH SENSITIVITY RESULT: 27 NG/L — SIGNIFICANT CHANGE UP (ref 0–51)
WBC # BLD: 5.81 K/UL — SIGNIFICANT CHANGE UP (ref 3.8–10.5)
WBC # BLD: 5.83 K/UL — SIGNIFICANT CHANGE UP (ref 3.8–10.5)
WBC # FLD AUTO: 5.81 K/UL — SIGNIFICANT CHANGE UP (ref 3.8–10.5)
WBC # FLD AUTO: 5.83 K/UL — SIGNIFICANT CHANGE UP (ref 3.8–10.5)

## 2023-06-22 PROCEDURE — A9585: CPT

## 2023-06-22 PROCEDURE — 99215 OFFICE O/P EST HI 40 MIN: CPT

## 2023-06-22 PROCEDURE — 99285 EMERGENCY DEPT VISIT HI MDM: CPT | Mod: FS

## 2023-06-22 PROCEDURE — 70553 MRI BRAIN STEM W/O & W/DYE: CPT | Mod: 26,MH

## 2023-06-22 PROCEDURE — 70553 MRI BRAIN STEM W/O & W/DYE: CPT

## 2023-06-22 PROCEDURE — 71045 X-RAY EXAM CHEST 1 VIEW: CPT | Mod: 26

## 2023-06-22 RX ORDER — LEVETIRACETAM 250 MG/1
1000 TABLET, FILM COATED ORAL ONCE
Refills: 0 | Status: COMPLETED | OUTPATIENT
Start: 2023-06-22 | End: 2023-06-22

## 2023-06-22 RX ORDER — LACOSAMIDE 50 MG/1
200 TABLET ORAL ONCE
Refills: 0 | Status: DISCONTINUED | OUTPATIENT
Start: 2023-06-22 | End: 2023-06-22

## 2023-06-22 RX ORDER — AMOXICILLIN AND CLAVULANATE POTASSIUM 500; 125 MG/1; MG/1
500-125 TABLET, FILM COATED ORAL TWICE DAILY
Qty: 10 | Refills: 1 | Status: DISCONTINUED | COMMUNITY
Start: 2023-05-26 | End: 2023-06-22

## 2023-06-22 RX ORDER — DEXAMETHASONE 2 MG/1
2 TABLET ORAL DAILY
Qty: 30 | Refills: 0 | Status: DISCONTINUED | COMMUNITY
Start: 2022-12-20 | End: 2023-06-22

## 2023-06-22 RX ORDER — SULFAMETHOXAZOLE AND TRIMETHOPRIM 800; 160 MG/1; MG/1
800-160 TABLET ORAL
Qty: 10 | Refills: 0 | Status: DISCONTINUED | COMMUNITY
Start: 2023-04-20 | End: 2023-06-22

## 2023-06-22 RX ADMIN — LEVETIRACETAM 1000 MILLIGRAM(S): 250 TABLET, FILM COATED ORAL at 21:09

## 2023-06-22 RX ADMIN — LACOSAMIDE 200 MILLIGRAM(S): 50 TABLET ORAL at 21:09

## 2023-06-22 NOTE — ED PROVIDER NOTE - ATTENDING CONTRIBUTION TO CARE
Attending MD Godfrey:  I performed a history and physical exam of the patient and discussed their management with the resident. I reviewed the resident's note and agree with the documented findings and plan of care. My medical decision making and observations are found above.

## 2023-06-22 NOTE — ED ADULT NURSE NOTE - ED STAT RN HAND OFF
"Recommendations from today's MTM visit:                                                       1. I will pend an order to [facility] for brand name medrol 2mg.  the current order in the meantime. I will also look at whether you could be switched to a different steroid.    2. Simethicone is over the counter for stomach gas.    3. Wash your mouth after using AirDuo to avoid thrush infection. There is real risk of this infection while you are on oral steroids such as methylprednisolone.    4.  I will talk to [facility] about an alternative med for your bones    5. See if your inhaler is working or not. If it is clear that the inhaler is functioning correctly, it may make sense to move to a nebulized treatment regimen if covered.    Follow-up: Return in 4 weeks (on 9/13/2022) for phone visit.    It was great speaking with you today.  I value your experience and would be very thankful for your time in providing feedback in our clinic survey. In the next few days, you may receive an email or text message from [employer] with a link to a survey related to your  clinical pharmacist.\"     To schedule another MTM appointment, please call the clinic directly or you may call the MTM scheduling line at [phone] or toll-free at [phone].     My Clinical Pharmacist's contact information:                                                      Please feel free to contact me with any questions or concerns you have.      Ashley Marsh, Cain, [facility]  Medication Therapy Management Provider  Phone: [phone]  [email or web address]    "
Handoff

## 2023-06-22 NOTE — ED ADULT NURSE REASSESSMENT NOTE - NS ED NURSE REASSESS COMMENT FT1
Received report from JOANA Kevin RN. Patient presenting following syncopal episode. AOx4 and speaking coherently with wife at bedside. Patient reports "feeling much better." Denies dizziness, lightheadedness, pain at this time. Comfort and safety measures maintained.

## 2023-06-22 NOTE — ED PROVIDER NOTE - PATIENT PORTAL LINK FT
You can access the FollowMyHealth Patient Portal offered by [facility] by registering at the following website: [email or web address]. By joining [employer]’s FollowMyHealth portal, you will also be able to view your health information using other applications (apps) compatible with our system.

## 2023-06-22 NOTE — ED PROVIDER NOTE - NSFOLLOWUPINSTRUCTIONS_ED_ALL_ED_FT
Advance activity as tolerated.  Continue all previously prescribed medications as directed unless otherwise instructed.  Follow up with your primary care physician in 48-72 hours- bring copies of your results.  Return to the ER for worsening or persistent symptoms, and/or ANY NEW OR CONCERNING SYMPTOMS. If you have issues obtaining follow up, please call: [phone] to obtain a doctor or specialist who takes your insurance in your area.  You may call [phone] to make an appointment with the internal medicine clinic.    Followup with Home P/T and acquire transport wheelchair.

## 2023-06-22 NOTE — ED PROVIDER NOTE - PROGRESS NOTE DETAILS
Channing, PGY2 - Received sign-out on patient. Introduced myself and updated patient and proxy at bedside on the medical evaluation process. Patient aware and in agreement. Waiting for repeat troponin. Patient and proxy would like to wait until tomorrow morning for PT evaluation for home PT. Channing, PGY2 - repeat troponin same. spoke to CDU jung for PT in morning for home pt services, will come see patient. Channing, PGY2 - Due to patient baseline intermittent confusion e.g. "open the window" when there is no window, "my meds are at 5pm" when meds are at 7pm, CDU concerned that they do not know his baseline confusion, and thus patient may accidently have stroke code called. Will continue observing in the ER I was called to evaluate patient for CDU. Patient with confusion and given current CDU guidelines patient not appropriate for CDU at this time. Discussed with ED team, states that patient will stay in the ED for a PT consult. - Abi Rosario PA-C Amarjit Ellis MD:  Patient evaluted By PT due to patients deconditioning and generalized weakness secondary to deconditioning the patient will require a transport chair. Patient is unable to self propel in a standard wheelchair. This is necessary to achieve daily tasks and therapies which cannot be achieved with the use of a cane or a rolling walker. Patient and family are in agreement with transport chair use at home and assistance will be provided if needed.

## 2023-06-22 NOTE — ED PROVIDER NOTE - CLINICAL SUMMARY MEDICAL DECISION MAKING FREE TEXT BOX
We will perform presyncope work-up assess for arrhythmia with EKG anemia with CBC [place] abnormalities with CMP chest x-ray for consolidations TSH for hypothyroidism patient currently asymptomatic possible orthostatics as well We will perform presyncope work-up assess for arrhythmia with EKG anemia with CBC [place] abnormalities with CMP chest x-ray for consolidations TSH for hypothyroidism patient currently asymptomatic possible orthostatics as well    Attending MD Godfrey.  Agree with above.  Pt is a 64 yo fem male with pmhx of glioblastoma, seizures presents to ED with complaint of near-syncope today after CT chest/head for eval of progression of disease.  Pt also has pmhx fo bilateral DVT and remains on AC (eliquis), pt with epigastric pain without radiation to back during event.  Pt endorses improvement in sxs on arrival to ED.

## 2023-06-22 NOTE — ED ADULT NURSE NOTE - NSFALLRISKINTERV_ED_ALL_ED

## 2023-06-22 NOTE — ED PROVIDER NOTE - OBJECTIVE STATEMENT
63-year-old male history of brain tumor status postradiation on chemotherapy chief complaint presyncope, History of DVTs on Eliquis.  Patient notes he was getting MRI done today for progression of disease and noted to have almost passed out.  As he was getting out of the machine he noted to be very lightheaded but did not pass out.  Patient states he is feeling a lot better since going out of the machine and coming into the hospital.  Patient did not get MRI completed today.  Patient states he did not get contrast during today's MRI.

## 2023-06-22 NOTE — ED PROVIDER NOTE - PHYSICAL EXAMINATION
GENERAL: Awake, alert, NAD  LUNGS: CTAB, no wheezes or crackles   CARDIAC: RRR, no m/r/g  ABDOMEN: Soft,, non tender, non distended, no rebound, no guarding  EXT: No edema, no calf tenderness, 2+ DP pulses bilaterally,   NEURO: A&Ox3. Moving all extremities.  SKIN: Warm and dry. No rash.  PSYCH: Normal affect.

## 2023-06-22 NOTE — ED ADULT NURSE NOTE - OBJECTIVE STATEMENT
Patient  is  alert  and  oriented  x3 but forgetful at times.  He had  a  near  syncopal  episode earlier.  He  denies  chest pain or  dizziness.

## 2023-06-22 NOTE — ED ADULT TRIAGE NOTE - CHIEF COMPLAINT QUOTE
near syncopal episode at cancer center today, was there for scheduled MRI  on chemo for brain cancer, last treatment 3 weeks ago

## 2023-06-23 VITALS — DIASTOLIC BLOOD PRESSURE: 89 MMHG | OXYGEN SATURATION: 95 % | SYSTOLIC BLOOD PRESSURE: 133 MMHG | HEART RATE: 80 BPM

## 2023-06-23 LAB — TSH SERPL-MCNC: 1.79 UIU/ML — SIGNIFICANT CHANGE UP (ref 0.27–4.2)

## 2023-06-23 PROCEDURE — 85025 COMPLETE CBC W/AUTO DIFF WBC: CPT

## 2023-06-23 PROCEDURE — 70553 MRI BRAIN STEM W/O & W/DYE: CPT

## 2023-06-23 PROCEDURE — 99282 EMERGENCY DEPT VISIT SF MDM: CPT

## 2023-06-23 PROCEDURE — 97161 PT EVAL LOW COMPLEX 20 MIN: CPT

## 2023-06-23 PROCEDURE — 93005 ELECTROCARDIOGRAM TRACING: CPT

## 2023-06-23 PROCEDURE — 84443 ASSAY THYROID STIM HORMONE: CPT

## 2023-06-23 PROCEDURE — A9585: CPT

## 2023-06-23 PROCEDURE — 71045 X-RAY EXAM CHEST 1 VIEW: CPT

## 2023-06-23 PROCEDURE — 84484 ASSAY OF TROPONIN QUANT: CPT

## 2023-06-23 PROCEDURE — 80053 COMPREHEN METABOLIC PANEL: CPT

## 2023-06-23 PROCEDURE — 36415 COLL VENOUS BLD VENIPUNCTURE: CPT

## 2023-06-23 PROCEDURE — 99285 EMERGENCY DEPT VISIT HI MDM: CPT | Mod: 25

## 2023-06-23 PROCEDURE — 83880 ASSAY OF NATRIURETIC PEPTIDE: CPT

## 2023-06-23 RX ORDER — ACETAMINOPHEN 500 MG
650 TABLET ORAL ONCE
Refills: 0 | Status: COMPLETED | OUTPATIENT
Start: 2023-06-23 | End: 2023-06-23

## 2023-06-23 RX ADMIN — Medication 650 MILLIGRAM(S): at 04:53

## 2023-06-23 RX ADMIN — Medication 650 MILLIGRAM(S): at 03:54

## 2023-06-23 NOTE — HISTORY OF PRESENT ILLNESS
[FreeTextEntry1] : Mr. Ramos is being seen in neuro oncologic evaluation for a new diagnosis of brain tumor.\par \par History obtained via discussion with patient and chart review, including personal review of all neuroimaging.\par Mr. 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 He returned on 926 with worsening headache and underwent MRI scan of his brain:\par \par MRI brain 9/27 shows an irregularly enhancing mass in the left temporal-occipital region measuring 2.6 cm transversely and 8.9 cm cranial - caudal with surrounding vasogenic edema.\par \par CT C/A/P 9/26 unremarkable.\par \par Dr. Turpin performed a large resection of this mass on 10/3.\par 10/3 Pathology - Glioblastoma, WHO 4, IDH wt - MGMT methylated.\par \par Post-operative MRI 10/5: LEFT parietal occipital craniotomy with near complete resection of the of the neoplasm in the LEFT occipital/posterior temporal lobes. Minimal residual neoplasm is seen in the anterior part of the neoplasm, which abuts the ependymal surface of the atrium of the LEFT ventricle. Moderate postsurgical hemorrhage is seen within the surgical cavity. Moderate surrounding vasogenic edema is unchanged with effacement of the posterior horn of the LEFT lateral ventricle. Abnormal signal is also seen within the LEFT cerebral peduncle with a 4 mm focus of central enhancement and 1.6 cm region of rim enhancement, which is suspicious for a secondary focus of neoplasm or extension from the primary along the white matter tracts.\par 10/25- Had an episode of loss of vision X 15-20 minutes , episode resolved on its own - Restarted Dexamethasone 2 mg daily, Keppra raised to 750 mg bid\par 11/2/2022- He continues to have headaches even waking him from sleep. Raised dexamethasone to 4 mg daily\par 11/7/2022 - CHEMORT started\par 11/14/2022 - Reports when we raised the Dexamethasone on the 2nd , he was not taking the 4 mg daily, after discussing with HCP started on 11/7, headache didn’t resolve so on 11/10- Dr Ayala raised to 4 mg bid. Since then he reports no further headaches. Vision has not improved but has not worsened. \par 12/20/2022 - Completed ChemoRT \par 12/30/2022 - a friend called him and he was "not making sense.' Friend went to see him and bring him to the hospital - noted shaking of right UE and rigidity. In the ER, he reportedly had a GTC seizures - he was intubated. He was also found to be COVID positive. He was discharged to rehab on 12/29 and then discharged home on 1/18.\par 2/1/2023- MRI with slight improvement - Tapered Dexamethasone to 4-2 mg\par 2/6/2023- 2/10- TMZ first cycle\par 2/15-2/17 ( At [facility] ) and then transferred to [facility] and stayed inpatient from 2/17-2/21/2023 - Admitted sec to seizures, and expressive aphasia. discharged on Keppra 1000 mg bid and Vimpat 200 mg bid\par 3/7-3/9/2023 - Admitted at [facility] after a mechanical fall, he was found to have a PE bleed and UTI - He was treated with Rocephin for UTI, Started on IV Heparin and then transitioned to Eliquis and then was discharged home\par 3/15/2023- MRI with POD - Plan was made to continue TMZ and add IV AVastin\par 3/22/2023- 1st Avastin infusion \par 3/25/2023 - 3/29/2023 - TMZ second cycle \par 4/26/2023-  Patient had 3 IV Avastin's thus far. MRI stable\par 5/4 - 5/8/2023- TMZ cycle 3 \par 6/1- 6/4/2023 - TMZ cycle # 4 \par 6/22/2023- Here for a follow up along with a new MRI. Infusion last week cancelled as patient was complaining of abdominal pain. A follow up CT scan was performed and did not reveal any acute pathology. He feels his right sided weakness is getting more weak and reports his right side vision is worse\par  Denies headaches , seizures, Admits compliance to anti-epileptics\par

## 2023-06-23 NOTE — CONSULT NOTE ADULT - SUBJECTIVE AND OBJECTIVE BOX
Mr. Ramos is a  64 yo man well known to me - was sent to the ER from my office yesterday afternoon for altered MS - SOB, epigastric discomfort and low BP.    Briefly, he was diagnosed on 9/27/2022 with a left temporo-occipital brain tumor - had presented to ED with blurry vision. He has this resected on 10/3/2022 - pathology Glioblastoma, IDH wt, MGMT methylated.  After a gross total resection, he began chemo/RT 11/7 - 12/20. He was admitted on 12/30 after a generalized seizure and was found to be COVID + at that time. He recovered and was home - had another seizure prompting admission in 2/2023. Since then, seizures have been well controlled on lacosamide 200 mg bid and Keppra 1000 mg bid. Of note, he was also admitted 3/7-3/9 with a PE and UTI. He is currently on Eliquis 5 mg bid.    He has been receiving month Temozolomide with IV Avastin every 2 weeks - last week, Avastin was held due to abdominal discomfort - history and triggers were unclear. Hb stable. CT abdomen 6/14 unrevealing.    His plan was to return to my office 6/22 with an updated MRI scan and evaluation.    When I saw him yesterday afternoon @ 3pm - he was sitting up in a wheelchair and pale - he knew where he was and who I was - did not seem aphasic but looked very sick.  He was breathing rapidly, had poor color - hands and feet were very cold - he became diaphoretic and felt that he had to have a bowel movement or vomit. He also was complaining of epigastric pain so EMS was called.  VS in our office - BP 98/73, O2 sat 95%, HR 70    Today, he is much better - good color - resting comfortably.  Recalls episode - not details but remembers being taken to my office by his friend and then to hospital via ambulance.  He denies any SOB or chest pain.  Neurologically he has mildly reduced fluency with intact comprehension and can follow commands.  EOMI  RLQ VF defect - better in UQ but mild  trace NLF flattening on the right  UE moving well  Ambulating with a walker - some shuffling.      Troponins not elevated.  I have reviewed MRI from 6/23 and have compared to prior - I see no increased enhancement (patient DID have contrast).

## 2023-06-23 NOTE — PHYSICAL THERAPY INITIAL EVALUATION ADULT - ADDITIONAL COMMENTS
Per discussion with patient friend/health care proxy Johnnie: Patient lives with friend in private home, 3 steps to enter, first floor set up. Patient has 24 hour supervision between aide and friend. DME: Rolling walker, cane, shower chair

## 2023-06-23 NOTE — PHYSICAL THERAPY INITIAL EVALUATION ADULT - GAIT TRAINING, PT EVAL
GOAL: PAtient will ambulate 150 feet with supervision with least restrictive assistive device in 2 weeks

## 2023-06-23 NOTE — CHART NOTE - NSCHARTNOTEFT_GEN_A_CORE
EMERGENCY : SW consulted by physical therapy as patient seen by PT in the ED and recommended for home physical therapy and a transport wheelchair. LMSW reviewed patient's chart. As per chart review patient is a "63-year-old male history of brain tumor status postradiation on chemotherapy chief complaint presyncope, History of DVTs on Eliquis.  Patient notes he was getting MRI done today for progression of disease and noted to have almost passed out.  As he was getting out of the machine he noted to be very lightheaded but did not pass out.  Patient states he is feeling a lot better since going out of the machine and coming into the hospital.  Patient did not get MRI completed today.  Patient states he did not get contrast during today's MRI." As per ED MD, patient to be cleared for discharge home.     LMSw spoke with patient's emergency contact, friend and caregiver Johnnie Johnson PH: [phone] who states that patient has been residing with her at her home address of [address] [place] [address] as patient requiring 24/7 supervision. She states that while she is at work or out of the home patient has a HHA paid for through patient's [facility] [employer]. CDPAP services powered by [employer]. Patient with rolling walker and cane at home, 3 steps to enter caregivers home. Patient with previous home care services through [facility] but currently not receiving any homecare services. Patient's PCP is Dr. Johnathan Almeida PH: [phone]. [facility] preferred again for homecare services. She states her address will be where patient will receive homecare services and requests transport wheelchair be delivered. LMSW sent referral to [facility] who confirms referral and states they will provide same day delivery today and will reach out to patient's caregiver for coordination of deliver. LMSW sent homecare and home PT referral via [facility] to [facility]. Case rejected by [facility] due to no skilled nursing need identified for [facility]. LMSW then sent referral to [facility] for Home PT recommendations. Response pending. ZANESW communicated above information with MD and patient's caregiver who states patient's HHA will be coming to hospital to pick patient up for D/C as patient cleared at this time. No further needs identified at this time. Social work team to reach out to caregiver when response received in careport from [facility]. ongoing social work availability ensured.  continues to remain available for any further needs.

## 2023-06-23 NOTE — PHYSICAL THERAPY INITIAL EVALUATION ADULT - PLANNED THERAPY INTERVENTIONS, PT EVAL
stair negotiation GOAL: Patient will negotiate up / down 3 steps with supervision in 2 weeks/balance training/bed mobility training/gait training/transfer training

## 2023-06-23 NOTE — CONSULT NOTE ADULT - ASSESSMENT
In summary, Mr. Ramos is a 64 yo man with a h/o left temporo-occipital GBM, IDH wt, MGMT methylated sent to the ER from my office yesterday with hypotension, SOB, and diaphoresis.  Symptoms abated.  Troponins negative.  ? vasovagal  Not consistent with seizure.  Per Johnnie, friend and HCP, will take home with outpatient PT.  Follow up next week. ( I am out of town but can see NP and Dr. Vallejo covering).

## 2023-06-23 NOTE — DISCUSSION/SUMMARY
[FreeTextEntry1] : Patient seen and examined\par \par NEAR SYNCOPAL EPISODE - ? Vasovagal  - While in exam room patient had an episode where he appeared pale , having sob, noted to be  diaphoretic - HR in the 60's with a saturation of 95%, BP 98/73 - EMS called - Patient in SR , No ST elevation noted on the monitor , /74, HR - 74, Saturation - 95% - Patient back to baseline - Was taken to [facility] for further eval\par \par GLIOMA - Neurologically not worse - MRI from today reviewed and left temporal parietal area appears stable to my review -  No new area of enhancement noted. Official report pending\par Will hold off on IV Avastin today \par \par CEREBRAL EDEMA - Continue dexamethasone to 1 mg daily. GI Prophylaxis with pantoprazole. \par \par DVT/PE - Continue Eliquis 5 mg bid\par \par SEIZURES- Continue Keppra 1000 mg bid and Vimpat 200 mg bid\par \par FOLLOW UP - Will follow up while in hospital. Further plan to be determined. In the interim, if any concerns patient knows to call our office. \par

## 2023-06-23 NOTE — PHYSICAL EXAM
[] : no respiratory distress [FreeTextEntry1] : Patient seen and examined \par During initial exam\par Alert , awake , answering appropriately\par Right VFD - Lower quadrant ok\par LOUISE X 4\par Gait not assessed at this time\par On a wheelchair\par \par 15 MINUTES AFTER \par \par Patient appears to be less awake and alert\par Diaphoretic, appears sob \par Responds to his name and answers  appropriately but appeared pale \par \par 2-3  MINUTES AFTER EMS ARRIVED\par Patient back to baseline\par \par

## 2023-06-23 NOTE — PHYSICAL THERAPY INITIAL EVALUATION ADULT - PERTINENT HX OF CURRENT PROBLEM, REHAB EVAL
63-year-old male history of brain tumor status postradiation on chemotherapy chief complaint presyncope, History of DVTs on Eliquis.  Patient notes he was getting MRI done today for progression of disease and noted to have almost passed out.  As he was getting out of the machine he noted to be very lightheaded but did not pass out.  Patient states he is feeling a lot better since going out of the machine and coming into the hospital.  Patient did not get MRI completed today.  Patient states he did not get contrast during today's MRI.  6/22/23: Chest xray: Right basilar linear atelectasis.

## 2023-06-28 ENCOUNTER — RESULT REVIEW (OUTPATIENT)
Age: 63
End: 2023-06-28

## 2023-06-28 ENCOUNTER — APPOINTMENT (OUTPATIENT)
Dept: INFUSION THERAPY | Facility: HOSPITAL | Age: 63
End: 2023-06-28

## 2023-06-28 ENCOUNTER — NON-APPOINTMENT (OUTPATIENT)
Age: 63
End: 2023-06-28

## 2023-06-28 ENCOUNTER — APPOINTMENT (OUTPATIENT)
Dept: HEMATOLOGY ONCOLOGY | Facility: CLINIC | Age: 63
End: 2023-06-28

## 2023-06-28 LAB
ALBUMIN SERPL ELPH-MCNC: 4.3 G/DL — SIGNIFICANT CHANGE UP (ref 3.3–5)
ALP SERPL-CCNC: 54 U/L — SIGNIFICANT CHANGE UP (ref 40–120)
ALT FLD-CCNC: 36 U/L — SIGNIFICANT CHANGE UP (ref 10–45)
ANION GAP SERPL CALC-SCNC: 15 MMOL/L — SIGNIFICANT CHANGE UP (ref 5–17)
AST SERPL-CCNC: 27 U/L — SIGNIFICANT CHANGE UP (ref 10–40)
BASOPHILS # BLD AUTO: 0.04 K/UL — SIGNIFICANT CHANGE UP (ref 0–0.2)
BASOPHILS NFR BLD AUTO: 0.5 % — SIGNIFICANT CHANGE UP (ref 0–2)
BILIRUB SERPL-MCNC: 0.4 MG/DL — SIGNIFICANT CHANGE UP (ref 0.2–1.2)
BUN SERPL-MCNC: 12 MG/DL — SIGNIFICANT CHANGE UP (ref 7–23)
CALCIUM SERPL-MCNC: 9.9 MG/DL — SIGNIFICANT CHANGE UP (ref 8.4–10.5)
CHLORIDE SERPL-SCNC: 103 MMOL/L — SIGNIFICANT CHANGE UP (ref 96–108)
CO2 SERPL-SCNC: 24 MMOL/L — SIGNIFICANT CHANGE UP (ref 22–31)
CREAT SERPL-MCNC: 0.95 MG/DL — SIGNIFICANT CHANGE UP (ref 0.5–1.3)
EGFR: 89 ML/MIN/1.73M2 — SIGNIFICANT CHANGE UP
EOSINOPHIL # BLD AUTO: 0.02 K/UL — SIGNIFICANT CHANGE UP (ref 0–0.5)
EOSINOPHIL NFR BLD AUTO: 0.3 % — SIGNIFICANT CHANGE UP (ref 0–6)
GLUCOSE SERPL-MCNC: 95 MG/DL — SIGNIFICANT CHANGE UP (ref 70–99)
HCT VFR BLD CALC: 42.6 % — SIGNIFICANT CHANGE UP (ref 39–50)
HGB BLD-MCNC: 14.6 G/DL — SIGNIFICANT CHANGE UP (ref 13–17)
IMM GRANULOCYTES NFR BLD AUTO: 0.4 % — SIGNIFICANT CHANGE UP (ref 0–0.9)
LYMPHOCYTES # BLD AUTO: 1.2 K/UL — SIGNIFICANT CHANGE UP (ref 1–3.3)
LYMPHOCYTES # BLD AUTO: 15.7 % — SIGNIFICANT CHANGE UP (ref 13–44)
MCHC RBC-ENTMCNC: 30.7 PG — SIGNIFICANT CHANGE UP (ref 27–34)
MCHC RBC-ENTMCNC: 34.3 G/DL — SIGNIFICANT CHANGE UP (ref 32–36)
MCV RBC AUTO: 89.7 FL — SIGNIFICANT CHANGE UP (ref 80–100)
MONOCYTES # BLD AUTO: 0.56 K/UL — SIGNIFICANT CHANGE UP (ref 0–0.9)
MONOCYTES NFR BLD AUTO: 7.3 % — SIGNIFICANT CHANGE UP (ref 2–14)
NEUTROPHILS # BLD AUTO: 5.78 K/UL — SIGNIFICANT CHANGE UP (ref 1.8–7.4)
NEUTROPHILS NFR BLD AUTO: 75.8 % — SIGNIFICANT CHANGE UP (ref 43–77)
NRBC # BLD: 0 /100 WBCS — SIGNIFICANT CHANGE UP (ref 0–0)
PLATELET # BLD AUTO: 142 K/UL — LOW (ref 150–400)
POTASSIUM SERPL-MCNC: 4.3 MMOL/L — SIGNIFICANT CHANGE UP (ref 3.5–5.3)
POTASSIUM SERPL-SCNC: 4.3 MMOL/L — SIGNIFICANT CHANGE UP (ref 3.5–5.3)
PROT SERPL-MCNC: 6.6 G/DL — SIGNIFICANT CHANGE UP (ref 6–8.3)
RBC # BLD: 4.75 M/UL — SIGNIFICANT CHANGE UP (ref 4.2–5.8)
RBC # FLD: 13.4 % — SIGNIFICANT CHANGE UP (ref 10.3–14.5)
SODIUM SERPL-SCNC: 142 MMOL/L — SIGNIFICANT CHANGE UP (ref 135–145)
WBC # BLD: 7.63 K/UL — SIGNIFICANT CHANGE UP (ref 3.8–10.5)
WBC # FLD AUTO: 7.63 K/UL — SIGNIFICANT CHANGE UP (ref 3.8–10.5)

## 2023-06-28 RX ORDER — TEMOZOLOMIDE 140 MG/1
140 CAPSULE ORAL
Qty: 5 | Refills: 0 | Status: DISCONTINUED | COMMUNITY
Start: 2023-05-25 | End: 2023-06-28

## 2023-06-28 RX ORDER — TEMOZOLOMIDE 100 MG/1
100 CAPSULE ORAL
Qty: 5 | Refills: 0 | Status: DISCONTINUED | COMMUNITY
Start: 2023-05-25 | End: 2023-06-28

## 2023-06-28 RX ORDER — TEMOZOLOMIDE 180 MG/1
180 CAPSULE ORAL
Qty: 5 | Refills: 0 | Status: DISCONTINUED | COMMUNITY
Start: 2023-05-25 | End: 2023-06-28

## 2023-06-29 DIAGNOSIS — Z51.11 ENCOUNTER FOR ANTINEOPLASTIC CHEMOTHERAPY: ICD-10-CM

## 2023-06-29 DIAGNOSIS — C71.9 MALIGNANT NEOPLASM OF BRAIN, UNSPECIFIED: ICD-10-CM

## 2023-07-06 ENCOUNTER — APPOINTMENT (OUTPATIENT)
Dept: HEMATOLOGY ONCOLOGY | Facility: CLINIC | Age: 63
End: 2023-07-06

## 2023-07-09 ENCOUNTER — INPATIENT (INPATIENT)
Facility: HOSPITAL | Age: 63
LOS: 2 days | Discharge: NURSING, REHAB OR LONG-TERM CARE | DRG: 563 | End: 2023-07-12
Attending: INTERNAL MEDICINE | Admitting: STUDENT IN AN ORGANIZED HEALTH CARE EDUCATION/TRAINING PROGRAM
Payer: MEDICARE

## 2023-07-09 VITALS
SYSTOLIC BLOOD PRESSURE: 148 MMHG | HEIGHT: 69 IN | OXYGEN SATURATION: 100 % | TEMPERATURE: 98 F | RESPIRATION RATE: 17 BRPM | HEART RATE: 82 BPM | WEIGHT: 199.96 LBS | DIASTOLIC BLOOD PRESSURE: 92 MMHG

## 2023-07-09 DIAGNOSIS — S82.409A UNSPECIFIED FRACTURE OF SHAFT OF UNSPECIFIED FIBULA, INITIAL ENCOUNTER FOR CLOSED FRACTURE: ICD-10-CM

## 2023-07-09 DIAGNOSIS — Z98.890 OTHER SPECIFIED POSTPROCEDURAL STATES: Chronic | ICD-10-CM

## 2023-07-09 LAB
ALBUMIN SERPL ELPH-MCNC: 3.3 G/DL — SIGNIFICANT CHANGE UP (ref 3.3–5)
ALP SERPL-CCNC: 54 U/L — SIGNIFICANT CHANGE UP (ref 40–120)
ALT FLD-CCNC: 38 U/L — SIGNIFICANT CHANGE UP (ref 12–78)
ANION GAP SERPL CALC-SCNC: 2 MMOL/L — LOW (ref 5–17)
APTT BLD: 34.4 SEC — SIGNIFICANT CHANGE UP (ref 27.5–35.5)
AST SERPL-CCNC: 19 U/L — SIGNIFICANT CHANGE UP (ref 15–37)
BASOPHILS # BLD AUTO: 0.02 K/UL — SIGNIFICANT CHANGE UP (ref 0–0.2)
BASOPHILS NFR BLD AUTO: 0.3 % — SIGNIFICANT CHANGE UP (ref 0–2)
BILIRUB SERPL-MCNC: 0.7 MG/DL — SIGNIFICANT CHANGE UP (ref 0.2–1.2)
BUN SERPL-MCNC: 13 MG/DL — SIGNIFICANT CHANGE UP (ref 7–23)
CALCIUM SERPL-MCNC: 9.7 MG/DL — SIGNIFICANT CHANGE UP (ref 8.5–10.1)
CHLORIDE SERPL-SCNC: 110 MMOL/L — HIGH (ref 96–108)
CO2 SERPL-SCNC: 30 MMOL/L — SIGNIFICANT CHANGE UP (ref 22–31)
CREAT SERPL-MCNC: 0.91 MG/DL — SIGNIFICANT CHANGE UP (ref 0.5–1.3)
EGFR: 94 ML/MIN/1.73M2 — SIGNIFICANT CHANGE UP
EOSINOPHIL # BLD AUTO: 0 K/UL — SIGNIFICANT CHANGE UP (ref 0–0.5)
EOSINOPHIL NFR BLD AUTO: 0 % — SIGNIFICANT CHANGE UP (ref 0–6)
GLUCOSE SERPL-MCNC: 110 MG/DL — HIGH (ref 70–99)
HCT VFR BLD CALC: 44.2 % — SIGNIFICANT CHANGE UP (ref 39–50)
HGB BLD-MCNC: 14.7 G/DL — SIGNIFICANT CHANGE UP (ref 13–17)
IMM GRANULOCYTES NFR BLD AUTO: 0.5 % — SIGNIFICANT CHANGE UP (ref 0–0.9)
INR BLD: 1.49 RATIO — HIGH (ref 0.88–1.16)
LYMPHOCYTES # BLD AUTO: 0.74 K/UL — LOW (ref 1–3.3)
LYMPHOCYTES # BLD AUTO: 11.4 % — LOW (ref 13–44)
MCHC RBC-ENTMCNC: 31 PG — SIGNIFICANT CHANGE UP (ref 27–34)
MCHC RBC-ENTMCNC: 33.3 GM/DL — SIGNIFICANT CHANGE UP (ref 32–36)
MCV RBC AUTO: 93.2 FL — SIGNIFICANT CHANGE UP (ref 80–100)
MONOCYTES # BLD AUTO: 0.42 K/UL — SIGNIFICANT CHANGE UP (ref 0–0.9)
MONOCYTES NFR BLD AUTO: 6.5 % — SIGNIFICANT CHANGE UP (ref 2–14)
NEUTROPHILS # BLD AUTO: 5.27 K/UL — SIGNIFICANT CHANGE UP (ref 1.8–7.4)
NEUTROPHILS NFR BLD AUTO: 81.3 % — HIGH (ref 43–77)
PLATELET # BLD AUTO: 187 K/UL — SIGNIFICANT CHANGE UP (ref 150–400)
POTASSIUM SERPL-MCNC: 4.7 MMOL/L — SIGNIFICANT CHANGE UP (ref 3.5–5.3)
POTASSIUM SERPL-SCNC: 4.7 MMOL/L — SIGNIFICANT CHANGE UP (ref 3.5–5.3)
PROT SERPL-MCNC: 7.1 GM/DL — SIGNIFICANT CHANGE UP (ref 6–8.3)
PROTHROM AB SERPL-ACNC: 17.3 SEC — HIGH (ref 10.5–13.4)
RBC # BLD: 4.74 M/UL — SIGNIFICANT CHANGE UP (ref 4.2–5.8)
RBC # FLD: 13.6 % — SIGNIFICANT CHANGE UP (ref 10.3–14.5)
SODIUM SERPL-SCNC: 142 MMOL/L — SIGNIFICANT CHANGE UP (ref 135–145)
WBC # BLD: 6.48 K/UL — SIGNIFICANT CHANGE UP (ref 3.8–10.5)
WBC # FLD AUTO: 6.48 K/UL — SIGNIFICANT CHANGE UP (ref 3.8–10.5)

## 2023-07-09 PROCEDURE — 99285 EMERGENCY DEPT VISIT HI MDM: CPT

## 2023-07-09 PROCEDURE — 99223 1ST HOSP IP/OBS HIGH 75: CPT

## 2023-07-09 PROCEDURE — 76376 3D RENDER W/INTRP POSTPROCES: CPT | Mod: 26

## 2023-07-09 PROCEDURE — 73700 CT LOWER EXTREMITY W/O DYE: CPT | Mod: RT

## 2023-07-09 PROCEDURE — 76376 3D RENDER W/INTRP POSTPROCES: CPT

## 2023-07-09 PROCEDURE — 73590 X-RAY EXAM OF LOWER LEG: CPT | Mod: 26,RT,77,76

## 2023-07-09 PROCEDURE — 36415 COLL VENOUS BLD VENIPUNCTURE: CPT

## 2023-07-09 PROCEDURE — 93005 ELECTROCARDIOGRAM TRACING: CPT

## 2023-07-09 PROCEDURE — 73630 X-RAY EXAM OF FOOT: CPT | Mod: 26,RT

## 2023-07-09 PROCEDURE — 85025 COMPLETE CBC W/AUTO DIFF WBC: CPT

## 2023-07-09 PROCEDURE — 82607 VITAMIN B-12: CPT

## 2023-07-09 PROCEDURE — 85610 PROTHROMBIN TIME: CPT

## 2023-07-09 PROCEDURE — 85730 THROMBOPLASTIN TIME PARTIAL: CPT

## 2023-07-09 PROCEDURE — 73610 X-RAY EXAM OF ANKLE: CPT | Mod: RT

## 2023-07-09 PROCEDURE — 93971 EXTREMITY STUDY: CPT | Mod: 26,RT

## 2023-07-09 PROCEDURE — 73610 X-RAY EXAM OF ANKLE: CPT | Mod: 26,RT,77

## 2023-07-09 PROCEDURE — 73590 X-RAY EXAM OF LOWER LEG: CPT | Mod: RT

## 2023-07-09 PROCEDURE — 93010 ELECTROCARDIOGRAM REPORT: CPT

## 2023-07-09 PROCEDURE — 73610 X-RAY EXAM OF ANKLE: CPT | Mod: 26,RT

## 2023-07-09 PROCEDURE — 80053 COMPREHEN METABOLIC PANEL: CPT

## 2023-07-09 PROCEDURE — 73590 X-RAY EXAM OF LOWER LEG: CPT | Mod: 26,RT

## 2023-07-09 PROCEDURE — 87635 SARS-COV-2 COVID-19 AMP PRB: CPT

## 2023-07-09 PROCEDURE — 73700 CT LOWER EXTREMITY W/O DYE: CPT | Mod: 26,RT

## 2023-07-09 RX ORDER — PANTOPRAZOLE SODIUM 20 MG/1
40 TABLET, DELAYED RELEASE ORAL
Refills: 0 | Status: DISCONTINUED | OUTPATIENT
Start: 2023-07-09 | End: 2023-07-12

## 2023-07-09 RX ORDER — OXYCODONE HYDROCHLORIDE 5 MG/1
2.5 TABLET ORAL EVERY 4 HOURS
Refills: 0 | Status: DISCONTINUED | OUTPATIENT
Start: 2023-07-09 | End: 2023-07-12

## 2023-07-09 RX ORDER — DEXAMETHASONE 0.5 MG/5ML
1 ELIXIR ORAL DAILY
Refills: 0 | Status: DISCONTINUED | OUTPATIENT
Start: 2023-07-09 | End: 2023-07-12

## 2023-07-09 RX ORDER — LEVETIRACETAM 250 MG/1
1000 TABLET, FILM COATED ORAL
Refills: 0 | Status: DISCONTINUED | OUTPATIENT
Start: 2023-07-09 | End: 2023-07-12

## 2023-07-09 RX ORDER — ACETAMINOPHEN 500 MG
1000 TABLET ORAL EVERY 8 HOURS
Refills: 0 | Status: DISCONTINUED | OUTPATIENT
Start: 2023-07-09 | End: 2023-07-12

## 2023-07-09 RX ORDER — ACETAMINOPHEN 500 MG
975 TABLET ORAL ONCE
Refills: 0 | Status: COMPLETED | OUTPATIENT
Start: 2023-07-09 | End: 2023-07-09

## 2023-07-09 RX ORDER — LANOLIN ALCOHOL/MO/W.PET/CERES
3 CREAM (GRAM) TOPICAL AT BEDTIME
Refills: 0 | Status: DISCONTINUED | OUTPATIENT
Start: 2023-07-09 | End: 2023-07-12

## 2023-07-09 RX ORDER — ONDANSETRON 8 MG/1
4 TABLET, FILM COATED ORAL EVERY 8 HOURS
Refills: 0 | Status: DISCONTINUED | OUTPATIENT
Start: 2023-07-09 | End: 2023-07-12

## 2023-07-09 RX ORDER — ROPINIROLE 8 MG/1
0.25 TABLET, FILM COATED, EXTENDED RELEASE ORAL
Refills: 0 | Status: DISCONTINUED | OUTPATIENT
Start: 2023-07-09 | End: 2023-07-12

## 2023-07-09 RX ORDER — OXYCODONE HYDROCHLORIDE 5 MG/1
5 TABLET ORAL EVERY 4 HOURS
Refills: 0 | Status: DISCONTINUED | OUTPATIENT
Start: 2023-07-09 | End: 2023-07-12

## 2023-07-09 RX ORDER — NALOXONE HYDROCHLORIDE 4 MG/.1ML
0.4 SPRAY NASAL ONCE
Refills: 0 | Status: DISCONTINUED | OUTPATIENT
Start: 2023-07-09 | End: 2023-07-12

## 2023-07-09 RX ORDER — TAMSULOSIN HYDROCHLORIDE 0.4 MG/1
0.4 CAPSULE ORAL AT BEDTIME
Refills: 0 | Status: DISCONTINUED | OUTPATIENT
Start: 2023-07-09 | End: 2023-07-12

## 2023-07-09 RX ORDER — CYCLOBENZAPRINE HYDROCHLORIDE 10 MG/1
5 TABLET, FILM COATED ORAL ONCE
Refills: 0 | Status: COMPLETED | OUTPATIENT
Start: 2023-07-09 | End: 2023-07-09

## 2023-07-09 RX ORDER — LACOSAMIDE 50 MG/1
200 TABLET ORAL
Refills: 0 | Status: DISCONTINUED | OUTPATIENT
Start: 2023-07-09 | End: 2023-07-12

## 2023-07-09 RX ORDER — ACETAMINOPHEN 500 MG
650 TABLET ORAL EVERY 6 HOURS
Refills: 0 | Status: DISCONTINUED | OUTPATIENT
Start: 2023-07-09 | End: 2023-07-12

## 2023-07-09 RX ORDER — POLYETHYLENE GLYCOL 3350 17 G/17G
17 POWDER, FOR SOLUTION ORAL DAILY
Refills: 0 | Status: DISCONTINUED | OUTPATIENT
Start: 2023-07-09 | End: 2023-07-12

## 2023-07-09 RX ORDER — APIXABAN 2.5 MG/1
5 TABLET, FILM COATED ORAL
Refills: 0 | Status: DISCONTINUED | OUTPATIENT
Start: 2023-07-09 | End: 2023-07-12

## 2023-07-09 RX ORDER — MIRTAZAPINE 45 MG/1
7.5 TABLET, ORALLY DISINTEGRATING ORAL AT BEDTIME
Refills: 0 | Status: DISCONTINUED | OUTPATIENT
Start: 2023-07-09 | End: 2023-07-12

## 2023-07-09 RX ORDER — SENNA PLUS 8.6 MG/1
2 TABLET ORAL AT BEDTIME
Refills: 0 | Status: DISCONTINUED | OUTPATIENT
Start: 2023-07-09 | End: 2023-07-12

## 2023-07-09 RX ADMIN — LACOSAMIDE 200 MILLIGRAM(S): 50 TABLET ORAL at 21:26

## 2023-07-09 RX ADMIN — OXYCODONE HYDROCHLORIDE 5 MILLIGRAM(S): 5 TABLET ORAL at 20:05

## 2023-07-09 RX ADMIN — LEVETIRACETAM 1000 MILLIGRAM(S): 250 TABLET, FILM COATED ORAL at 21:28

## 2023-07-09 RX ADMIN — OXYCODONE HYDROCHLORIDE 5 MILLIGRAM(S): 5 TABLET ORAL at 20:39

## 2023-07-09 RX ADMIN — Medication 1000 MILLIGRAM(S): at 21:54

## 2023-07-09 RX ADMIN — TAMSULOSIN HYDROCHLORIDE 0.4 MILLIGRAM(S): 0.4 CAPSULE ORAL at 21:24

## 2023-07-09 RX ADMIN — CYCLOBENZAPRINE HYDROCHLORIDE 5 MILLIGRAM(S): 10 TABLET, FILM COATED ORAL at 21:24

## 2023-07-09 RX ADMIN — SENNA PLUS 2 TABLET(S): 8.6 TABLET ORAL at 21:25

## 2023-07-09 RX ADMIN — ROPINIROLE 0.25 MILLIGRAM(S): 8 TABLET, FILM COATED, EXTENDED RELEASE ORAL at 21:25

## 2023-07-09 RX ADMIN — Medication 1000 MILLIGRAM(S): at 21:24

## 2023-07-09 RX ADMIN — APIXABAN 5 MILLIGRAM(S): 2.5 TABLET, FILM COATED ORAL at 21:26

## 2023-07-09 RX ADMIN — Medication 975 MILLIGRAM(S): at 09:53

## 2023-07-09 NOTE — PATIENT PROFILE ADULT - FALL HARM RISK - HARM RISK INTERVENTIONS
Assistance with ambulation/Assistance OOB with selected safe patient handling equipment/Communicate Risk of Fall with Harm to all staff/Discuss with provider need for PT consult/Monitor gait and stability/Reinforce activity limits and safety measures with patient and family/Tailored Fall Risk Interventions/Visual Cue: Yellow wristband and red socks/Bed in lowest position, wheels locked, appropriate side rails in place/Call bell, personal items and telephone in reach/Instruct patient to call for assistance before getting out of bed or chair/Non-slip footwear when patient is out of bed/[place] to call system/Physically safe environment - no spills, clutter or unnecessary equipment/Purposeful Proactive Rounding/Room/bathroom lighting operational, light cord in reach

## 2023-07-09 NOTE — ED PROVIDER NOTE - MUSCULOSKELETAL, MLM
Spine appears normal. R foot pain and difficulty in ROM comfortably also mild swelling and ecchymosis to mid foot. no other injuries.

## 2023-07-09 NOTE — PHARMACOTHERAPY INTERVENTION NOTE - COMMENTS
Medication history complete. Medications and allergies reviewed with patient's health care proxy, Johnnie at bedside and confirmed with . 
25-Jan-2021 12:28

## 2023-07-09 NOTE — ED ADULT TRIAGE NOTE - CHIEF COMPLAINT QUOTE
Pt presents to ER c/o b/l leg pain. Onset of symptoms began last night over night and kept him up from sleep. Hx of PE/DVT. On Eliquis

## 2023-07-09 NOTE — ED PROVIDER NOTE - CONSTITUTIONAL, MLM
normal... Appearing comfortable, awake, alert, oriented to person, place, time/situation. complaining of pain.

## 2023-07-09 NOTE — ED ADULT NURSE NOTE - NSFALLHARMRISKINTERV_ED_ALL_ED

## 2023-07-09 NOTE — H&P ADULT - HISTORY OF PRESENT ILLNESS
63 year old male w hx GBM s/p craniotomy on chemo, DVT/PE on eliquis, Sz came to ED c/o leg pains    last night had bilateral leg pain kleber at R ankle that prevented his sleep  +difficulty weight bearing  +s/p fall        63 year old male w hx GBM s/p craniotomy on chemo, DVT/PE on eliquis, Sz came to ED c/o leg pain      c/o R lower leg pain especially at ankle  pain  prevented sleep 10/10  +difficulty weight bearing, last ambulating 07-06  +s/p fall : timing unknown to pt or to friend he lives with  denied head injury or LOC  Friend noted bruising on his arms today    In /92   HR 82   RR 17   T 97.6   100% sat RA  xray RLE + distal fibular fx  RLE doppler no DVT  ortho consult        PAST MEDICAL HX  GBM glioblastoma multiforme  DVT/PE  Headaches  RLS restless legs  s/p craniotomy  Seborrheic keratosis  SZ seizures  Squamous cell carcinoma in situ (SCCIS)      PAST SURGICAL HX  Craniotomy      FAMILY HX  pt was adopted        SOCIAL HX  lives w his friend  nonsmoker

## 2023-07-09 NOTE — H&P ADULT - ASSESSMENT
63 year old male w hx GBM s/p craniotomy on chemo, DVT/PE on eliquis, Sz came to ED c/o leg pain      #R ankle fx  #Fall  #unable to ambulate  doppler neg DVT RLE  1. admit to med  2. ice q 2 hrs  3. elevate extremity  4. ortho consult read and appreciated  5. pain meds  acetaminophen prn mild  low dose oxy prn mod and severe  flexeril 5 mg once to see if decreases spasms  6. fx precautions  7. fall precautions  8. PT evaluation      #DVT/PE  1. continue eliquis      #GBM  #hx craniotomy  #Sz  1. Sz precautions  2. continue AED  3. steroids      #MISC  1. case management      #RLS  1. continue      #VTE  on AC   63 year old male w hx GBM s/p craniotomy on chemo, DVT/PE on eliquis, Sz came to ED c/o leg pain      #R ankle fx  #Fall  #unable to ambulate  doppler neg DVT RLE  1. admit to med  2. ice q 2 hrs  3. elevate extremity  4. ortho consult read and appreciated  5. pain meds  acetaminophen prn mild  low dose oxy prn mod and severe  flexeril 5 mg once to see if decreases spasms  6. fx precautions  7. fall precautions  8. PT evaluation      #DVT/PE  1. continue eliquis      #GBM  #hx craniotomy  #Sz  1. Sz precautions  2. continue AED  3. steroids      #MISC  1. case management      #RLS  1. continue      #VTE  on AC        #Goals of care  full code    HCP  Johnnie Johnson [phone]

## 2023-07-09 NOTE — ED PROVIDER NOTE - OBJECTIVE STATEMENT
62 y/o male w/PMHx of PE/DVT, seizures, and glioblastoma presents to ER c/o b/l leg pain. Pt was ambulating when he slipped and banged his right foot.  Onset of symptoms began last night over night and kept him up from sleep. Pt states symptoms have been getting progressively worse. On Eliquis.

## 2023-07-09 NOTE — ED PROVIDER NOTE - SKIN, MLM
Retention Suture Text: Retention sutures were placed to support the closure and prevent dehiscence. Skin normal color for race, warm, dry and intact. No evidence of rash.

## 2023-07-09 NOTE — ED PROVIDER NOTE - WR ORDER NAME 4
Psychiatric Assessment    Patient: Sandy Gonsalez Date: 2019   : 1982 Attending: No att. providers found   37 year old female      Chief complaint: \"I am tired of crying about my life.\"    History of Presenting Illness: Ms. Delgado is a 37 year old -American female known to this provider, who is here today to re-establish care. She was last seen by this writer nearly 12 months ago and has not been taking any medication for her mood. She has history of Schizoaffective disorder, depression, PTSD and anxiety. She is currently seeing a psychotherapist and \"using vitamins and yoga\" to manage her mood. She recently called requesting to be re-started on Abilify Maintena injection which \"helped me a lot in the past\". She reports that she sleeps about 5 hours a night and denies decreased energy levels. , Concentration and appetite are reportedly good.    Patient is concerned today that she is unable to keep a steady job. Reports that her mood makes it difficult for her to be gainfully employed. She reported that she has lost 3 jobs in the last 12 months and that she will prefer to be working instead of applying for Social Security benefits. She is also reporting having major family issues. She reportedly has trust and relationship issues with her family. Of concern also is the fact that her 2 daughters as still take anywhere from her by the Department of family services. Reports that she is being asked to give up parental rights. Reports increased nightmares and frequent crying spells. Her overall mood is \"sad and tearful\". She denies active auditory or visual hallucinations. Denies delusional thoughts of paranoid behaviors.    Past Psych History: As noted in the history of present illness including AODA loss of control and trauma.      Psychiatric ROS:   Bipolar disorder: The patient denies having grandiose mood, elevated energy, being awake for extended periods of time without the need for sleep, excessive  talking, impulsive behavior, hypersexual behavior, racing thoughts and mood swings.   Obsessive compulsive: The patient denies having repetitive thoughts, repetitive visions, hoarding behavior, repetitive checking and uses of rituals behaviors.   Post-traumatic stress disorder: The patient denies having trauma that has caused nightmares, hypervigilant behavior, emotional numbing, flashbacks, avoidant behavior, anger, and dissociation.   Panic attacks/Generalized anxiety disorder: The patient denies having nervousness, constant worrying, muscle tension, anger, key up, problems with sleep caused by anxiety, shortness of breath, palpitations, chest tightness, hot/cold flashes, sweating, shakiness, and feeling of impending doom.     Past Medical and Current Status:   The following were reviewed in the electronic record as past history and active problems:  Active Ambulatory Problems     Diagnosis Date Noted   • Headache(784.0) 04/14/2010   • Carpal tunnel syndrome 05/13/2010   • Positive MRSA culture Lf thigh pustule 09/18/2010   • Nicotine dependence 06/01/2012   • Breast cancer (CMS/[facility]) 08/23/2012   • Acquired absence of both breasts and nipples 08/24/2012   • Posttraumatic stress disorder 03/14/2013   • Chronic leg pain 07/26/2013   • PTSD (post-traumatic stress disorder) 08/01/2013   • Peripheral edema 08/19/2013   • Unspecified vitamin D deficiency 09/25/2013   • Myositis 09/25/2013   • Elevated CPK 03/11/2014   • Hallucinations 04/04/2014   • Suicidal ideation 04/04/2014   • Homicidal ideation 04/04/2014   • Chronic pain 04/04/2014   • Major depressive disorder, recurrent episode, moderate (CMS/[facility]) 05/08/2014   • Multiple thyroid nodules 05/11/2014   • Nonspecific abnormal results of thyroid function study 05/11/2014   • Dysplasia of cervix, low grade (JAY 1) 06/23/2014   • Fibromyalgia 07/02/2014   • Schizoaffective disorder, depressive type (CMS/[facility]) 09/10/2014   • Hyperthyroidism 10/17/2014   • Right thyroid  nodule 02/24/2015   • Anxiety 03/17/2015   • Migraine 03/17/2015   • Segmental and somatic dysfunction of lumbar region 02/23/2016   • Segmental and somatic dysfunction of thoracic region 06/22/2015   • Closed dislocation, multiple cervical vertebrae 06/22/2015   • Mild persistent asthma without complication 06/27/2016   • Malignant neoplasm of right female breast (CMS/[facility]) 08/17/2016   • Goiter diffuse 10/13/2016   • Family history of cancer 10/14/2016   • Chronic bilateral low back pain without sciatica 12/07/2016   • Gastroesophageal reflux disease without esophagitis 01/18/2017   • Candidal enteritis 01/23/2017   • BRCA negative 02/28/2017   • Myofascial muscle pain 07/21/2017   • Segmental dysfunction of lumbar region 03/09/2018   • Segmental dysfunction of pelvic region 03/09/2018   • Segmental dysfunction of sacral region 03/09/2018   • Segmental dysfunction of thoracic region 03/09/2018   • Prediabetes      Resolved Ambulatory Problems     Diagnosis Date Noted   • Positive IFEANYI, SSA 12/22/2010   • Fibromyalgia 03/02/2011   • Depression, major, severe recurrence (CMS/[facility]) 03/14/2013   • Systemic lupus erythematosus (CMS/[facility]) 07/11/2013   • Depressive disorder, not elsewhere classified 07/26/2013   • Asthma    • Moderate persistent asthma without complication 06/27/2016     Past Medical History:   Diagnosis Date   • Allergy    • Arthritis    • Bipolar disorder (CMS/[facility])    • Chronic tension-type headache    • Closed bimalleolar fracture 01/02/2008   • Closed fracture of metatarsal bone(s) 01/02/2008   • Depression    • GBS carrier    • Graves disease    • Lupus (CMS/[facility])    • Mild cervical dysplasia 2014   • MRSA (methicillin resistant Staphylococcus aureus) infection  6/28/09 thigh+   • Neuropathy due to chemotherapeutic drug (CMS/[facility])    • Sinus problem    • Thyroid nodule      Patient Active Problem List   Diagnosis   • Headache(784.0)   • Carpal tunnel syndrome   • Positive MRSA culture Lf thigh pustule   •  Nicotine dependence   • Breast cancer (CMS/HCC)   • Acquired absence of both breasts and nipples   • Posttraumatic stress disorder   • Chronic leg pain   • PTSD (post-traumatic stress disorder)   • Peripheral edema   • Unspecified vitamin D deficiency   • Myositis   • Elevated CPK   • Hallucinations   • Suicidal ideation   • Homicidal ideation   • Chronic pain   • Major depressive disorder, recurrent episode, moderate (CMS/HCC)   • Multiple thyroid nodules   • Nonspecific abnormal results of thyroid function study   • Dysplasia of cervix, low grade (JAY 1)   • Fibromyalgia   • Schizoaffective disorder, depressive type (CMS/HCC)   • Hyperthyroidism   • Right thyroid nodule   • Anxiety   • Migraine   • Segmental and somatic dysfunction of lumbar region   • Segmental and somatic dysfunction of thoracic region   • Closed dislocation, multiple cervical vertebrae   • Mild persistent asthma without complication   • Malignant neoplasm of right female breast (CMS/HCC)   • Goiter diffuse   • Family history of cancer   • Chronic bilateral low back pain without sciatica   • Gastroesophageal reflux disease without esophagitis   • Candidal enteritis   • BRCA negative   • Myofascial muscle pain   • Segmental dysfunction of lumbar region   • Segmental dysfunction of pelvic region   • Segmental dysfunction of sacral region   • Segmental dysfunction of thoracic region   • Prediabetes       Medications at Assessment: Prior to assessment medications were reviewed in the electronic record.  cyclobenzaprine (FLEXERIL) 10 MG tablet  cyclobenzaprine (FLEXERIL) 10 MG tablet  ADVAIR DISKUS 250-50 MCG/DOSE inhaler  albuterol 108 (90 Base) MCG/ACT inhaler  lidocaine (LIDODERM) 5 % patch    No current facility-administered medications on file prior to visit.       Family History (Psych and pertinent Med):  Reviewed and updated in the electronic record.  Family diseases/traits and treatment: Asked and none stated.    Social History:    Social  History     Tobacco Use   • Smoking status: Current Every Day Smoker     Packs/day: 0.25     Years: 10.00     Pack years: 2.50     Types: Cigarettes   • Smokeless tobacco: Never Used   • Tobacco comment: trying to cut back   Substance Use Topics   • Alcohol use: Yes     Alcohol/week: 0.0 standard drinks     Frequency: Never     Drinks per session: 1 or 2     Binge frequency: Never     Comment: socially   • Drug use: No     None/No Preference  Living Condition: Lives alone  Social and Sexual History reviewed with the patient and updated in the electronic record.    Mental Status Exam:  Sandy Gonsalez is a 37 year old Black/  female who presents today for a psychiatric evaluation.  Patient appears stated age. Patient is alert and fully oriented. No abnormal involuntary movements or gait issues noted. Speech is of normal rate, tone and volume. Thought process is free of any thought blocking or perseveration. Thought content logical and coherent with no delusions. Patient’s perception did not reveal hallucinations. Attention and concentration are unremarkable. Remote and immediate memories are intact. Insight and judgment are fair. Fund of knowledge is average. Patient denies suicidal or homicidal ideations.    Inventory of Assets: Able to make her needs known    Diagnosis: F39 Mood disorder (CMS/HCC)  (primary encounter diagnosis)  F33.1 Major depressive disorder, recurrent episode, moderate (CMS/HCC)  F43.10 Posttraumatic stress disorder    Medical Decision Making/Plan of Treatment: We discussed patient's diagnoses and treatment options including psychotherapy. This writer discussed with patient and we do not have enough reasons to restart Abilify monthly injection. We agreed to have patient on Abilify 5 mg daily as a mood stabilizer. She rates her depression as 5 out of 10 with 10 being very depressed. She is able to manage anxiety with vitamins and yoga by her report. She is also seeing a  psychotherapist which she reports as helpful.  Orders Placed This Encounter   • PSYCH DIAG JACEK W/MED SRVCS   • ARIPiprazole (ABILIFY) 5 MG tablet     Sig: Take 1 tablet by mouth daily.     Dispense:  30 tablet     Refill:  3   Indications and common side effects of her medications were reviewed. Most recent lab results were also reviewed. Patient to return to clinic in 4 months for follow-up and understands to call with questions or concerns in the interim.    This information is confidential and disclosure without patient consent or statutory authorization is prohibited by law.    Tushar Herrera, MANUELITO, PMHNP-BC   Xray Tibia + Fibula 2 Views, Right

## 2023-07-09 NOTE — ED PROVIDER NOTE - NS ED MD DISPO ADMITTING SERVICE
Discuss asthma.  Fill out asthma action plan and give copy to mother.  Refill Albuterol inhaler and use as directed.  Re contact clinic ASAP for increased wheezing or respiratory distress.   MED

## 2023-07-09 NOTE — H&P ADULT - NSHPPHYSICALEXAM_GEN_ALL_CORE
Vital Signs Last 24 Hrs  T(C): 36.4 (10 Jul 2023 00:25), Max: 37.1 (09 Jul 2023 08:14)  T(F): 97.6 (10 Jul 2023 00:25), Max: 98.8 (09 Jul 2023 08:14)  HR: 76 (10 Jul 2023 00:25) (63 - 82)  BP: 135/82 (10 Jul 2023 00:25) (119/85 - 148/92)  BP(mean): 109 (09 Jul 2023 08:14) (109 - 109)  RR: 18 (10 Jul 2023 00:25) (16 - 18)  SpO2: 96% (10 Jul 2023 00:25) (96% - 100%)    Parameters below as of 10 Jul 2023 00:25  Patient On (Oxygen Delivery Method): room air

## 2023-07-09 NOTE — CONSULT NOTE ADULT - SUBJECTIVE AND OBJECTIVE BOX
*** INCOMPLETE NOTE - CHARTING IN PROGRESS ***    Consult Note: Orthopaedic Surgery    63M community-ambulator presents complaining of Right ankle pain status-post mechanical fall. Denies HS/LOC. Denies numbness or tingling in the Right lower extremity. No other pain or injuries. Denies fevers, chills, headaches, chest pain, or shortness of breath.      VITAL SIGNS:  T(C): 37.1 (07-09-23 @ 08:14), Max: 37.1 (07-09-23 @ 08:14)  T(F): 98.8 (07-09-23 @ 08:14), Max: 98.8 (07-09-23 @ 08:14)  HR: 79 (07-09-23 @ 08:14) (79 - 82)  BP: 129/98 (07-09-23 @ 08:14) (129/98 - 148/92)  BP(mean): 109 (07-09-23 @ 08:14) (109 - 109)  RR: 16 (07-09-23 @ 08:14) (16 - 17)  SpO2: 96% (07-09-23 @ 08:14) (96% - 100%)    Imaging: XR demonstrates R/L ankle fracture    Physical Exam  Gen: Nad  R/L LE: Skin intact, +TTP medial/lateral malleolus, no bony ttp elsewhere, +ehl/fhl/ta/gs function, L3-S1 SILT, dp/pt pulse intact, negative log roll, able to SLR, compartments soft/compressive, extremity warm/well perfused  Secondary Survey: No TTP bony prominences with full AROM at baseline per patient, SILT diffusely, Capillary refill brisk, compartments soft and compressible, able to SLR with contralateral leg, no ttp axial spine.     Procedure: -- cc 1% lidocaine injected under sterile procedure into L/R ankle joint. Closed reduction performed. Placed in well padded trilam splint. Post procedure imaging obtained. Post procedure exam unchanged, NV intact, able to move all toes, SILT distally.     A/P: 63y Male with L/R ankle fracture  Pain control  NWB R/L LE in splint, keep c/d/I, cane/crutches/walker as needed  Ice/elevation  Si/sx compartment syndrome discussed with patient, told to return to ED if exhibit any  Possible need for surgical intervention in future discussed, all questions answered  Follow up with  – within 1 week, call office for appointment  Ortho stable    *** INCOMPLETE NOTE - CHARTING IN PROGRESS ***   *** INCOMPLETE NOTE - CHARTING IN PROGRESS ***    Consult Note: Orthopaedic Surgery    63M community-ambulator presents complaining of Right ankle pain status-post mechanical fall. Denies HS/LOC. Denies numbness or tingling in the Right lower extremity. No other pain or injuries. Denies fevers, chills, headaches, chest pain, or shortness of breath.      VITAL SIGNS:  T(C): 37.1 (07-09-23 @ 08:14), Max: 37.1 (07-09-23 @ 08:14)  T(F): 98.8 (07-09-23 @ 08:14), Max: 98.8 (07-09-23 @ 08:14)  HR: 79 (07-09-23 @ 08:14) (79 - 82)  BP: 129/98 (07-09-23 @ 08:14) (129/98 - 148/92)  BP(mean): 109 (07-09-23 @ 08:14) (109 - 109)  RR: 16 (07-09-23 @ 08:14) (16 - 17)  SpO2: 96% (07-09-23 @ 08:14) (96% - 100%)      PHYSICAL EXAM:  Gen: Nad  RLE: Skin intact, +TTP medial/lateral malleolus, no bony ttp elsewhere, +ehl/fhl/ta/gs function, L3-S1 SILT, dp/pt pulse intact, negative log roll, able to SLR, compartments soft/compressive, extremity warm/well perfused  Secondary Survey: No TTP bony prominences with full AROM at baseline per patient, SILT diffusely, Capillary refill brisk, compartments soft and compressible, able to SLR with contralateral  leg, no ttp axial spine.       IMAGING:   XR Right Ankle/TibiaFibula: Distal fibula fracture at the level of the syndesmosis consistent with a Bhatia B ankle fracture; Stress views conducted as below and demonstrating medial clear space widening suggestive of injury to the deltoid ligament as well as compromise of the radiographic tibial-almaz line, both suggstive of an unstable, bimalleolar-equivalent ankle injury.         PROCEDURE:   10 cc 1% lidocaine injected under sterile procedure into L/R ankle joint. Radiographic stress views obtained. Closed reduction performed. Placed in well padded AO splint. Post procedure imaging obtained. Post procedure exam unchanged, NV intact, able to move all toes, SILT distally.     ASSESSMENT:   63M with a Right Bhatia B ankle fracture with medial clear space widening on stress views, consistent with an unstable, bimalleolar equivalent ankle fracture.     PLAN:  Pain control  NWB RLE in AO splint, keep c/d/I, cane/crutches/walker as needed  Ice/elevation  Si/sx compartment syndrome discussed with patient, told to return to ED if exhibit any  Possible need for surgical intervention in future discussed, all questions answered  Follow up with Dr. Melendez within 1 week, call office for appointment  Ortho stable    *** INCOMPLETE NOTE - CHARTING IN PROGRESS ***   *** INCOMPLETE NOTE - CHARTING IN PROGRESS ***    Consult Note: Orthopaedic Surgery    63M community-ambulator presents complaining of Right ankle pain status-post mechanical fall. The timing of the fall is unknown, but last known date of pain free ambulation was 7/6/23.  The patient states he fell but does not know how or when.  He lives with a friend who is his healthcare proxy that states she is unsure also.  She has had a very difficult with his transfers and ambulation this weekend and noticed some ecchymosis of the right ankle.  He also was complaining of R ankle pain.  Denies HS/LOC. Denies numbness or tingling in the Right lower extremity. No other pain or injuries. Denies fevers, chills, headaches, chest pain, or shortness of breath.      VITAL SIGNS:  T(C): 37.1 (07-09-23 @ 08:14), Max: 37.1 (07-09-23 @ 08:14)  T(F): 98.8 (07-09-23 @ 08:14), Max: 98.8 (07-09-23 @ 08:14)  HR: 79 (07-09-23 @ 08:14) (79 - 82)  BP: 129/98 (07-09-23 @ 08:14) (129/98 - 148/92)  BP(mean): 109 (07-09-23 @ 08:14) (109 - 109)  RR: 16 (07-09-23 @ 08:14) (16 - 17)  SpO2: 96% (07-09-23 @ 08:14) (96% - 100%)    PAST MEDICAL & SURGICAL HISTORY:  Glioblastoma s/p resection 11/22    PE 11/22    DVT 11/22      Seizures      S/P craniotomy      PHYSICAL EXAM:  Gen: Nad  RLE: Skin intact, +TTP medial/lateral malleolus, no bony ttp elsewhere, +ehl/fhl/ta/gs function, L3-S1 SILT, dp/pt pulse intact, negative log roll, able to SLR, compartments soft/compressive, extremity warm/well perfused  Secondary Survey: No TTP bony prominences with full AROM at baseline per patient, SILT diffusely, Capillary refill brisk, compartments soft and compressible, able to SLR with contralateral  leg, no ttp axial spine.       IMAGING:   XR Right Ankle/TibiaFibula: Distal fibula fracture at the level of the syndesmosis consistent with a Bhatia B ankle fracture; Stress views conducted as below and demonstrating medial clear space widening suggestive of injury to the deltoid ligament as well as compromise of the radiographic tibial-almaz line, both suggstive of an unstable, bimalleolar-equivalent ankle injury.         PROCEDURE:   10 cc 1% lidocaine injected under sterile procedure into R ankle joint. Radiographic stress views obtained. Closed reduction performed. Placed in well padded AO splint. Post procedure imaging obtained. Post procedure exam unchanged, NV intact, able to move all toes, SILT distally.     ASSESSMENT:   63M with a Right Bhatia B ankle fracture with medial clear space widening on stress views, consistent with an unstable, bimalleolar equivalent ankle fracture.     PLAN:  Pain control  NWB RLE in AO splint, keep c/d/I, cane/crutches/walker as needed  Ice/elevation  Si/sx compartment syndrome discussed with patient, told to return to ED if exhibit any  Possible need for surgical intervention in future discussed, all questions answered  Follow up with Dr. Melendez within 1 week, call office for appointment  Ortho stable    *** INCOMPLETE NOTE - CHARTING IN PROGRESS ***   *** INCOMPLETE NOTE - CHARTING IN PROGRESS ***    Consult Note: Orthopaedic Surgery    63M community-ambulator w/ assistive device presents complaining of Right ankle pain status-post mechanical fall. The timing of the fall is unknown, but last known date of pain free ambulation was 7/6/23.  The patient states he fell but does not know how or when.  He lives with a friend who is his healthcare proxy that states she is unsure also.  She has had a very difficult with his transfers and ambulation this weekend and noticed some ecchymosis of the right ankle.  He also was complaining of R ankle pain.  Denies HS/LOC. Denies numbness or tingling in the Right lower extremity. No other pain or injuries. Denies fevers, chills, headaches, chest pain, or shortness of breath.      VITAL SIGNS:  T(C): 37.1 (07-09-23 @ 08:14), Max: 37.1 (07-09-23 @ 08:14)  T(F): 98.8 (07-09-23 @ 08:14), Max: 98.8 (07-09-23 @ 08:14)  HR: 79 (07-09-23 @ 08:14) (79 - 82)  BP: 129/98 (07-09-23 @ 08:14) (129/98 - 148/92)  BP(mean): 109 (07-09-23 @ 08:14) (109 - 109)  RR: 16 (07-09-23 @ 08:14) (16 - 17)  SpO2: 96% (07-09-23 @ 08:14) (96% - 100%)    PAST MEDICAL & SURGICAL HISTORY:  Glioblastoma s/p resection 11/22    PE 11/22    DVT 11/22      Seizures      S/P craniotomy      PHYSICAL EXAM:  Gen: Nad  RLE: Skin intact, +TTP medial/lateral malleolus, no bony ttp elsewhere, +ehl/fhl/ta/gs function, L3-S1 SILT, dp/pt pulse intact, negative log roll, able to SLR, compartments soft/compressive, extremity warm/well perfused  Secondary Survey: No TTP bony prominences with full AROM at baseline per patient, SILT diffusely, Capillary refill brisk, compartments soft and compressible, able to SLR with contralateral  leg, no ttp axial spine.       IMAGING:   XR Right Ankle/TibiaFibula: Distal fibula fracture at the level of the syndesmosis consistent with a Bhatia B ankle fracture; Stress views conducted as below and demonstrating medial clear space widening suggestive of injury to the deltoid ligament as well as compromise of the radiographic tibial-almaz line, both suggstive of an unstable, bimalleolar-equivalent ankle injury.         PROCEDURE:   10 cc 1% lidocaine injected under sterile procedure into R ankle joint. Radiographic stress views obtained. Closed reduction performed. Placed in well padded AO splint. Post procedure imaging obtained. Post procedure exam unchanged, NV intact, able to move all toes, SILT distally.     ASSESSMENT:   63M with a Right Bhatia B ankle fracture with medial clear space widening on stress views, consistent with an unstable, bimalleolar equivalent ankle fracture.     PLAN:  Pain control  NWB RLE in AO splint, keep c/d/I, cane/crutches/walker as needed  Ice/elevation  Si/sx compartment syndrome discussed with patient, told to return to ED if exhibit any  Possible need for surgical intervention in future discussed, all questions answered  Follow up with Dr. Melendez within 1 week, call office for appointment  Ortho stable    *** INCOMPLETE NOTE - CHARTING IN PROGRESS ***   Consult Note: Orthopaedic Surgery    63M community-ambulator w/ assistive device presents complaining of Right ankle pain status-post mechanical fall. The timing of the fall is unknown, but last known date of pain free ambulation was 7/6/23.  The patient states he fell but does not know how or when.  He lives with a friend who is his healthcare proxy that states she is unsure also.  She has had a very difficult with his transfers and ambulation this weekend and noticed some ecchymosis of the right ankle.  He also was complaining of R ankle pain.  Denies HS/LOC. Denies numbness or tingling in the Right lower extremity. No other pain or injuries. Denies fevers, chills, headaches, chest pain, or shortness of breath.      VITAL SIGNS:  T(C): 37.1 (07-09-23 @ 08:14), Max: 37.1 (07-09-23 @ 08:14)  T(F): 98.8 (07-09-23 @ 08:14), Max: 98.8 (07-09-23 @ 08:14)  HR: 79 (07-09-23 @ 08:14) (79 - 82)  BP: 129/98 (07-09-23 @ 08:14) (129/98 - 148/92)  BP(mean): 109 (07-09-23 @ 08:14) (109 - 109)  RR: 16 (07-09-23 @ 08:14) (16 - 17)  SpO2: 96% (07-09-23 @ 08:14) (96% - 100%)    PAST MEDICAL & SURGICAL HISTORY:  Glioblastoma s/p resection 11/22    PE 11/22    DVT 11/22      Seizures      S/P craniotomy      PHYSICAL EXAM:  Gen: Nad  RLE: Skin intact, +TTP medial/lateral malleolus, no bony ttp elsewhere, +ehl/fhl/ta/gs function, L3-S1 SILT, dp/pt pulse intact, negative log roll, able to SLR, compartments soft/compressive, extremity warm/well perfused  Secondary Survey: No TTP bony prominences with full AROM at baseline per patient, SILT diffusely, Capillary refill brisk, compartments soft and compressible, able to SLR with contralateral  leg, no ttp axial spine.       IMAGING:   XR Right Ankle/TibiaFibula: Distal fibula fracture at the level of the syndesmosis consistent with a Bhatia B ankle fracture; Stress views conducted as below and demonstrating medial clear space widening suggestive of injury to the deltoid ligament as well as compromise of the radiographic tibial-talar line, both suggestive of an unstable, bimalleolar-equivalent ankle injury.         PROCEDURE:   10 cc 1% lidocaine injected under sterile procedure into R ankle joint. Radiographic stress views obtained. Closed reduction performed. Placed in well padded AO splint. Post procedure imaging obtained. Post procedure exam unchanged, NV intact, able to move all toes, SILT distally.     ASSESSMENT:   63M with a Right Bhatia B ankle fracture with medial clear space widening on stress views, consistent with an unstable, bimalleolar equivalent ankle fracture.     PLAN:  Pain control  NWB RLE in AO splint, keep c/d/I, cane/crutches/walker as needed  Ice/elevation  Si/sx compartment syndrome discussed with patient, told to return to ED if exhibit any  Possible need for surgical intervention in future discussed, all questions answered; Will continue to monitor if admitted.   Follow up with Dr. Hutson within 1 week, call office for appointment  Ortho stable

## 2023-07-09 NOTE — ED PROVIDER NOTE - CARE PLAN
Principal Discharge DX:	Fibula fracture  Secondary Diagnosis:	Fall at home  Secondary Diagnosis:	Glioblastoma   1

## 2023-07-09 NOTE — ED ADULT NURSE NOTE - OBJECTIVE STATEMENT
62 yo Male co swollen, Right foot pain stating it's a stabbing pain that started last night but worsened this morning. Pt took tylenol yesterday at 1200. Pt is AO x4 with periods of confusion. Wife is at bedside to confirm story. PMHx PE and bilat DVT, gleoblastoma dx from september and taking oral chemo. Last dose of oral chemo on 7/6. On eliquis and Kepra. Pt's wife stated pt complained of Right elbow and knee pain before arrival, but pt denies at this time

## 2023-07-10 LAB — VIT B12 SERPL-MCNC: 428 PG/ML — SIGNIFICANT CHANGE UP (ref 232–1245)

## 2023-07-10 PROCEDURE — 99232 SBSQ HOSP IP/OBS MODERATE 35: CPT

## 2023-07-10 PROCEDURE — 93010 ELECTROCARDIOGRAM REPORT: CPT

## 2023-07-10 RX ORDER — CYCLOBENZAPRINE HYDROCHLORIDE 10 MG/1
5 TABLET, FILM COATED ORAL ONCE
Refills: 0 | Status: COMPLETED | OUTPATIENT
Start: 2023-07-10 | End: 2023-07-10

## 2023-07-10 RX ORDER — CYCLOBENZAPRINE HYDROCHLORIDE 10 MG/1
10 TABLET, FILM COATED ORAL THREE TIMES A DAY
Refills: 0 | Status: DISCONTINUED | OUTPATIENT
Start: 2023-07-10 | End: 2023-07-12

## 2023-07-10 RX ADMIN — LEVETIRACETAM 1000 MILLIGRAM(S): 250 TABLET, FILM COATED ORAL at 16:17

## 2023-07-10 RX ADMIN — CYCLOBENZAPRINE HYDROCHLORIDE 5 MILLIGRAM(S): 10 TABLET, FILM COATED ORAL at 06:58

## 2023-07-10 RX ADMIN — Medication 1000 MILLIGRAM(S): at 22:24

## 2023-07-10 RX ADMIN — LACOSAMIDE 200 MILLIGRAM(S): 50 TABLET ORAL at 05:39

## 2023-07-10 RX ADMIN — CYCLOBENZAPRINE HYDROCHLORIDE 10 MILLIGRAM(S): 10 TABLET, FILM COATED ORAL at 16:16

## 2023-07-10 RX ADMIN — LEVETIRACETAM 1000 MILLIGRAM(S): 250 TABLET, FILM COATED ORAL at 05:38

## 2023-07-10 RX ADMIN — Medication 1 MILLIGRAM(S): at 05:38

## 2023-07-10 RX ADMIN — LACOSAMIDE 200 MILLIGRAM(S): 50 TABLET ORAL at 16:16

## 2023-07-10 RX ADMIN — TAMSULOSIN HYDROCHLORIDE 0.4 MILLIGRAM(S): 0.4 CAPSULE ORAL at 22:23

## 2023-07-10 RX ADMIN — Medication 1000 MILLIGRAM(S): at 15:28

## 2023-07-10 RX ADMIN — PANTOPRAZOLE SODIUM 40 MILLIGRAM(S): 20 TABLET, DELAYED RELEASE ORAL at 05:38

## 2023-07-10 RX ADMIN — Medication 1000 MILLIGRAM(S): at 05:40

## 2023-07-10 RX ADMIN — APIXABAN 5 MILLIGRAM(S): 2.5 TABLET, FILM COATED ORAL at 16:15

## 2023-07-10 RX ADMIN — Medication 3 MILLIGRAM(S): at 22:23

## 2023-07-10 RX ADMIN — Medication 1000 MILLIGRAM(S): at 15:27

## 2023-07-10 RX ADMIN — Medication 1000 MILLIGRAM(S): at 06:06

## 2023-07-10 RX ADMIN — ROPINIROLE 0.25 MILLIGRAM(S): 8 TABLET, FILM COATED, EXTENDED RELEASE ORAL at 22:24

## 2023-07-10 RX ADMIN — APIXABAN 5 MILLIGRAM(S): 2.5 TABLET, FILM COATED ORAL at 05:39

## 2023-07-10 NOTE — PHYSICAL THERAPY INITIAL EVALUATION ADULT - PERTINENT HX OF CURRENT PROBLEM, REHAB EVAL
as per HPI: 63 year old male w hx GBM s/p craniotomy on chemo, DVT/PE on eliquis, Sz came to ED c/o leg pain - on right lower leg especially at ankle. - pt is s/p fall

## 2023-07-10 NOTE — PHYSICAL THERAPY INITIAL EVALUATION ADULT - FOLLOWS COMMANDS/ANSWERS QUESTIONS, REHAB EVAL
Problem: OXYGENATION/RESPIRATORY FUNCTION  Goal: Patient will achieve/maintain normal respiratory rate/effort  Respiratory rate and effort will be within normal limits for the patient   Outcome: Ongoing  Respiratory rate has been steady at 20 this shift. Pulse ox has been steady at 92-96% on 2 LPM via nasal cannula. Pt does have shortness of breath while ambulating. Problem: Falls - Risk of:  Goal: Will remain free from falls  Will remain free from falls   Outcome: Met This Shift  Remained free from falls this shift. Non skid socks on. Chair alarm on. Encouraged pt to use call light for assistance. Problem: Discharge Planning:  Goal: Discharged to appropriate level of care  Discharged to appropriate level of care   Outcome: Ongoing  Discharge in progress. Plans to return home with wife. Problem: Falls - Risk of  Goal: Absence of falls  Outcome: Met This Shift  Remained free from falls this shift. Non skid socks on. Chair alarm on. Encouraged to use call light for assistance. Problem: Anxiety/Stress:  Goal: Level of anxiety will decrease  Level of anxiety will decrease   Outcome: Met This Shift  No anxiety noted this shift     Problem: Cardiac Output - Decreased:  Goal: Hemodynamic stability will improve  Hemodynamic stability will improve   Outcome: Ongoing  Vital signs remain stable this shift. Labs monitored and assessed. Problem: Gas Exchange - Impaired:  Goal: Levels of oxygenation will improve  Levels of oxygenation will improve   Outcome: Ongoing  Pt has been wearing oxygen at 2 LPM via nasal cannula for comfort. Able to have 4 LPM with ambulation and 6 LPM with coughing spell. Problem: Pain:  Goal: Control of acute pain  Control of acute pain   Outcome: Met This Shift  No complaints of pain this shift. Able to verbalize pain. Comments: Care plan reviewed with patient and family. Patient and family verbalizes acceptance and contributes to goal setting. 75% of the time/able to follow single-step instructions

## 2023-07-10 NOTE — PHYSICAL THERAPY INITIAL EVALUATION ADULT - GENERAL OBSERVATIONS, REHAB EVAL
The pt was received on 2N, supine, willing to get OOB with PT after much pt education and encouragement. The pt had distal right LE splinted.

## 2023-07-11 PROCEDURE — 99223 1ST HOSP IP/OBS HIGH 75: CPT

## 2023-07-11 PROCEDURE — 99232 SBSQ HOSP IP/OBS MODERATE 35: CPT

## 2023-07-11 RX ADMIN — PANTOPRAZOLE SODIUM 40 MILLIGRAM(S): 20 TABLET, DELAYED RELEASE ORAL at 06:57

## 2023-07-11 RX ADMIN — Medication 1000 MILLIGRAM(S): at 06:57

## 2023-07-11 RX ADMIN — TAMSULOSIN HYDROCHLORIDE 0.4 MILLIGRAM(S): 0.4 CAPSULE ORAL at 21:35

## 2023-07-11 RX ADMIN — LACOSAMIDE 200 MILLIGRAM(S): 50 TABLET ORAL at 17:16

## 2023-07-11 RX ADMIN — POLYETHYLENE GLYCOL 3350 17 GRAM(S): 17 POWDER, FOR SOLUTION ORAL at 12:53

## 2023-07-11 RX ADMIN — APIXABAN 5 MILLIGRAM(S): 2.5 TABLET, FILM COATED ORAL at 17:12

## 2023-07-11 RX ADMIN — Medication 1 MILLIGRAM(S): at 06:58

## 2023-07-11 RX ADMIN — APIXABAN 5 MILLIGRAM(S): 2.5 TABLET, FILM COATED ORAL at 06:58

## 2023-07-11 RX ADMIN — CYCLOBENZAPRINE HYDROCHLORIDE 10 MILLIGRAM(S): 10 TABLET, FILM COATED ORAL at 03:34

## 2023-07-11 RX ADMIN — Medication 1000 MILLIGRAM(S): at 12:54

## 2023-07-11 RX ADMIN — SENNA PLUS 2 TABLET(S): 8.6 TABLET ORAL at 21:35

## 2023-07-11 RX ADMIN — LACOSAMIDE 200 MILLIGRAM(S): 50 TABLET ORAL at 06:57

## 2023-07-11 RX ADMIN — LEVETIRACETAM 1000 MILLIGRAM(S): 250 TABLET, FILM COATED ORAL at 06:58

## 2023-07-11 RX ADMIN — ROPINIROLE 0.25 MILLIGRAM(S): 8 TABLET, FILM COATED, EXTENDED RELEASE ORAL at 21:35

## 2023-07-11 RX ADMIN — LEVETIRACETAM 1000 MILLIGRAM(S): 250 TABLET, FILM COATED ORAL at 17:12

## 2023-07-11 NOTE — CONSULT NOTE ADULT - ASSESSMENT
64 y/o M with a PMHx of GBM currently receiving tx through Neuro/Onc Dr Debby Torres, currently admitted s/p fall with sustained R ankle fx.      # Glioblastoma s/p Craniotomy (WHO 4, IDH wt - MGMT methylated)    - 09/27/22 MRI brain revealed: irregularly enhancing mass in the left temporal-occipital region measuring 2.6 cm transversely and 8.9 cm cranial - caudal with surrounding vasogenic edema  - 10/03/22: NeuroSurg Dr Turpin performed a large resection of this mass with pathology --> Glioblastoma, WHO 4, IDH wt - MGMT methylated  - 10/05/22: Post op MRI revealed LEFT parietal occipital craniotomy with near complete resection of the of the neoplasm in the LEFT occipital/posterior temporal lobes. Minimal residual neoplasm is seen in the anterior part of the neoplasm, which abuts the ependymal surface of the atrium of the LEFT ventricle. Moderate postsurgical hemorrhage is seen within the surgical cavity. Moderate surrounding vasogenic edema is unchanged with effacement of the posterior horn of the LEFT lateral ventricle. Abnormal signal is also seen within the LEFT cerebral peduncle with a 4 mm focus of central enhancement and 1.6 cm region of rim enhancement, which is suspicious for a secondary focus of neoplasm or extension from the primary along the white matter tracts.  - 11/07/22: adjuvant RT with temodar started  - 12/20/22: ChemoRT completed  - 12/30/22: shaking of right UE and rigidity, reportedly had GTC seizures ---> intubated; also found to be COVID positive; discharged to rehab on 12/29/22 and then discharged home on 01/18/2023  - 02/01/23: Repeat MRI, compared to 12/2022 with slight improvement to the enhancing area in the left posterior surgical bed site  Temozolomide  - 03/2023: Admitted at  after a mechanical fall, found to have PE, started on IV Heparin then transitioned to Eliquis, has been compliant since  - 03/15/23: MRI with POD; plan to continue TMZ (Temozolomide) and add IV Avastin  - 04/26/23: MRI stable; s/p C3 Avastin  - 06/04/23: s/p C4 TMZ; also has been compliant with dexamethasone at 4mg in am and 2mg at night, pantoprazole 40mg, Keppra 1000mg BID, Vimpat 100mg BID  - Most recently seen by Neuro/Onc Dr Torres 06/22      Full Consult to follow 64 y/o M with a PMHx of GBM currently receiving tx through Neuro/Onc Dr Debby Torres, currently admitted s/p fall with sustained R ankle fx.      # Glioblastoma s/p Craniotomy (WHO 4, IDH wt - MGMT methylated)    - 09/27/22 MRI brain revealed: irregularly enhancing mass in the left temporal-occipital region measuring 2.6 cm transversely and 8.9 cm cranial - caudal with surrounding vasogenic edema  - 10/03/22: NeuroSurg Dr Turpin performed a large resection of this mass with pathology --> Glioblastoma, WHO 4, IDH wt - MGMT methylated  - 10/05/22: Post op MRI revealed LEFT parietal occipital craniotomy with near complete resection of the of the neoplasm in the LEFT occipital/posterior temporal lobes. Minimal residual neoplasm is seen in the anterior part of the neoplasm, which abuts the ependymal surface of the atrium of the LEFT ventricle. Moderate postsurgical hemorrhage is seen within the surgical cavity. Moderate surrounding vasogenic edema is unchanged with effacement of the posterior horn of the LEFT lateral ventricle. Abnormal signal is also seen within the LEFT cerebral peduncle with a 4 mm focus of central enhancement and 1.6 cm region of rim enhancement, which is suspicious for a secondary focus of neoplasm or extension from the primary along the white matter tracts.  - 11/07/22: adjuvant RT with temodar started  - 12/20/22: ChemoRT completed  - 12/30/22: shaking of right UE and rigidity, reportedly had GTC seizures ---> intubated; also found to be COVID positive; discharged to rehab on 12/29/22 and then discharged home on 01/18/2023  - 02/01/23: Repeat MRI, compared to 12/2022 with slight improvement to the enhancing area in the left posterior surgical bed site  Temozolomide  - 03/2023: Admitted at  after a mechanical fall, found to have PE, started on IV Heparin then transitioned to Eliquis, has been compliant since  - 03/15/23: MRI with POD; plan to continue TMZ (Temozolomide) and add IV Avastin  - 04/26/23: MRI stable; s/p C3 Avastin  - 06/04/23: s/p C4 TMZ; also has been compliant with dexamethasone at 4mg in am and 2mg at night, pantoprazole 40mg, Keppra 1000mg BID, Vimpat 100mg BID  - Per [employer], most recently seen by Neuro/Onc Dr Torres 06/22, will reach out to obtain recent treatment hx and outpatient plan       64 y/o M with a PMHx of GBM currently receiving tx through Neuro/Onc Dr Debby Torres, currently admitted s/p fall with sustained R ankle fx.      # Glioblastoma s/p Craniotomy (WHO 4, IDH wt - MGMT methylated)    - 09/27/22 MRI brain revealed: irregularly enhancing mass in the left temporal-occipital region measuring 2.6 cm transversely and 8.9 cm cranial - caudal with surrounding vasogenic edema  - 10/03/22: NeuroSurg Dr Turpin performed a large resection of this mass with pathology --> Glioblastoma, WHO 4, IDH wt - MGMT methylated  - 10/05/22: Post op MRI revealed LEFT parietal occipital craniotomy with near complete resection of the of the neoplasm in the LEFT occipital/posterior temporal lobes. Minimal residual neoplasm is seen in the anterior part of the neoplasm, which abuts the ependymal surface of the atrium of the LEFT ventricle. Moderate postsurgical hemorrhage is seen within the surgical cavity. Moderate surrounding vasogenic edema is unchanged with effacement of the posterior horn of the LEFT lateral ventricle. Abnormal signal is also seen within the LEFT cerebral peduncle with a 4 mm focus of central enhancement and 1.6 cm region of rim enhancement, which is suspicious for a secondary focus of neoplasm or extension from the primary along the white matter tracts.  - 11/07/22: adjuvant RT with temodar started  - 12/20/22: ChemoRT completed  - 12/30/22: shaking of right UE and rigidity, reportedly had GTC seizures ---> intubated; also found to be COVID positive; discharged to rehab on 12/29/22 and then discharged home on 01/18/2023  - 02/01/23: Repeat MRI, compared to 12/2022 with slight improvement to the enhancing area in the left posterior surgical bed site  Temozolomide  - 03/2023: Admitted at  after a mechanical fall, found to have PE, started on IV Heparin then transitioned to Eliquis, has been compliant since  - 03/15/23: MRI with POD; plan to continue TMZ (Temozolomide) and add IV Avastin  - 04/26/23: MRI stable; s/p C3 Avastin  - 06/04/23: s/p C4 TMZ; also has been compliant with dexamethasone at 4mg in am and 2mg at night, pantoprazole 40mg, Keppra 1000mg BID, Vimpat 100mg BID  - Spoke with Neuro/Onc Dr Torres who is aware of patient's hospitalization and agreed with plan for [place] and holding tx at this time until he can follow up outpatient       62 y/o M with a PMHx of GBM currently receiving tx through Neuro/Onc Dr Debby Torres, currently admitted s/p fall with sustained R ankle fx.      # Glioblastoma s/p Craniotomy (WHO 4, IDH wt - MGMT methylated)    - 09/27/22 MRI brain revealed: irregularly enhancing mass in the left temporal-occipital region measuring 2.6 cm transversely and 8.9 cm cranial - caudal with surrounding vasogenic edema  - 10/03/22: NeuroSurg Dr Turpin performed a large resection of this mass with pathology --> Glioblastoma, WHO 4, IDH wt - MGMT methylated  - 10/05/22: Post op MRI revealed LEFT parietal occipital craniotomy with near complete resection of the of the neoplasm in the LEFT occipital/posterior temporal lobes. Minimal residual neoplasm is seen in the anterior part of the neoplasm, which abuts the ependymal surface of the atrium of the LEFT ventricle. Moderate postsurgical hemorrhage is seen within the surgical cavity. Moderate surrounding vasogenic edema is unchanged with effacement of the posterior horn of the LEFT lateral ventricle. Abnormal signal is also seen within the LEFT cerebral peduncle with a 4 mm focus of central enhancement and 1.6 cm region of rim enhancement, which is suspicious for a secondary focus of neoplasm or extension from the primary along the white matter tracts.  - 11/07/22: adjuvant RT with temodar started  - 12/20/22: ChemoRT completed  - 12/30/22: shaking of right UE and rigidity, reportedly had GTC seizures ---> intubated; also found to be COVID positive; discharged to rehab on 12/29/22 and then discharged home on 01/18/2023  - 02/01/23: Repeat MRI, compared to 12/2022 with slight improvement to the enhancing area in the left posterior surgical bed site  Temozolomide  - 03/2023: Admitted at  after a mechanical fall, found to have PE, started on IV Heparin then transitioned to Eliquis, has been compliant since  - 03/15/23: MRI with POD; plan to continue TMZ (Temozolomide) and add IV Avastin  - 04/26/23: MRI stable; s/p C3 Avastin  - 06/04/23: s/p C4 TMZ; also has been compliant with dexamethasone at 4mg in am and 2mg at night, pantoprazole 40mg, Keppra 1000mg BID, Vimpat 100mg BID  - Spoke with Neuro/Onc Dr Torres who is aware of patient's hospitalization and agreed with plan for [place] and holding tx at this time until he can follow up outpatient    Addendum Hem Onc attending.  Patient seen and examined on morning rounds together with oncology PA. Ambulatory records reviewed.  62 y/o male diagnosed with GBM  in Fall 2022. S/p resection OCt 2022 followed  by adjuvant RT with Temodar and next adjuvant Temodar. In [facility] 2023- POD - continues Temodar and bevacizumab added. Neurooncology Dr JAMARI Torres .  Hx of PE March 2023- on Eliquis.  Now admitted s/p fall with R ankle fracture. No surgery necessary .  Per Dr Torres OK to hold avastin and temodar while in [facility].

## 2023-07-11 NOTE — CONSULT NOTE ADULT - SUBJECTIVE AND OBJECTIVE BOX
HPI:    64 y/o M with a PMhx of GBM s/p craniotomy currently receiving tx through Neuro/Onc Dr Debby Torres, DVT/PE on Eliquis, Seizures presented to the ED c/o worsening RLE ankle pain since recent fall. According to patient, he has been experiencing difficulty ambulating since 07/06, did experience mechanical fall but cannot recall timing, denied and head injury or LOC. (09 Jul 2023 19:30)      07/11/2023:      PAST MEDICAL & SURGICAL HISTORY:    Glioblastoma    Seizures    S/P craniotomy    PE/DVT on Eliquis        MEDICATIONS  (STANDING):    acetaminophen     Tablet .. 1000 milliGRAM(s) Oral every 8 hours  apixaban 5 milliGRAM(s) Oral two times a day  dexAMETHasone     Tablet 1 milliGRAM(s) Oral daily  lacosamide 200 milliGRAM(s) Oral two times a day  levETIRAcetam 1000 milliGRAM(s) Oral two times a day  naloxone Injectable 0.4 milliGRAM(s) IV Push once  pantoprazole    Tablet 40 milliGRAM(s) Oral before breakfast  polyethylene glycol 3350 17 Gram(s) Oral daily  rOPINIRole 0.25 milliGRAM(s) Oral <User Schedule>  senna 2 Tablet(s) Oral at bedtime  tamsulosin 0.4 milliGRAM(s) Oral at bedtime      MEDICATIONS  (PRN):    acetaminophen     Tablet .. 650 milliGRAM(s) Oral every 6 hours PRN Temp greater or equal to 38C (100.4F), Mild Pain (1 - 3)  aluminum hydroxide/magnesium hydroxide/simethicone Suspension 30 milliLiter(s) Oral every 4 hours PRN Dyspepsia  bisacodyl 5 milliGRAM(s) Oral daily PRN Constipation  cyclobenzaprine 10 milliGRAM(s) Oral three times a day PRN Muscle Spasm  melatonin 3 milliGRAM(s) Oral at bedtime PRN Insomnia  mirtazapine 7.5 milliGRAM(s) Oral at bedtime PRN insomnia  ondansetron Injectable 4 milliGRAM(s) IV Push every 8 hours PRN Nausea and/or Vomiting  oxyCODONE    IR 5 milliGRAM(s) Oral every 4 hours PRN Severe Pain (7 - 10)  oxyCODONE    IR 2.5 milliGRAM(s) Oral every 4 hours PRN Moderate Pain (4 - 6)      ALLERGIES:    No Known Allergies      REVIEW OF SYSTEMS:    Constitutional, Eyes, ENT, Cardiovascular, Respiratory, Gastrointestinal, Genitourinary, Musculoskeletal, Integumentary, Neurological, Psychiatric, Endocrine, Heme/Lymph and Allergic/Immunologic review of systems are otherwise negative except as noted in HPI.       VITALS:    T(C): 36.4 (11 Jul 2023 00:18), Max: 36.6 (10 Jul 2023 16:22)  T(F): 97.5 (11 Jul 2023 00:18), Max: 97.8 (10 Jul 2023 16:22)  HR: 86 (11 Jul 2023 00:18) (68 - 86)  BP: 115/82 (11 Jul 2023 00:18) (115/82 - 132/91)  BP(mean): --  RR: 16 (11 Jul 2023 00:18) (16 - 16)  SpO2: 95% (11 Jul 2023 00:18) (95% - 98%)    Parameters below as of 11 Jul 2023 00:18  Patient On (Oxygen Delivery Method): room air      PHYSICAL:    Constitutional:   Eyes: PERRL, EOMI  ENT: pharynx is unremarkable  Neck: supple without JVD  Pulmonary: clear to auscultation bilaterally, no dullness, no wheezing  Cardiac: RRR, normal S1S2  Vascular: no calf tenderness, venous stasis changes, varices  Abdomen: normoactive bowel sounds, soft and nontender, no hepatosplenomegaly or masses appreciated  Lymphatic: no peripheral adenopathy appreciated  Musculoskeletal: full range of motion and no deformities appreciated  Skin: normal appearance, no rash/erythema  Neurology:   Psychiatric:       LABS:    CBC Full  -  ( 09 Jul 2023 15:44 )  WBC Count : 6.48 K/uL  RBC Count : 4.74 M/uL  Hemoglobin : 14.7 g/dL  Hematocrit : 44.2 %  Platelet Count - Automated : 187 K/uL  Mean Cell Volume : 93.2 fl  Mean Cell Hemoglobin : 31.0 pg  Mean Cell Hemoglobin Concentration : 33.3 gm/dL  Auto Neutrophil # : 5.27 K/uL  Auto Lymphocyte # : 0.74 K/uL  Auto Monocyte # : 0.42 K/uL  Auto Eosinophil # : 0.00 K/uL  Auto Basophil # : 0.02 K/uL  Auto Neutrophil % : 81.3 %  Auto Lymphocyte % : 11.4 %  Auto Monocyte % : 6.5 %  Auto Eosinophil % : 0.0 %  Auto Basophil % : 0.3 %    07-09    142  |  110<H>  |  13  ----------------------------<  110<H>  4.7   |  30  |  0.91    Ca    9.7      09 Jul 2023 15:44    TPro  7.1  /  Alb  3.3  /  TBili  0.7  /  DBili  x   /  AST  19  /  ALT  38  /  AlkPhos  54  07-09    PT/INR - ( 09 Jul 2023 15:44 )   PT: 17.3 sec;   INR: 1.49 ratio         PTT - ( 09 Jul 2023 15:44 )  PTT:34.4 sec  Urinalysis Basic - ( 09 Jul 2023 15:44 )    Color: x / Appearance: x / SG: x / pH: x  Gluc: 110 mg/dL / Ketone: x  / Bili: x / Urobili: x   Blood: x / Protein: x / Nitrite: x   Leuk Esterase: x / RBC: x / WBC x   Sq Epi: x / Non Sq Epi: x / Bacteria: x        RADIOLOGY & ADDITIONAL STUDIES:        US Duplex Venous Lower Ext Ltd, Right (07.09.23 @ 10:27)    IMPRESSION:    No evidence of right lower extremity deep venous thrombosis.    Right foot is not evaluated on this study.          Xray Foot AP + Lateral + Oblique, Right (07.09.23 @ 11:14)    IMPRESSION:    Casting of nondisplaced distal fibular fracture. No ankle dislocation.          CT Ankle No Cont, Right (07.09.23 @ 17:22)    IMPRESSION:    Acute comminuted oblique ligament fracture of the distal fibular   diametaphysis. Associated small cortical avulsion along the region of the   anterior talofibular ligament.    Small foci of air within the anterior subcutaneous tissues at the level   of the tibiotalar joint. This may be related to intervention in this   region, correlate clinically to see the possibility of an open component   to the fracture.          CT 3D Reconstruct w/o Workstation (07.09.23 @ 17:28)    IMPRESSION:    Acute comminuted oblique ligament fracture of the distal fibular   diametaphysis. Associated small cortical avulsion along the region of the   anterior talofibular ligament.    Small foci of air within the anterior subcutaneous tissues at the level   of the tibiotalar joint. This may be related to intervention in this   region, correlate clinically to see the possibility of an open component   to the fracture.       HPI:    62 y/o M with a PMhx of GBM s/p craniotomy currently receiving tx through Neuro/Onc Dr Debby Torres, DVT/PE on Eliquis, Seizures presented to the ED c/o worsening RLE ankle pain since recent fall. According to patient, he has been experiencing difficulty ambulating since 07/06, did experience mechanical fall but cannot recall timing, denied and head injury or LOC. (09 Jul 2023 19:30)      07/11/2023: Seen at bedside, seems confused about hospitalization and recent Oncology timeline with Primary Onc Dr Torres      PAST MEDICAL & SURGICAL HISTORY:    Glioblastoma    Seizures    S/P craniotomy    PE/DVT on Eliquis        MEDICATIONS  (STANDING):    acetaminophen     Tablet .. 1000 milliGRAM(s) Oral every 8 hours  apixaban 5 milliGRAM(s) Oral two times a day  dexAMETHasone     Tablet 1 milliGRAM(s) Oral daily  lacosamide 200 milliGRAM(s) Oral two times a day  levETIRAcetam 1000 milliGRAM(s) Oral two times a day  naloxone Injectable 0.4 milliGRAM(s) IV Push once  pantoprazole    Tablet 40 milliGRAM(s) Oral before breakfast  polyethylene glycol 3350 17 Gram(s) Oral daily  rOPINIRole 0.25 milliGRAM(s) Oral <User Schedule>  senna 2 Tablet(s) Oral at bedtime  tamsulosin 0.4 milliGRAM(s) Oral at bedtime      MEDICATIONS  (PRN):    acetaminophen     Tablet .. 650 milliGRAM(s) Oral every 6 hours PRN Temp greater or equal to 38C (100.4F), Mild Pain (1 - 3)  aluminum hydroxide/magnesium hydroxide/simethicone Suspension 30 milliLiter(s) Oral every 4 hours PRN Dyspepsia  bisacodyl 5 milliGRAM(s) Oral daily PRN Constipation  cyclobenzaprine 10 milliGRAM(s) Oral three times a day PRN Muscle Spasm  melatonin 3 milliGRAM(s) Oral at bedtime PRN Insomnia  mirtazapine 7.5 milliGRAM(s) Oral at bedtime PRN insomnia  ondansetron Injectable 4 milliGRAM(s) IV Push every 8 hours PRN Nausea and/or Vomiting  oxyCODONE    IR 5 milliGRAM(s) Oral every 4 hours PRN Severe Pain (7 - 10)  oxyCODONE    IR 2.5 milliGRAM(s) Oral every 4 hours PRN Moderate Pain (4 - 6)      ALLERGIES:    No Known Allergies      REVIEW OF SYSTEMS:    Constitutional, Eyes, ENT, Cardiovascular, Respiratory, Gastrointestinal, Genitourinary, Musculoskeletal, Integumentary, Neurological, Psychiatric, Endocrine, Heme/Lymph and Allergic/Immunologic review of systems are otherwise negative except as noted in HPI.       VITALS:    T(C): 36.4 (11 Jul 2023 00:18), Max: 36.6 (10 Jul 2023 16:22)  T(F): 97.5 (11 Jul 2023 00:18), Max: 97.8 (10 Jul 2023 16:22)  HR: 86 (11 Jul 2023 00:18) (68 - 86)  BP: 115/82 (11 Jul 2023 00:18) (115/82 - 132/91)  BP(mean): --  RR: 16 (11 Jul 2023 00:18) (16 - 16)  SpO2: 95% (11 Jul 2023 00:18) (95% - 98%)    Parameters below as of 11 Jul 2023 00:18  Patient On (Oxygen Delivery Method): room air      PHYSICAL:    Constitutional: No acute distress   Eyes: PERRL, EOMI  ENT: pharynx is unremarkable  Neck: supple without JVD  Pulmonary: clear to auscultation bilaterally, no dullness, no wheezing  Cardiac: RRR, normal S1S2  Vascular: no calf tenderness, venous stasis changes, varices  Abdomen: normoactive bowel sounds, soft and nontender, no hepatosplenomegaly or masses appreciated  Lymphatic: no peripheral adenopathy appreciated  Musculoskeletal: full range of motion and no deformities appreciated; RLE wrapped from toes to knee  Skin: normal appearance, no rash/erythema  Neurology: Slightly confused, but overall intact with no obvious deficits   Psychiatric: affect/mood appropriate       LABS:    CBC Full  -  ( 09 Jul 2023 15:44 )  WBC Count : 6.48 K/uL  RBC Count : 4.74 M/uL  Hemoglobin : 14.7 g/dL  Hematocrit : 44.2 %  Platelet Count - Automated : 187 K/uL  Mean Cell Volume : 93.2 fl  Mean Cell Hemoglobin : 31.0 pg  Mean Cell Hemoglobin Concentration : 33.3 gm/dL  Auto Neutrophil # : 5.27 K/uL  Auto Lymphocyte # : 0.74 K/uL  Auto Monocyte # : 0.42 K/uL  Auto Eosinophil # : 0.00 K/uL  Auto Basophil # : 0.02 K/uL  Auto Neutrophil % : 81.3 %  Auto Lymphocyte % : 11.4 %  Auto Monocyte % : 6.5 %  Auto Eosinophil % : 0.0 %  Auto Basophil % : 0.3 %    07-09    142  |  110<H>  |  13  ----------------------------<  110<H>  4.7   |  30  |  0.91    Ca    9.7      09 Jul 2023 15:44    TPro  7.1  /  Alb  3.3  /  TBili  0.7  /  DBili  x   /  AST  19  /  ALT  38  /  AlkPhos  54  07-09    PT/INR - ( 09 Jul 2023 15:44 )   PT: 17.3 sec;   INR: 1.49 ratio         PTT - ( 09 Jul 2023 15:44 )  PTT:34.4 sec  Urinalysis Basic - ( 09 Jul 2023 15:44 )    Color: x / Appearance: x / SG: x / pH: x  Gluc: 110 mg/dL / Ketone: x  / Bili: x / Urobili: x   Blood: x / Protein: x / Nitrite: x   Leuk Esterase: x / RBC: x / WBC x   Sq Epi: x / Non Sq Epi: x / Bacteria: x        RADIOLOGY & ADDITIONAL STUDIES:        US Duplex Venous Lower Ext Ltd, Right (07.09.23 @ 10:27)    IMPRESSION:    No evidence of right lower extremity deep venous thrombosis.    Right foot is not evaluated on this study.          Xray Foot AP + Lateral + Oblique, Right (07.09.23 @ 11:14)    IMPRESSION:    Casting of nondisplaced distal fibular fracture. No ankle dislocation.          CT Ankle No Cont, Right (07.09.23 @ 17:22)    IMPRESSION:    Acute comminuted oblique ligament fracture of the distal fibular   diametaphysis. Associated small cortical avulsion along the region of the   anterior talofibular ligament.    Small foci of air within the anterior subcutaneous tissues at the level   of the tibiotalar joint. This may be related to intervention in this   region, correlate clinically to see the possibility of an open component   to the fracture.          CT 3D Reconstruct w/o Workstation (07.09.23 @ 17:28)    IMPRESSION:    Acute comminuted oblique ligament fracture of the distal fibular   diametaphysis. Associated small cortical avulsion along the region of the   anterior talofibular ligament.    Small foci of air within the anterior subcutaneous tissues at the level   of the tibiotalar joint. This may be related to intervention in this   region, correlate clinically to see the possibility of an open component   to the fracture.

## 2023-07-12 ENCOUNTER — APPOINTMENT (OUTPATIENT)
Dept: NEUROLOGY | Facility: CLINIC | Age: 63
End: 2023-07-12

## 2023-07-12 ENCOUNTER — APPOINTMENT (OUTPATIENT)
Dept: INFUSION THERAPY | Facility: HOSPITAL | Age: 63
End: 2023-07-12

## 2023-07-12 ENCOUNTER — TRANSCRIPTION ENCOUNTER (OUTPATIENT)
Age: 63
End: 2023-07-12

## 2023-07-12 ENCOUNTER — APPOINTMENT (OUTPATIENT)
Dept: HEMATOLOGY ONCOLOGY | Facility: CLINIC | Age: 63
End: 2023-07-12

## 2023-07-12 VITALS
TEMPERATURE: 98 F | OXYGEN SATURATION: 96 % | RESPIRATION RATE: 17 BRPM | DIASTOLIC BLOOD PRESSURE: 78 MMHG | HEART RATE: 91 BPM | SYSTOLIC BLOOD PRESSURE: 106 MMHG

## 2023-07-12 LAB — SARS-COV-2 RNA SPEC QL NAA+PROBE: SIGNIFICANT CHANGE UP

## 2023-07-12 PROCEDURE — 99239 HOSP IP/OBS DSCHRG MGMT >30: CPT

## 2023-07-12 RX ORDER — OXYCODONE HYDROCHLORIDE 5 MG/1
1 TABLET ORAL
Qty: 0 | Refills: 0 | DISCHARGE
Start: 2023-07-12

## 2023-07-12 RX ORDER — ACETAMINOPHEN 500 MG
2 TABLET ORAL
Qty: 0 | Refills: 0 | DISCHARGE
Start: 2023-07-12

## 2023-07-12 RX ORDER — POLYETHYLENE GLYCOL 3350 17 G/17G
17 POWDER, FOR SOLUTION ORAL
Qty: 0 | Refills: 0 | DISCHARGE
Start: 2023-07-12

## 2023-07-12 RX ORDER — OXYCODONE HYDROCHLORIDE 5 MG/1
10 TABLET ORAL
Qty: 0 | Refills: 0 | DISCHARGE
Start: 2023-07-12

## 2023-07-12 RX ORDER — CYCLOBENZAPRINE HYDROCHLORIDE 10 MG/1
1 TABLET, FILM COATED ORAL
Qty: 0 | Refills: 0 | DISCHARGE
Start: 2023-07-12

## 2023-07-12 RX ORDER — SENNA PLUS 8.6 MG/1
2 TABLET ORAL
Qty: 0 | Refills: 0 | DISCHARGE
Start: 2023-07-12

## 2023-07-12 RX ADMIN — PANTOPRAZOLE SODIUM 40 MILLIGRAM(S): 20 TABLET, DELAYED RELEASE ORAL at 06:05

## 2023-07-12 RX ADMIN — Medication 1 MILLIGRAM(S): at 05:25

## 2023-07-12 RX ADMIN — Medication 1000 MILLIGRAM(S): at 05:39

## 2023-07-12 RX ADMIN — APIXABAN 5 MILLIGRAM(S): 2.5 TABLET, FILM COATED ORAL at 06:02

## 2023-07-12 RX ADMIN — LEVETIRACETAM 1000 MILLIGRAM(S): 250 TABLET, FILM COATED ORAL at 05:25

## 2023-07-12 RX ADMIN — Medication 1000 MILLIGRAM(S): at 05:25

## 2023-07-12 RX ADMIN — LACOSAMIDE 200 MILLIGRAM(S): 50 TABLET ORAL at 05:25

## 2023-07-12 NOTE — DISCHARGE NOTE PROVIDER - NSDCCPCAREPLAN_GEN_ALL_CORE_FT
PRINCIPAL DISCHARGE DIAGNOSIS  Diagnosis: Ankle fracture, right  Assessment and Plan of Treatment: Non-surgical  NWB to RLE  Pain meds prn  Outpatient f/u with DR Hutson in 1 week      SECONDARY DISCHARGE DIAGNOSES  Diagnosis: Fall at home  Assessment and Plan of Treatment:     Diagnosis: Glioblastoma  Assessment and Plan of Treatment:

## 2023-07-12 NOTE — DISCHARGE NOTE PROVIDER - HOSPITAL COURSE
Patient presented s/p mechanical fall with RT ankle fracture. Seen by ortho, reduced and splinted, non-surgical. Patient is NWB to [facility]. Outpatient ortho f/u with DR Hutson in 1 week. Medically stable for transfer to [facility]

## 2023-07-12 NOTE — DISCHARGE NOTE PROVIDER - NSDCMRMEDTOKEN_GEN_ALL_CORE_FT
acetaminophen 325 mg oral tablet: 2 tab(s) orally every 6 hours As needed Temp greater or equal to 38C (100.4F), Mild Pain (1 - 3)  cyclobenzaprine 10 mg oral tablet: 1 tab(s) orally 3 times a day As needed Muscle Spasm  dexAMETHasone 1 mg oral tablet: 1 tab(s) orally once a day  Eliquis 5 mg oral tablet: 1 orally 2 times a day  lacosamide 200 mg oral tablet: 1 tab(s) orally 2 times a day  levETIRAcetam 1000 mg oral tablet: 1 tab(s) orally 2 times a day  Lotrisone 1%-0.05% topical cream: Apply topically to affected area 2 times a day  mirtazapine 7.5 mg oral tablet: 2 tab(s) orally once a day (at bedtime) as needed for  oxyCODONE: 10 milligram(s) orally every 4 hours as needed for  severe pain  oxyCODONE 5 mg oral tablet: 1 tab(s) orally every 4 hours as needed for  moderate pain  pantoprazole 40 mg oral delayed release tablet: 1 tab(s) orally once a day (in the afternoon)  polyethylene glycol 3350 oral powder for reconstitution: 17 gram(s) orally once a day  rOPINIRole 0.25 mg oral tablet: 3 tab(s) orally once a day at 8pm  senna leaf extract oral tablet: 2 tab(s) orally once a day (at bedtime)  tamsulosin 0.4 mg oral capsule: 1 cap(s) orally once a day

## 2023-07-12 NOTE — DISCHARGE NOTE PROVIDER - NSDCFUSCHEDAPPT_GEN_ALL_CORE_FT
Sonu Burton  [facility]  [facility] [address]  Scheduled Appointment: 07/19/2023    [facility]  [facility]  Scheduled Appointment: 07/26/2023    [facility]  Scheduled Appointment: 07/26/2023    [facility]  [facility]  Scheduled Appointment: 08/09/2023    [facility]  [facility]  Scheduled Appointment: 08/09/2023

## 2023-07-12 NOTE — PROGRESS NOTE ADULT - SUBJECTIVE AND OBJECTIVE BOX
CC- s/p mechanical fall with RT ankle fracture    HPI:  63 year old male w hx GBM s/p craniotomy on chemo, DVT/PE on eliquis, Sz came to ED c/o leg pain  c/o R lower leg pain especially at ankle  pain  prevented sleep 10/10  +difficulty weight bearing, last ambulating 07-06  +s/p fall : timing unknown to pt or to friend he lives with  denied head injury or LOC  Friend noted bruising on his arms today    7/10/23- comfortable, some RT ankle pain    Review of system- All 10 systems reviewed and is as per HPI otherwise negative.     T(C): 36.4 (07-10-23 @ 08:02), Max: 37.1 (07-09-23 @ 17:48)  HR: 68 (07-10-23 @ 08:02) (63 - 76)  BP: 132/91 (07-10-23 @ 08:02) (119/85 - 135/82)  RR: 16 (07-10-23 @ 08:02) (16 - 18)  SpO2: 98% (07-10-23 @ 08:02) (96% - 99%)  Wt(kg): --    LABS:                        14.7   6.48  )-----------( 187      ( 09 Jul 2023 15:44 )             44.2     07-09    142  |  110<H>  |  13  ----------------------------<  110<H>  4.7   |  30  |  0.91    Ca    9.7      09 Jul 2023 15:44    TPro  7.1  /  Alb  3.3  /  TBili  0.7  /  DBili  x   /  AST  19  /  ALT  38  /  AlkPhos  54  07-09    PT/INR - ( 09 Jul 2023 15:44 )   PT: 17.3 sec;   INR: 1.49 ratio      PTT - ( 09 Jul 2023 15:44 )  PTT:34.4 sec    Urinalysis Basic - ( 09 Jul 2023 15:44 )  Color: x / Appearance: x / SG: x / pH: x  Gluc: 110 mg/dL / Ketone: x  / Bili: x / Urobili: x   Blood: x / Protein: x / Nitrite: x   Leuk Esterase: x / RBC: x / WBC x   Sq Epi: x / Non Sq Epi: x / Bacteria: x    RADIOLOGY & ADDITIONAL TESTS:    ACC: [id number] EXAM:  XR ANKLE COMP MIN 3 VIEWS RT   ORDERED BY: VANESSA   BERTHA     ACC: [id number] EXAM:  XR ANKLE POST RED 2 VIEWS RT   ORDERED BY: FAVIAN HARPER     ACC: [id number] EXAM:  XR ANKLE COMP MIN 3 VIEWS RT   ORDERED BY: MARCOS   JOSE     ACC: [id number] EXAM:  XR TIB FIB AP LAT 2 VIEWS RT   ORDERED BY: MARCOS GARCIA     ACC: [id number] EXAM:  XR ANKLE 2 VIEWS RT   ORDERED BY: FAVIAN HARPER     ACC: [id number] EXAM:  XR TIB FIB AP LAT 2 VIEWS RT   ORDERED BY: VANESSAGWEN STONE     ACC: [id number] EXAM:  XR FOOT COMP MIN 3 VIEWS RT   ORDERED BY: MARCOS GARCIA     PROCEDURE DATE:  07/09/2023          INTERPRETATION:  History: Fall.    FINDINGS:    Frontal and lateral right leg  Frontal, lateral and oblique right ankle  Stress views right ankle  Frontal, lateral and oblique right foot at 11:03 AM:    Minimally distracted oblique fracture distal fibula. No dislocation.   Ankle mortise is aligned on standard and stress views.    Frontal and lateral right leg  Frontal, lateral and oblique right ankle at 2:02 PM:    Interval casting of nondisplaced distal fibular fracture. No change in   fracture alignment. Ankle mortise remains aligned.    IMPRESSION:    Casting of nondisplaced distal fibular fracture. No ankle dislocation.      PHYSICAL EXAM:  GENERAL: NAD, well-groomed, well-developed  HEAD:  Atraumatic, Normocephalic  EYES: EOMI, PERRLA, conjunctiva and sclera clear  HEENT: Moist mucous membranes  NECK: Supple, No JVD  NERVOUS SYSTEM:  Alert & Oriented X3, Motor Strength 5/5 B/L upper and lower extremities; DTRs 2+ intact and symmetric  CHEST/LUNG: Clear to auscultation bilaterally; No rales, rhonchi, wheezing, or rubs  HEART: Regular rate and rhythm; No murmurs, rubs, or gallops  ABDOMEN: Soft, Nontender, Nondistended; Bowel sounds present  GENITOURINARY- Voiding, no palpable bladder  EXTREMITIES:  2+ Peripheral Pulses, No clubbing, cyanosis, or edema  MUSCULOSKELTAL- RLE in splint, moving toes  SKIN-no rash, no lesion  CNS- alert, oriented X3, non focal     MEDICATIONS  (STANDING):  acetaminophen     Tablet .. 1000 milliGRAM(s) Oral every 8 hours  apixaban 5 milliGRAM(s) Oral two times a day  dexAMETHasone     Tablet 1 milliGRAM(s) Oral daily  lacosamide 200 milliGRAM(s) Oral two times a day  levETIRAcetam 1000 milliGRAM(s) Oral two times a day  naloxone Injectable 0.4 milliGRAM(s) IV Push once  pantoprazole    Tablet 40 milliGRAM(s) Oral before breakfast  polyethylene glycol 3350 17 Gram(s) Oral daily  rOPINIRole 0.25 milliGRAM(s) Oral <User Schedule>  senna 2 Tablet(s) Oral at bedtime  tamsulosin 0.4 milliGRAM(s) Oral at bedtime    MEDICATIONS  (PRN):  acetaminophen     Tablet .. 650 milliGRAM(s) Oral every 6 hours PRN Temp greater or equal to 38C (100.4F), Mild Pain (1 - 3)  aluminum hydroxide/magnesium hydroxide/simethicone Suspension 30 milliLiter(s) Oral every 4 hours PRN Dyspepsia  bisacodyl 5 milliGRAM(s) Oral daily PRN Constipation  melatonin 3 milliGRAM(s) Oral at bedtime PRN Insomnia  mirtazapine 7.5 milliGRAM(s) Oral at bedtime PRN insomnia  ondansetron Injectable 4 milliGRAM(s) IV Push every 8 hours PRN Nausea and/or Vomiting  oxyCODONE    IR 2.5 milliGRAM(s) Oral every 4 hours PRN Moderate Pain (4 - 6)  oxyCODONE    IR 5 milliGRAM(s) Oral every 4 hours PRN Severe Pain (7 - 10)    Assessment/Plan  #S/p mechanical fall with RT ankle fracture  Ortho following  Splinted, non-op  Pain meds prn  PT per ortho recommendations  Incentive spirometry  bowel regimen    #H/o GBM. craniotomy/ seizures  Following at [facility]  Cont dexamethasone, vimpat, keppra    #H/o DVT/PE  On Eliquis    #Full code    #Dispo- likely rehab       
CC- s/p mechanical fall with RT ankle fracture    HPI:  63 year old male w hx GBM s/p craniotomy on chemo, DVT/PE on eliquis, Sz came to ED c/o leg pain  c/o R lower leg pain especially at ankle  pain  prevented sleep 10/10  +difficulty weight bearing, last ambulating 07-06  +s/p fall : timing unknown to pt or to friend he lives with  denied head injury or LOC  Friend noted bruising on his arms today    7/10/23- comfortable, some RT ankle pain  7/11/23- denies any complaints    Review of system- All 10 systems reviewed and is as per HPI otherwise negative.     Vital Signs Last 24 Hrs  T(C): 36.4 (11 Jul 2023 08:28), Max: 36.6 (10 Jul 2023 16:22)  T(F): 97.5 (11 Jul 2023 08:28), Max: 97.8 (10 Jul 2023 16:22)  HR: 72 (11 Jul 2023 08:28) (72 - 86)  BP: 107/81 (11 Jul 2023 08:28) (107/81 - 125/79)  BP(mean): --  RR: 17 (11 Jul 2023 08:28) (16 - 17)  SpO2: 97% (11 Jul 2023 08:28) (95% - 97%)    Parameters below as of 11 Jul 2023 08:28  Patient On (Oxygen Delivery Method): room air      LABS:                        14.7   6.48  )-----------( 187      ( 09 Jul 2023 15:44 )             44.2     09 Jul 2023 15:44    142    |  110    |  13     ----------------------------<  110    4.7     |  30     |  0.91     Ca    9.7        09 Jul 2023 15:44    TPro  7.1    /  Alb  3.3    /  TBili  0.7    /  DBili  x      /  AST  19     /  ALT  38     /  AlkPhos  54     09 Jul 2023 15:44    LIVER FUNCTIONS - ( 09 Jul 2023 15:44 )  Alb: 3.3 g/dL / Pro: 7.1 gm/dL / ALK PHOS: 54 U/L / ALT: 38 U/L / AST: 19 U/L / GGT: x           PT/INR - ( 09 Jul 2023 15:44 )   PT: 17.3 sec;   INR: 1.49 ratio      PTT - ( 09 Jul 2023 15:44 )  PTT:34.4 sec    Urinalysis Basic - ( 09 Jul 2023 15:44 )  Color: x / Appearance: x / SG: x / pH: x  Gluc: 110 mg/dL / Ketone: x  / Bili: x / Urobili: x   Blood: x / Protein: x / Nitrite: x   Leuk Esterase: x / RBC: x / WBC x   Sq Epi: x / Non Sq Epi: x / Bacteria: x        RADIOLOGY & ADDITIONAL TESTS:  ACC: [id number] EXAM:  XR ANKLE COMP MIN 3 VIEWS RT   ORDERED BY: VANESSA STONE   ACC: [id number] EXAM:  XR ANKLE POST RED 2 VIEWS RT   ORDERED BY: FAVIAN HARPER   ACC: [id number] EXAM:  XR ANKLE COMP MIN 3 VIEWS RT   ORDERED BY: MARCOS GARCIA   ACC: [id number] EXAM:  XR TIB FIB AP LAT 2 VIEWS RT   ORDERED BY: MARCOS GARCIA   ACC: [id number] EXAM:  XR ANKLE 2 VIEWS RT   ORDERED BY: FAVIAN HARPER   ACC: [id number] EXAM:  XR TIB FIB AP LAT 2 VIEWS RT   ORDERED BY: VANESSA STONE   ACC: [id number] EXAM:  XR FOOT COMP MIN 3 VIEWS RT   ORDERED BY: MARCOS GARCIA   PROCEDURE DATE:  07/09/2023      INTERPRETATION:  History: Fall.    FINDINGS:    Frontal and lateral right leg  Frontal, lateral and oblique right ankle  Stress views right ankle  Frontal, lateral and oblique right foot at 11:03 AM:    Minimally distracted oblique fracture distal fibula. No dislocation.   Ankle mortise is aligned on standard and stress views.    Frontal and lateral right leg  Frontal, lateral and oblique right ankle at 2:02 PM:    Interval casting of nondisplaced distal fibular fracture. No change in   fracture alignment. Ankle mortise remains aligned.    IMPRESSION:    Casting of nondisplaced distal fibular fracture. No ankle dislocation.    PHYSICAL EXAM:  GENERAL: NAD, well-groomed, well-developed  HEAD:  Atraumatic, Normocephalic  EYES: EOMI, PERRLA, conjunctiva and sclera clear  HEENT: Moist mucous membranes  NECK: Supple, No JVD  NERVOUS SYSTEM:  Alert & Oriented X3, Motor Strength 5/5 B/L upper and lower extremities; DTRs 2+ intact and symmetric  CHEST/LUNG: Clear to auscultation bilaterally; No rales, rhonchi, wheezing, or rubs  HEART: Regular rate and rhythm; No murmurs, rubs, or gallops  ABDOMEN: Soft, Nontender, Nondistended; Bowel sounds present  GENITOURINARY- Voiding, no palpable bladder  EXTREMITIES:  2+ Peripheral Pulses, No clubbing, cyanosis, or edema  MUSCULOSKELTAL- RLE in splint, moving toes  SKIN-no rash, no lesion  CNS- alert, oriented X3, non focal     MEDICATIONS  (STANDING):  acetaminophen     Tablet .. 1000 milliGRAM(s) Oral every 8 hours  apixaban 5 milliGRAM(s) Oral two times a day  dexAMETHasone     Tablet 1 milliGRAM(s) Oral daily  lacosamide 200 milliGRAM(s) Oral two times a day  levETIRAcetam 1000 milliGRAM(s) Oral two times a day  naloxone Injectable 0.4 milliGRAM(s) IV Push once  pantoprazole    Tablet 40 milliGRAM(s) Oral before breakfast  polyethylene glycol 3350 17 Gram(s) Oral daily  rOPINIRole 0.25 milliGRAM(s) Oral <User Schedule>  senna 2 Tablet(s) Oral at bedtime  tamsulosin 0.4 milliGRAM(s) Oral at bedtime    MEDICATIONS  (PRN):  acetaminophen     Tablet .. 650 milliGRAM(s) Oral every 6 hours PRN Temp greater or equal to 38C (100.4F), Mild Pain (1 - 3)  aluminum hydroxide/magnesium hydroxide/simethicone Suspension 30 milliLiter(s) Oral every 4 hours PRN Dyspepsia  bisacodyl 5 milliGRAM(s) Oral daily PRN Constipation  melatonin 3 milliGRAM(s) Oral at bedtime PRN Insomnia  mirtazapine 7.5 milliGRAM(s) Oral at bedtime PRN insomnia  ondansetron Injectable 4 milliGRAM(s) IV Push every 8 hours PRN Nausea and/or Vomiting  oxyCODONE    IR 2.5 milliGRAM(s) Oral every 4 hours PRN Moderate Pain (4 - 6)  oxyCODONE    IR 5 milliGRAM(s) Oral every 4 hours PRN Severe Pain (7 - 10)    Assessment/Plan  #S/p mechanical fall with RT ankle fracture  Ortho following  Splinted, non-op  Pain meds prn  PT per ortho recommendations  Incentive spirometry  bowel regimen    #H/o GBM. craniotomy/ seizures  Following at [facility]  Cont dexamethasone, vimpat, keppra  Hem-onc eval called to clear the patient for rehab nees ok to hold the immunotherapy till done with rehab    #H/o DVT/PE  On Eliquis    #Full code    #Dispo- likely rehab tomorrow if onc cleared      
Progress Note: Orthopaedic Surgery    Patient interviewed and examined at bedside, well-appearing and in no acute distress. Pain controlled. Denies fevers, chills, headaches, chest pain, or shortness of breath.     VITAL SIGNS:  T(C): 36.4 (10 Jul 2023 08:02), Max: 37.1 (09 Jul 2023 17:48)  T(F): 97.5 (10 Jul 2023 08:02), Max: 98.7 (09 Jul 2023 17:48)  HR: 68 (10 Jul 2023 08:02) (63 - 76)  BP: 132/91 (10 Jul 2023 08:02) (119/85 - 135/82)  RR: 16 (10 Jul 2023 08:02) (16 - 18)  SpO2: 98% (10 Jul 2023 08:02) (96% - 99%)    O2 Parameters below as of 10 Jul 2023 08:02  Patient On (Oxygen Delivery Method): room air        LABS:                        14.7   6.48  )-----------( 187      ( 09 Jul 2023 15:44 )             44.2     07-09    142  |  110<H>  |  13  ----------------------------<  110<H>  4.7   |  30  |  0.91    Ca    9.7      09 Jul 2023 15:44    TPro  7.1  /  Alb  3.3  /  TBili  0.7  /  DBili  x   /  AST  19  /  ALT  38  /  AlkPhos  54  07-09    PT/INR - ( 09 Jul 2023 15:44 )   PT: 17.3 sec;   INR: 1.49 ratio         PTT - ( 09 Jul 2023 15:44 )  PTT:34.4 sec              ASSESSMENT:   63M with a Right Bhatia B ankle fracture with medial clear space widening on stress views, consistent with an unstable, bimalleolar equivalent ankle fracture.     PLAN:  Pain control  NWB RLE in AO splint, keep c/d/I, cane/crutches/walker as needed  Ice/elevation  Possible need for surgical intervention in future discussed, all questions answered; Will continue to monitor while admitted.   Will dw attending and advise eof any changes to the above plan.     
CC- s/p mechanical fall with RT ankle fracture    HPI:  63 year old male w hx GBM s/p craniotomy on chemo, DVT/PE on eliquis, Sz came to ED c/o leg pain  c/o R lower leg pain especially at ankle  pain  prevented sleep 10/10  +difficulty weight bearing, last ambulating 07-06  +s/p fall : timing unknown to pt or to friend he lives with  denied head injury or LOC  Friend noted bruising on his arms today    7/10/23- comfortable, some RT ankle pain  7/11/23- denies any complaints  7/12/23- up with PT, did couple of steps    Review of system- All 10 systems reviewed and is as per HPI otherwise negative.     Vital Signs Last 24 Hrs  T(C): 36.4 (12 Jul 2023 08:00), Max: 36.5 (11 Jul 2023 16:04)  T(F): 97.5 (12 Jul 2023 08:00), Max: 97.7 (11 Jul 2023 16:04)  HR: 91 (12 Jul 2023 08:00) (90 - 92)  BP: 106/78 (12 Jul 2023 08:00) (106/78 - 129/87)  BP(mean): 101 (11 Jul 2023 16:04) (101 - 101)  RR: 17 (12 Jul 2023 08:00) (17 - 17)  SpO2: 96% (12 Jul 2023 08:00) (95% - 97%)    Parameters below as of 12 Jul 2023 08:00  Patient On (Oxygen Delivery Method): room air    LABS:    No new labs      RADIOLOGY & ADDITIONAL TESTS:  ACC: [id number] EXAM:  XR ANKLE COMP MIN 3 VIEWS RT   ORDERED BY: VANESSA STONE   ACC: [id number] EXAM:  XR ANKLE POST RED 2 VIEWS RT   ORDERED BY: FAVIAN HARPER   ACC: [id number] EXAM:  XR ANKLE COMP MIN 3 VIEWS RT   ORDERED BY: MARCOS GARCIA   ACC: [id number] EXAM:  XR TIB FIB AP LAT 2 VIEWS RT   ORDERED BY: MARCOS GARCIA   ACC: [id number] EXAM:  XR ANKLE 2 VIEWS RT   ORDERED BY: FAVIAN HARPER   ACC: [id number] EXAM:  XR TIB FIB AP LAT 2 VIEWS RT   ORDERED BY: VANESSA STONE   ACC: [id number] EXAM:  XR FOOT COMP MIN 3 VIEWS RT   ORDERED BY: MARCOS GARCIA   PROCEDURE DATE:  07/09/2023      INTERPRETATION:  History: Fall.    FINDINGS:    Frontal and lateral right leg  Frontal, lateral and oblique right ankle  Stress views right ankle  Frontal, lateral and oblique right foot at 11:03 AM:    Minimally distracted oblique fracture distal fibula. No dislocation.   Ankle mortise is aligned on standard and stress views.    Frontal and lateral right leg  Frontal, lateral and oblique right ankle at 2:02 PM:    Interval casting of nondisplaced distal fibular fracture. No change in   fracture alignment. Ankle mortise remains aligned.    IMPRESSION:    Casting of nondisplaced distal fibular fracture. No ankle dislocation.    PHYSICAL EXAM:  GENERAL: NAD, well-groomed, well-developed  HEAD:  Atraumatic, Normocephalic  EYES: EOMI, PERRLA, conjunctiva and sclera clear  HEENT: Moist mucous membranes  NECK: Supple, No JVD  NERVOUS SYSTEM:  Alert & Oriented X3, Motor Strength 5/5 B/L upper and lower extremities; DTRs 2+ intact and symmetric  CHEST/LUNG: Clear to auscultation bilaterally; No rales, rhonchi, wheezing, or rubs  HEART: Regular rate and rhythm; No murmurs, rubs, or gallops  ABDOMEN: Soft, Nontender, Nondistended; Bowel sounds present  GENITOURINARY- Voiding, no palpable bladder  EXTREMITIES:  2+ Peripheral Pulses, No clubbing, cyanosis, or edema  MUSCULOSKELTAL- RLE in splint, moving toes  SKIN-no rash, no lesion  CNS- alert, oriented X3, non focal     MEDICATIONS  (STANDING):  acetaminophen     Tablet .. 1000 milliGRAM(s) Oral every 8 hours  apixaban 5 milliGRAM(s) Oral two times a day  dexAMETHasone     Tablet 1 milliGRAM(s) Oral daily  lacosamide 200 milliGRAM(s) Oral two times a day  levETIRAcetam 1000 milliGRAM(s) Oral two times a day  naloxone Injectable 0.4 milliGRAM(s) IV Push once  pantoprazole    Tablet 40 milliGRAM(s) Oral before breakfast  polyethylene glycol 3350 17 Gram(s) Oral daily  rOPINIRole 0.25 milliGRAM(s) Oral <User Schedule>  senna 2 Tablet(s) Oral at bedtime  tamsulosin 0.4 milliGRAM(s) Oral at bedtime    MEDICATIONS  (PRN):  acetaminophen     Tablet .. 650 milliGRAM(s) Oral every 6 hours PRN Temp greater or equal to 38C (100.4F), Mild Pain (1 - 3)  aluminum hydroxide/magnesium hydroxide/simethicone Suspension 30 milliLiter(s) Oral every 4 hours PRN Dyspepsia  bisacodyl 5 milliGRAM(s) Oral daily PRN Constipation  melatonin 3 milliGRAM(s) Oral at bedtime PRN Insomnia  mirtazapine 7.5 milliGRAM(s) Oral at bedtime PRN insomnia  ondansetron Injectable 4 milliGRAM(s) IV Push every 8 hours PRN Nausea and/or Vomiting  oxyCODONE    IR 2.5 milliGRAM(s) Oral every 4 hours PRN Moderate Pain (4 - 6)  oxyCODONE    IR 5 milliGRAM(s) Oral every 4 hours PRN Severe Pain (7 - 10)    Assessment/Plan  #S/p mechanical fall with RT ankle fracture  Ortho following  Splinted, non-op  Pain meds prn  PT per ortho recommendations  Incentive spirometry  bowel regimen    #H/o GBM. craniotomy/ seizures  Following at [facility]  Cont dexamethasone, vimpat, keppra  Hem-onc eval called to clear the patient for rehab nees ok to hold the immunotherapy till done with rehab    #H/o DVT/PE  On Eliquis    #Full code    #Dispo- [facility] today. DC time 45 mins

## 2023-07-12 NOTE — DISCHARGE NOTE PROVIDER - CARE PROVIDER_API CALL
Raymond Hutson  Orthopaedic Surgery  [address] [place] [id number]  Phone: [phone]  Fax: [phone]  Follow Up Time: 1 week

## 2023-07-12 NOTE — DISCHARGE NOTE NURSING/CASE MANAGEMENT/SOCIAL WORK - PATIENT PORTAL LINK FT
You can access the FollowMyHealth Patient Portal offered by [facility] by registering at the following website: [email or web address]. By joining [facility]’s FollowMyHealth portal, you will also be able to view your health information using other applications (apps) compatible with our system.

## 2023-07-19 ENCOUNTER — APPOINTMENT (OUTPATIENT)
Dept: DERMATOLOGY | Facility: CLINIC | Age: 63
End: 2023-07-19

## 2023-07-20 DIAGNOSIS — Z86.718 PERSONAL HISTORY OF OTHER VENOUS THROMBOSIS AND EMBOLISM: ICD-10-CM

## 2023-07-20 DIAGNOSIS — Z79.60 LONG TERM (CURRENT) USE OF UNSPECIFIED IMMUNOMODULATORS AND IMMUNOSUPPRESSANTS: ICD-10-CM

## 2023-07-20 DIAGNOSIS — G40.802 OTHER EPILEPSY, NOT INTRACTABLE, WITHOUT STATUS EPILEPTICUS: ICD-10-CM

## 2023-07-20 DIAGNOSIS — C71.9 MALIGNANT NEOPLASM OF BRAIN, UNSPECIFIED: ICD-10-CM
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
## 2023-07-20 DIAGNOSIS — Z79.01 LONG TERM (CURRENT) USE OF ANTICOAGULANTS: ICD-10-CM

## 2023-07-20 DIAGNOSIS — Z86.711 PERSONAL HISTORY OF PULMONARY EMBOLISM: ICD-10-CM

## 2023-07-20 DIAGNOSIS — S82.831A OTHER FRACTURE OF UPPER AND LOWER END OF RIGHT FIBULA, INITIAL ENCOUNTER FOR CLOSED FRACTURE: ICD-10-CM
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
## 2023-07-26 ENCOUNTER — APPOINTMENT (OUTPATIENT)
Dept: INFUSION THERAPY | Facility: HOSPITAL | Age: 63
End: 2023-07-26

## 2023-07-26 ENCOUNTER — APPOINTMENT (OUTPATIENT)
Dept: HEMATOLOGY ONCOLOGY | Facility: CLINIC | Age: 63
End: 2023-07-26

## 2023-08-03 ENCOUNTER — NON-APPOINTMENT (OUTPATIENT)
Age: 63
End: 2023-08-03

## 2023-08-09 ENCOUNTER — APPOINTMENT (OUTPATIENT)
Dept: HEMATOLOGY ONCOLOGY | Facility: CLINIC | Age: 63
End: 2023-08-09

## 2023-08-09 ENCOUNTER — APPOINTMENT (OUTPATIENT)
Dept: INFUSION THERAPY | Facility: HOSPITAL | Age: 63
End: 2023-08-09

## 2023-08-16 ENCOUNTER — APPOINTMENT (OUTPATIENT)
Age: 63
End: 2023-08-16
Payer: MEDICARE

## 2023-08-16 ENCOUNTER — NON-APPOINTMENT (OUTPATIENT)
Age: 63
End: 2023-08-16

## 2023-08-16 ENCOUNTER — APPOINTMENT (OUTPATIENT)
Dept: UROLOGY | Facility: CLINIC | Age: 63
End: 2023-08-16

## 2023-08-16 VITALS — WEIGHT: 195 LBS | BODY MASS INDEX: 27.92 KG/M2 | HEIGHT: 70 IN

## 2023-08-16 DIAGNOSIS — S82.401A UNSPECIFIED FRACTURE OF SHAFT OF RIGHT FIBULA, INITIAL ENCOUNTER FOR CLOSED FRACTURE: ICD-10-CM

## 2023-08-16 PROCEDURE — 99203 OFFICE O/P NEW LOW 30 MIN: CPT

## 2023-08-16 PROCEDURE — 73610 X-RAY EXAM OF ANKLE: CPT | Mod: 26,RT

## 2023-08-16 NOTE — DISCUSSION/SUMMARY
[de-identified] : At this time I want the patient to transition out of the splint and go into an ASO brace.  With the help of physical therapy he can now transition to weightbearing as tolerated with assistance.  He can now work on full ankle range of motion and strength while in physical therapy.  I showed him and his spouse the x-rays and I showed them the healing/healed fibula fracture.  I do want to see the patient back here in 6 weeks for an x-ray.  If anything changes they must return to the office sooner.  The paperwork from the rehab center was completed.  All questions answered.

## 2023-08-16 NOTE — PHYSICAL EXAM
[de-identified] : Right ankle Physical Examination:  General: Alert and oriented x3.  In no acute distress.  Pleasant in nature with a normal affect.  No apparent respiratory distress.  Erythema, Warmth, Rubor: Negative Swelling: Negative  ROM: 1. Dorsiflexion: 10 degrees 2. Plantarflexion: 40 degrees 3. Inversion: 30 degrees 4. Eversion: 20 degrees  Tenderness to Palpation:  1. Lateral Malleolus: Very mild tenderness to palpation when palpating the distal fibula. 2. Medial Malleolus: Negative 3. Proximal Fibular Pain: Negative 4. Heel Pain: Negative 5. Cuboid: Negative 6. Navicular: Negative 7. Tibiotalar Joint: Negative 8. Subtalar Joint: Negative 9. Posterior Recess: Negative  Tendon Pain: 1. Achilles: Negative 2. Peroneals: Negative 3. Posterior Tibialis: Negative 4. Tibialis Anterior: Negative  Ligament Pain: 1. ATFL: Negative 2. CFL: Negative  3. PTFL: Negative 4. Deltoid Ligaments: Negative 5. Lis Franc Ligament: Negative  Stability:  1. Anterior Drawer: Negative 2. Posterior Drawer: Negative  Strength: 5/5 TA/GS/EHL  Pulses: 2+ DP/PT Pulses  Neuro: Intact motor and sensory  Additional Test: 1. Calcaneal Squeeze Test: Negative 2. Syndesmosis Squeeze Test: Negative [de-identified] : 3 views x-rays right ankle reviewed in office, 8/16/2023: Healed fibula fracture.    ACC: [id number] EXAM: CT 3D RECONSTRUCT CECILIA MELENDEZ ORDERED BY: MARCOS GARCIA  ACC: [id number] EXAM: CT ANKLE ONLY RT ORDERED BY: MARCOS GARCIA  PROCEDURE DATE: 07/09/2023    INTERPRETATION: HISTORY: Ankle fracture.  Helical CT imaging of the right ankle was performed without intravenous contrast. Sagittal and coronal reformats were provided. 3-D reformats were performed on a separate workstation.  Correlation is made with radiographs from same day.  FINDINGS:  There is a plaster splint in place. There is an acute comminuted obliquely oriented fracture of the distal fibular diametaphysis with slight posterior displacement. There is an associated small cortical avulsion along the region of the anterior talofibular ligament. No additional fractures are seen. The joint spaces are preserved.  Foci of air are seen within the anterior subcutaneous tissues at the level of the tibiotalar joint which may be related to intervention in this region, correlate clinically to exclude the possibility of an open component to the fracture.  There is soft tissue swelling over the lateral malleolus. Achilles enthesophyte.  IMPRESSION:  Acute comminuted oblique ligament fracture of the distal fibular diametaphysis. Associated small cortical avulsion along the region of the anterior talofibular ligament.  Small foci of air within the anterior subcutaneous tissues at the level of the tibiotalar joint. This may be related to intervention in this region, correlate clinically to see the possibility of an open component to the fracture.  --- End of Report ---      JUS PAL MD; Attending Radiologist This document has been electronically signed. Jul 9 2023 5:51PM    ACC: [id number] EXAM: XR ANKLE COMP MIN 3 VIEWS RT ORDERED BY: VANESSA STONE  ACC: [id number] EXAM: XR ANKLE POST RED 2 VIEWS RT ORDERED BY: FAVIAN HARPER  ACC: [id number] EXAM: XR ANKLE COMP MIN 3 VIEWS RT ORDERED BY: MARCOS GARCIA  ACC: [id number] EXAM: XR TIB FIB AP LAT 2 VIEWS RT ORDERED BY: MARCOS GARCIA  ACC: [id number] EXAM: XR ANKLE 2 VIEWS RT ORDERED BY: FAVIAN HARPER  ACC: [id number] EXAM: XR TIB FIB AP LAT 2 VIEWS RT ORDERED BY: VANESSA STONE  ACC: [id number] EXAM: XR FOOT COMP MIN 3 VIEWS RT ORDERED BY: MARCOS GARCIA  PROCEDURE DATE: 07/09/2023    INTERPRETATION: History: Fall.  FINDINGS:  Frontal and lateral right leg Frontal, lateral and oblique right ankle Stress views right ankle Frontal, lateral and oblique right foot at 11:03 AM:  Minimally distracted oblique fracture distal fibula. No dislocation. Ankle mortise is aligned on standard and stress views.  Frontal and lateral right leg Frontal, lateral and oblique right ankle at 2:02 PM:  Interval casting of nondisplaced distal fibular fracture. No change in fracture alignment. Ankle mortise remains aligned.  IMPRESSION:  Casting of nondisplaced distal fibular fracture. No ankle dislocation.    --- End of Report ---      JENNIFER PIPER MD; Attending Radiologist This document has been electronically signed. Jul 9 2023 2:25PM

## 2023-08-16 NOTE — HISTORY OF PRESENT ILLNESS
[FreeTextEntry1] : The patient is a 63-year-old male who presents with right ankle fracture.  The patient injured the right ankle after he took an accidental fall on 7/7/2023.  The patient is currently in [facility].  Patient does have a history of a brain tumor which surgery.  Patient presents wearing a protective splint.  He states that the protective splint has been on since the day of the injury.  Patient is currently on blood thinning medications.  He denies fevers, chills, night's, shortness of breath, calf pain.  The patient has very minimal pain of the right ankle and states that the ankle pain has subsided greatly since the injury.  No other complaints.

## 2023-08-23 ENCOUNTER — APPOINTMENT (OUTPATIENT)
Dept: UROLOGY | Facility: CLINIC | Age: 63
End: 2023-08-23
Payer: MEDICARE

## 2023-08-23 PROCEDURE — 99214 OFFICE O/P EST MOD 30 MIN: CPT

## 2023-08-23 NOTE — HISTORY OF PRESENT ILLNESS
[FreeTextEntry1] : hx of arm fracture  went to hosp - states no UTI sxs byt found to havef uti - treaed with keflex  also with c/o penile itching and given nystatin powder -helps  here for f/velasco - no LUTS c/o  rehab review ofmeds - taking flomax here with aide:

## 2023-08-23 NOTE — PHYSICAL EXAM
[General Appearance - Well Developed] : well developed [General Appearance - Well Nourished] : well nourished [Normal Appearance] : normal appearance [Well Groomed] : well groomed [General Appearance - In No Acute Distress] : no acute distress [Urethral Meatus] : meatus normal [Scrotum] : the scrotum was normal [FreeTextEntry1] : sl erythema on scrtoal skin

## 2023-08-23 NOTE — ASSESSMENT
[FreeTextEntry1] : hx of arm fracture  went to hosp  hx of uti - treaed with keflex  also with c/o penile itching and given nystatin powder -helps  here for f/velasco  rehab review ofmeds - taking flomax here with aide: 1- check u c x 2- cont nystatin powder as needed 3- conside rcysto due to uti again

## 2023-08-25 ENCOUNTER — NON-APPOINTMENT (OUTPATIENT)
Age: 63
End: 2023-08-25

## 2023-08-31 ENCOUNTER — APPOINTMENT (OUTPATIENT)
Dept: INFUSION THERAPY | Facility: HOSPITAL | Age: 63
End: 2023-08-31

## 2023-08-31 ENCOUNTER — APPOINTMENT (OUTPATIENT)
Dept: HEMATOLOGY ONCOLOGY | Facility: CLINIC | Age: 63
End: 2023-08-31

## 2023-08-31 LAB — BACTERIA UR CULT: ABNORMAL

## 2023-09-13 ENCOUNTER — APPOINTMENT (OUTPATIENT)
Dept: UROLOGY | Facility: CLINIC | Age: 63
End: 2023-09-13
Payer: MEDICARE

## 2023-09-13 ENCOUNTER — APPOINTMENT (OUTPATIENT)
Dept: MRI IMAGING | Facility: IMAGING CENTER | Age: 63
End: 2023-09-13

## 2023-09-13 DIAGNOSIS — N32.0 BLADDER-NECK OBSTRUCTION: ICD-10-CM

## 2023-09-13 DIAGNOSIS — N39.0 URINARY TRACT INFECTION, SITE NOT SPECIFIED: ICD-10-CM

## 2023-09-13 DIAGNOSIS — N48.1 BALANITIS: ICD-10-CM

## 2023-09-13 DIAGNOSIS — R33.9 RETENTION OF URINE, UNSPECIFIED: ICD-10-CM

## 2023-09-13 PROCEDURE — 52000 CYSTOURETHROSCOPY: CPT

## 2023-09-14 ENCOUNTER — APPOINTMENT (OUTPATIENT)
Dept: INFUSION THERAPY | Facility: HOSPITAL | Age: 63
End: 2023-09-14

## 2023-09-19 ENCOUNTER — NON-APPOINTMENT (OUTPATIENT)
Age: 63
End: 2023-09-19

## 2023-09-20 NOTE — ED ADULT NURSE NOTE - NS ED NURSE IV DC DT
Go straight to [address] and make them aware that you are a transfer from [place] to see orthopedics.
16-Mar-2023 20:42

## 2023-09-28 ENCOUNTER — APPOINTMENT (OUTPATIENT)
Dept: INFUSION THERAPY | Facility: HOSPITAL | Age: 63
End: 2023-09-28

## 2023-09-28 NOTE — DISCHARGE NOTE PROVIDER - NSDCMRMEDTOKEN_GEN_ALL_CORE_FT
Warren Bettencourt, this patient was previously referred to Dr. Jones for her psych conditions including active hallucinations but since that referral was placed she has now also suffered the loss of her son so I was hoping to touch base with you and see if we could get her on the schedule to see him asap. I verified her phone number today and it is up to date! Thanks! - TA  acetaminophen 325 mg oral tablet: 2 tab(s) orally every 6 hours, As needed, Temp greater or equal to 38C (100.4F), Mild Pain (1 - 3)  bisacodyl 5 mg oral delayed release tablet: 1 tab(s) orally once a day (at bedtime)  calcium carbonate 500 mg (200 mg elemental calcium) oral tablet, chewable: 1 tab(s) orally 3 times a day, As needed, Dyspepsia  dexamethasone 2 mg oral tablet: 2 tablets orally at 8 am and 1 tablet orally at 6pm daily  lacosamide 200 mg oral tablet: 200 tab(s) orally 2 times a day MDD:2 tablets daily  levETIRAcetam 1000 mg oral tablet: 1 tab(s) orally 2 times a day  melatonin 5 mg oral tablet: 1 tab(s) orally once a day (at bedtime)  ocular lubricant ophthalmic solution: 1 drop(s) to each affected eye every 6 hours  polyethylene glycol 3350 oral powder for reconstitution: 17 gram(s) orally once a day  rOPINIRole 0.25 mg oral tablet: 3 tab(s) orally once a day  senna leaf extract oral tablet: 2 tab(s) orally once a day (at bedtime)   acetaminophen 325 mg oral tablet: 2 tab(s) orally every 6 hours, As needed, Temp greater or equal to 38C (100.4F), Mild Pain (1 - 3)  bisacodyl 5 mg oral delayed release tablet: 1 tab(s) orally once a day (at bedtime)  calcium carbonate 500 mg (200 mg elemental calcium) oral tablet, chewable: 1 tab(s) orally 3 times a day, As needed, Dyspepsia  dexamethasone 2 mg oral tablet: 2 tablets orally at 8 am and 1 tablet orally at 6pm daily  lacosamide 200 mg oral tablet: 200 tab(s) orally 2 times a day MDD:2 tablets daily  levETIRAcetam 1000 mg oral tablet: 1 tab(s) orally 2 times a day  melatonin 5 mg oral tablet: 1 tab(s) orally once a day (at bedtime)  ocular lubricant ophthalmic solution: 1 drop(s) to each affected eye every 6 hours  pantoprazole 40 mg oral delayed release tablet: 1 tab(s) orally once a day   polyethylene glycol 3350 oral powder for reconstitution: 17 gram(s) orally once a day  rOPINIRole 0.25 mg oral tablet: 3 tab(s) orally once a day  senna leaf extract oral tablet: 2 tab(s) orally once a day (at bedtime)

## 2023-10-02 ENCOUNTER — APPOINTMENT (OUTPATIENT)
Dept: MRI IMAGING | Facility: CLINIC | Age: 63
End: 2023-10-02
Payer: MEDICARE

## 2023-10-02 ENCOUNTER — OUTPATIENT (OUTPATIENT)
Dept: OUTPATIENT SERVICES | Facility: HOSPITAL | Age: 63
LOS: 1 days | End: 2023-10-02
Payer: MEDICARE

## 2023-10-02 ENCOUNTER — RESULT REVIEW (OUTPATIENT)
Age: 63
End: 2023-10-02

## 2023-10-02 DIAGNOSIS — C71.9 MALIGNANT NEOPLASM OF BRAIN, UNSPECIFIED: ICD-10-CM

## 2023-10-02 DIAGNOSIS — Z98.890 OTHER SPECIFIED POSTPROCEDURAL STATES: Chronic | ICD-10-CM

## 2023-10-02 PROCEDURE — A9585: CPT

## 2023-10-02 PROCEDURE — 70553 MRI BRAIN STEM W/O & W/DYE: CPT | Mod: MH

## 2023-10-02 PROCEDURE — 70553 MRI BRAIN STEM W/O & W/DYE: CPT | Mod: 26,MH

## 2023-10-10 ENCOUNTER — APPOINTMENT (OUTPATIENT)
Dept: NEUROLOGY | Facility: CLINIC | Age: 63
End: 2023-10-10
Payer: MEDICARE

## 2023-10-10 VITALS
OXYGEN SATURATION: 98 % | DIASTOLIC BLOOD PRESSURE: 82 MMHG | HEART RATE: 92 BPM | SYSTOLIC BLOOD PRESSURE: 116 MMHG | RESPIRATION RATE: 16 BRPM | TEMPERATURE: 98.4 F

## 2023-10-10 PROCEDURE — 99215 OFFICE O/P EST HI 40 MIN: CPT

## 2023-10-10 RX ORDER — TEMOZOLOMIDE 140 MG/1
140 CAPSULE ORAL
Qty: 5 | Refills: 0 | Status: DISCONTINUED | COMMUNITY
Start: 2023-03-16 | End: 2023-10-10

## 2023-10-10 RX ORDER — TEMOZOLOMIDE 100 MG/1
100 CAPSULE ORAL
Qty: 5 | Refills: 0 | Status: DISCONTINUED | COMMUNITY
Start: 2023-03-16 | End: 2023-10-10

## 2023-10-10 RX ORDER — ROPINIROLE 0.25 MG/1
0.25 TABLET, FILM COATED ORAL 3 TIMES DAILY
Qty: 30 | Refills: 0 | Status: ACTIVE | COMMUNITY
Start: 2023-10-10

## 2023-10-10 RX ORDER — LEVETIRACETAM 1000 MG/1
1000 TABLET, FILM COATED ORAL
Qty: 60 | Refills: 6 | Status: DISCONTINUED | COMMUNITY
Start: 2022-10-25 | End: 2023-10-10

## 2023-10-10 RX ORDER — ONDANSETRON 8 MG/1
8 TABLET ORAL
Qty: 30 | Refills: 2 | Status: DISCONTINUED | COMMUNITY
Start: 2022-10-19 | End: 2023-10-10

## 2023-10-10 RX ORDER — TEMOZOLOMIDE 180 MG/1
180 CAPSULE ORAL
Qty: 5 | Refills: 0 | Status: DISCONTINUED | COMMUNITY
Start: 2023-03-16 | End: 2023-10-10

## 2023-10-10 RX ORDER — NYSTATIN 100MM UNIT
POWDER (EA) MISCELLANEOUS
Qty: 1 | Refills: 3 | Status: DISCONTINUED | COMMUNITY
Start: 2023-03-22 | End: 2023-10-10

## 2023-10-10 RX ORDER — CIPROFLOXACIN HYDROCHLORIDE 500 MG/1
500 TABLET, FILM COATED ORAL
Qty: 14 | Refills: 1 | Status: DISCONTINUED | COMMUNITY
Start: 2023-08-31 | End: 2023-10-10

## 2023-10-22 ENCOUNTER — INPATIENT (INPATIENT)
Facility: HOSPITAL | Age: 63
LOS: 3 days | Discharge: HOME | DRG: 177 | End: 2023-10-26
Attending: HOSPITALIST | Admitting: INTERNAL MEDICINE
Payer: MEDICARE

## 2023-10-22 VITALS
TEMPERATURE: 102 F | DIASTOLIC BLOOD PRESSURE: 102 MMHG | SYSTOLIC BLOOD PRESSURE: 131 MMHG | RESPIRATION RATE: 17 BRPM | OXYGEN SATURATION: 98 % | HEART RATE: 132 BPM

## 2023-10-22 DIAGNOSIS — Z98.890 OTHER SPECIFIED POSTPROCEDURAL STATES: Chronic | ICD-10-CM

## 2023-10-22 DIAGNOSIS — G40.901 EPILEPSY, UNSPECIFIED, NOT INTRACTABLE, WITH STATUS EPILEPTICUS: ICD-10-CM

## 2023-10-22 LAB
ALBUMIN SERPL ELPH-MCNC: 2.8 G/DL — LOW (ref 3.3–5)
ALBUMIN SERPL ELPH-MCNC: 2.8 G/DL — LOW (ref 3.3–5)
ALBUMIN SERPL ELPH-MCNC: 3.2 G/DL — LOW (ref 3.3–5)
ALBUMIN SERPL ELPH-MCNC: 3.2 G/DL — LOW (ref 3.3–5)
ALP SERPL-CCNC: 44 U/L — SIGNIFICANT CHANGE UP (ref 40–120)
ALP SERPL-CCNC: 44 U/L — SIGNIFICANT CHANGE UP (ref 40–120)
ALP SERPL-CCNC: 49 U/L — SIGNIFICANT CHANGE UP (ref 40–120)
ALP SERPL-CCNC: 49 U/L — SIGNIFICANT CHANGE UP (ref 40–120)
ALT FLD-CCNC: 22 U/L — SIGNIFICANT CHANGE UP (ref 12–78)
ALT FLD-CCNC: 22 U/L — SIGNIFICANT CHANGE UP (ref 12–78)
ALT FLD-CCNC: 27 U/L — SIGNIFICANT CHANGE UP (ref 12–78)
ALT FLD-CCNC: 27 U/L — SIGNIFICANT CHANGE UP (ref 12–78)
ANION GAP SERPL CALC-SCNC: 4 MMOL/L — LOW (ref 5–17)
ANION GAP SERPL CALC-SCNC: 4 MMOL/L — LOW (ref 5–17)
APPEARANCE UR: CLEAR — SIGNIFICANT CHANGE UP
APPEARANCE UR: CLEAR — SIGNIFICANT CHANGE UP
APTT BLD: 22.4 SEC — LOW (ref 24.5–35.6)
APTT BLD: 22.4 SEC — LOW (ref 24.5–35.6)
AST SERPL-CCNC: 17 U/L — SIGNIFICANT CHANGE UP (ref 15–37)
AST SERPL-CCNC: 17 U/L — SIGNIFICANT CHANGE UP (ref 15–37)
AST SERPL-CCNC: 19 U/L — SIGNIFICANT CHANGE UP (ref 15–37)
AST SERPL-CCNC: 19 U/L — SIGNIFICANT CHANGE UP (ref 15–37)
BACTERIA # UR AUTO: NEGATIVE /HPF — SIGNIFICANT CHANGE UP
BACTERIA # UR AUTO: NEGATIVE /HPF — SIGNIFICANT CHANGE UP
BASOPHILS # BLD AUTO: 0.03 K/UL — SIGNIFICANT CHANGE UP (ref 0–0.2)
BASOPHILS # BLD AUTO: 0.03 K/UL — SIGNIFICANT CHANGE UP (ref 0–0.2)
BASOPHILS NFR BLD AUTO: 0.3 % — SIGNIFICANT CHANGE UP (ref 0–2)
BASOPHILS NFR BLD AUTO: 0.3 % — SIGNIFICANT CHANGE UP (ref 0–2)
BILIRUB DIRECT SERPL-MCNC: 0.2 MG/DL — SIGNIFICANT CHANGE UP (ref 0–0.3)
BILIRUB DIRECT SERPL-MCNC: 0.2 MG/DL — SIGNIFICANT CHANGE UP (ref 0–0.3)
BILIRUB INDIRECT FLD-MCNC: 0.5 MG/DL — SIGNIFICANT CHANGE UP (ref 0.2–1)
BILIRUB INDIRECT FLD-MCNC: 0.5 MG/DL — SIGNIFICANT CHANGE UP (ref 0.2–1)
BILIRUB SERPL-MCNC: 0.6 MG/DL — SIGNIFICANT CHANGE UP (ref 0.2–1.2)
BILIRUB SERPL-MCNC: 0.6 MG/DL — SIGNIFICANT CHANGE UP (ref 0.2–1.2)
BILIRUB SERPL-MCNC: 0.7 MG/DL — SIGNIFICANT CHANGE UP (ref 0.2–1.2)
BILIRUB SERPL-MCNC: 0.7 MG/DL — SIGNIFICANT CHANGE UP (ref 0.2–1.2)
BILIRUB UR-MCNC: NEGATIVE — SIGNIFICANT CHANGE UP
BILIRUB UR-MCNC: NEGATIVE — SIGNIFICANT CHANGE UP
BLD GP AB SCN SERPL QL: SIGNIFICANT CHANGE UP
BLD GP AB SCN SERPL QL: SIGNIFICANT CHANGE UP
BUN SERPL-MCNC: 14 MG/DL — SIGNIFICANT CHANGE UP (ref 7–23)
BUN SERPL-MCNC: 14 MG/DL — SIGNIFICANT CHANGE UP (ref 7–23)
CALCIUM SERPL-MCNC: 9.2 MG/DL — SIGNIFICANT CHANGE UP (ref 8.5–10.1)
CALCIUM SERPL-MCNC: 9.2 MG/DL — SIGNIFICANT CHANGE UP (ref 8.5–10.1)
CAST: 0 /LPF — SIGNIFICANT CHANGE UP (ref 0–4)
CAST: 0 /LPF — SIGNIFICANT CHANGE UP (ref 0–4)
CHLORIDE SERPL-SCNC: 107 MMOL/L — SIGNIFICANT CHANGE UP (ref 96–108)
CHLORIDE SERPL-SCNC: 107 MMOL/L — SIGNIFICANT CHANGE UP (ref 96–108)
CO2 SERPL-SCNC: 28 MMOL/L — SIGNIFICANT CHANGE UP (ref 22–31)
CO2 SERPL-SCNC: 28 MMOL/L — SIGNIFICANT CHANGE UP (ref 22–31)
COLOR SPEC: SIGNIFICANT CHANGE UP
COLOR SPEC: SIGNIFICANT CHANGE UP
CREAT SERPL-MCNC: 0.8 MG/DL — SIGNIFICANT CHANGE UP (ref 0.5–1.3)
CREAT SERPL-MCNC: 0.8 MG/DL — SIGNIFICANT CHANGE UP (ref 0.5–1.3)
CREAT SERPL-MCNC: 0.92 MG/DL — SIGNIFICANT CHANGE UP (ref 0.5–1.3)
CREAT SERPL-MCNC: 0.92 MG/DL — SIGNIFICANT CHANGE UP (ref 0.5–1.3)
DIFF PNL FLD: ABNORMAL
DIFF PNL FLD: ABNORMAL
EGFR: 92 ML/MIN/1.73M2 — SIGNIFICANT CHANGE UP
EGFR: 92 ML/MIN/1.73M2 — SIGNIFICANT CHANGE UP
EGFR: 98 ML/MIN/1.73M2 — SIGNIFICANT CHANGE UP
EGFR: 98 ML/MIN/1.73M2 — SIGNIFICANT CHANGE UP
EOSINOPHIL # BLD AUTO: 0.01 K/UL — SIGNIFICANT CHANGE UP (ref 0–0.5)
EOSINOPHIL # BLD AUTO: 0.01 K/UL — SIGNIFICANT CHANGE UP (ref 0–0.5)
EOSINOPHIL NFR BLD AUTO: 0.1 % — SIGNIFICANT CHANGE UP (ref 0–6)
EOSINOPHIL NFR BLD AUTO: 0.1 % — SIGNIFICANT CHANGE UP (ref 0–6)
GLUCOSE SERPL-MCNC: 128 MG/DL — HIGH (ref 70–99)
GLUCOSE SERPL-MCNC: 128 MG/DL — HIGH (ref 70–99)
GLUCOSE UR QL: NEGATIVE MG/DL — SIGNIFICANT CHANGE UP
GLUCOSE UR QL: NEGATIVE MG/DL — SIGNIFICANT CHANGE UP
HCT VFR BLD CALC: 46.3 % — SIGNIFICANT CHANGE UP (ref 39–50)
HCT VFR BLD CALC: 46.3 % — SIGNIFICANT CHANGE UP (ref 39–50)
HGB BLD-MCNC: 15.2 G/DL — SIGNIFICANT CHANGE UP (ref 13–17)
HGB BLD-MCNC: 15.2 G/DL — SIGNIFICANT CHANGE UP (ref 13–17)
IMM GRANULOCYTES NFR BLD AUTO: 0.3 % — SIGNIFICANT CHANGE UP (ref 0–0.9)
IMM GRANULOCYTES NFR BLD AUTO: 0.3 % — SIGNIFICANT CHANGE UP (ref 0–0.9)
INR BLD: 1.21 RATIO — HIGH (ref 0.85–1.18)
INR BLD: 1.21 RATIO — HIGH (ref 0.85–1.18)
INR BLD: 1.31 RATIO — HIGH (ref 0.85–1.18)
INR BLD: 1.31 RATIO — HIGH (ref 0.85–1.18)
KETONES UR-MCNC: 40 MG/DL
KETONES UR-MCNC: 40 MG/DL
LACTATE SERPL-SCNC: 1 MMOL/L — SIGNIFICANT CHANGE UP (ref 0.7–2)
LACTATE SERPL-SCNC: 1 MMOL/L — SIGNIFICANT CHANGE UP (ref 0.7–2)
LEUKOCYTE ESTERASE UR-ACNC: NEGATIVE — SIGNIFICANT CHANGE UP
LEUKOCYTE ESTERASE UR-ACNC: NEGATIVE — SIGNIFICANT CHANGE UP
LYMPHOCYTES # BLD AUTO: 0.95 K/UL — LOW (ref 1–3.3)
LYMPHOCYTES # BLD AUTO: 0.95 K/UL — LOW (ref 1–3.3)
LYMPHOCYTES # BLD AUTO: 9.9 % — LOW (ref 13–44)
LYMPHOCYTES # BLD AUTO: 9.9 % — LOW (ref 13–44)
MAGNESIUM SERPL-MCNC: 2.1 MG/DL — SIGNIFICANT CHANGE UP (ref 1.6–2.6)
MAGNESIUM SERPL-MCNC: 2.1 MG/DL — SIGNIFICANT CHANGE UP (ref 1.6–2.6)
MCHC RBC-ENTMCNC: 28.8 PG — SIGNIFICANT CHANGE UP (ref 27–34)
MCHC RBC-ENTMCNC: 28.8 PG — SIGNIFICANT CHANGE UP (ref 27–34)
MCHC RBC-ENTMCNC: 32.8 GM/DL — SIGNIFICANT CHANGE UP (ref 32–36)
MCHC RBC-ENTMCNC: 32.8 GM/DL — SIGNIFICANT CHANGE UP (ref 32–36)
MCV RBC AUTO: 87.7 FL — SIGNIFICANT CHANGE UP (ref 80–100)
MCV RBC AUTO: 87.7 FL — SIGNIFICANT CHANGE UP (ref 80–100)
MONOCYTES # BLD AUTO: 0.83 K/UL — SIGNIFICANT CHANGE UP (ref 0–0.9)
MONOCYTES # BLD AUTO: 0.83 K/UL — SIGNIFICANT CHANGE UP (ref 0–0.9)
MONOCYTES NFR BLD AUTO: 8.6 % — SIGNIFICANT CHANGE UP (ref 2–14)
MONOCYTES NFR BLD AUTO: 8.6 % — SIGNIFICANT CHANGE UP (ref 2–14)
NEUTROPHILS # BLD AUTO: 7.78 K/UL — HIGH (ref 1.8–7.4)
NEUTROPHILS # BLD AUTO: 7.78 K/UL — HIGH (ref 1.8–7.4)
NEUTROPHILS NFR BLD AUTO: 80.8 % — HIGH (ref 43–77)
NEUTROPHILS NFR BLD AUTO: 80.8 % — HIGH (ref 43–77)
NITRITE UR-MCNC: NEGATIVE — SIGNIFICANT CHANGE UP
NITRITE UR-MCNC: NEGATIVE — SIGNIFICANT CHANGE UP
PH UR: 5.5 — SIGNIFICANT CHANGE UP (ref 5–8)
PH UR: 5.5 — SIGNIFICANT CHANGE UP (ref 5–8)
PHOSPHATE SERPL-MCNC: 3.8 MG/DL — SIGNIFICANT CHANGE UP (ref 2.5–4.5)
PHOSPHATE SERPL-MCNC: 3.8 MG/DL — SIGNIFICANT CHANGE UP (ref 2.5–4.5)
PLATELET # BLD AUTO: 135 K/UL — LOW (ref 150–400)
PLATELET # BLD AUTO: 135 K/UL — LOW (ref 150–400)
POTASSIUM SERPL-MCNC: 4 MMOL/L — SIGNIFICANT CHANGE UP (ref 3.5–5.3)
POTASSIUM SERPL-MCNC: 4 MMOL/L — SIGNIFICANT CHANGE UP (ref 3.5–5.3)
POTASSIUM SERPL-SCNC: 4 MMOL/L — SIGNIFICANT CHANGE UP (ref 3.5–5.3)
POTASSIUM SERPL-SCNC: 4 MMOL/L — SIGNIFICANT CHANGE UP (ref 3.5–5.3)
PROT SERPL-MCNC: 7 GM/DL — SIGNIFICANT CHANGE UP (ref 6–8.3)
PROT SERPL-MCNC: 7 GM/DL — SIGNIFICANT CHANGE UP (ref 6–8.3)
PROT SERPL-MCNC: 7.5 GM/DL — SIGNIFICANT CHANGE UP (ref 6–8.3)
PROT SERPL-MCNC: 7.5 GM/DL — SIGNIFICANT CHANGE UP (ref 6–8.3)
PROT UR-MCNC: SIGNIFICANT CHANGE UP MG/DL
PROT UR-MCNC: SIGNIFICANT CHANGE UP MG/DL
PROTHROM AB SERPL-ACNC: 13.6 SEC — HIGH (ref 9.5–13)
PROTHROM AB SERPL-ACNC: 13.6 SEC — HIGH (ref 9.5–13)
PROTHROM AB SERPL-ACNC: 14.7 SEC — HIGH (ref 9.5–13)
PROTHROM AB SERPL-ACNC: 14.7 SEC — HIGH (ref 9.5–13)
RAPID RVP RESULT: DETECTED
RAPID RVP RESULT: DETECTED
RBC # BLD: 5.28 M/UL — SIGNIFICANT CHANGE UP (ref 4.2–5.8)
RBC # BLD: 5.28 M/UL — SIGNIFICANT CHANGE UP (ref 4.2–5.8)
RBC # FLD: 13.8 % — SIGNIFICANT CHANGE UP (ref 10.3–14.5)
RBC # FLD: 13.8 % — SIGNIFICANT CHANGE UP (ref 10.3–14.5)
RBC CASTS # UR COMP ASSIST: 28 /HPF — HIGH (ref 0–4)
RBC CASTS # UR COMP ASSIST: 28 /HPF — HIGH (ref 0–4)
SARS-COV-2 RNA SPEC QL NAA+PROBE: DETECTED
SARS-COV-2 RNA SPEC QL NAA+PROBE: DETECTED
SODIUM SERPL-SCNC: 139 MMOL/L — SIGNIFICANT CHANGE UP (ref 135–145)
SODIUM SERPL-SCNC: 139 MMOL/L — SIGNIFICANT CHANGE UP (ref 135–145)
SP GR SPEC: 1.02 — SIGNIFICANT CHANGE UP (ref 1–1.03)
SP GR SPEC: 1.02 — SIGNIFICANT CHANGE UP (ref 1–1.03)
SQUAMOUS # UR AUTO: 1 /HPF — SIGNIFICANT CHANGE UP (ref 0–5)
SQUAMOUS # UR AUTO: 1 /HPF — SIGNIFICANT CHANGE UP (ref 0–5)
TROPONIN I, HIGH SENSITIVITY RESULT: 10.83 NG/L — SIGNIFICANT CHANGE UP
TROPONIN I, HIGH SENSITIVITY RESULT: 10.83 NG/L — SIGNIFICANT CHANGE UP
UROBILINOGEN FLD QL: 1 MG/DL — SIGNIFICANT CHANGE UP (ref 0.2–1)
UROBILINOGEN FLD QL: 1 MG/DL — SIGNIFICANT CHANGE UP (ref 0.2–1)
WBC # BLD: 9.63 K/UL — SIGNIFICANT CHANGE UP (ref 3.8–10.5)
WBC # BLD: 9.63 K/UL — SIGNIFICANT CHANGE UP (ref 3.8–10.5)
WBC # FLD AUTO: 9.63 K/UL — SIGNIFICANT CHANGE UP (ref 3.8–10.5)
WBC # FLD AUTO: 9.63 K/UL — SIGNIFICANT CHANGE UP (ref 3.8–10.5)
WBC UR QL: 2 /HPF — SIGNIFICANT CHANGE UP (ref 0–5)
WBC UR QL: 2 /HPF — SIGNIFICANT CHANGE UP (ref 0–5)

## 2023-10-22 PROCEDURE — 93010 ELECTROCARDIOGRAM REPORT: CPT

## 2023-10-22 PROCEDURE — 73080 X-RAY EXAM OF ELBOW: CPT | Mod: RT

## 2023-10-22 PROCEDURE — 80076 HEPATIC FUNCTION PANEL: CPT

## 2023-10-22 PROCEDURE — A9579: CPT

## 2023-10-22 PROCEDURE — 97530 THERAPEUTIC ACTIVITIES: CPT | Mod: GP

## 2023-10-22 PROCEDURE — 85027 COMPLETE CBC AUTOMATED: CPT

## 2023-10-22 PROCEDURE — 84145 PROCALCITONIN (PCT): CPT

## 2023-10-22 PROCEDURE — 80053 COMPREHEN METABOLIC PANEL: CPT

## 2023-10-22 PROCEDURE — 99291 CRITICAL CARE FIRST HOUR: CPT

## 2023-10-22 PROCEDURE — 87081 CULTURE SCREEN ONLY: CPT

## 2023-10-22 PROCEDURE — 71045 X-RAY EXAM CHEST 1 VIEW: CPT | Mod: 26

## 2023-10-22 PROCEDURE — 84100 ASSAY OF PHOSPHORUS: CPT

## 2023-10-22 PROCEDURE — 92610 EVALUATE SWALLOWING FUNCTION: CPT | Mod: GN

## 2023-10-22 PROCEDURE — 92523 SPEECH SOUND LANG COMPREHEN: CPT | Mod: GN

## 2023-10-22 PROCEDURE — 97162 PT EVAL MOD COMPLEX 30 MIN: CPT | Mod: GP

## 2023-10-22 PROCEDURE — 83735 ASSAY OF MAGNESIUM: CPT

## 2023-10-22 PROCEDURE — 70450 CT HEAD/BRAIN W/O DYE: CPT | Mod: 26,MA

## 2023-10-22 PROCEDURE — 93971 EXTREMITY STUDY: CPT | Mod: RT

## 2023-10-22 PROCEDURE — 82565 ASSAY OF CREATININE: CPT

## 2023-10-22 PROCEDURE — 74177 CT ABD & PELVIS W/CONTRAST: CPT | Mod: 26,MA

## 2023-10-22 PROCEDURE — 95816 EEG AWAKE AND DROWSY: CPT

## 2023-10-22 PROCEDURE — 70553 MRI BRAIN STEM W/O & W/DYE: CPT

## 2023-10-22 PROCEDURE — 93971 EXTREMITY STUDY: CPT | Mod: 26,RT

## 2023-10-22 PROCEDURE — 71275 CT ANGIOGRAPHY CHEST: CPT | Mod: 26,MA

## 2023-10-22 PROCEDURE — 36415 COLL VENOUS BLD VENIPUNCTURE: CPT

## 2023-10-22 PROCEDURE — C9254: CPT

## 2023-10-22 PROCEDURE — 85025 COMPLETE CBC W/AUTO DIFF WBC: CPT

## 2023-10-22 PROCEDURE — 85610 PROTHROMBIN TIME: CPT

## 2023-10-22 PROCEDURE — 80048 BASIC METABOLIC PNL TOTAL CA: CPT

## 2023-10-22 PROCEDURE — 99222 1ST HOSP IP/OBS MODERATE 55: CPT

## 2023-10-22 RX ORDER — VANCOMYCIN HCL 1 G
1500 VIAL (EA) INTRAVENOUS ONCE
Refills: 0 | Status: COMPLETED | OUTPATIENT
Start: 2023-10-22 | End: 2023-10-22

## 2023-10-22 RX ORDER — PIPERACILLIN AND TAZOBACTAM 4; .5 G/20ML; G/20ML
3.38 INJECTION, POWDER, LYOPHILIZED, FOR SOLUTION INTRAVENOUS EVERY 8 HOURS
Refills: 0 | Status: DISCONTINUED | OUTPATIENT
Start: 2023-10-23 | End: 2023-10-26

## 2023-10-22 RX ORDER — CLOTRIMAZOLE AND BETAMETHASONE DIPROPIONATE 10; .5 MG/G; MG/G
1 CREAM TOPICAL
Refills: 0 | DISCHARGE

## 2023-10-22 RX ORDER — APIXABAN 2.5 MG/1
5 TABLET, FILM COATED ORAL EVERY 12 HOURS
Refills: 0 | Status: DISCONTINUED | OUTPATIENT
Start: 2023-10-22 | End: 2023-10-26

## 2023-10-22 RX ORDER — REMDESIVIR 5 MG/ML
100 INJECTION INTRAVENOUS EVERY 24 HOURS
Refills: 0 | Status: DISCONTINUED | OUTPATIENT
Start: 2023-10-23 | End: 2023-10-23

## 2023-10-22 RX ORDER — REMDESIVIR 5 MG/ML
200 INJECTION INTRAVENOUS EVERY 24 HOURS
Refills: 0 | Status: COMPLETED | OUTPATIENT
Start: 2023-10-22 | End: 2023-10-22

## 2023-10-22 RX ORDER — PIPERACILLIN AND TAZOBACTAM 4; .5 G/20ML; G/20ML
3.38 INJECTION, POWDER, LYOPHILIZED, FOR SOLUTION INTRAVENOUS ONCE
Refills: 0 | Status: COMPLETED | OUTPATIENT
Start: 2023-10-23 | End: 2023-10-23

## 2023-10-22 RX ORDER — SODIUM CHLORIDE 9 MG/ML
1000 INJECTION INTRAMUSCULAR; INTRAVENOUS; SUBCUTANEOUS ONCE
Refills: 0 | Status: COMPLETED | OUTPATIENT
Start: 2023-10-22 | End: 2023-10-22

## 2023-10-22 RX ORDER — LACOSAMIDE 50 MG/1
200 TABLET ORAL EVERY 12 HOURS
Refills: 0 | Status: DISCONTINUED | OUTPATIENT
Start: 2023-10-22 | End: 2023-10-26

## 2023-10-22 RX ORDER — DEXAMETHASONE 0.5 MG/5ML
6 ELIXIR ORAL ONCE
Refills: 0 | Status: COMPLETED | OUTPATIENT
Start: 2023-10-22 | End: 2023-10-22

## 2023-10-22 RX ORDER — CEFEPIME 1 G/1
INJECTION, POWDER, FOR SOLUTION INTRAMUSCULAR; INTRAVENOUS
Refills: 0 | Status: DISCONTINUED | OUTPATIENT
Start: 2023-10-22 | End: 2023-10-22

## 2023-10-22 RX ORDER — DEXAMETHASONE 0.5 MG/5ML
6 ELIXIR ORAL DAILY
Refills: 0 | Status: DISCONTINUED | OUTPATIENT
Start: 2023-10-23 | End: 2023-10-23

## 2023-10-22 RX ORDER — REMDESIVIR 5 MG/ML
INJECTION INTRAVENOUS
Refills: 0 | Status: DISCONTINUED | OUTPATIENT
Start: 2023-10-22 | End: 2023-10-23

## 2023-10-22 RX ORDER — POLYETHYLENE GLYCOL 3350 17 G/17G
17 POWDER, FOR SOLUTION ORAL DAILY
Refills: 0 | Status: DISCONTINUED | OUTPATIENT
Start: 2023-10-22 | End: 2023-10-26

## 2023-10-22 RX ORDER — VANCOMYCIN HCL 1 G
1000 VIAL (EA) INTRAVENOUS ONCE
Refills: 0 | Status: DISCONTINUED | OUTPATIENT
Start: 2023-10-22 | End: 2023-10-22

## 2023-10-22 RX ORDER — CEFEPIME 1 G/1
1000 INJECTION, POWDER, FOR SOLUTION INTRAMUSCULAR; INTRAVENOUS ONCE
Refills: 0 | Status: DISCONTINUED | OUTPATIENT
Start: 2023-10-22 | End: 2023-10-22

## 2023-10-22 RX ORDER — TAMSULOSIN HYDROCHLORIDE 0.4 MG/1
0.4 CAPSULE ORAL AT BEDTIME
Refills: 0 | Status: DISCONTINUED | OUTPATIENT
Start: 2023-10-22 | End: 2023-10-26

## 2023-10-22 RX ORDER — SODIUM CHLORIDE 9 MG/ML
1000 INJECTION, SOLUTION INTRAVENOUS
Refills: 0 | Status: DISCONTINUED | OUTPATIENT
Start: 2023-10-22 | End: 2023-10-23

## 2023-10-22 RX ORDER — MIRTAZAPINE 45 MG/1
15 TABLET, ORALLY DISINTEGRATING ORAL ONCE
Refills: 0 | Status: DISCONTINUED | OUTPATIENT
Start: 2023-10-22 | End: 2023-10-23

## 2023-10-22 RX ORDER — CYCLOBENZAPRINE HYDROCHLORIDE 10 MG/1
10 TABLET, FILM COATED ORAL THREE TIMES A DAY
Refills: 0 | Status: DISCONTINUED | OUTPATIENT
Start: 2023-10-22 | End: 2023-10-26

## 2023-10-22 RX ORDER — PIPERACILLIN AND TAZOBACTAM 4; .5 G/20ML; G/20ML
3.38 INJECTION, POWDER, LYOPHILIZED, FOR SOLUTION INTRAVENOUS ONCE
Refills: 0 | Status: COMPLETED | OUTPATIENT
Start: 2023-10-22 | End: 2023-10-22

## 2023-10-22 RX ORDER — DEXAMETHASONE 0.5 MG/5ML
1 ELIXIR ORAL DAILY
Refills: 0 | Status: DISCONTINUED | OUTPATIENT
Start: 2023-10-22 | End: 2023-10-22

## 2023-10-22 RX ORDER — ACETAMINOPHEN 500 MG
650 TABLET ORAL EVERY 6 HOURS
Refills: 0 | Status: DISCONTINUED | OUTPATIENT
Start: 2023-10-22 | End: 2023-10-26

## 2023-10-22 RX ORDER — SODIUM CHLORIDE 9 MG/ML
500 INJECTION INTRAMUSCULAR; INTRAVENOUS; SUBCUTANEOUS ONCE
Refills: 0 | Status: COMPLETED | OUTPATIENT
Start: 2023-10-22 | End: 2023-10-22

## 2023-10-22 RX ORDER — CEFEPIME 1 G/1
1000 INJECTION, POWDER, FOR SOLUTION INTRAMUSCULAR; INTRAVENOUS ONCE
Refills: 0 | Status: COMPLETED | OUTPATIENT
Start: 2023-10-22 | End: 2023-10-22

## 2023-10-22 RX ORDER — ROPINIROLE 8 MG/1
0.25 TABLET, FILM COATED, EXTENDED RELEASE ORAL EVERY 8 HOURS
Refills: 0 | Status: DISCONTINUED | OUTPATIENT
Start: 2023-10-22 | End: 2023-10-26

## 2023-10-22 RX ORDER — LEVETIRACETAM 250 MG/1
1500 TABLET, FILM COATED ORAL EVERY 12 HOURS
Refills: 0 | Status: DISCONTINUED | OUTPATIENT
Start: 2023-10-22 | End: 2023-10-26

## 2023-10-22 RX ORDER — SENNA PLUS 8.6 MG/1
2 TABLET ORAL AT BEDTIME
Refills: 0 | Status: DISCONTINUED | OUTPATIENT
Start: 2023-10-22 | End: 2023-10-26

## 2023-10-22 RX ADMIN — PIPERACILLIN AND TAZOBACTAM 25 GRAM(S): 4; .5 INJECTION, POWDER, LYOPHILIZED, FOR SOLUTION INTRAVENOUS at 19:56

## 2023-10-22 RX ADMIN — Medication 6 MILLIGRAM(S): at 12:51

## 2023-10-22 RX ADMIN — LACOSAMIDE 100 MILLIGRAM(S): 50 TABLET ORAL at 18:24

## 2023-10-22 RX ADMIN — PIPERACILLIN AND TAZOBACTAM 200 GRAM(S): 4; .5 INJECTION, POWDER, LYOPHILIZED, FOR SOLUTION INTRAVENOUS at 18:24

## 2023-10-22 RX ADMIN — Medication 300 MILLIGRAM(S): at 14:22

## 2023-10-22 RX ADMIN — SODIUM CHLORIDE 1000 MILLILITER(S): 9 INJECTION INTRAMUSCULAR; INTRAVENOUS; SUBCUTANEOUS at 10:15

## 2023-10-22 RX ADMIN — CEFEPIME 1000 MILLIGRAM(S): 1 INJECTION, POWDER, FOR SOLUTION INTRAMUSCULAR; INTRAVENOUS at 14:23

## 2023-10-22 RX ADMIN — LEVETIRACETAM 400 MILLIGRAM(S): 250 TABLET, FILM COATED ORAL at 17:09

## 2023-10-22 RX ADMIN — Medication 2 MILLIGRAM(S): at 10:15

## 2023-10-22 RX ADMIN — SODIUM CHLORIDE 75 MILLILITER(S): 9 INJECTION, SOLUTION INTRAVENOUS at 19:09

## 2023-10-22 RX ADMIN — SODIUM CHLORIDE 1000 MILLILITER(S): 9 INJECTION INTRAMUSCULAR; INTRAVENOUS; SUBCUTANEOUS at 14:24

## 2023-10-22 RX ADMIN — REMDESIVIR 200 MILLIGRAM(S): 5 INJECTION INTRAVENOUS at 22:28

## 2023-10-22 RX ADMIN — LEVETIRACETAM 400 MILLIGRAM(S): 250 TABLET, FILM COATED ORAL at 22:28

## 2023-10-22 NOTE — PROGRESS NOTE ADULT - ASSESSMENT
A/P: 63 male PMH of a GBM still under treatment presented with fevers from COVID and had up to 5 witnessed seizures.  Possible RLL PNA      Plan:  SICU  Continue VIMPAT and Keppra  No new changes on todays HCT  NPO for NOW  Neuro and Neuro Surg Consults  Neuro checks  Remdesivir  Decadron 6MG IV daily  Zosyn/ Vanco  DOAC DVT Prophylaxis

## 2023-10-22 NOTE — ED PROVIDER NOTE - SKIN, MLM
Skin normal color for race, warm, dry and intact. No evidence of rash. Skin normal color for race, warm

## 2023-10-22 NOTE — ED ADULT NURSE NOTE - NSFALLHARMRISKINTERV_ED_ALL_ED

## 2023-10-22 NOTE — ED PROVIDER NOTE - OBJECTIVE STATEMENT
64 yo male w/PMHx seizure disorder and glioblastoma presents to the ED BIBEMS from [facility] for +COVID, vomiting and tachycardia. Upon arrival pt appears to be seizing. 62 yo male w/PMHx seizure disorder and glioblastoma presents to the ED BIBEMS from [facility] for +COVID, vomiting and tachycardia. Upon arrival pt appears to be seizing. Pt on keppra. EMS reports pt had 5 tonic clonic seizures lasting about 15 seconds each. 62 yo male w/PMHx seizure disorder and glioblastoma presents to the ED BIBEMS from [facility] for +COVID, vomiting and tachycardia. Upon arrival pt appears to be seizing. Pt on keppra. EMS reports pt had 5 tonic clonic seizures lasting about 15 seconds each. Pt given Tylenol at 9am.

## 2023-10-22 NOTE — ED ADULT NURSE NOTE - OBJECTIVE STATEMENT
Pt is nonverbal on arrival to ED with seizure like activity and tachycardia. Dr. Hubbard notified and ordered Ativan 2 mg IV for seizures and medication administered as per orders. 20 G IV inserted into left forearm. Urine specimen obtained via straight cath. ECG completed. Cardiac monitor and continuous pulse ox. in place. Rectal temperature on arrival to ED was 101.8 and Pt was given Tylenol one hour prior to arrival. Dr. Hubbard notified of temperature.

## 2023-10-22 NOTE — ED ADULT NURSE NOTE - CHIEF COMPLAINT QUOTE
pt presents to ED from [facility] for seizure. pt is COVID +. reporting fevers 102. pt hx seizures on keppra, brain CA. pt had 5 tonic clonic seizures lasting about 15econds each. pt appears popst ictal upon arrival. MD Hubbard called to bedside. RN at bedside. no meds PTa.

## 2023-10-22 NOTE — PROGRESS NOTE ADULT - SUBJECTIVE AND OBJECTIVE BOX
CC:    HPI:  Patient is a 63 male with a PMH of seizure disorder and glioblastoma presents to the ED [facility] from [facility] for +COVID, vomiting and tachycardia. Upon arrival pt appears to be seizing. Pt on keppra. EMS reports pt had 5 tonic clonic seizures lasting about 15 seconds each. PAtient seen in the ED and is confused but awake and following simple commands         PAST MEDICAL & SURGICAL HISTORY:  Glioblastoma      Seizures      S/P craniotomy          FAMILY HISTORY:      Social Hx:    Allergies    No Known Allergies    Intolerances        Height (cm): 167.6 (10-22 @ 13:55)  Weight (kg): 102.1 (10-22 @ 13:55)  BMI (kg/m2): 36.3 (10-22 @ 13:55)    ICU Vital Signs Last 24 Hrs  T(C): 37.7 (22 Oct 2023 13:04), Max: 38.8 (22 Oct 2023 10:20)  T(F): 99.8 (22 Oct 2023 13:04), Max: 101.8 (22 Oct 2023 10:20)  HR: 112 (22 Oct 2023 13:04) (112 - 132)  BP: 131/89 (22 Oct 2023 13:04) (111/99 - 131/102)  BP(mean): 102 (22 Oct 2023 11:30) (102 - 112)  ABP: --  ABP(mean): --  RR: 19 (22 Oct 2023 13:04) (17 - 22)  SpO2: 98% (22 Oct 2023 13:04) (85% - 100%)    O2 Parameters below as of 22 Oct 2023 13:04  Patient On (Oxygen Delivery Method): nasal cannula  O2 Flow (L/min): 5              I&O's Summary                            15.2   9.63  )-----------( 135      ( 22 Oct 2023 10:20 )             46.3       10    139  |  107  |  14  ----------------------------<  128<H>  4.0   |  28  |  0.92    Ca    9.2      22 Oct 2023 10:20  Phos  3.8     10  Mg     2.1     10-22    TPro  7.5  /  Alb  3.2<L>  /  TBili  0.6  /  DBili  x   /  AST  19  /  ALT  27  /  AlkPhos  49  10-22                Urinalysis Basic - ( 22 Oct 2023 10:20 )    Color: Dark Yellow / Appearance: Clear / S.025 / pH: x  Gluc: 128 mg/dL / Ketone: 40 mg/dL  / Bili: Negative / Urobili: 1.0 mg/dL   Blood: x / Protein: Trace mg/dL / Nitrite: Negative   Leuk Esterase: Negative / RBC: 28 /HPF / WBC 2 /HPF   Sq Epi: x / Non Sq Epi: 1 /HPF / Bacteria: Negative /HPF        MEDICATIONS  (STANDING):  apixaban 5 milliGRAM(s) Oral every 12 hours  dexAMETHasone     Tablet 1 milliGRAM(s) Oral daily  lacosamide IVPB 200 milliGRAM(s) IV Intermittent every 12 hours  lactated ringers. 1000 milliLiter(s) (75 mL/Hr) IV Continuous <Continuous>  levETIRAcetam  IVPB 1500 milliGRAM(s) IV Intermittent every 12 hours  mirtazapine 15 milliGRAM(s) Oral once  piperacillin/tazobactam IVPB. 3.375 Gram(s) IV Intermittent once  piperacillin/tazobactam IVPB.- 3.375 Gram(s) IV Intermittent once  polyethylene glycol 3350 17 Gram(s) Oral daily  rOPINIRole 0.25 milliGRAM(s) Oral every 8 hours  senna 2 Tablet(s) Oral at bedtime  tamsulosin 0.4 milliGRAM(s) Oral at bedtime    MEDICATIONS  (PRN):  acetaminophen     Tablet .. 650 milliGRAM(s) Oral every 6 hours PRN Temp greater or equal to 38C (100.4F), Mild Pain (1 - 3), Moderate Pain (4 - 6)  cyclobenzaprine 10 milliGRAM(s) Oral three times a day PRN Muscle Spasm      DVT Prophylaxis: DOAC    Advanced Directives:  Discussed with:    Visit Information: 30 min    ** Time is exclusive of billed procedures and/or teaching and/or routine family updates.

## 2023-10-22 NOTE — ED PROVIDER NOTE - MUSCULOSKELETAL, MLM
Spine appears normal, range of motion is not limited, no muscle or joint tenderness no pain to palp extermitieis

## 2023-10-22 NOTE — H&P ADULT - ASSESSMENT
A:    63M  HD # 1  FULL CODE    Here for:    1. Seizures  2. COVID-19 positive  3. AMS  4, GBM  5. Epilepsy  6. ? UTI    This patient requires critical care for support of one or more vital organ systems with a high probability of imminent or life threatening deterioration in his/her condition    P:    Breakthru seizures in setting of epilepsy  + COVID-19, fever  Suspect lowering of seizure threshold 2/2 fevers, COVID  Of note, last time Pt had COVID, had status ep and was intubated on propofol    No active seizures at this time, awake but post ictal    Admit to SDU for close Neuro monitoring  Increase keppra from 1g BID to 1.5g IV BID  vIMPAT, prn bzd  Neuro, NSGY, Onc consults  HD monitoring  IS, OOB as able  Diet, SLP eval  VTE PUD ppx  ? UTI; start pip/tazo; last UCx + kleb, S to piperacillin  IVF  No s/s active bleeding  BG < 180  f/u labs, replete lytes PRN  Renal fxn OK, f/u renal indices      Dispo: Accept to critical care.    Called HCP Tania Johnson at ; updated, all questions answered    TOTAL CRITICAL CARE TIME: 110 minutes  (EXCLUSIVE of any non bundled procedures)    Note: This time spent INCLUDES time spent directly as this patient's bedside with evaluation, review of chart including review of laboratory and imaging studies, interpretation of vital signs and cardiac output measurements, any necessary ventilator management, and time spent discussing plan of care with patient and family, including goals of care discussion.

## 2023-10-22 NOTE — H&P ADULT - HISTORY OF PRESENT ILLNESS
ICU Admission Note    S:    Pt seen and examined  HD # 1  FULL CODE  PMHx seizure disorder and glioblastoma presents to the ED BIBEMS from [facility] for +COVID, vomiting and tachycardia. Upon arrival pt appears to be seizing. Pt on keppra. EMS reports pt had 5 tonic clonic seizures lasting about 15 seconds each. Pt given Tylenol at 9am.    10/22: Post ictal, but awake and answering questions.

## 2023-10-22 NOTE — H&P ADULT - NSHPLABSRESULTS_GEN_ALL_CORE
LABS:    CBC Full  -  ( 22 Oct 2023 10:20 )  WBC Count : 9.63 K/uL  RBC Count : 5.28 M/uL  Hemoglobin : 15.2 g/dL  Hematocrit : 46.3 %  Platelet Count - Automated : 135 K/uL  Mean Cell Volume : 87.7 fl  Mean Cell Hemoglobin : 28.8 pg  Mean Cell Hemoglobin Concentration : 32.8 gm/dL  Auto Neutrophil # : 7.78 K/uL  Auto Lymphocyte # : 0.95 K/uL  Auto Monocyte # : 0.83 K/uL  Auto Eosinophil # : 0.01 K/uL  Auto Basophil # : 0.03 K/uL  Auto Neutrophil % : 80.8 %  Auto Lymphocyte % : 9.9 %  Auto Monocyte % : 8.6 %  Auto Eosinophil % : 0.1 %  Auto Basophil % : 0.3 %    10-22    139  |  107  |  14  ----------------------------<  128<H>  4.0   |  28  |  0.92    Ca    9.2      22 Oct 2023 10:20  Phos  3.8     10  Mg     2.1     10-22    TPro  7.5  /  Alb  3.2<L>  /  TBili  0.6  /  DBili  x   /  AST  19  /  ALT  27  /  AlkPhos  49  10-22    PT/INR - ( 22 Oct 2023 10:20 )   PT: 14.7 sec;   INR: 1.31 ratio         PTT - ( 22 Oct 2023 10:20 )  PTT:22.4 sec  Urinalysis Basic - ( 22 Oct 2023 10:20 )    Color: Dark Yellow / Appearance: Clear / S.025 / pH: x  Gluc: 128 mg/dL / Ketone: 40 mg/dL  / Bili: Negative / Urobili: 1.0 mg/dL   Blood: x / Protein: Trace mg/dL / Nitrite: Negative   Leuk Esterase: Negative / RBC: 28 /HPF / WBC 2 /HPF   Sq Epi: x / Non Sq Epi: 1 /HPF / Bacteria: Negative /HPF      CAPILLARY BLOOD GLUCOSE            LIVER FUNCTIONS - ( 22 Oct 2023 10:20 )  Alb: 3.2 g/dL / Pro: 7.5 gm/dL / ALK PHOS: 49 U/L / ALT: 27 U/L / AST: 19 U/L / GGT: x

## 2023-10-22 NOTE — CONSULT NOTE ADULT - SUBJECTIVE AND OBJECTIVE BOX
Patient is a 63y old male who presents with a chief complaint of Seizures, COVID sent from nursing home       HPI:  Patient is a 63 year old man with a PMHof GBM s/p resection 10/2023 with Dr. Turpin and seizure disorder on Keppra who presented from [facility] with vomiting and tachycardia and he was found to be COVID positive. Appeared to be seizing on admission, CT head showed new calcifications associated with left parietal GBM. PT admitted to the SICU for supportive care.         PAST MEDICAL & SURGICAL HISTORY:  Glioblastoma  DVT/PE  Seizures  Squamous cell carcinoma in situ (SCCIS)  S/P craniotomy    FAMILY HISTORY:  unknown, pt was adopted      Social Hx:  Nonsmoker, no drug or alcohol use    MEDICATIONS  (STANDING):  apixaban 5 milliGRAM(s) Oral every 12 hours  dexAMETHasone  Tablet 1 milliGRAM(s) Oral daily  lacosamide IVPB 200 milliGRAM(s) IV Intermittent every 12 hours  lactated ringers. 1000 milliLiter(s) (75 mL/Hr) IV Continuous <Continuous>  levETIRAcetam  IVPB 1500 milliGRAM(s) IV Intermittent every 12 hours  mirtazapine 15 milliGRAM(s) Oral once  piperacillin/tazobactam IVPB. 3.375 Gram(s) IV Intermittent once  piperacillin/tazobactam IVPB.- 3.375 Gram(s) IV Intermittent once  polyethylene glycol 3350 17 Gram(s) Oral daily  rOPINIRole 0.25 milliGRAM(s) Oral every 8 hours  senna 2 Tablet(s) Oral at bedtime  tamsulosin 0.4 milliGRAM(s) Oral at bedtime     Allergies:  No Known Allergies    ROS: Pertinent positives in HPI, all other ROS were reviewed and are negative.      Vital Signs Last 24 Hrs  T(C): 37.7 (22 Oct 2023 13:04), Max: 38.8 (22 Oct 2023 10:20)  T(F): 99.8 (22 Oct 2023 13:04), Max: 101.8 (22 Oct 2023 10:20)  HR: 112 (22 Oct 2023 13:04) (112 - 132)  BP: 131/89 (22 Oct 2023 13:04) (111/99 - 131/102)  BP(mean): 102 (22 Oct 2023 11:30) (102 - 112)  RR: 19 (22 Oct 2023 13:04) (17 - 22)  SpO2: 98% (22 Oct 2023 13:04) (85% - 100%)    Parameters below as of 22 Oct 2023 13:04  Patient On (Oxygen Delivery Method): nasal cannula  O2 Flow (L/min): 5      PHYSICAL EXAM:  Constitutional: awake and alert, resting comfortably in bed   HEENT: PERRLA, EOMI, hearing intact   Neck: Supple  Respiratory: Breath sounds are clear bilaterally  Cardiovascular: S1 and S2, regular rhythm  Gastrointestinal: soft, nontender  Extremities:  no edema  Vascular: Carotid Bruit - no  Musculoskeletal: no joint swelling/tenderness, no abnormal movements  Skin: No rashes    Neurological exam:  HF: A x O x 3. Appropriately interactive, normal affect. Speech fluent, No Aphasia or paraphasic errors. Naming /repetition intact   CN: MICHELLE, EOMI, VFF, facial sensation normal, no NLFD, tongue midline, Palate moves equally, SCM equal bilaterally  Motor: No pronator drift, Strength 5/5 in all 4 ext, normal bulk and tone, no tremor, rigidity or bradykinesia.    Sens: Intact to light touch / PP/ VS/ JS    Reflexes: Symmetric and normal, downgoing toes b/l  Coord:  No FNFA, dysmetria, RENEE intact   Gait/Balance: Not tested     Labs:                        15.2   9.63  )-----------( 135      ( 22 Oct 2023 10:20 )             46.3     10-22    139  |  107  |  14  ----------------------------<  128<H>  4.0   |  28  |  0.92    Ca    9.2      22 Oct 2023 10:20  Phos  3.8     10-22  Mg     2.1     10-22    TPro  7.5  /  Alb  3.2<L>  /  TBili  0.6  /  DBili  x   /  AST  19  /  ALT  27  /  AlkPhos  49  10-22    PT/INR - ( 22 Oct 2023 10:20 )   PT: 14.7 sec;   INR: 1.31 ratio       PTT - ( 22 Oct 2023 10:20 )  PTT:22.4 sec    RADIOLOGY:  < from: CT Head No Cont (10.22.23 @ 12:38) >  Changes from left parietal craniotomy are again noted. There are new   heterogeneous coarse and curvilinear calcifications associated with the   left parietal mass. Faint calcification is noted along the   periventricular margin of the left occipital horn (series 5 image 34),   corresponding to periventricular ependymal enhancement seen on previous   MRI of 10/2/2023. Perilesional vasogenic edema extending into the left   frontoparietal and temporo-occipital region produces local mass effect,   sulcal effacement, compression of the posterior body and atrium of the   left lateral ventricle, similar to more remote CT of 3/7/2023.    Nonspecific punctate calcifications in the left caren are again noted.    Mild to moderate centrally predominant cerebral volume loss is again   noted. No hydrocephalus. Basal cisterns are patent.    No acute intracranial hemorrhage or midline shift.    Scattered ethmoid sinus mucosal thickening is present. Remaining   paranasal sinuses and mastoid air cells are clear    IMPRESSION:  New heterogeneous coarse and curvilinear parenchymal calcifications, as   well as new trace periventricular ependymal calcifications along the left   occipital horn, associated with the left parietal GBM. Perilesional   vasogenic edema extending into the left frontoparietal and   temporo-occipital region is similar in extent to CT of 3/7/2023.      Patient is a 63y old male who presents with a chief complaint of Seizures, COVID sent from nursing home     HPI:  Patient is a 63 year old man with a PMHof GBM s/p resection 10/2023 with Dr. Turpin and seizure disorder on Keppra who presented from [facility] with vomiting and tachycardia and he was found to be COVID positive. Appeared to be seizing on admission, CT head showed new calcifications associated with left parietal GBM. Pt admitted to the SICU for supportive care. Pt seen and examined in the SDU, post-ictal, +confusion, oriented to self only, +neglect right upper and lower extremities, right pupil round 4mm but unreactive, right homonomous hemianopsia.     PAST MEDICAL & SURGICAL HISTORY:  Glioblastoma  DVT/PE  Seizures  Squamous cell carcinoma in situ (SCCIS)  S/P craniotomy    FAMILY HISTORY:  unknown, pt was adopted      Social Hx:  Nonsmoker, no drug or alcohol use    MEDICATIONS  (STANDING):  apixaban 5 milliGRAM(s) Oral every 12 hours  dexAMETHasone  Tablet 1 milliGRAM(s) Oral daily  lacosamide IVPB 200 milliGRAM(s) IV Intermittent every 12 hours  lactated ringers. 1000 milliLiter(s) (75 mL/Hr) IV Continuous <Continuous>  levETIRAcetam  IVPB 1500 milliGRAM(s) IV Intermittent every 12 hours  mirtazapine 15 milliGRAM(s) Oral once  piperacillin/tazobactam IVPB. 3.375 Gram(s) IV Intermittent once  piperacillin/tazobactam IVPB.- 3.375 Gram(s) IV Intermittent once  polyethylene glycol 3350 17 Gram(s) Oral daily  rOPINIRole 0.25 milliGRAM(s) Oral every 8 hours  senna 2 Tablet(s) Oral at bedtime  tamsulosin 0.4 milliGRAM(s) Oral at bedtime     Allergies:  No Known Allergies    ROS: unable to review due to confusion    Vital Signs Last 24 Hrs  T(C): 37.7 (22 Oct 2023 13:04), Max: 38.8 (22 Oct 2023 10:20)  T(F): 99.8 (22 Oct 2023 13:04), Max: 101.8 (22 Oct 2023 10:20)  HR: 112 (22 Oct 2023 13:04) (112 - 132)  BP: 131/89 (22 Oct 2023 13:04) (111/99 - 131/102)  BP(mean): 102 (22 Oct 2023 11:30) (102 - 112)  RR: 19 (22 Oct 2023 13:04) (17 - 22)  SpO2: 98% (22 Oct 2023 13:04) (85% - 100%)    Parameters below as of 22 Oct 2023 13:04  Patient On (Oxygen Delivery Method): nasal cannula  O2 Flow (L/min): 5      PHYSICAL EXAM:  Constitutional: lethargic but arousable, post-ictal   HEENT: left pupil 4mm and reactive, right pupil 4mm no reaction to light, right homonomous hemianopsia, hearing intact   Neck: Supple  Respiratory: Breath sounds are clear bilaterally  Cardiovascular: S1 and S2, regular rhythm  Gastrointestinal: soft, nontender  Extremities:  no edema  Vascular: Carotid Bruit - no  Musculoskeletal: no joint swelling/tenderness, no abnormal movements  Skin: No rashes  Psych: postictal, + confusion, emotionally labile      Neurological exam:  HF: lethargic but arousable, post-ictal, follows some simple commands, +confusion   CN: left pupil 4mm and reactive, right pupil 4mm no reaction to light, right homonomous hemianopsia, hearing intact, face symmetric   Motor: difficult to exam, moving left upper and lower ext antigravity with good strength, moving right upper and lower spontaneously but not to command, neglect?  Sens: intact X4   Reflexes: Symmetric and normal, downgoing toes b/l  Coord: pt too confused to test   Gait/Balance: Not tested     Labs:                        15.2   9.63  )-----------( 135      ( 22 Oct 2023 10:20 )             46.3     10-22    139  |  107  |  14  ----------------------------<  128<H>  4.0   |  28  |  0.92    Ca    9.2      22 Oct 2023 10:20  Phos  3.8     10-22  Mg     2.1     10-22    TPro  7.5  /  Alb  3.2<L>  /  TBili  0.6  /  DBili  x   /  AST  19  /  ALT  27  /  AlkPhos  49  10-22    PT/INR - ( 22 Oct 2023 10:20 )   PT: 14.7 sec;   INR: 1.31 ratio       PTT - ( 22 Oct 2023 10:20 )  PTT:22.4 sec    RADIOLOGY:  < from: CT Head No Cont (10.22.23 @ 12:38) >  Changes from left parietal craniotomy are again noted. There are new   heterogeneous coarse and curvilinear calcifications associated with the   left parietal mass. Faint calcification is noted along the   periventricular margin of the left occipital horn (series 5 image 34),   corresponding to periventricular ependymal enhancement seen on previous   MRI of 10/2/2023. Perilesional vasogenic edema extending into the left   frontoparietal and temporo-occipital region produces local mass effect,   sulcal effacement, compression of the posterior body and atrium of the   left lateral ventricle, similar to more remote CT of 3/7/2023.    Nonspecific punctate calcifications in the left caren are again noted.    Mild to moderate centrally predominant cerebral volume loss is again   noted. No hydrocephalus. Basal cisterns are patent.    No acute intracranial hemorrhage or midline shift.    Scattered ethmoid sinus mucosal thickening is present. Remaining   paranasal sinuses and mastoid air cells are clear    IMPRESSION:  New heterogeneous coarse and curvilinear parenchymal calcifications, as   well as new trace periventricular ependymal calcifications along the left   occipital horn, associated with the left parietal GBM. Perilesional   vasogenic edema extending into the left frontoparietal and   temporo-occipital region is similar in extent to CT of 3/7/2023.

## 2023-10-22 NOTE — ED PROVIDER NOTE - PROGRESS NOTE DETAILS
Kendra Mora for attending Dr. [facility] called, reports pt with recent diagnosis of COVID. Pt was transferred to another unit at the facility. This morning aide states she had to help him feed himself which is unlike him. Aide noted pt had a fever to 102 and that his O2 desated to 84%. Pt then started to seize, an ambulance was called, and the pt was brought to the hospital. Kendra Mora for attending Dr. Hubbard   ICU PA consulted and informed of case for s/p epilepticus and COVID+. Kendra Mora for attending Dr. Stevie Blair, pt's friend and health care proxy now at bedside. Reports pt was in a room with a different COVID+ pt. Pt with body aches, fever, and confusion yesterday. Pt had COVID with ongoing seizures at [facility]. Pt has had COVID vaccines, unsure if he has had any boosters. Pt no longer with left gaze, pt sleepy after Ativan, however with light touch will open eyes. Kendra Mora for attending Dr. Stevie Blair, pt's friend and health care proxy now at bedside. Reports pt was in a room with a different COVID+ pt. Pt with body aches, fever, and confusion yesterday. Last time pt had COVID, he had ongoing seizures and had to be intubated at [facility]. Pt has had COVID vaccines, unsure if he has had any boosters. Pt no longer with left gaze, pt sleepy after Ativan, however with light touch will open eyes. CC:  Signout received from Dr. Hubbard to f/u ICU PA eval & dispo.  Informed by ICU PA admission accepted to SICU (COVID section) under BRI Adam.

## 2023-10-22 NOTE — ED PROVIDER NOTE - CONSTITUTIONAL, MLM
normal... Well appearing, awake, alert, oriented to person, place, time/situation and in no apparent distress. Pt no answering questions, eyes open and staring to the left

## 2023-10-22 NOTE — PHARMACOTHERAPY INTERVENTION NOTE - COMMENTS
Medication history complete, reviewed medications with patient provided med list from [facility] and confirmed with doctor first med hx.

## 2023-10-22 NOTE — ED ADULT TRIAGE NOTE - CHIEF COMPLAINT QUOTE
pt presents to ED from [facility] for seizure. pt is COVID +. reporting fevers 102. pt hx seizures on keppra, brain CA. pt had 5 tonic clonic seizures lasting about 15econds each. pt appears popst ictal upon arrival. MD Hubbard called to bedside. RN at bedside. no meds PTa.

## 2023-10-22 NOTE — ED PROVIDER NOTE - CLINICAL SUMMARY MEDICAL DECISION MAKING FREE TEXT BOX
Pt from [facility] transferred for COVID+ result, vomiting and with tachycardia. Pt appears to be in seizing.  Plan check labs, x-rays, and ct scan.

## 2023-10-22 NOTE — PATIENT PROFILE ADULT - FALL HARM RISK - HARM RISK INTERVENTIONS
Assistance with ambulation/Assistance OOB with selected safe patient handling equipment/Communicate Risk of Fall with Harm to all staff/Discuss with provider need for PT consult/Monitor gait and stability/Reinforce activity limits and safety measures with patient and family/Tailored Fall Risk Interventions/Visual Cue: Yellow wristband and red socks/Bed in lowest position, wheels locked, appropriate side rails in place/Call bell, personal items and telephone in reach/Instruct patient to call for assistance before getting out of bed or chair/Non-slip footwear when patient is out of bed/Williams to call system/Physically safe environment - no spills, clutter or unnecessary equipment/Purposeful Proactive Rounding/Room/bathroom lighting operational, light cord in reach

## 2023-10-22 NOTE — ED PROVIDER NOTE - CARE PLAN
Principal Discharge DX:	Status epilepticus  Secondary Diagnosis:	2019 novel coronavirus disease (COVID-19)  Secondary Diagnosis:	GBM (glioblastoma multiforme)   1

## 2023-10-22 NOTE — CONSULT NOTE ADULT - ASSESSMENT
Patient is a 63 year old man with a PMHof GBM s/p resection 10/2022 with Dr. Turpin and seizure disorder on Keppra who presented from [facility] with vomiting and tachycardia and he was found to be COVID positive. Appeared to be seizing on admission, CT head showed new calcifications associated with left parietal GBM.     # COVID   # Seizure   # GBM s/p resection 10/2022      PLAN-  No acute neurosurgical intervention   Continue supportive care for COVID  Continue antiepileptics, formal neurology consult in the AM   MR brain with contrast when stable   Continue current decadron dose   Will reach out to Dr. Turpin and his primary oncology team     Discussed with Dr. Childs.

## 2023-10-22 NOTE — ED ADULT NURSE REASSESSMENT NOTE - NS ED NURSE REASSESS COMMENT FT1
Power Ramachandran, ICU PA notified of neuro assessment. Right sided extremities unable to hold up extremities for 10 seconds and garbled speech. Power Ramachandran, ICU PA notified of neuro assessment.

## 2023-10-23 LAB
ALBUMIN SERPL ELPH-MCNC: 2.8 G/DL — LOW (ref 3.3–5)
ALBUMIN SERPL ELPH-MCNC: 2.8 G/DL — LOW (ref 3.3–5)
ALBUMIN SERPL ELPH-MCNC: 2.9 G/DL — LOW (ref 3.3–5)
ALBUMIN SERPL ELPH-MCNC: 2.9 G/DL — LOW (ref 3.3–5)
ALP SERPL-CCNC: 40 U/L — SIGNIFICANT CHANGE UP (ref 40–120)
ALP SERPL-CCNC: 40 U/L — SIGNIFICANT CHANGE UP (ref 40–120)
ALP SERPL-CCNC: 42 U/L — SIGNIFICANT CHANGE UP (ref 40–120)
ALP SERPL-CCNC: 42 U/L — SIGNIFICANT CHANGE UP (ref 40–120)
ALT FLD-CCNC: 20 U/L — SIGNIFICANT CHANGE UP (ref 12–78)
ALT FLD-CCNC: 20 U/L — SIGNIFICANT CHANGE UP (ref 12–78)
ALT FLD-CCNC: 22 U/L — SIGNIFICANT CHANGE UP (ref 12–78)
ALT FLD-CCNC: 22 U/L — SIGNIFICANT CHANGE UP (ref 12–78)
ANION GAP SERPL CALC-SCNC: 7 MMOL/L — SIGNIFICANT CHANGE UP (ref 5–17)
ANION GAP SERPL CALC-SCNC: 7 MMOL/L — SIGNIFICANT CHANGE UP (ref 5–17)
AST SERPL-CCNC: 15 U/L — SIGNIFICANT CHANGE UP (ref 15–37)
AST SERPL-CCNC: 15 U/L — SIGNIFICANT CHANGE UP (ref 15–37)
AST SERPL-CCNC: 16 U/L — SIGNIFICANT CHANGE UP (ref 15–37)
AST SERPL-CCNC: 16 U/L — SIGNIFICANT CHANGE UP (ref 15–37)
BASOPHILS # BLD AUTO: 0.01 K/UL — SIGNIFICANT CHANGE UP (ref 0–0.2)
BASOPHILS # BLD AUTO: 0.01 K/UL — SIGNIFICANT CHANGE UP (ref 0–0.2)
BASOPHILS NFR BLD AUTO: 0.1 % — SIGNIFICANT CHANGE UP (ref 0–2)
BASOPHILS NFR BLD AUTO: 0.1 % — SIGNIFICANT CHANGE UP (ref 0–2)
BILIRUB DIRECT SERPL-MCNC: 0.2 MG/DL — SIGNIFICANT CHANGE UP (ref 0–0.3)
BILIRUB DIRECT SERPL-MCNC: 0.2 MG/DL — SIGNIFICANT CHANGE UP (ref 0–0.3)
BILIRUB INDIRECT FLD-MCNC: 0.3 MG/DL — SIGNIFICANT CHANGE UP (ref 0.2–1)
BILIRUB INDIRECT FLD-MCNC: 0.3 MG/DL — SIGNIFICANT CHANGE UP (ref 0.2–1)
BILIRUB SERPL-MCNC: 0.5 MG/DL — SIGNIFICANT CHANGE UP (ref 0.2–1.2)
BUN SERPL-MCNC: 11 MG/DL — SIGNIFICANT CHANGE UP (ref 7–23)
BUN SERPL-MCNC: 11 MG/DL — SIGNIFICANT CHANGE UP (ref 7–23)
CALCIUM SERPL-MCNC: 8.9 MG/DL — SIGNIFICANT CHANGE UP (ref 8.5–10.1)
CALCIUM SERPL-MCNC: 8.9 MG/DL — SIGNIFICANT CHANGE UP (ref 8.5–10.1)
CHLORIDE SERPL-SCNC: 107 MMOL/L — SIGNIFICANT CHANGE UP (ref 96–108)
CHLORIDE SERPL-SCNC: 107 MMOL/L — SIGNIFICANT CHANGE UP (ref 96–108)
CO2 SERPL-SCNC: 25 MMOL/L — SIGNIFICANT CHANGE UP (ref 22–31)
CO2 SERPL-SCNC: 25 MMOL/L — SIGNIFICANT CHANGE UP (ref 22–31)
CREAT SERPL-MCNC: 0.83 MG/DL — SIGNIFICANT CHANGE UP (ref 0.5–1.3)
CREAT SERPL-MCNC: 0.83 MG/DL — SIGNIFICANT CHANGE UP (ref 0.5–1.3)
CREAT SERPL-MCNC: 0.84 MG/DL — SIGNIFICANT CHANGE UP (ref 0.5–1.3)
CREAT SERPL-MCNC: 0.84 MG/DL — SIGNIFICANT CHANGE UP (ref 0.5–1.3)
CULTURE RESULTS: NO GROWTH — SIGNIFICANT CHANGE UP
CULTURE RESULTS: NO GROWTH — SIGNIFICANT CHANGE UP
EGFR: 97 ML/MIN/1.73M2 — SIGNIFICANT CHANGE UP
EOSINOPHIL # BLD AUTO: 0 K/UL — SIGNIFICANT CHANGE UP (ref 0–0.5)
EOSINOPHIL # BLD AUTO: 0 K/UL — SIGNIFICANT CHANGE UP (ref 0–0.5)
EOSINOPHIL NFR BLD AUTO: 0 % — SIGNIFICANT CHANGE UP (ref 0–6)
EOSINOPHIL NFR BLD AUTO: 0 % — SIGNIFICANT CHANGE UP (ref 0–6)
GLUCOSE SERPL-MCNC: 114 MG/DL — HIGH (ref 70–99)
GLUCOSE SERPL-MCNC: 114 MG/DL — HIGH (ref 70–99)
HCT VFR BLD CALC: 40.9 % — SIGNIFICANT CHANGE UP (ref 39–50)
HCT VFR BLD CALC: 40.9 % — SIGNIFICANT CHANGE UP (ref 39–50)
HGB BLD-MCNC: 13.5 G/DL — SIGNIFICANT CHANGE UP (ref 13–17)
HGB BLD-MCNC: 13.5 G/DL — SIGNIFICANT CHANGE UP (ref 13–17)
IMM GRANULOCYTES NFR BLD AUTO: 0.5 % — SIGNIFICANT CHANGE UP (ref 0–0.9)
IMM GRANULOCYTES NFR BLD AUTO: 0.5 % — SIGNIFICANT CHANGE UP (ref 0–0.9)
INR BLD: 1.26 RATIO — HIGH (ref 0.85–1.18)
INR BLD: 1.26 RATIO — HIGH (ref 0.85–1.18)
LYMPHOCYTES # BLD AUTO: 0.95 K/UL — LOW (ref 1–3.3)
LYMPHOCYTES # BLD AUTO: 0.95 K/UL — LOW (ref 1–3.3)
LYMPHOCYTES # BLD AUTO: 11.8 % — LOW (ref 13–44)
LYMPHOCYTES # BLD AUTO: 11.8 % — LOW (ref 13–44)
MAGNESIUM SERPL-MCNC: 2 MG/DL — SIGNIFICANT CHANGE UP (ref 1.6–2.6)
MAGNESIUM SERPL-MCNC: 2 MG/DL — SIGNIFICANT CHANGE UP (ref 1.6–2.6)
MCHC RBC-ENTMCNC: 28.8 PG — SIGNIFICANT CHANGE UP (ref 27–34)
MCHC RBC-ENTMCNC: 28.8 PG — SIGNIFICANT CHANGE UP (ref 27–34)
MCHC RBC-ENTMCNC: 33 GM/DL — SIGNIFICANT CHANGE UP (ref 32–36)
MCHC RBC-ENTMCNC: 33 GM/DL — SIGNIFICANT CHANGE UP (ref 32–36)
MCV RBC AUTO: 87.2 FL — SIGNIFICANT CHANGE UP (ref 80–100)
MCV RBC AUTO: 87.2 FL — SIGNIFICANT CHANGE UP (ref 80–100)
MONOCYTES # BLD AUTO: 0.72 K/UL — SIGNIFICANT CHANGE UP (ref 0–0.9)
MONOCYTES # BLD AUTO: 0.72 K/UL — SIGNIFICANT CHANGE UP (ref 0–0.9)
MONOCYTES NFR BLD AUTO: 9 % — SIGNIFICANT CHANGE UP (ref 2–14)
MONOCYTES NFR BLD AUTO: 9 % — SIGNIFICANT CHANGE UP (ref 2–14)
NEUTROPHILS # BLD AUTO: 6.3 K/UL — SIGNIFICANT CHANGE UP (ref 1.8–7.4)
NEUTROPHILS # BLD AUTO: 6.3 K/UL — SIGNIFICANT CHANGE UP (ref 1.8–7.4)
NEUTROPHILS NFR BLD AUTO: 78.6 % — HIGH (ref 43–77)
NEUTROPHILS NFR BLD AUTO: 78.6 % — HIGH (ref 43–77)
PHOSPHATE SERPL-MCNC: 2.8 MG/DL — SIGNIFICANT CHANGE UP (ref 2.5–4.5)
PHOSPHATE SERPL-MCNC: 2.8 MG/DL — SIGNIFICANT CHANGE UP (ref 2.5–4.5)
PLATELET # BLD AUTO: 144 K/UL — LOW (ref 150–400)
PLATELET # BLD AUTO: 144 K/UL — LOW (ref 150–400)
POTASSIUM SERPL-MCNC: 3.6 MMOL/L — SIGNIFICANT CHANGE UP (ref 3.5–5.3)
POTASSIUM SERPL-MCNC: 3.6 MMOL/L — SIGNIFICANT CHANGE UP (ref 3.5–5.3)
POTASSIUM SERPL-SCNC: 3.6 MMOL/L — SIGNIFICANT CHANGE UP (ref 3.5–5.3)
POTASSIUM SERPL-SCNC: 3.6 MMOL/L — SIGNIFICANT CHANGE UP (ref 3.5–5.3)
PROCALCITONIN SERPL-MCNC: 0.11 NG/ML — HIGH (ref 0.02–0.1)
PROCALCITONIN SERPL-MCNC: 0.11 NG/ML — HIGH (ref 0.02–0.1)
PROT SERPL-MCNC: 6.5 GM/DL — SIGNIFICANT CHANGE UP (ref 6–8.3)
PROT SERPL-MCNC: 6.5 GM/DL — SIGNIFICANT CHANGE UP (ref 6–8.3)
PROT SERPL-MCNC: 6.6 GM/DL — SIGNIFICANT CHANGE UP (ref 6–8.3)
PROT SERPL-MCNC: 6.6 GM/DL — SIGNIFICANT CHANGE UP (ref 6–8.3)
PROTHROM AB SERPL-ACNC: 14.1 SEC — HIGH (ref 9.5–13)
PROTHROM AB SERPL-ACNC: 14.1 SEC — HIGH (ref 9.5–13)
RBC # BLD: 4.69 M/UL — SIGNIFICANT CHANGE UP (ref 4.2–5.8)
RBC # BLD: 4.69 M/UL — SIGNIFICANT CHANGE UP (ref 4.2–5.8)
RBC # FLD: 13.4 % — SIGNIFICANT CHANGE UP (ref 10.3–14.5)
RBC # FLD: 13.4 % — SIGNIFICANT CHANGE UP (ref 10.3–14.5)
SODIUM SERPL-SCNC: 139 MMOL/L — SIGNIFICANT CHANGE UP (ref 135–145)
SODIUM SERPL-SCNC: 139 MMOL/L — SIGNIFICANT CHANGE UP (ref 135–145)
SPECIMEN SOURCE: SIGNIFICANT CHANGE UP
SPECIMEN SOURCE: SIGNIFICANT CHANGE UP
WBC # BLD: 8.02 K/UL — SIGNIFICANT CHANGE UP (ref 3.8–10.5)
WBC # BLD: 8.02 K/UL — SIGNIFICANT CHANGE UP (ref 3.8–10.5)
WBC # FLD AUTO: 8.02 K/UL — SIGNIFICANT CHANGE UP (ref 3.8–10.5)
WBC # FLD AUTO: 8.02 K/UL — SIGNIFICANT CHANGE UP (ref 3.8–10.5)

## 2023-10-23 PROCEDURE — 99231 SBSQ HOSP IP/OBS SF/LOW 25: CPT

## 2023-10-23 PROCEDURE — 99233 SBSQ HOSP IP/OBS HIGH 50: CPT

## 2023-10-23 PROCEDURE — 99223 1ST HOSP IP/OBS HIGH 75: CPT

## 2023-10-23 PROCEDURE — 95816 EEG AWAKE AND DROWSY: CPT | Mod: 26

## 2023-10-23 RX ORDER — MIRTAZAPINE 45 MG/1
15 TABLET, ORALLY DISINTEGRATING ORAL AT BEDTIME
Refills: 0 | Status: DISCONTINUED | OUTPATIENT
Start: 2023-10-23 | End: 2023-10-26

## 2023-10-23 RX ORDER — OLANZAPINE 15 MG/1
2.5 TABLET, FILM COATED ORAL ONCE
Refills: 0 | Status: COMPLETED | OUTPATIENT
Start: 2023-10-23 | End: 2023-10-23

## 2023-10-23 RX ORDER — DEXAMETHASONE 0.5 MG/5ML
1 ELIXIR ORAL DAILY
Refills: 0 | Status: DISCONTINUED | OUTPATIENT
Start: 2023-10-24 | End: 2023-10-26

## 2023-10-23 RX ADMIN — LACOSAMIDE 100 MILLIGRAM(S): 50 TABLET ORAL at 21:03

## 2023-10-23 RX ADMIN — LEVETIRACETAM 400 MILLIGRAM(S): 250 TABLET, FILM COATED ORAL at 21:04

## 2023-10-23 RX ADMIN — PIPERACILLIN AND TAZOBACTAM 25 GRAM(S): 4; .5 INJECTION, POWDER, LYOPHILIZED, FOR SOLUTION INTRAVENOUS at 13:19

## 2023-10-23 RX ADMIN — PIPERACILLIN AND TAZOBACTAM 25 GRAM(S): 4; .5 INJECTION, POWDER, LYOPHILIZED, FOR SOLUTION INTRAVENOUS at 03:17

## 2023-10-23 RX ADMIN — TAMSULOSIN HYDROCHLORIDE 0.4 MILLIGRAM(S): 0.4 CAPSULE ORAL at 21:03

## 2023-10-23 RX ADMIN — MIRTAZAPINE 15 MILLIGRAM(S): 45 TABLET, ORALLY DISINTEGRATING ORAL at 21:03

## 2023-10-23 RX ADMIN — LEVETIRACETAM 400 MILLIGRAM(S): 250 TABLET, FILM COATED ORAL at 11:11

## 2023-10-23 RX ADMIN — APIXABAN 5 MILLIGRAM(S): 2.5 TABLET, FILM COATED ORAL at 11:07

## 2023-10-23 RX ADMIN — ROPINIROLE 0.25 MILLIGRAM(S): 8 TABLET, FILM COATED, EXTENDED RELEASE ORAL at 13:19

## 2023-10-23 RX ADMIN — Medication 6 MILLIGRAM(S): at 11:08

## 2023-10-23 RX ADMIN — OLANZAPINE 2.5 MILLIGRAM(S): 15 TABLET, FILM COATED ORAL at 20:26

## 2023-10-23 RX ADMIN — Medication 0.25 MILLIGRAM(S): at 11:07

## 2023-10-23 RX ADMIN — Medication 1 MILLIGRAM(S): at 00:04

## 2023-10-23 RX ADMIN — LACOSAMIDE 100 MILLIGRAM(S): 50 TABLET ORAL at 01:54

## 2023-10-23 RX ADMIN — PIPERACILLIN AND TAZOBACTAM 25 GRAM(S): 4; .5 INJECTION, POWDER, LYOPHILIZED, FOR SOLUTION INTRAVENOUS at 21:04

## 2023-10-23 RX ADMIN — APIXABAN 5 MILLIGRAM(S): 2.5 TABLET, FILM COATED ORAL at 22:25

## 2023-10-23 RX ADMIN — POLYETHYLENE GLYCOL 3350 17 GRAM(S): 17 POWDER, FOR SOLUTION ORAL at 11:11

## 2023-10-23 RX ADMIN — SENNA PLUS 2 TABLET(S): 8.6 TABLET ORAL at 21:03

## 2023-10-23 RX ADMIN — LACOSAMIDE 100 MILLIGRAM(S): 50 TABLET ORAL at 11:10

## 2023-10-23 RX ADMIN — ROPINIROLE 0.25 MILLIGRAM(S): 8 TABLET, FILM COATED, EXTENDED RELEASE ORAL at 21:03

## 2023-10-23 NOTE — SWALLOW BEDSIDE ASSESSMENT ADULT - SWALLOW EVAL: CRITERIA FOR SKILLED INTERVENTION MET
DO NOT FEEL THAT ACUTE SPEECH PATHOLOGY FOLLOW UP WOULD CHANGE CLINICAL MANAGEMENT/OUTCOME IN ACUTE HOSPITAL SETTING. PT'S OROPHARYNGEAL SWALLOW INTEGRITY IS FELT TO BE FUNCTIONAL FOR AGE/STABLE AND THE APHASIA THAT IS EVIDENT IS CHRONIC/PRE-EXISTING/IRREVERSIBLE. PT HAS RECEIVED EXTENSIVE SPEECH THERAPY FOR EXISTING APHASIA PTH AND I DO NOT FEEL THAT PROVIDING ST WHILE PT IS IN AN ACUTELY ILL STATE WOULD BE OF CLINICAL BENEFIT. GIVEN ABOVE, THIS SERVICE WILL NOT ACTIVELY FOLLOW. RECONSULT PRN SHOULD STATUS CHANGE AND CONDITION WARRANT.

## 2023-10-23 NOTE — SWALLOW BEDSIDE ASSESSMENT ADULT - SLP GENERAL OBSERVATIONS
On encounter, a mild right eye lid ptosis & mild right lip lag were evident which are likely chronic. The pt was alert and interactive. However, he was restless, irritable, & oppositional at times. The pt could not be directed to structured communication tasks but he did follow some 1 step commands and often attempted to spontaneously verbalize. At these times, pt's motor speech integrity was preserved, and his speech output was intelligible. Additionally, his verbalizations were often fluent. However, his utterances were occasionally marked by formulation halts, paraphasias and perseverations. Pt was able to verbalize needs at times but chronic pre-existing Aphasia did hinder communicative effectiveness at times.

## 2023-10-23 NOTE — SWALLOW BEDSIDE ASSESSMENT ADULT - ASR SWALLOW LABIAL MOBILITY
A slight right lip lag was evident which is felt to be chronic/pre-existing. Labial function is adequate for speech/deglutition purposes nonetheless.

## 2023-10-23 NOTE — CONSULT NOTE ADULT - ASSESSMENT
62yo male with PMHx seizure disorder and glioblastoma, s/p 5 cycles of TMA(last dose 6/2023), and 6 doses of MVASI(last dose 5/31), fall and R ankle fracture in 7/2023, stable MRI 10/2 follows up with Dr. Debby Torres,  presents to the ED [facility] from [facility] for +COVID, vomiting and tachycardia. Upon arrival pt appeared to be seizing. Pt was on keppra and vimpat. EMS reports pt had 5 tonic clonic seizures lasting about 15 seconds each.  Currently patient is confused, agitated, on wrist restraints.  Head CT shows new heterogeneous coarse and curvilinear parenchymal calcifications, as well as new trace periventricular ependymal calcifications along the left occipital horn, associated with the left parietal GBM.  Pt. is on IV keppra and dose was increased from 1000mg BID to 1500mg BID.  NO more seizures have been observed in the ICU.  PE is limited as patient is restless, agitated, confused, and on wrist restraints.      # Seizures and AMS-in the setting of COVID +.  Pt. with glioblastoma and seizure d/o.  Head CT with new heterogeneous coarse and curvilinear parenchymal calcifications, as well as new trace periventricular ependymal calcifications along the left occipital horn, associated with the left parietal GBM.  Neurosurgery saw the patient and does not recommend any interventions.    Keppra increased  EEG pending    Recommendations:   F/U EEG  Agree with increasing Keppra-can switch to PO once stable to take by mouth  Will follow    D/W Dr. Grewal   64yo male with PMHx seizure disorder and glioblastoma, s/p 5 cycles of TMA(last dose 6/2023), and 6 doses of MVASI(last dose 5/31), fall and R ankle fracture in 7/2023, stable MRI 10/2 follows up with Dr. Debby Torres,  presents to the ED [facility] from [facility] for +COVID, vomiting and tachycardia. Upon arrival pt appeared to be seizing. Pt was on keppra and vimpat. EMS reports pt had 5 tonic clonic seizures lasting about 15 seconds each.  Currently patient is confused, agitated, on wrist restraints.  Head CT shows new heterogeneous coarse and curvilinear parenchymal calcifications, as well as new trace periventricular ependymal calcifications along the left occipital horn, associated with the left parietal GBM.  Pt. is on IV keppra and dose was increased from 1000mg BID to 1500mg BID.  NO more seizures have been observed in the ICU.  PE is limited as patient is restless, agitated, confused, and on wrist restraints.      # Seizures and AMS-in the setting of COVID +.  Pt. with glioblastoma and seizure d/o.  Head CT with new heterogeneous coarse and curvilinear parenchymal calcifications, as well as new trace periventricular ependymal calcifications along the left occipital horn, associated with the left parietal GBM.  Neurosurgery saw the patient and does not recommend any interventions.    Keppra increased  EEG pending    Recommendations:   F/U EEG  Agree with increasing Keppra-can switch to PO once stable to take by mouth  MRI brain with contrast is pending  Will follow    D/W Dr. Grewal

## 2023-10-23 NOTE — CONSULT NOTE ADULT - SUBJECTIVE AND OBJECTIVE BOX
CC: seizures      HPI:  Patient is a 63 male with a PMH of seizure disorder and glioblastoma presents to the ED BIBEMS from [facility] for +COVID, vomiting and tachycardia. Upon arrival pt appears to be seizing. Pt on keppra. EMS reports pt had 5 tonic clonic seizures lasting about 15 seconds each. PAtient seen in the ED and is confused but awake and following simple commands      S:    Pt seen and examined  HD # 1  FULL CODE  PMHx seizure disorder and glioblastoma presents to the ED BIBEMS from [facility] for +COVID, vomiting and tachycardia. Upon arrival pt appears to be seizing. Pt on keppra. EMS reports pt had 5 tonic clonic seizures lasting about 15 seconds each. Pt given Tylenol at 9am.    10/22: Post ictal, but awake and answering questions.  (22 Oct 2023 15:23)      PAST MEDICAL & SURGICAL HISTORY:  Glioblastoma  Seizures  S/P craniotomy      FAMILY HISTORY:      Social Hx:  Nonsmoker, no drug or alcohol use    MEDICATIONS  (STANDING):  apixaban 5 milliGRAM(s) Oral every 12 hours  dexAMETHasone  Injectable 6 milliGRAM(s) IV Push daily  lacosamide IVPB 200 milliGRAM(s) IV Intermittent every 12 hours  lactated ringers. 1000 milliLiter(s) (75 mL/Hr) IV Continuous <Continuous>  levETIRAcetam  IVPB 1500 milliGRAM(s) IV Intermittent every 12 hours  mirtazapine 15 milliGRAM(s) Oral once  piperacillin/tazobactam IVPB.. 3.375 Gram(s) IV Intermittent every 8 hours  polyethylene glycol 3350 17 Gram(s) Oral daily  remdesivir  IVPB   IV Intermittent   remdesivir  IVPB 100 milliGRAM(s) IV Intermittent every 24 hours  rOPINIRole 0.25 milliGRAM(s) Oral every 8 hours  senna 2 Tablet(s) Oral at bedtime  tamsulosin 0.4 milliGRAM(s) Oral at bedtime       Allergies  No Known Allergies      ROS: Pertinent positives in HPI, all other ROS were reviewed and are negative.      Vital Signs Last 24 Hrs  T(C): 36.9 (23 Oct 2023 04:00), Max: 38.8 (22 Oct 2023 10:20)  T(F): 98.4 (23 Oct 2023 04:00), Max: 101.8 (22 Oct 2023 10:20)  HR: 88 (23 Oct 2023 08:00) (88 - 132)  BP: 140/86 (23 Oct 2023 08:00) (111/99 - 145/98)  BP(mean): 101 (23 Oct 2023 08:00) (84 - 112)  RR: 21 (23 Oct 2023 08:00) (15 - 38)  SpO2: 94% (23 Oct 2023 08:00) (85% - 100%)    Parameters below as of 23 Oct 2023 06:00  Patient On (Oxygen Delivery Method): nasal cannula  O2 Flow (L/min): 2.5      Constitutional: awake and alert.  HEENT: PERRLA, EOMI,   Neck: Supple.  Respiratory: Breath sounds are clear bilaterally  Cardiovascular: S1 and S2, regular   Extremities:  no edema  Musculoskeletal: no joint swelling/tenderness, no abnormal movements  Skin: No rashes    Neurological exam:  HF: A x O x 3. Appropriately interactive, normal affect. Speech fluent, No Aphasia or paraphasic errors. Naming /repetition intact   CN: CHAPIN, EOMI, VFF, facial sensation normal, no NLFD, tongue midline, Palate moves equally, SCM equal bilaterally  Motor: No pronator drift, Strength 5/5 in all 4 ext, normal bulk and tone, no tremor, rigidity or bradykinesia.    Sens: Intact to light touch / PP/ VS/ JS    Reflexes: Symmetric and normal . BJ 2+, BR 2+, KJ 2+, AJ 2+, downgoing toes b/l  Coord:  No FNFA, dysmetria, RENEE intact   Gait/Balance: Normal/Cannot test          Labs:   10-23    139  |  107  |  11  ----------------------------<  114<H>  3.6   |  25  |  0.84    Ca    8.9      23 Oct 2023 05:21  Phos  2.8     10-23  Mg     2.0     10-23    TPro  6.5  /  Alb  2.8<L>  /  TBili  0.5  /  DBili  0.2  /  AST  15  /  ALT  22  /  AlkPhos  40  10-23                   13.5   8.02  )-----------( 144      ( 23 Oct 2023 05:21 )             40.9       Radiology:   CT Head No Cont (10.22.23 @ 12:38)   IMPRESSION:    New heterogeneous coarse and curvilinear parenchymal calcifications, as   well as new trace periventricular ependymal calcifications along the left   occipital horn, associated with the left parietal GBM. Perilesional   vasogenic edema extending into the left frontoparietal and   temporo-occipital region is similar in extent to CT of 3/7/2023.           CC: seizures      HPI: 64yo male with PMHx seizure disorder and glioblastoma, s/p 5 cycles of TMA(last dose 6/2023), and 6 doses of MVASI(last dose 5/31), fall and R ankle fracture in 7/2023, stable MRI 10/2 follows up with Dr. Debby Torres,  presents to the ED BIBEMS from [facility] for +COVID, vomiting and tachycardia. Upon arrival pt appeared to be seizing. Pt was on keppra and vimpat. EMS reports pt had 5 tonic clonic seizures lasting about 15 seconds each.  Currently patient is confused, agitated, on wrist restraints.  Head CT shows new heterogeneous coarse and curvilinear parenchymal calcifications, as well as new trace periventricular ependymal calcifications along the left occipital horn, associated with the left parietal GBM.  Pt. is on IV keppra and dose was increased from 1000mg BID to 1500mg BID.  NO more seizures have been observed in the ICU.        PAST MEDICAL & SURGICAL HISTORY:  Glioblastoma  Seizures  S/P craniotomy      FAMILY HISTORY: Unable to obtain, chart review: patient is adopted      Social Hx:  Unable to obtain.  Chart review: no use of tobacco, ETOH, illicit drug use    MEDICATIONS  (STANDING):  apixaban 5 milliGRAM(s) Oral every 12 hours  dexAMETHasone  Injectable 6 milliGRAM(s) IV Push daily  lacosamide IVPB 200 milliGRAM(s) IV Intermittent every 12 hours  lactated ringers. 1000 milliLiter(s) (75 mL/Hr) IV Continuous <Continuous>  levETIRAcetam  IVPB 1500 milliGRAM(s) IV Intermittent every 12 hours  mirtazapine 15 milliGRAM(s) Oral once  piperacillin/tazobactam IVPB.. 3.375 Gram(s) IV Intermittent every 8 hours  polyethylene glycol 3350 17 Gram(s) Oral daily  remdesivir  IVPB   IV Intermittent   remdesivir  IVPB 100 milliGRAM(s) IV Intermittent every 24 hours  rOPINIRole 0.25 milliGRAM(s) Oral every 8 hours  senna 2 Tablet(s) Oral at bedtime  tamsulosin 0.4 milliGRAM(s) Oral at bedtime       Allergies  No Known Allergies      ROS: Unable to obtain    Vital Signs Last 24 Hrs  T(C): 36.9 (23 Oct 2023 04:00), Max: 38.8 (22 Oct 2023 10:20)  T(F): 98.4 (23 Oct 2023 04:00), Max: 101.8 (22 Oct 2023 10:20)  HR: 88 (23 Oct 2023 08:00) (88 - 132)  BP: 140/86 (23 Oct 2023 08:00) (111/99 - 145/98)  BP(mean): 101 (23 Oct 2023 08:00) (84 - 112)  RR: 21 (23 Oct 2023 08:00) (15 - 38)  SpO2: 94% (23 Oct 2023 08:00) (85% - 100%)    Parameters below as of 23 Oct 2023 06:00  Patient On (Oxygen Delivery Method): nasal cannula  O2 Flow (L/min): 2.5      Constitutional: awake, confused, agitated, in wrist restraints, easily distracted  HEENT: PERRL, EOMI,   Neck: Supple.  Respiratory: Breath sounds are clear bilaterally  Cardiovascular: S1 and S2, regular   Extremities:  no edema  Musculoskeletal: no joint swelling/tenderness, no abnormal movements  Skin: No rashes    Neurological exam:  HF: A x O x O. Agitated, restless, confused, follows simple commands ie: lifting up legs, sticking out tongue, Speech unintelligent, no Aphasia, exam limited  CN: CHAPIN, EOMI, unable to assess VF, facial sensation, no NLFD, tongue midline, no bite areas observed although patient did not stick tongue out fully  Motor: Moves all extremities, 5/5 LE's, unable to assess UE's 2/2 wrist restraints.    Sens: unable to assess   Reflexes: Symmetric and normal, downgoing toes b/l  Coord:  Unable to assess  Gait/Balance: Cannot test        Labs:   10-23    139  |  107  |  11  ----------------------------<  114<H>  3.6   |  25  |  0.84    Ca    8.9      23 Oct 2023 05:21  Phos  2.8     10-23  Mg     2.0     10-23    TPro  6.5  /  Alb  2.8<L>  /  TBili  0.5  /  DBili  0.2  /  AST  15  /  ALT  22  /  AlkPhos  40  10-23                   13.5   8.02  )-----------( 144      ( 23 Oct 2023 05:21 )             40.9       Radiology:   CT Head No Cont (10.22.23 @ 12:38)   IMPRESSION:    New heterogeneous coarse and curvilinear parenchymal calcifications, as   well as new trace periventricular ependymal calcifications along the left   occipital horn, associated with the left parietal GBM. Perilesional   vasogenic edema extending into the left frontoparietal and   temporo-occipital region is similar in extent to CT of 3/7/2023.           CC: seizures      HPI: 62yo male with PMHx seizure disorder and glioblastoma, s/p 5 cycles of TMA(last dose 6/2023), and 6 doses of MVASI(last dose 5/31), fall and R ankle fracture in 7/2023, stable MRI 10/2 follows up with Dr. Debby Torres,  presents to the ED BIBEMS from [facility] for +COVID, vomiting and tachycardia. Upon arrival pt appeared to be seizing. Pt was on keppra and vimpat. EMS reports pt had 5 tonic clonic seizures lasting about 15 seconds each.  Currently patient is confused, agitated, on wrist restraints.  Head CT shows new heterogeneous coarse and curvilinear parenchymal calcifications, as well as new trace periventricular ependymal calcifications along the left occipital horn, associated with the left parietal GBM.  Pt. is on IV keppra and dose was increased from 1000mg BID to 1500mg BID.  NO more seizures have been observed in the ICU.        PAST MEDICAL & SURGICAL HISTORY:  Glioblastoma  Seizures  S/P craniotomy      FAMILY HISTORY: Unable to obtain, chart review: patient is adopted      Social Hx:  Unable to obtain.  Chart review: no use of tobacco, ETOH, illicit drug use    MEDICATIONS  (STANDING):  apixaban 5 milliGRAM(s) Oral every 12 hours  dexAMETHasone  Injectable 6 milliGRAM(s) IV Push daily  lacosamide IVPB 200 milliGRAM(s) IV Intermittent every 12 hours  lactated ringers. 1000 milliLiter(s) (75 mL/Hr) IV Continuous <Continuous>  levETIRAcetam  IVPB 1500 milliGRAM(s) IV Intermittent every 12 hours  mirtazapine 15 milliGRAM(s) Oral once  piperacillin/tazobactam IVPB.. 3.375 Gram(s) IV Intermittent every 8 hours  polyethylene glycol 3350 17 Gram(s) Oral daily  remdesivir  IVPB   IV Intermittent   remdesivir  IVPB 100 milliGRAM(s) IV Intermittent every 24 hours  rOPINIRole 0.25 milliGRAM(s) Oral every 8 hours  senna 2 Tablet(s) Oral at bedtime  tamsulosin 0.4 milliGRAM(s) Oral at bedtime       Allergies  No Known Allergies      ROS: Unable to obtain    Vital Signs Last 24 Hrs  T(C): 36.9 (23 Oct 2023 04:00), Max: 38.8 (22 Oct 2023 10:20)  T(F): 98.4 (23 Oct 2023 04:00), Max: 101.8 (22 Oct 2023 10:20)  HR: 88 (23 Oct 2023 08:00) (88 - 132)  BP: 140/86 (23 Oct 2023 08:00) (111/99 - 145/98)  BP(mean): 101 (23 Oct 2023 08:00) (84 - 112)  RR: 21 (23 Oct 2023 08:00) (15 - 38)  SpO2: 94% (23 Oct 2023 08:00) (85% - 100%)    Parameters below as of 23 Oct 2023 06:00  Patient On (Oxygen Delivery Method): nasal cannula  O2 Flow (L/min): 2.5      Constitutional: awake, confused, agitated, in wrist restraints, easily distracted  HEENT: PERRL, EOMI,   Neck: Supple.  Respiratory: Breath sounds are clear bilaterally  Cardiovascular: S1 and S2, regular   Extremities:  no edema  Musculoskeletal: no joint swelling/tenderness, no abnormal movements  Skin: No rashes    Neurological exam:  HF: A x O x O. Agitated, restless, confused, follows simple commands ie: lifting up legs, sticking out tongue, Speech unintelligent, no Aphasia, exam limited  CN: CHAPIN, EOMI, unable to assess VF, facial sensation, no NLFD, tongue midline, no bite areas observed although patient did not stick tongue out fully  Motor: Moves all extremities, 5/5 LE's, unable to assess UE's 2/2 wrist restraints.    Sens: unable to assess   Reflexes: Symmetric and normal, downgoing toes b/l  Coord:  Unable to assess  Gait/Balance: Cannot test        Labs:   10-23    139  |  107  |  11  ----------------------------<  114<H>  3.6   |  25  |  0.84    Ca    8.9      23 Oct 2023 05:21  Phos  2.8     10-23  Mg     2.0     10-23    TPro  6.5  /  Alb  2.8<L>  /  TBili  0.5  /  DBili  0.2  /  AST  15  /  ALT  22  /  AlkPhos  40  10-23                   13.5   8.02  )-----------( 144      ( 23 Oct 2023 05:21 )             40.9       Radiology:   CT Head No Cont (10.22.23 @ 12:38)   IMPRESSION:    New heterogeneous coarse and curvilinear parenchymal calcifications, as   well as new trace periventricular ependymal calcifications along the left   occipital horn, associated with the left parietal GBM. Perilesional   vasogenic edema extending into the left frontoparietal and   temporo-occipital region is similar in extent to CT of 3/7/2023.

## 2023-10-23 NOTE — PROGRESS NOTE ADULT - ASSESSMENT
· Assessment	   Patient is a 63 year old man with a PMHof GBM s/p resection 10/2022 with Dr. Turpin and seizure disorder on Keppra who presented from [facility] with vomiting and tachycardia and he was found to be COVID positive. Appeared to be seizing on admission, CT head showed new calcifications associated with left parietal GBM.     # COVID   # Seizure   # GBM s/p resection 10/2022      PLAN-  No acute neurosurgical intervention   Continue supportive care for COVID  Continue antiepileptics, neurology consult appreciated   MR brain with contrast when stable, would probably need pre medication   Continue current decadron dose   primary neurosurgeon Dr. Turpin and his primary oncology team contracted     Discussed with Dr. Childs.

## 2023-10-23 NOTE — PROGRESS NOTE ADULT - SUBJECTIVE AND OBJECTIVE BOX
Patient is a 63y old  Male who presents with a chief complaint of Seizures, COVID (23 Oct 2023 08:54)    BRIEF HOSPITAL COURSE: 64yo male with PMHx seizure disorder and glioblastoma presents to the ED [facility] from [facility] for +COVID, vomiting and tachycardia. Upon arrival pt appears to be seizing. Pt on keppra. EMS reports pt had 5 tonic clonic seizures lasting about 15 seconds each.    10/23 pt confused and agitated, yelling. unable to tell me his name. intermittently follows commands    PAST MEDICAL & SURGICAL HISTORY:  Glioblastoma  Seizures  S/P craniotomy  Medications:  piperacillin/tazobactam IVPB.. 3.375 Gram(s) IV Intermittent every 8 hours  remdesivir  IVPB 100 milliGRAM(s) IV Intermittent every 24 hours  remdesivir  IVPB   IV Intermittent   acetaminophen     Tablet .. 650 milliGRAM(s) Oral every 6 hours PRN  cyclobenzaprine 10 milliGRAM(s) Oral three times a day PRN  lacosamide IVPB 200 milliGRAM(s) IV Intermittent every 12 hours  levETIRAcetam  IVPB 1500 milliGRAM(s) IV Intermittent every 12 hours  LORazepam   Injectable 0.25 milliGRAM(s) IV Push once PRN  mirtazapine 15 milliGRAM(s) Oral once  rOPINIRole 0.25 milliGRAM(s) Oral every 8 hours  apixaban 5 milliGRAM(s) Oral every 12 hours  polyethylene glycol 3350 17 Gram(s) Oral daily  senna 2 Tablet(s) Oral at bedtime  tamsulosin 0.4 milliGRAM(s) Oral at bedtime  dexAMETHasone  Injectable 6 milliGRAM(s) IV Push daily  lactated ringers. 1000 milliLiter(s) IV Continuous <Continuous>    ICU Vital Signs Last 24 Hrs  T(C): 36.9 (23 Oct 2023 09:16), Max: 37.7 (22 Oct 2023 13:04)  T(F): 98.5 (23 Oct 2023 09:16), Max: 99.8 (22 Oct 2023 13:04)  HR: 88 (23 Oct 2023 08:00) (88 - 126)  BP: 140/86 (23 Oct 2023 08:00) (111/99 - 145/98)  BP(mean): 101 (23 Oct 2023 08:00) (84 - 111)  ABP: --  ABP(mean): --  RR: 21 (23 Oct 2023 08:00) (15 - 38)  SpO2: 94% (23 Oct 2023 08:00) (85% - 100%)    O2 Parameters below as of 23 Oct 2023 06:00  Patient On (Oxygen Delivery Method): nasal cannula  O2 Flow (L/min): 2.5      I&O's Detail    22 Oct 2023 07:01  -  23 Oct 2023 07:00  --------------------------------------------------------  IN:    IV PiggyBack: 500 mL    Lactated Ringers: 750 mL  Total IN: 1250 mL    OUT:    Stool (mL): 1 mL    Voided (mL): 800 mL  Total OUT: 801 mL    Total NET: 449 mL      LABS:                        13.5   8.02  )-----------( 144      ( 23 Oct 2023 05:21 )             40.9     10-23    139  |  107  |  11  ----------------------------<  114<H>  3.6   |  25  |  0.84    Ca    8.9      23 Oct 2023 05:21  Phos  2.8     10-23  Mg     2.0     10-23    TPro  6.5  /  Alb  2.8<L>  /  TBili  0.5  /  DBili  0.2  /  AST  15  /  ALT  22  /  AlkPhos  40  10-23    CAPILLARY BLOOD GLUCOSE  PT/INR - ( 23 Oct 2023 05:21 )   PT: 14.1 sec;   INR: 1.26 ratio    PTT - ( 22 Oct 2023 10:20 )  PTT:22.4 sec  Urinalysis Basic - ( 23 Oct 2023 05:21 )    Color: x / Appearance: x / SG: x / pH: x  Gluc: 114 mg/dL / Ketone: x  / Bili: x / Urobili: x   Blood: x / Protein: x / Nitrite: x   Leuk Esterase: x / RBC: x / WBC x   Sq Epi: x / Non Sq Epi: x / Bacteria: x      CULTURES:  Rapid RVP Result: Detected (10-22 @ 10:20)  Culture Results:   No growth (10-22 @ 10:20)      Physical Examination:    General: No acute distress.    HEENT: Pupils equal, reactive to light.  Symmetric.  PULM: Clear to auscultation bilaterally,  CVS: Regular rate and rhythm, no murmurs, rubs, or gallops  ABD: Soft, nondistended, nontender, guards in epigastric area.   EXT: No LE edema, nontender  SKIN: Warm and well perfused, no rashes noted.  NEURO: Alert, oriented x 0, yelling    DEVICES:     RADIOLOGY:  < from: CT Head No Cont (10.22.23 @ 12:38) >  IMPRESSION:    New heterogeneous coarse and curvilinear parenchymal calcifications, as   well as new trace periventricular ependymal calcifications along the left   occipital horn, associated with the left parietal GBM. Perilesional   vasogenic edema extending into the left frontoparietal and   temporo-occipital region is similar in extent to CT of 3/7/2023.    < end of copied text >      < from: CT Abdomen and Pelvis w/ IV Cont (10.22.23 @ 12:57) >  IMPRESSION:    1. Moderate consolidations at the posterior bibasilar lungs, possibly   representing aspiration pneumonia. No pulmonary embolism.  2. Diffuse bladder wall thickening suggestive of cystitis. Correlate with   urinalysis.  3. Borderline dilated main pulmonary artery suggestive of pulmonary   hypertension.  4. Trace posterior pericardial effusion.    < end of copied text >

## 2023-10-23 NOTE — SWALLOW BEDSIDE ASSESSMENT ADULT - COMMENTS
The pt was admitted to  from [facility]. Hospital course is remarkable for tachycardia, seizures, COVID and altered sensorium with agitation. Head CT is remarkable for new heterogenous calcifications on left with vasogenic edema. The pt fractured his right ankle in 7/23. Other selected prior medical history is remarkable for depression, GERD, BPH, skin cancer s/p removal, seizures disorder and Glioblastoma Multiforme for which he is status post craniotomy/RT/chemo.  The pt has a longstanding h/o Aphasia for which he has received extensive speech therapy in the past.

## 2023-10-23 NOTE — SWALLOW BEDSIDE ASSESSMENT ADULT - DIET PRIOR TO ADMI
The pt was on a regular texture diet without a liquid consistency restriction at [facility].

## 2023-10-23 NOTE — SWALLOW BEDSIDE ASSESSMENT ADULT - NS SPL SWALLOW CLINIC TRIAL FT
The patient demonstrated Oropharyngeal Swallowing integrity which subjectively appeared to be within functional parameters for age when in an alert/calm state. Bolus formation/transfer were mechanically functional for age, swallow was triggered in an acceptable time frame for age, & laryngeal lift on palpation during swallowing trials was felt to be functional for age as well. NO behavioral aspiration signs exhibited. No change in O2 sats noted. Odynophagia was denied. Oropharyngeal swallow integrity is felt to be functional/stable when pt is alert/calm.

## 2023-10-23 NOTE — GOALS OF CARE CONVERSATION - ADVANCED CARE PLANNING - CONVERSATION DETAILS
discussed GOC with Tania Johnson HCP via phone. She states that pt mother is 98yo. As per HCP, His brother recently committed suicide in August and that he had told her that he doesn't want his mother to bury another child. He wanted everything to be done to keep him alive

## 2023-10-23 NOTE — SWALLOW BEDSIDE ASSESSMENT ADULT - SWALLOW EVAL: DIAGNOSIS
1) The pt demonstrates Oropharyngeal Swallowing integrity which subjectively appeared to be within functional parameters for age when in an alert/calm state. Bolus formation/transfer were mechanically functional for age, swallow was triggered in an acceptable time frame for age, & laryngeal lift on palpation during swallowing trials was felt to be functional for age as well. NO behavioral aspiration signs exhibited. No change in O2 sats noted. Odynophagia was denied. Oropharyngeal swallow integrity is felt to be functional/stable when pt is alert/calm.

## 2023-10-23 NOTE — SWALLOW BEDSIDE ASSESSMENT ADULT - SWALLOW EVAL: PROGNOSIS
2) On encounter, a mild right eye lid ptosis & mild right lip lag were evident which are likely chronic. The pt was alert and interactive. However, he was restless, irritable, & oppositional at times. The pt could not be directed to structured communication tasks but he did follow some 1 step commands and often attempted to spontaneously verbalize. At these times, pt's motor speech integrity was preserved, and his speech output was intelligible. Additionally, his verbalizations were often fluent. However, his utterances were occasionally marked by formulation halts, paraphasias and perseverations. Pt was able to verbalize needs at times but chronic pre-existing Aphasia did hinder communicative effectiveness at times.

## 2023-10-23 NOTE — PROGRESS NOTE ADULT - SUBJECTIVE AND OBJECTIVE BOX
This is a 63y old male who presented with a chief complaint of Seizures, COVID sent from nursing home     HPI:  Patient is a 63 year old man with a PMH of left parietal GBM s/p resection 10/2023 with Dr. Turpin and seizure disorder on Keppra who presented from [facility] with vomiting and tachycardia and he was found to be COVID positive. Appeared to be seizing on admission, CT head showed new calcifications associated with left parietal GBM. Pt admitted to the SICU for supportive care. Pt seen and examined in the SDU, post-ictal, +confusion, oriented to self only, +neglect right upper and lower extremities, right pupil round 4mm but unreactive, right homonomous hemianopsia.     10/23 pt seen and examined this am. patient agitated overnight requiring wrist restraints. patient does not offer any complaints but has expressive aphasia    ICU Vital Signs Last 24 Hrs  T(C): 36.9 (23 Oct 2023 09:16), Max: 37.7 (22 Oct 2023 13:04)  T(F): 98.5 (23 Oct 2023 09:16), Max: 99.8 (22 Oct 2023 13:04)  HR: 81 (23 Oct 2023 10:00) (81 - 126)  BP: 133/89 (23 Oct 2023 10:00) (111/99 - 145/98)  BP(mean): 102 (23 Oct 2023 10:00) (84 - 111)  ABP: --  ABP(mean): --  RR: 26 (23 Oct 2023 10:00) (15 - 38)  SpO2: 93% (23 Oct 2023 10:00) (85% - 100%)    O2 Parameters below as of 23 Oct 2023 06:00  Patient On (Oxygen Delivery Method): nasal cannula  O2 Flow (L/min): 2.5                          13.5   8.02  )-----------( 144      ( 23 Oct 2023 05:21 )             40.9   10-23    139  |  107  |  11  ----------------------------<  114<H>  3.6   |  25  |  0.84    Ca    8.9      23 Oct 2023 05:21  Phos  2.8     10-23  Mg     2.0     10-23    TPro  6.5  /  Alb  2.8<L>  /  TBili  0.5  /  DBili  0.2  /  AST  15  /  ALT  22  /  Alk Phos  40  10-23    MEDICATIONS  (STANDING):  apixaban 5 milliGRAM(s) Oral every 12 hours  dexAMETHasone  Injectable 6 milliGRAM(s) IV Push daily  lacosamide IVPB 200 milliGRAM(s) IV Intermittent every 12 hours  lactated ringers. 1000 milliLiter(s) (75 mL/Hr) IV Continuous <Continuous>  levETIRAcetam  IVPB 1500 milliGRAM(s) IV Intermittent every 12 hours  mirtazapine 15 milliGRAM(s) Oral once  piperacillin/tazobactam IVPB.. 3.375 Gram(s) IV Intermittent every 8 hours  polyethylene glycol 3350 17 Gram(s) Oral daily  remdesivir  IVPB   IV Intermittent   remdesivir  IVPB 100 milliGRAM(s) IV Intermittent every 24 hours  rOPINIRole 0.25 milliGRAM(s) Oral every 8 hours  senna 2 Tablet(s) Oral at bedtime  tamsulosin 0.4 milliGRAM(s) Oral at bedtime    PHYSICAL EXAM:  Constitutional: awake & alert   HEENT: left pupil 4mm and reactive, right pupil 4mm no reaction to light,  hearing intact   Neck: Supple  Respiratory: Breath sounds are clear bilaterally  Cardiovascular: S1 and S2, regular rhythm  Gastrointestinal: soft, nontender  Extremities:  no edema  Vascular: Carotid Bruit - no  Musculoskeletal: no joint swelling/tenderness, no abnormal movements  Skin: No rashes      Neurological exam:  HF: awake, alert, unable to confirm his name,  not able to express if oriented due to expressive aphasia, word salad, follows some simple commands   CN: left pupil 4mm and reactive, right pupil 4mm no reaction to light, right homonomous hemianopsia, hearing intact, face symmetric   Motor: moves all extremities antigravity off bed without assist L>R  Sens: intact X4   Reflexes: Symmetric and normal, downgoing toes b/l  Coord: pt too confused to test   Gait/Balance: Not tested

## 2023-10-23 NOTE — SWALLOW BEDSIDE ASSESSMENT ADULT - SWALLOW EVAL: RECOMMENDED DIET
SUGGEST A REGULAR CONSISTENCY DIET WITH THIN LIQUIDS AS PATIENT APPEARED CLINICALLY TOLERANT OF THESE FOOD TEXTURES FROM AN OROPHARYNGEAL SWALLOWING PERSPECTIVE ON EXAM WHEN IN AN ALERT CALM STATE.

## 2023-10-23 NOTE — PROGRESS NOTE ADULT - ASSESSMENT
ASSESSMENT  62yo male with PMHx seizure disorder and glioblastoma presents to the ED BIBEMS from [facility] for +COVID, vomiting and tachycardia. Upon arrival pt appeared to be seizing as per chart. Pt on keppra and vimpat. EMS reports pt had 5 tonic clonic seizures lasting about 15 seconds each. pt with hx of seizure last time he had covid    Admitted for :  1. Seizures in setting of GBM and COVID  2. Toxic metabolic encephalopathy vs steroid induced delirium  3. hx seizure disorder    PLAN    Neuro: AAOx 0, agitated yelling. CTH revealing new calcifications asso w/ L parietal GBM, vasogenic edema unchanged. neuro and neuro sx consult. cont vimpat 200 bid, keppra increased from 1000mg bid -> 1500mg bid. EEG today.   CV: Normotensive.   Pulm: on 2l nc sating 94% titrate to maintain O2 sat > 94%. CT chest neg for PE but revealing mod bibasilar consolidations, suspect aspiration. isolation precautions  GI: PO diet. PPI. bowel regimen prn  Nephro: no active issues. monitor I & Os. Trend renal fxn  Vasc: US doppler RLE - neg for DVT  ID: cont IV zosyn for bibasilar PNA and UTI. cont IV decadron and remdesivir for covid infection.  f/u blood and urine cultures.   Heme: DVT ppx - on eliquis for hx of DVT. SCDs.   PT eval    Dispo: EEG. keppra and vimpat. MRI?. IV abx for PNA, UTI, covid tx.    Will discuss with Dr. Stewart ASSESSMENT  64yo male with PMHx seizure disorder and glioblastoma presents to the ED BIBEMS from [facility] for +COVID, vomiting and tachycardia. Upon arrival pt appeared to be seizing as per chart. Pt on keppra and vimpat. EMS reports pt had 5 tonic clonic seizures lasting about 15 seconds each. pt with hx of seizure last time he had covid    Admitted for :  1. Seizures in setting of GBM and COVID  2. Toxic metabolic encephalopathy vs steroid induced delirium  3. hx seizure disorder    PLAN    Neuro: AAOx 0, agitated yelling. CTH revealing new calcifications asso w/ L parietal GBM, vasogenic edema unchanged. neuro and neuro sx consult. cont vimpat 200 bid, keppra increased from 1000mg bid -> 1500mg bid. EEG today.   CV: Normotensive.   Pulm: on 2l nc sating 94% titrate to maintain O2 sat > 94%. CT chest neg for PE but revealing mod bibasilar consolidations, suspect aspiration. isolation precautions  GI: PO diet. PPI. bowel regimen prn  Nephro: no active issues. monitor I & Os. Trend renal fxn  Vasc: US doppler RLE - neg for DVT  ID: cont IV zosyn for bibasilar PNA and UTI. cont IV decadron and remdesivir for covid infection.  f/u blood and urine cultures.   Heme: DVT ppx - on eliquis for hx of DVT. SCDs.   PT eval    Dispo: EEG. keppra and vimpat. MR brain with IV contrast . IV abx for PNA, UTI, covid tx.    Will discuss with Dr. Stewart ASSESSMENT  62yo male with PMHx seizure disorder and glioblastoma presents to the ED BIBEMS from [facility] for +COVID, vomiting and tachycardia. Upon arrival pt appeared to be seizing as per chart. Pt on keppra and vimpat. EMS reports pt had 5 tonic clonic seizures lasting about 15 seconds each. pt with hx of seizure last time he had covid    Admitted for :  1. Seizures in setting of GBM and COVID  2. Toxic metabolic encephalopathy vs steroid induced delirium  3. hx seizure disorder    PLAN    Neuro: AAOx 0, agitated yelling. CTH revealing new calcifications asso w/ L parietal GBM, vasogenic edema unchanged. neuro and neuro sx consult. cont vimpat 200 bid, keppra increased from 1000mg bid -> 1500mg bid. EEG today and MR brain w/ contrast. decrease steroids to home dose PO decadron 1mg qd. (normally AAOx3, but just "stubborn and argumentative" as per HCP)  CV: Normotensive.   Pulm: on 2l nc sating 94% titrate to maintain O2 sat > 94%. CT chest neg for PE but revealing mod bibasilar consolidations, suspect aspiration. isolation precautions  GI: PO diet. PPI. bowel regimen prn  Nephro: no active issues. monitor I & Os. Trend renal fxn  Vasc: US doppler RLE - neg for DVT  ID: cont IV zosyn for bibasilar PNA and UTI. will monitor off decadron/remdesivir for now.  f/u blood and urine cultures.   Heme: DVT ppx - on eliquis for hx of DVT/PE. SCDs.   PT eval    Dispo: SICU. EEG. keppra and vimpat. MR brain with IV contrast . IV abx for PNA, UTI, covid tx.    discussed with Dr. Stewart, updated HCP Tania Johnson via phone

## 2023-10-24 LAB
ALBUMIN SERPL ELPH-MCNC: 2.8 G/DL — LOW (ref 3.3–5)
ALBUMIN SERPL ELPH-MCNC: 2.8 G/DL — LOW (ref 3.3–5)
ALP SERPL-CCNC: 42 U/L — SIGNIFICANT CHANGE UP (ref 40–120)
ALP SERPL-CCNC: 42 U/L — SIGNIFICANT CHANGE UP (ref 40–120)
ALT FLD-CCNC: 28 U/L — SIGNIFICANT CHANGE UP (ref 12–78)
ALT FLD-CCNC: 28 U/L — SIGNIFICANT CHANGE UP (ref 12–78)
ANION GAP SERPL CALC-SCNC: 7 MMOL/L — SIGNIFICANT CHANGE UP (ref 5–17)
ANION GAP SERPL CALC-SCNC: 7 MMOL/L — SIGNIFICANT CHANGE UP (ref 5–17)
AST SERPL-CCNC: 30 U/L — SIGNIFICANT CHANGE UP (ref 15–37)
AST SERPL-CCNC: 30 U/L — SIGNIFICANT CHANGE UP (ref 15–37)
BILIRUB DIRECT SERPL-MCNC: 0.2 MG/DL — SIGNIFICANT CHANGE UP (ref 0–0.3)
BILIRUB DIRECT SERPL-MCNC: 0.2 MG/DL — SIGNIFICANT CHANGE UP (ref 0–0.3)
BILIRUB INDIRECT FLD-MCNC: 0.4 MG/DL — SIGNIFICANT CHANGE UP (ref 0.2–1)
BILIRUB INDIRECT FLD-MCNC: 0.4 MG/DL — SIGNIFICANT CHANGE UP (ref 0.2–1)
BILIRUB SERPL-MCNC: 0.6 MG/DL — SIGNIFICANT CHANGE UP (ref 0.2–1.2)
BILIRUB SERPL-MCNC: 0.6 MG/DL — SIGNIFICANT CHANGE UP (ref 0.2–1.2)
BUN SERPL-MCNC: 13 MG/DL — SIGNIFICANT CHANGE UP (ref 7–23)
BUN SERPL-MCNC: 13 MG/DL — SIGNIFICANT CHANGE UP (ref 7–23)
CALCIUM SERPL-MCNC: 9.3 MG/DL — SIGNIFICANT CHANGE UP (ref 8.5–10.1)
CALCIUM SERPL-MCNC: 9.3 MG/DL — SIGNIFICANT CHANGE UP (ref 8.5–10.1)
CHLORIDE SERPL-SCNC: 107 MMOL/L — SIGNIFICANT CHANGE UP (ref 96–108)
CHLORIDE SERPL-SCNC: 107 MMOL/L — SIGNIFICANT CHANGE UP (ref 96–108)
CO2 SERPL-SCNC: 26 MMOL/L — SIGNIFICANT CHANGE UP (ref 22–31)
CO2 SERPL-SCNC: 26 MMOL/L — SIGNIFICANT CHANGE UP (ref 22–31)
CREAT SERPL-MCNC: 0.62 MG/DL — SIGNIFICANT CHANGE UP (ref 0.5–1.3)
CREAT SERPL-MCNC: 0.62 MG/DL — SIGNIFICANT CHANGE UP (ref 0.5–1.3)
EGFR: 106 ML/MIN/1.73M2 — SIGNIFICANT CHANGE UP
EGFR: 106 ML/MIN/1.73M2 — SIGNIFICANT CHANGE UP
GLUCOSE SERPL-MCNC: 91 MG/DL — SIGNIFICANT CHANGE UP (ref 70–99)
GLUCOSE SERPL-MCNC: 91 MG/DL — SIGNIFICANT CHANGE UP (ref 70–99)
HCT VFR BLD CALC: 42.3 % — SIGNIFICANT CHANGE UP (ref 39–50)
HCT VFR BLD CALC: 42.3 % — SIGNIFICANT CHANGE UP (ref 39–50)
HGB BLD-MCNC: 14 G/DL — SIGNIFICANT CHANGE UP (ref 13–17)
HGB BLD-MCNC: 14 G/DL — SIGNIFICANT CHANGE UP (ref 13–17)
INR BLD: 1.41 RATIO — HIGH (ref 0.85–1.18)
INR BLD: 1.41 RATIO — HIGH (ref 0.85–1.18)
MAGNESIUM SERPL-MCNC: 2.1 MG/DL — SIGNIFICANT CHANGE UP (ref 1.6–2.6)
MAGNESIUM SERPL-MCNC: 2.1 MG/DL — SIGNIFICANT CHANGE UP (ref 1.6–2.6)
MCHC RBC-ENTMCNC: 28.7 PG — SIGNIFICANT CHANGE UP (ref 27–34)
MCHC RBC-ENTMCNC: 28.7 PG — SIGNIFICANT CHANGE UP (ref 27–34)
MCHC RBC-ENTMCNC: 33.1 GM/DL — SIGNIFICANT CHANGE UP (ref 32–36)
MCHC RBC-ENTMCNC: 33.1 GM/DL — SIGNIFICANT CHANGE UP (ref 32–36)
MCV RBC AUTO: 86.9 FL — SIGNIFICANT CHANGE UP (ref 80–100)
MCV RBC AUTO: 86.9 FL — SIGNIFICANT CHANGE UP (ref 80–100)
PHOSPHATE SERPL-MCNC: 2.8 MG/DL — SIGNIFICANT CHANGE UP (ref 2.5–4.5)
PHOSPHATE SERPL-MCNC: 2.8 MG/DL — SIGNIFICANT CHANGE UP (ref 2.5–4.5)
PLATELET # BLD AUTO: 157 K/UL — SIGNIFICANT CHANGE UP (ref 150–400)
PLATELET # BLD AUTO: 157 K/UL — SIGNIFICANT CHANGE UP (ref 150–400)
POTASSIUM SERPL-MCNC: 3.6 MMOL/L — SIGNIFICANT CHANGE UP (ref 3.5–5.3)
POTASSIUM SERPL-MCNC: 3.6 MMOL/L — SIGNIFICANT CHANGE UP (ref 3.5–5.3)
POTASSIUM SERPL-SCNC: 3.6 MMOL/L — SIGNIFICANT CHANGE UP (ref 3.5–5.3)
POTASSIUM SERPL-SCNC: 3.6 MMOL/L — SIGNIFICANT CHANGE UP (ref 3.5–5.3)
PROT SERPL-MCNC: 6.6 GM/DL — SIGNIFICANT CHANGE UP (ref 6–8.3)
PROT SERPL-MCNC: 6.6 GM/DL — SIGNIFICANT CHANGE UP (ref 6–8.3)
PROTHROM AB SERPL-ACNC: 15.8 SEC — HIGH (ref 9.5–13)
PROTHROM AB SERPL-ACNC: 15.8 SEC — HIGH (ref 9.5–13)
RBC # BLD: 4.87 M/UL — SIGNIFICANT CHANGE UP (ref 4.2–5.8)
RBC # BLD: 4.87 M/UL — SIGNIFICANT CHANGE UP (ref 4.2–5.8)
RBC # FLD: 13.3 % — SIGNIFICANT CHANGE UP (ref 10.3–14.5)
RBC # FLD: 13.3 % — SIGNIFICANT CHANGE UP (ref 10.3–14.5)
SODIUM SERPL-SCNC: 140 MMOL/L — SIGNIFICANT CHANGE UP (ref 135–145)
SODIUM SERPL-SCNC: 140 MMOL/L — SIGNIFICANT CHANGE UP (ref 135–145)
WBC # BLD: 7.42 K/UL — SIGNIFICANT CHANGE UP (ref 3.8–10.5)
WBC # BLD: 7.42 K/UL — SIGNIFICANT CHANGE UP (ref 3.8–10.5)
WBC # FLD AUTO: 7.42 K/UL — SIGNIFICANT CHANGE UP (ref 3.8–10.5)
WBC # FLD AUTO: 7.42 K/UL — SIGNIFICANT CHANGE UP (ref 3.8–10.5)

## 2023-10-24 PROCEDURE — 70553 MRI BRAIN STEM W/O & W/DYE: CPT | Mod: 26

## 2023-10-24 PROCEDURE — 99223 1ST HOSP IP/OBS HIGH 75: CPT

## 2023-10-24 PROCEDURE — 99233 SBSQ HOSP IP/OBS HIGH 50: CPT

## 2023-10-24 RX ADMIN — PIPERACILLIN AND TAZOBACTAM 25 GRAM(S): 4; .5 INJECTION, POWDER, LYOPHILIZED, FOR SOLUTION INTRAVENOUS at 22:58

## 2023-10-24 RX ADMIN — ROPINIROLE 0.25 MILLIGRAM(S): 8 TABLET, FILM COATED, EXTENDED RELEASE ORAL at 05:26

## 2023-10-24 RX ADMIN — APIXABAN 5 MILLIGRAM(S): 2.5 TABLET, FILM COATED ORAL at 21:58

## 2023-10-24 RX ADMIN — PIPERACILLIN AND TAZOBACTAM 25 GRAM(S): 4; .5 INJECTION, POWDER, LYOPHILIZED, FOR SOLUTION INTRAVENOUS at 05:27

## 2023-10-24 RX ADMIN — LEVETIRACETAM 400 MILLIGRAM(S): 250 TABLET, FILM COATED ORAL at 09:08

## 2023-10-24 RX ADMIN — ROPINIROLE 0.25 MILLIGRAM(S): 8 TABLET, FILM COATED, EXTENDED RELEASE ORAL at 21:58

## 2023-10-24 RX ADMIN — Medication 0.5 MILLIGRAM(S): at 09:08

## 2023-10-24 RX ADMIN — LACOSAMIDE 100 MILLIGRAM(S): 50 TABLET ORAL at 10:50

## 2023-10-24 RX ADMIN — TAMSULOSIN HYDROCHLORIDE 0.4 MILLIGRAM(S): 0.4 CAPSULE ORAL at 21:58

## 2023-10-24 RX ADMIN — PIPERACILLIN AND TAZOBACTAM 25 GRAM(S): 4; .5 INJECTION, POWDER, LYOPHILIZED, FOR SOLUTION INTRAVENOUS at 13:55

## 2023-10-24 RX ADMIN — Medication 1 MILLIGRAM(S): at 10:23

## 2023-10-24 RX ADMIN — LACOSAMIDE 100 MILLIGRAM(S): 50 TABLET ORAL at 21:58

## 2023-10-24 RX ADMIN — SENNA PLUS 2 TABLET(S): 8.6 TABLET ORAL at 21:58

## 2023-10-24 RX ADMIN — APIXABAN 5 MILLIGRAM(S): 2.5 TABLET, FILM COATED ORAL at 10:23

## 2023-10-24 RX ADMIN — ROPINIROLE 0.25 MILLIGRAM(S): 8 TABLET, FILM COATED, EXTENDED RELEASE ORAL at 13:56

## 2023-10-24 RX ADMIN — LEVETIRACETAM 400 MILLIGRAM(S): 250 TABLET, FILM COATED ORAL at 22:34

## 2023-10-24 NOTE — CONSULT NOTE ADULT - SUBJECTIVE AND OBJECTIVE BOX
HPI:  62yo male with MHx significant for seizure disorder and glioblastoma presents to the ED [facility] from [facility] for +COVID, vomiting and tachycardia.    PAST MEDICAL & SURGICAL HISTORY:    Glioblastoma    Seizures    S/P craniotomy    FAMILY HISTORY:    MEDICATIONS  (STANDING):    apixaban 5 milliGRAM(s) Oral every 12 hours  dexAMETHasone     Tablet 1 milliGRAM(s) Oral daily  lacosamide IVPB 200 milliGRAM(s) IV Intermittent every 12 hours  levETIRAcetam  IVPB 1500 milliGRAM(s) IV Intermittent every 12 hours  piperacillin/tazobactam IVPB.. 3.375 Gram(s) IV Intermittent every 8 hours  polyethylene glycol 3350 17 Gram(s) Oral daily  rOPINIRole 0.25 milliGRAM(s) Oral every 8 hours  senna 2 Tablet(s) Oral at bedtime  tamsulosin 0.4 milliGRAM(s) Oral at bedtime    MEDICATIONS  (PRN):    acetaminophen     Tablet .. 650 milliGRAM(s) Oral every 6 hours PRN Temp greater or equal to 38C (100.4F), Mild Pain (1 - 3), Moderate Pain (4 - 6)  cyclobenzaprine 10 milliGRAM(s) Oral three times a day PRN Muscle Spasm  mirtazapine 15 milliGRAM(s) Oral at bedtime PRN insomnia    ALLERGIES:    No Known Allergies    REVIEW OF SYSTEM:    Constitutional, Eyes, ENT, Cardiovascular, Respiratory, Gastrointestinal, Genitourinary, Musculoskeletal, Integumentary, Neurological, Psychiatric, Endocrine, Heme/Lymph and Allergic/Immunologic review of systems are otherwise negative except as noted in HPI.     VITAL SIGNS LAST 24 HRS:    T(C): 36.1 (24 Oct 2023 06:00), Max: 36.8 (23 Oct 2023 16:19)  T(F): 97 (24 Oct 2023 06:00), Max: 98.2 (23 Oct 2023 16:19)  HR: 92 (24 Oct 2023 14:00) (66 - 103)  BP: 121/94 (24 Oct 2023 14:00) (118/71 - 136/90)  BP(mean): 104 (24 Oct 2023 14:00) (87 - 110)  RR: 21 (24 Oct 2023 14:00) (12 - 28)  SpO2: 100% (24 Oct 2023 14:00) (93% - 100%)    Parameters below as of 24 Oct 2023 14:00  Patient On (Oxygen Delivery Method): room air    PHYSICAL EXAM:    CONSTITUTIONAL : NAD, appear to be of stated age , well groomed   NERVOUS SYSTEM:  Alert & Oriented X 3, no focal deficits.   HEAD:  Atraumatic, Normocephalic  EYES: EOMI, PERRLA, conjunctiva and sclera clear  HEENT: Moist mucous membranes, Supple neck , No JVD  CHEST: Clear to auscultation bilaterally; No rales, no rhonchi, no wheezing  HEART: Regular rate and rhythm; No murmurs, no rubs or gallops  ABDOMEN: Soft, Non-tender, Non-distended; Bowel sounds present, no guarding , no peritoneal irritation   GENITOURINARY- Voiding, no suprapubic tenderness  EXTREMITIES:  2+ Peripheral Pulses, No clubbing, cyanosis, no edema  MUSCULOSKELETAL:- No muscle tenderness, Muscle tone normal, No joint tenderness, no Joint swelling,  Joint ROM –normal   SKIN-no rash, no lesion    LABS:    CBC Full  -  ( 24 Oct 2023 06:24 )  WBC Count : 7.42 K/uL  RBC Count : 4.87 M/uL  Hemoglobin : 14.0 g/dL  Hematocrit : 42.3 %  Platelet Count - Automated : 157 K/uL  Mean Cell Volume : 86.9 fl  Mean Cell Hemoglobin : 28.7 pg  Mean Cell Hemoglobin Concentration : 33.1 gm/dL  Auto Neutrophil # : x  Auto Lymphocyte # : x  Auto Monocyte # : x  Auto Eosinophil # : x  Auto Basophil # : x  Auto Neutrophil % : x  Auto Lymphocyte % : x  Auto Monocyte % : x  Auto Eosinophil % : x  Auto Basophil % : x    10-24    140  |  107  |  13  ----------------------------<  91  3.6   |  26  |  0.62    Ca    9.3      24 Oct 2023 06:24  Phos  2.8     10-24  Mg     2.1     10-24    TPro  6.6  /  Alb  2.8<L>  /  TBili  0.6  /  DBili  0.2  /  AST  30  /  ALT  28  /  AlkPhos  42  10-24    PT/INR - ( 24 Oct 2023 06:24 )   PT: 15.8 sec;   INR: 1.41 ratio      Urinalysis Basic - ( 24 Oct 2023 06:24 )    Color: x / Appearance: x / SG: x / pH: x  Gluc: 91 mg/dL / Ketone: x  / Bili: x / Urobili: x   Blood: x / Protein: x / Nitrite: x   Leuk Esterase: x / RBC: x / WBC x   Sq Epi: x / Non Sq Epi: x / Bacteria: x    RADIOLOGY & ADDITIONAL STUDIES:    EXAM:  MR BRAIN WAW IC   ORDERED BY: RIVERA QUINTANA     PROCEDURE DATE:  10/24/2023      INTERPRETATION:  MR brain with and without gadolinium    CLINICAL INFORMATION:    TECHNIQUE:   Sagittal and axial T1-weighted images, axial FLAIR images,   axial T2-weighted images, axial gradient echo images and axial diffusion   weighted images of the brain were obtained. Following 7 cc of Gadavist   administration, .5 cc discarded, isotropic volumetric and fast spin echo   T1-weighted imageswere obtained; this data was reformatted using image   post processing software in multiple imaging planes.    FINDINGS:   MRI dated 10/02/2023 available for review.    The brain demonstrates interval increase in the enhancing neoplasm   located in the LEFT occipital and posterior temporal lobes now measuring   6.7 cm AP by 3.7 cm TRV by 3.8 cm cc with extension into the posterior   horn of the LEFT lateral ventricle and ependymal spread. Increase in   moderate vasogenic edema involving the LEFT occipital, parietal,   posterior frontal and posterior temporal lobes. Minimal edema also   extends into the LEFT middle cerebellar peduncle and LEFT caren. No acute   cerebral cortical infarct is found. Minimal hemorrhagic   products/hemosiderin are seen within the central neoplasm..    The ventricles, sulci and basal cisterns appear unremarkable.    The vertebral and internal carotid arteries demonstrate expected flow   voids indicating their patency.    The orbits are unremarkable.  The paranasal sinuses are significant for   mild mucosal thickening in the BILATERAL maxillary, RIGHT frontal and   ethmoid sinuses.  The nasal cavity appears intact.  The nasopharynx is   symmetric.  The central skull base and petrous temporal bones are intact.    The calvarium shows LEFT parieto-occipital craniotomy.      IMPRESSION:  Interval increase in the enhancing neoplasm located in the   LEFT occipital and posterior temporal lobes now measuring 6.7 cm AP by   3.7 cm TRV by 3.8 cm cc with extension into the posterior horn of the   LEFT lateral ventricle and ependymal spread. Increase in moderate   vasogenic edema involving the LEFT occipital, parietal, posterior frontal   and posterior temporal lobes. Minimal edema also extends into the LEFT   middle cerebellar peduncle and LEFT caren.    < end of copied text >   HPI:  62yo male with MHx significant for seizure disorder and glioblastoma presents to the ED [facility] from [facility] for +COVID, vomiting and tachycardia.    10/24/2023- Seen at bedside, remains in Isolation due to COVID-19, remains confused per RN.  Noted with fine tremors.    PAST MEDICAL & SURGICAL HISTORY:    Glioblastoma    Seizures    S/P craniotomy    FAMILY HISTORY:    MEDICATIONS  (STANDING):    apixaban 5 milliGRAM(s) Oral every 12 hours  dexAMETHasone     Tablet 1 milliGRAM(s) Oral daily  lacosamide IVPB 200 milliGRAM(s) IV Intermittent every 12 hours  levETIRAcetam  IVPB 1500 milliGRAM(s) IV Intermittent every 12 hours  piperacillin/tazobactam IVPB.. 3.375 Gram(s) IV Intermittent every 8 hours  polyethylene glycol 3350 17 Gram(s) Oral daily  rOPINIRole 0.25 milliGRAM(s) Oral every 8 hours  senna 2 Tablet(s) Oral at bedtime  tamsulosin 0.4 milliGRAM(s) Oral at bedtime    MEDICATIONS  (PRN):    acetaminophen     Tablet .. 650 milliGRAM(s) Oral every 6 hours PRN Temp greater or equal to 38C (100.4F), Mild Pain (1 - 3), Moderate Pain (4 - 6)  cyclobenzaprine 10 milliGRAM(s) Oral three times a day PRN Muscle Spasm  mirtazapine 15 milliGRAM(s) Oral at bedtime PRN insomnia    ALLERGIES:    No Known Allergies    REVIEW OF SYSTEM:    -Unable to obtain due to change in MS    VITAL SIGNS LAST 24 HRS:    T(C): 36.1 (24 Oct 2023 06:00), Max: 36.8 (23 Oct 2023 16:19)  T(F): 97 (24 Oct 2023 06:00), Max: 98.2 (23 Oct 2023 16:19)  HR: 92 (24 Oct 2023 14:00) (66 - 103)  BP: 121/94 (24 Oct 2023 14:00) (118/71 - 136/90)  BP(mean): 104 (24 Oct 2023 14:00) (87 - 110)  RR: 21 (24 Oct 2023 14:00) (12 - 28)  SpO2: 100% (24 Oct 2023 14:00) (93% - 100%)    Parameters below as of 24 Oct 2023 14:00  Patient On (Oxygen Delivery Method): room air    PHYSICAL EXAM:    CONSTITUTIONAL : NAD, appear to be of stated age , well groomed   NERVOUS SYSTEM:  unable to assess.   HEAD:  Atraumatic, Normocephalic  EYES: conjunctiva and sclera clear  HEENT: did not assess.  CHEST: Clear to auscultation bilaterally; No rales, no rhonchi, no wheezing  HEART: Regular rate and rhythm; No murmurs, no rubs or gallops  ABDOMEN: Soft, Non-tender, Non-distended; Bowel sounds present, no guarding , no peritoneal irritation   GENITOURINARY- incontinent  EXTREMITIES:  2+ Peripheral Pulses, No clubbing, cyanosis, no edema  MUSCULOSKELETAL:- No deformity appreciated.  SKIN-no rash, no lesion    LABS:    CBC Full  -  ( 24 Oct 2023 06:24 )  WBC Count : 7.42 K/uL  RBC Count : 4.87 M/uL  Hemoglobin : 14.0 g/dL  Hematocrit : 42.3 %  Platelet Count - Automated : 157 K/uL  Mean Cell Volume : 86.9 fl  Mean Cell Hemoglobin : 28.7 pg  Mean Cell Hemoglobin Concentration : 33.1 gm/dL  Auto Neutrophil # : x  Auto Lymphocyte # : x  Auto Monocyte # : x  Auto Eosinophil # : x  Auto Basophil # : x  Auto Neutrophil % : x  Auto Lymphocyte % : x  Auto Monocyte % : x  Auto Eosinophil % : x  Auto Basophil % : x    10-24    140  |  107  |  13  ----------------------------<  91  3.6   |  26  |  0.62    Ca    9.3      24 Oct 2023 06:24  Phos  2.8     10-24  Mg     2.1     10-24    TPro  6.6  /  Alb  2.8<L>  /  TBili  0.6  /  DBili  0.2  /  AST  30  /  ALT  28  /  AlkPhos  42  10-24    PT/INR - ( 24 Oct 2023 06:24 )   PT: 15.8 sec;   INR: 1.41 ratio      Urinalysis Basic - ( 24 Oct 2023 06:24 )    Color: x / Appearance: x / SG: x / pH: x  Gluc: 91 mg/dL / Ketone: x  / Bili: x / Urobili: x   Blood: x / Protein: x / Nitrite: x   Leuk Esterase: x / RBC: x / WBC x   Sq Epi: x / Non Sq Epi: x / Bacteria: x    RADIOLOGY & ADDITIONAL STUDIES:    EXAM:  MR BRAIN WAW IC   ORDERED BY: RIVERA QUINTANA     PROCEDURE DATE:  10/24/2023      INTERPRETATION:  MR brain with and without gadolinium    CLINICAL INFORMATION:    TECHNIQUE:   Sagittal and axial T1-weighted images, axial FLAIR images,   axial T2-weighted images, axial gradient echo images and axial diffusion   weighted images of the brain were obtained. Following 7 cc of Gadavist   administration, .5 cc discarded, isotropic volumetric and fast spin echo   T1-weighted imageswere obtained; this data was reformatted using image   post processing software in multiple imaging planes.    FINDINGS:   MRI dated 10/02/2023 available for review.    The brain demonstrates interval increase in the enhancing neoplasm   located in the LEFT occipital and posterior temporal lobes now measuring   6.7 cm AP by 3.7 cm TRV by 3.8 cm cc with extension into the posterior   horn of the LEFT lateral ventricle and ependymal spread. Increase in   moderate vasogenic edema involving the LEFT occipital, parietal,   posterior frontal and posterior temporal lobes. Minimal edema also   extends into the LEFT middle cerebellar peduncle and LEFT caren. No acute   cerebral cortical infarct is found. Minimal hemorrhagic   products/hemosiderin are seen within the central neoplasm..    The ventricles, sulci and basal cisterns appear unremarkable.    The vertebral and internal carotid arteries demonstrate expected flow   voids indicating their patency.    The orbits are unremarkable.  The paranasal sinuses are significant for   mild mucosal thickening in the BILATERAL maxillary, RIGHT frontal and   ethmoid sinuses.  The nasal cavity appears intact.  The nasopharynx is   symmetric.  The central skull base and petrous temporal bones are intact.    The calvarium shows LEFT parieto-occipital craniotomy.      IMPRESSION:  Interval increase in the enhancing neoplasm located in the   LEFT occipital and posterior temporal lobes now measuring 6.7 cm AP by   3.7 cm TRV by 3.8 cm cc with extension into the posterior horn of the   LEFT lateral ventricle and ependymal spread. Increase in moderate   vasogenic edema involving the LEFT occipital, parietal, posterior frontal   and posterior temporal lobes. Minimal edema also extends into the LEFT   middle cerebellar peduncle and LEFT caren.    < end of copied text >   HPI:  62yo male with MHx significant for seizure disorder and glioblastoma presents to the ED [facility] from [facility] for +COVID, vomiting and tachycardia.    10/24/2023- Seen at bedside, remains in Isolation due to COVID-19, remains confused per RN.  Noted with fine tremors.    PAST MEDICAL & SURGICAL HISTORY:    Glioblastoma    Seizures    S/P craniotomy    FAMILY HISTORY:    MEDICATIONS  (STANDING):    apixaban 5 milliGRAM(s) Oral every 12 hours  dexAMETHasone     Tablet 1 milliGRAM(s) Oral daily  lacosamide IVPB 200 milliGRAM(s) IV Intermittent every 12 hours  levETIRAcetam  IVPB 1500 milliGRAM(s) IV Intermittent every 12 hours  piperacillin/tazobactam IVPB.. 3.375 Gram(s) IV Intermittent every 8 hours  polyethylene glycol 3350 17 Gram(s) Oral daily  rOPINIRole 0.25 milliGRAM(s) Oral every 8 hours  senna 2 Tablet(s) Oral at bedtime  tamsulosin 0.4 milliGRAM(s) Oral at bedtime    MEDICATIONS  (PRN):    acetaminophen     Tablet .. 650 milliGRAM(s) Oral every 6 hours PRN Temp greater or equal to 38C (100.4F), Mild Pain (1 - 3), Moderate Pain (4 - 6)  cyclobenzaprine 10 milliGRAM(s) Oral three times a day PRN Muscle Spasm  mirtazapine 15 milliGRAM(s) Oral at bedtime PRN insomnia    ALLERGIES:    No Known Allergies    REVIEW OF SYSTEM:    -Unable to obtain due to change in MS    VITAL SIGNS LAST 24 HRS:    T(C): 36.1 (24 Oct 2023 06:00), Max: 36.8 (23 Oct 2023 16:19)  T(F): 97 (24 Oct 2023 06:00), Max: 98.2 (23 Oct 2023 16:19)  HR: 92 (24 Oct 2023 14:00) (66 - 103)  BP: 121/94 (24 Oct 2023 14:00) (118/71 - 136/90)  BP(mean): 104 (24 Oct 2023 14:00) (87 - 110)  RR: 21 (24 Oct 2023 14:00) (12 - 28)  SpO2: 100% (24 Oct 2023 14:00) (93% - 100%)    Parameters below as of 24 Oct 2023 14:00  Patient On (Oxygen Delivery Method): room air    PHYSICAL EXAM:    CONSTITUTIONAL : NAD, appear to be of stated age , well groomed   NERVOUS SYSTEM:  unable to assess.   HEAD:  Atraumatic, Normocephalic  EYES: conjunctiva and sclera clear  HEENT: did not assess.  CHEST: Clear to auscultation bilaterally; No rales, no rhonchi, no wheezing  HEART: Regular rate and rhythm; No murmurs, no rubs or gallops  ABDOMEN: Soft, Non-tender, Non-distended; Bowel sounds present, no guarding , no peritoneal irritation   GENITOURINARY- incontinent  EXTREMITIES:  2+ Peripheral Pulses, No clubbing, cyanosis, no edema  MUSCULOSKELETAL:- fine upper motor tremors, no deformity appreciated.  SKIN-no rash, no lesion    LABS:    CBC Full  -  ( 24 Oct 2023 06:24 )  WBC Count : 7.42 K/uL  RBC Count : 4.87 M/uL  Hemoglobin : 14.0 g/dL  Hematocrit : 42.3 %  Platelet Count - Automated : 157 K/uL  Mean Cell Volume : 86.9 fl  Mean Cell Hemoglobin : 28.7 pg  Mean Cell Hemoglobin Concentration : 33.1 gm/dL  Auto Neutrophil # : x  Auto Lymphocyte # : x  Auto Monocyte # : x  Auto Eosinophil # : x  Auto Basophil # : x  Auto Neutrophil % : x  Auto Lymphocyte % : x  Auto Monocyte % : x  Auto Eosinophil % : x  Auto Basophil % : x    10-24    140  |  107  |  13  ----------------------------<  91  3.6   |  26  |  0.62    Ca    9.3      24 Oct 2023 06:24  Phos  2.8     10-24  Mg     2.1     10-24    TPro  6.6  /  Alb  2.8<L>  /  TBili  0.6  /  DBili  0.2  /  AST  30  /  ALT  28  /  AlkPhos  42  10-24    PT/INR - ( 24 Oct 2023 06:24 )   PT: 15.8 sec;   INR: 1.41 ratio      Urinalysis Basic - ( 24 Oct 2023 06:24 )    Color: x / Appearance: x / SG: x / pH: x  Gluc: 91 mg/dL / Ketone: x  / Bili: x / Urobili: x   Blood: x / Protein: x / Nitrite: x   Leuk Esterase: x / RBC: x / WBC x   Sq Epi: x / Non Sq Epi: x / Bacteria: x    RADIOLOGY & ADDITIONAL STUDIES:    EXAM:  MR BRAIN WAW IC   ORDERED BY: RIVERA QUINTANA     PROCEDURE DATE:  10/24/2023      INTERPRETATION:  MR brain with and without gadolinium    CLINICAL INFORMATION:    TECHNIQUE:   Sagittal and axial T1-weighted images, axial FLAIR images,   axial T2-weighted images, axial gradient echo images and axial diffusion   weighted images of the brain were obtained. Following 7 cc of Gadavist   administration, .5 cc discarded, isotropic volumetric and fast spin echo   T1-weighted imageswere obtained; this data was reformatted using image   post processing software in multiple imaging planes.    FINDINGS:   MRI dated 10/02/2023 available for review.    The brain demonstrates interval increase in the enhancing neoplasm   located in the LEFT occipital and posterior temporal lobes now measuring   6.7 cm AP by 3.7 cm TRV by 3.8 cm cc with extension into the posterior   horn of the LEFT lateral ventricle and ependymal spread. Increase in   moderate vasogenic edema involving the LEFT occipital, parietal,   posterior frontal and posterior temporal lobes. Minimal edema also   extends into the LEFT middle cerebellar peduncle and LEFT caren. No acute   cerebral cortical infarct is found. Minimal hemorrhagic   products/hemosiderin are seen within the central neoplasm..    The ventricles, sulci and basal cisterns appear unremarkable.    The vertebral and internal carotid arteries demonstrate expected flow   voids indicating their patency.    The orbits are unremarkable.  The paranasal sinuses are significant for   mild mucosal thickening in the BILATERAL maxillary, RIGHT frontal and   ethmoid sinuses.  The nasal cavity appears intact.  The nasopharynx is   symmetric.  The central skull base and petrous temporal bones are intact.    The calvarium shows LEFT parieto-occipital craniotomy.      IMPRESSION:  Interval increase in the enhancing neoplasm located in the   LEFT occipital and posterior temporal lobes now measuring 6.7 cm AP by   3.7 cm TRV by 3.8 cm cc with extension into the posterior horn of the   LEFT lateral ventricle and ependymal spread. Increase in moderate   vasogenic edema involving the LEFT occipital, parietal, posterior frontal   and posterior temporal lobes. Minimal edema also extends into the LEFT   middle cerebellar peduncle and LEFT caren.    < end of copied text >

## 2023-10-24 NOTE — PHYSICAL THERAPY INITIAL EVALUATION ADULT - ACTIVE RANGE OF MOTION EXAMINATION, REHAB EVAL
LUKAS NT d/t pending elbow XR, SARAHE appear tremulous at times, c/o pain w/ B hand open/close/bilateral  lower extremity Active ROM was WFL (within functional limits)

## 2023-10-24 NOTE — PHYSICAL THERAPY INITIAL EVALUATION ADULT - MODALITIES TREATMENT COMMENTS
pt left supine in bed, HOB up, CBIR, lines, monitors and wrist restraints intact, NAD, calm when PT left room but at this time calling out

## 2023-10-24 NOTE — PROGRESS NOTE ADULT - ASSESSMENT
64yo male with PMHx seizure disorder and glioblastoma, s/p 5 cycles of TMA(last dose 6/2023), and 6 doses of MVASI(last dose 5/31), fall and R ankle fracture in 7/2023, stable MRI 10/2 follows up with Dr. Debby Torres,  presents to the ED BIBEMS from [facility] for +COVID, vomiting and tachycardia. Upon arrival pt appeared to be seizing. Pt was on keppra and vimpat. EMS reports pt had 5 tonic clonic seizures lasting about 15 seconds each.  Currently patient is confused, agitated, on wrist restraints.  Head CT shows new heterogeneous coarse and curvilinear parenchymal calcifications, as well as new trace periventricular ependymal calcifications along the left occipital horn, associated with the left parietal GBM.  Pt. is on IV keppra and dose was increased from 1000mg BID to 1500mg BID.  NO more seizures have been observed in the ICU.  PE is limited as patient is restless, agitated, confused, and on wrist restraints.      # Seizures and AMS-in the setting of COVID +.  Pt. with glioblastoma and seizure d/o.  Head CT with new heterogeneous coarse and curvilinear parenchymal calcifications, as well as new trace periventricular ependymal calcifications along the left occipital horn, associated with the left parietal GBM.  Neurosurgery saw the patient and does not recommend any interventions.    Keppra increased  EEG pending    Recommendations:   F/U EEG  Agree with increasing Keppra-can switch to PO once stable to take by mouth  MRI brain with contrast is pending  Will follow    D/W Dr. Grewal        Attestation Statements:   Attestation Statements:  Attending and PA/NP shared services statement (NON-critical care):     Attending to bill.     I independently performed the documented:     History, Exam and Medical decision making.     I have made amendments to the documentation where necessary. Additional comments: I saw and examined the patient.   Patient with history of GBM, epilepsy, presenting with seizure activity in setting of COVId.      Now admitted to ICU, has not had witnessed seizure today.     On exam:   GEN: NAD  NEURO:   Awake, alert, attending the examiner.  he does not say his name, or name any common objects.  He did not follow commands.  He does speak fluently, asking to go to the bathroom.   Pupils 3-2mm, symmetric, full EOM, possible visual deficit on the R.  Trace R facial weakness. No dysarthria.   MOTOR: diminished tone on the R.  R arm with diminished spontaneous movements.  Does not fully participate in confrontational testing.  Seems to have R leg weakness as well.   COORDINATION: unable to fully assess.     CTH images reviewed: L parietal vasogenic edema, post surgical changes.   EEG: L parietal slowing and sharp waves.     AP; 63 year old man with epilepsy, L parietal GBM, presenting with seizure activity in setting of COVID.  On exam, has language dysfunction, and R sided weakness.  Unclear what his baseline is.  The imaging shows vasogenic edema in the R parietal area.    -continue keppra 1500mg twice daily  -MRi brain is pending  -supportive care per ICU  -seizure precautions, PRN ativan for convulsive seizure, and padded bed rails.   -PT/OT  -neurology to follow.  Please page or call with any acute changes, or other issues we can help address. 64yo male with PMHx seizure disorder and glioblastoma, s/p 5 cycles of TMA(last dose 6/2023), and 6 doses of MVASI(last dose 5/31), fall and R ankle fracture in 7/2023, stable MRI 10/2 follows up with Dr. Debby Torres,  presents to the ED [facility] from [facility] for +COVID, vomiting and tachycardia. Upon arrival pt appeared to be seizing. Pt was on keppra and vimpat. EMS reports pt had 5 tonic clonic seizures lasting about 15 seconds each.  Currently patient is confused, agitated, on wrist restraints.  Head CT shows new heterogeneous coarse and curvilinear parenchymal calcifications, as well as new trace periventricular ependymal calcifications along the left occipital horn, associated with the left parietal GBM.  Pt. is on IV keppra and dose was increased from 1000mg BID to 1500mg BID.  NO more seizures have been observed in the ICU.        # Seizures and AMS-in the setting of COVID +.  Pt. with glioblastoma and seizure d/o.  Head CT with new heterogeneous coarse and curvilinear parenchymal calcifications, as well as new trace periventricular ependymal calcifications along the left occipital horn, associated with the left parietal GBM.  Neurosurgery saw the patient and does not recommend any interventions.      MRI brain: Interval increase in the enhancing neoplasm located in the   LEFT occipital and posterior temporal lobes now measuring 6.7 cm AP by   3.7 cm TRV by 3.8 cm cc with extension into the posterior horn of the   LEFT lateral ventricle and ependymal spread. Increase in moderate   vasogenic edema involving the LEFT occipital, parietal, posterior frontal   and posterior temporal lobes. Minimal edema also extends into the LEFT   middle cerebellar peduncle and LEFT caren.  Keppra increased    EEG: Findings are consistent with left hemisphere dysfunction (parietal,   temporal) with epileptogenic cortical dysfunction and a risk for focal   onset seizures.    Recommendations:   Continue keppra at current dose of 1500mg BID  Decadron per neurosurgery  Primary neurosurgeon Dr. Turpin and his primary oncology team contacted by Neurosurgery  Supportive care for COVID      D/W Dr. Grewal        Attestation Statements:   Attestation Statements:  Attending and PA/NP shared services statement (NON-critical care):     Attending to bill.     I independently performed the documented:     History, Exam and Medical decision making.     I have made amendments to the documentation where necessary. Additional comments: I saw and examined the patient.   Patient with history of GBM, epilepsy, presenting with seizure activity in setting of COVId.      Now admitted to ICU, has not had witnessed seizure today.     On exam:   GEN: NAD  NEURO:   Awake, alert, attending the examiner.  he does not say his name, or name any common objects.  He did not follow commands.  He does speak fluently, asking to go to the bathroom.   Pupils 3-2mm, symmetric, full EOM, possible visual deficit on the R.  Trace R facial weakness. No dysarthria.   MOTOR: diminished tone on the R.  R arm with diminished spontaneous movements.  Does not fully participate in confrontational testing.  Seems to have R leg weakness as well.   COORDINATION: unable to fully assess.     CTH images reviewed: L parietal vasogenic edema, post surgical changes.   EEG: L parietal slowing and sharp waves.     AP; 63 year old man with epilepsy, L parietal GBM, presenting with seizure activity in setting of COVID.  On exam, has language dysfunction, and R sided weakness.  Unclear what his baseline is.  The imaging shows vasogenic edema in the R parietal area.    -continue keppra 1500mg twice daily  -MRi brain is pending  -supportive care per ICU  -seizure precautions, PRN ativan for convulsive seizure, and padded bed rails.   -PT/OT  -neurology to follow.  Please page or call with any acute changes, or other issues we can help address. 64yo male with PMHx seizure disorder and glioblastoma, s/p 5 cycles of TMA(last dose 6/2023), and 6 doses of MVASI(last dose 5/31), fall and R ankle fracture in 7/2023, stable MRI 10/2 follows up with Dr. Debby Torres,  presents to the ED [facility] from [facility] for +COVID, vomiting and tachycardia. Upon arrival pt appeared to be seizing. Pt was on keppra and vimpat. EMS reports pt had 5 tonic clonic seizures lasting about 15 seconds each.  Currently patient is confused, agitated, on wrist restraints.  Head CT shows new heterogeneous coarse and curvilinear parenchymal calcifications, as well as new trace periventricular ependymal calcifications along the left occipital horn, associated with the left parietal GBM.  Pt. is on IV keppra and dose was increased from 1000mg BID to 1500mg BID.  NO more seizures have been observed in the ICU.        # Seizures and AMS-in the setting of COVID +.  Pt. with glioblastoma and seizure d/o.  Head CT with new heterogeneous coarse and curvilinear parenchymal calcifications, as well as new trace periventricular ependymal calcifications along the left occipital horn, associated with the left parietal GBM.  Neurosurgery saw the patient and does not recommend any interventions.      MRI brain: Interval increase in the enhancing neoplasm located in the   LEFT occipital and posterior temporal lobes now measuring 6.7 cm AP by   3.7 cm TRV by 3.8 cm cc with extension into the posterior horn of the   LEFT lateral ventricle and ependymal spread. Increase in moderate   vasogenic edema involving the LEFT occipital, parietal, posterior frontal   and posterior temporal lobes. Minimal edema also extends into the LEFT   middle cerebellar peduncle and LEFT caren.  Keppra increased    EEG: Findings are consistent with left hemisphere dysfunction (parietal,   temporal) with epileptogenic cortical dysfunction and a risk for focal   onset seizures.    Recommendations:   Continue keppra at current dose of 1500mg BID  Decadron per neurosurgery  Primary neurosurgeon Dr. Turpin and his primary oncology team contacted by Neurosurgery  Supportive care for COVID      D/W Dr. Grewal

## 2023-10-24 NOTE — PHYSICAL THERAPY INITIAL EVALUATION ADULT - ADDITIONAL COMMENTS
pt tells me he lives in pvt house w/ spouse- poor historian, pt adm from [facility] pt tells me he lives in pvt house w/ spouse- poor historian, pt adm from [facility] (pt w/ R ankle fx 7/23-to [facility] for [facility] then?)

## 2023-10-24 NOTE — PROGRESS NOTE ADULT - ASSESSMENT
ASSESSMENT  62yo male with PMHx seizure disorder and glioblastoma presents to the ED BIBEMS from [facility] for +COVID, vomiting and tachycardia. Upon arrival pt appeared to be seizing as per chart. Pt on keppra and vimpat. EMS reports pt had 5 tonic clonic seizures lasting about 15 seconds each. pt with hx of seizure last time he had covid    Admitted for :  1. Seizures in setting of GBM and COVID  2. Toxic metabolic encephalopathy vs steroid induced delirium  3. hx seizure disorder    PLAN  Neuro: AAOx CTH revealing new calcifications asso w/ L parietal GBM, vasogenic edema unchanged. cont vimpat 200 bid, keppra 1500mg bid. MR brain w/ contrast.  PO decadron 1mg qd. (normally AAOx3, but just "stubborn and argumentative" as per HCP)  CV: Normotensive.   Pulm: on room air.  CT chest neg for PE but revealing mod bibasilar consolidations, suspect aspiration. isolation precautions for covid  GI: PO diet. PPI. bowel regimen prn  Nephro: no active issues. monitor I & Os. Trend renal fxn  Vasc: US doppler RLE - neg for DVT. cont eliquis bid.  ID: cont IV zosyn for bibasilar PNA and UTI. will monitor off decadron/remdesivir for now. blood and urine cultures - neg to date  Heme: DVT ppx - on eliquis for hx of DVT/PE. SCDs.   PT eval    Dispo: SICU. keppra and vimpat. MR brain with IV contrast . IV abx for PNA    will discuss with Dr. Stewart ASSESSMENT  62yo male with PMHx seizure disorder and glioblastoma presents to the ED BIBEMS from [facility] for +COVID, vomiting and tachycardia. Upon arrival pt appeared to be seizing as per chart. Pt on keppra and vimpat. EMS reports pt had 5 tonic clonic seizures lasting about 15 seconds each. pt with hx of seizure last time he had covid    Admitted for :  1. Seizures in setting of GBM and COVID  2. Toxic metabolic encephalopathy vs steroid induced delirium  3. hx seizure disorder    PLAN  Neuro: AAOx 1, calmer today. cont vimpat 200 bid, keppra 1500mg bid. MR brain w/ contrast.  PO decadron 1mg qd.   CV: Normotensive.   Pulm: on room air.  CT chest neg for PE but revealing mod bibasilar consolidations, suspect aspiration. isolation precautions for covid  GI: PO diet. PPI. bowel regimen prn  Nephro: no active issues. monitor I & Os. Trend renal fxn  Vasc: US doppler RLE - neg for DVT. cont eliquis bid.  ID: cont IV zosyn for bibasilar PNA and UTI. will monitor off decadron/remdesivir for now. blood and urine cultures - neg to date  Heme: DVT ppx - on eliquis for hx of DVT/PE. SCDs.   PT eval    Dispo: SICU. keppra and vimpat. MR brain with/wo IV contrast . IV abx for PNA    will discuss with Dr. Stewart ASSESSMENT  64yo male with PMHx seizure disorder and glioblastoma presents to the ED BIBEMS from [facility] for +COVID, vomiting and tachycardia. Upon arrival pt appeared to be seizing as per chart. Pt on keppra and vimpat. EMS reports pt had 5 tonic clonic seizures lasting about 15 seconds each. pt with hx of seizure last time he had covid    Admitted for :  1. Seizures in setting of GBM and COVID  2. Toxic metabolic encephalopathy vs steroid induced delirium  3. hx seizure disorder    PLAN  Neuro: AAOx 1, calmer today. cont vimpat 200 bid, keppra 1500mg bid. MR brain w/ contrast.  PO decadron 1mg qd.   CV: Normotensive.   Pulm: on room air.  CT chest neg for PE but revealing mod bibasilar consolidations, suspect aspiration. isolation precautions for covid  GI: PO diet. PPI. bowel regimen prn  Nephro: no active issues. monitor I & Os. Trend renal fxn  Vasc: US doppler RLE - neg for DVT. cont eliquis bid.  ID: cont IV zosyn for bibasilar PNA and UTI. will monitor off decadron/remdesivir for now. blood and urine cultures - neg to date  Heme: DVT ppx - on eliquis for hx of DVT/PE. SCDs.   PT eval    Dispo: Transfer to N. keppra and vimpat. MR brain with/wo IV contrast . IV abx for PNA    will discuss with Dr. Stewart    signed out to Dr. Polk 11:45am

## 2023-10-24 NOTE — PROGRESS NOTE ADULT - SUBJECTIVE AND OBJECTIVE BOX
HPI:     ROS:     MEDICATIONS  (STANDING):  apixaban 5 milliGRAM(s) Oral every 12 hours  dexAMETHasone     Tablet 1 milliGRAM(s) Oral daily  lacosamide IVPB 200 milliGRAM(s) IV Intermittent every 12 hours  levETIRAcetam  IVPB 1500 milliGRAM(s) IV Intermittent every 12 hours  piperacillin/tazobactam IVPB.. 3.375 Gram(s) IV Intermittent every 8 hours  polyethylene glycol 3350 17 Gram(s) Oral daily  rOPINIRole 0.25 milliGRAM(s) Oral every 8 hours  senna 2 Tablet(s) Oral at bedtime  tamsulosin 0.4 milliGRAM(s) Oral at bedtime      Vital Signs Last 24 Hrs  T(C): 36.1 (24 Oct 2023 06:00), Max: 36.8 (23 Oct 2023 16:19)  T(F): 97 (24 Oct 2023 06:00), Max: 98.2 (23 Oct 2023 16:19)  HR: 92 (24 Oct 2023 11:00) (69 - 103)  BP: 120/79 (24 Oct 2023 11:00) (118/71 - 136/90)  BP(mean): 92 (24 Oct 2023 11:00) (87 - 110)  RR: 20 (24 Oct 2023 11:00) (17 - 28)  SpO2: 95% (24 Oct 2023 11:00) (93% - 98%)    Parameters below as of 24 Oct 2023 11:00  Patient On (Oxygen Delivery Method): room air                              14.0   7.42  )-----------( 157      ( 24 Oct 2023 06:24 )             42.3     10-24    140  |  107  |  13  ----------------------------<  91  3.6   |  26  |  0.62    Ca    9.3      24 Oct 2023 06:24  Phos  2.8     10-24  Mg     2.1     10-24    TPro  6.6  /  Alb  2.8<L>  /  TBili  0.6  /  DBili  0.2  /  AST  30  /  ALT  28  /  AlkPhos  42  10-24          Radiology report:   CT Head No Cont (10.22.23 @ 12:38)   IMPRESSION:    New heterogeneous coarse and curvilinear parenchymal calcifications, as   well as new trace periventricular ependymal calcifications along the left   occipital horn, associated with the left parietal GBM. Perilesional   vasogenic edema extending into the left frontoparietal and   temporo-occipital region is similar in extent to CT of 3/7/2023.    < from: EEG Awake or Drowsy (10.23.23 @ 16:30) >    EEG Summary/Classification:  This was an abnormal EEG study in the awake and drowsy state due to:  -Continuous focal slowing ? left parietal and mid-posterior temporal   region.  -Brief runs of sharp waves, left parietal and temporal, rarely with   spread throughout the left hemisphere.    EEG Clinical Correlate/  Impression:  Findings are consistent with left hemisphere dysfunction (parietal,   temporal) with epileptogenic cortical dysfunction and a risk for focal   onset seizures.  Consider a repeat study if clinically indicated.      < from: MR Head w/wo IV Cont (10.24.23 @ 09:50) >  IMPRESSION:  Interval increase in the enhancing neoplasm located in the   LEFT occipital and posterior temporal lobes now measuring 6.7 cm AP by   3.7 cm TRV by 3.8 cm cc with extension into the posterior horn of the   LEFT lateral ventricle and ependymal spread. Increase in moderate   vasogenic edema involving the LEFT occipital, parietal, posterior frontal   and posterior temporal lobes. Minimal edema also extends into the LEFT   middle cerebellar peduncle and LEFT caren.     No acute events overnight, still confused, but calmer, still with wrist restraints    ROS: Unable to obtain    MEDICATIONS  (STANDING):  apixaban 5 milliGRAM(s) Oral every 12 hours  dexAMETHasone     Tablet 1 milliGRAM(s) Oral daily  lacosamide IVPB 200 milliGRAM(s) IV Intermittent every 12 hours  levETIRAcetam  IVPB 1500 milliGRAM(s) IV Intermittent every 12 hours  piperacillin/tazobactam IVPB.. 3.375 Gram(s) IV Intermittent every 8 hours  polyethylene glycol 3350 17 Gram(s) Oral daily  rOPINIRole 0.25 milliGRAM(s) Oral every 8 hours  senna 2 Tablet(s) Oral at bedtime  tamsulosin 0.4 milliGRAM(s) Oral at bedtime      Vital Signs Last 24 Hrs  T(C): 36.1 (24 Oct 2023 06:00), Max: 36.8 (23 Oct 2023 16:19)  T(F): 97 (24 Oct 2023 06:00), Max: 98.2 (23 Oct 2023 16:19)  HR: 92 (24 Oct 2023 11:00) (69 - 103)  BP: 120/79 (24 Oct 2023 11:00) (118/71 - 136/90)  BP(mean): 92 (24 Oct 2023 11:00) (87 - 110)  RR: 20 (24 Oct 2023 11:00) (17 - 28)  SpO2: 95% (24 Oct 2023 11:00) (93% - 98%)    Parameters below as of 24 Oct 2023 11:00  Patient On (Oxygen Delivery Method): room air      GEN: NAD  NEURO:   Awake, alert, attending the examiner.  he does not say his name, or name any common objects.  He did smile,  He does speak fluently, saying that he is hot and has perseverance in speech  Pupils 3-2mm, symmetric, full EOM, possible visual deficit on the R.  Trace R facial weakness. No dysarthria.   MOTOR: diminished tone on the R.  R arm with diminished spontaneous movements.  Does not fully participate in confrontational testing.  Seems to have R leg weakness as well.   COORDINATION: unable to fully assess.                         14.0   7.42  )-----------( 157      ( 24 Oct 2023 06:24 )             42.3     10-24    140  |  107  |  13  ----------------------------<  91  3.6   |  26  |  0.62    Ca    9.3      24 Oct 2023 06:24  Phos  2.8     10-24  Mg     2.1     10-24    TPro  6.6  /  Alb  2.8<L>  /  TBili  0.6  /  DBili  0.2  /  AST  30  /  ALT  28  /  AlkPhos  42  10-24          Radiology report:   CT Head No Cont (10.22.23 @ 12:38)   IMPRESSION:    New heterogeneous coarse and curvilinear parenchymal calcifications, as   well as new trace periventricular ependymal calcifications along the left   occipital horn, associated with the left parietal GBM. Perilesional   vasogenic edema extending into the left frontoparietal and   temporo-occipital region is similar in extent to CT of 3/7/2023.    EEG Awake or Drowsy (10.23.23 @ 16:30)     EEG Summary/Classification:  This was an abnormal EEG study in the awake and drowsy state due to:  -Continuous focal slowing ? left parietal and mid-posterior temporal   region.  -Brief runs of sharp waves, left parietal and temporal, rarely with   spread throughout the left hemisphere.    EEG Clinical Correlate/  Impression:  Findings are consistent with left hemisphere dysfunction (parietal,   temporal) with epileptogenic cortical dysfunction and a risk for focal   onset seizures.  Consider a repeat study if clinically indicated.      MR Head w/wo IV Cont (10.24.23 @ 09:50)   IMPRESSION:  Interval increase in the enhancing neoplasm located in the   LEFT occipital and posterior temporal lobes now measuring 6.7 cm AP by   3.7 cm TRV by 3.8 cm cc with extension into the posterior horn of the   LEFT lateral ventricle and ependymal spread. Increase in moderate   vasogenic edema involving the LEFT occipital, parietal, posterior frontal   and posterior temporal lobes. Minimal edema also extends into the LEFT   middle cerebellar peduncle and LEFT caren.     No acute events overnight, still confused, but calmer, still with wrist restraints    ROS: Unable to obtain    MEDICATIONS  (STANDING):  apixaban 5 milliGRAM(s) Oral every 12 hours  dexAMETHasone     Tablet 1 milliGRAM(s) Oral daily  lacosamide IVPB 200 milliGRAM(s) IV Intermittent every 12 hours  levETIRAcetam  IVPB 1500 milliGRAM(s) IV Intermittent every 12 hours  piperacillin/tazobactam IVPB.. 3.375 Gram(s) IV Intermittent every 8 hours  polyethylene glycol 3350 17 Gram(s) Oral daily  rOPINIRole 0.25 milliGRAM(s) Oral every 8 hours  senna 2 Tablet(s) Oral at bedtime  tamsulosin 0.4 milliGRAM(s) Oral at bedtime      Vital Signs Last 24 Hrs  T(C): 36.1 (24 Oct 2023 06:00), Max: 36.8 (23 Oct 2023 16:19)  T(F): 97 (24 Oct 2023 06:00), Max: 98.2 (23 Oct 2023 16:19)  HR: 92 (24 Oct 2023 11:00) (69 - 103)  BP: 120/79 (24 Oct 2023 11:00) (118/71 - 136/90)  BP(mean): 92 (24 Oct 2023 11:00) (87 - 110)  RR: 20 (24 Oct 2023 11:00) (17 - 28)  SpO2: 95% (24 Oct 2023 11:00) (93% - 98%)    Parameters below as of 24 Oct 2023 11:00  Patient On (Oxygen Delivery Method): room air      GEN: NAD  NEURO:   Awake, alert, attending the examiner.  he does not say his name, or name any common objects.  He did smile,  He does speak fluently, saying that he is hot and has perseverance in speech  Pupils 3-2mm, symmetric, full EOM, possible visual deficit on the R.  Trace R facial weakness. No dysarthria.   MOTOR: diminished tone on the R.  R arm with diminished spontaneous movements.  Does not fully participate in confrontational testing.  Seems to have R leg weakness as well.   COORDINATION: unable to fully assess.     Neurological exam:  HF: A x O x O. Much more calmer, still confused, follows simple commands ie: lifting up legs L>R, smiles, fluent speech saying that he is hot, perseverance of speech, no Aphasia, exam limited  CN: pupils 3-2mm b/l, CHAPIN, EOMI, unable to assess VF, facial sensation, no NLFD  Motor: Moves all extremities,  unable to assess UE's 2/2 wrist restraints.  Diminished tone on R.  Diminished spontaneous movement RUE, refusing to move RLE although has his R knee bent  Sens: unable to assess   Reflexes: Symmetric and normal, downgoing toes b/l  Coord:  Unable to assess  Gait/Balance: Cannot test                          14.0   7.42  )-----------( 157      ( 24 Oct 2023 06:24 )             42.3     10-24    140  |  107  |  13  ----------------------------<  91  3.6   |  26  |  0.62    Ca    9.3      24 Oct 2023 06:24  Phos  2.8     10-24  Mg     2.1     10-24    TPro  6.6  /  Alb  2.8<L>  /  TBili  0.6  /  DBili  0.2  /  AST  30  /  ALT  28  /  AlkPhos  42  10-24          Radiology report:   CT Head No Cont (10.22.23 @ 12:38)   IMPRESSION:    New heterogeneous coarse and curvilinear parenchymal calcifications, as   well as new trace periventricular ependymal calcifications along the left   occipital horn, associated with the left parietal GBM. Perilesional   vasogenic edema extending into the left frontoparietal and   temporo-occipital region is similar in extent to CT of 3/7/2023.    EEG Awake or Drowsy (10.23.23 @ 16:30)     EEG Summary/Classification:  This was an abnormal EEG study in the awake and drowsy state due to:  -Continuous focal slowing ? left parietal and mid-posterior temporal   region.  -Brief runs of sharp waves, left parietal and temporal, rarely with   spread throughout the left hemisphere.    EEG Clinical Correlate/  Impression:  Findings are consistent with left hemisphere dysfunction (parietal,   temporal) with epileptogenic cortical dysfunction and a risk for focal   onset seizures.  Consider a repeat study if clinically indicated.      MR Head w/wo IV Cont (10.24.23 @ 09:50)   IMPRESSION:  Interval increase in the enhancing neoplasm located in the   LEFT occipital and posterior temporal lobes now measuring 6.7 cm AP by   3.7 cm TRV by 3.8 cm cc with extension into the posterior horn of the   LEFT lateral ventricle and ependymal spread. Increase in moderate   vasogenic edema involving the LEFT occipital, parietal, posterior frontal   and posterior temporal lobes. Minimal edema also extends into the LEFT   middle cerebellar peduncle and LEFT caren.

## 2023-10-24 NOTE — PHYSICAL THERAPY INITIAL EVALUATION ADULT - PERTINENT HX OF CURRENT PROBLEM, REHAB EVAL
64yo male with PMHx seizure disorder and glioblastoma presents to the ED BIBEMS from [facility] for +COVID, vomiting and tachycardia. Upon arrival pt appears to be seizing. Pt on keppra. EMS reports pt had 5 tonic clonic seizures lasting about 15 seconds each. CT chest neg for PE but revealing mod bibasilar consolidations. EEG- consistent with left hemisphere dysfunction (parietal,   temporal) with epileptogenic cortical dysfunction and a risk for focal onset seizures. 62yo male with PMHx seizure disorder and glioblastoma (s/p crani, chemo) presents to the ED BIBEMS from [facility] for +COVID, vomiting and tachycardia. Upon arrival pt appears to be seizing. Pt on keppra. EMS reports pt had 5 tonic clonic seizures lasting about 15 seconds each. CT chest neg for PE but revealing mod bibasilar consolidations. EEG- consistent with left hemisphere dysfunction (parietal,   temporal) with epileptogenic cortical dysfunction and a risk for focal onset seizures.

## 2023-10-24 NOTE — CONSULT NOTE ADULT - ASSESSMENT
62 yo female  significant for Glioblastoma, WHO 4, IDH wt  MGMT methylated in 10/2022, presents to the ED [facility] from [facility] for +COVID, vomiting and seizures, found to have COVID-19.    # Glioblastoma, WHO 4, IDH wt- MGMT methylated     s/p 5 cycles of TMZ ( last dose 6/2023 ) and a total of 6 doses of MVASI( last dose 5/31/23).  -His further treatment got delayed secondary to fall and right ankle fracture -and has been in rehab at [facility] since 7/2023  -Per Dr. Torres's outpatient notes- since MRI from 10/2- left temporal parietal area appears stable - treatments on hold, but continue Keppra 1000 mg bid and Vimpat 200 mg bid  -MR-brain-10/24/23-  Interval increase in the enhancing neoplasm located in the LEFT occipital and posterior temporal lobes now measuring 6.7 cm AP by 3.7 cm TRV by 3.8 cm cc with extension into the posterior horn of the LEFT lateral ventricle and ependymal spread. Increase in moderate vasogenic edema involving the LEFT occipital, parietal, posterior frontal and posterior temporal lobes. Minimal edema also extends into the LEFT middle cerebellar peduncle and LEFT caren  -Currently on Keppra 1500 mg IV BID & Vimpat 2002mg PO BID  -Dexamethasone- 1 mg PO QD  ?  # COVID-19    -s/p Remdesivir   -s/p Decadron  -  # Hx of DVT /PE    - On Eliquis 5 mg bid     64 yo female  significant for Glioblastoma, WHO 4, IDH wt  MGMT methylated in 10/2022, presents to the ED [facility] from [facility] for positive COVID,-19  vomiting and seizures.    # Glioblastoma, WHO 4, IDH wt- MGMT methylated     s/p 5 cycles of TMZ ( last dose 6/2023 ) and a total of 6 doses of MVASI( last dose 5/31/23).  -His further treatment got delayed secondary to fall and right ankle fracture -and has been in rehab at [facility] since 7/2023  -Per Dr. Torres's outpatient notes- since MRI from 10/2- left temporal parietal area appears stable - treatments on hold, but continue Keppra 1000 mg bid and Vimpat 200 mg bid  -MR-brain-10/24/23-  Interval increase in the enhancing neoplasm located in the LEFT occipital and posterior temporal lobes now measuring 6.7 cm AP by 3.7 cm TRV by 3.8 cm cc with extension into the posterior horn of the LEFT lateral ventricle and ependymal spread. Increase in moderate vasogenic edema involving the LEFT occipital, parietal, posterior frontal and posterior temporal lobes. Minimal edema also extends into the LEFT middle cerebellar peduncle and LEFT caren  -Currently on Keppra 1500 mg IV BID & Vimpat 2002mg PO BID  -Dexamethasone- 1 mg PO QD  ?  # COVID-19    -s/p Remdesivir   -s/p Decadron  -  # Hx of DVT /PE    - On Eliquis 5 mg bid     64 yo female MH significant for Glioblastoma, WHO 4, IDH wt  MGMT methylated in 10/2022, presents to the ED [facility] from [facility] for positive COVID,-19  vomiting and seizures.    # Glioblastoma, WHO 4, IDH wt- MGMT methylated     s/p 5 cycles of TMZ ( last dose 6/2023 ) and a total of 6 doses of MVASI( last dose 5/31/23).  -His further treatment got delayed secondary to fall and right ankle fracture -and has been in rehab at [facility] since 7/2023  -Per Dr. Torres's outpatient notes- since MRI from 10/2- left temporal parietal area appears stable - treatments on hold, but continue Keppra 1000 mg bid and Vimpat 200 mg bid  -MR-brain-10/24/23-  Interval increase in the enhancing neoplasm located in the LEFT occipital and posterior temporal lobes now measuring 6.7 cm AP by 3.7 cm TRV by 3.8 cm cc with extension into the posterior horn of the LEFT lateral ventricle and ependymal spread. Increase in moderate vasogenic edema involving the LEFT occipital, parietal, posterior frontal and posterior temporal lobes. Minimal edema also extends into the LEFT middle cerebellar peduncle and LEFT caren    ?  # COVID-19    -s/p Remdesivir   -s/p Decadron    # Seizure     -s/p EEG Findings are consistent with left hemisphere dysfunction (parietal, temporal) with epileptogenic cortical dysfunction and a risk for focal onset seizures.  -Neurology following  -Currently on Keppra 1500 mg IV BID & Vimpat 2002mg PO BID  -Dexamethasone- 1 mg PO QD  -  # Hx of DVT /PE    - On Eliquis 5 mg bid     62 yo female  significant for Glioblastoma, WHO 4, IDH wt  MGMT methylated in 10/2022, presents to the ED [facility] from [facility] for positive COVID,-19  vomiting and seizures.    # Glioblastoma, WHO 4, IDH wt- MGMT methylated     s/p 5 cycles of TMZ ( last dose 6/2023 ) and a total of 6 doses of MVASI( last dose 5/31/23).  -His further treatment got delayed secondary to fall and right ankle fracture -and has been in rehab at [facility] since 7/2023  -Per Dr. Torres's outpatient notes- since MRI from 10/2- left temporal parietal area appears stable - treatments on hold, but continue Keppra 1000 mg bid and Vimpat 200 mg bid  -MR-brain-10/24/23- Interval increase in the enhancing neoplasm located in the LEFT occipital and posterior temporal lobes now measuring 6.7 cm AP by 3.7 cm TRV by 3.8 cm cc with extension into the posterior horn of the LEFT lateral ventricle and ependymal spread. Increase in moderate vasogenic edema involving the LEFT occipital, parietal, posterior frontal and posterior temporal lobes. Minimal edema also extends into the LEFT middle cerebellar peduncle and LEFT caren  -Dr. Torres aware; there is no significant mass effect although she agrees enhancement is worse.    ?  # COVID-19    -s/p Remdesivir   -s/p Decadron    # Seizure     -s/p EEG Findings are consistent with left hemisphere dysfunction (parietal, temporal) with epileptogenic cortical dysfunction and a risk for focal onset seizures.  -Neurology following  -Currently on Keppra 1500 mg IV BID & Vimpat 2002mg PO BID  -Dexamethasone- 1 mg PO QD  -  # Hx of DVT /PE    - On Eliquis 5 mg bid

## 2023-10-24 NOTE — PROGRESS NOTE ADULT - SUBJECTIVE AND OBJECTIVE BOX
Patient is a 63y old  Male who presents with a chief complaint of Seizures, COVID (23 Oct 2023 08:54)    BRIEF HOSPITAL COURSE: 64yo male with PMHx seizure disorder and glioblastoma presents to the ED [facility] from [facility] for +COVID, vomiting and tachycardia. Upon arrival pt appears to be seizing. Pt on keppra. EMS reports pt had 5 tonic clonic seizures lasting about 15 seconds each.    10/23 pt confused and agitated, yelling. unable to tell me his name. intermittently follows commands  10/24    PAST MEDICAL & SURGICAL HISTORY:  Glioblastoma  Seizures  S/P craniotomy  Medications:  piperacillin/tazobactam IVPB.. 3.375 Gram(s) IV Intermittent every 8 hours  remdesivir  IVPB 100 milliGRAM(s) IV Intermittent every 24 hours  remdesivir  IVPB   IV Intermittent   acetaminophen     Tablet .. 650 milliGRAM(s) Oral every 6 hours PRN  cyclobenzaprine 10 milliGRAM(s) Oral three times a day PRN  lacosamide IVPB 200 milliGRAM(s) IV Intermittent every 12 hours  levETIRAcetam  IVPB 1500 milliGRAM(s) IV Intermittent every 12 hours  LORazepam   Injectable 0.25 milliGRAM(s) IV Push once PRN  mirtazapine 15 milliGRAM(s) Oral once  rOPINIRole 0.25 milliGRAM(s) Oral every 8 hours  apixaban 5 milliGRAM(s) Oral every 12 hours  polyethylene glycol 3350 17 Gram(s) Oral daily  senna 2 Tablet(s) Oral at bedtime  tamsulosin 0.4 milliGRAM(s) Oral at bedtime  dexAMETHasone  Injectable 6 milliGRAM(s) IV Push daily  lactated ringers. 1000 milliLiter(s) IV Continuous <Continuous>    Vital Signs Last 24 Hrs  T(C): 36.1 (24 Oct 2023 06:00), Max: 36.9 (23 Oct 2023 09:16)  T(F): 97 (24 Oct 2023 06:00), Max: 98.5 (23 Oct 2023 09:16)  HR: 81 (24 Oct 2023 06:00) (76 - 102)  BP: 132/99 (24 Oct 2023 06:00) (118/71 - 136/90)  BP(mean): 109 (24 Oct 2023 06:00) (87 - 110)  RR: 22 (24 Oct 2023 06:00) (22 - 28)  SpO2: 97% (24 Oct 2023 06:00) (93% - 98%)    Parameters below as of 23 Oct 2023 20:00  Patient On (Oxygen Delivery Method): room air    I&O's Detail    23 Oct 2023 07:01  -  24 Oct 2023 07:00  --------------------------------------------------------  IN:    IV PiggyBack: 700 mL  Total IN: 700 mL    OUT:    Stool (mL): 0 mL    Voided (mL): 850 mL  Total OUT: 850 mL    Total NET: -150 mL      LABS:                          14.0   7.42  )-----------( 157      ( 24 Oct 2023 06:24 )             42.3     10-24    140  |  107  |  13  ----------------------------<  91  3.6   |  26  |  0.62    Ca    9.3      24 Oct 2023 06:24  Phos  2.8     10-24  Mg     2.1     10-24    TPro  6.6  /  Alb  2.8<L>  /  TBili  0.6  /  DBili  0.2  /  AST  30  /  ALT  28  /  AlkPhos  42  10-24    LIVER FUNCTIONS - ( 24 Oct 2023 06:24 )  Alb: 2.8 g/dL / Pro: 6.6 gm/dL / ALK PHOS: 42 U/L / ALT: 28 U/L / AST: 30 U/L / GGT: x           PT/INR - ( 24 Oct 2023 06:24 )   PT: 15.8 sec;   INR: 1.41 ratio         PTT - ( 22 Oct 2023 10:20 )  PTT:22.4 sec  Urinalysis Basic - ( 24 Oct 2023 06:24 )    Color: x / Appearance: x / SG: x / pH: x  Gluc: 91 mg/dL / Ketone: x  / Bili: x / Urobili: x   Blood: x / Protein: x / Nitrite: x   Leuk Esterase: x / RBC: x / WBC x   Sq Epi: x / Non Sq Epi: x / Bacteria: x      CULTURES:  Rapid RVP Result: Detected (10-22 @ 10:20)  Culture Results:   No growth (10-22 @ 10:20)      Physical Examination:    General: No acute distress.  agitated.   HEENT: Pupils equal, reactive to light.  Symmetric.  PULM: Clear to auscultation bilaterally,  CVS: Regular rate and rhythm, no murmur  ABD: Soft, nondistended, nontender, guards in epigastric area.   EXT: No LE edema, nontender  SKIN: Warm and well perfused, no rashes noted.  NEURO: Alert, oriented x 0, yelling    DEVICES:     RADIOLOGY:  < from: CT Head No Cont (10.22.23 @ 12:38) >  IMPRESSION:    New heterogeneous coarse and curvilinear parenchymal calcifications, as   well as new trace periventricular ependymal calcifications along the left   occipital horn, associated with the left parietal GBM. Perilesional   vasogenic edema extending into the left frontoparietal and   temporo-occipital region is similar in extent to CT of 3/7/2023.    < end of copied text >      < from: CT Abdomen and Pelvis w/ IV Cont (10.22.23 @ 12:57) >  IMPRESSION:    1. Moderate consolidations at the posterior bibasilar lungs, possibly   representing aspiration pneumonia. No pulmonary embolism.  2. Diffuse bladder wall thickening suggestive of cystitis. Correlate with   urinalysis.  3. Borderline dilated main pulmonary artery suggestive of pulmonary   hypertension.  4. Trace posterior pericardial effusion.    < end of copied text >     Patient is a 63y old  Male who presents with a chief complaint of Seizures, COVID (23 Oct 2023 08:54)    BRIEF HOSPITAL COURSE: 64yo male with PMHx seizure disorder and glioblastoma presents to the ED [facility] from [facility] for +COVID, vomiting and tachycardia. Upon arrival pt appears to be seizing. Pt on keppra. EMS reports pt had 5 tonic clonic seizures lasting about 15 seconds each.    10/23 pt confused and agitated, yelling. unable to tell me his name. intermittently follows commands  10/24 calmer today, but still confused. re-directable.     PAST MEDICAL & SURGICAL HISTORY:  Glioblastoma  Seizures  S/P craniotomy  Medications:  piperacillin/tazobactam IVPB.. 3.375 Gram(s) IV Intermittent every 8 hours  remdesivir  IVPB 100 milliGRAM(s) IV Intermittent every 24 hours  remdesivir  IVPB   IV Intermittent   acetaminophen     Tablet .. 650 milliGRAM(s) Oral every 6 hours PRN  cyclobenzaprine 10 milliGRAM(s) Oral three times a day PRN  lacosamide IVPB 200 milliGRAM(s) IV Intermittent every 12 hours  levETIRAcetam  IVPB 1500 milliGRAM(s) IV Intermittent every 12 hours  LORazepam   Injectable 0.25 milliGRAM(s) IV Push once PRN  mirtazapine 15 milliGRAM(s) Oral once  rOPINIRole 0.25 milliGRAM(s) Oral every 8 hours  apixaban 5 milliGRAM(s) Oral every 12 hours  polyethylene glycol 3350 17 Gram(s) Oral daily  senna 2 Tablet(s) Oral at bedtime  tamsulosin 0.4 milliGRAM(s) Oral at bedtime  dexAMETHasone  Injectable 6 milliGRAM(s) IV Push daily  lactated ringers. 1000 milliLiter(s) IV Continuous <Continuous>    Vital Signs Last 24 Hrs  T(C): 36.1 (24 Oct 2023 06:00), Max: 36.9 (23 Oct 2023 09:16)  T(F): 97 (24 Oct 2023 06:00), Max: 98.5 (23 Oct 2023 09:16)  HR: 81 (24 Oct 2023 06:00) (76 - 102)  BP: 132/99 (24 Oct 2023 06:00) (118/71 - 136/90)  BP(mean): 109 (24 Oct 2023 06:00) (87 - 110)  RR: 22 (24 Oct 2023 06:00) (22 - 28)  SpO2: 97% (24 Oct 2023 06:00) (93% - 98%)    Parameters below as of 23 Oct 2023 20:00  Patient On (Oxygen Delivery Method): room air    I&O's Detail    23 Oct 2023 07:01  -  24 Oct 2023 07:00  --------------------------------------------------------  IN:    IV PiggyBack: 700 mL  Total IN: 700 mL    OUT:    Stool (mL): 0 mL    Voided (mL): 850 mL  Total OUT: 850 mL    Total NET: -150 mL      LABS:                          14.0   7.42  )-----------( 157      ( 24 Oct 2023 06:24 )             42.3     10-24    140  |  107  |  13  ----------------------------<  91  3.6   |  26  |  0.62    Ca    9.3      24 Oct 2023 06:24  Phos  2.8     10-24  Mg     2.1     10-24    TPro  6.6  /  Alb  2.8<L>  /  TBili  0.6  /  DBili  0.2  /  AST  30  /  ALT  28  /  AlkPhos  42  10-24    LIVER FUNCTIONS - ( 24 Oct 2023 06:24 )  Alb: 2.8 g/dL / Pro: 6.6 gm/dL / ALK PHOS: 42 U/L / ALT: 28 U/L / AST: 30 U/L / GGT: x           PT/INR - ( 24 Oct 2023 06:24 )   PT: 15.8 sec;   INR: 1.41 ratio         PTT - ( 22 Oct 2023 10:20 )  PTT:22.4 sec  Urinalysis Basic - ( 24 Oct 2023 06:24 )    Color: x / Appearance: x / SG: x / pH: x  Gluc: 91 mg/dL / Ketone: x  / Bili: x / Urobili: x   Blood: x / Protein: x / Nitrite: x   Leuk Esterase: x / RBC: x / WBC x   Sq Epi: x / Non Sq Epi: x / Bacteria: x      CULTURES:  Rapid RVP Result: Detected (10-22 @ 10:20)  Culture Results:   No growth (10-22 @ 10:20)      Physical Examination:    General: No acute distress.  agitated.   HEENT: Pupils equal, reactive to light.  Symmetric.  PULM: Clear to auscultation bilaterally,  CVS: Regular rate and rhythm, no murmur  ABD: Soft, nondistended, nontender, guards in epigastric area.   EXT: No LE edema, nontender  SKIN: Warm and well perfused, no rashes noted.  NEURO: Alert, oriented x 0, yelling    DEVICES:     RADIOLOGY:  < from: CT Head No Cont (10.22.23 @ 12:38) >  IMPRESSION:    New heterogeneous coarse and curvilinear parenchymal calcifications, as   well as new trace periventricular ependymal calcifications along the left   occipital horn, associated with the left parietal GBM. Perilesional   vasogenic edema extending into the left frontoparietal and   temporo-occipital region is similar in extent to CT of 3/7/2023.    < end of copied text >      < from: CT Abdomen and Pelvis w/ IV Cont (10.22.23 @ 12:57) >  IMPRESSION:    1. Moderate consolidations at the posterior bibasilar lungs, possibly   representing aspiration pneumonia. No pulmonary embolism.  2. Diffuse bladder wall thickening suggestive of cystitis. Correlate with   urinalysis.  3. Borderline dilated main pulmonary artery suggestive of pulmonary   hypertension.  4. Trace posterior pericardial effusion.    < end of copied text >

## 2023-10-24 NOTE — CONSULT NOTE ADULT - NS ATTEND AMEND GEN_ALL_CORE FT
see A/P
I saw and examined the patient.   Patient with history of GBM, epilepsy, presenting with seizure activity in setting of COVId.      Now admitted to ICU, has not had witnessed seizure today.     On exam:   GEN: NAD  NEURO:   Awake, alert, attending the examiner.  he does not say his name, or name any common objects.  He did not follow commands.  He does speak fluently, asking to go to the bathroom.   Pupils 3-2mm, symmetric, full EOM, possible visual deficit on the R.  Trace R facial weakness. No dysarthria.   MOTOR: diminished tone on the R.  R arm with diminished spontaneous movements.  Does not fully participate in confrontational testing.  Seems to have R leg weakness as well.   COORDINATION: unable to fully assess.     CTH images reviewed: L parietal vasogenic edema, post surgical changes.   EEG: L parietal slowing and sharp waves.     AP; 63 year old man with epilepsy, L parietal GBM, presenting with seizure activity in setting of COVID.  On exam, has language dysfunction, and R sided weakness.  Unclear what his baseline is.  The imaging shows vasogenic edema in the R parietal area.    -continue keppra 1500mg twice daily  -MRi brain is pending  -supportive care per ICU  -seizure precautions, PRN ativan for convulsive seizure, and padded bed rails.   -PT/OT  -neurology to follow.  Please page or call with any acute changes, or other issues we can help address.

## 2023-10-25 LAB
ALBUMIN SERPL ELPH-MCNC: 2.6 G/DL — LOW (ref 3.3–5)
ALBUMIN SERPL ELPH-MCNC: 2.6 G/DL — LOW (ref 3.3–5)
ALP SERPL-CCNC: 41 U/L — SIGNIFICANT CHANGE UP (ref 40–120)
ALP SERPL-CCNC: 41 U/L — SIGNIFICANT CHANGE UP (ref 40–120)
ALT FLD-CCNC: 24 U/L — SIGNIFICANT CHANGE UP (ref 12–78)
ALT FLD-CCNC: 24 U/L — SIGNIFICANT CHANGE UP (ref 12–78)
ANION GAP SERPL CALC-SCNC: 8 MMOL/L — SIGNIFICANT CHANGE UP (ref 5–17)
ANION GAP SERPL CALC-SCNC: 8 MMOL/L — SIGNIFICANT CHANGE UP (ref 5–17)
AST SERPL-CCNC: 19 U/L — SIGNIFICANT CHANGE UP (ref 15–37)
AST SERPL-CCNC: 19 U/L — SIGNIFICANT CHANGE UP (ref 15–37)
BILIRUB DIRECT SERPL-MCNC: 0.3 MG/DL — SIGNIFICANT CHANGE UP (ref 0–0.3)
BILIRUB DIRECT SERPL-MCNC: 0.3 MG/DL — SIGNIFICANT CHANGE UP (ref 0–0.3)
BILIRUB INDIRECT FLD-MCNC: 0.3 MG/DL — SIGNIFICANT CHANGE UP (ref 0.2–1)
BILIRUB INDIRECT FLD-MCNC: 0.3 MG/DL — SIGNIFICANT CHANGE UP (ref 0.2–1)
BILIRUB SERPL-MCNC: 0.6 MG/DL — SIGNIFICANT CHANGE UP (ref 0.2–1.2)
BILIRUB SERPL-MCNC: 0.6 MG/DL — SIGNIFICANT CHANGE UP (ref 0.2–1.2)
BUN SERPL-MCNC: 18 MG/DL — SIGNIFICANT CHANGE UP (ref 7–23)
BUN SERPL-MCNC: 18 MG/DL — SIGNIFICANT CHANGE UP (ref 7–23)
CALCIUM SERPL-MCNC: 8.7 MG/DL — SIGNIFICANT CHANGE UP (ref 8.5–10.1)
CALCIUM SERPL-MCNC: 8.7 MG/DL — SIGNIFICANT CHANGE UP (ref 8.5–10.1)
CHLORIDE SERPL-SCNC: 108 MMOL/L — SIGNIFICANT CHANGE UP (ref 96–108)
CHLORIDE SERPL-SCNC: 108 MMOL/L — SIGNIFICANT CHANGE UP (ref 96–108)
CO2 SERPL-SCNC: 25 MMOL/L — SIGNIFICANT CHANGE UP (ref 22–31)
CO2 SERPL-SCNC: 25 MMOL/L — SIGNIFICANT CHANGE UP (ref 22–31)
CREAT SERPL-MCNC: 0.76 MG/DL — SIGNIFICANT CHANGE UP (ref 0.5–1.3)
CREAT SERPL-MCNC: 0.76 MG/DL — SIGNIFICANT CHANGE UP (ref 0.5–1.3)
EGFR: 100 ML/MIN/1.73M2 — SIGNIFICANT CHANGE UP
EGFR: 100 ML/MIN/1.73M2 — SIGNIFICANT CHANGE UP
GLUCOSE SERPL-MCNC: 102 MG/DL — HIGH (ref 70–99)
GLUCOSE SERPL-MCNC: 102 MG/DL — HIGH (ref 70–99)
HCT VFR BLD CALC: 42.7 % — SIGNIFICANT CHANGE UP (ref 39–50)
HCT VFR BLD CALC: 42.7 % — SIGNIFICANT CHANGE UP (ref 39–50)
HGB BLD-MCNC: 14.3 G/DL — SIGNIFICANT CHANGE UP (ref 13–17)
HGB BLD-MCNC: 14.3 G/DL — SIGNIFICANT CHANGE UP (ref 13–17)
INR BLD: 1.55 RATIO — HIGH (ref 0.85–1.18)
INR BLD: 1.55 RATIO — HIGH (ref 0.85–1.18)
LEVETIRACETAM SERPL-MCNC: 13.5 UG/ML — SIGNIFICANT CHANGE UP (ref 10–40)
LEVETIRACETAM SERPL-MCNC: 13.5 UG/ML — SIGNIFICANT CHANGE UP (ref 10–40)
MAGNESIUM SERPL-MCNC: 2 MG/DL — SIGNIFICANT CHANGE UP (ref 1.6–2.6)
MAGNESIUM SERPL-MCNC: 2 MG/DL — SIGNIFICANT CHANGE UP (ref 1.6–2.6)
MCHC RBC-ENTMCNC: 28.9 PG — SIGNIFICANT CHANGE UP (ref 27–34)
MCHC RBC-ENTMCNC: 28.9 PG — SIGNIFICANT CHANGE UP (ref 27–34)
MCHC RBC-ENTMCNC: 33.5 GM/DL — SIGNIFICANT CHANGE UP (ref 32–36)
MCHC RBC-ENTMCNC: 33.5 GM/DL — SIGNIFICANT CHANGE UP (ref 32–36)
MCV RBC AUTO: 86.3 FL — SIGNIFICANT CHANGE UP (ref 80–100)
MCV RBC AUTO: 86.3 FL — SIGNIFICANT CHANGE UP (ref 80–100)
PHOSPHATE SERPL-MCNC: 3.8 MG/DL — SIGNIFICANT CHANGE UP (ref 2.5–4.5)
PHOSPHATE SERPL-MCNC: 3.8 MG/DL — SIGNIFICANT CHANGE UP (ref 2.5–4.5)
PLATELET # BLD AUTO: 168 K/UL — SIGNIFICANT CHANGE UP (ref 150–400)
PLATELET # BLD AUTO: 168 K/UL — SIGNIFICANT CHANGE UP (ref 150–400)
POTASSIUM SERPL-MCNC: 3.4 MMOL/L — LOW (ref 3.5–5.3)
POTASSIUM SERPL-MCNC: 3.4 MMOL/L — LOW (ref 3.5–5.3)
POTASSIUM SERPL-SCNC: 3.4 MMOL/L — LOW (ref 3.5–5.3)
POTASSIUM SERPL-SCNC: 3.4 MMOL/L — LOW (ref 3.5–5.3)
PROT SERPL-MCNC: 6.7 GM/DL — SIGNIFICANT CHANGE UP (ref 6–8.3)
PROT SERPL-MCNC: 6.7 GM/DL — SIGNIFICANT CHANGE UP (ref 6–8.3)
PROTHROM AB SERPL-ACNC: 17.3 SEC — HIGH (ref 9.5–13)
PROTHROM AB SERPL-ACNC: 17.3 SEC — HIGH (ref 9.5–13)
RBC # BLD: 4.95 M/UL — SIGNIFICANT CHANGE UP (ref 4.2–5.8)
RBC # BLD: 4.95 M/UL — SIGNIFICANT CHANGE UP (ref 4.2–5.8)
RBC # FLD: 13.5 % — SIGNIFICANT CHANGE UP (ref 10.3–14.5)
RBC # FLD: 13.5 % — SIGNIFICANT CHANGE UP (ref 10.3–14.5)
SODIUM SERPL-SCNC: 141 MMOL/L — SIGNIFICANT CHANGE UP (ref 135–145)
SODIUM SERPL-SCNC: 141 MMOL/L — SIGNIFICANT CHANGE UP (ref 135–145)
WBC # BLD: 7.18 K/UL — SIGNIFICANT CHANGE UP (ref 3.8–10.5)
WBC # BLD: 7.18 K/UL — SIGNIFICANT CHANGE UP (ref 3.8–10.5)
WBC # FLD AUTO: 7.18 K/UL — SIGNIFICANT CHANGE UP (ref 3.8–10.5)
WBC # FLD AUTO: 7.18 K/UL — SIGNIFICANT CHANGE UP (ref 3.8–10.5)

## 2023-10-25 PROCEDURE — 73080 X-RAY EXAM OF ELBOW: CPT | Mod: 26,RT

## 2023-10-25 PROCEDURE — 99232 SBSQ HOSP IP/OBS MODERATE 35: CPT

## 2023-10-25 PROCEDURE — 99233 SBSQ HOSP IP/OBS HIGH 50: CPT

## 2023-10-25 PROCEDURE — 99231 SBSQ HOSP IP/OBS SF/LOW 25: CPT

## 2023-10-25 RX ORDER — POTASSIUM CHLORIDE 20 MEQ
40 PACKET (EA) ORAL ONCE
Refills: 0 | Status: COMPLETED | OUTPATIENT
Start: 2023-10-25 | End: 2023-10-25

## 2023-10-25 RX ADMIN — PIPERACILLIN AND TAZOBACTAM 25 GRAM(S): 4; .5 INJECTION, POWDER, LYOPHILIZED, FOR SOLUTION INTRAVENOUS at 15:17

## 2023-10-25 RX ADMIN — LEVETIRACETAM 400 MILLIGRAM(S): 250 TABLET, FILM COATED ORAL at 11:44

## 2023-10-25 RX ADMIN — Medication 1 MILLIGRAM(S): at 10:38

## 2023-10-25 RX ADMIN — POLYETHYLENE GLYCOL 3350 17 GRAM(S): 17 POWDER, FOR SOLUTION ORAL at 10:37

## 2023-10-25 RX ADMIN — PIPERACILLIN AND TAZOBACTAM 25 GRAM(S): 4; .5 INJECTION, POWDER, LYOPHILIZED, FOR SOLUTION INTRAVENOUS at 05:24

## 2023-10-25 RX ADMIN — ROPINIROLE 0.25 MILLIGRAM(S): 8 TABLET, FILM COATED, EXTENDED RELEASE ORAL at 23:50

## 2023-10-25 RX ADMIN — Medication 40 MILLIEQUIVALENT(S): at 11:43

## 2023-10-25 RX ADMIN — LEVETIRACETAM 400 MILLIGRAM(S): 250 TABLET, FILM COATED ORAL at 23:01

## 2023-10-25 RX ADMIN — ROPINIROLE 0.25 MILLIGRAM(S): 8 TABLET, FILM COATED, EXTENDED RELEASE ORAL at 15:18

## 2023-10-25 RX ADMIN — TAMSULOSIN HYDROCHLORIDE 0.4 MILLIGRAM(S): 0.4 CAPSULE ORAL at 23:09

## 2023-10-25 RX ADMIN — ROPINIROLE 0.25 MILLIGRAM(S): 8 TABLET, FILM COATED, EXTENDED RELEASE ORAL at 05:25

## 2023-10-25 RX ADMIN — SENNA PLUS 2 TABLET(S): 8.6 TABLET ORAL at 22:06

## 2023-10-25 RX ADMIN — APIXABAN 5 MILLIGRAM(S): 2.5 TABLET, FILM COATED ORAL at 10:37

## 2023-10-25 RX ADMIN — Medication 650 MILLIGRAM(S): at 05:24

## 2023-10-25 RX ADMIN — LACOSAMIDE 100 MILLIGRAM(S): 50 TABLET ORAL at 22:05

## 2023-10-25 RX ADMIN — APIXABAN 5 MILLIGRAM(S): 2.5 TABLET, FILM COATED ORAL at 22:06

## 2023-10-25 RX ADMIN — LACOSAMIDE 100 MILLIGRAM(S): 50 TABLET ORAL at 10:34

## 2023-10-25 NOTE — PROGRESS NOTE ADULT - ASSESSMENT
62 yo female  significant for Glioblastoma, WHO 4, IDH wt  MGMT methylated in 10/2022, presents to the ED [facility] from [facility] for positive COVID,-19  vomiting and seizures.    # Glioblastoma, WHO 4, IDH wt- MGMT methylated   -s/p 5 cycles of TMZ ( last dose 6/2023 ) and a total of 6 doses of MVASI( last dose 5/31/23).  -His further treatment got delayed secondary to fall and right ankle fracture -and has been in rehab at [facility] since 7/2023  -Per Dr. Torres's outpatient notes- since MRI from 10/2- left temporal parietal area appears stable - treatments on hold, but continue Keppra 1000 mg bid and Vimpat 200 mg bid  -MR-brain-10/24/23- Interval increase in the enhancing neoplasm located in the LEFT occipital and posterior temporal lobes now measuring 6.7 cm AP by 3.7 cm TRV by 3.8 cm cc with extension into the posterior horn of the LEFT lateral ventricle and ependymal spread. Increase in moderate vasogenic edema involving the LEFT occipital, parietal, posterior frontal and posterior temporal lobes. Minimal edema also extends into the LEFT middle cerebellar peduncle and LEFT caren  -Dr. Torres aware; there is no significant mass effect although she agrees enhancement is worse.    ?#COVID-19  -s/p Remdesivir   -continue with Decadron    # Seizure   -s/p EEG Findings are consistent with left hemisphere dysfunction (parietal, temporal) with epileptogenic cortical dysfunction and a risk for focal onset seizures.  -Neurology following  -Currently on Keppra 1500 mg IV BID & Vimpat 2002mg PO BID    #PNA  - started on zosyn     #UTI  - continue with zosyn     # Hx of DVT /PE  - On Eliquis 5 mg bid    Hypokalemia   - monitor potassium for need to replete K   62 yo female MH significant for Glioblastoma, WHO 4, IDH wt  MGMT methylated in 10/2022, presents to the ED [facility] from [facility] for positive COVID,-19  vomiting and seizures.    # Glioblastoma, WHO 4, IDH wt- MGMT methylated   -s/p 5 cycles of TMZ ( last dose 6/2023 ) and a total of 6 doses of MVASI( last dose 5/31/23).  -His further treatment got delayed secondary to fall and right ankle fracture -and has been in rehab at [facility] since 7/2023  -Per Dr. Torres's outpatient notes- since MRI from 10/2- left temporal parietal area appears stable - treatments on hold, but continue Keppra 1000 mg bid and Vimpat 200 mg bid  -MR-brain-10/24/23- Interval increase in the enhancing neoplasm located in the LEFT occipital and posterior temporal lobes now measuring 6.7 cm AP by 3.7 cm TRV by 3.8 cm cc with extension into the posterior horn of the LEFT lateral ventricle and ependymal spread. Increase in moderate vasogenic edema involving the LEFT occipital, parietal, posterior frontal and posterior temporal lobes. Minimal edema also extends into the LEFT middle cerebellar peduncle and LEFT caren  -Neuro and neuro surg consult appreciated / Dr. Torres aware; there is no significant mass effect although she agrees enhancement is worse.    ?#COVID-19  -s/p Remdesivir   -continue with Decadron    # Seizure   -s/p EEG Findings are consistent with left hemisphere dysfunction (parietal, temporal) with epileptogenic cortical dysfunction and a risk for focal onset seizures.  -Neurology following  -Currently on Keppra 1500 mg IV BID & Vimpat 2002mg PO BID    #PNA  - started on zosyn     #UTI  - continue with zosyn     # Hx of DVT /PE  - On Eliquis 5 mg bid    Hypokalemia   - monitor potassium for need to replete K

## 2023-10-25 NOTE — CONSULT NOTE ADULT - CONSULT REQUESTED BY NAME
Dr. Adam, ICU attending
Dr. Ramachandran
medical team - progress note entry - locked by other user
Jil ANDERS

## 2023-10-25 NOTE — PROGRESS NOTE ADULT - NS ATTEND AMEND GEN_ALL_CORE FT
see A/P
Patient remains confused, agitated,   Patient with GBM, previously resected  presented with seizures  Covid positive  PE confused, not cooperated, not oriented       non focal    Plan  1 Keppra increased, also on vimpat, check eeg  2. Place back on home steroids decadron, no infiltrates suggestive of covid  3. MRI when stable, may need sedation for MRI
Patient seen with MARTITA Ley    Patient less confused today more orientable  MRI show 6 x 3 left posterior temporal mass  no  further seizures  covid positive  bibasilar infiltrates    Plan  1. keppra 1500 bid/vimpat  2. encephalopathy  3. covid not hypoxic off remdesevir and steroids  4. on home dose of decadron

## 2023-10-25 NOTE — PROGRESS NOTE ADULT - SUBJECTIVE AND OBJECTIVE BOX
HPI:  PMHx seizure disorder and glioblastoma presents to the ED [facility] from [facility] for +COVID, vomiting and tachycardia. Upon arrival pt appears to be seizing. Pt on keppra. EMS reports pt had 5 tonic clonic seizures lasting about 15 seconds each. Pt given Tylenol at 9am.    10/22: Post ictal, but awake and answering questions.  (22 Oct 2023 15:23)  10/25: pt seen and evaluated, no complaints     Review of systems:  CONSTITUTIONAL: + H/A No weakness, fevers or chills, no weight loss   EYES/ENT: No visual changes;  No vertigo or throat pain   NECK: No pain or stiffness  RESPIRATORY: + cough, no wheezing, hemoptysis; No shortness of breath  CARDIOVASCULAR: No chest pain or palpitations  GASTROINTESTINAL: + constipation No abdominal or epigastric pain. No nausea, vomiting, or hematemesis; No diarrhea. No melena or hematochezia.  GENITOURINARY: + urgency No discharge, hematuria  NEUROLOGICAL: No numbness or weakness  All other review of systems is negative unless indicated above.    MEDICATIONS  (STANDING):  apixaban 5 milliGRAM(s) Oral every 12 hours  dexAMETHasone     Tablet 1 milliGRAM(s) Oral daily  lacosamide IVPB 200 milliGRAM(s) IV Intermittent every 12 hours  levETIRAcetam  IVPB 1500 milliGRAM(s) IV Intermittent every 12 hours  piperacillin/tazobactam IVPB.. 3.375 Gram(s) IV Intermittent every 8 hours  polyethylene glycol 3350 17 Gram(s) Oral daily  potassium chloride   Powder 40 milliEquivalent(s) Oral once  rOPINIRole 0.25 milliGRAM(s) Oral every 8 hours  senna 2 Tablet(s) Oral at bedtime  tamsulosin 0.4 milliGRAM(s) Oral at bedtime    MEDICATIONS  (PRN):  acetaminophen     Tablet .. 650 milliGRAM(s) Oral every 6 hours PRN Temp greater or equal to 38C (100.4F), Mild Pain (1 - 3), Moderate Pain (4 - 6)  cyclobenzaprine 10 milliGRAM(s) Oral three times a day PRN Muscle Spasm  LORazepam   Injectable 1 milliGRAM(s) IntraMuscular once PRN Agitation  mirtazapine 15 milliGRAM(s) Oral at bedtime PRN insomnia      Vital Signs Last 24 Hrs  T(C): 36.4 (25 Oct 2023 10:59), Max: 37.1 (25 Oct 2023 08:21)  T(F): 97.5 (25 Oct 2023 10:59), Max: 98.8 (25 Oct 2023 08:21)  HR: 106 (25 Oct 2023 10:59) (66 - 106)  BP: 118/70 (25 Oct 2023 10:59) (95/74 - 130/90)  BP(mean): 101 (24 Oct 2023 18:00) (90 - 104)  RR: 20 (25 Oct 2023 11:00) (12 - 21)  SpO2: 91% (25 Oct 2023 11:00) (87% - 100%)    Parameters below as of 25 Oct 2023 11:00  Patient On (Oxygen Delivery Method): nasal cannula  O2 Flow (L/min): 4      Physical exam:   CONSTITUTIONAL : NAD, appear to be of stated age , well groomed   NERVOUS SYSTEM:  Alert & Oriented X2, mildly confused   HEAD:  Atraumatic, Normocephalic  EYES: conjunctiva and sclera clear  HEENT: did not assess.  CHEST: Clear to auscultation bilaterally; No rales, no rhonchi, no wheezing  HEART: Regular rate and rhythm; No murmurs, no rubs or gallops  ABDOMEN: Soft, Non-tender, Non-distended; Bowel sounds present, no guarding , no peritoneal irritation   GENITOURINARY- incontinent  EXTREMITIES: R arm weakness, 2+ Peripheral Pulses, No clubbing, cyanosis, no edema  MUSCULOSKELETAL:- fine upper motor tremors, no deformity appreciated.  SKIN-no rash, no lesion          LABS:                          14.3   7.18  )-----------( 168      ( 25 Oct 2023 06:16 )             42.7     25 Oct 2023 06:16    141    |  108    |  18     ----------------------------<  102    3.4     |  25     |  0.76     Ca    8.7        25 Oct 2023 06:16  Phos  3.8       25 Oct 2023 06:16  Mg     2.0       25 Oct 2023 06:16    TPro  6.7    /  Alb  2.6    /  TBili  0.6    /  DBili  0.3    /  AST  19     /  ALT  24     /  AlkPhos  41     25 Oct 2023 06:16    LIVER FUNCTIONS - ( 25 Oct 2023 06:16 )  Alb: 2.6 g/dL / Pro: 6.7 gm/dL / ALK PHOS: 41 U/L / ALT: 24 U/L / AST: 19 U/L / GGT: x           PT/INR - ( 25 Oct 2023 06:16 )   PT: 17.3 sec;   INR: 1.55 ratio           CAPILLARY BLOOD GLUCOSE            Urinalysis Basic - ( 25 Oct 2023 06:16 )    Color: x / Appearance: x / SG: x / pH: x  Gluc: 102 mg/dL / Ketone: x  / Bili: x / Urobili: x   Blood: x / Protein: x / Nitrite: x   Leuk Esterase: x / RBC: x / WBC x   Sq Epi: x / Non Sq Epi: x / Bacteria: x        RADIOLOGY:  EXAM:  MR BRAIN WAW IC   ORDERED BY: RIVERA QUINTANA     PROCEDURE DATE:  10/24/2023      INTERPRETATION:  MR brain with and without gadolinium    CLINICAL INFORMATION:    TECHNIQUE:   Sagittal and axial T1-weighted images, axial FLAIR images,   axial T2-weighted images, axial gradient echo images and axial diffusion   weighted images of the brain were obtained. Following 7 cc of Gadavist   administration, .5 cc discarded, isotropic volumetric and fast spin echo   T1-weighted imageswere obtained; this data was reformatted using image   post processing software in multiple imaging planes.    FINDINGS:   MRI dated 10/02/2023 available for review.    The brain demonstrates interval increase in the enhancing neoplasm   located in the LEFT occipital and posterior temporal lobes now measuring   6.7 cm AP by 3.7 cm TRV by 3.8 cm cc with extension into the posterior   horn of the LEFT lateral ventricle and ependymal spread. Increase in   moderate vasogenic edema involving the LEFT occipital, parietal,   posterior frontal and posterior temporal lobes. Minimal edema also   extends into the LEFT middle cerebellar peduncle and LEFT caren. No acute   cerebral cortical infarct is found. Minimal hemorrhagic   products/hemosiderin are seen within the central neoplasm..    The ventricles, sulci and basal cisterns appear unremarkable.    The vertebral and internal carotid arteries demonstrate expected flow   voids indicating their patency.    The orbits are unremarkable.  The paranasal sinuses are significant for   mild mucosal thickening in the BILATERAL maxillary, RIGHT frontal and   ethmoid sinuses.  The nasal cavity appears intact.  The nasopharynx is   symmetric.  The central skull base and petrous temporal bones are intact.    The calvarium shows LEFT parieto-occipital craniotomy.      IMPRESSION:  Interval increase in the enhancing neoplasm located in the   LEFT occipital and posterior temporal lobes now measuring 6.7 cm AP by   3.7 cm TRV by 3.8 cm cc with extension into the posterior horn of the   LEFT lateral ventricle and ependymal spread. Increase in moderate   vasogenic edema involving the LEFT occipital, parietal, posterior frontal   and posterior temporal lobes. Minimal edema also extends into the LEFT   middle cerebellar peduncle and LEFT caren.    < end of copied text >   CT Head No Cont (10.22.23 @ 12:38)   IMPRESSION:    New heterogeneous coarse and curvilinear parenchymal calcifications, as   well as new trace periventricular ependymal calcifications along the left   occipital horn, associated with the left parietal GBM. Perilesional   vasogenic edema extending into the left frontoparietal and   temporo-occipital region is similar in extent to CT of 3/7/2023.    EEG Awake or Drowsy (10.23.23 @ 16:30)     EEG Summary/Classification:  This was an abnormal EEG study in the awake and drowsy state due to:  -Continuous focal slowing ? left parietal and mid-posterior temporal   region.  -Brief runs of sharp waves, left parietal and temporal, rarely with   spread throughout the left hemisphere.    EEG Clinical Correlate/  Impression:  Findings are consistent with left hemisphere dysfunction (parietal,   temporal) with epileptogenic cortical dysfunction and a risk for focal   onset seizures.  Consider a repeat study if clinically indicated.

## 2023-10-25 NOTE — PROGRESS NOTE ADULT - SUBJECTIVE AND OBJECTIVE BOX
HPI:  64yo male with MHx significant for seizure disorder and glioblastoma presents to the ED BIBEMS from [facility] for +COVID, vomiting and tachycardia.    10/24/2023- Seen at bedside, remains in Isolation due to COVID-19, remains confused per RN.  Noted with fine tremors.    10/25/2023- Seen at bedside, remans confused.     PAST MEDICAL & SURGICAL HISTORY:    Glioblastoma    Seizures    S/P craniotomy    FAMILY HISTORY:    MEDICATIONS  (STANDING):    apixaban 5 milliGRAM(s) Oral every 12 hours  dexAMETHasone     Tablet 1 milliGRAM(s) Oral daily  lacosamide IVPB 200 milliGRAM(s) IV Intermittent every 12 hours  levETIRAcetam  IVPB 1500 milliGRAM(s) IV Intermittent every 12 hours  piperacillin/tazobactam IVPB.. 3.375 Gram(s) IV Intermittent every 8 hours  polyethylene glycol 3350 17 Gram(s) Oral daily  rOPINIRole 0.25 milliGRAM(s) Oral every 8 hours  senna 2 Tablet(s) Oral at bedtime  tamsulosin 0.4 milliGRAM(s) Oral at bedtime    MEDICATIONS  (PRN):    acetaminophen     Tablet .. 650 milliGRAM(s) Oral every 6 hours PRN Temp greater or equal to 38C (100.4F), Mild Pain (1 - 3), Moderate Pain (4 - 6)  cyclobenzaprine 10 milliGRAM(s) Oral three times a day PRN Muscle Spasm  LORazepam   Injectable 1 milliGRAM(s) IntraMuscular once PRN Agitation  mirtazapine 15 milliGRAM(s) Oral at bedtime PRN insomnia      ALLERGIES:    No Known Allergies    REVIEW OF SYSTEM:    -Unable to obtain due to change in MS    VITAL SIGNS LAST 24 HRS:    T(C): 37.4 (25 Oct 2023 15:32), Max: 37.4 (25 Oct 2023 15:32)  T(F): 99.3 (25 Oct 2023 15:32), Max: 99.3 (25 Oct 2023 15:32)  HR: 81 (25 Oct 2023 15:32) (81 - 106)  BP: 105/76 (25 Oct 2023 15:32) (95/74 - 130/90)  BP(mean): 101 (24 Oct 2023 18:00) (101 - 101)  RR: 18 (25 Oct 2023 15:32) (18 - 21)  SpO2: 94% (25 Oct 2023 15:32) (87% - 99%)    Parameters below as of 25 Oct 2023 15:32  Patient On (Oxygen Delivery Method): nasal cannula  O2 Flow (L/min): 4    PHYSICAL EXAM:    CONSTITUTIONAL : NAD, appear to be of stated age , well groomed   NERVOUS SYSTEM:  unable to assess.   HEAD:  Atraumatic, Normocephalic  EYES: conjunctiva and sclera clear  HEENT: did not assess.  CHEST: Clear to auscultation bilaterally; No rales, no rhonchi, no wheezing  HEART: Regular rate and rhythm; No murmurs, no rubs or gallops  ABDOMEN: Soft, Non-tender, Non-distended; Bowel sounds present, no guarding , no peritoneal irritation   GENITOURINARY- incontinent  EXTREMITIES:  2+ Peripheral Pulses, No clubbing, cyanosis, no edema  MUSCULOSKELETAL:- fine upper motor tremors, no deformity appreciated.  SKIN-no rash, no lesion    LABS:                          14.3   7.18  )-----------( 168      ( 25 Oct 2023 06:16 )             42.7     10-25    141  |  108  |  18  ----------------------------<  102<H>  3.4<L>   |  25  |  0.76    Ca    8.7      25 Oct 2023 06:16  Phos  3.8     10-25  Mg     2.0     10-25    TPro  6.7  /  Alb  2.6<L>  /  TBili  0.6  /  DBili  0.3  /  AST  19  /  ALT  24  /  AlkPhos  41  10-25      PT/INR - ( 24 Oct 2023 06:24 )   PT: 15.8 sec;   INR: 1.41 ratio      Urinalysis Basic - ( 24 Oct 2023 06:24 )    Color: x / Appearance: x / SG: x / pH: x  Gluc: 91 mg/dL / Ketone: x  / Bili: x / Urobili: x   Blood: x / Protein: x / Nitrite: x   Leuk Esterase: x / RBC: x / WBC x   Sq Epi: x / Non Sq Epi: x / Bacteria: x    RADIOLOGY & ADDITIONAL STUDIES:    EXAM:  MR BRAIN WAW IC   ORDERED BY: RIVERA QUINTANA     PROCEDURE DATE:  10/24/2023      INTERPRETATION:  MR brain with and without gadolinium    CLINICAL INFORMATION:    TECHNIQUE:   Sagittal and axial T1-weighted images, axial FLAIR images,   axial T2-weighted images, axial gradient echo images and axial diffusion   weighted images of the brain were obtained. Following 7 cc of Gadavist   administration, .5 cc discarded, isotropic volumetric and fast spin echo   T1-weighted images were obtained; this data was reformatted using image   post processing software in multiple imaging planes.    FINDINGS:   MRI dated 10/02/2023 available for review.    The brain demonstrates interval increase in the enhancing neoplasm   located in the LEFT occipital and posterior temporal lobes now measuring   6.7 cm AP by 3.7 cm TRV by 3.8 cm cc with extension into the posterior   horn of the LEFT lateral ventricle and ependymal spread. Increase in   moderate vasogenic edema involving the LEFT occipital, parietal,   posterior frontal and posterior temporal lobes. Minimal edema also   extends into the LEFT middle cerebellar peduncle and LEFT caren. No acute   cerebral cortical infarct is found. Minimal hemorrhagic   products/hemosiderin are seen within the central neoplasm..    The ventricles, sulci and basal cisterns appear unremarkable.    The vertebral and internal carotid arteries demonstrate expected flow   voids indicating their patency.    The orbits are unremarkable.  The paranasal sinuses are significant for   mild mucosal thickening in the BILATERAL maxillary, RIGHT frontal and   ethmoid sinuses.  The nasal cavity appears intact.  The nasopharynx is   symmetric.  The central skull base and petrous temporal bones are intact.    The calvarium shows LEFT parieto-occipital craniotomy.      IMPRESSION:  Interval increase in the enhancing neoplasm located in the   LEFT occipital and posterior temporal lobes now measuring 6.7 cm AP by   3.7 cm TRV by 3.8 cm cc with extension into the posterior horn of the   LEFT lateral ventricle and ependymal spread. Increase in moderate   vasogenic edema involving the LEFT occipital, parietal, posterior frontal   and posterior temporal lobes. Minimal edema also extends into the LEFT   middle cerebellar peduncle and LEFT caren.    < end of copied text >

## 2023-10-25 NOTE — CONSULT NOTE ADULT - SUBJECTIVE AND OBJECTIVE BOX
Neurosurgery:    d/w Dr. Turpin - no need for txr at this time.  Will plan for outpt follow up, upon completion of covid managment and care and return to [facility].  Pt may warrant a txr if Dr. Torres wishes to receive pt as their is recurrence of tumor GBM on recent MRI studies.  continue with neurologies recommendations of antiszr medication regiment.  All imaging reviewed by neurosurgical attendings.  Pt shoud follow up with [facility] neurosurgeon and Dr. Torres in 1-2 weeks time upon discharge from Hospital.  Pt remains neuroligically stable LOUISE, c/o right arm elbow tenderness to palpation / ROM (xray pending).   Pt needs PT and mental status evalaution prior to discharge to [facility] for safety measures and plan of action upon covid treatment clearance at rehab.  d/w medicine team accoridngly - please re-call with any questions.

## 2023-10-25 NOTE — PROGRESS NOTE ADULT - ATTENDING COMMENTS
Patient seen and examined with PA student Yissel Cantrell,  I  was physically present for the key portions of the evaluation and management (E/M) service provided.  I agree with the above history, physical, and plan which I have reviewed and edited where appropriate.  - case d/w nsx, neurology. no plan for intervention at this time  - pt was requiring 4 liters oxygen now hypoxia improving.

## 2023-10-25 NOTE — PROGRESS NOTE ADULT - ASSESSMENT
64 yo female  significant for Glioblastoma, WHO 4, IDH wt  MGMT methylated in 10/2022, presents to the ED [facility] from [facility] for positive COVID,-19  vomiting and seizures.    # Glioblastoma, WHO 4, IDH wt- MGMT methylated     s/p 5 cycles of TMZ ( last dose 6/2023 ) and a total of 6 doses of MVASI( last dose 5/31/23).  -His further treatment got delayed secondary to fall and right ankle fracture -and has been in rehab at [facility] since 7/2023  -Per Dr. Torres's outpatient notes- since MRI from 10/2- left temporal parietal area appears stable - treatments on hold, but continue Keppra 1000 mg bid and Vimpat 200 mg bid  -MR-brain-10/24/23- Interval increase in the enhancing neoplasm located in the LEFT occipital and posterior temporal lobes now measuring 6.7 cm AP by 3.7 cm TRV by 3.8 cm cc with extension into the posterior horn of the LEFT lateral ventricle and ependymal spread. Increase in moderate vasogenic edema involving the LEFT occipital, parietal, posterior frontal and posterior temporal lobes. Minimal edema also extends into the LEFT middle cerebellar peduncle and LEFT caren  -Dr. Torres aware; there is no significant mass effect although she agrees enhancement is worse.    # COVID-19    -s/p Remdesivir   -s/p Decadron    # Seizure     -s/p EEG Findings are consistent with left hemisphere dysfunction (parietal, temporal) with epileptogenic cortical dysfunction and a risk for focal onset seizures.  -Neurology/Neuro Sx following-->no need for txr at this time. Will plan for outpt follow up, upon completion of covid managment and care and return to [facility].  -Currently on Keppra 1500 mg IV BID & Vimpat 2002mg PO BID  -Dexamethasone- 1 mg PO QD    # Hx of DVT /PE    - On Eliquis 5 mg bid     64 yo female  significant for Glioblastoma, WHO 4, IDH wt  MGMT methylated in 10/2022, presents to the ED [facility] from [facility] for positive COVID,-19  vomiting and seizures.    # Glioblastoma, WHO 4, IDH wt- MGMT methylated     s/p 5 cycles of TMZ ( last dose 6/2023 ) and a total of 6 doses of MVASI( last dose 5/31/23).  -His further treatment got delayed secondary to fall and right ankle fracture -and has been in rehab at [facility] since 7/2023  -Per Dr. Torres's outpatient notes- since MRI from 10/2- left temporal parietal area appears stable - treatments on hold, but continue Keppra 1000 mg bid and Vimpat 200 mg bid  -MR-brain-10/24/23- Interval increase in the enhancing neoplasm located in the LEFT occipital and posterior temporal lobes now measuring 6.7 cm AP by 3.7 cm TRV by 3.8 cm cc with extension into the posterior horn of the LEFT lateral ventricle and ependymal spread. Increase in moderate vasogenic edema involving the LEFT occipital, parietal, posterior frontal and posterior temporal lobes. Minimal edema also extends into the LEFT middle cerebellar peduncle and LEFT caren  -Dr. Torres aware and per her, there is no significant mass effect although she agrees enhancement is worse.  Plan is for continued outpt management with Dr. Torres.   # COVID-19    -s/p Remdesivir   -s/p Decadron    # Seizure     -s/p EEG Findings are consistent with left hemisphere dysfunction (parietal, temporal) with epileptogenic cortical dysfunction and a risk for focal onset seizures.  -Neurology/Neuro Sx following-->no need for txr at this time. Will plan for outpt follow up, upon completion of covid management and care and return to [facility].  -Currently on Keppra 1500 mg IV BID & Vimpat 2002mg PO BID  -Dexamethasone- 1 mg PO QD    # Hx of DVT /PE    - On Eliquis 5 mg bid

## 2023-10-25 NOTE — PROGRESS NOTE ADULT - ASSESSMENT
62yo male with PMHx seizure disorder and glioblastoma, s/p 5 cycles of TMA(last dose 6/2023), and 6 doses of MVASI(last dose 5/31), fall and R ankle fracture in 7/2023, stable MRI 10/2 follows up with Dr. Debby Torres,  presents to the ED [facility] from [facility] for +COVID, vomiting and tachycardia. Upon arrival pt appeared to be seizing. Pt was on keppra and vimpat. EMS reports pt had 5 tonic clonic seizures lasting about 15 seconds each.  Currently patient is confused, agitated, on wrist restraints.  Head CT shows new heterogeneous coarse and curvilinear parenchymal calcifications, as well as new trace periventricular ependymal calcifications along the left occipital horn, associated with the left parietal GBM.  Pt. is on IV keppra and dose was increased from 1000mg BID to 1500mg BID.  NO more seizures have been observed in the ICU.        # Seizures and AMS-in the setting of COVID +.  Pt. with glioblastoma and seizure d/o.  Head CT with new heterogeneous coarse and curvilinear parenchymal calcifications, as well as new trace periventricular ependymal calcifications along the left occipital horn, associated with the left parietal GBM.  Neurosurgery saw the patient and does not recommend any interventions.      MRI brain: Interval increase in the enhancing neoplasm located in the   LEFT occipital and posterior temporal lobes now measuring 6.7 cm AP by   3.7 cm TRV by 3.8 cm cc with extension into the posterior horn of the   LEFT lateral ventricle and ependymal spread. Increase in moderate   vasogenic edema involving the LEFT occipital, parietal, posterior frontal   and posterior temporal lobes. Minimal edema also extends into the LEFT   middle cerebellar peduncle and LEFT caren.  Keppra increased    EEG: Findings are consistent with left hemisphere dysfunction (parietal,   temporal) with epileptogenic cortical dysfunction and a risk for focal   onset seizures.    Recommendations:   Continue keppra at current dose of 1500mg BID  Decadron per neurosurgery  Primary neurosurgeon Dr. Turpin and his primary oncology team contacted by Neurosurgery  Supportive care for COVID  Xrays R elbow pending    D/W Dr. Grewal      D/W Dr. Grewal

## 2023-10-25 NOTE — CONSULT NOTE ADULT - CONSULT REASON
GBM
Eplielpsy, here for seizures, 2/2 GBM  Follows Dr Melissa Sanders
h/o GBM
progress note locked by other user - needed to enter note as progress note

## 2023-10-25 NOTE — PROGRESS NOTE ADULT - SUBJECTIVE AND OBJECTIVE BOX
HPI:     ROS:     MEDICATIONS  (STANDING):  apixaban 5 milliGRAM(s) Oral every 12 hours  dexAMETHasone     Tablet 1 milliGRAM(s) Oral daily  lacosamide IVPB 200 milliGRAM(s) IV Intermittent every 12 hours  levETIRAcetam  IVPB 1500 milliGRAM(s) IV Intermittent every 12 hours  piperacillin/tazobactam IVPB.. 3.375 Gram(s) IV Intermittent every 8 hours  polyethylene glycol 3350 17 Gram(s) Oral daily  potassium chloride   Powder 40 milliEquivalent(s) Oral once  rOPINIRole 0.25 milliGRAM(s) Oral every 8 hours  senna 2 Tablet(s) Oral at bedtime  tamsulosin 0.4 milliGRAM(s) Oral at bedtime      Vital Signs Last 24 Hrs  T(C): 36.4 (25 Oct 2023 10:59), Max: 37.1 (25 Oct 2023 08:21)  T(F): 97.5 (25 Oct 2023 10:59), Max: 98.8 (25 Oct 2023 08:21)  HR: 106 (25 Oct 2023 10:59) (66 - 106)  BP: 118/70 (25 Oct 2023 10:59) (95/74 - 130/90)  BP(mean): 101 (24 Oct 2023 18:00) (90 - 104)  RR: 20 (25 Oct 2023 11:00) (12 - 21)  SpO2: 91% (25 Oct 2023 11:00) (87% - 100%)    Parameters below as of 25 Oct 2023 11:00  Patient On (Oxygen Delivery Method): nasal cannula  O2 Flow (L/min): 4                            14.3   7.18  )-----------( 168      ( 25 Oct 2023 06:16 )             42.7     10-25    141  |  108  |  18  ----------------------------<  102<H>  3.4<L>   |  25  |  0.76    Ca    8.7      25 Oct 2023 06:16  Phos  3.8     10-25  Mg     2.0     10-25    TPro  6.7  /  Alb  2.6<L>  /  TBili  0.6  /  DBili  0.3  /  AST  19  /  ALT  24  /  AlkPhos  41  10-25          Radiology report:  - CT Head  - MRI brain  - MRA brain/carotids  - EEG No acute events overnight, responding more, more awake and alert, seems to have expressive aphasia    ROS: c/o R arm and leg pain arm >leg, otherwise unable to fully obtain because of expressive aphasia    MEDICATIONS  (STANDING):  apixaban 5 milliGRAM(s) Oral every 12 hours  dexAMETHasone     Tablet 1 milliGRAM(s) Oral daily  lacosamide IVPB 200 milliGRAM(s) IV Intermittent every 12 hours  levETIRAcetam  IVPB 1500 milliGRAM(s) IV Intermittent every 12 hours  piperacillin/tazobactam IVPB.. 3.375 Gram(s) IV Intermittent every 8 hours  polyethylene glycol 3350 17 Gram(s) Oral daily  potassium chloride   Powder 40 milliEquivalent(s) Oral once  rOPINIRole 0.25 milliGRAM(s) Oral every 8 hours  senna 2 Tablet(s) Oral at bedtime  tamsulosin 0.4 milliGRAM(s) Oral at bedtime      Vital Signs Last 24 Hrs  T(C): 36.4 (25 Oct 2023 10:59), Max: 37.1 (25 Oct 2023 08:21)  T(F): 97.5 (25 Oct 2023 10:59), Max: 98.8 (25 Oct 2023 08:21)  HR: 106 (25 Oct 2023 10:59) (66 - 106)  BP: 118/70 (25 Oct 2023 10:59) (95/74 - 130/90)  BP(mean): 101 (24 Oct 2023 18:00) (90 - 104)  RR: 20 (25 Oct 2023 11:00) (12 - 21)  SpO2: 91% (25 Oct 2023 11:00) (87% - 100%)      Neurological exam:  HF: A x O x 1 to place. Calm, not on wrist restraints, follows commands, fluent speech saying that he is hot, perseverance of speech, +expressive aphasia(says he knows what to say but cannot)  CN: pupils 3-2mm b/l, CHAPIN, EOMI, VFF, facial sensation intact, no NLFD  Motor: Moves all extremities, but sig pain to RUE cannot move it >45 degrees and is pointing to R lateral antecubital area, diminished tone on R. Moving RLE but its limited  Sens: intact to light touch  Reflexes: Symmetric and normal, downgoing toes b/l  Coord:  HTS intact on L, cannot assess R 2/2 RLE pain. No FTNA on L, cannot asses R 2/2 limitied motion 2/2 pain  Gait/Balance: Cannot test                            14.3   7.18  )-----------( 168      ( 25 Oct 2023 06:16 )             42.7     10-25    141  |  108  |  18  ----------------------------<  102<H>  3.4<L>   |  25  |  0.76    Ca    8.7      25 Oct 2023 06:16  Phos  3.8     10-25  Mg     2.0     10-25    TPro  6.7  /  Alb  2.6<L>  /  TBili  0.6  /  DBili  0.3  /  AST  19  /  ALT  24  /  AlkPhos  41  10-25        Radiology report:   CT Head No Cont (10.22.23 @ 12:38)   IMPRESSION:    New heterogeneous coarse and curvilinear parenchymal calcifications, as   well as new trace periventricular ependymal calcifications along the left   occipital horn, associated with the left parietal GBM. Perilesional   vasogenic edema extending into the left frontoparietal and   temporo-occipital region is similar in extent to CT of 3/7/2023.    EEG Awake or Drowsy (10.23.23 @ 16:30)     EEG Summary/Classification:  This was an abnormal EEG study in the awake and drowsy state due to:  -Continuous focal slowing ? left parietal and mid-posterior temporal   region.  -Brief runs of sharp waves, left parietal and temporal, rarely with   spread throughout the left hemisphere.    EEG Clinical Correlate/  Impression:  Findings are consistent with left hemisphere dysfunction (parietal,   temporal) with epileptogenic cortical dysfunction and a risk for focal   onset seizures.  Consider a repeat study if clinically indicated.      MR Head w/wo IV Cont (10.24.23 @ 09:50)   IMPRESSION:  Interval increase in the enhancing neoplasm located in the   LEFT occipital and posterior temporal lobes now measuring 6.7 cm AP by   3.7 cm TRV by 3.8 cm cc with extension into the posterior horn of the   LEFT lateral ventricle and ependymal spread. Increase in moderate   vasogenic edema involving the LEFT occipital, parietal, posterior frontal   and posterior temporal lobes. Minimal edema also extends into the LEFT   middle cerebellar peduncle and LEFT caren.

## 2023-10-26 ENCOUNTER — TRANSCRIPTION ENCOUNTER (OUTPATIENT)
Age: 63
End: 2023-10-26

## 2023-10-26 VITALS
TEMPERATURE: 97 F | RESPIRATION RATE: 17 BRPM | SYSTOLIC BLOOD PRESSURE: 106 MMHG | OXYGEN SATURATION: 97 % | DIASTOLIC BLOOD PRESSURE: 68 MMHG | HEART RATE: 88 BPM

## 2023-10-26 LAB
ALBUMIN SERPL ELPH-MCNC: 2.6 G/DL — LOW (ref 3.3–5)
ALBUMIN SERPL ELPH-MCNC: 2.6 G/DL — LOW (ref 3.3–5)
ALP SERPL-CCNC: 42 U/L — SIGNIFICANT CHANGE UP (ref 40–120)
ALP SERPL-CCNC: 42 U/L — SIGNIFICANT CHANGE UP (ref 40–120)
ALT FLD-CCNC: 26 U/L — SIGNIFICANT CHANGE UP (ref 12–78)
ALT FLD-CCNC: 26 U/L — SIGNIFICANT CHANGE UP (ref 12–78)
ANION GAP SERPL CALC-SCNC: 7 MMOL/L — SIGNIFICANT CHANGE UP (ref 5–17)
ANION GAP SERPL CALC-SCNC: 7 MMOL/L — SIGNIFICANT CHANGE UP (ref 5–17)
AST SERPL-CCNC: 17 U/L — SIGNIFICANT CHANGE UP (ref 15–37)
AST SERPL-CCNC: 17 U/L — SIGNIFICANT CHANGE UP (ref 15–37)
BILIRUB DIRECT SERPL-MCNC: 0.3 MG/DL — SIGNIFICANT CHANGE UP (ref 0–0.3)
BILIRUB DIRECT SERPL-MCNC: 0.3 MG/DL — SIGNIFICANT CHANGE UP (ref 0–0.3)
BILIRUB INDIRECT FLD-MCNC: 0.4 MG/DL — SIGNIFICANT CHANGE UP (ref 0.2–1)
BILIRUB INDIRECT FLD-MCNC: 0.4 MG/DL — SIGNIFICANT CHANGE UP (ref 0.2–1)
BILIRUB SERPL-MCNC: 0.7 MG/DL — SIGNIFICANT CHANGE UP (ref 0.2–1.2)
BILIRUB SERPL-MCNC: 0.7 MG/DL — SIGNIFICANT CHANGE UP (ref 0.2–1.2)
BUN SERPL-MCNC: 13 MG/DL — SIGNIFICANT CHANGE UP (ref 7–23)
BUN SERPL-MCNC: 13 MG/DL — SIGNIFICANT CHANGE UP (ref 7–23)
CALCIUM SERPL-MCNC: 8.6 MG/DL — SIGNIFICANT CHANGE UP (ref 8.5–10.1)
CALCIUM SERPL-MCNC: 8.6 MG/DL — SIGNIFICANT CHANGE UP (ref 8.5–10.1)
CHLORIDE SERPL-SCNC: 109 MMOL/L — HIGH (ref 96–108)
CHLORIDE SERPL-SCNC: 109 MMOL/L — HIGH (ref 96–108)
CO2 SERPL-SCNC: 26 MMOL/L — SIGNIFICANT CHANGE UP (ref 22–31)
CO2 SERPL-SCNC: 26 MMOL/L — SIGNIFICANT CHANGE UP (ref 22–31)
CREAT SERPL-MCNC: 0.8 MG/DL — SIGNIFICANT CHANGE UP (ref 0.5–1.3)
CREAT SERPL-MCNC: 0.8 MG/DL — SIGNIFICANT CHANGE UP (ref 0.5–1.3)
CULTURE RESULTS: SIGNIFICANT CHANGE UP
CULTURE RESULTS: SIGNIFICANT CHANGE UP
EGFR: 98 ML/MIN/1.73M2 — SIGNIFICANT CHANGE UP
EGFR: 98 ML/MIN/1.73M2 — SIGNIFICANT CHANGE UP
GLUCOSE SERPL-MCNC: 100 MG/DL — HIGH (ref 70–99)
GLUCOSE SERPL-MCNC: 100 MG/DL — HIGH (ref 70–99)
INR BLD: 1.48 RATIO — HIGH (ref 0.85–1.18)
INR BLD: 1.48 RATIO — HIGH (ref 0.85–1.18)
POTASSIUM SERPL-MCNC: 3.7 MMOL/L — SIGNIFICANT CHANGE UP (ref 3.5–5.3)
POTASSIUM SERPL-MCNC: 3.7 MMOL/L — SIGNIFICANT CHANGE UP (ref 3.5–5.3)
POTASSIUM SERPL-SCNC: 3.7 MMOL/L — SIGNIFICANT CHANGE UP (ref 3.5–5.3)
POTASSIUM SERPL-SCNC: 3.7 MMOL/L — SIGNIFICANT CHANGE UP (ref 3.5–5.3)
PROT SERPL-MCNC: 6.5 GM/DL — SIGNIFICANT CHANGE UP (ref 6–8.3)
PROT SERPL-MCNC: 6.5 GM/DL — SIGNIFICANT CHANGE UP (ref 6–8.3)
PROTHROM AB SERPL-ACNC: 16.5 SEC — HIGH (ref 9.5–13)
PROTHROM AB SERPL-ACNC: 16.5 SEC — HIGH (ref 9.5–13)
SODIUM SERPL-SCNC: 142 MMOL/L — SIGNIFICANT CHANGE UP (ref 135–145)
SODIUM SERPL-SCNC: 142 MMOL/L — SIGNIFICANT CHANGE UP (ref 135–145)
SPECIMEN SOURCE: SIGNIFICANT CHANGE UP
SPECIMEN SOURCE: SIGNIFICANT CHANGE UP

## 2023-10-26 PROCEDURE — 99239 HOSP IP/OBS DSCHRG MGMT >30: CPT

## 2023-10-26 PROCEDURE — 99233 SBSQ HOSP IP/OBS HIGH 50: CPT

## 2023-10-26 RX ORDER — LEVETIRACETAM 250 MG/1
15 TABLET, FILM COATED ORAL
Qty: 0 | Refills: 0 | DISCHARGE
Start: 2023-10-26

## 2023-10-26 RX ORDER — LEVETIRACETAM 250 MG/1
1500 TABLET, FILM COATED ORAL
Refills: 0 | Status: DISCONTINUED | OUTPATIENT
Start: 2023-10-26 | End: 2023-10-26

## 2023-10-26 RX ORDER — LACOSAMIDE 50 MG/1
200 TABLET ORAL
Refills: 0 | Status: DISCONTINUED | OUTPATIENT
Start: 2023-10-26 | End: 2023-10-26

## 2023-10-26 RX ADMIN — POLYETHYLENE GLYCOL 3350 17 GRAM(S): 17 POWDER, FOR SOLUTION ORAL at 10:46

## 2023-10-26 RX ADMIN — PIPERACILLIN AND TAZOBACTAM 25 GRAM(S): 4; .5 INJECTION, POWDER, LYOPHILIZED, FOR SOLUTION INTRAVENOUS at 00:29

## 2023-10-26 RX ADMIN — ROPINIROLE 0.25 MILLIGRAM(S): 8 TABLET, FILM COATED, EXTENDED RELEASE ORAL at 14:43

## 2023-10-26 RX ADMIN — Medication 1 MILLIGRAM(S): at 10:47

## 2023-10-26 RX ADMIN — LEVETIRACETAM 1500 MILLIGRAM(S): 250 TABLET, FILM COATED ORAL at 12:40

## 2023-10-26 RX ADMIN — ROPINIROLE 0.25 MILLIGRAM(S): 8 TABLET, FILM COATED, EXTENDED RELEASE ORAL at 10:46

## 2023-10-26 RX ADMIN — PIPERACILLIN AND TAZOBACTAM 25 GRAM(S): 4; .5 INJECTION, POWDER, LYOPHILIZED, FOR SOLUTION INTRAVENOUS at 14:43

## 2023-10-26 RX ADMIN — APIXABAN 5 MILLIGRAM(S): 2.5 TABLET, FILM COATED ORAL at 10:46

## 2023-10-26 RX ADMIN — PIPERACILLIN AND TAZOBACTAM 25 GRAM(S): 4; .5 INJECTION, POWDER, LYOPHILIZED, FOR SOLUTION INTRAVENOUS at 10:46

## 2023-10-26 RX ADMIN — LACOSAMIDE 200 MILLIGRAM(S): 50 TABLET ORAL at 12:41

## 2023-10-26 NOTE — DISCHARGE NOTE PROVIDER - NSDCCPCAREPLAN_GEN_ALL_CORE_FT
PRINCIPAL DISCHARGE DIAGNOSIS  Diagnosis: Status epilepticus  Assessment and Plan of Treatment: your keppra dose has been adjusted  continue vimpat        SECONDARY DISCHARGE DIAGNOSES  Diagnosis: 2019 novel coronavirus disease (COVID-19)  Assessment and Plan of Treatment:     Diagnosis: GBM (glioblastoma multiforme)  Assessment and Plan of Treatment: outpatient follow up with dr america spann at [place]   continue dexamethasone

## 2023-10-26 NOTE — DISCHARGE NOTE PROVIDER - HOSPITAL COURSE
pt is 64 yo male with PMHx seizure disorder and glioblastoma presents to the ED BIBNANY from [facility] for +COVID, vomiting and tachycardia. Upon arrival pt appears to be seizing. Pt on keppra. EMS reports pt had 5 tonic clonic seizures lasting about 15 seconds each. Pt given Tylenol at 9am.  pt seen by neuro and nsx and felt lowered sz threshold in setting of covid.  keppra dose adjusted.  worsening of GBM noted on MRI.  GOC done no limitations placed. plan to dc back to [facility] d/w HCP Tania Johnson.      Physical exam:   CONSTITUTIONAL : NAD, appear to be of stated age , well groomed   NERVOUS SYSTEM:  Alert & Oriented X2, confused at times. expressive aphasia   HEAD:  Atraumatic, Normocephalic  EYES: conjunctiva and sclera clear  HEENT: did not assess.  CHEST: Clear to auscultation bilaterally; No rales, no rhonchi, no wheezing  HEART: Regular rate and rhythm; No murmurs, no rubs or gallops  ABDOMEN: Soft, Non-tender, Non-distended; Bowel sounds present, no guarding , no peritoneal irritation   GENITOURINARY- incontinent  EXTREMITIES: R arm weakness, 2+ Peripheral Pulses, No clubbing, cyanosis, no edema  MUSCULOSKELETAL:- fine upper motor tremors, no deformity appreciated.  SKIN-no rash, no lesion    # Glioblastoma, WHO 4, IDH wt- MGMT methylated   -s/p 5 cycles of TMZ ( last dose 6/2023 ) and a total of 6 doses of MVASI( last dose 5/31/23).  -His further treatment got delayed secondary to fall and right ankle fracture -and has been in rehab at [facility] since 7/2023  -Per Dr. Torres's outpatient notes- since MRI from 10/2- left temporal parietal area appears stable - treatments on hold, but continue Keppra 1000 mg bid and Vimpat 200 mg bid  -MR-brain-10/24/23- Interval increase in the enhancing neoplasm located in the LEFT occipital and posterior temporal lobes now measuring 6.7 cm AP by 3.7 cm TRV by 3.8 cm cc with extension into the posterior horn of the LEFT lateral ventricle and ependymal spread. Increase in moderate vasogenic edema involving the LEFT occipital, parietal, posterior frontal and posterior temporal lobes. Minimal edema also extends into the LEFT middle cerebellar peduncle and LEFT caren  -Neuro and neuro surg consult appreciated / Dr. Torres aware; there is no significant mass effect although she agrees enhancement is worse. outpt f/u    ?#COVID-19  -s/p Remdesivir   -continue with Decadron for GBM    # Seizure   -s/p EEG Findings are consistent with left hemisphere dysfunction (parietal, temporal) with epileptogenic cortical dysfunction and a risk for focal onset seizures.  -Neurology following  -Currently on Keppra 1500 mg IV BID & Vimpat 2002mg PO BID    #PNA  - started on zosyn - dc on augmentin for 4 more days     #UTI  - continue with zosyn     # Hx of DVT /PE  - On Eliquis 5 mg bid      INDINGS:   MRI dated 10/02/2023 available for review.    IMPRESSION:  Interval increase in the enhancing neoplasm located in the   LEFT occipital and posterior temporal lobes now measuring 6.7 cm AP by   3.7 cm TRV by 3.8 cm cc with extension into the posterior horn of the   LEFT lateral ventricle and ependymal spread. Increase in moderate   vasogenic edema involving the LEFT occipital, parietal, posterior frontal   and posterior temporal lobes. Minimal edema also extends into the LEFT   middle cerebellar peduncle and LEFT caren.      GOC - patient wants no limitations on care  FULL CODE

## 2023-10-26 NOTE — DISCHARGE NOTE PROVIDER - CARE PROVIDER_API CALL
Johnathan Almeida  [address] [address]  Phone: [phone]  Fax: [phone]  Follow Up Time:     Debby Torres  Neurology  [address] [place] [id number]  Phone: [phone]  Fax: [phone]  Follow Up Time:

## 2023-10-26 NOTE — PROGRESS NOTE ADULT - REASON FOR ADMISSION
Seizures, COVID

## 2023-10-26 NOTE — DISCHARGE NOTE PROVIDER - DISCHARGE DATE
Left message for patient to return call  
Patient returned call and scheduled appointment with PSR  
Per Dr Meyer call patient and reschedule patient's appointment for Friday due due symptoms pf shortness of breath and pain as discussed with Dr Mcallister.    Left message for patient to return call.  
26-Oct-2023

## 2023-10-26 NOTE — PROGRESS NOTE ADULT - SUBJECTIVE AND OBJECTIVE BOX
No acute events overnight, no seizures observed, patient is more awake, alert, and verbalizing more still with expressive aphasia, R elbow area pain is still there but improved.  Pt. expressing wanting to go to another center and wanting to know more about his GBM.    ROS: As stated above, otherwise negative    MEDICATIONS  (STANDING):  apixaban 5 milliGRAM(s) Oral every 12 hours  dexAMETHasone     Tablet 1 milliGRAM(s) Oral daily  lacosamide Solution 200 milliGRAM(s) Oral two times a day  levETIRAcetam  Solution 1500 milliGRAM(s) Oral two times a day  piperacillin/tazobactam IVPB.. 3.375 Gram(s) IV Intermittent every 8 hours  polyethylene glycol 3350 17 Gram(s) Oral daily  rOPINIRole 0.25 milliGRAM(s) Oral every 8 hours  senna 2 Tablet(s) Oral at bedtime  tamsulosin 0.4 milliGRAM(s) Oral at bedtime      Vital Signs Last 24 Hrs  T(C): 36.4 (25 Oct 2023 20:32), Max: 37.4 (25 Oct 2023 15:32)  T(F): 97.5 (25 Oct 2023 20:32), Max: 99.3 (25 Oct 2023 15:32)  HR: 79 (25 Oct 2023 20:32) (79 - 81)  BP: 114/86 (25 Oct 2023 20:32) (105/76 - 114/86)  BP(mean): --  RR: 18 (25 Oct 2023 20:32) (18 - 18)  SpO2: 96% (25 Oct 2023 20:32) (94% - 96%)    Parameters below as of 25 Oct 2023 20:32  Patient On (Oxygen Delivery Method): nasal cannula  O2 Flow (L/min): 4      Neurological exam:  HF: A x O x 1 to place. Calm, not on wrist restraints, follows commands, fluent speech saying that he is hot, perseverance of speech, +expressive aphasia(improved)  CN: pupils 3-2mm b/l, CHAPIN, EOMI, VFF, facial sensation intact, no NLFD  Motor: Moves all extremities, but mild-mod pain to RUE at elbow.   Moving RLE and toes  Sens: intact to light touch  Reflexes: Symmetric and normal, downgoing toes b/l  Coord:  HTS intact on L No FTNA on L, cannot asses RUE/RLE 2/2 pain  Gait/Balance: Cannot test                      14.3   7.18  )-----------( 168      ( 25 Oct 2023 06:16 )             42.7     10-26    142  |  109<H>  |  13  ----------------------------<  100<H>  3.7   |  26  |  0.80    Ca    8.6      26 Oct 2023 06:08  Phos  3.8     10-25  Mg     2.0     10-25    TPro  6.5  /  Alb  2.6<L>  /  TBili  0.7  /  DBili  0.3  /  AST  17  /  ALT  26  /  AlkPhos  42  10-26          Radiology report:   CT Head No Cont (10.22.23 @ 12:38)   IMPRESSION:    New heterogeneous coarse and curvilinear parenchymal calcifications, as   well as new trace periventricular ependymal calcifications along the left   occipital horn, associated with the left parietal GBM. Perilesional   vasogenic edema extending into the left frontoparietal and   temporo-occipital region is similar in extent to CT of 3/7/2023.    EEG Awake or Drowsy (10.23.23 @ 16:30)     EEG Summary/Classification:  This was an abnormal EEG study in the awake and drowsy state due to:  -Continuous focal slowing ? left parietal and mid-posterior temporal   region.  -Brief runs of sharp waves, left parietal and temporal, rarely with   spread throughout the left hemisphere.    EEG Clinical Correlate/  Impression:  Findings are consistent with left hemisphere dysfunction (parietal,   temporal) with epileptogenic cortical dysfunction and a risk for focal   onset seizures.  Consider a repeat study if clinically indicated.      MR Head w/wo IV Cont (10.24.23 @ 09:50)   IMPRESSION:  Interval increase in the enhancing neoplasm located in the   LEFT occipital and posterior temporal lobes now measuring 6.7 cm AP by   3.7 cm TRV by 3.8 cm cc with extension into the posterior horn of the   LEFT lateral ventricle and ependymal spread. Increase in moderate   vasogenic edema involving the LEFT occipital, parietal, posterior frontal   and posterior temporal lobes. Minimal edema also extends into the LEFT   middle cerebellar peduncle and LEFT caren.

## 2023-10-26 NOTE — DISCHARGE NOTE PROVIDER - NSDCMRMEDTOKEN_GEN_ALL_CORE_FT
acetaminophen 325 mg oral tablet: 2 tab(s) orally every 6 hours As needed Temp greater or equal to 38C (100.4F), Mild Pain (1 - 3)  Augmentin 875 mg-125 mg oral tablet: 1 tab(s) orally 2 times a day X 4 more days  cyclobenzaprine 10 mg oral tablet: 1 tab(s) orally 3 times a day As needed Muscle Spasm  dexAMETHasone 1 mg oral tablet: 1 tab(s) orally once a day  Eliquis 5 mg oral tablet: 1 orally 2 times a day  lacosamide 200 mg oral tablet: 1 tab(s) orally 2 times a day  levETIRAcetam 100 mg/mL oral solution: 15 milliliter(s) orally 2 times a day  mirtazapine 7.5 mg oral tablet: 2 tab(s) orally once a day (at bedtime) as needed for  oxyCODONE 5 mg oral tablet: 1 tab(s) orally every 4 hours as needed for  moderate pain  pantoprazole 40 mg oral delayed release tablet: 1 tab(s) orally once a day (in the afternoon)  polyethylene glycol 3350 oral powder for reconstitution: 17 gram(s) orally once a day  rOPINIRole 0.25 mg oral tablet: 3 tab(s) orally once a day at 8pm  senna leaf extract oral tablet: 2 tab(s) orally once a day (at bedtime)  tamsulosin 0.4 mg oral capsule: 1 cap(s) orally once a day

## 2023-10-26 NOTE — DISCHARGE NOTE PROVIDER - NSDCFUSCHEDAPPT_GEN_ALL_CORE_FT
[facility]  MRI  [facility]  Scheduled Appointment: 11/30/2023    Debby Torres  [facility]  NEUROLOGY [address] [facility]  Scheduled Appointment: 11/30/2023

## 2023-10-26 NOTE — DISCHARGE NOTE NURSING/CASE MANAGEMENT/SOCIAL WORK - PATIENT PORTAL LINK FT
You can access the FollowMyHealth Patient Portal offered by [facility] by registering at the following website: [email or web address]. By joining [employer]’s FollowMyHealth portal, you will also be able to view your health information using other applications (apps) compatible with our system.

## 2023-10-26 NOTE — DISCHARGE NOTE NURSING/CASE MANAGEMENT/SOCIAL WORK - NSDCPEFALRISK_GEN_ALL_CORE
For information on Fall & Injury Prevention, visit: [email or web address] OR  [email or web address] OR  https://www.cdc.gov/steadi/patient.html
0 = swallows foods/liquids without difficulty

## 2023-10-26 NOTE — PROGRESS NOTE ADULT - ASSESSMENT
62yo male with PMHx seizure disorder and glioblastoma, s/p 5 cycles of TMA(last dose 6/2023), and 6 doses of MVASI(last dose 5/31), fall and R ankle fracture in 7/2023, stable MRI 10/2 follows up with Dr. Debby Torres,  presents to the ED [facility] from [facility] for +COVID, vomiting and tachycardia. Upon arrival pt appeared to be seizing. Pt was on keppra and vimpat. EMS reports pt had 5 tonic clonic seizures lasting about 15 seconds each.  Currently patient is confused, agitated, on wrist restraints.  Head CT shows new heterogeneous coarse and curvilinear parenchymal calcifications, as well as new trace periventricular ependymal calcifications along the left occipital horn, associated with the left parietal GBM.  Pt. is on IV keppra and dose was increased from 1000mg BID to 1500mg BID.  NO more seizures have been observed in the ICU.        # Seizures and AMS-in the setting of COVID +.  Pt. with glioblastoma and seizure d/o.  Head CT with new heterogeneous coarse and curvilinear parenchymal calcifications, as well as new trace periventricular ependymal calcifications along the left occipital horn, associated with the left parietal GBM.  Neurosurgery saw the patient and does not recommend any interventions.      MRI brain: Interval increase in the enhancing neoplasm located in the   LEFT occipital and posterior temporal lobes now measuring 6.7 cm AP by   3.7 cm TRV by 3.8 cm cc with extension into the posterior horn of the   LEFT lateral ventricle and ependymal spread. Increase in moderate   vasogenic edema involving the LEFT occipital, parietal, posterior frontal   and posterior temporal lobes. Minimal edema also extends into the LEFT   middle cerebellar peduncle and LEFT caren.  Keppra increased    EEG: Findings are consistent with left hemisphere dysfunction (parietal,   temporal) with epileptogenic cortical dysfunction and a risk for focal   onset seizures.    Recommendations:   Continue keppra at current dose of 1500mg BID-can switch to PO  Decadron per neurosurgery  Primary neurosurgeon Dr. Turpin and his primary oncology team contacted by Neurosurgery-F/U outpatient  Supportive care for COVID  No objection for DC  Outpatient follow up with Neurology    D/W Dr. Grewal

## 2023-10-26 NOTE — DISCHARGE NOTE PROVIDER - CARE PROVIDERS DIRECT ADDRESSES
,[email or web address]

## 2023-10-27 LAB
CULTURE RESULTS: SIGNIFICANT CHANGE UP
SPECIMEN SOURCE: SIGNIFICANT CHANGE UP

## 2023-11-02 DIAGNOSIS — Z79.01 LONG TERM (CURRENT) USE OF ANTICOAGULANTS: ICD-10-CM

## 2023-11-02 DIAGNOSIS — G40.801 OTHER EPILEPSY, NOT INTRACTABLE, WITH STATUS EPILEPTICUS: ICD-10-CM

## 2023-11-02 DIAGNOSIS — Z86.718 PERSONAL HISTORY OF OTHER VENOUS THROMBOSIS AND EMBOLISM: ICD-10-CM

## 2023-11-02 DIAGNOSIS — R47.01 APHASIA: ICD-10-CM

## 2023-11-02 DIAGNOSIS — Z79.891 LONG TERM (CURRENT) USE OF OPIATE ANALGESIC: ICD-10-CM

## 2023-11-02 DIAGNOSIS — Z86.711 PERSONAL HISTORY OF PULMONARY EMBOLISM: ICD-10-CM

## 2023-11-02 DIAGNOSIS — C71.9 MALIGNANT NEOPLASM OF BRAIN, UNSPECIFIED: ICD-10-CM

## 2023-11-02 DIAGNOSIS — Z86.16 PERSONAL HISTORY OF COVID-19: ICD-10-CM

## 2023-11-02 DIAGNOSIS — U07.1 COVID-19: ICD-10-CM

## 2023-11-02 DIAGNOSIS — I49.3 VENTRICULAR PREMATURE DEPOLARIZATION: ICD-10-CM

## 2023-11-02 DIAGNOSIS — J18.9 PNEUMONIA, UNSPECIFIED ORGANISM: ICD-10-CM

## 2023-11-02 DIAGNOSIS — Z78.1 PHYSICAL RESTRAINT STATUS: ICD-10-CM

## 2023-11-02 DIAGNOSIS — G93.6 CEREBRAL EDEMA: ICD-10-CM

## 2023-11-02 DIAGNOSIS — N39.0 URINARY TRACT INFECTION, SITE NOT SPECIFIED: ICD-10-CM

## 2023-11-05 ENCOUNTER — EMERGENCY (EMERGENCY)
Facility: HOSPITAL | Age: 63
LOS: 0 days | Discharge: NURSING, REHAB OR LONG-TERM CARE | End: 2023-11-05
Attending: STUDENT IN AN ORGANIZED HEALTH CARE EDUCATION/TRAINING PROGRAM
Payer: MEDICARE

## 2023-11-05 VITALS
DIASTOLIC BLOOD PRESSURE: 78 MMHG | RESPIRATION RATE: 18 BRPM | HEIGHT: 66 IN | TEMPERATURE: 98 F | HEART RATE: 102 BPM | SYSTOLIC BLOOD PRESSURE: 99 MMHG | OXYGEN SATURATION: 97 % | WEIGHT: 220.02 LBS

## 2023-11-05 VITALS
TEMPERATURE: 98 F | OXYGEN SATURATION: 96 % | DIASTOLIC BLOOD PRESSURE: 79 MMHG | HEART RATE: 84 BPM | RESPIRATION RATE: 18 BRPM | SYSTOLIC BLOOD PRESSURE: 112 MMHG

## 2023-11-05 DIAGNOSIS — G40.909 EPILEPSY, UNSPECIFIED, NOT INTRACTABLE, WITHOUT STATUS EPILEPTICUS: ICD-10-CM

## 2023-11-05 DIAGNOSIS — R00.0 TACHYCARDIA, UNSPECIFIED: ICD-10-CM

## 2023-11-05 DIAGNOSIS — R07.89 OTHER CHEST PAIN: ICD-10-CM

## 2023-11-05 DIAGNOSIS — Z85.841 PERSONAL HISTORY OF MALIGNANT NEOPLASM OF BRAIN: ICD-10-CM

## 2023-11-05 DIAGNOSIS — Z98.890 OTHER SPECIFIED POSTPROCEDURAL STATES: Chronic | ICD-10-CM

## 2023-11-05 DIAGNOSIS — Z79.01 LONG TERM (CURRENT) USE OF ANTICOAGULANTS: ICD-10-CM

## 2023-11-05 DIAGNOSIS — I45.10 UNSPECIFIED RIGHT BUNDLE-BRANCH BLOCK: ICD-10-CM
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
## 2023-11-05 DIAGNOSIS — R07.81 PLEURODYNIA: ICD-10-CM

## 2023-11-05 PROCEDURE — 71250 CT THORAX DX C-: CPT | Mod: 26,MA

## 2023-11-05 PROCEDURE — 99284 EMERGENCY DEPT VISIT MOD MDM: CPT | Mod: 25

## 2023-11-05 PROCEDURE — 93010 ELECTROCARDIOGRAM REPORT: CPT

## 2023-11-05 PROCEDURE — 99284 EMERGENCY DEPT VISIT MOD MDM: CPT

## 2023-11-05 PROCEDURE — 93005 ELECTROCARDIOGRAM TRACING: CPT

## 2023-11-05 PROCEDURE — 71250 CT THORAX DX C-: CPT | Mod: MA

## 2023-11-05 RX ORDER — ACETAMINOPHEN 500 MG
1000 TABLET ORAL ONCE
Refills: 0 | Status: COMPLETED | OUTPATIENT
Start: 2023-11-05 | End: 2023-11-05

## 2023-11-05 RX ORDER — LIDOCAINE 4 G/100G
1 CREAM TOPICAL ONCE
Refills: 0 | Status: COMPLETED | OUTPATIENT
Start: 2023-11-05 | End: 2023-11-05

## 2023-11-05 RX ADMIN — Medication 1000 MILLIGRAM(S): at 20:40

## 2023-11-05 RX ADMIN — LIDOCAINE 1 PATCH: 4 CREAM TOPICAL at 20:41

## 2023-11-05 NOTE — ED PROVIDER NOTE - CLINICAL SUMMARY MEDICAL DECISION MAKING FREE TEXT BOX
62 y/o male with PMHx of seizure disorder, glioblastoma presents to the ED c/o left sided cp. Patient overall well appearing, NAD. Reports recent fall and has been having left sided cp since. Will obtain CT chest to evaluate for fracture, provide analgesia. Do not suspect ACS, PNA or PE. Plan for imaging, medications, will reassess. 64 y/o male with PMHx of seizure disorder, glioblastoma presents to the ED c/o left sided cp. Patient overall well appearing, NAD. Reports recent fall and has been having left sided cp since. Will obtain CT chest to evaluate for fracture, provide analgesia. Will obtain screening EKG. Do not suspect ACS, PNA or PE. Plan for imaging, medications. Will reassess the need for additional interventions as clinically warranted. Refer to any progress notes for updates on clinical course and as a continuation of this MDM.

## 2023-11-05 NOTE — ED PROVIDER NOTE - PATIENT'S PREFERRED PRONOUN
the tongue every 5 minutes as needed for Chest pain       No current facility-administered medications for this visit. No Known Allergies    Health Maintenance   Topic Date Due    DTaP/Tdap/Td vaccine (1 - Tdap) 02/[birth year]/1962    Shingles Vaccine (1 of 2) 02/23/1993    Pneumococcal 65+ years Vaccine (2 of 2 - PCV13) 01/09/2019    Flu vaccine (1) 09/01/2019    Annual Wellness Visit (AWV)  10/02/2019    Potassium monitoring  06/14/2020    Creatinine monitoring  06/14/2020       Subjective:      Review of Systems   Constitutional: Negative for chills and fever. HENT: Negative for congestion. Respiratory: Negative for cough, chest tightness and shortness of breath. Cardiovascular: Negative for chest pain, palpitations and leg swelling. Gastrointestinal: Negative for abdominal pain, anal bleeding, constipation, diarrhea and nausea. Genitourinary: Negative for difficulty urinating. Musculoskeletal: Positive for arthralgias, back pain, myalgias and neck pain. Negative for gait problem and joint swelling. Psychiatric/Behavioral: Negative. SeeHPI for visit specific review of symptoms. All others negative      Objective:   /68   Pulse 68   Temp 97 °F (36.1 °C)   Wt 222 lb (100.7 kg)   SpO2 98%   BMI 33.75 kg/m²   Physical Exam   Constitutional: He appears well-developed. He does not appear ill. Eyes: Pupils are equal, round, and reactive to light. Neck: Normal range of motion. Neck supple. Cardiovascular: Normal rate and regular rhythm. Exam reveals no friction rub. No murmur heard. Pulmonary/Chest: Effort normal and breath sounds normal. No respiratory distress. He has no wheezes. He has no rales. Abdominal: Soft. Bowel sounds are normal. He exhibits no distension. There is no tenderness. There is no rebound and no guarding. Musculoskeletal: He exhibits no edema. Neurological: He is alert. Psychiatric: He has a normal mood and affect.  His behavior is normal.
Him/He

## 2023-11-05 NOTE — ED ADULT NURSE NOTE - CHIEF COMPLAINT QUOTE
patient brought in by EMS from [facility] c/o intermittent left sided rib pain for the last few weeks.  patient reports pain is worse with inspiration.  having some difficulty breathing.

## 2023-11-05 NOTE — ED ADULT NURSE NOTE - NSFALLRISKINTERV_ED_ALL_ED

## 2023-11-05 NOTE — ED PROVIDER NOTE - PHYSICAL EXAMINATION
Gen: NAD, AOx2, able to make needs known, non-toxic  Head: NCAT  HEENT: EOMI, oral mucosa moist, normal conjunctiva  Lung: no respiratory distress, CTAB, no wheezes/rhonchi/rales B/L, speaking in full sentences  CV: RRR, no murmurs  Abd: non distended, soft, nontender, no guarding, no CVA tenderness  MSK: no visible deformities, left lower anterior and posterior chest wall tenderness with no crepitus or ecchymosis  Neuro: Appears non focal  Skin: Warm, well perfused  Psych: normal affect Gen: NAD, AOx3, able to make needs known, non-toxic  Head: NCAT  HEENT: EOMI, oral mucosa moist, normal conjunctiva  Lung: no respiratory distress, CTAB, no wheezes/rhonchi/rales B/L, speaking in full sentences  CV: RRR, no murmurs  Abd: non distended, soft, nontender, no guarding, no CVA tenderness  MSK: no visible deformities, left lower anterior and posterior chest wall tenderness with no crepitus or ecchymosis  Neuro: Appears non focal  Skin: Warm, well perfused  Psych: normal affect

## 2023-11-05 NOTE — ED ADULT TRIAGE NOTE - CHIEF COMPLAINT QUOTE
patient brought in by EMS from [facility] c/o intermittent left sided rib pain for the last few weeks.  patient reports pain is worse with inspiration.  having some difficulty breathing.

## 2023-11-05 NOTE — ED PROVIDER NOTE - INTERNATIONAL TRAVEL
Lydia Dietz's chief complaint for this visit includes:  Chief Complaint   Patient presents with     Skin Check     FBSC- area of concern-  bilateral shins, has had for about 1 1/2 years. Left side of neck. 2 spots on left and right upper arm. Dryness on forearms.      PCP: Praveen Moses    Referring Provider:  Praveen Moses MD  [address] [address]    There were no vitals taken for this visit.  Moderate Pain (4)        Allergies   Allergen Reactions     Rosuvastatin Muscle Pain (Myalgia)     Seasonal Allergies Other (See Comments)     Amlodipine      Other reaction(s): Edema,generalized     Atorvastatin      PN: Reaction: BLURRED VISION     Fenofibrate Muscle Pain (Myalgia)     Fluconazole Nausea     Hydrochlorothiazide      Leg cramps, felt sluggish     Losartan      PN: Reaction: Back pain and cramping     Pravastatin      PN:  Reaction: JOINT AND MUSCLE PAIN     Simvastatin Nausea     Niacin Rash         Do you need any medication refills at today's visit?  No.       Ayanna Vidal LPN   No

## 2023-11-05 NOTE — ED ADULT NURSE NOTE - COVID-19 ORDERING FACILITY
RN from [facility] is calling and is requesting to have order changed for Chilo as he is claustrophobic.  FNA directed RN to page  for [place].    
[facility]

## 2023-11-05 NOTE — ED PROVIDER NOTE - OBJECTIVE STATEMENT
Quantity not sufficient 62 y/o male with PMHx of seizure disorder, glioblastoma presents to the ED BIBEMS from [place] c/o left sided rib/chest pain. Patient states he fell 2 weeks ago, the pain has improved except for his left ribs/chest. States the pain, swelling, and breathing are worsening. States he has labored breathing due to pain. Endorses mild abdominal pain. Took aspirin this morning, no other meds PTA. 62 y/o male with PMHx of seizure disorder, glioblastoma [facility]. Patient states he fell 2 weeks ago, the pain has improved except for his left ribs/chest. States the pain, swelling, and breathing are worsening. States he has labored breathing due to pain. Took aspirin this morning, no other meds PTA. Denies fevers, chills, headache, dizziness, blurred vision, cough, n/v/d/c, urinary symptoms, MSK pain, rash.

## 2023-11-05 NOTE — ED PROVIDER NOTE - PATIENT PORTAL LINK FT
You can access the FollowMyHealth Patient Portal offered by [facility] by registering at the following website: [email or web address]. By joining [employer]’s FollowMyHealth portal, you will also be able to view your health information using other applications (apps) compatible with our system.

## 2023-11-05 NOTE — ED PROVIDER NOTE - PROGRESS NOTE DETAILS
Attending Jocelino: CT w/o emergent findings. pt reports pain improved. will DC back to [facility].  to arrange transportation

## 2023-11-05 NOTE — ED ADULT NURSE NOTE - DISCHARGE DATE/TIME
They will come get a urine specimen bottle me back so we can ensure that she actually has a UTI. If she does we will send in antibiotics if not she will go to [address] to get Covid testing. 05-Nov-2023 22:39

## 2023-11-05 NOTE — ED PROVIDER NOTE - NS_ ATTENDINGSCRIBEDETAILS _ED_A_ED_FT
Attending Janis: The scribe's documentation has been prepared under my direction and personally reviewed by me in its entirety. I confirm that the note above accurately reflects all work, treatment, procedures, and medical decision making performed by me.

## 2023-11-05 NOTE — ED PROVIDER NOTE - NSFOLLOWUPINSTRUCTIONS_ED_ALL_ED_FT
1) Follow up with your doctor this week  2) Return to the ED immediately for new or worsening symptoms   3) Please continue to take any home medications as prescribed  4) Your test results from your ED visit were discussed with you prior to discharge  5) You were provided with a copy of your test results  6) Please take Tylenol 975 mg every 6 hours as needed for pain. Please do not exceed more than 4,000mg of Tylenol in a day     Chest Pain    Chest pain can be caused by many different conditions which may or may not be dangerous. Causes include heartburn, lung infections, heart attack, blood clot in lungs, skin infections, strain or damage to muscle, cartilage, or bones, etc. In addition to a history and physical examination, an electrocardiogram (ECG) or other lab tests may have been performed to determine the cause of your chest pain. Follow up with your primary care provider or with a cardiologist as instructed.     SEEK IMMEDIATE MEDICAL CARE IF YOU HAVE ANY OF THE FOLLOWING SYMPTOMS: worsening chest pain, coughing up blood, unexplained back/neck/jaw pain, severe abdominal pain, dizziness or lightheadedness, fainting, shortness of breath, sweaty or clammy skin, vomiting, or racing heart beat. These symptoms may represent a serious problem that is an emergency. Do not wait to see if the symptoms will go away. Get medical help right away. Call 911 and do not drive yourself to the hospital.    Strain    A strain is a stretch or tear in one of the muscles in your body. This is caused by an injury to the area such as a twisting mechanism. Symptoms include pain, swelling, or bruising. Rest that area over the next several days and slowly resume activity when tolerated. Ice can help with swelling and pain.     SEEK IMMEDIATE MEDICAL CARE IF YOU HAVE ANY OF THE FOLLOWING SYMPTOMS: worsening pain, inability to move that body part, numbness or tingling.

## 2023-11-07 ENCOUNTER — NON-APPOINTMENT (OUTPATIENT)
Age: 63
End: 2023-11-07

## 2023-11-07 ENCOUNTER — APPOINTMENT (OUTPATIENT)
Dept: NEUROLOGY | Facility: CLINIC | Age: 63
End: 2023-11-07
Payer: MEDICARE

## 2023-11-07 VITALS
SYSTOLIC BLOOD PRESSURE: 109 MMHG | TEMPERATURE: 98.5 F | BODY MASS INDEX: 29.92 KG/M2 | RESPIRATION RATE: 16 BRPM | HEIGHT: 70 IN | HEART RATE: 95 BPM | DIASTOLIC BLOOD PRESSURE: 72 MMHG | OXYGEN SATURATION: 97 % | WEIGHT: 209 LBS

## 2023-11-07 PROCEDURE — 99215 OFFICE O/P EST HI 40 MIN: CPT

## 2023-11-07 RX ORDER — OXYCODONE 5 MG/1
5 TABLET ORAL
Qty: 120 | Refills: 0 | Status: DISCONTINUED | COMMUNITY
Start: 2023-10-10 | End: 2023-11-07

## 2023-11-16 ENCOUNTER — OUTPATIENT (OUTPATIENT)
Dept: OUTPATIENT SERVICES | Facility: HOSPITAL | Age: 63
LOS: 1 days | Discharge: HOME | End: 2023-11-16

## 2023-11-16 DIAGNOSIS — G93.9 DISORDER OF BRAIN, UNSPECIFIED: ICD-10-CM

## 2023-11-16 DIAGNOSIS — Z98.890 OTHER SPECIFIED POSTPROCEDURAL STATES: Chronic | ICD-10-CM

## 2023-11-22 ENCOUNTER — APPOINTMENT (OUTPATIENT)
Dept: NEUROLOGY | Facility: CLINIC | Age: 63
End: 2023-11-22
Payer: MEDICARE

## 2023-11-22 ENCOUNTER — RESULT REVIEW (OUTPATIENT)
Age: 63
End: 2023-11-22

## 2023-11-22 ENCOUNTER — APPOINTMENT (OUTPATIENT)
Dept: INFUSION THERAPY | Facility: HOSPITAL | Age: 63
End: 2023-11-22

## 2023-11-22 ENCOUNTER — APPOINTMENT (OUTPATIENT)
Dept: HEMATOLOGY ONCOLOGY | Facility: CLINIC | Age: 63
End: 2023-11-22

## 2023-11-22 VITALS
OXYGEN SATURATION: 98 % | HEART RATE: 65 BPM | RESPIRATION RATE: 16 BRPM | SYSTOLIC BLOOD PRESSURE: 112 MMHG | HEIGHT: 70 IN | DIASTOLIC BLOOD PRESSURE: 73 MMHG | WEIGHT: 210 LBS | BODY MASS INDEX: 30.06 KG/M2

## 2023-11-22 LAB
BASOPHILS # BLD AUTO: 0.01 K/UL — SIGNIFICANT CHANGE UP (ref 0–0.2)
BASOPHILS # BLD AUTO: 0.01 K/UL — SIGNIFICANT CHANGE UP (ref 0–0.2)
BASOPHILS NFR BLD AUTO: 0.1 % — SIGNIFICANT CHANGE UP (ref 0–2)
BASOPHILS NFR BLD AUTO: 0.1 % — SIGNIFICANT CHANGE UP (ref 0–2)
EOSINOPHIL # BLD AUTO: 0 K/UL — SIGNIFICANT CHANGE UP (ref 0–0.5)
EOSINOPHIL # BLD AUTO: 0 K/UL — SIGNIFICANT CHANGE UP (ref 0–0.5)
EOSINOPHIL NFR BLD AUTO: 0 % — SIGNIFICANT CHANGE UP (ref 0–6)
EOSINOPHIL NFR BLD AUTO: 0 % — SIGNIFICANT CHANGE UP (ref 0–6)
HCT VFR BLD CALC: 43.3 % — SIGNIFICANT CHANGE UP (ref 39–50)
HCT VFR BLD CALC: 43.3 % — SIGNIFICANT CHANGE UP (ref 39–50)
HGB BLD-MCNC: 14.4 G/DL — SIGNIFICANT CHANGE UP (ref 13–17)
HGB BLD-MCNC: 14.4 G/DL — SIGNIFICANT CHANGE UP (ref 13–17)
IMM GRANULOCYTES NFR BLD AUTO: 0.7 % — SIGNIFICANT CHANGE UP (ref 0–0.9)
IMM GRANULOCYTES NFR BLD AUTO: 0.7 % — SIGNIFICANT CHANGE UP (ref 0–0.9)
LYMPHOCYTES # BLD AUTO: 0.66 K/UL — LOW (ref 1–3.3)
LYMPHOCYTES # BLD AUTO: 0.66 K/UL — LOW (ref 1–3.3)
LYMPHOCYTES # BLD AUTO: 6.7 % — LOW (ref 13–44)
LYMPHOCYTES # BLD AUTO: 6.7 % — LOW (ref 13–44)
MCHC RBC-ENTMCNC: 28.9 PG — SIGNIFICANT CHANGE UP (ref 27–34)
MCHC RBC-ENTMCNC: 28.9 PG — SIGNIFICANT CHANGE UP (ref 27–34)
MCHC RBC-ENTMCNC: 33.3 G/DL — SIGNIFICANT CHANGE UP (ref 32–36)
MCHC RBC-ENTMCNC: 33.3 G/DL — SIGNIFICANT CHANGE UP (ref 32–36)
MCV RBC AUTO: 86.8 FL — SIGNIFICANT CHANGE UP (ref 80–100)
MCV RBC AUTO: 86.8 FL — SIGNIFICANT CHANGE UP (ref 80–100)
MONOCYTES # BLD AUTO: 0.23 K/UL — SIGNIFICANT CHANGE UP (ref 0–0.9)
MONOCYTES # BLD AUTO: 0.23 K/UL — SIGNIFICANT CHANGE UP (ref 0–0.9)
MONOCYTES NFR BLD AUTO: 2.3 % — SIGNIFICANT CHANGE UP (ref 2–14)
MONOCYTES NFR BLD AUTO: 2.3 % — SIGNIFICANT CHANGE UP (ref 2–14)
NEUTROPHILS # BLD AUTO: 8.84 K/UL — HIGH (ref 1.8–7.4)
NEUTROPHILS # BLD AUTO: 8.84 K/UL — HIGH (ref 1.8–7.4)
NEUTROPHILS NFR BLD AUTO: 90.2 % — HIGH (ref 43–77)
NEUTROPHILS NFR BLD AUTO: 90.2 % — HIGH (ref 43–77)
NRBC # BLD: 0 /100 WBCS — SIGNIFICANT CHANGE UP (ref 0–0)
NRBC # BLD: 0 /100 WBCS — SIGNIFICANT CHANGE UP (ref 0–0)
PLATELET # BLD AUTO: 129 K/UL — LOW (ref 150–400)
PLATELET # BLD AUTO: 129 K/UL — LOW (ref 150–400)
RBC # BLD: 4.99 M/UL — SIGNIFICANT CHANGE UP (ref 4.2–5.8)
RBC # BLD: 4.99 M/UL — SIGNIFICANT CHANGE UP (ref 4.2–5.8)
RBC # FLD: 15.6 % — HIGH (ref 10.3–14.5)
RBC # FLD: 15.6 % — HIGH (ref 10.3–14.5)
WBC # BLD: 9.81 K/UL — SIGNIFICANT CHANGE UP (ref 3.8–10.5)
WBC # BLD: 9.81 K/UL — SIGNIFICANT CHANGE UP (ref 3.8–10.5)
WBC # FLD AUTO: 9.81 K/UL — SIGNIFICANT CHANGE UP (ref 3.8–10.5)
WBC # FLD AUTO: 9.81 K/UL — SIGNIFICANT CHANGE UP (ref 3.8–10.5)

## 2023-11-22 PROCEDURE — 99215 OFFICE O/P EST HI 40 MIN: CPT

## 2023-11-22 RX ORDER — LEVETIRACETAM 1000 MG/1
1000 TABLET, FILM COATED ORAL
Qty: 270 | Refills: 1 | Status: ACTIVE | COMMUNITY
Start: 2023-05-26

## 2023-11-22 RX ORDER — MIRTAZAPINE 7.5 MG/1
7.5 TABLET, FILM COATED ORAL
Qty: 60 | Refills: 0 | Status: DISCONTINUED | COMMUNITY
Start: 2023-03-21 | End: 2023-11-22

## 2023-11-23 LAB
ALBUMIN SERPL ELPH-MCNC: 3.9 G/DL — SIGNIFICANT CHANGE UP (ref 3.3–5)
ALBUMIN SERPL ELPH-MCNC: 3.9 G/DL — SIGNIFICANT CHANGE UP (ref 3.3–5)
ALP SERPL-CCNC: 64 U/L — SIGNIFICANT CHANGE UP (ref 40–120)
ALP SERPL-CCNC: 64 U/L — SIGNIFICANT CHANGE UP (ref 40–120)
ALT FLD-CCNC: 25 U/L — SIGNIFICANT CHANGE UP (ref 10–45)
ALT FLD-CCNC: 25 U/L — SIGNIFICANT CHANGE UP (ref 10–45)
ANION GAP SERPL CALC-SCNC: 13 MMOL/L — SIGNIFICANT CHANGE UP (ref 5–17)
ANION GAP SERPL CALC-SCNC: 13 MMOL/L — SIGNIFICANT CHANGE UP (ref 5–17)
APPEARANCE UR: CLEAR — SIGNIFICANT CHANGE UP
APPEARANCE UR: CLEAR — SIGNIFICANT CHANGE UP
AST SERPL-CCNC: 14 U/L — SIGNIFICANT CHANGE UP (ref 10–40)
AST SERPL-CCNC: 14 U/L — SIGNIFICANT CHANGE UP (ref 10–40)
BILIRUB SERPL-MCNC: 0.3 MG/DL — SIGNIFICANT CHANGE UP (ref 0.2–1.2)
BILIRUB SERPL-MCNC: 0.3 MG/DL — SIGNIFICANT CHANGE UP (ref 0.2–1.2)
BILIRUB UR-MCNC: NEGATIVE — SIGNIFICANT CHANGE UP
BILIRUB UR-MCNC: NEGATIVE — SIGNIFICANT CHANGE UP
BUN SERPL-MCNC: 14 MG/DL — SIGNIFICANT CHANGE UP (ref 7–23)
BUN SERPL-MCNC: 14 MG/DL — SIGNIFICANT CHANGE UP (ref 7–23)
CALCIUM SERPL-MCNC: 9.6 MG/DL — SIGNIFICANT CHANGE UP (ref 8.4–10.5)
CALCIUM SERPL-MCNC: 9.6 MG/DL — SIGNIFICANT CHANGE UP (ref 8.4–10.5)
CHLORIDE SERPL-SCNC: 102 MMOL/L — SIGNIFICANT CHANGE UP (ref 96–108)
CHLORIDE SERPL-SCNC: 102 MMOL/L — SIGNIFICANT CHANGE UP (ref 96–108)
CO2 SERPL-SCNC: 26 MMOL/L — SIGNIFICANT CHANGE UP (ref 22–31)
CO2 SERPL-SCNC: 26 MMOL/L — SIGNIFICANT CHANGE UP (ref 22–31)
COLOR SPEC: YELLOW — SIGNIFICANT CHANGE UP
COLOR SPEC: YELLOW — SIGNIFICANT CHANGE UP
CREAT SERPL-MCNC: 0.78 MG/DL — SIGNIFICANT CHANGE UP (ref 0.5–1.3)
CREAT SERPL-MCNC: 0.78 MG/DL — SIGNIFICANT CHANGE UP (ref 0.5–1.3)
DIFF PNL FLD: NEGATIVE — SIGNIFICANT CHANGE UP
DIFF PNL FLD: NEGATIVE — SIGNIFICANT CHANGE UP
EGFR: 99 ML/MIN/1.73M2 — SIGNIFICANT CHANGE UP
EGFR: 99 ML/MIN/1.73M2 — SIGNIFICANT CHANGE UP
GLUCOSE SERPL-MCNC: 109 MG/DL — HIGH (ref 70–99)
GLUCOSE SERPL-MCNC: 109 MG/DL — HIGH (ref 70–99)
GLUCOSE UR QL: NEGATIVE MG/DL — SIGNIFICANT CHANGE UP
GLUCOSE UR QL: NEGATIVE MG/DL — SIGNIFICANT CHANGE UP
KETONES UR-MCNC: NEGATIVE MG/DL — SIGNIFICANT CHANGE UP
KETONES UR-MCNC: NEGATIVE MG/DL — SIGNIFICANT CHANGE UP
LEUKOCYTE ESTERASE UR-ACNC: NEGATIVE — SIGNIFICANT CHANGE UP
LEUKOCYTE ESTERASE UR-ACNC: NEGATIVE — SIGNIFICANT CHANGE UP
NITRITE UR-MCNC: NEGATIVE — SIGNIFICANT CHANGE UP
NITRITE UR-MCNC: NEGATIVE — SIGNIFICANT CHANGE UP
PH UR: 6 — SIGNIFICANT CHANGE UP (ref 5–8)
PH UR: 6 — SIGNIFICANT CHANGE UP (ref 5–8)
POTASSIUM SERPL-MCNC: 3.9 MMOL/L — SIGNIFICANT CHANGE UP (ref 3.5–5.3)
POTASSIUM SERPL-MCNC: 3.9 MMOL/L — SIGNIFICANT CHANGE UP (ref 3.5–5.3)
POTASSIUM SERPL-SCNC: 3.9 MMOL/L — SIGNIFICANT CHANGE UP (ref 3.5–5.3)
POTASSIUM SERPL-SCNC: 3.9 MMOL/L — SIGNIFICANT CHANGE UP (ref 3.5–5.3)
PROT SERPL-MCNC: 6.6 G/DL — SIGNIFICANT CHANGE UP (ref 6–8.3)
PROT SERPL-MCNC: 6.6 G/DL — SIGNIFICANT CHANGE UP (ref 6–8.3)
PROT UR-MCNC: NEGATIVE MG/DL — SIGNIFICANT CHANGE UP
PROT UR-MCNC: NEGATIVE MG/DL — SIGNIFICANT CHANGE UP
SODIUM SERPL-SCNC: 142 MMOL/L — SIGNIFICANT CHANGE UP (ref 135–145)
SODIUM SERPL-SCNC: 142 MMOL/L — SIGNIFICANT CHANGE UP (ref 135–145)
SP GR SPEC: 1.01 — SIGNIFICANT CHANGE UP (ref 1–1.03)
SP GR SPEC: 1.01 — SIGNIFICANT CHANGE UP (ref 1–1.03)
UROBILINOGEN FLD QL: 0.2 MG/DL — SIGNIFICANT CHANGE UP (ref 0.2–1)
UROBILINOGEN FLD QL: 0.2 MG/DL — SIGNIFICANT CHANGE UP (ref 0.2–1)

## 2023-11-24 DIAGNOSIS — Z51.11 ENCOUNTER FOR ANTINEOPLASTIC CHEMOTHERAPY: ICD-10-CM

## 2023-11-24 DIAGNOSIS — C71.9 MALIGNANT NEOPLASM OF BRAIN, UNSPECIFIED: ICD-10-CM

## 2023-11-24 DIAGNOSIS — C71.3 MALIGNANT NEOPLASM OF PARIETAL LOBE: ICD-10-CM

## 2023-11-30 ENCOUNTER — APPOINTMENT (OUTPATIENT)
Dept: MRI IMAGING | Facility: IMAGING CENTER | Age: 63
End: 2023-11-30

## 2023-11-30 ENCOUNTER — APPOINTMENT (OUTPATIENT)
Dept: NEUROLOGY | Facility: CLINIC | Age: 63
End: 2023-11-30

## 2023-11-30 ENCOUNTER — APPOINTMENT (OUTPATIENT)
Dept: HEMATOLOGY ONCOLOGY | Facility: CLINIC | Age: 63
End: 2023-11-30

## 2023-12-06 ENCOUNTER — APPOINTMENT (OUTPATIENT)
Dept: HEMATOLOGY ONCOLOGY | Facility: CLINIC | Age: 63
End: 2023-12-06

## 2023-12-06 ENCOUNTER — APPOINTMENT (OUTPATIENT)
Dept: INFUSION THERAPY | Facility: HOSPITAL | Age: 63
End: 2023-12-06

## 2023-12-20 ENCOUNTER — RESULT REVIEW (OUTPATIENT)
Age: 63
End: 2023-12-20

## 2023-12-20 ENCOUNTER — APPOINTMENT (OUTPATIENT)
Dept: INFUSION THERAPY | Facility: HOSPITAL | Age: 63
End: 2023-12-20

## 2023-12-20 ENCOUNTER — APPOINTMENT (OUTPATIENT)
Dept: NEUROLOGY | Facility: CLINIC | Age: 63
End: 2023-12-20
Payer: MEDICARE

## 2023-12-20 ENCOUNTER — APPOINTMENT (OUTPATIENT)
Dept: HEMATOLOGY ONCOLOGY | Facility: CLINIC | Age: 63
End: 2023-12-20

## 2023-12-20 VITALS
HEART RATE: 69 BPM | RESPIRATION RATE: 16 BRPM | DIASTOLIC BLOOD PRESSURE: 82 MMHG | WEIGHT: 211 LBS | OXYGEN SATURATION: 97 % | SYSTOLIC BLOOD PRESSURE: 127 MMHG | BODY MASS INDEX: 30.21 KG/M2 | HEIGHT: 70 IN

## 2023-12-20 DIAGNOSIS — C71.9 MALIGNANT NEOPLASM OF BRAIN, UNSPECIFIED: ICD-10-CM

## 2023-12-20 LAB
ALBUMIN SERPL ELPH-MCNC: 4.1 G/DL — SIGNIFICANT CHANGE UP (ref 3.3–5)
ALBUMIN SERPL ELPH-MCNC: 4.1 G/DL — SIGNIFICANT CHANGE UP (ref 3.3–5)
ALP SERPL-CCNC: 49 U/L — SIGNIFICANT CHANGE UP (ref 40–120)
ALP SERPL-CCNC: 49 U/L — SIGNIFICANT CHANGE UP (ref 40–120)
ALT FLD-CCNC: 48 U/L — HIGH (ref 10–45)
ALT FLD-CCNC: 48 U/L — HIGH (ref 10–45)
ANION GAP SERPL CALC-SCNC: 12 MMOL/L — SIGNIFICANT CHANGE UP (ref 5–17)
ANION GAP SERPL CALC-SCNC: 12 MMOL/L — SIGNIFICANT CHANGE UP (ref 5–17)
APPEARANCE UR: CLEAR — SIGNIFICANT CHANGE UP
APPEARANCE UR: CLEAR — SIGNIFICANT CHANGE UP
AST SERPL-CCNC: 25 U/L — SIGNIFICANT CHANGE UP (ref 10–40)
AST SERPL-CCNC: 25 U/L — SIGNIFICANT CHANGE UP (ref 10–40)
BACTERIA # UR AUTO: NEGATIVE /HPF — SIGNIFICANT CHANGE UP
BACTERIA # UR AUTO: NEGATIVE /HPF — SIGNIFICANT CHANGE UP
BASOPHILS # BLD AUTO: 0.04 K/UL — SIGNIFICANT CHANGE UP (ref 0–0.2)
BASOPHILS # BLD AUTO: 0.04 K/UL — SIGNIFICANT CHANGE UP (ref 0–0.2)
BASOPHILS NFR BLD AUTO: 0.4 % — SIGNIFICANT CHANGE UP (ref 0–2)
BASOPHILS NFR BLD AUTO: 0.4 % — SIGNIFICANT CHANGE UP (ref 0–2)
BILIRUB SERPL-MCNC: 0.4 MG/DL — SIGNIFICANT CHANGE UP (ref 0.2–1.2)
BILIRUB SERPL-MCNC: 0.4 MG/DL — SIGNIFICANT CHANGE UP (ref 0.2–1.2)
BILIRUB UR-MCNC: NEGATIVE — SIGNIFICANT CHANGE UP
BILIRUB UR-MCNC: NEGATIVE — SIGNIFICANT CHANGE UP
BUN SERPL-MCNC: 15 MG/DL — SIGNIFICANT CHANGE UP (ref 7–23)
BUN SERPL-MCNC: 15 MG/DL — SIGNIFICANT CHANGE UP (ref 7–23)
CALCIUM SERPL-MCNC: 9.4 MG/DL — SIGNIFICANT CHANGE UP (ref 8.4–10.5)
CALCIUM SERPL-MCNC: 9.4 MG/DL — SIGNIFICANT CHANGE UP (ref 8.4–10.5)
CAST: 0 /LPF — SIGNIFICANT CHANGE UP (ref 0–4)
CAST: 0 /LPF — SIGNIFICANT CHANGE UP (ref 0–4)
CHLORIDE SERPL-SCNC: 107 MMOL/L — SIGNIFICANT CHANGE UP (ref 96–108)
CHLORIDE SERPL-SCNC: 107 MMOL/L — SIGNIFICANT CHANGE UP (ref 96–108)
CO2 SERPL-SCNC: 24 MMOL/L — SIGNIFICANT CHANGE UP (ref 22–31)
CO2 SERPL-SCNC: 24 MMOL/L — SIGNIFICANT CHANGE UP (ref 22–31)
COLOR SPEC: SIGNIFICANT CHANGE UP
COLOR SPEC: SIGNIFICANT CHANGE UP
CREAT SERPL-MCNC: 0.76 MG/DL — SIGNIFICANT CHANGE UP (ref 0.5–1.3)
CREAT SERPL-MCNC: 0.76 MG/DL — SIGNIFICANT CHANGE UP (ref 0.5–1.3)
DIFF PNL FLD: NEGATIVE — SIGNIFICANT CHANGE UP
DIFF PNL FLD: NEGATIVE — SIGNIFICANT CHANGE UP
EGFR: 100 ML/MIN/1.73M2 — SIGNIFICANT CHANGE UP
EGFR: 100 ML/MIN/1.73M2 — SIGNIFICANT CHANGE UP
EOSINOPHIL # BLD AUTO: 0.01 K/UL — SIGNIFICANT CHANGE UP (ref 0–0.5)
EOSINOPHIL # BLD AUTO: 0.01 K/UL — SIGNIFICANT CHANGE UP (ref 0–0.5)
EOSINOPHIL NFR BLD AUTO: 0.1 % — SIGNIFICANT CHANGE UP (ref 0–6)
EOSINOPHIL NFR BLD AUTO: 0.1 % — SIGNIFICANT CHANGE UP (ref 0–6)
GLUCOSE SERPL-MCNC: 123 MG/DL — HIGH (ref 70–99)
GLUCOSE SERPL-MCNC: 123 MG/DL — HIGH (ref 70–99)
GLUCOSE UR QL: NEGATIVE MG/DL — SIGNIFICANT CHANGE UP
GLUCOSE UR QL: NEGATIVE MG/DL — SIGNIFICANT CHANGE UP
HCT VFR BLD CALC: 49 % — SIGNIFICANT CHANGE UP (ref 39–50)
HCT VFR BLD CALC: 49 % — SIGNIFICANT CHANGE UP (ref 39–50)
HGB BLD-MCNC: 16.3 G/DL — SIGNIFICANT CHANGE UP (ref 13–17)
HGB BLD-MCNC: 16.3 G/DL — SIGNIFICANT CHANGE UP (ref 13–17)
IMM GRANULOCYTES NFR BLD AUTO: 1.8 % — HIGH (ref 0–0.9)
IMM GRANULOCYTES NFR BLD AUTO: 1.8 % — HIGH (ref 0–0.9)
KETONES UR-MCNC: ABNORMAL MG/DL
KETONES UR-MCNC: ABNORMAL MG/DL
LEUKOCYTE ESTERASE UR-ACNC: ABNORMAL
LEUKOCYTE ESTERASE UR-ACNC: ABNORMAL
LYMPHOCYTES # BLD AUTO: 1 K/UL — SIGNIFICANT CHANGE UP (ref 1–3.3)
LYMPHOCYTES # BLD AUTO: 1 K/UL — SIGNIFICANT CHANGE UP (ref 1–3.3)
LYMPHOCYTES # BLD AUTO: 9 % — LOW (ref 13–44)
LYMPHOCYTES # BLD AUTO: 9 % — LOW (ref 13–44)
MCHC RBC-ENTMCNC: 29.6 PG — SIGNIFICANT CHANGE UP (ref 27–34)
MCHC RBC-ENTMCNC: 29.6 PG — SIGNIFICANT CHANGE UP (ref 27–34)
MCHC RBC-ENTMCNC: 33.3 G/DL — SIGNIFICANT CHANGE UP (ref 32–36)
MCHC RBC-ENTMCNC: 33.3 G/DL — SIGNIFICANT CHANGE UP (ref 32–36)
MCV RBC AUTO: 88.9 FL — SIGNIFICANT CHANGE UP (ref 80–100)
MCV RBC AUTO: 88.9 FL — SIGNIFICANT CHANGE UP (ref 80–100)
MONOCYTES # BLD AUTO: 0.59 K/UL — SIGNIFICANT CHANGE UP (ref 0–0.9)
MONOCYTES # BLD AUTO: 0.59 K/UL — SIGNIFICANT CHANGE UP (ref 0–0.9)
MONOCYTES NFR BLD AUTO: 5.3 % — SIGNIFICANT CHANGE UP (ref 2–14)
MONOCYTES NFR BLD AUTO: 5.3 % — SIGNIFICANT CHANGE UP (ref 2–14)
NEUTROPHILS # BLD AUTO: 9.3 K/UL — HIGH (ref 1.8–7.4)
NEUTROPHILS # BLD AUTO: 9.3 K/UL — HIGH (ref 1.8–7.4)
NEUTROPHILS NFR BLD AUTO: 83.4 % — HIGH (ref 43–77)
NEUTROPHILS NFR BLD AUTO: 83.4 % — HIGH (ref 43–77)
NITRITE UR-MCNC: NEGATIVE — SIGNIFICANT CHANGE UP
NITRITE UR-MCNC: NEGATIVE — SIGNIFICANT CHANGE UP
NRBC # BLD: 0 /100 WBCS — SIGNIFICANT CHANGE UP (ref 0–0)
NRBC # BLD: 0 /100 WBCS — SIGNIFICANT CHANGE UP (ref 0–0)
PH UR: 5.5 — SIGNIFICANT CHANGE UP (ref 5–8)
PH UR: 5.5 — SIGNIFICANT CHANGE UP (ref 5–8)
PLATELET # BLD AUTO: 109 K/UL — LOW (ref 150–400)
PLATELET # BLD AUTO: 109 K/UL — LOW (ref 150–400)
POTASSIUM SERPL-MCNC: 4.3 MMOL/L — SIGNIFICANT CHANGE UP (ref 3.5–5.3)
POTASSIUM SERPL-MCNC: 4.3 MMOL/L — SIGNIFICANT CHANGE UP (ref 3.5–5.3)
POTASSIUM SERPL-SCNC: 4.3 MMOL/L — SIGNIFICANT CHANGE UP (ref 3.5–5.3)
POTASSIUM SERPL-SCNC: 4.3 MMOL/L — SIGNIFICANT CHANGE UP (ref 3.5–5.3)
PROT SERPL-MCNC: 6.7 G/DL — SIGNIFICANT CHANGE UP (ref 6–8.3)
PROT SERPL-MCNC: 6.7 G/DL — SIGNIFICANT CHANGE UP (ref 6–8.3)
PROT UR-MCNC: SIGNIFICANT CHANGE UP MG/DL
PROT UR-MCNC: SIGNIFICANT CHANGE UP MG/DL
RBC # BLD: 5.51 M/UL — SIGNIFICANT CHANGE UP (ref 4.2–5.8)
RBC # BLD: 5.51 M/UL — SIGNIFICANT CHANGE UP (ref 4.2–5.8)
RBC # FLD: 16.4 % — HIGH (ref 10.3–14.5)
RBC # FLD: 16.4 % — HIGH (ref 10.3–14.5)
RBC CASTS # UR COMP ASSIST: 4 /HPF — SIGNIFICANT CHANGE UP (ref 0–4)
RBC CASTS # UR COMP ASSIST: 4 /HPF — SIGNIFICANT CHANGE UP (ref 0–4)
SODIUM SERPL-SCNC: 143 MMOL/L — SIGNIFICANT CHANGE UP (ref 135–145)
SODIUM SERPL-SCNC: 143 MMOL/L — SIGNIFICANT CHANGE UP (ref 135–145)
SP GR SPEC: >1.03 — HIGH (ref 1–1.03)
SP GR SPEC: >1.03 — HIGH (ref 1–1.03)
SQUAMOUS # UR AUTO: 1 /HPF — SIGNIFICANT CHANGE UP (ref 0–5)
SQUAMOUS # UR AUTO: 1 /HPF — SIGNIFICANT CHANGE UP (ref 0–5)
UROBILINOGEN FLD QL: 0.2 MG/DL — SIGNIFICANT CHANGE UP (ref 0.2–1)
UROBILINOGEN FLD QL: 0.2 MG/DL — SIGNIFICANT CHANGE UP (ref 0.2–1)
WBC # BLD: 11.14 K/UL — HIGH (ref 3.8–10.5)
WBC # BLD: 11.14 K/UL — HIGH (ref 3.8–10.5)
WBC # FLD AUTO: 11.14 K/UL — HIGH (ref 3.8–10.5)
WBC # FLD AUTO: 11.14 K/UL — HIGH (ref 3.8–10.5)
WBC UR QL: 2 /HPF — SIGNIFICANT CHANGE UP (ref 0–5)
WBC UR QL: 2 /HPF — SIGNIFICANT CHANGE UP (ref 0–5)

## 2023-12-20 PROCEDURE — 99215 OFFICE O/P EST HI 40 MIN: CPT

## 2023-12-20 NOTE — PHYSICAL EXAM
[FreeTextEntry1] : He is awake and alert - oriented and fluent with normal comprehension. EOMI Right VF defect UQ more than LQ Face symmetric Oral thrush noted Bilateral extension tremor. No drift Strong - mild distal right weakness 5-/5 foot and hand - no neglect today No LE edema.

## 2023-12-20 NOTE — DISCUSSION/SUMMARY
[FreeTextEntry1] : In summary, this is a 62 yo man with a left parietal occipital GBM diagnosed in 10/2022. He is clinically stable - continue Avastin Nystatin mouthwash for oral thrush. MRI and follow up in 2 weeks. Any interval questions, he and his friend Johnnie know to call.

## 2023-12-20 NOTE — HISTORY OF PRESENT ILLNESS
[FreeTextEntry1] : Mr. Ramos is being seen in neuro oncologic follow-up.   History obtained via discussion with patient and chart review, including personal review of all neuroimaging.  Mr. 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 He returned on 926 with worsening headache and underwent MRI scan of his brain:  MRI brain 9/27 shows an irregularly enhancing mass in the left temporal-occipital region measuring 2.6 cm transversely and 8.9 cm cranial - caudal with surrounding vasogenic edema.  CT C/A/P 9/26 unremarkable.  Dr. Turpin performed a large resection of this mass on 10/3.  10/3 Pathology - Glioblastoma, WHO 4, IDH wt - MGMT methylated.  Post-operative MRI 10/5: LEFT parietal occipital craniotomy with near complete resection of the of the neoplasm in the LEFT occipital/posterior temporal lobes. Minimal residual neoplasm is seen in the anterior part of the neoplasm, which abuts the ependymal surface of the atrium of the LEFT ventricle. Moderate postsurgical hemorrhage is seen within the surgical cavity. Moderate surrounding vasogenic edema is unchanged with effacement of the posterior horn of the LEFT lateral ventricle. Abnormal signal is also seen within the LEFT cerebral peduncle with a 4 mm focus of central enhancement and 1.6 cm region of rim enhancement, which is suspicious for a secondary focus of neoplasm or extension from the primary along the white matter tracts.  10/25- Had an episode of loss of vision X 15-20 minutes , episode resolved on its own - Restarted Dexamethasone 2 mg daily, Keppra raised to 750 mg bid  11/2/2022- He continues to have headaches even waking him from sleep. Raised dexamethasone to 4 mg daily  11/7/2022 - CHEMORT started  11/14/2022 - Reports when we raised the Dexamethasone on the 2nd , he was not taking the 4 mg daily, after discussing with HCP started on 11/7, headache didn't resolve so on 11/10- Dr Goenka raised to 4 mg bid. Since then he reports no further headaches. Vision has not improved but has not worsened.  12/20/2022 - Completed ChemoRT  12/30/2022 - a friend called him and he was "not making sense.' Friend went to see him and bring him to the hospital - noted shaking of right UE and rigidity. In the ER, he reportedly had a GTC seizures - he was intubated. He was also found to be COVID positive. He was discharged to rehab on 12/29 and then discharged home on 1/18.  2/1/2023- MRI with slight improvement - Tapered Dexamethasone to 4-2 mg  2/6/2023- 2/10- TMZ first cycle  2/15-2/17 ( At [facility] ) and then transferred to [facility] and stayed inpatient from 2/17-2/21/2023 - Admitted sec to seizures, and expressive aphasia. discharged on Keppra 1000 mg bid and Vimpat 200 mg bid  3/7-3/9/2023 - Admitted at [facility] after a mechanical fall, he was found to have a PE bleed and UTI - He was treated with Rocephin for UTI, Started on IV Heparin and then transitioned to Eliquis and then was discharged home  3/15/2023- MRI with POD - Plan was made to continue TMZ and add IV AVastin  3/22/2023- 1st Avastin infusion  3/25/2023 - 3/29/2023 - TMZ second cycle  4/26/2023- Patient had 3 IV Avastin's thus far. MRI stable  5/4 - 5/8/2023- TMZ cycle 3  6/1- 6/4/2023 - TMZ cycle # 4  6/22/2023- Infusion last week cancelled as patient was complaining of abdominal pain. A follow up CT scan was performed and did not reveal any acute pathology. He feels his right sided weakness is getting more weak and reports his right side vision is worse. He had a near syncopal episode while at the clinic - was sent to [facility] 6/28/23-7/2/2023 - TMZ 5th cycle- Patient had 7 DOSES OF IV AVASTINS THUS FAR 7/9-7/12/2023- Patient presented s/p mechanical fall with RT ankle fracture. Seen by ortho, reduced and splinted, non-surgical. Patient is NWB to RLE. Outpatient ortho f/u with DR Hutson in 1 week. Medically stable for transfer to [facility] 10/10/2023 - MRI stable. Plan was made to repeat imaging in 2 months 10/22-10/26/2023- Patient was admitted to [facility] from [facility] for +COVID, vomiting and tachycardia. Upon arrival pt appears to be seizing. Pt on Keppra. EMS reports pt had 5 tonic clonic seizures lasting about 15 seconds each. Pt given Tylenol at 9am. pt seen by neuro and nsx and felt lowered sz threshold in setting of covid. Keppra dose adjusted. worsening of GBM noted on MRI. MRI from 10/24 showed Interval increase in the enhancing neoplasm located in the LEFT occipital and posterior temporal lobes now measuring 6.7 cm AP by 3.7 cm TRV by 3.8 cm cc with extension into the posterior horn of the LEFT lateral ventricle and ependymal spread. Increase in moderate vasogenic edema involving the LEFT occipital, parietal, posterior frontal and posterior temporal lobes. Minimal edema also extends into the LEFT middle cerebellar peduncle and LEFT caren Patient d/cd back to nursing home on 10/26 11/7/2023- He is frustrated with being at the subacute rehab - Johnnie is trying to get him to her house but he needs 24-hour supervision as he can be impulsive. He is tearful and not always participating in therapy - we discussed his mood - he is reluctant to start antidepressants. He is still able to enjoy visits with friends. He has no headaches. Raised Dexamethasone to 4 mg daily 11/22/2023- Here for a follow up prior to IV Avastin.   12/20/23 - Presents for follow-up prior to next Avastin infusion.   He and his friend Johnnie feel that he is doing better - while he is no longer getting PT - he is walking more - with a walker - and appears more motivated and is socializing more. He has had no further seizures.

## 2023-12-22 NOTE — ED PROVIDER NOTE - OBJECTIVE STATEMENT
No 63-year-old male patient past medical history of seizures and glioblastoma status postcraniotomy on chemo and steroids, PE and DVT on Eliquis who presents to the ED for persistent shortness of breath, leg edema, tremors that has been present since 2 weeks ago.  Patient denies any chest pain, nausea, vomiting, diarrhea.  Patient does endorse swelling to the face and swelling to the legs that is new.  Patient found here tachycardic to the 110s

## 2023-12-29 NOTE — PROGRESS NOTE ADULT - ASSESSMENT
Assessment: 62y (1960) man with a PMHx significant for GBM s/p resection in 10/22 on Keppra 750 bid followed by Dr. Turpin who presented to the ED for change in mental status. Further history provided by HCP friend at bedside. Per HCP, she spoke to pt on the phone at 1230 and he was not making sense. While en route to the hospital had witnessed RUE twitching and rigid lasting around 2 min. At baseline fully independent with full adls. While in the ED, Pt had witnessed RUE twitching lasting around 10 seconds.    CT head non con: Slight increased mass effect on the left lateral ventricle when compared with the prior MR 10/5/2022  MR head w/w/o iv contrast:  suspicious for progressive neoplasm in the left parietal postoperative bed compared with 10/5/2022 with MR spectroscopy. Tiny focus of neoplasm also likely in the left brainstem, unchanged.  MR spectroscopy: Findings suspicious for progressive neoplasm in the left parietal postoperative bed compared with 10/5/2022 with MR spectroscopy. Tiny focus of neoplasm also likely in the left brainstem, unchanged.    Impression: Expressive aphasia with breakthrough seizure likely secondary to progression of GBM or edema but also may be from additional toxic/metabolic causes (such as current COVID-19 infection). He has high epileptogenic risk but no seizures on EEG.    Recommendations:  - vEEG 12/22: Left GPDs, Potential epileptogenic zone from the left anterior temporal region.  Regional structural abnormality in the left hemisphere most conspicuous in the left temporal area. Mild diffuse / multifocal cerebral dysfunction.  No seizures were recorded. can stop vEEG.  - 12 lead EKG 12/21: NSR, MS 176ms, would repeat EKG to evaluate for MS interval bc Vimpat can cause MS prolongation  - continue Vimpat 100 mg bid and Keppra 1000 mg TID  - Rest of care per neurosurgery team for further management of neoplasm seen on MRI head.  - Seizure rescue medications for a generalized tonic clonic episode lasting >3 min or significant derangement of vital signs:   ---> 1st line: Ativan 2 mg IV. For GTC > 3 min and refractory to Ativan please call [phone] ([facility]).  - Telemetry monitoring; Neurochecks/VS per unit protocol  - Seizure, fall and aspiration precautions    Other:   [] Please note: if patient has a convulsion, please document time of onset, progression of limb involvement (upper/lower; R/L) if any, and specifically what patient was doing paying attention to eye opening vs closure, head turn, gaze deviation, shaking of extremities, tongue bite, urinary/bowel incontinence, any derangement of vital signs, length of episode, and duration of postictal period.  [] Given concern for seizure, advise patient to not drive, operate heavy machinery, avoid heights, pools, bathtubs, locked doors until cleared by further follow up outpatient by neurology.          Encounter for palliative care

## 2023-12-31 ENCOUNTER — INPATIENT (INPATIENT)
Facility: HOSPITAL | Age: 63
LOS: 7 days | Discharge: NURSING, REHAB OR LONG-TERM CARE | DRG: 193 | End: 2024-01-08
Attending: FAMILY MEDICINE | Admitting: INTERNAL MEDICINE
Payer: MEDICARE

## 2023-12-31 VITALS
RESPIRATION RATE: 16 BRPM | WEIGHT: 199.96 LBS | HEART RATE: 107 BPM | TEMPERATURE: 98 F | HEIGHT: 66 IN | SYSTOLIC BLOOD PRESSURE: 127 MMHG | OXYGEN SATURATION: 95 % | DIASTOLIC BLOOD PRESSURE: 92 MMHG

## 2023-12-31 DIAGNOSIS — J10.1 INFLUENZA DUE TO OTHER IDENTIFIED INFLUENZA VIRUS WITH OTHER RESPIRATORY MANIFESTATIONS: ICD-10-CM

## 2023-12-31 DIAGNOSIS — Z98.890 OTHER SPECIFIED POSTPROCEDURAL STATES: Chronic | ICD-10-CM

## 2023-12-31 LAB
ALBUMIN SERPL ELPH-MCNC: 2.8 G/DL — LOW (ref 3.3–5)
ALBUMIN SERPL ELPH-MCNC: 2.8 G/DL — LOW (ref 3.3–5)
ALP SERPL-CCNC: 57 U/L — SIGNIFICANT CHANGE UP (ref 40–120)
ALP SERPL-CCNC: 57 U/L — SIGNIFICANT CHANGE UP (ref 40–120)
ALT FLD-CCNC: 46 U/L — SIGNIFICANT CHANGE UP (ref 12–78)
ALT FLD-CCNC: 46 U/L — SIGNIFICANT CHANGE UP (ref 12–78)
ANION GAP SERPL CALC-SCNC: 3 MMOL/L — LOW (ref 5–17)
ANION GAP SERPL CALC-SCNC: 3 MMOL/L — LOW (ref 5–17)
AST SERPL-CCNC: 25 U/L — SIGNIFICANT CHANGE UP (ref 15–37)
AST SERPL-CCNC: 25 U/L — SIGNIFICANT CHANGE UP (ref 15–37)
BASOPHILS # BLD AUTO: 0.01 K/UL — SIGNIFICANT CHANGE UP (ref 0–0.2)
BASOPHILS # BLD AUTO: 0.01 K/UL — SIGNIFICANT CHANGE UP (ref 0–0.2)
BASOPHILS NFR BLD AUTO: 0.1 % — SIGNIFICANT CHANGE UP (ref 0–2)
BASOPHILS NFR BLD AUTO: 0.1 % — SIGNIFICANT CHANGE UP (ref 0–2)
BILIRUB SERPL-MCNC: 0.5 MG/DL — SIGNIFICANT CHANGE UP (ref 0.2–1.2)
BILIRUB SERPL-MCNC: 0.5 MG/DL — SIGNIFICANT CHANGE UP (ref 0.2–1.2)
BUN SERPL-MCNC: 19 MG/DL — SIGNIFICANT CHANGE UP (ref 7–23)
BUN SERPL-MCNC: 19 MG/DL — SIGNIFICANT CHANGE UP (ref 7–23)
CALCIUM SERPL-MCNC: 9.1 MG/DL — SIGNIFICANT CHANGE UP (ref 8.5–10.1)
CALCIUM SERPL-MCNC: 9.1 MG/DL — SIGNIFICANT CHANGE UP (ref 8.5–10.1)
CHLORIDE SERPL-SCNC: 108 MMOL/L — SIGNIFICANT CHANGE UP (ref 96–108)
CHLORIDE SERPL-SCNC: 108 MMOL/L — SIGNIFICANT CHANGE UP (ref 96–108)
CO2 SERPL-SCNC: 29 MMOL/L — SIGNIFICANT CHANGE UP (ref 22–31)
CO2 SERPL-SCNC: 29 MMOL/L — SIGNIFICANT CHANGE UP (ref 22–31)
CREAT SERPL-MCNC: 1.04 MG/DL — SIGNIFICANT CHANGE UP (ref 0.5–1.3)
CREAT SERPL-MCNC: 1.04 MG/DL — SIGNIFICANT CHANGE UP (ref 0.5–1.3)
EGFR: 80 ML/MIN/1.73M2 — SIGNIFICANT CHANGE UP
EGFR: 80 ML/MIN/1.73M2 — SIGNIFICANT CHANGE UP
EOSINOPHIL # BLD AUTO: 0 K/UL — SIGNIFICANT CHANGE UP (ref 0–0.5)
EOSINOPHIL # BLD AUTO: 0 K/UL — SIGNIFICANT CHANGE UP (ref 0–0.5)
EOSINOPHIL NFR BLD AUTO: 0 % — SIGNIFICANT CHANGE UP (ref 0–6)
EOSINOPHIL NFR BLD AUTO: 0 % — SIGNIFICANT CHANGE UP (ref 0–6)
FLUAV AG NPH QL: DETECTED
FLUAV AG NPH QL: DETECTED
FLUBV AG NPH QL: SIGNIFICANT CHANGE UP
FLUBV AG NPH QL: SIGNIFICANT CHANGE UP
GLUCOSE SERPL-MCNC: 115 MG/DL — HIGH (ref 70–99)
GLUCOSE SERPL-MCNC: 115 MG/DL — HIGH (ref 70–99)
HCT VFR BLD CALC: 51.6 % — HIGH (ref 39–50)
HCT VFR BLD CALC: 51.6 % — HIGH (ref 39–50)
HGB BLD-MCNC: 16.8 G/DL — SIGNIFICANT CHANGE UP (ref 13–17)
HGB BLD-MCNC: 16.8 G/DL — SIGNIFICANT CHANGE UP (ref 13–17)
IMM GRANULOCYTES NFR BLD AUTO: 0.6 % — SIGNIFICANT CHANGE UP (ref 0–0.9)
IMM GRANULOCYTES NFR BLD AUTO: 0.6 % — SIGNIFICANT CHANGE UP (ref 0–0.9)
LYMPHOCYTES # BLD AUTO: 0.52 K/UL — LOW (ref 1–3.3)
LYMPHOCYTES # BLD AUTO: 0.52 K/UL — LOW (ref 1–3.3)
LYMPHOCYTES # BLD AUTO: 7.2 % — LOW (ref 13–44)
LYMPHOCYTES # BLD AUTO: 7.2 % — LOW (ref 13–44)
MCHC RBC-ENTMCNC: 29 PG — SIGNIFICANT CHANGE UP (ref 27–34)
MCHC RBC-ENTMCNC: 29 PG — SIGNIFICANT CHANGE UP (ref 27–34)
MCHC RBC-ENTMCNC: 32.6 GM/DL — SIGNIFICANT CHANGE UP (ref 32–36)
MCHC RBC-ENTMCNC: 32.6 GM/DL — SIGNIFICANT CHANGE UP (ref 32–36)
MCV RBC AUTO: 89.1 FL — SIGNIFICANT CHANGE UP (ref 80–100)
MCV RBC AUTO: 89.1 FL — SIGNIFICANT CHANGE UP (ref 80–100)
MONOCYTES # BLD AUTO: 0.23 K/UL — SIGNIFICANT CHANGE UP (ref 0–0.9)
MONOCYTES # BLD AUTO: 0.23 K/UL — SIGNIFICANT CHANGE UP (ref 0–0.9)
MONOCYTES NFR BLD AUTO: 3.2 % — SIGNIFICANT CHANGE UP (ref 2–14)
MONOCYTES NFR BLD AUTO: 3.2 % — SIGNIFICANT CHANGE UP (ref 2–14)
NEUTROPHILS # BLD AUTO: 6.4 K/UL — SIGNIFICANT CHANGE UP (ref 1.8–7.4)
NEUTROPHILS # BLD AUTO: 6.4 K/UL — SIGNIFICANT CHANGE UP (ref 1.8–7.4)
NEUTROPHILS NFR BLD AUTO: 88.9 % — HIGH (ref 43–77)
NEUTROPHILS NFR BLD AUTO: 88.9 % — HIGH (ref 43–77)
NT-PROBNP SERPL-SCNC: 70 PG/ML — SIGNIFICANT CHANGE UP (ref 0–125)
NT-PROBNP SERPL-SCNC: 70 PG/ML — SIGNIFICANT CHANGE UP (ref 0–125)
PLATELET # BLD AUTO: 91 K/UL — LOW (ref 150–400)
PLATELET # BLD AUTO: 91 K/UL — LOW (ref 150–400)
POTASSIUM SERPL-MCNC: 4.3 MMOL/L — SIGNIFICANT CHANGE UP (ref 3.5–5.3)
POTASSIUM SERPL-MCNC: 4.3 MMOL/L — SIGNIFICANT CHANGE UP (ref 3.5–5.3)
POTASSIUM SERPL-SCNC: 4.3 MMOL/L — SIGNIFICANT CHANGE UP (ref 3.5–5.3)
POTASSIUM SERPL-SCNC: 4.3 MMOL/L — SIGNIFICANT CHANGE UP (ref 3.5–5.3)
PROT SERPL-MCNC: 6.9 GM/DL — SIGNIFICANT CHANGE UP (ref 6–8.3)
PROT SERPL-MCNC: 6.9 GM/DL — SIGNIFICANT CHANGE UP (ref 6–8.3)
RBC # BLD: 5.79 M/UL — SIGNIFICANT CHANGE UP (ref 4.2–5.8)
RBC # BLD: 5.79 M/UL — SIGNIFICANT CHANGE UP (ref 4.2–5.8)
RBC # FLD: 17.2 % — HIGH (ref 10.3–14.5)
RBC # FLD: 17.2 % — HIGH (ref 10.3–14.5)
RSV RNA NPH QL NAA+NON-PROBE: SIGNIFICANT CHANGE UP
RSV RNA NPH QL NAA+NON-PROBE: SIGNIFICANT CHANGE UP
SARS-COV-2 RNA SPEC QL NAA+PROBE: SIGNIFICANT CHANGE UP
SARS-COV-2 RNA SPEC QL NAA+PROBE: SIGNIFICANT CHANGE UP
SODIUM SERPL-SCNC: 140 MMOL/L — SIGNIFICANT CHANGE UP (ref 135–145)
SODIUM SERPL-SCNC: 140 MMOL/L — SIGNIFICANT CHANGE UP (ref 135–145)
TROPONIN I, HIGH SENSITIVITY RESULT: 8.56 NG/L — SIGNIFICANT CHANGE UP
TROPONIN I, HIGH SENSITIVITY RESULT: 8.56 NG/L — SIGNIFICANT CHANGE UP
WBC # BLD: 7.2 K/UL — SIGNIFICANT CHANGE UP (ref 3.8–10.5)
WBC # BLD: 7.2 K/UL — SIGNIFICANT CHANGE UP (ref 3.8–10.5)
WBC # FLD AUTO: 7.2 K/UL — SIGNIFICANT CHANGE UP (ref 3.8–10.5)
WBC # FLD AUTO: 7.2 K/UL — SIGNIFICANT CHANGE UP (ref 3.8–10.5)

## 2023-12-31 PROCEDURE — 85025 COMPLETE CBC W/AUTO DIFF WBC: CPT

## 2023-12-31 PROCEDURE — 87635 SARS-COV-2 COVID-19 AMP PRB: CPT

## 2023-12-31 PROCEDURE — 84145 PROCALCITONIN (PCT): CPT

## 2023-12-31 PROCEDURE — 71045 X-RAY EXAM CHEST 1 VIEW: CPT

## 2023-12-31 PROCEDURE — 97530 THERAPEUTIC ACTIVITIES: CPT | Mod: GP

## 2023-12-31 PROCEDURE — 80048 BASIC METABOLIC PNL TOTAL CA: CPT

## 2023-12-31 PROCEDURE — 36415 COLL VENOUS BLD VENIPUNCTURE: CPT

## 2023-12-31 PROCEDURE — 71045 X-RAY EXAM CHEST 1 VIEW: CPT | Mod: 26

## 2023-12-31 PROCEDURE — 99285 EMERGENCY DEPT VISIT HI MDM: CPT

## 2023-12-31 PROCEDURE — 93010 ELECTROCARDIOGRAM REPORT: CPT

## 2023-12-31 PROCEDURE — 94640 AIRWAY INHALATION TREATMENT: CPT

## 2023-12-31 PROCEDURE — 97116 GAIT TRAINING THERAPY: CPT | Mod: GP

## 2023-12-31 PROCEDURE — 97162 PT EVAL MOD COMPLEX 30 MIN: CPT | Mod: GP

## 2023-12-31 PROCEDURE — 84100 ASSAY OF PHOSPHORUS: CPT

## 2023-12-31 PROCEDURE — 83735 ASSAY OF MAGNESIUM: CPT

## 2023-12-31 PROCEDURE — 85027 COMPLETE CBC AUTOMATED: CPT

## 2023-12-31 PROCEDURE — 80053 COMPREHEN METABOLIC PANEL: CPT

## 2023-12-31 PROCEDURE — 71250 CT THORAX DX C-: CPT

## 2023-12-31 RX ORDER — IPRATROPIUM/ALBUTEROL SULFATE 18-103MCG
3 AEROSOL WITH ADAPTER (GRAM) INHALATION ONCE
Refills: 0 | Status: COMPLETED | OUTPATIENT
Start: 2023-12-31 | End: 2023-12-31

## 2023-12-31 RX ORDER — ROPINIROLE 8 MG/1
3 TABLET, FILM COATED, EXTENDED RELEASE ORAL
Refills: 0 | DISCHARGE

## 2023-12-31 RX ORDER — DEXAMETHASONE 0.5 MG/5ML
1 ELIXIR ORAL
Refills: 0 | DISCHARGE

## 2023-12-31 RX ADMIN — Medication 125 MILLIGRAM(S): at 20:34

## 2023-12-31 RX ADMIN — Medication 3 MILLILITER(S): at 21:12

## 2023-12-31 RX ADMIN — Medication 3 MILLILITER(S): at 20:34

## 2023-12-31 RX ADMIN — Medication 75 MILLIGRAM(S): at 20:52

## 2023-12-31 RX ADMIN — Medication 1 MILLIGRAM(S): at 20:52

## 2023-12-31 NOTE — ED PROVIDER NOTE - CLINICAL SUMMARY MEDICAL DECISION MAKING FREE TEXT BOX
Patient presenting with SOB, known flu positive. Hypoxic to 88% on room air, stable on 2L nasal cannula. Wheezing on exam. Will give Duoneb, Solu-Medrol, order lab work, EKG, CXR, reassess.

## 2023-12-31 NOTE — ED ADULT NURSE NOTE - OBJECTIVE STATEMENT
sent in by carrip for difficulty breathing. pt 89% room air. pt flu A+, denies cp/n/v/d/fever/chills. pmh: Gleoblastoma, pt poor historian. health care proxy at bedside.

## 2023-12-31 NOTE — ED ADULT NURSE NOTE - PAIN: DESCRIPTION (FREQUENCY/QUALITY)
[facility] - Observ[facility]  Critical Care Medicine  Consult Note    Patient Name: Dania Her  MRN: [id number]  Admission Date: 11/24/2023  Hospital Length of Stay: 0 days  Code Status: Full Code  Attending Physician: Eunice Bear MD   Primary Care Provider: [facility] -   Principal Problem: Acute metabolic encephalopathy    Inpatient consult to Critical Care Medicine  Consult performed by: Bryan Mccormick MD  Consult ordered by: Anna Mcintosh MD        Subjective:     HPI:  82 yo F with PMHx of HTN, T2DM, A fib (on eliquis), DVT, ICD, dementia, breast cancer s/p mastectomy, and ESBL UTI who presented to ED from [facility] for worsening mental status. Per chart review, baseline is oriented to self and to others.       In ED, /97, HR 71, satting 95% on RA. Afebrile.CBC w/ WBC 3.5. CMP unremarkable.  . Trop 0.081.Lactate 1.5. UA suspicious for recurrent UTI (started on ertapenem) w/ WBC 17, normal folate/B12 studies, echo with preserved EF; however, elevated CVP and other findings suggestive of R-sided failure. D-dimer elevated. CTH w/ focal hyperdensity along the anterior left frontal lobe, which may be artifactual, though small focus of hemorrhage cannot be excluded. Repeat CTH with no convincing acute hemorrhage. CT cervical spine with moderate spinal canal stenosis at C6-C7 and areas of moderate neural foraminal narrowing from C4 through T1. XR L shoulder with osteoarthritic changes and diffuse osteopenia, but no acute fracture or dislocation. CXR w/ central vascular congestion and possible mild perihilar interstitial edema, similar to prior study. There are scattered bandlike opacities suggesting atelectasis or scarring. No new large confluent airspace consolidation identified. Given IV lasix 40 mg and po seroquel 25 mg.CTPE study was completed and did not show PE, but was suggestive of pulmonary arterial hypertension as well as an possibly underlying infectious process. On   afternoon patient's mentation seemed to worsen, and VBG showed new respiratory acidosis to pH 7.32 and CO2 of 68. Given this new respiratory acidosis, critical care medicine was consulted.     Upon assessment by critical care team, pt somnolent. She was responding yes/no to questions but fell asleep shortly after. During blood draw, pt screaming and less somnolent. Repeat VBG w/ pH 7.4 and CO2 56.           Hospital/ICU Course:  No notes on file    Past Medical History:   Diagnosis Date    Arthritis     Dementia     Diabetes mellitus     Hypertension        Past Surgical History:   Procedure Laterality Date     SECTION      OOPHORECTOMY         Review of patient's allergies indicates:   Allergen Reactions    Glimepiride        Family History    None       Tobacco Use    Smoking status: Never    Smokeless tobacco: Not on file   Substance and Sexual Activity    Alcohol use: Not on file    Drug use: Not on file    Sexual activity: Not on file      Review of Systems   Unable to perform ROS: Dementia     Objective:     Vital Signs (Most Recent):  Temp: 97.1 °F (36.2 °C) (23 1620)  Pulse: 71 (23 1620)  Resp: 17 (23 1620)  BP: 98/60 (23 1620)  SpO2: 97 % (23 1620) Vital Signs (24h Range):  Temp:  [97.1 °F (36.2 °C)-98.8 °F (37.1 °C)] 97.1 °F (36.2 °C)  Pulse:  [69-75] 71  Resp:  [16-22] 17  SpO2:  [91 %-99 %] 97 %  BP: ()/(60-97) 98/60   Weight: 112.5 kg (248 lb 0.3 oz)  Body mass index is 41.27 kg/m².    No intake or output data in the 24 hours ending 23 1811       Physical Exam  Vitals and nursing note reviewed.   Constitutional:       General: She is not in acute distress.     Appearance: She is obese. She is ill-appearing. She is not toxic-appearing.      Comments: Somnolent but arousable to voice. Can answer simple yes no questions, but falls asleep soon after.   HENT:      Head: Normocephalic and atraumatic.      Mouth/Throat:      Mouth: Mucous membranes are  dry.      Pharynx: Oropharynx is clear.   Eyes:      Conjunctiva/sclera: Conjunctivae normal.   Cardiovascular:      Rate and Rhythm: Normal rate and regular rhythm.      Heart sounds: No murmur heard.  Pulmonary:      Effort: No respiratory distress.      Comments: Decreased breath sounds bilaterally, no obvious wheeze or crackles  Chest:      Chest wall: No tenderness.   Abdominal:      General: There is no distension.      Palpations: Abdomen is soft.      Tenderness: There is no abdominal tenderness.   Musculoskeletal:      Comments: Non pitting edema BLE, chronic venous stasis changes   Skin:     General: Skin is warm and dry.   Neurological:      Mental Status: She is disoriented.            Vents:     Lines/Drains/Airways       Drain  Duration             Female External Urinary Catheter 11/24/23 0945 <1 day              Peripheral Intravenous Line  Duration                  Peripheral IV - Single Lumen 11/24/23 0600 20 G Anterior;Right Upper Arm <1 day                  Significant Labs:    CBC/Anemia Profile:  Recent Labs   Lab 11/24/23  0123 11/24/23  1052 11/24/23  1526   WBC 3.53*  --   --    HGB 15.1  --   --    HCT 46.5  --  43   PLT 45*  --   --    *  --   --    RDW 18.1*  --   --    FOLATE  --  13.8  --    [id number]  --  1671*  --         Chemistries:  Recent Labs   Lab 11/24/23  0123      K 4.0      CO2 23   BUN 10   CREATININE 0.7   CALCIUM 10.8*   ALBUMIN 3.3*   PROT 8.0   BILITOT 4.3*   ALKPHOS 118   ALT 9*   AST 16   MG 1.9       All pertinent labs within the past 24 hours have been reviewed.    Significant Imaging: I have reviewed all pertinent imaging results/findings within the past 24 hours.    ABG  Recent Labs   Lab 11/24/23  1802   PH 7.378   PO2 59*   PCO2 56.9*   HCO3 33.5*   BE 8*     Assessment/Plan:     Neuro  * Acute metabolic encephalopathy  82 yo F with PMHx of HTN, T2DM, A fib (on eliquis), DVT, ICD, dementia, breast cancer s/p mastectomy, and ESBL UTI who  presented to ED from [facility] for worsening mental status.    Critical care consulted for worsening mental status. Per chart review and discussion with primary team, on arrival to ED pt was confused but generally alert. However throughout the day pt became increasingly somnolent but still arousable.     Pt afebrile, leukopenia at 3.5, UA with 17 WBC, reflexed to Ucx. UTI may be contributing to AMS, would continue broad spectrum coverage for hx ESBL UTI.    CT head initially c/f possible hemorrhage vs motion artifact, repeat was negative for hemorrhage. Received 325mg ASA on arrival, but no other antiplatelet/AC medications given since then. Low likelihood for new hemorrhage since imaging this AM.    CXR with some evidence mild volume overload, but no large effusions or consolidation, BNP elevated 586, trop slightly elevated at 0.081 -> 0.087. Not particularly volume overloaded on exam. EKG with Aflutter, nl rate, paced rhythm. Echo with Nl LVEF, but somewhat poor visualization of RV and c/f RV dilation and elevated CVP. D-dimer elevated to 5.42, but no evidence of PE on CTA chest.    Initial VBG 7.43/52.8/42/35/2 -> 7.32/68.3/61/35/3 -> 7.37/56.9/59/33.5. Based on record review pt is chronic CO2 retainer and has had persistently elevated bicarb on numerous metabolic panels dating back months. May be contributing to AMS, however, pt is a chronic CO2 retainer.    Pt did receive x1 dose of home seroquel 25mg this AM at 3:30, unlikely contributing to AMS. No other obvious sedating medications or medications known to cause AMS.     Ammonia wnl.    Pt evaluated at bedside by critical care team. Pt seems to be more alert on exam than earlier in the day and became somewhat agitated with questioning/exam/blood gas stick. Vitals are stable, HDS, satting well on RA, repeat VBG shows improving pH and improving CO2. D/w primary team and plan to transfer pt off Obs unit and place pt on BiPAP qhs. No ICU needs at this  time              Critical care was time spent personally by me on the following activities: development of treatment plan with patient or surrogate and bedside caregivers, discussions with consultants, evaluation of patient's response to treatment, examination of patient, ordering and performing treatments and interventions, ordering and review of laboratory studies, ordering and review of radiographic studies, pulse oximetry, re-evaluation of patient's condition. This critical care time did not overlap with that of any other provider or involve time for any procedures.    Thank you for your consult. I will sign off. Please contact us if you have any additional questions.     Bryan Mccormick MD  Critical Care Medicine  [facility] 11[facility]   constant

## 2023-12-31 NOTE — ED PROVIDER NOTE - PHYSICAL EXAMINATION
Const: NAD  Eyes: PERRL, EOM intact  CV: RRR, no murmurs, no chest wall tenderness, distal pulses intact  Resp: +Bilateral wheezing, diffuse wheezing.   GI: soft, nondistended, nontender  MSK: Full ROM, no muscle or bony deformity or tenderness  Neuro: AOx3, GCS 15, No focal deficits  Skin: No rash, laceration or abrasion  Psych: calm, cooperative, No SI, HI, hallucination.

## 2023-12-31 NOTE — ED PROVIDER NOTE - CARE PLAN
Principal Discharge DX:	Acute respiratory failure with hypoxia  Secondary Diagnosis:	Influenza A   1

## 2023-12-31 NOTE — ED PROVIDER NOTE - OBJECTIVE STATEMENT
62 y/o male with PMHx of seizure disorder, glioblastoma presents to the ED from [facility] c/o worsening SOB x3 days, associated with cough. Endorses recent diagnosis of the flu. Patient hypoxic on room air and sent in for evaluation. Denies fever, chills, chest pain. 62 y/o male with PMHx of seizure disorder, glioblastoma presents to the ED from [facility] c/o worsening SOB x3 days, associated with cough. Endorses recent diagnosis of the flu. Patient hypoxic on room air and sent in for evaluation. Denies fever, chills, chest pain.

## 2024-01-01 DIAGNOSIS — R06.02 SHORTNESS OF BREATH: ICD-10-CM

## 2024-01-01 DIAGNOSIS — I26.99 OTHER PULMONARY EMBOLISM WITHOUT ACUTE COR PULMONALE: ICD-10-CM

## 2024-01-01 DIAGNOSIS — J10.1 INFLUENZA DUE TO OTHER IDENTIFIED INFLUENZA VIRUS WITH OTHER RESPIRATORY MANIFESTATIONS: ICD-10-CM

## 2024-01-01 DIAGNOSIS — C71.9 MALIGNANT NEOPLASM OF BRAIN, UNSPECIFIED: ICD-10-CM

## 2024-01-01 DIAGNOSIS — J98.01 ACUTE BRONCHOSPASM: ICD-10-CM

## 2024-01-01 LAB
ANION GAP SERPL CALC-SCNC: 6 MMOL/L — SIGNIFICANT CHANGE UP (ref 5–17)
ANION GAP SERPL CALC-SCNC: 6 MMOL/L — SIGNIFICANT CHANGE UP (ref 5–17)
BUN SERPL-MCNC: 19 MG/DL — SIGNIFICANT CHANGE UP (ref 7–23)
BUN SERPL-MCNC: 19 MG/DL — SIGNIFICANT CHANGE UP (ref 7–23)
CALCIUM SERPL-MCNC: 9.1 MG/DL — SIGNIFICANT CHANGE UP (ref 8.5–10.1)
CALCIUM SERPL-MCNC: 9.1 MG/DL — SIGNIFICANT CHANGE UP (ref 8.5–10.1)
CHLORIDE SERPL-SCNC: 110 MMOL/L — HIGH (ref 96–108)
CHLORIDE SERPL-SCNC: 110 MMOL/L — HIGH (ref 96–108)
CO2 SERPL-SCNC: 29 MMOL/L — SIGNIFICANT CHANGE UP (ref 22–31)
CO2 SERPL-SCNC: 29 MMOL/L — SIGNIFICANT CHANGE UP (ref 22–31)
CREAT SERPL-MCNC: 0.86 MG/DL — SIGNIFICANT CHANGE UP (ref 0.5–1.3)
CREAT SERPL-MCNC: 0.86 MG/DL — SIGNIFICANT CHANGE UP (ref 0.5–1.3)
EGFR: 96 ML/MIN/1.73M2 — SIGNIFICANT CHANGE UP
EGFR: 96 ML/MIN/1.73M2 — SIGNIFICANT CHANGE UP
GLUCOSE SERPL-MCNC: 147 MG/DL — HIGH (ref 70–99)
GLUCOSE SERPL-MCNC: 147 MG/DL — HIGH (ref 70–99)
HCT VFR BLD CALC: 48.3 % — SIGNIFICANT CHANGE UP (ref 39–50)
HCT VFR BLD CALC: 48.3 % — SIGNIFICANT CHANGE UP (ref 39–50)
HGB BLD-MCNC: 15.9 G/DL — SIGNIFICANT CHANGE UP (ref 13–17)
HGB BLD-MCNC: 15.9 G/DL — SIGNIFICANT CHANGE UP (ref 13–17)
MCHC RBC-ENTMCNC: 29.3 PG — SIGNIFICANT CHANGE UP (ref 27–34)
MCHC RBC-ENTMCNC: 29.3 PG — SIGNIFICANT CHANGE UP (ref 27–34)
MCHC RBC-ENTMCNC: 32.9 GM/DL — SIGNIFICANT CHANGE UP (ref 32–36)
MCHC RBC-ENTMCNC: 32.9 GM/DL — SIGNIFICANT CHANGE UP (ref 32–36)
MCV RBC AUTO: 89.1 FL — SIGNIFICANT CHANGE UP (ref 80–100)
MCV RBC AUTO: 89.1 FL — SIGNIFICANT CHANGE UP (ref 80–100)
PLATELET # BLD AUTO: 89 K/UL — LOW (ref 150–400)
PLATELET # BLD AUTO: 89 K/UL — LOW (ref 150–400)
POTASSIUM SERPL-MCNC: 4.1 MMOL/L — SIGNIFICANT CHANGE UP (ref 3.5–5.3)
POTASSIUM SERPL-MCNC: 4.1 MMOL/L — SIGNIFICANT CHANGE UP (ref 3.5–5.3)
POTASSIUM SERPL-SCNC: 4.1 MMOL/L — SIGNIFICANT CHANGE UP (ref 3.5–5.3)
POTASSIUM SERPL-SCNC: 4.1 MMOL/L — SIGNIFICANT CHANGE UP (ref 3.5–5.3)
RBC # BLD: 5.42 M/UL — SIGNIFICANT CHANGE UP (ref 4.2–5.8)
RBC # BLD: 5.42 M/UL — SIGNIFICANT CHANGE UP (ref 4.2–5.8)
RBC # FLD: 16.6 % — HIGH (ref 10.3–14.5)
RBC # FLD: 16.6 % — HIGH (ref 10.3–14.5)
SODIUM SERPL-SCNC: 145 MMOL/L — SIGNIFICANT CHANGE UP (ref 135–145)
SODIUM SERPL-SCNC: 145 MMOL/L — SIGNIFICANT CHANGE UP (ref 135–145)
WBC # BLD: 6.07 K/UL — SIGNIFICANT CHANGE UP (ref 3.8–10.5)
WBC # BLD: 6.07 K/UL — SIGNIFICANT CHANGE UP (ref 3.8–10.5)
WBC # FLD AUTO: 6.07 K/UL — SIGNIFICANT CHANGE UP (ref 3.8–10.5)
WBC # FLD AUTO: 6.07 K/UL — SIGNIFICANT CHANGE UP (ref 3.8–10.5)

## 2024-01-01 PROCEDURE — 99222 1ST HOSP IP/OBS MODERATE 55: CPT

## 2024-01-01 PROCEDURE — 12345: CPT | Mod: NC

## 2024-01-01 RX ORDER — ROPINIROLE 8 MG/1
0.75 TABLET, FILM COATED, EXTENDED RELEASE ORAL AT BEDTIME
Refills: 0 | Status: DISCONTINUED | OUTPATIENT
Start: 2024-01-01 | End: 2024-01-08

## 2024-01-01 RX ORDER — MIRTAZAPINE 45 MG/1
2 TABLET, ORALLY DISINTEGRATING ORAL
Refills: 0 | DISCHARGE

## 2024-01-01 RX ORDER — ALBUTEROL 90 UG/1
2.5 AEROSOL, METERED ORAL EVERY 6 HOURS
Refills: 0 | Status: DISCONTINUED | OUTPATIENT
Start: 2024-01-01 | End: 2024-01-08

## 2024-01-01 RX ORDER — NYSTATIN CREAM 100000 [USP'U]/G
1 CREAM TOPICAL THREE TIMES A DAY
Refills: 0 | Status: DISCONTINUED | OUTPATIENT
Start: 2024-01-01 | End: 2024-01-08

## 2024-01-01 RX ORDER — LACOSAMIDE 50 MG/1
200 TABLET ORAL
Refills: 0 | Status: DISCONTINUED | OUTPATIENT
Start: 2024-01-01 | End: 2024-01-08

## 2024-01-01 RX ORDER — APIXABAN 2.5 MG/1
5 TABLET, FILM COATED ORAL EVERY 12 HOURS
Refills: 0 | Status: DISCONTINUED | OUTPATIENT
Start: 2024-01-01 | End: 2024-01-08

## 2024-01-01 RX ORDER — DEXAMETHASONE 0.5 MG/5ML
4 ELIXIR ORAL DAILY
Refills: 0 | Status: DISCONTINUED | OUTPATIENT
Start: 2024-01-01 | End: 2024-01-08

## 2024-01-01 RX ORDER — DEXAMETHASONE 0.5 MG/5ML
1 ELIXIR ORAL
Refills: 0 | DISCHARGE

## 2024-01-01 RX ORDER — PANTOPRAZOLE SODIUM 20 MG/1
40 TABLET, DELAYED RELEASE ORAL
Refills: 0 | Status: DISCONTINUED | OUTPATIENT
Start: 2024-01-01 | End: 2024-01-08

## 2024-01-01 RX ORDER — LEVETIRACETAM 250 MG/1
1500 TABLET, FILM COATED ORAL
Refills: 0 | Status: DISCONTINUED | OUTPATIENT
Start: 2024-01-01 | End: 2024-01-08

## 2024-01-01 RX ORDER — TAMSULOSIN HYDROCHLORIDE 0.4 MG/1
0.4 CAPSULE ORAL AT BEDTIME
Refills: 0 | Status: DISCONTINUED | OUTPATIENT
Start: 2024-01-01 | End: 2024-01-08

## 2024-01-01 RX ADMIN — Medication 40 MILLIGRAM(S): at 21:04

## 2024-01-01 RX ADMIN — Medication 75 MILLIGRAM(S): at 21:03

## 2024-01-01 RX ADMIN — Medication 4 MILLIGRAM(S): at 11:13

## 2024-01-01 RX ADMIN — APIXABAN 5 MILLIGRAM(S): 2.5 TABLET, FILM COATED ORAL at 11:14

## 2024-01-01 RX ADMIN — NYSTATIN CREAM 1 APPLICATION(S): 100000 CREAM TOPICAL at 20:26

## 2024-01-01 RX ADMIN — Medication 75 MILLIGRAM(S): at 11:14

## 2024-01-01 RX ADMIN — NYSTATIN CREAM 1 APPLICATION(S): 100000 CREAM TOPICAL at 06:21

## 2024-01-01 RX ADMIN — ALBUTEROL 2.5 MILLIGRAM(S): 90 AEROSOL, METERED ORAL at 20:26

## 2024-01-01 RX ADMIN — NYSTATIN CREAM 1 APPLICATION(S): 100000 CREAM TOPICAL at 15:10

## 2024-01-01 RX ADMIN — LEVETIRACETAM 1500 MILLIGRAM(S): 250 TABLET, FILM COATED ORAL at 12:33

## 2024-01-01 RX ADMIN — TAMSULOSIN HYDROCHLORIDE 0.4 MILLIGRAM(S): 0.4 CAPSULE ORAL at 21:03

## 2024-01-01 RX ADMIN — LEVETIRACETAM 1500 MILLIGRAM(S): 250 TABLET, FILM COATED ORAL at 21:02

## 2024-01-01 RX ADMIN — LACOSAMIDE 200 MILLIGRAM(S): 50 TABLET ORAL at 21:03

## 2024-01-01 RX ADMIN — ROPINIROLE 0.75 MILLIGRAM(S): 8 TABLET, FILM COATED, EXTENDED RELEASE ORAL at 23:12

## 2024-01-01 RX ADMIN — LACOSAMIDE 200 MILLIGRAM(S): 50 TABLET ORAL at 11:15

## 2024-01-01 RX ADMIN — APIXABAN 5 MILLIGRAM(S): 2.5 TABLET, FILM COATED ORAL at 21:02

## 2024-01-01 RX ADMIN — PANTOPRAZOLE SODIUM 40 MILLIGRAM(S): 20 TABLET, DELAYED RELEASE ORAL at 11:14

## 2024-01-01 NOTE — ED ADULT NURSE REASSESSMENT NOTE - NS ED NURSE REASSESS COMMENT FT1
Report received from night RN Manny. Pt is sleeping on stretcher in room, respirations even and unlabored. No complaints at this time. Safety and comfort measures maintained. Call bell within reach.
Pt bed linens changed, new gown provided, perineal care provided.
pt received from previous RN Dasha. pt contact made. pt is alert, but c/o anxiety. pt denies pain, but is complaining of discomfort. pt repositioned and MD notified. no other complaints. family at bedside.

## 2024-01-01 NOTE — H&P ADULT - HISTORY OF PRESENT ILLNESS
Patient is a 62 y/o M with a PMH of glioblastoma, seizure disorder, and DVT/PE who presented to  ED on 12/31/23 from [facility] with increased shortness of breath and cough for the past 3 days prior to admission. Was found to have a saturation of 88% on RA at the facility. Also mentions he has had decreased appetite for the past few days leading up to admission.     He denies chest pain, palpitations, abdominal pain, fever, chills. sore throat, runny nose, body aches, headaches, nausea, vomiting, or diarrhea.  Was recently hospitalized in October 2023 with COVID-19.   Upon arrival to the ED, patient's vitals were /92  RR16 SpO2 95% on 2L NC and T 97.9F. He received IV Solumedrol 125 mg x1 , Duoneb and was started on Oseltamavir.            Patient is a 64 y/o M with a PMH of glioblastoma, seizure disorder, and DVT/PE who presented to  ED on 12/31/23 from [facility] with increased shortness of breath and cough for the past 3 days prior to admission. Was found to have a saturation of 88% on RA at the facility. Also mentions he has had decreased appetite for the past few days leading up to admission.     He denies chest pain, palpitations, abdominal pain, fever, chills. sore throat, runny nose, body aches, headaches, nausea, vomiting, or diarrhea.  Was recently hospitalized in October 2023 with COVID-19.   Upon arrival to the ED, patient's vitals were /92  RR16 SpO2 95% on 2L NC and T 97.9F. He received IV Solumedrol 125 mg x1 , Duoneb and was started on Oseltamavir.            Patient is a 64 y/o M with a PMH of glioblastoma, seizure disorder, and DVT/PE who presented to  ED on 12/31/23 from [facility] with increased shortness of breath and cough for the past 3 days prior to admission. Was found to have a saturation of 88% on RA at the facility. Also mentions he has had decreased appetite for the past few days leading up to admission.     He denies chest pain, palpitations, abdominal pain, fever, chills. sore throat, runny nose, body aches, headaches, nausea, vomiting, or diarrhea.  Was recently hospitalized in October 2023 with COVID-19.   Upon arrival to the ED, patient's vitals were /92  RR16 SpO2 95% on 2L NC and T 97.9F. He received IV Solumedrol 125 mg x1 , Duoneb and was started on Oseltamavir.  Patient is a 62 y/o M with a PMH of glioblastoma, seizure disorder, and DVT/PE who presented to  ED on 12/31/23 from [facility] with increased shortness of breath and cough for the past 3 days prior to admission. Was found to have a saturation of 88% on RA at the facility. Also mentions he has had decreased appetite for the past few days leading up to admission.     He denies chest pain, palpitations, abdominal pain, fever, chills. sore throat, runny nose, body aches, headaches, nausea, vomiting, or diarrhea.  Was recently hospitalized in October 2023 with COVID-19.   Upon arrival to the ED, patient's vitals were /92  RR16 SpO2 95% on 2L NC and T 97.9F. He received IV Solumedrol 125 mg x1 , Duoneb and was started on Oseltamavir.

## 2024-01-01 NOTE — PROGRESS NOTE ADULT - ASSESSMENT
Patient is a 62 y/o M with a PMH of glioblastoma, seizure disorder, and DVT/PE who presented to  ED on 12/31/23 from [facility] with increased shortness of breath and cough for the past 3 days admitted with acute hypoxemic respiratory failure secondary to influenza virus.     #Acute hypoxemic respiratory failure secondary to influenza  - Initially saturating 88% on RA   - s/p IV solumedrol, Duoneb in the ED   - CXR official read pending - does not appear to be superimposed PNA  - Currently on 4L NC; continue supplemental oxygen   - Is on Decadron (due to GBM)  - Continue Oseltamavir 75 mg BID (Day 1 of 5)   - F/u pulmonology consult     #Glioblastoma multiforme   - Diagnosed on 9/27/22   - Pathology classification: WHO 4, IDH wild type, MGMT methylated   - Underwent large resection of mass on 10/3/22 with Dr. Turpin  - Last chemotherapy session was 12/20/23  - Most recent visit with Dr. Torres on 12/20/23 - plan to continue Avastin and follow-up MRI in 2 weeks   --> Has appointment for outpatient MRI on 1/4/23  - Continue Dexamethasone 4 mg QD     #Seizure Disorder   - Continue Keppra 1500 mg BID   - Continue Lacosamide 200 mg BID    # Hx of DVT/PE  - Above and below the knee DVT: Right femoral vein and left peroneal vein acute DVT on 3/7/23   - CT angio chest from 3/7/23: right lower lobe distal segmental PE  - Continue Eliquis 5 mg BID    #BPH   - Continue Tamsulosin 0.4 mg at bedtime     #Code Status   - FULL CODE Patient is a 64 y/o M with a PMH of glioblastoma, seizure disorder, and DVT/PE who presented to  ED on 12/31/23 from [facility] with increased shortness of breath and cough for the past 3 days admitted with acute hypoxemic respiratory failure secondary to influenza virus.     #Acute hypoxemic respiratory failure secondary to influenza  - Initially saturating 88% on RA   - s/p IV solumedrol, Duoneb in the ED   - CXR official read pending - does not appear to be superimposed PNA  - Currently on 4L NC; continue supplemental oxygen   - Is on Decadron (due to GBM)  - Continue Oseltamavir 75 mg BID (Day 1 of 5)   - F/u pulmonology consult     #Glioblastoma multiforme   - Diagnosed on 9/27/22   - Pathology classification: WHO 4, IDH wild type, MGMT methylated   - Underwent large resection of mass on 10/3/22 with Dr. Turpin  - Last chemotherapy session was 12/20/23  - Most recent visit with Dr. Torres on 12/20/23 - plan to continue Avastin and follow-up MRI in 2 weeks   --> Has appointment for outpatient MRI on 1/4/23  - Continue Dexamethasone 4 mg QD     #Seizure Disorder   - Continue Keppra 1500 mg BID   - Continue Lacosamide 200 mg BID    # Hx of DVT/PE  - Above and below the knee DVT: Right femoral vein and left peroneal vein acute DVT on 3/7/23   - CT angio chest from 3/7/23: right lower lobe distal segmental PE  - Continue Eliquis 5 mg BID    #BPH   - Continue Tamsulosin 0.4 mg at bedtime     #Code Status   - FULL CODE

## 2024-01-01 NOTE — H&P ADULT - NSHPPHYSICALEXAM_GEN_ALL_CORE
GENERAL: NAD, lying in bed comfortably  HEAD:  Atraumatic, Normocephalic  EYES: conjunctiva clear and sclera white   ENT: oral thrush noted   NECK: Supple, No JVD  CHEST/LUNG: mild wheezing throughout lung fields  HEART: Regular rate and rhythm; No murmurs, rubs, or gallops appreciated   ABDOMEN: Bowel sounds present; Soft, Nontender   EXTREMITIES: No clubbing, cyanosis, or edema  NERVOUS SYSTEM:  Alert & Oriented X3   MSK: FROM all 4 extremities   SKIN: No rashes or lesions

## 2024-01-01 NOTE — PATIENT PROFILE ADULT - FALL HARM RISK - HARM RISK INTERVENTIONS
Assistance with ambulation/Assistance OOB with selected safe patient handling equipment/Communicate Risk of Fall with Harm to all staff/Discuss with provider need for PT consult/Monitor gait and stability/Reinforce activity limits and safety measures with patient and family/Tailored Fall Risk Interventions/Visual Cue: Yellow wristband and red socks/Bed in lowest position, wheels locked, appropriate side rails in place/Call bell, personal items and telephone in reach/Instruct patient to call for assistance before getting out of bed or chair/Non-slip footwear when patient is out of bed/[place] to call system/Physically safe environment - no spills, clutter or unnecessary equipment/Purposeful Proactive Rounding/Room/bathroom lighting operational, light cord in reach Assistance with ambulation/Assistance OOB with selected safe patient handling equipment/Communicate Risk of Fall with Harm to all staff/Discuss with provider need for PT consult/Monitor gait and stability/Reinforce activity limits and safety measures with patient and family/Tailored Fall Risk Interventions/Visual Cue: Yellow wristband and red socks/Bed in lowest position, wheels locked, appropriate side rails in place/Call bell, personal items and telephone in reach/Instruct patient to call for assistance before getting out of bed or chair/Non-slip footwear when patient is out of bed/[place] to call system/Physically safe environment - no spills, clutter or unnecessary equipment/Purposeful Proactive Rounding/Room/bathroom lighting operational, light cord in reach Assistance with ambulation/Assistance OOB with selected safe patient handling equipment/Communicate Risk of Fall with Harm to all staff/Discuss with provider need for PT consult/Monitor gait and stability/Reinforce activity limits and safety measures with patient and family/Tailored Fall Risk Interventions/Use of alarms - bed, chair and/or voice tab/Visual Cue: Yellow wristband and red socks/Bed in lowest position, wheels locked, appropriate side rails in place/Call bell, personal items and telephone in reach/Instruct patient to call for assistance before getting out of bed or chair/Non-slip footwear when patient is out of bed/[place] to call system/Physically safe environment - no spills, clutter or unnecessary equipment/Purposeful Proactive Rounding/Room/bathroom lighting operational, light cord in reach Assistance with ambulation/Assistance OOB with selected safe patient handling equipment/Communicate Risk of Fall with Harm to all staff/Discuss with provider need for PT consult/Monitor gait and stability/Reinforce activity limits and safety measures with patient and family/Tailored Fall Risk Interventions/Use of alarms - bed, chair and/or voice tab/Visual Cue: Yellow wristband and red socks/Bed in lowest position, wheels locked, appropriate side rails in place/Call bell, personal items and telephone in reach/Instruct patient to call for assistance before getting out of bed or chair/Non-slip footwear when patient is out of bed/[place] to call system/Physically safe environment - no spills, clutter or unnecessary equipment/Purposeful Proactive Rounding/Room/bathroom lighting operational, light cord in reach

## 2024-01-01 NOTE — H&P ADULT - NSHPADDITIONALINFOADULT_GEN_ALL_CORE
Patient seen after initial evaluation above by family medicine resident. Case discussed and reviewed in detail. Please note my plan below     64 y/o M w/ PMH of glioblastoma s/p craniotomy, DVT / PE, seizure disorder, p/w SOB + cough.     *Dyspnea w/ Influenza A infection   -Tamiflu   -Pulse ox monitoring  -O2 supplementation PRN   -Pulm consult   -Lactate = 1.9    *nonobstructive renal calculi / T7 compression deformity   -F/u outpatient for further management     *H/o seizure disorder / gliblastoma / DVT / PE  -C/w home meds and f/u outpatient for further management  -Seizure / Fall precautions     *DVT ppx   -On Eliquis      55 mins spent on this admission

## 2024-01-01 NOTE — CONSULT NOTE ADULT - SUBJECTIVE AND OBJECTIVE BOX
HPI:  Patient is a 62 y/o M with a PMH of glioblastoma, seizure disorder, and DVT/PE who presented to  ED on 12/31/23 from [facility] with increased shortness of breath and cough for the past 3 days prior to admission. Was found to have a saturation of 88% on RA at the facility. Also mentions he has had decreased appetite for the past few days leading up to admission.     He denies chest pain, palpitations, abdominal pain, fever, chills. sore throat, runny nose, body aches, headaches, nausea, vomiting, or diarrhea.  Was recently hospitalized in October 2023 with COVID-19.   Upon arrival to the ED, patient's vitals were /92  RR16 SpO2 95% on 2L NC and T 97.9F. He received IV Solumedrol 125 mg x1 , Duoneb and was started on Oseltamavir.  (01 Jan 2024 00:18)    1/1/24:  History as above. Had covid in October. Now w influenza A.      PAST MEDICAL & SURGICAL HISTORY:  Glioblastoma      Seizures      S/P craniotomy          MEDICATIONS  (STANDING):  apixaban 5 milliGRAM(s) Oral every 12 hours  dexAMETHasone     Tablet 4 milliGRAM(s) Oral daily  lacosamide 200 milliGRAM(s) Oral two times a day  levETIRAcetam  Solution 1500 milliGRAM(s) Oral two times a day  nystatin Powder 1 Application(s) Topical three times a day  oseltamivir 75 milliGRAM(s) Oral two times a day  pantoprazole    Tablet 40 milliGRAM(s) Oral before breakfast  rOPINIRole 0.75 milliGRAM(s) Oral at bedtime  tamsulosin 0.4 milliGRAM(s) Oral at bedtime    MEDICATIONS  (PRN):      Allergies    No Known Allergies    Intolerances        SOCIAL HISTORY: Denies tobacco, etoh abuse or illicit drug use    FAMILY HISTORY:      Vital Signs Last 24 Hrs  T(C): 36.4 (31 Dec 2023 23:24), Max: 36.6 (31 Dec 2023 18:30)  T(F): 97.6 (31 Dec 2023 23:24), Max: 97.9 (31 Dec 2023 18:30)  HR: 92 (01 Jan 2024 06:30) (92 - 114)  BP: 121/80 (01 Jan 2024 06:30) (121/80 - 135/104)  BP(mean): 93 (01 Jan 2024 06:30) (93 - 121)  RR: 20 (01 Jan 2024 06:30) (16 - 20)  SpO2: 95% (01 Jan 2024 06:30) (94% - 97%)    Parameters below as of 01 Jan 2024 06:30  Patient On (Oxygen Delivery Method): nasal cannula  O2 Flow (L/min): 3      REVIEW OF SYSTEMS:    CONSTITUTIONAL:  As per HPI.  SKIN: no rashes  HEENT:  Eyes:  No diplopia or blurred vision. ENT:  No earache, sore throat or runny nose.  CARDIOVASCULAR:  No pressure, squeezing, tightness, heaviness or aching about the chest, neck, axilla or epigastrium.  RESPIRATORY:  cough/sob  GASTROINTESTINAL:  No nausea, vomiting or diarrhea.  GENITOURINARY:  No dysuria, frequency or urgency.  MUSCULOSKELETAL:  As per HPI.  SKIN:  No change in skin, hair or nails.  NEUROLOGIC:  No paresthesias, fasciculations, seizures or weakness.  PSYCHIATRIC:  No disorder of thought or mood.  ENDOCRINE:  No heat or cold intolerance, polyuria or polydipsia.  HEMATOLOGICAL:  No easy bruising or bleedings:  .     PHYSICAL EXAMINATION:    GENERAL APPEARANCE:  Pt. is not currently dyspneic, in no distress. Pt. is alert, oriented, and pleasant.  HEENT:  Pupils are normal and react normally. No icterus. Mucous membranes well colored.  NECK:  Supple. No lymphadenopathy. Jugular venous pressure not elevated. Carotids equal.   HEART:  S1S2 reg  CHEST:  scatterred exp ronchi  ABDOMEN:  Soft and nontender.   EXTREMITIES:  There is no cyanosis, clubbing or edema.   SKIN:  No rash or significant lesions are noted.    LABS:                        15.9   6.07  )-----------( 89       ( 01 Jan 2024 07:06 )             48.3     01-01    145  |  110<H>  |  19  ----------------------------<  147<H>  4.1   |  29  |  0.86    Ca    9.1      01 Jan 2024 07:06    TPro  6.9  /  Alb  2.8<L>  /  TBili  0.5  /  DBili  x   /  AST  25  /  ALT  46  /  AlkPhos  57  12-31    LIVER FUNCTIONS - ( 31 Dec 2023 18:28 )  Alb: 2.8 g/dL / Pro: 6.9 gm/dL / ALK PHOS: 57 U/L / ALT: 46 U/L / AST: 25 U/L / GGT: x                 Urinalysis Basic - ( 01 Jan 2024 07:06 )    Color: x / Appearance: x / SG: x / pH: x  Gluc: 147 mg/dL / Ketone: x  / Bili: x / Urobili: x   Blood: x / Protein: x / Nitrite: x   Leuk Esterase: x / RBC: x / WBC x   Sq Epi: x / Non Sq Epi: x / Bacteria: x          RADIOLOGY & ADDITIONAL STUDIES:        HPI:  Patient is a 64 y/o M with a PMH of glioblastoma, seizure disorder, and DVT/PE who presented to  ED on 12/31/23 from [facility] with increased shortness of breath and cough for the past 3 days prior to admission. Was found to have a saturation of 88% on RA at the facility. Also mentions he has had decreased appetite for the past few days leading up to admission.     He denies chest pain, palpitations, abdominal pain, fever, chills. sore throat, runny nose, body aches, headaches, nausea, vomiting, or diarrhea.  Was recently hospitalized in October 2023 with COVID-19.   Upon arrival to the ED, patient's vitals were /92  RR16 SpO2 95% on 2L NC and T 97.9F. He received IV Solumedrol 125 mg x1 , Duoneb and was started on Oseltamavir.  (01 Jan 2024 00:18)    1/1/24:  History as above. Had covid in October. Now w influenza A.      PAST MEDICAL & SURGICAL HISTORY:  Glioblastoma      Seizures      S/P craniotomy          MEDICATIONS  (STANDING):  apixaban 5 milliGRAM(s) Oral every 12 hours  dexAMETHasone     Tablet 4 milliGRAM(s) Oral daily  lacosamide 200 milliGRAM(s) Oral two times a day  levETIRAcetam  Solution 1500 milliGRAM(s) Oral two times a day  nystatin Powder 1 Application(s) Topical three times a day  oseltamivir 75 milliGRAM(s) Oral two times a day  pantoprazole    Tablet 40 milliGRAM(s) Oral before breakfast  rOPINIRole 0.75 milliGRAM(s) Oral at bedtime  tamsulosin 0.4 milliGRAM(s) Oral at bedtime    MEDICATIONS  (PRN):      Allergies    No Known Allergies    Intolerances        SOCIAL HISTORY: Denies tobacco, etoh abuse or illicit drug use    FAMILY HISTORY:      Vital Signs Last 24 Hrs  T(C): 36.4 (31 Dec 2023 23:24), Max: 36.6 (31 Dec 2023 18:30)  T(F): 97.6 (31 Dec 2023 23:24), Max: 97.9 (31 Dec 2023 18:30)  HR: 92 (01 Jan 2024 06:30) (92 - 114)  BP: 121/80 (01 Jan 2024 06:30) (121/80 - 135/104)  BP(mean): 93 (01 Jan 2024 06:30) (93 - 121)  RR: 20 (01 Jan 2024 06:30) (16 - 20)  SpO2: 95% (01 Jan 2024 06:30) (94% - 97%)    Parameters below as of 01 Jan 2024 06:30  Patient On (Oxygen Delivery Method): nasal cannula  O2 Flow (L/min): 3      REVIEW OF SYSTEMS:    CONSTITUTIONAL:  As per HPI.  SKIN: no rashes  HEENT:  Eyes:  No diplopia or blurred vision. ENT:  No earache, sore throat or runny nose.  CARDIOVASCULAR:  No pressure, squeezing, tightness, heaviness or aching about the chest, neck, axilla or epigastrium.  RESPIRATORY:  cough/sob  GASTROINTESTINAL:  No nausea, vomiting or diarrhea.  GENITOURINARY:  No dysuria, frequency or urgency.  MUSCULOSKELETAL:  As per HPI.  SKIN:  No change in skin, hair or nails.  NEUROLOGIC:  No paresthesias, fasciculations, seizures or weakness.  PSYCHIATRIC:  No disorder of thought or mood.  ENDOCRINE:  No heat or cold intolerance, polyuria or polydipsia.  HEMATOLOGICAL:  No easy bruising or bleedings:  .     PHYSICAL EXAMINATION:    GENERAL APPEARANCE:  Pt. is not currently dyspneic, in no distress. Pt. is alert, oriented, and pleasant.  HEENT:  Pupils are normal and react normally. No icterus. Mucous membranes well colored.  NECK:  Supple. No lymphadenopathy. Jugular venous pressure not elevated. Carotids equal.   HEART:  S1S2 reg  CHEST:  scatterred exp ronchi  ABDOMEN:  Soft and nontender.   EXTREMITIES:  There is no cyanosis, clubbing or edema.   SKIN:  No rash or significant lesions are noted.    LABS:                        15.9   6.07  )-----------( 89       ( 01 Jan 2024 07:06 )             48.3     01-01    145  |  110<H>  |  19  ----------------------------<  147<H>  4.1   |  29  |  0.86    Ca    9.1      01 Jan 2024 07:06    TPro  6.9  /  Alb  2.8<L>  /  TBili  0.5  /  DBili  x   /  AST  25  /  ALT  46  /  AlkPhos  57  12-31    LIVER FUNCTIONS - ( 31 Dec 2023 18:28 )  Alb: 2.8 g/dL / Pro: 6.9 gm/dL / ALK PHOS: 57 U/L / ALT: 46 U/L / AST: 25 U/L / GGT: x                 Urinalysis Basic - ( 01 Jan 2024 07:06 )    Color: x / Appearance: x / SG: x / pH: x  Gluc: 147 mg/dL / Ketone: x  / Bili: x / Urobili: x   Blood: x / Protein: x / Nitrite: x   Leuk Esterase: x / RBC: x / WBC x   Sq Epi: x / Non Sq Epi: x / Bacteria: x          RADIOLOGY & ADDITIONAL STUDIES:

## 2024-01-01 NOTE — PROGRESS NOTE ADULT - SUBJECTIVE AND OBJECTIVE BOX
CHIEF COMPLAINT: Cough and SOB/Flu    SUBJECTIVE/SIGNIFICANT INTERVAL EVENTS/OVERNIGHT EVENTS:    1/1: Cough and SOB with mild interval improvment. Denies fever, chest pain, nausea, vomiting.     Review of Systems: 14 Point review of systems reviewed and reported as negative unless otherwise stated above    FROM H&P:  "Patient is a 62 y/o M with a PMH of glioblastoma, seizure disorder, and DVT/PE who presented to  ED on 12/31/23 from [facility] with increased shortness of breath and cough for the past 3 days prior to admission. Was found to have a saturation of 88% on RA at the facility. Also mentions he has had decreased appetite for the past few days leading up to admission.     He denies chest pain, palpitations, abdominal pain, fever, chills. sore throat, runny nose, body aches, headaches, nausea, vomiting, or diarrhea.  Was recently hospitalized in October 2023 with COVID-19.   Upon arrival to the ED, patient's vitals were /92  RR16 SpO2 95% on 2L NC and T 97.9F. He received IV Solumedrol 125 mg x1 , Duoneb and was started on Oseltamavir. "    PHYSICAL EXAM:    T(C): 37 (01-01-24 @ 15:09), Max: 37 (01-01-24 @ 15:09)  HR: 85 (01-01-24 @ 15:09) (85 - 114)  BP: 113/75 (01-01-24 @ 15:09) (113/75 - 135/104)  RR: 20 (01-01-24 @ 15:09) (16 - 20)  SpO2: 95% (01-01-24 @ 15:09) (94% - 97%)    General: AAOx3; NAD  Head: AT/NC  ENT: Moist Mucous Membranes; No Injury  Eyes: EOMI; PERRL  Neck: Non-tender; No JVD  CVS: RRR, S1&S2, No murmur, No edema  Respiratory: Mild bilateral ronchi. Normal respiratory effort  Abdomen/GI: Soft, non-tender, non-distended, no guarding, no rebound, normal bowel sounds  : No bladder distention,   Extremities: No cyanosis, No clubbing, No edema  MSK: No CVA tenderness, Normal ROM, No injury  Neuro: AAOx3, CNII-XII grossly intact, non-focal  Psych: Appropriate, Cooperative,   Skin: Clean, Dry and Intact      LABS:                          15.9   6.07  )-----------( 89       ( 01 Jan 2024 07:06 )             48.3     01-01    145  |  110<H>  |  19  ----------------------------<  147<H>  4.1   |  29  |  0.86    Ca    9.1      01 Jan 2024 07:06    TPro  6.9  /  Alb  2.8<L>  /  TBili  0.5  /  DBili  x   /  AST  25  /  ALT  46  /  AlkPhos  57  12-31    SARS-CoV-2: Detected (22 Oct 2023 10:20)  COVID-19 PCR: NotDetec (12 Jul 2023 05:15)    CAPILLARY BLOOD GLUCOSE        Flu With COVID-19 By STEVE (12.31.23 @ 18:28)   SARS-CoV-2 Result: NotDete: This Respiratory Panel uses polymerase chain reaction (PCR) to detect for   influenza A; influenza B; respiratory syncytial virus; and SARS-CoV-2.   This test was validated by [facility] and is in use under the FDA   Emergency Use Authorization (EUA) for clinical labs CLIA-certified to   perform high complexity testing. Test results should be correlated with   clinical presentation, patient history, and epidemiology.  Influenza A Result: Detected  Influenza B Result: NotDetec  Resp Syn Virus Result: NotDetecPro-Brain Natriuretic Peptide: 70 pg/mL (12.31.23 @ 18:28) Troponin I, High Sensitivity Result: 8.56:       RADIOLOGY:  < from: Xray Chest 1 View- PORTABLE-Urgent (Xray Chest 1 View- PORTABLE-Urgent .) (12.31.23 @ 19:17) >  IMPRESSION: Slight infiltrate anterior segment right upper lobe at this   time.    < end of copied text >      EKG:  < from: 12 Lead ECG (12.31.23 @ 19:37) >    Diagnosis Line Normal sinus rhythm  Right bundle branch block  Possible Lateral infarct (cited on or before 05-NOV-2023)  T wave abnormality, consider inferior ischemia  Abnormal ECG  When compared with ECG of 05-NOV-2023 18:30,  No significant change was found    < end of copied text >      ECHO:  < from: TTE Echo Complete w/ Contrast w/ Doppler (03.08.23 @ 09:47) >   Summary     The left ventricle is normal in size, wall thickness, wall motion and   contractility.   Lumason was used to better define the endocardial border.   Estimated left ventricular ejection fraction is 65-70 %.   Normal appearing left atrium.   The right atrium is not well visualized, appears normal in size.   The right ventricle is not well seen.   The right ventricular apex appears hypokinetic in some views.   The aortic valve is trileaflet with thin pliable leaflets.   Trace aortic regurgitation is present.   The mitral valve leaflets appear thin and normal.   Trace mitral regurgitation is present.   EA reversal of the mitral inflow consistent with reduced compliance of   the   left ventricle.   The tricuspid valve leaflets are thin and pliable; valve motion is   normal.   Trace tricuspid valve regurgitation is present.   Pulmonic valve not well seen.   Trace pulmonic valvular regurgitation is present.    < end of copied text >              I personally reviewed labs, imaging, ekg, orders and vitals.    Discussed case with:  [X]RN  [X]CM/TIFFANY  [X]Patient  []Family  []Specialist:        MEDICATIONS  (STANDING):  albuterol    0.083% 2.5 milliGRAM(s) Nebulizer every 6 hours  apixaban 5 milliGRAM(s) Oral every 12 hours  dexAMETHasone     Tablet 4 milliGRAM(s) Oral daily  lacosamide 200 milliGRAM(s) Oral two times a day  levETIRAcetam  Solution 1500 milliGRAM(s) Oral two times a day  methylPREDNISolone sodium succinate Injectable 40 milliGRAM(s) IV Push every 12 hours  nystatin Powder 1 Application(s) Topical three times a day  oseltamivir 75 milliGRAM(s) Oral two times a day  pantoprazole    Tablet 40 milliGRAM(s) Oral before breakfast  rOPINIRole 0.75 milliGRAM(s) Oral at bedtime  tamsulosin 0.4 milliGRAM(s) Oral at bedtime    MEDICATIONS  (PRN):     CHIEF COMPLAINT: Cough and SOB/Flu    SUBJECTIVE/SIGNIFICANT INTERVAL EVENTS/OVERNIGHT EVENTS:    1/1: Cough and SOB with mild interval improvment. Denies fever, chest pain, nausea, vomiting.     Review of Systems: 14 Point review of systems reviewed and reported as negative unless otherwise stated above    FROM H&P:  "Patient is a 62 y/o M with a PMH of glioblastoma, seizure disorder, and DVT/PE who presented to  ED on 12/31/23 from [facility] with increased shortness of breath and cough for the past 3 days prior to admission. Was found to have a saturation of 88% on RA at the facility. Also mentions he has had decreased appetite for the past few days leading up to admission.     He denies chest pain, palpitations, abdominal pain, fever, chills. sore throat, runny nose, body aches, headaches, nausea, vomiting, or diarrhea.  Was recently hospitalized in October 2023 with COVID-19.   Upon arrival to the ED, patient's vitals were /92  RR16 SpO2 95% on 2L NC and T 97.9F. He received IV Solumedrol 125 mg x1 , Duoneb and was started on Oseltamavir. "    PHYSICAL EXAM:    T(C): 37 (01-01-24 @ 15:09), Max: 37 (01-01-24 @ 15:09)  HR: 85 (01-01-24 @ 15:09) (85 - 114)  BP: 113/75 (01-01-24 @ 15:09) (113/75 - 135/104)  RR: 20 (01-01-24 @ 15:09) (16 - 20)  SpO2: 95% (01-01-24 @ 15:09) (94% - 97%)    General: AAOx3; NAD  Head: AT/NC  ENT: Moist Mucous Membranes; No Injury  Eyes: EOMI; PERRL  Neck: Non-tender; No JVD  CVS: RRR, S1&S2, No murmur, No edema  Respiratory: Mild bilateral ronchi. Normal respiratory effort  Abdomen/GI: Soft, non-tender, non-distended, no guarding, no rebound, normal bowel sounds  : No bladder distention,   Extremities: No cyanosis, No clubbing, No edema  MSK: No CVA tenderness, Normal ROM, No injury  Neuro: AAOx3, CNII-XII grossly intact, non-focal  Psych: Appropriate, Cooperative,   Skin: Clean, Dry and Intact      LABS:                          15.9   6.07  )-----------( 89       ( 01 Jan 2024 07:06 )             48.3     01-01    145  |  110<H>  |  19  ----------------------------<  147<H>  4.1   |  29  |  0.86    Ca    9.1      01 Jan 2024 07:06    TPro  6.9  /  Alb  2.8<L>  /  TBili  0.5  /  DBili  x   /  AST  25  /  ALT  46  /  AlkPhos  57  12-31    SARS-CoV-2: Detected (22 Oct 2023 10:20)  COVID-19 PCR: NotDetec (12 Jul 2023 05:15)    CAPILLARY BLOOD GLUCOSE        Flu With COVID-19 By STEVE (12.31.23 @ 18:28)   SARS-CoV-2 Result: NotDete: This Respiratory Panel uses polymerase chain reaction (PCR) to detect for   influenza A; influenza B; respiratory syncytial virus; and SARS-CoV-2.   This test was validated by [facility] and is in use under the FDA   Emergency Use Authorization (EUA) for clinical labs CLIA-certified to   perform high complexity testing. Test results should be correlated with   clinical presentation, patient history, and epidemiology.  Influenza A Result: Detected  Influenza B Result: NotDetec  Resp Syn Virus Result: NotDetecPro-Brain Natriuretic Peptide: 70 pg/mL (12.31.23 @ 18:28) Troponin I, High Sensitivity Result: 8.56:       RADIOLOGY:  < from: Xray Chest 1 View- PORTABLE-Urgent (Xray Chest 1 View- PORTABLE-Urgent .) (12.31.23 @ 19:17) >  IMPRESSION: Slight infiltrate anterior segment right upper lobe at this   time.    < end of copied text >      EKG:  < from: 12 Lead ECG (12.31.23 @ 19:37) >    Diagnosis Line Normal sinus rhythm  Right bundle branch block  Possible Lateral infarct (cited on or before 05-NOV-2023)  T wave abnormality, consider inferior ischemia  Abnormal ECG  When compared with ECG of 05-NOV-2023 18:30,  No significant change was found    < end of copied text >      ECHO:  < from: TTE Echo Complete w/ Contrast w/ Doppler (03.08.23 @ 09:47) >   Summary     The left ventricle is normal in size, wall thickness, wall motion and   contractility.   Lumason was used to better define the endocardial border.   Estimated left ventricular ejection fraction is 65-70 %.   Normal appearing left atrium.   The right atrium is not well visualized, appears normal in size.   The right ventricle is not well seen.   The right ventricular apex appears hypokinetic in some views.   The aortic valve is trileaflet with thin pliable leaflets.   Trace aortic regurgitation is present.   The mitral valve leaflets appear thin and normal.   Trace mitral regurgitation is present.   EA reversal of the mitral inflow consistent with reduced compliance of   the   left ventricle.   The tricuspid valve leaflets are thin and pliable; valve motion is   normal.   Trace tricuspid valve regurgitation is present.   Pulmonic valve not well seen.   Trace pulmonic valvular regurgitation is present.    < end of copied text >              I personally reviewed labs, imaging, ekg, orders and vitals.    Discussed case with:  [X]RN  [X]CM/TIFFANY  [X]Patient  []Family  []Specialist:        MEDICATIONS  (STANDING):  albuterol    0.083% 2.5 milliGRAM(s) Nebulizer every 6 hours  apixaban 5 milliGRAM(s) Oral every 12 hours  dexAMETHasone     Tablet 4 milliGRAM(s) Oral daily  lacosamide 200 milliGRAM(s) Oral two times a day  levETIRAcetam  Solution 1500 milliGRAM(s) Oral two times a day  methylPREDNISolone sodium succinate Injectable 40 milliGRAM(s) IV Push every 12 hours  nystatin Powder 1 Application(s) Topical three times a day  oseltamivir 75 milliGRAM(s) Oral two times a day  pantoprazole    Tablet 40 milliGRAM(s) Oral before breakfast  rOPINIRole 0.75 milliGRAM(s) Oral at bedtime  tamsulosin 0.4 milliGRAM(s) Oral at bedtime    MEDICATIONS  (PRN):

## 2024-01-01 NOTE — PATIENT PROFILE ADULT - STATED REASON FOR ADMISSION
increasing weakness    s/p fall at home. Right ankle fracture. Increased cough and short of breath

## 2024-01-01 NOTE — H&P ADULT - ASSESSMENT
Patient is a 64 y/o M with a PMH of glioblastoma, seizure disorder, and DVT/PE who presented to  ED on 12/31/23 from [facility] with increased shortness of breath and cough for the past 3 days admitted with acute hypoxemic respiratory failure secondary to influenza virus.     #Acute hypoxemic respiratory failure secondary to influenza  - Initially saturating 88% on RA   - s/p IV solumedrol, Duoneb in the ED   - CXR official read pending - does not appear to be superimposed PNA  - Currently on 4L NC; continue supplemental oxygen   - Is on Decadron (due to GBM)  - Continue Oseltamavir 75 mg BID (Day 1 of 5)     #Glioblastoma multiforme   - Diagnosed on 9/27/22   - Pathology classification: WHO 4, IDH wild type, MGMT methylated   - Underwent large resection of mass on 10/3/22 with Dr. Turpin  - Last chemotherapy session was 12/20/23  - Most recent visit with Dr. Torres on 12/20/23 - plan to continue Avastin and follow-up MRI in 2 weeks   --> Has appointment for outpatient MRI on 1/4/23 - will order while inpatient   - Continue Dexamethasone 4 mg QD   - F/u neurology consult     #Seizure Disorder   - Continue Keppra 1500 mg BID   - Continue Lacosamide 200 mg BID    # Hx of DVT/PE  - Above and below the knee DVT: Right femoral vein and left peroneal vein acute DVT on 3/7/23   - CT angio chest from 3/7/23: right lower lobe distal segmental PE  - Continue Eliquis 5 mg BID    #BPH   - Continue Tamsulosin 0.4 mg at bedtime     #Code Status   - FULL CODE Patient is a 62 y/o M with a PMH of glioblastoma, seizure disorder, and DVT/PE who presented to  ED on 12/31/23 from [facility] with increased shortness of breath and cough for the past 3 days admitted with acute hypoxemic respiratory failure secondary to influenza virus.     #Acute hypoxemic respiratory failure secondary to influenza  - Initially saturating 88% on RA   - s/p IV solumedrol, Duoneb in the ED   - CXR official read pending - does not appear to be superimposed PNA  - Currently on 4L NC; continue supplemental oxygen   - Is on Decadron (due to GBM)  - Continue Oseltamavir 75 mg BID (Day 1 of 5)     #Glioblastoma multiforme   - Diagnosed on 9/27/22   - Pathology classification: WHO 4, IDH wild type, MGMT methylated   - Underwent large resection of mass on 10/3/22 with Dr. Turpin  - Last chemotherapy session was 12/20/23  - Most recent visit with Dr. Torres on 12/20/23 - plan to continue Avastin and follow-up MRI in 2 weeks   --> Has appointment for outpatient MRI on 1/4/23 - will order while inpatient   - Continue Dexamethasone 4 mg QD   - F/u neurology consult     #Seizure Disorder   - Continue Keppra 1500 mg BID   - Continue Lacosamide 200 mg BID    # Hx of DVT/PE  - Above and below the knee DVT: Right femoral vein and left peroneal vein acute DVT on 3/7/23   - CT angio chest from 3/7/23: right lower lobe distal segmental PE  - Continue Eliquis 5 mg BID    #BPH   - Continue Tamsulosin 0.4 mg at bedtime     #Code Status   - FULL CODE Patient is a 62 y/o M with a PMH of glioblastoma, seizure disorder, and DVT/PE who presented to  ED on 12/31/23 from [facility] with increased shortness of breath and cough for the past 3 days admitted with acute hypoxemic respiratory failure secondary to influenza virus.     #Acute hypoxemic respiratory failure secondary to influenza  - Initially saturating 88% on RA   - s/p IV solumedrol, Duoneb in the ED   - CXR official read pending - does not appear to be superimposed PNA  - Currently on 4L NC; continue supplemental oxygen   - Is on Decadron (due to GBM)  - Continue Oseltamavir 75 mg BID (Day 1 of 5)   - F/u pulmonology consult     #Glioblastoma multiforme   - Diagnosed on 9/27/22   - Pathology classification: WHO 4, IDH wild type, MGMT methylated   - Underwent large resection of mass on 10/3/22 with Dr. Turpin  - Last chemotherapy session was 12/20/23  - Most recent visit with Dr. Torres on 12/20/23 - plan to continue Avastin and follow-up MRI in 2 weeks   --> Has appointment for outpatient MRI on 1/4/23  - Continue Dexamethasone 4 mg QD     #Seizure Disorder   - Continue Keppra 1500 mg BID   - Continue Lacosamide 200 mg BID    # Hx of DVT/PE  - Above and below the knee DVT: Right femoral vein and left peroneal vein acute DVT on 3/7/23   - CT angio chest from 3/7/23: right lower lobe distal segmental PE  - Continue Eliquis 5 mg BID    #BPH   - Continue Tamsulosin 0.4 mg at bedtime     #Code Status   - FULL CODE Patient is a 62 y/o M with a PMH of glioblastoma, seizure disorder, and DVT/PE who presented to  ED on 12/31/23 from [facility] with increased shortness of breath and cough for the past 3 days admitted with acute hypoxemic respiratory failure secondary to influenza virus.     #Acute hypoxemic respiratory failure secondary to influenza  - Initially saturating 88% on RA   - s/p IV solumedrol, Duoneb in the ED   - CXR official read pending - does not appear to be superimposed PNA  - Currently on 4L NC; continue supplemental oxygen   - Is on Decadron (due to GBM)  - Continue Oseltamavir 75 mg BID (Day 1 of 5)   - F/u pulmonology consult     #Glioblastoma multiforme   - Diagnosed on 9/27/22   - Pathology classification: WHO 4, IDH wild type, MGMT methylated   - Underwent large resection of mass on 10/3/22 with Dr. Turpin  - Last chemotherapy session was 12/20/23  - Most recent visit with Dr. Torres on 12/20/23 - plan to continue Avastin and follow-up MRI in 2 weeks   --> Has appointment for outpatient MRI on 1/4/23  - Continue Dexamethasone 4 mg QD     #Seizure Disorder   - Continue Keppra 1500 mg BID   - Continue Lacosamide 200 mg BID    # Hx of DVT/PE  - Above and below the knee DVT: Right femoral vein and left peroneal vein acute DVT on 3/7/23   - CT angio chest from 3/7/23: right lower lobe distal segmental PE  - Continue Eliquis 5 mg BID    #BPH   - Continue Tamsulosin 0.4 mg at bedtime     #Code Status   - FULL CODE

## 2024-01-01 NOTE — H&P ADULT - NSHPSOCIALHISTORY_GEN_ALL_CORE
Lives at [facility]   Used to smoke 1 ppd day - stopped at age 58   Not  Lives at [facility]   Used to smoke 1 ppd day - stopped at age 58   Not

## 2024-01-02 LAB
ALBUMIN SERPL ELPH-MCNC: 2.3 G/DL — LOW (ref 3.3–5)
ALBUMIN SERPL ELPH-MCNC: 2.3 G/DL — LOW (ref 3.3–5)
ALP SERPL-CCNC: 51 U/L — SIGNIFICANT CHANGE UP (ref 40–120)
ALP SERPL-CCNC: 51 U/L — SIGNIFICANT CHANGE UP (ref 40–120)
ALT FLD-CCNC: 86 U/L — HIGH (ref 12–78)
ALT FLD-CCNC: 86 U/L — HIGH (ref 12–78)
ANION GAP SERPL CALC-SCNC: 1 MMOL/L — LOW (ref 5–17)
ANION GAP SERPL CALC-SCNC: 1 MMOL/L — LOW (ref 5–17)
AST SERPL-CCNC: 56 U/L — HIGH (ref 15–37)
AST SERPL-CCNC: 56 U/L — HIGH (ref 15–37)
BASOPHILS # BLD AUTO: 0.01 K/UL — SIGNIFICANT CHANGE UP (ref 0–0.2)
BASOPHILS # BLD AUTO: 0.01 K/UL — SIGNIFICANT CHANGE UP (ref 0–0.2)
BASOPHILS NFR BLD AUTO: 0.1 % — SIGNIFICANT CHANGE UP (ref 0–2)
BASOPHILS NFR BLD AUTO: 0.1 % — SIGNIFICANT CHANGE UP (ref 0–2)
BILIRUB SERPL-MCNC: 0.6 MG/DL — SIGNIFICANT CHANGE UP (ref 0.2–1.2)
BILIRUB SERPL-MCNC: 0.6 MG/DL — SIGNIFICANT CHANGE UP (ref 0.2–1.2)
BUN SERPL-MCNC: 21 MG/DL — SIGNIFICANT CHANGE UP (ref 7–23)
BUN SERPL-MCNC: 21 MG/DL — SIGNIFICANT CHANGE UP (ref 7–23)
CALCIUM SERPL-MCNC: 9.2 MG/DL — SIGNIFICANT CHANGE UP (ref 8.5–10.1)
CALCIUM SERPL-MCNC: 9.2 MG/DL — SIGNIFICANT CHANGE UP (ref 8.5–10.1)
CHLORIDE SERPL-SCNC: 111 MMOL/L — HIGH (ref 96–108)
CHLORIDE SERPL-SCNC: 111 MMOL/L — HIGH (ref 96–108)
CO2 SERPL-SCNC: 30 MMOL/L — SIGNIFICANT CHANGE UP (ref 22–31)
CO2 SERPL-SCNC: 30 MMOL/L — SIGNIFICANT CHANGE UP (ref 22–31)
CREAT SERPL-MCNC: 0.81 MG/DL — SIGNIFICANT CHANGE UP (ref 0.5–1.3)
CREAT SERPL-MCNC: 0.81 MG/DL — SIGNIFICANT CHANGE UP (ref 0.5–1.3)
EGFR: 98 ML/MIN/1.73M2 — SIGNIFICANT CHANGE UP
EGFR: 98 ML/MIN/1.73M2 — SIGNIFICANT CHANGE UP
EOSINOPHIL # BLD AUTO: 0 K/UL — SIGNIFICANT CHANGE UP (ref 0–0.5)
EOSINOPHIL # BLD AUTO: 0 K/UL — SIGNIFICANT CHANGE UP (ref 0–0.5)
EOSINOPHIL NFR BLD AUTO: 0 % — SIGNIFICANT CHANGE UP (ref 0–6)
EOSINOPHIL NFR BLD AUTO: 0 % — SIGNIFICANT CHANGE UP (ref 0–6)
GLUCOSE SERPL-MCNC: 110 MG/DL — HIGH (ref 70–99)
GLUCOSE SERPL-MCNC: 110 MG/DL — HIGH (ref 70–99)
HCT VFR BLD CALC: 50.3 % — HIGH (ref 39–50)
HCT VFR BLD CALC: 50.3 % — HIGH (ref 39–50)
HGB BLD-MCNC: 16.1 G/DL — SIGNIFICANT CHANGE UP (ref 13–17)
HGB BLD-MCNC: 16.1 G/DL — SIGNIFICANT CHANGE UP (ref 13–17)
IMM GRANULOCYTES NFR BLD AUTO: 0.5 % — SIGNIFICANT CHANGE UP (ref 0–0.9)
IMM GRANULOCYTES NFR BLD AUTO: 0.5 % — SIGNIFICANT CHANGE UP (ref 0–0.9)
LYMPHOCYTES # BLD AUTO: 0.52 K/UL — LOW (ref 1–3.3)
LYMPHOCYTES # BLD AUTO: 0.52 K/UL — LOW (ref 1–3.3)
LYMPHOCYTES # BLD AUTO: 6.2 % — LOW (ref 13–44)
LYMPHOCYTES # BLD AUTO: 6.2 % — LOW (ref 13–44)
MAGNESIUM SERPL-MCNC: 2.4 MG/DL — SIGNIFICANT CHANGE UP (ref 1.6–2.6)
MAGNESIUM SERPL-MCNC: 2.4 MG/DL — SIGNIFICANT CHANGE UP (ref 1.6–2.6)
MCHC RBC-ENTMCNC: 28.9 PG — SIGNIFICANT CHANGE UP (ref 27–34)
MCHC RBC-ENTMCNC: 28.9 PG — SIGNIFICANT CHANGE UP (ref 27–34)
MCHC RBC-ENTMCNC: 32 GM/DL — SIGNIFICANT CHANGE UP (ref 32–36)
MCHC RBC-ENTMCNC: 32 GM/DL — SIGNIFICANT CHANGE UP (ref 32–36)
MCV RBC AUTO: 90.1 FL — SIGNIFICANT CHANGE UP (ref 80–100)
MCV RBC AUTO: 90.1 FL — SIGNIFICANT CHANGE UP (ref 80–100)
MONOCYTES # BLD AUTO: 0.28 K/UL — SIGNIFICANT CHANGE UP (ref 0–0.9)
MONOCYTES # BLD AUTO: 0.28 K/UL — SIGNIFICANT CHANGE UP (ref 0–0.9)
MONOCYTES NFR BLD AUTO: 3.3 % — SIGNIFICANT CHANGE UP (ref 2–14)
MONOCYTES NFR BLD AUTO: 3.3 % — SIGNIFICANT CHANGE UP (ref 2–14)
NEUTROPHILS # BLD AUTO: 7.51 K/UL — HIGH (ref 1.8–7.4)
NEUTROPHILS # BLD AUTO: 7.51 K/UL — HIGH (ref 1.8–7.4)
NEUTROPHILS NFR BLD AUTO: 89.9 % — HIGH (ref 43–77)
NEUTROPHILS NFR BLD AUTO: 89.9 % — HIGH (ref 43–77)
PHOSPHATE SERPL-MCNC: 2.8 MG/DL — SIGNIFICANT CHANGE UP (ref 2.5–4.5)
PHOSPHATE SERPL-MCNC: 2.8 MG/DL — SIGNIFICANT CHANGE UP (ref 2.5–4.5)
PLATELET # BLD AUTO: 95 K/UL — LOW (ref 150–400)
PLATELET # BLD AUTO: 95 K/UL — LOW (ref 150–400)
POTASSIUM SERPL-MCNC: 4.7 MMOL/L — SIGNIFICANT CHANGE UP (ref 3.5–5.3)
POTASSIUM SERPL-MCNC: 4.7 MMOL/L — SIGNIFICANT CHANGE UP (ref 3.5–5.3)
POTASSIUM SERPL-SCNC: 4.7 MMOL/L — SIGNIFICANT CHANGE UP (ref 3.5–5.3)
POTASSIUM SERPL-SCNC: 4.7 MMOL/L — SIGNIFICANT CHANGE UP (ref 3.5–5.3)
PROCALCITONIN SERPL-MCNC: 0.13 NG/ML — HIGH (ref 0.02–0.1)
PROCALCITONIN SERPL-MCNC: 0.13 NG/ML — HIGH (ref 0.02–0.1)
PROT SERPL-MCNC: 6.6 GM/DL — SIGNIFICANT CHANGE UP (ref 6–8.3)
PROT SERPL-MCNC: 6.6 GM/DL — SIGNIFICANT CHANGE UP (ref 6–8.3)
RBC # BLD: 5.58 M/UL — SIGNIFICANT CHANGE UP (ref 4.2–5.8)
RBC # BLD: 5.58 M/UL — SIGNIFICANT CHANGE UP (ref 4.2–5.8)
RBC # FLD: 16.2 % — HIGH (ref 10.3–14.5)
RBC # FLD: 16.2 % — HIGH (ref 10.3–14.5)
SODIUM SERPL-SCNC: 142 MMOL/L — SIGNIFICANT CHANGE UP (ref 135–145)
SODIUM SERPL-SCNC: 142 MMOL/L — SIGNIFICANT CHANGE UP (ref 135–145)
WBC # BLD: 8.36 K/UL — SIGNIFICANT CHANGE UP (ref 3.8–10.5)
WBC # BLD: 8.36 K/UL — SIGNIFICANT CHANGE UP (ref 3.8–10.5)
WBC # FLD AUTO: 8.36 K/UL — SIGNIFICANT CHANGE UP (ref 3.8–10.5)
WBC # FLD AUTO: 8.36 K/UL — SIGNIFICANT CHANGE UP (ref 3.8–10.5)

## 2024-01-02 PROCEDURE — 99232 SBSQ HOSP IP/OBS MODERATE 35: CPT

## 2024-01-02 RX ADMIN — Medication 40 MILLIGRAM(S): at 10:49

## 2024-01-02 RX ADMIN — ALBUTEROL 2.5 MILLIGRAM(S): 90 AEROSOL, METERED ORAL at 21:54

## 2024-01-02 RX ADMIN — PANTOPRAZOLE SODIUM 40 MILLIGRAM(S): 20 TABLET, DELAYED RELEASE ORAL at 10:49

## 2024-01-02 RX ADMIN — NYSTATIN CREAM 1 APPLICATION(S): 100000 CREAM TOPICAL at 22:59

## 2024-01-02 RX ADMIN — Medication 4 MILLIGRAM(S): at 10:49

## 2024-01-02 RX ADMIN — Medication 40 MILLIGRAM(S): at 22:59

## 2024-01-02 RX ADMIN — LACOSAMIDE 200 MILLIGRAM(S): 50 TABLET ORAL at 22:59

## 2024-01-02 RX ADMIN — Medication 75 MILLIGRAM(S): at 22:58

## 2024-01-02 RX ADMIN — APIXABAN 5 MILLIGRAM(S): 2.5 TABLET, FILM COATED ORAL at 22:59

## 2024-01-02 RX ADMIN — Medication 75 MILLIGRAM(S): at 10:49

## 2024-01-02 RX ADMIN — NYSTATIN CREAM 1 APPLICATION(S): 100000 CREAM TOPICAL at 10:00

## 2024-01-02 RX ADMIN — LACOSAMIDE 200 MILLIGRAM(S): 50 TABLET ORAL at 10:50

## 2024-01-02 RX ADMIN — ALBUTEROL 2.5 MILLIGRAM(S): 90 AEROSOL, METERED ORAL at 08:39

## 2024-01-02 RX ADMIN — ROPINIROLE 0.75 MILLIGRAM(S): 8 TABLET, FILM COATED, EXTENDED RELEASE ORAL at 22:58

## 2024-01-02 RX ADMIN — LEVETIRACETAM 1500 MILLIGRAM(S): 250 TABLET, FILM COATED ORAL at 10:49

## 2024-01-02 RX ADMIN — NYSTATIN CREAM 1 APPLICATION(S): 100000 CREAM TOPICAL at 15:53

## 2024-01-02 RX ADMIN — LEVETIRACETAM 1500 MILLIGRAM(S): 250 TABLET, FILM COATED ORAL at 22:59

## 2024-01-02 RX ADMIN — APIXABAN 5 MILLIGRAM(S): 2.5 TABLET, FILM COATED ORAL at 10:50

## 2024-01-02 RX ADMIN — TAMSULOSIN HYDROCHLORIDE 0.4 MILLIGRAM(S): 0.4 CAPSULE ORAL at 22:59

## 2024-01-02 RX ADMIN — ALBUTEROL 2.5 MILLIGRAM(S): 90 AEROSOL, METERED ORAL at 15:21

## 2024-01-02 NOTE — PROGRESS NOTE ADULT - ASSESSMENT
Patient is a 64 y/o M with a PMH of glioblastoma, seizure disorder, and DVT/PE who presented to  ED on 12/31/23 from [facility] with increased shortness of breath and cough for the past 3 days admitted with acute hypoxemic respiratory failure secondary to influenza virus.     #Acute hypoxemic respiratory failure secondary to influenza  - Initially saturating 88% on RA   - s/p IV solumedrol, Duoneb in the ED   - CXR official read pending - does not appear to be superimposed PNA  - Currently on 4L NC; continue supplemental oxygen   - Is on Decadron (due to GBM)  - Continue Oseltamavir 75 mg BID (Day 1 of 5)   - F/u pulmonology consult     #Glioblastoma multiforme   - Diagnosed on 9/27/22   - Pathology classification: WHO 4, IDH wild type, MGMT methylated   - Underwent large resection of mass on 10/3/22 with Dr. Turpin  - Last chemotherapy session was 12/20/23  - Most recent visit with Dr. Torres on 12/20/23 - plan to continue Avastin and follow-up MRI in 2 weeks   --> Has appointment for outpatient MRI on 1/4/23  - Continue Dexamethasone 4 mg QD     #Seizure Disorder   - Continue Keppra 1500 mg BID   - Continue Lacosamide 200 mg BID    # Hx of DVT/PE  - Above and below the knee DVT: Right femoral vein and left peroneal vein acute DVT on 3/7/23   - CT angio chest from 3/7/23: right lower lobe distal segmental PE  - Continue Eliquis 5 mg BID    #BPH   - Continue Tamsulosin 0.4 mg at bedtime     #Code Status   - FULL CODE Patient is a 62 y/o M with a PMH of glioblastoma, seizure disorder, and DVT/PE who presented to  ED on 12/31/23 from [facility] with increased shortness of breath and cough for the past 3 days admitted with acute hypoxemic respiratory failure secondary to influenza virus.     #Acute hypoxemic respiratory failure secondary to influenza  - Initially saturating 88% on RA   - s/p IV solumedrol, Duoneb in the ED   - CXR official read pending - does not appear to be superimposed PNA  - Currently on 4L NC; continue supplemental oxygen   - Is on Decadron (due to GBM)  - Continue Oseltamavir 75 mg BID (Day 1 of 5)   - F/u pulmonology consult     #Glioblastoma multiforme   - Diagnosed on 9/27/22   - Pathology classification: WHO 4, IDH wild type, MGMT methylated   - Underwent large resection of mass on 10/3/22 with Dr. Turpin  - Last chemotherapy session was 12/20/23  - Most recent visit with Dr. Torres on 12/20/23 - plan to continue Avastin and follow-up MRI in 2 weeks   --> Has appointment for outpatient MRI on 1/4/23  - Continue Dexamethasone 4 mg QD     #Seizure Disorder   - Continue Keppra 1500 mg BID   - Continue Lacosamide 200 mg BID    # Hx of DVT/PE  - Above and below the knee DVT: Right femoral vein and left peroneal vein acute DVT on 3/7/23   - CT angio chest from 3/7/23: right lower lobe distal segmental PE  - Continue Eliquis 5 mg BID    #BPH   - Continue Tamsulosin 0.4 mg at bedtime     #Code Status   - FULL CODE

## 2024-01-02 NOTE — CHART NOTE - NSCHARTNOTEFT_GEN_A_CORE
Case discussed in IDRs. SW consulted for +dep screen, however RN noting pt to be confused, attempting to jumping out of bed. Consult closed at this time, SW will attempt to reassess if pt's mentation improves. Pt known to this writer from previous admissions. Pt's mentation has been waxing and waning, unable to assess previous admission as well.

## 2024-01-02 NOTE — PROGRESS NOTE ADULT - SUBJECTIVE AND OBJECTIVE BOX
CHIEF COMPLAINT: Cough and SOB/Flu    SUBJECTIVE/SIGNIFICANT INTERVAL EVENTS/OVERNIGHT EVENTS:    1/1: Cough and SOB with mild interval improvment. Denies fever, chest pain, nausea, vomiting.     1/2: Cough and SOB imiproving. Patient with waxing and waning mentation but easily re-oriented. Easily forgetful. Vitals stable. No fevers, chest pain, nausea, vomiting, SOB at rest.     Review of Systems: 14 Point review of systems reviewed and reported as negative unless otherwise stated above    FROM H&P:  "Patient is a 64 y/o M with a PMH of glioblastoma, seizure disorder, and DVT/PE who presented to  ED on 12/31/23 from [facility] with increased shortness of breath and cough for the past 3 days prior to admission. Was found to have a saturation of 88% on RA at the facility. Also mentions he has had decreased appetite for the past few days leading up to admission.     He denies chest pain, palpitations, abdominal pain, fever, chills. sore throat, runny nose, body aches, headaches, nausea, vomiting, or diarrhea.  Was recently hospitalized in October 2023 with COVID-19.   Upon arrival to the ED, patient's vitals were /92  RR16 SpO2 95% on 2L NC and T 97.9F. He received IV Solumedrol 125 mg x1 , Duoneb and was started on Oseltamavir. "    PHYSICAL EXAM:    T(C): 36.9 (01-02-24 @ 16:07), Max: 37.2 (01-02-24 @ 09:07)  HR: 94 (01-02-24 @ 16:07) (73 - 94)  BP: 145/87 (01-02-24 @ 16:07) (114/67 - 145/87)  RR: 18 (01-02-24 @ 16:07) (18 - 20)  SpO2: 91% (01-02-24 @ 16:07) (91% - 98%)  General: AAOx3; NAD  Head: AT/NC  ENT: Moist Mucous Membranes; No Injury  Eyes: EOMI; PERRL  Neck: Non-tender; No JVD  CVS: RRR, S1&S2, No murmur, No edema  Respiratory: Mild bilateral ronchi. Normal respiratory effort  Abdomen/GI: Soft, non-tender, non-distended, no guarding, no rebound, normal bowel sounds  : No bladder distention,   Extremities: No cyanosis, No clubbing, No edema  MSK: No CVA tenderness, Normal ROM, No injury  Neuro: AAOx3, CNII-XII grossly intact, non-focal  Psych: Appropriate, Cooperative,   Skin: Clean, Dry and Intact      LABS:                          16.1   8.36  )-----------( 95       ( 02 Jan 2024 08:49 )             50.3     01-02    142  |  111<H>  |  21  ----------------------------<  110<H>  4.7   |  30  |  0.81    Ca    9.2      02 Jan 2024 08:49  Phos  2.8     01-02  Mg     2.4     01-02    TPro  6.6  /  Alb  2.3<L>  /  TBili  0.6  /  DBili  x   /  AST  56<H>  /  ALT  86<H>  /  AlkPhos  51  01-02    SARS-CoV-2: Detected (22 Oct 2023 10:20)  COVID-19 PCR: NotDetec (12 Jul 2023 05:15)    CAPILLARY BLOOD GLUCOSE                                15.9   6.07  )-----------( 89       ( 01 Jan 2024 07:06 )             48.3     01-01    145  |  110<H>  |  19  ----------------------------<  147<H>  4.1   |  29  |  0.86    Ca    9.1      01 Jan 2024 07:06    TPro  6.9  /  Alb  2.8<L>  /  TBili  0.5  /  DBili  x   /  AST  25  /  ALT  46  /  AlkPhos  57  12-31    SARS-CoV-2: Detected (22 Oct 2023 10:20)  COVID-19 PCR: NotDetec (12 Jul 2023 05:15)    CAPILLARY BLOOD GLUCOSE        Flu With COVID-19 By STEVE (12.31.23 @ 18:28)   SARS-CoV-2 Result: NotDetec: This Respiratory Panel uses polymerase chain reaction (PCR) to detect for   influenza A; influenza B; respiratory syncytial virus; and SARS-CoV-2.   This test was validated by [employer] and is in use under the FDA   Emergency Use Authorization (EUA) for clinical labs CLIA-certified to   perform high complexity testing. Test results should be correlated with   clinical presentation, patient history, and epidemiology.  Influenza A Result: Detected  Influenza B Result: NotDetec  Resp Syn Virus Result: NotDetecPro-Brain Natriuretic Peptide: 70 pg/mL (12.31.23 @ 18:28) Troponin I, High Sensitivity Result: 8.56:       RADIOLOGY:  < from: Xray Chest 1 View- PORTABLE-Urgent (Xray Chest 1 View- PORTABLE-Urgent .) (12.31.23 @ 19:17) >  IMPRESSION: Slight infiltrate anterior segment right upper lobe at this   time.    < end of copied text >      EKG:  < from: 12 Lead ECG (12.31.23 @ 19:37) >    Diagnosis Line Normal sinus rhythm  Right bundle branch block  Possible Lateral infarct (cited on or before 05-NOV-2023)  T wave abnormality, consider inferior ischemia  Abnormal ECG  When compared with ECG of 05-NOV-2023 18:30,  No significant change was found    < end of copied text >      ECHO:  < from: TTE Echo Complete w/ Contrast w/ Doppler (03.08.23 @ 09:47) >   Summary     The left ventricle is normal in size, wall thickness, wall motion and   contractility.   Lumason was used to better define the endocardial border.   Estimated left ventricular ejection fraction is 65-70 %.   Normal appearing left atrium.   The right atrium is not well visualized, appears normal in size.   The right ventricle is not well seen.   The right ventricular apex appears hypokinetic in some views.   The aortic valve is trileaflet with thin pliable leaflets.   Trace aortic regurgitation is present.   The mitral valve leaflets appear thin and normal.   Trace mitral regurgitation is present.   EA reversal of the mitral inflow consistent with reduced compliance of   the   left ventricle.   The tricuspid valve leaflets are thin and pliable; valve motion is   normal.   Trace tricuspid valve regurgitation is present.   Pulmonic valve not well seen.   Trace pulmonic valvular regurgitation is present.    < end of copied text >              I personally reviewed labs, imaging, ekg, orders and vitals.    Discussed case with:  [X]RN  [X]CM/SW  [X]Patient  []Family  []Specialist:        MEDICATIONS  (STANDING):  albuterol    0.083% 2.5 milliGRAM(s) Nebulizer every 6 hours  apixaban 5 milliGRAM(s) Oral every 12 hours  dexAMETHasone     Tablet 4 milliGRAM(s) Oral daily  lacosamide 200 milliGRAM(s) Oral two times a day  levETIRAcetam  Solution 1500 milliGRAM(s) Oral two times a day  methylPREDNISolone sodium succinate Injectable 40 milliGRAM(s) IV Push every 12 hours  nystatin Powder 1 Application(s) Topical three times a day  oseltamivir 75 milliGRAM(s) Oral two times a day  pantoprazole    Tablet 40 milliGRAM(s) Oral before breakfast  rOPINIRole 0.75 milliGRAM(s) Oral at bedtime  tamsulosin 0.4 milliGRAM(s) Oral at bedtime    MEDICATIONS  (PRN):     CHIEF COMPLAINT: Cough and SOB/Flu    SUBJECTIVE/SIGNIFICANT INTERVAL EVENTS/OVERNIGHT EVENTS:    1/1: Cough and SOB with mild interval improvment. Denies fever, chest pain, nausea, vomiting.     1/2: Cough and SOB imiproving. Patient with waxing and waning mentation but easily re-oriented. Easily forgetful. Vitals stable. No fevers, chest pain, nausea, vomiting, SOB at rest.     Review of Systems: 14 Point review of systems reviewed and reported as negative unless otherwise stated above    FROM H&P:  "Patient is a 62 y/o M with a PMH of glioblastoma, seizure disorder, and DVT/PE who presented to  ED on 12/31/23 from [facility] with increased shortness of breath and cough for the past 3 days prior to admission. Was found to have a saturation of 88% on RA at the facility. Also mentions he has had decreased appetite for the past few days leading up to admission.     He denies chest pain, palpitations, abdominal pain, fever, chills. sore throat, runny nose, body aches, headaches, nausea, vomiting, or diarrhea.  Was recently hospitalized in October 2023 with COVID-19.   Upon arrival to the ED, patient's vitals were /92  RR16 SpO2 95% on 2L NC and T 97.9F. He received IV Solumedrol 125 mg x1 , Duoneb and was started on Oseltamavir. "    PHYSICAL EXAM:    T(C): 36.9 (01-02-24 @ 16:07), Max: 37.2 (01-02-24 @ 09:07)  HR: 94 (01-02-24 @ 16:07) (73 - 94)  BP: 145/87 (01-02-24 @ 16:07) (114/67 - 145/87)  RR: 18 (01-02-24 @ 16:07) (18 - 20)  SpO2: 91% (01-02-24 @ 16:07) (91% - 98%)  General: AAOx3; NAD  Head: AT/NC  ENT: Moist Mucous Membranes; No Injury  Eyes: EOMI; PERRL  Neck: Non-tender; No JVD  CVS: RRR, S1&S2, No murmur, No edema  Respiratory: Mild bilateral ronchi. Normal respiratory effort  Abdomen/GI: Soft, non-tender, non-distended, no guarding, no rebound, normal bowel sounds  : No bladder distention,   Extremities: No cyanosis, No clubbing, No edema  MSK: No CVA tenderness, Normal ROM, No injury  Neuro: AAOx3, CNII-XII grossly intact, non-focal  Psych: Appropriate, Cooperative,   Skin: Clean, Dry and Intact      LABS:                          16.1   8.36  )-----------( 95       ( 02 Jan 2024 08:49 )             50.3     01-02    142  |  111<H>  |  21  ----------------------------<  110<H>  4.7   |  30  |  0.81    Ca    9.2      02 Jan 2024 08:49  Phos  2.8     01-02  Mg     2.4     01-02    TPro  6.6  /  Alb  2.3<L>  /  TBili  0.6  /  DBili  x   /  AST  56<H>  /  ALT  86<H>  /  AlkPhos  51  01-02    SARS-CoV-2: Detected (22 Oct 2023 10:20)  COVID-19 PCR: NotDetec (12 Jul 2023 05:15)    CAPILLARY BLOOD GLUCOSE                                15.9   6.07  )-----------( 89       ( 01 Jan 2024 07:06 )             48.3     01-01    145  |  110<H>  |  19  ----------------------------<  147<H>  4.1   |  29  |  0.86    Ca    9.1      01 Jan 2024 07:06    TPro  6.9  /  Alb  2.8<L>  /  TBili  0.5  /  DBili  x   /  AST  25  /  ALT  46  /  AlkPhos  57  12-31    SARS-CoV-2: Detected (22 Oct 2023 10:20)  COVID-19 PCR: NotDetec (12 Jul 2023 05:15)    CAPILLARY BLOOD GLUCOSE        Flu With COVID-19 By STEVE (12.31.23 @ 18:28)   SARS-CoV-2 Result: NotDetec: This Respiratory Panel uses polymerase chain reaction (PCR) to detect for   influenza A; influenza B; respiratory syncytial virus; and SARS-CoV-2.   This test was validated by [employer] and is in use under the FDA   Emergency Use Authorization (EUA) for clinical labs CLIA-certified to   perform high complexity testing. Test results should be correlated with   clinical presentation, patient history, and epidemiology.  Influenza A Result: Detected  Influenza B Result: NotDetec  Resp Syn Virus Result: NotDetecPro-Brain Natriuretic Peptide: 70 pg/mL (12.31.23 @ 18:28) Troponin I, High Sensitivity Result: 8.56:       RADIOLOGY:  < from: Xray Chest 1 View- PORTABLE-Urgent (Xray Chest 1 View- PORTABLE-Urgent .) (12.31.23 @ 19:17) >  IMPRESSION: Slight infiltrate anterior segment right upper lobe at this   time.    < end of copied text >      EKG:  < from: 12 Lead ECG (12.31.23 @ 19:37) >    Diagnosis Line Normal sinus rhythm  Right bundle branch block  Possible Lateral infarct (cited on or before 05-NOV-2023)  T wave abnormality, consider inferior ischemia  Abnormal ECG  When compared with ECG of 05-NOV-2023 18:30,  No significant change was found    < end of copied text >      ECHO:  < from: TTE Echo Complete w/ Contrast w/ Doppler (03.08.23 @ 09:47) >   Summary     The left ventricle is normal in size, wall thickness, wall motion and   contractility.   Lumason was used to better define the endocardial border.   Estimated left ventricular ejection fraction is 65-70 %.   Normal appearing left atrium.   The right atrium is not well visualized, appears normal in size.   The right ventricle is not well seen.   The right ventricular apex appears hypokinetic in some views.   The aortic valve is trileaflet with thin pliable leaflets.   Trace aortic regurgitation is present.   The mitral valve leaflets appear thin and normal.   Trace mitral regurgitation is present.   EA reversal of the mitral inflow consistent with reduced compliance of   the   left ventricle.   The tricuspid valve leaflets are thin and pliable; valve motion is   normal.   Trace tricuspid valve regurgitation is present.   Pulmonic valve not well seen.   Trace pulmonic valvular regurgitation is present.    < end of copied text >              I personally reviewed labs, imaging, ekg, orders and vitals.    Discussed case with:  [X]RN  [X]CM/SW  [X]Patient  []Family  []Specialist:        MEDICATIONS  (STANDING):  albuterol    0.083% 2.5 milliGRAM(s) Nebulizer every 6 hours  apixaban 5 milliGRAM(s) Oral every 12 hours  dexAMETHasone     Tablet 4 milliGRAM(s) Oral daily  lacosamide 200 milliGRAM(s) Oral two times a day  levETIRAcetam  Solution 1500 milliGRAM(s) Oral two times a day  methylPREDNISolone sodium succinate Injectable 40 milliGRAM(s) IV Push every 12 hours  nystatin Powder 1 Application(s) Topical three times a day  oseltamivir 75 milliGRAM(s) Oral two times a day  pantoprazole    Tablet 40 milliGRAM(s) Oral before breakfast  rOPINIRole 0.75 milliGRAM(s) Oral at bedtime  tamsulosin 0.4 milliGRAM(s) Oral at bedtime    MEDICATIONS  (PRN):

## 2024-01-03 PROCEDURE — 99232 SBSQ HOSP IP/OBS MODERATE 35: CPT

## 2024-01-03 RX ORDER — BUDESONIDE, MICRONIZED 100 %
0.5 POWDER (GRAM) MISCELLANEOUS
Refills: 0 | Status: DISCONTINUED | OUTPATIENT
Start: 2024-01-03 | End: 2024-01-08

## 2024-01-03 RX ORDER — GUAIFENESIN/DEXTROMETHORPHAN 600MG-30MG
10 TABLET, EXTENDED RELEASE 12 HR ORAL EVERY 6 HOURS
Refills: 0 | Status: DISCONTINUED | OUTPATIENT
Start: 2024-01-03 | End: 2024-01-08

## 2024-01-03 RX ORDER — ALPRAZOLAM 0.25 MG
0.25 TABLET ORAL AT BEDTIME
Refills: 0 | Status: DISCONTINUED | OUTPATIENT
Start: 2024-01-03 | End: 2024-01-03

## 2024-01-03 RX ADMIN — Medication 0.5 MILLIGRAM(S): at 20:23

## 2024-01-03 RX ADMIN — LEVETIRACETAM 1500 MILLIGRAM(S): 250 TABLET, FILM COATED ORAL at 22:35

## 2024-01-03 RX ADMIN — NYSTATIN CREAM 1 APPLICATION(S): 100000 CREAM TOPICAL at 15:19

## 2024-01-03 RX ADMIN — NYSTATIN CREAM 1 APPLICATION(S): 100000 CREAM TOPICAL at 23:19

## 2024-01-03 RX ADMIN — LACOSAMIDE 200 MILLIGRAM(S): 50 TABLET ORAL at 10:00

## 2024-01-03 RX ADMIN — APIXABAN 5 MILLIGRAM(S): 2.5 TABLET, FILM COATED ORAL at 22:35

## 2024-01-03 RX ADMIN — ALBUTEROL 2.5 MILLIGRAM(S): 90 AEROSOL, METERED ORAL at 14:45

## 2024-01-03 RX ADMIN — Medication 75 MILLIGRAM(S): at 09:57

## 2024-01-03 RX ADMIN — LEVETIRACETAM 1500 MILLIGRAM(S): 250 TABLET, FILM COATED ORAL at 09:57

## 2024-01-03 RX ADMIN — TAMSULOSIN HYDROCHLORIDE 0.4 MILLIGRAM(S): 0.4 CAPSULE ORAL at 22:35

## 2024-01-03 RX ADMIN — ROPINIROLE 0.75 MILLIGRAM(S): 8 TABLET, FILM COATED, EXTENDED RELEASE ORAL at 22:36

## 2024-01-03 RX ADMIN — Medication 40 MILLIGRAM(S): at 09:56

## 2024-01-03 RX ADMIN — ALBUTEROL 2.5 MILLIGRAM(S): 90 AEROSOL, METERED ORAL at 10:23

## 2024-01-03 RX ADMIN — LACOSAMIDE 200 MILLIGRAM(S): 50 TABLET ORAL at 23:19

## 2024-01-03 RX ADMIN — NYSTATIN CREAM 1 APPLICATION(S): 100000 CREAM TOPICAL at 06:13

## 2024-01-03 RX ADMIN — ALBUTEROL 2.5 MILLIGRAM(S): 90 AEROSOL, METERED ORAL at 20:23

## 2024-01-03 RX ADMIN — APIXABAN 5 MILLIGRAM(S): 2.5 TABLET, FILM COATED ORAL at 09:57

## 2024-01-03 RX ADMIN — PANTOPRAZOLE SODIUM 40 MILLIGRAM(S): 20 TABLET, DELAYED RELEASE ORAL at 06:13

## 2024-01-03 RX ADMIN — Medication 75 MILLIGRAM(S): at 22:35

## 2024-01-03 RX ADMIN — Medication 4 MILLIGRAM(S): at 09:57

## 2024-01-03 RX ADMIN — Medication 0.25 MILLIGRAM(S): at 22:41

## 2024-01-03 NOTE — PROGRESS NOTE ADULT - ASSESSMENT
Patient is a 64 y/o M with a PMH of glioblastoma, seizure disorder, and DVT/PE who presented to  ED on 12/31/23 from [facility] with increased shortness of breath and cough for the past 3 days admitted with acute hypoxemic respiratory failure secondary to influenza virus.     #Acute hypoxemic respiratory failure secondary to influenza  - Initially saturating 88% on RA   - s/p IV solumedrol, Duoneb in the ED   - CXR official read pending - does not appear to be superimposed PNA  - Currently on 4L NC; continue supplemental oxygen   - Is on Decadron (due to GBM)  - Continue Oseltamavir 75 mg BID (Day 1 of 5)   - F/u pulmonology consult     #Glioblastoma multiforme   - Diagnosed on 9/27/22   - Pathology classification: WHO 4, IDH wild type, MGMT methylated   - Underwent large resection of mass on 10/3/22 with Dr. Turpin  - Last chemotherapy session was 12/20/23  - Most recent visit with Dr. Torres on 12/20/23 - plan to continue Avastin and follow-up MRI in 2 weeks   --> Has appointment for outpatient MRI on 1/4/23  - Continue Dexamethasone 4 mg QD     #Seizure Disorder   - Continue Keppra 1500 mg BID   - Continue Lacosamide 200 mg BID    # Hx of DVT/PE  - Above and below the knee DVT: Right femoral vein and left peroneal vein acute DVT on 3/7/23   - CT angio chest from 3/7/23: right lower lobe distal segmental PE  - Continue Eliquis 5 mg BID    #BPH   - Continue Tamsulosin 0.4 mg at bedtime     #Code Status   - FULL CODE Patient is a 64 y/o M with a PMH of glioblastoma, seizure disorder, and DVT/PE who presented to  ED on 12/31/23 from [facility] with increased shortness of breath and cough for the past 3 days admitted with acute hypoxemic respiratory failure secondary to influenza virus.     #Acute hypoxemic respiratory failure secondary to influenza  - Initially saturating 88% on RA   - s/p IV solumedrol, Duoneb in the ED   - CXR official read pending - does not appear to be superimposed PNA  - Currently on 4L NC; continue supplemental oxygen   - Is on Decadron (due to GBM)  - Continue Oseltamavir 75 mg BID (Day 1 of 5)   - F/u pulmonology consult     #Glioblastoma multiforme   - Diagnosed on 9/27/22   - Pathology classification: WHO 4, IDH wild type, MGMT methylated   - Underwent large resection of mass on 10/3/22 with Dr. Turpin  - Last chemotherapy session was 12/20/23  - Most recent visit with Dr. Torres on 12/20/23 - plan to continue Avastin and follow-up MRI in 2 weeks   --> Has appointment for outpatient MRI on 1/4/23  - Continue Dexamethasone 4 mg QD     #Seizure Disorder   - Continue Keppra 1500 mg BID   - Continue Lacosamide 200 mg BID    # Hx of DVT/PE  - Above and below the knee DVT: Right femoral vein and left peroneal vein acute DVT on 3/7/23   - CT angio chest from 3/7/23: right lower lobe distal segmental PE  - Continue Eliquis 5 mg BID    #BPH   - Continue Tamsulosin 0.4 mg at bedtime     #Code Status   - FULL CODE

## 2024-01-03 NOTE — PROGRESS NOTE ADULT - SUBJECTIVE AND OBJECTIVE BOX
CHIEF COMPLAINT: Cough and SOB/Flu    SUBJECTIVE/SIGNIFICANT INTERVAL EVENTS/OVERNIGHT EVENTS:    1/1: Cough and SOB with mild interval improvment. Denies fever, chest pain, nausea, vomiting.     1/2: Cough and SOB imiproving. Patient with waxing and waning mentation but easily re-oriented. Easily forgetful. Vitals stable. No fevers, chest pain, nausea, vomiting, SOB at rest.     1/3: Still on O2: mild interval improvement. Afebrile. Add budesonide inhaled. CXRAY for re-evaluation in am.     Review of Systems: 14 Point review of systems reviewed and reported as negative unless otherwise stated above    FROM H&P:  "Patient is a 62 y/o M with a PMH of glioblastoma, seizure disorder, and DVT/PE who presented to  ED on 12/31/23 from [facility] with increased shortness of breath and cough for the past 3 days prior to admission. Was found to have a saturation of 88% on RA at the facility. Also mentions he has had decreased appetite for the past few days leading up to admission.     He denies chest pain, palpitations, abdominal pain, fever, chills. sore throat, runny nose, body aches, headaches, nausea, vomiting, or diarrhea.  Was recently hospitalized in October 2023 with COVID-19.   Upon arrival to the ED, patient's vitals were /92  RR16 SpO2 95% on 2L NC and T 97.9F. He received IV Solumedrol 125 mg x1 , Duoneb and was started on Oseltamavir. "    PHYSICAL EXAM:    T(C): 37.1 (01-03-24 @ 15:09), Max: 37.1 (01-03-24 @ 15:09)  HR: 77 (01-03-24 @ 15:09) (71 - 91)  BP: 115/69 (01-03-24 @ 15:09) (107/69 - 127/66)  RR: 18 (01-03-24 @ 15:09) (18 - 20)  SpO2: 94% (01-03-24 @ 15:09) (91% - 94%)  General: AAOx3; NAD  Head: AT/NC  ENT: Moist Mucous Membranes; No Injury  Eyes: EOMI; PERRL  Neck: Non-tender; No JVD  CVS: RRR, S1&S2, No murmur, No edema  Respiratory: Mild bilateral ronchi. Normal respiratory effort  Abdomen/GI: Soft, non-tender, non-distended, no guarding, no rebound, normal bowel sounds  : No bladder distention,   Extremities: No cyanosis, No clubbing, No edema  MSK: No CVA tenderness, Normal ROM, No injury  Neuro: AAOx3, CNII-XII grossly intact, non-focal  Psych: Appropriate, Cooperative,   Skin: Clean, Dry and Intact      LABS:                          16.1   8.36  )-----------( 95       ( 02 Jan 2024 08:49 )             50.3     01-02    142  |  111<H>  |  21  ----------------------------<  110<H>  4.7   |  30  |  0.81    Ca    9.2      02 Jan 2024 08:49  Phos  2.8     01-02  Mg     2.4     01-02    TPro  6.6  /  Alb  2.3<L>  /  TBili  0.6  /  DBili  x   /  AST  56<H>  /  ALT  86<H>  /  AlkPhos  51  01-02    SARS-CoV-2: Detected (22 Oct 2023 10:20)  COVID-19 PCR: NotDetec (12 Jul 2023 05:15)    CAPILLARY BLOOD GLUCOSE                                15.9   6.07  )-----------( 89       ( 01 Jan 2024 07:06 )             48.3     01-01    145  |  110<H>  |  19  ----------------------------<  147<H>  4.1   |  29  |  0.86    Ca    9.1      01 Jan 2024 07:06    TPro  6.9  /  Alb  2.8<L>  /  TBili  0.5  /  DBili  x   /  AST  25  /  ALT  46  /  AlkPhos  57  12-31    SARS-CoV-2: Detected (22 Oct 2023 10:20)  COVID-19 PCR: NotDetec (12 Jul 2023 05:15)    CAPILLARY BLOOD GLUCOSE        Flu With COVID-19 By STEVE (12.31.23 @ 18:28)   SARS-CoV-2 Result: NotDete: This Respiratory Panel uses polymerase chain reaction (PCR) to detect for   influenza A; influenza B; respiratory syncytial virus; and SARS-CoV-2.   This test was validated by [employer] and is in use under the FDA   Emergency Use Authorization (EUA) for clinical labs CLIA-certified to   perform high complexity testing. Test results should be correlated with   clinical presentation, patient history, and epidemiology.  Influenza A Result: Detected  Influenza B Result: NotDetec  Resp Syn Virus Result: NotDetecPro-Brain Natriuretic Peptide: 70 pg/mL (12.31.23 @ 18:28) Troponin I, High Sensitivity Result: 8.56:       RADIOLOGY:  < from: Xray Chest 1 View- PORTABLE-Urgent (Xray Chest 1 View- PORTABLE-Urgent .) (12.31.23 @ 19:17) >  IMPRESSION: Slight infiltrate anterior segment right upper lobe at this   time.    < end of copied text >      EKG:  < from: 12 Lead ECG (12.31.23 @ 19:37) >    Diagnosis Line Normal sinus rhythm  Right bundle branch block  Possible Lateral infarct (cited on or before 05-NOV-2023)  T wave abnormality, consider inferior ischemia  Abnormal ECG  When compared with ECG of 05-NOV-2023 18:30,  No significant change was found    < end of copied text >      ECHO:  < from: TTE Echo Complete w/ Contrast w/ Doppler (03.08.23 @ 09:47) >   Summary     The left ventricle is normal in size, wall thickness, wall motion and   contractility.   Lumason was used to better define the endocardial border.   Estimated left ventricular ejection fraction is 65-70 %.   Normal appearing left atrium.   The right atrium is not well visualized, appears normal in size.   The right ventricle is not well seen.   The right ventricular apex appears hypokinetic in some views.   The aortic valve is trileaflet with thin pliable leaflets.   Trace aortic regurgitation is present.   The mitral valve leaflets appear thin and normal.   Trace mitral regurgitation is present.   EA reversal of the mitral inflow consistent with reduced compliance of   the   left ventricle.   The tricuspid valve leaflets are thin and pliable; valve motion is   normal.   Trace tricuspid valve regurgitation is present.   Pulmonic valve not well seen.   Trace pulmonic valvular regurgitation is present.    < end of copied text >              I personally reviewed labs, imaging, ekg, orders and vitals.    Discussed case with:  [X]RN  [X]CM/SW  [X]Patient  []Family  []Specialist:        MEDICATIONS  (STANDING):  albuterol    0.083% 2.5 milliGRAM(s) Nebulizer every 6 hours  apixaban 5 milliGRAM(s) Oral every 12 hours  dexAMETHasone     Tablet 4 milliGRAM(s) Oral daily  lacosamide 200 milliGRAM(s) Oral two times a day  levETIRAcetam  Solution 1500 milliGRAM(s) Oral two times a day  methylPREDNISolone sodium succinate Injectable 40 milliGRAM(s) IV Push every 12 hours  nystatin Powder 1 Application(s) Topical three times a day  oseltamivir 75 milliGRAM(s) Oral two times a day  pantoprazole    Tablet 40 milliGRAM(s) Oral before breakfast  rOPINIRole 0.75 milliGRAM(s) Oral at bedtime  tamsulosin 0.4 milliGRAM(s) Oral at bedtime    MEDICATIONS  (PRN):     CHIEF COMPLAINT: Cough and SOB/Flu    SUBJECTIVE/SIGNIFICANT INTERVAL EVENTS/OVERNIGHT EVENTS:    1/1: Cough and SOB with mild interval improvment. Denies fever, chest pain, nausea, vomiting.     1/2: Cough and SOB imiproving. Patient with waxing and waning mentation but easily re-oriented. Easily forgetful. Vitals stable. No fevers, chest pain, nausea, vomiting, SOB at rest.     1/3: Still on O2: mild interval improvement. Afebrile. Add budesonide inhaled. CXRAY for re-evaluation in am.     Review of Systems: 14 Point review of systems reviewed and reported as negative unless otherwise stated above    FROM H&P:  "Patient is a 62 y/o M with a PMH of glioblastoma, seizure disorder, and DVT/PE who presented to  ED on 12/31/23 from [facility] with increased shortness of breath and cough for the past 3 days prior to admission. Was found to have a saturation of 88% on RA at the facility. Also mentions he has had decreased appetite for the past few days leading up to admission.     He denies chest pain, palpitations, abdominal pain, fever, chills. sore throat, runny nose, body aches, headaches, nausea, vomiting, or diarrhea.  Was recently hospitalized in October 2023 with COVID-19.   Upon arrival to the ED, patient's vitals were /92  RR16 SpO2 95% on 2L NC and T 97.9F. He received IV Solumedrol 125 mg x1 , Duoneb and was started on Oseltamavir. "    PHYSICAL EXAM:    T(C): 37.1 (01-03-24 @ 15:09), Max: 37.1 (01-03-24 @ 15:09)  HR: 77 (01-03-24 @ 15:09) (71 - 91)  BP: 115/69 (01-03-24 @ 15:09) (107/69 - 127/66)  RR: 18 (01-03-24 @ 15:09) (18 - 20)  SpO2: 94% (01-03-24 @ 15:09) (91% - 94%)  General: AAOx3; NAD  Head: AT/NC  ENT: Moist Mucous Membranes; No Injury  Eyes: EOMI; PERRL  Neck: Non-tender; No JVD  CVS: RRR, S1&S2, No murmur, No edema  Respiratory: Mild bilateral ronchi. Normal respiratory effort  Abdomen/GI: Soft, non-tender, non-distended, no guarding, no rebound, normal bowel sounds  : No bladder distention,   Extremities: No cyanosis, No clubbing, No edema  MSK: No CVA tenderness, Normal ROM, No injury  Neuro: AAOx3, CNII-XII grossly intact, non-focal  Psych: Appropriate, Cooperative,   Skin: Clean, Dry and Intact      LABS:                          16.1   8.36  )-----------( 95       ( 02 Jan 2024 08:49 )             50.3     01-02    142  |  111<H>  |  21  ----------------------------<  110<H>  4.7   |  30  |  0.81    Ca    9.2      02 Jan 2024 08:49  Phos  2.8     01-02  Mg     2.4     01-02    TPro  6.6  /  Alb  2.3<L>  /  TBili  0.6  /  DBili  x   /  AST  56<H>  /  ALT  86<H>  /  AlkPhos  51  01-02    SARS-CoV-2: Detected (22 Oct 2023 10:20)  COVID-19 PCR: NotDetec (12 Jul 2023 05:15)    CAPILLARY BLOOD GLUCOSE                                15.9   6.07  )-----------( 89       ( 01 Jan 2024 07:06 )             48.3     01-01    145  |  110<H>  |  19  ----------------------------<  147<H>  4.1   |  29  |  0.86    Ca    9.1      01 Jan 2024 07:06    TPro  6.9  /  Alb  2.8<L>  /  TBili  0.5  /  DBili  x   /  AST  25  /  ALT  46  /  AlkPhos  57  12-31    SARS-CoV-2: Detected (22 Oct 2023 10:20)  COVID-19 PCR: NotDetec (12 Jul 2023 05:15)    CAPILLARY BLOOD GLUCOSE        Flu With COVID-19 By STEVE (12.31.23 @ 18:28)   SARS-CoV-2 Result: NotDete: This Respiratory Panel uses polymerase chain reaction (PCR) to detect for   influenza A; influenza B; respiratory syncytial virus; and SARS-CoV-2.   This test was validated by [employer] and is in use under the FDA   Emergency Use Authorization (EUA) for clinical labs CLIA-certified to   perform high complexity testing. Test results should be correlated with   clinical presentation, patient history, and epidemiology.  Influenza A Result: Detected  Influenza B Result: NotDetec  Resp Syn Virus Result: NotDetecPro-Brain Natriuretic Peptide: 70 pg/mL (12.31.23 @ 18:28) Troponin I, High Sensitivity Result: 8.56:       RADIOLOGY:  < from: Xray Chest 1 View- PORTABLE-Urgent (Xray Chest 1 View- PORTABLE-Urgent .) (12.31.23 @ 19:17) >  IMPRESSION: Slight infiltrate anterior segment right upper lobe at this   time.    < end of copied text >      EKG:  < from: 12 Lead ECG (12.31.23 @ 19:37) >    Diagnosis Line Normal sinus rhythm  Right bundle branch block  Possible Lateral infarct (cited on or before 05-NOV-2023)  T wave abnormality, consider inferior ischemia  Abnormal ECG  When compared with ECG of 05-NOV-2023 18:30,  No significant change was found    < end of copied text >      ECHO:  < from: TTE Echo Complete w/ Contrast w/ Doppler (03.08.23 @ 09:47) >   Summary     The left ventricle is normal in size, wall thickness, wall motion and   contractility.   Lumason was used to better define the endocardial border.   Estimated left ventricular ejection fraction is 65-70 %.   Normal appearing left atrium.   The right atrium is not well visualized, appears normal in size.   The right ventricle is not well seen.   The right ventricular apex appears hypokinetic in some views.   The aortic valve is trileaflet with thin pliable leaflets.   Trace aortic regurgitation is present.   The mitral valve leaflets appear thin and normal.   Trace mitral regurgitation is present.   EA reversal of the mitral inflow consistent with reduced compliance of   the   left ventricle.   The tricuspid valve leaflets are thin and pliable; valve motion is   normal.   Trace tricuspid valve regurgitation is present.   Pulmonic valve not well seen.   Trace pulmonic valvular regurgitation is present.    < end of copied text >              I personally reviewed labs, imaging, ekg, orders and vitals.    Discussed case with:  [X]RN  [X]CM/SW  [X]Patient  []Family  []Specialist:        MEDICATIONS  (STANDING):  albuterol    0.083% 2.5 milliGRAM(s) Nebulizer every 6 hours  apixaban 5 milliGRAM(s) Oral every 12 hours  dexAMETHasone     Tablet 4 milliGRAM(s) Oral daily  lacosamide 200 milliGRAM(s) Oral two times a day  levETIRAcetam  Solution 1500 milliGRAM(s) Oral two times a day  methylPREDNISolone sodium succinate Injectable 40 milliGRAM(s) IV Push every 12 hours  nystatin Powder 1 Application(s) Topical three times a day  oseltamivir 75 milliGRAM(s) Oral two times a day  pantoprazole    Tablet 40 milliGRAM(s) Oral before breakfast  rOPINIRole 0.75 milliGRAM(s) Oral at bedtime  tamsulosin 0.4 milliGRAM(s) Oral at bedtime    MEDICATIONS  (PRN):

## 2024-01-04 LAB
ADD ON TEST-SPECIMEN IN LAB: SIGNIFICANT CHANGE UP
ADD ON TEST-SPECIMEN IN LAB: SIGNIFICANT CHANGE UP
ALBUMIN SERPL ELPH-MCNC: 2.3 G/DL — LOW (ref 3.3–5)
ALBUMIN SERPL ELPH-MCNC: 2.3 G/DL — LOW (ref 3.3–5)
ALP SERPL-CCNC: 46 U/L — SIGNIFICANT CHANGE UP (ref 40–120)
ALP SERPL-CCNC: 46 U/L — SIGNIFICANT CHANGE UP (ref 40–120)
ALT FLD-CCNC: 126 U/L — HIGH (ref 12–78)
ALT FLD-CCNC: 126 U/L — HIGH (ref 12–78)
ANION GAP SERPL CALC-SCNC: 5 MMOL/L — SIGNIFICANT CHANGE UP (ref 5–17)
ANION GAP SERPL CALC-SCNC: 5 MMOL/L — SIGNIFICANT CHANGE UP (ref 5–17)
ANISOCYTOSIS BLD QL: SLIGHT — SIGNIFICANT CHANGE UP
ANISOCYTOSIS BLD QL: SLIGHT — SIGNIFICANT CHANGE UP
AST SERPL-CCNC: 35 U/L — SIGNIFICANT CHANGE UP (ref 15–37)
AST SERPL-CCNC: 35 U/L — SIGNIFICANT CHANGE UP (ref 15–37)
BASOPHILS # BLD AUTO: 0 K/UL — SIGNIFICANT CHANGE UP (ref 0–0.2)
BASOPHILS # BLD AUTO: 0 K/UL — SIGNIFICANT CHANGE UP (ref 0–0.2)
BASOPHILS NFR BLD AUTO: 0 % — SIGNIFICANT CHANGE UP (ref 0–2)
BASOPHILS NFR BLD AUTO: 0 % — SIGNIFICANT CHANGE UP (ref 0–2)
BILIRUB SERPL-MCNC: 0.7 MG/DL — SIGNIFICANT CHANGE UP (ref 0.2–1.2)
BILIRUB SERPL-MCNC: 0.7 MG/DL — SIGNIFICANT CHANGE UP (ref 0.2–1.2)
BUN SERPL-MCNC: 20 MG/DL — SIGNIFICANT CHANGE UP (ref 7–23)
BUN SERPL-MCNC: 20 MG/DL — SIGNIFICANT CHANGE UP (ref 7–23)
CALCIUM SERPL-MCNC: 9 MG/DL — SIGNIFICANT CHANGE UP (ref 8.5–10.1)
CALCIUM SERPL-MCNC: 9 MG/DL — SIGNIFICANT CHANGE UP (ref 8.5–10.1)
CHLORIDE SERPL-SCNC: 110 MMOL/L — HIGH (ref 96–108)
CHLORIDE SERPL-SCNC: 110 MMOL/L — HIGH (ref 96–108)
CO2 SERPL-SCNC: 29 MMOL/L — SIGNIFICANT CHANGE UP (ref 22–31)
CO2 SERPL-SCNC: 29 MMOL/L — SIGNIFICANT CHANGE UP (ref 22–31)
CREAT SERPL-MCNC: 0.72 MG/DL — SIGNIFICANT CHANGE UP (ref 0.5–1.3)
CREAT SERPL-MCNC: 0.72 MG/DL — SIGNIFICANT CHANGE UP (ref 0.5–1.3)
EGFR: 101 ML/MIN/1.73M2 — SIGNIFICANT CHANGE UP
EGFR: 101 ML/MIN/1.73M2 — SIGNIFICANT CHANGE UP
EOSINOPHIL # BLD AUTO: 0 K/UL — SIGNIFICANT CHANGE UP (ref 0–0.5)
EOSINOPHIL # BLD AUTO: 0 K/UL — SIGNIFICANT CHANGE UP (ref 0–0.5)
EOSINOPHIL NFR BLD AUTO: 0 % — SIGNIFICANT CHANGE UP (ref 0–6)
EOSINOPHIL NFR BLD AUTO: 0 % — SIGNIFICANT CHANGE UP (ref 0–6)
GLUCOSE SERPL-MCNC: 86 MG/DL — SIGNIFICANT CHANGE UP (ref 70–99)
GLUCOSE SERPL-MCNC: 86 MG/DL — SIGNIFICANT CHANGE UP (ref 70–99)
HCT VFR BLD CALC: 46.5 % — SIGNIFICANT CHANGE UP (ref 39–50)
HCT VFR BLD CALC: 46.5 % — SIGNIFICANT CHANGE UP (ref 39–50)
HGB BLD-MCNC: 15.4 G/DL — SIGNIFICANT CHANGE UP (ref 13–17)
HGB BLD-MCNC: 15.4 G/DL — SIGNIFICANT CHANGE UP (ref 13–17)
LYMPHOCYTES # BLD AUTO: 0.97 K/UL — LOW (ref 1–3.3)
LYMPHOCYTES # BLD AUTO: 0.97 K/UL — LOW (ref 1–3.3)
LYMPHOCYTES # BLD AUTO: 16 % — SIGNIFICANT CHANGE UP (ref 13–44)
LYMPHOCYTES # BLD AUTO: 16 % — SIGNIFICANT CHANGE UP (ref 13–44)
MAGNESIUM SERPL-MCNC: 2.5 MG/DL — SIGNIFICANT CHANGE UP (ref 1.6–2.6)
MAGNESIUM SERPL-MCNC: 2.5 MG/DL — SIGNIFICANT CHANGE UP (ref 1.6–2.6)
MANUAL SMEAR VERIFICATION: SIGNIFICANT CHANGE UP
MANUAL SMEAR VERIFICATION: SIGNIFICANT CHANGE UP
MCHC RBC-ENTMCNC: 29.3 PG — SIGNIFICANT CHANGE UP (ref 27–34)
MCHC RBC-ENTMCNC: 29.3 PG — SIGNIFICANT CHANGE UP (ref 27–34)
MCHC RBC-ENTMCNC: 33.1 GM/DL — SIGNIFICANT CHANGE UP (ref 32–36)
MCHC RBC-ENTMCNC: 33.1 GM/DL — SIGNIFICANT CHANGE UP (ref 32–36)
MCV RBC AUTO: 88.6 FL — SIGNIFICANT CHANGE UP (ref 80–100)
MCV RBC AUTO: 88.6 FL — SIGNIFICANT CHANGE UP (ref 80–100)
METAMYELOCYTES # FLD: 1 % — HIGH (ref 0–0)
METAMYELOCYTES # FLD: 1 % — HIGH (ref 0–0)
MICROCYTES BLD QL: SLIGHT — SIGNIFICANT CHANGE UP
MICROCYTES BLD QL: SLIGHT — SIGNIFICANT CHANGE UP
MONOCYTES # BLD AUTO: 0.43 K/UL — SIGNIFICANT CHANGE UP (ref 0–0.9)
MONOCYTES # BLD AUTO: 0.43 K/UL — SIGNIFICANT CHANGE UP (ref 0–0.9)
MONOCYTES NFR BLD AUTO: 7 % — SIGNIFICANT CHANGE UP (ref 2–14)
MONOCYTES NFR BLD AUTO: 7 % — SIGNIFICANT CHANGE UP (ref 2–14)
NEUTROPHILS # BLD AUTO: 4.62 K/UL — SIGNIFICANT CHANGE UP (ref 1.8–7.4)
NEUTROPHILS # BLD AUTO: 4.62 K/UL — SIGNIFICANT CHANGE UP (ref 1.8–7.4)
NEUTROPHILS NFR BLD AUTO: 72 % — SIGNIFICANT CHANGE UP (ref 43–77)
NEUTROPHILS NFR BLD AUTO: 72 % — SIGNIFICANT CHANGE UP (ref 43–77)
NEUTS BAND # BLD: 4 % — SIGNIFICANT CHANGE UP (ref 0–8)
NEUTS BAND # BLD: 4 % — SIGNIFICANT CHANGE UP (ref 0–8)
NRBC # BLD: 0 /100 WBCS — SIGNIFICANT CHANGE UP (ref 0–0)
NRBC # BLD: 0 /100 WBCS — SIGNIFICANT CHANGE UP (ref 0–0)
NRBC # BLD: SIGNIFICANT CHANGE UP /100 WBCS (ref 0–0)
NRBC # BLD: SIGNIFICANT CHANGE UP /100 WBCS (ref 0–0)
OVALOCYTES BLD QL SMEAR: SLIGHT — SIGNIFICANT CHANGE UP
OVALOCYTES BLD QL SMEAR: SLIGHT — SIGNIFICANT CHANGE UP
PHOSPHATE SERPL-MCNC: 2 MG/DL — LOW (ref 2.5–4.5)
PHOSPHATE SERPL-MCNC: 2 MG/DL — LOW (ref 2.5–4.5)
PLAT MORPH BLD: NORMAL — SIGNIFICANT CHANGE UP
PLAT MORPH BLD: NORMAL — SIGNIFICANT CHANGE UP
PLATELET # BLD AUTO: 114 K/UL — LOW (ref 150–400)
PLATELET # BLD AUTO: 114 K/UL — LOW (ref 150–400)
POTASSIUM SERPL-MCNC: 3.7 MMOL/L — SIGNIFICANT CHANGE UP (ref 3.5–5.3)
POTASSIUM SERPL-MCNC: 3.7 MMOL/L — SIGNIFICANT CHANGE UP (ref 3.5–5.3)
POTASSIUM SERPL-SCNC: 3.7 MMOL/L — SIGNIFICANT CHANGE UP (ref 3.5–5.3)
POTASSIUM SERPL-SCNC: 3.7 MMOL/L — SIGNIFICANT CHANGE UP (ref 3.5–5.3)
PROCALCITONIN SERPL-MCNC: 0.09 NG/ML — SIGNIFICANT CHANGE UP (ref 0.02–0.1)
PROCALCITONIN SERPL-MCNC: 0.09 NG/ML — SIGNIFICANT CHANGE UP (ref 0.02–0.1)
PROT SERPL-MCNC: 6 GM/DL — SIGNIFICANT CHANGE UP (ref 6–8.3)
PROT SERPL-MCNC: 6 GM/DL — SIGNIFICANT CHANGE UP (ref 6–8.3)
RBC # BLD: 5.25 M/UL — SIGNIFICANT CHANGE UP (ref 4.2–5.8)
RBC # BLD: 5.25 M/UL — SIGNIFICANT CHANGE UP (ref 4.2–5.8)
RBC # FLD: 15.9 % — HIGH (ref 10.3–14.5)
RBC # FLD: 15.9 % — HIGH (ref 10.3–14.5)
RBC BLD AUTO: ABNORMAL
RBC BLD AUTO: ABNORMAL
SODIUM SERPL-SCNC: 144 MMOL/L — SIGNIFICANT CHANGE UP (ref 135–145)
SODIUM SERPL-SCNC: 144 MMOL/L — SIGNIFICANT CHANGE UP (ref 135–145)
WBC # BLD: 6.08 K/UL — SIGNIFICANT CHANGE UP (ref 3.8–10.5)
WBC # BLD: 6.08 K/UL — SIGNIFICANT CHANGE UP (ref 3.8–10.5)
WBC # FLD AUTO: 6.08 K/UL — SIGNIFICANT CHANGE UP (ref 3.8–10.5)
WBC # FLD AUTO: 6.08 K/UL — SIGNIFICANT CHANGE UP (ref 3.8–10.5)

## 2024-01-04 PROCEDURE — 99233 SBSQ HOSP IP/OBS HIGH 50: CPT

## 2024-01-04 PROCEDURE — 71250 CT THORAX DX C-: CPT | Mod: 26

## 2024-01-04 PROCEDURE — 71045 X-RAY EXAM CHEST 1 VIEW: CPT | Mod: 26

## 2024-01-04 RX ORDER — SODIUM,POTASSIUM PHOSPHATES 278-250MG
1 POWDER IN PACKET (EA) ORAL
Refills: 0 | Status: COMPLETED | OUTPATIENT
Start: 2024-01-04 | End: 2024-01-05

## 2024-01-04 RX ADMIN — Medication 0.5 MILLIGRAM(S): at 08:02

## 2024-01-04 RX ADMIN — Medication 0.5 MILLIGRAM(S): at 20:05

## 2024-01-04 RX ADMIN — TAMSULOSIN HYDROCHLORIDE 0.4 MILLIGRAM(S): 0.4 CAPSULE ORAL at 22:28

## 2024-01-04 RX ADMIN — LEVETIRACETAM 1500 MILLIGRAM(S): 250 TABLET, FILM COATED ORAL at 10:17

## 2024-01-04 RX ADMIN — APIXABAN 5 MILLIGRAM(S): 2.5 TABLET, FILM COATED ORAL at 10:16

## 2024-01-04 RX ADMIN — Medication 100 MILLIGRAM(S): at 10:16

## 2024-01-04 RX ADMIN — Medication 1 PACKET(S): at 19:18

## 2024-01-04 RX ADMIN — LACOSAMIDE 200 MILLIGRAM(S): 50 TABLET ORAL at 22:28

## 2024-01-04 RX ADMIN — Medication 75 MILLIGRAM(S): at 10:16

## 2024-01-04 RX ADMIN — LEVETIRACETAM 1500 MILLIGRAM(S): 250 TABLET, FILM COATED ORAL at 22:27

## 2024-01-04 RX ADMIN — NYSTATIN CREAM 1 APPLICATION(S): 100000 CREAM TOPICAL at 15:00

## 2024-01-04 RX ADMIN — Medication 1 PACKET(S): at 14:59

## 2024-01-04 RX ADMIN — ALBUTEROL 2.5 MILLIGRAM(S): 90 AEROSOL, METERED ORAL at 08:01

## 2024-01-04 RX ADMIN — Medication 4 MILLIGRAM(S): at 10:16

## 2024-01-04 RX ADMIN — NYSTATIN CREAM 1 APPLICATION(S): 100000 CREAM TOPICAL at 22:29

## 2024-01-04 RX ADMIN — Medication 40 MILLIGRAM(S): at 10:17

## 2024-01-04 RX ADMIN — NYSTATIN CREAM 1 APPLICATION(S): 100000 CREAM TOPICAL at 05:56

## 2024-01-04 RX ADMIN — ALBUTEROL 2.5 MILLIGRAM(S): 90 AEROSOL, METERED ORAL at 20:05

## 2024-01-04 RX ADMIN — APIXABAN 5 MILLIGRAM(S): 2.5 TABLET, FILM COATED ORAL at 22:27

## 2024-01-04 RX ADMIN — Medication 75 MILLIGRAM(S): at 22:28

## 2024-01-04 RX ADMIN — LACOSAMIDE 200 MILLIGRAM(S): 50 TABLET ORAL at 10:16

## 2024-01-04 RX ADMIN — ROPINIROLE 0.75 MILLIGRAM(S): 8 TABLET, FILM COATED, EXTENDED RELEASE ORAL at 22:28

## 2024-01-04 RX ADMIN — PANTOPRAZOLE SODIUM 40 MILLIGRAM(S): 20 TABLET, DELAYED RELEASE ORAL at 05:56

## 2024-01-04 RX ADMIN — Medication 1 PACKET(S): at 22:27

## 2024-01-04 NOTE — PHYSICAL THERAPY INITIAL EVALUATION ADULT - GENERAL OBSERVATIONS, REHAB EVAL
Pt found supine in bed on 1N, +O2 via NC, agreeable to participate in PT Evaluation. Pt requires Dejon for bed mobility and transfers. Pt is able to ambulate 5 feet x 2 with RW. Pt returned to bedside chair with chair alarm on, call bell and phone in reach, RN  Yoli made aware.

## 2024-01-04 NOTE — PROGRESS NOTE ADULT - SUBJECTIVE AND OBJECTIVE BOX
CHIEF COMPLAINT: Cough and SOB/Flu    SUBJECTIVE/SIGNIFICANT INTERVAL EVENTS/OVERNIGHT EVENTS:    1/1: Cough and SOB with mild interval improvment. Denies fever, chest pain, nausea, vomiting.     1/2: Cough and SOB imiproving. Patient with waxing and waning mentation but easily re-oriented. Easily forgetful. Vitals stable. No fevers, chest pain, nausea, vomiting, SOB at rest.     1/3: Still on O2: mild interval improvement. Afebrile. Add budesonide inhaled. CXRAY for re-evaluation in am.     1/4: SLight increase in O2 requirment. Patient reports mild interval improvement in symptoms. CT chest done with some ateletasis and no consolidation/infiltrate. Likely a deconditioning component as well. Continue steroids, inhalers and supportive care.     Review of Systems: 14 Point review of systems reviewed and reported as negative unless otherwise stated above    FROM H&P:  "Patient is a 62 y/o M with a PMH of glioblastoma, seizure disorder, and DVT/PE who presented to  ED on 12/31/23 from [facility] with increased shortness of breath and cough for the past 3 days prior to admission. Was found to have a saturation of 88% on RA at the facility. Also mentions he has had decreased appetite for the past few days leading up to admission.     He denies chest pain, palpitations, abdominal pain, fever, chills. sore throat, runny nose, body aches, headaches, nausea, vomiting, or diarrhea.  Was recently hospitalized in October 2023 with COVID-19.   Upon arrival to the ED, patient's vitals were /92  RR16 SpO2 95% on 2L NC and T 97.9F. He received IV Solumedrol 125 mg x1 , Duoneb and was started on Oseltamavir. "    PHYSICAL EXAM:    T(C): 36.3 (01-04-24 @ 15:48), Max: 36.8 (01-03-24 @ 21:23)  HR: 101 (01-04-24 @ 15:48) (68 - 101)  BP: 118/74 (01-04-24 @ 15:48) (109/65 - 122/76)  RR: 18 (01-04-24 @ 15:48) (18 - 18)  SpO2: 95% (01-04-24 @ 15:48) (93% - 96%)  General: AAOx3; NAD  ENT: Moist Mucous Membranes; No Injury  Neck: Non-tender; No JVD  CVS: RRR, S1&S2, No murmur, No edema  Respiratory: Mild bilateral ronchi. Normal respiratory effort; Respiratory supprot with NC  Abdomen/GI: Soft, non-tender, non-distended, no guarding, no rebound, normal bowel sounds  Neuro: AAOx3 (but poor memory/recall), CNII-XII grossly intact, non-focal  Psych: Appropriate, Cooperative,   Skin: Clean, Dry and Intact      LABS:                          15.4   6.08  )-----------( 114      ( 04 Jan 2024 07:52 )             46.5     01-04    144  |  110<H>  |  20  ----------------------------<  86  3.7   |  29  |  0.72    Ca    9.0      04 Jan 2024 07:52  Phos  2.0     01-04  Mg     2.5     01-04    TPro  6.0  /  Alb  2.3<L>  /  TBili  0.7  /  DBili  x   /  AST  35  /  ALT  126<H>  /  AlkPhos  46  01-04    SARS-CoV-2: Detected (22 Oct 2023 10:20)  COVID-19 PCR: NotDetec (12 Jul 2023 05:15)    CAPILLARY BLOOD GLUCOSE                                16.1   8.36  )-----------( 95       ( 02 Jan 2024 08:49 )             50.3     01-02    142  |  111<H>  |  21  ----------------------------<  110<H>  4.7   |  30  |  0.81    Ca    9.2      02 Jan 2024 08:49  Phos  2.8     01-02  Mg     2.4     01-02    TPro  6.6  /  Alb  2.3<L>  /  TBili  0.6  /  DBili  x   /  AST  56<H>  /  ALT  86<H>  /  AlkPhos  51  01-02    SARS-CoV-2: Detected (22 Oct 2023 10:20)  COVID-19 PCR: NotDetec (12 Jul 2023 05:15)    CAPILLARY BLOOD GLUCOSE                                15.9   6.07  )-----------( 89       ( 01 Jan 2024 07:06 )             48.3     01-01    145  |  110<H>  |  19  ----------------------------<  147<H>  4.1   |  29  |  0.86    Ca    9.1      01 Jan 2024 07:06    TPro  6.9  /  Alb  2.8<L>  /  TBili  0.5  /  DBili  x   /  AST  25  /  ALT  46  /  AlkPhos  57  12-31    SARS-CoV-2: Detected (22 Oct 2023 10:20)  COVID-19 PCR: NotDetec (12 Jul 2023 05:15)    CAPILLARY BLOOD GLUCOSE        Flu With COVID-19 By STEVE (12.31.23 @ 18:28)   SARS-CoV-2 Result: NotDete: This Respiratory Panel uses polymerase chain reaction (PCR) to detect for   influenza A; influenza B; respiratory syncytial virus; and SARS-CoV-2.   This test was validated by [facility] and is in use under the FDA   Emergency Use Authorization (EUA) for clinical labs CLIA-certified to   perform high complexity testing. Test results should be correlated with   clinical presentation, patient history, and epidemiology.  Influenza A Result: Detected  Influenza B Result: NotDetec  Resp Syn Virus Result: NotDetecPro-Brain Natriuretic Peptide: 70 pg/mL (12.31.23 @ 18:28) Troponin I, High Sensitivity Result: 8.56:       RADIOLOGY:  < from: Xray Chest 1 View- PORTABLE-Urgent (Xray Chest 1 View- PORTABLE-Urgent .) (12.31.23 @ 19:17) >  IMPRESSION: Slight infiltrate anterior segment right upper lobe at this   time.    < end of copied text >      < from: CT Chest No Cont (01.04.24 @ 13:46) >  LUNGS AND AIRWAYS: PLEURA:    There is linear subsegmental atelectasis right upper middle and lower   lobes, increased from prior exam.  There is dependent bibasilar atelectasis.    No lobar lung consolidation, pleural effusion or pneumothorax.    Elevation right hemidiaphragm similar to priorexam.    The central airways remain patent.    MEDIASTINUM AND CARLOS:  No enlarged lymphadenopathy.    VESSELS: Atherosclerotic changes thoracic aorta and coronary artery   calcification.    HEART: Heart size is normal. No pericardial effusion.    CHEST WALL AND LOWER NECK: Within normal limits.    VISUALIZED UPPER ABDOMEN:  Hepatic steatosis.  Left adrenal calcification.    BONES: Degenerative changes.    IMPRESSION:    Linear and subsegmental atelectasis right lung with dependent bibasilar   atelectasis.    Stable elevation right hemidiaphragm.    < end of copied text >      EKG:  < from: 12 Lead ECG (12.31.23 @ 19:37) >    Diagnosis Line Normal sinus rhythm  Right bundle branch block  Possible Lateral infarct (cited on or before 05-NOV-2023)  T wave abnormality, consider inferior ischemia  Abnormal ECG  When compared with ECG of 05-NOV-2023 18:30,  No significant change was found    < end of copied text >      ECHO:  < from: TTE Echo Complete w/ Contrast w/ Doppler (03.08.23 @ 09:47) >   Summary     The left ventricle is normal in size, wall thickness, wall motion and   contractility.   Lumason was used to better define the endocardial border.   Estimated left ventricular ejection fraction is 65-70 %.   Normal appearing left atrium.   The right atrium is not well visualized, appears normal in size.   The right ventricle is not well seen.   The right ventricular apex appears hypokinetic in some views.   The aortic valve is trileaflet with thin pliable leaflets.   Trace aortic regurgitation is present.   The mitral valve leaflets appear thin and normal.   Trace mitral regurgitation is present.   EA reversal of the mitral inflow consistent with reduced compliance of   the   left ventricle.   The tricuspid valve leaflets are thin and pliable; valve motion is   normal.   Trace tricuspid valve regurgitation is present.   Pulmonic valve not well seen.   Trace pulmonic valvular regurgitation is present.    < end of copied text >              I personally reviewed labs, imaging, ekg, orders and vitals.    Discussed case with:  [X]RN  [X]CM/SW  [X]Patient  []Family  []Specialist:        MEDICATIONS  (STANDING):  albuterol    0.083% 2.5 milliGRAM(s) Nebulizer every 6 hours  apixaban 5 milliGRAM(s) Oral every 12 hours  dexAMETHasone     Tablet 4 milliGRAM(s) Oral daily  lacosamide 200 milliGRAM(s) Oral two times a day  levETIRAcetam  Solution 1500 milliGRAM(s) Oral two times a day  methylPREDNISolone sodium succinate Injectable 40 milliGRAM(s) IV Push every 12 hours  nystatin Powder 1 Application(s) Topical three times a day  oseltamivir 75 milliGRAM(s) Oral two times a day  pantoprazole    Tablet 40 milliGRAM(s) Oral before breakfast  rOPINIRole 0.75 milliGRAM(s) Oral at bedtime  tamsulosin 0.4 milliGRAM(s) Oral at bedtime    MEDICATIONS  (PRN):     CHIEF COMPLAINT: Cough and SOB/Flu    SUBJECTIVE/SIGNIFICANT INTERVAL EVENTS/OVERNIGHT EVENTS:    1/1: Cough and SOB with mild interval improvment. Denies fever, chest pain, nausea, vomiting.     1/2: Cough and SOB imiproving. Patient with waxing and waning mentation but easily re-oriented. Easily forgetful. Vitals stable. No fevers, chest pain, nausea, vomiting, SOB at rest.     1/3: Still on O2: mild interval improvement. Afebrile. Add budesonide inhaled. CXRAY for re-evaluation in am.     1/4: SLight increase in O2 requirment. Patient reports mild interval improvement in symptoms. CT chest done with some ateletasis and no consolidation/infiltrate. Likely a deconditioning component as well. Continue steroids, inhalers and supportive care.     Review of Systems: 14 Point review of systems reviewed and reported as negative unless otherwise stated above    FROM H&P:  "Patient is a 62 y/o M with a PMH of glioblastoma, seizure disorder, and DVT/PE who presented to  ED on 12/31/23 from [facility] with increased shortness of breath and cough for the past 3 days prior to admission. Was found to have a saturation of 88% on RA at the facility. Also mentions he has had decreased appetite for the past few days leading up to admission.     He denies chest pain, palpitations, abdominal pain, fever, chills. sore throat, runny nose, body aches, headaches, nausea, vomiting, or diarrhea.  Was recently hospitalized in October 2023 with COVID-19.   Upon arrival to the ED, patient's vitals were /92  RR16 SpO2 95% on 2L NC and T 97.9F. He received IV Solumedrol 125 mg x1 , Duoneb and was started on Oseltamavir. "    PHYSICAL EXAM:    T(C): 36.3 (01-04-24 @ 15:48), Max: 36.8 (01-03-24 @ 21:23)  HR: 101 (01-04-24 @ 15:48) (68 - 101)  BP: 118/74 (01-04-24 @ 15:48) (109/65 - 122/76)  RR: 18 (01-04-24 @ 15:48) (18 - 18)  SpO2: 95% (01-04-24 @ 15:48) (93% - 96%)  General: AAOx3; NAD  ENT: Moist Mucous Membranes; No Injury  Neck: Non-tender; No JVD  CVS: RRR, S1&S2, No murmur, No edema  Respiratory: Mild bilateral ronchi. Normal respiratory effort; Respiratory supprot with NC  Abdomen/GI: Soft, non-tender, non-distended, no guarding, no rebound, normal bowel sounds  Neuro: AAOx3 (but poor memory/recall), CNII-XII grossly intact, non-focal  Psych: Appropriate, Cooperative,   Skin: Clean, Dry and Intact      LABS:                          15.4   6.08  )-----------( 114      ( 04 Jan 2024 07:52 )             46.5     01-04    144  |  110<H>  |  20  ----------------------------<  86  3.7   |  29  |  0.72    Ca    9.0      04 Jan 2024 07:52  Phos  2.0     01-04  Mg     2.5     01-04    TPro  6.0  /  Alb  2.3<L>  /  TBili  0.7  /  DBili  x   /  AST  35  /  ALT  126<H>  /  AlkPhos  46  01-04    SARS-CoV-2: Detected (22 Oct 2023 10:20)  COVID-19 PCR: NotDetec (12 Jul 2023 05:15)    CAPILLARY BLOOD GLUCOSE                                16.1   8.36  )-----------( 95       ( 02 Jan 2024 08:49 )             50.3     01-02    142  |  111<H>  |  21  ----------------------------<  110<H>  4.7   |  30  |  0.81    Ca    9.2      02 Jan 2024 08:49  Phos  2.8     01-02  Mg     2.4     01-02    TPro  6.6  /  Alb  2.3<L>  /  TBili  0.6  /  DBili  x   /  AST  56<H>  /  ALT  86<H>  /  AlkPhos  51  01-02    SARS-CoV-2: Detected (22 Oct 2023 10:20)  COVID-19 PCR: NotDetec (12 Jul 2023 05:15)    CAPILLARY BLOOD GLUCOSE                                15.9   6.07  )-----------( 89       ( 01 Jan 2024 07:06 )             48.3     01-01    145  |  110<H>  |  19  ----------------------------<  147<H>  4.1   |  29  |  0.86    Ca    9.1      01 Jan 2024 07:06    TPro  6.9  /  Alb  2.8<L>  /  TBili  0.5  /  DBili  x   /  AST  25  /  ALT  46  /  AlkPhos  57  12-31    SARS-CoV-2: Detected (22 Oct 2023 10:20)  COVID-19 PCR: NotDetec (12 Jul 2023 05:15)    CAPILLARY BLOOD GLUCOSE        Flu With COVID-19 By STEVE (12.31.23 @ 18:28)   SARS-CoV-2 Result: NotDete: This Respiratory Panel uses polymerase chain reaction (PCR) to detect for   influenza A; influenza B; respiratory syncytial virus; and SARS-CoV-2.   This test was validated by [facility] and is in use under the FDA   Emergency Use Authorization (EUA) for clinical labs CLIA-certified to   perform high complexity testing. Test results should be correlated with   clinical presentation, patient history, and epidemiology.  Influenza A Result: Detected  Influenza B Result: NotDetec  Resp Syn Virus Result: NotDetecPro-Brain Natriuretic Peptide: 70 pg/mL (12.31.23 @ 18:28) Troponin I, High Sensitivity Result: 8.56:       RADIOLOGY:  < from: Xray Chest 1 View- PORTABLE-Urgent (Xray Chest 1 View- PORTABLE-Urgent .) (12.31.23 @ 19:17) >  IMPRESSION: Slight infiltrate anterior segment right upper lobe at this   time.    < end of copied text >      < from: CT Chest No Cont (01.04.24 @ 13:46) >  LUNGS AND AIRWAYS: PLEURA:    There is linear subsegmental atelectasis right upper middle and lower   lobes, increased from prior exam.  There is dependent bibasilar atelectasis.    No lobar lung consolidation, pleural effusion or pneumothorax.    Elevation right hemidiaphragm similar to priorexam.    The central airways remain patent.    MEDIASTINUM AND CARLOS:  No enlarged lymphadenopathy.    VESSELS: Atherosclerotic changes thoracic aorta and coronary artery   calcification.    HEART: Heart size is normal. No pericardial effusion.    CHEST WALL AND LOWER NECK: Within normal limits.    VISUALIZED UPPER ABDOMEN:  Hepatic steatosis.  Left adrenal calcification.    BONES: Degenerative changes.    IMPRESSION:    Linear and subsegmental atelectasis right lung with dependent bibasilar   atelectasis.    Stable elevation right hemidiaphragm.    < end of copied text >      EKG:  < from: 12 Lead ECG (12.31.23 @ 19:37) >    Diagnosis Line Normal sinus rhythm  Right bundle branch block  Possible Lateral infarct (cited on or before 05-NOV-2023)  T wave abnormality, consider inferior ischemia  Abnormal ECG  When compared with ECG of 05-NOV-2023 18:30,  No significant change was found    < end of copied text >      ECHO:  < from: TTE Echo Complete w/ Contrast w/ Doppler (03.08.23 @ 09:47) >   Summary     The left ventricle is normal in size, wall thickness, wall motion and   contractility.   Lumason was used to better define the endocardial border.   Estimated left ventricular ejection fraction is 65-70 %.   Normal appearing left atrium.   The right atrium is not well visualized, appears normal in size.   The right ventricle is not well seen.   The right ventricular apex appears hypokinetic in some views.   The aortic valve is trileaflet with thin pliable leaflets.   Trace aortic regurgitation is present.   The mitral valve leaflets appear thin and normal.   Trace mitral regurgitation is present.   EA reversal of the mitral inflow consistent with reduced compliance of   the   left ventricle.   The tricuspid valve leaflets are thin and pliable; valve motion is   normal.   Trace tricuspid valve regurgitation is present.   Pulmonic valve not well seen.   Trace pulmonic valvular regurgitation is present.    < end of copied text >              I personally reviewed labs, imaging, ekg, orders and vitals.    Discussed case with:  [X]RN  [X]CM/SW  [X]Patient  []Family  []Specialist:        MEDICATIONS  (STANDING):  albuterol    0.083% 2.5 milliGRAM(s) Nebulizer every 6 hours  apixaban 5 milliGRAM(s) Oral every 12 hours  dexAMETHasone     Tablet 4 milliGRAM(s) Oral daily  lacosamide 200 milliGRAM(s) Oral two times a day  levETIRAcetam  Solution 1500 milliGRAM(s) Oral two times a day  methylPREDNISolone sodium succinate Injectable 40 milliGRAM(s) IV Push every 12 hours  nystatin Powder 1 Application(s) Topical three times a day  oseltamivir 75 milliGRAM(s) Oral two times a day  pantoprazole    Tablet 40 milliGRAM(s) Oral before breakfast  rOPINIRole 0.75 milliGRAM(s) Oral at bedtime  tamsulosin 0.4 milliGRAM(s) Oral at bedtime    MEDICATIONS  (PRN):

## 2024-01-04 NOTE — PROGRESS NOTE ADULT - ASSESSMENT
Patient is a 64 y/o M with a PMH of glioblastoma, seizure disorder, and DVT/PE who presented to  ED on 12/31/23 from [facility] with increased shortness of breath and cough for the past 3 days admitted with acute hypoxemic respiratory failure secondary to influenza virus.     #Acute hypoxemic respiratory failure secondary to influenza  - Initially saturating 88% on RA   - s/p IV solumedrol, Duoneb in the ED   - CXR official read pending - does not appear to be superimposed PNA  - Currently on 4L NC; continue supplemental oxygen   - Is on Decadron (due to GBM)  - Continue Oseltamavir 75 mg BID (Day 1 of 5)   - F/u pulmonology consult     #Glioblastoma multiforme   - Diagnosed on 9/27/22   - Pathology classification: WHO 4, IDH wild type, MGMT methylated   - Underwent large resection of mass on 10/3/22 with Dr. Turpin  - Last chemotherapy session was 12/20/23  - Most recent visit with Dr. Torres on 12/20/23 - plan to continue Avastin and follow-up MRI in 2 weeks   --> Has appointment for outpatient MRI on 1/4/23  - Continue Dexamethasone 4 mg QD   -Repeat CT chest (1/4) with atelectasis but no infiltrate or consolidation. Procal (1/4) low. Continue to monitor.       #Seizure Disorder   - Continue Keppra 1500 mg BID   - Continue Lacosamide 200 mg BID    # Hx of DVT/PE  - Above and below the knee DVT: Right femoral vein and left peroneal vein acute DVT on 3/7/23   - CT angio chest from 3/7/23: right lower lobe distal segmental PE  - Continue Eliquis 5 mg BID    #BPH   - Continue Tamsulosin 0.4 mg at bedtime     #Code Status   - FULL CODE Patient is a 62 y/o M with a PMH of glioblastoma, seizure disorder, and DVT/PE who presented to  ED on 12/31/23 from [facility] with increased shortness of breath and cough for the past 3 days admitted with acute hypoxemic respiratory failure secondary to influenza virus.     #Acute hypoxemic respiratory failure secondary to influenza  - Initially saturating 88% on RA   - s/p IV solumedrol, Duoneb in the ED   - CXR official read pending - does not appear to be superimposed PNA  - Currently on 4L NC; continue supplemental oxygen   - Is on Decadron (due to GBM)  - Continue Oseltamavir 75 mg BID (Day 1 of 5)   - F/u pulmonology consult     #Glioblastoma multiforme   - Diagnosed on 9/27/22   - Pathology classification: WHO 4, IDH wild type, MGMT methylated   - Underwent large resection of mass on 10/3/22 with Dr. Turpin  - Last chemotherapy session was 12/20/23  - Most recent visit with Dr. Torres on 12/20/23 - plan to continue Avastin and follow-up MRI in 2 weeks   --> Has appointment for outpatient MRI on 1/4/23  - Continue Dexamethasone 4 mg QD   -Repeat CT chest (1/4) with atelectasis but no infiltrate or consolidation. Procal (1/4) low. Continue to monitor.       #Seizure Disorder   - Continue Keppra 1500 mg BID   - Continue Lacosamide 200 mg BID    # Hx of DVT/PE  - Above and below the knee DVT: Right femoral vein and left peroneal vein acute DVT on 3/7/23   - CT angio chest from 3/7/23: right lower lobe distal segmental PE  - Continue Eliquis 5 mg BID    #BPH   - Continue Tamsulosin 0.4 mg at bedtime     #Code Status   - FULL CODE

## 2024-01-04 NOTE — PHYSICAL THERAPY INITIAL EVALUATION ADULT - GAIT TRAINING, PT EVAL
Patient will be able to safely ambulate 100 feet with Rolling Walker by discharge from acute care setting.

## 2024-01-04 NOTE — PHYSICAL THERAPY INITIAL EVALUATION ADULT - PERTINENT HX OF CURRENT PROBLEM, REHAB EVAL
Patient is a 64 y/o M with a PMH of glioblastoma, seizure disorder, and DVT/PE who presented to  ED on 12/31/23 from [facility] with increased shortness of breath and cough for the past 3 days admitted with acute hypoxemic respiratory failure secondary to influenza virus. Patient is a 64 y/o M with a PMH of glioblastoma, seizure disorder, and DVT/PE who presented to  ED on 12/31/23 from [facility] with increased shortness of breath and cough for the past 3 days admitted with acute hypoxemic respiratory failure secondary to influenza virus.

## 2024-01-04 NOTE — PHYSICAL THERAPY INITIAL EVALUATION ADULT - ADDITIONAL COMMENTS
From [facility]  Pt needs assist secondary to impaired vision in one eye From [facility]  Pt needs assist secondary to impaired vision in one eye

## 2024-01-05 PROCEDURE — 99232 SBSQ HOSP IP/OBS MODERATE 35: CPT

## 2024-01-05 RX ORDER — ACETAMINOPHEN 500 MG
650 TABLET ORAL EVERY 6 HOURS
Refills: 0 | Status: DISCONTINUED | OUTPATIENT
Start: 2024-01-05 | End: 2024-01-08

## 2024-01-05 RX ADMIN — ALBUTEROL 2.5 MILLIGRAM(S): 90 AEROSOL, METERED ORAL at 18:57

## 2024-01-05 RX ADMIN — LACOSAMIDE 200 MILLIGRAM(S): 50 TABLET ORAL at 10:32

## 2024-01-05 RX ADMIN — NYSTATIN CREAM 1 APPLICATION(S): 100000 CREAM TOPICAL at 15:16

## 2024-01-05 RX ADMIN — Medication 650 MILLIGRAM(S): at 05:42

## 2024-01-05 RX ADMIN — Medication 0.5 MILLIGRAM(S): at 18:58

## 2024-01-05 RX ADMIN — ALBUTEROL 2.5 MILLIGRAM(S): 90 AEROSOL, METERED ORAL at 13:58

## 2024-01-05 RX ADMIN — NYSTATIN CREAM 1 APPLICATION(S): 100000 CREAM TOPICAL at 05:12

## 2024-01-05 RX ADMIN — LEVETIRACETAM 1500 MILLIGRAM(S): 250 TABLET, FILM COATED ORAL at 22:42

## 2024-01-05 RX ADMIN — Medication 0.5 MILLIGRAM(S): at 09:00

## 2024-01-05 RX ADMIN — PANTOPRAZOLE SODIUM 40 MILLIGRAM(S): 20 TABLET, DELAYED RELEASE ORAL at 05:12

## 2024-01-05 RX ADMIN — LACOSAMIDE 200 MILLIGRAM(S): 50 TABLET ORAL at 22:42

## 2024-01-05 RX ADMIN — APIXABAN 5 MILLIGRAM(S): 2.5 TABLET, FILM COATED ORAL at 22:42

## 2024-01-05 RX ADMIN — Medication 40 MILLIGRAM(S): at 10:29

## 2024-01-05 RX ADMIN — Medication 75 MILLIGRAM(S): at 10:30

## 2024-01-05 RX ADMIN — Medication 4 MILLIGRAM(S): at 10:29

## 2024-01-05 RX ADMIN — Medication 650 MILLIGRAM(S): at 05:12

## 2024-01-05 RX ADMIN — TAMSULOSIN HYDROCHLORIDE 0.4 MILLIGRAM(S): 0.4 CAPSULE ORAL at 22:43

## 2024-01-05 RX ADMIN — ROPINIROLE 0.75 MILLIGRAM(S): 8 TABLET, FILM COATED, EXTENDED RELEASE ORAL at 22:43

## 2024-01-05 RX ADMIN — LEVETIRACETAM 1500 MILLIGRAM(S): 250 TABLET, FILM COATED ORAL at 10:29

## 2024-01-05 RX ADMIN — APIXABAN 5 MILLIGRAM(S): 2.5 TABLET, FILM COATED ORAL at 10:29

## 2024-01-05 RX ADMIN — ALBUTEROL 2.5 MILLIGRAM(S): 90 AEROSOL, METERED ORAL at 09:00

## 2024-01-05 RX ADMIN — Medication 1 PACKET(S): at 15:15

## 2024-01-05 NOTE — PROGRESS NOTE ADULT - ASSESSMENT
64 y/o M with a PMH of glioblastoma, seizure disorder, and DVT/PE who presented to  ED on 12/31/23 from [facility] with increased shortness of breath and cough for the past 3 days admitted with acute hypoxemic respiratory failure secondary to influenza virus.     #Acute hypoxemic respiratory failure secondary to influenza  - Initially saturating 88% on RA   - s/p IV solumedrol, Duoneb in the ED   - CXR official read pending - does not appear to be superimposed PNA  - Currently on 4L NC; continue supplemental oxygen   - Is on Decadron (due to GBM)  - Continue Oseltamavir 75 mg BID (Day 1 of 5)   - F/u pulmonology consult   - CT chest negative    #Glioblastoma multiforme   - Diagnosed on 9/27/22   - Pathology classification: WHO 4, IDH wild type, MGMT methylated   - Underwent large resection of mass on 10/3/22 with Dr. Turpin  - Last chemotherapy session was 12/20/23  - Most recent visit with Dr. Torres on 12/20/23 - plan to continue Avastin and follow-up MRI in 2 weeks   - Has appointment for outpatient MRI on 1/4/23  - Continue Dexamethasone 4 mg QD   -Repeat CT chest (1/4) with atelectasis but no infiltrate or consolidation. Procal (1/4) low. #Seizure Disorder   - Continue Keppra 1500 mg BID   - Continue Lacosamide 200 mg BID    # Hx of DVT/PE  - Above and below the knee DVT: Right femoral vein and left peroneal vein acute DVT on 3/7/23   - CT angio chest from 3/7/23: right lower lobe distal segmental PE  - Continue Eliquis 5 mg BID    #BPH   - Continue Tamsulosin 0.4 mg at bedtime     #Code Status   - FULL CODE    64 y/o M with a PMH of glioblastoma, seizure disorder, and DVT/PE who presented to  ED on 12/31/23 from [facility] with increased shortness of breath and cough for the past 3 days admitted with acute hypoxemic respiratory failure secondary to influenza virus.     #Acute hypoxemic respiratory failure secondary to influenza  - Initially saturating 88% on RA   - s/p IV solumedrol, Duoneb in the ED   - CXR official read pending - does not appear to be superimposed PNA  - Currently on 4L NC; continue supplemental oxygen   - Is on Decadron (due to GBM)  - Continue Oseltamavir 75 mg BID (Day 1 of 5)   - F/u pulmonology consult   - CT chest negative    #Glioblastoma multiforme   - Diagnosed on 9/27/22   - Pathology classification: WHO 4, IDH wild type, MGMT methylated   - Underwent large resection of mass on 10/3/22 with Dr. Turpin  - Last chemotherapy session was 12/20/23  - Most recent visit with Dr. Torres on 12/20/23 - plan to continue Avastin and follow-up MRI in 2 weeks   - Has appointment for outpatient MRI on 1/4/23  - Continue Dexamethasone 4 mg QD   -Repeat CT chest (1/4) with atelectasis but no infiltrate or consolidation. Procal (1/4) low. #Seizure Disorder   - Continue Keppra 1500 mg BID   - Continue Lacosamide 200 mg BID    # Hx of DVT/PE  - Above and below the knee DVT: Right femoral vein and left peroneal vein acute DVT on 3/7/23   - CT angio chest from 3/7/23: right lower lobe distal segmental PE  - Continue Eliquis 5 mg BID    #BPH   - Continue Tamsulosin 0.4 mg at bedtime     #Code Status   - FULL CODE

## 2024-01-05 NOTE — PROGRESS NOTE ADULT - SUBJECTIVE AND OBJECTIVE BOX
HOSPITALIST ATTENDING PROGRESS NOTE    Chart and meds reviewed.  Patient seen and examined.    CC: SOB/Cough    Subjective: Still with cough and sob. No Fever.     All other systems reviewed and found to be negative with the exception of what has been described above.    MEDICATIONS  (STANDING):  albuterol    0.083% 2.5 milliGRAM(s) Nebulizer every 6 hours  apixaban 5 milliGRAM(s) Oral every 12 hours  buDESOnide    Inhalation Suspension 0.5 milliGRAM(s) Inhalation two times a day  dexAMETHasone     Tablet 4 milliGRAM(s) Oral daily  lacosamide 200 milliGRAM(s) Oral two times a day  levETIRAcetam  Solution 1500 milliGRAM(s) Oral two times a day  methylPREDNISolone sodium succinate Injectable 40 milliGRAM(s) IV Push daily  nystatin Powder 1 Application(s) Topical three times a day  pantoprazole    Tablet 40 milliGRAM(s) Oral before breakfast  potassium phosphate / sodium phosphate Powder (PHOS-NaK) 1 Packet(s) Oral three times a day with meals  rOPINIRole 0.75 milliGRAM(s) Oral at bedtime  tamsulosin 0.4 milliGRAM(s) Oral at bedtime    MEDICATIONS  (PRN):  acetaminophen     Tablet .. 650 milliGRAM(s) Oral every 6 hours PRN Mild Pain (1 - 3)  benzonatate 100 milliGRAM(s) Oral every 8 hours PRN Cough  guaifenesin/dextromethorphan Oral Liquid 10 milliLiter(s) Oral every 6 hours PRN Cough    Vital Signs Last 24 Hrs  T(C): 36.2 (05 Jan 2024 08:15), Max: 36.3 (04 Jan 2024 15:48)  T(F): 97.2 (05 Jan 2024 08:15), Max: 97.4 (04 Jan 2024 15:48)  HR: 64 (05 Jan 2024 09:26) (56 - 101)  BP: 123/72 (05 Jan 2024 08:15) (118/74 - 123/72)  BP(mean): 84 (05 Jan 2024 08:15) (84 - 84)  RR: 18 (05 Jan 2024 08:15) (18 - 18)  SpO2: 94% (05 Jan 2024 09:26) (94% - 98%)    GEN: NAD  HEENT:  pupils equal and reactive, EOMI, no oropharyngeal lesions, erythema, exudates, oral thrush  NECK:   supple, no carotid bruits, no palpable lymph nodes, no thyromegaly  CV:  +S1, +S2, regular, no murmurs or rubs  RESP:   lungs clear to auscultation bilaterally, no wheezing, rales, rhonchi, good air entry bilaterally  GI:  abdomen soft, non-tender, non-distended, normal BS, no bruits, no abdominal masses, no palpable masses  EXT:  no clubbing, no cyanosis, no edema, no calf pain, swelling or erythema  NEURO:  AAOX3, no focal neurological deficits, follows all commands, able to move extremities spontaneously  SKIN:  no ulcers, lesions or rashes    LABS:                          15.4   6.08  )-----------( 114      ( 04 Jan 2024 07:52 )             46.5     01-04    144  |  110<H>  |  20  ----------------------------<  86  3.7   |  29  |  0.72    Ca    9.0      04 Jan 2024 07:52  Phos  2.0     01-04  Mg     2.5     01-04    TPro  6.0  /  Alb  2.3<L>  /  TBili  0.7  /  DBili  x   /  AST  35  /  ALT  126<H>  /  AlkPhos  46  01-04        LIVER FUNCTIONS - ( 04 Jan 2024 07:52 )  Alb: 2.3 g/dL / Pro: 6.0 gm/dL / ALK PHOS: 46 U/L / ALT: 126 U/L / AST: 35 U/L / GGT: x             Urinalysis Basic - ( 04 Jan 2024 07:52 )  Color: x / Appearance: x / SG: x / pH: x  Gluc: 86 mg/dL / Ketone: x  / Bili: x / Urobili: x   Blood: x / Protein: x / Nitrite: x   Leuk Esterase: x / RBC: x / WBC x   Sq Epi: x / Non Sq Epi: x / Bacteria: x     CT Chest No Cont (01.04.24 @ 13:46) >  IMPRESSION:    Linear and subsegmental atelectasis right lung with dependent bibasilar   atelectasis.    Stable elevation right hemidiaphragm.    Other findings as discussed above.      < end of copied text >

## 2024-01-06 ENCOUNTER — TRANSCRIPTION ENCOUNTER (OUTPATIENT)
Age: 64
End: 2024-01-06

## 2024-01-06 LAB
ANION GAP SERPL CALC-SCNC: 3 MMOL/L — LOW (ref 5–17)
ANION GAP SERPL CALC-SCNC: 3 MMOL/L — LOW (ref 5–17)
BUN SERPL-MCNC: 17 MG/DL — SIGNIFICANT CHANGE UP (ref 7–23)
BUN SERPL-MCNC: 17 MG/DL — SIGNIFICANT CHANGE UP (ref 7–23)
CALCIUM SERPL-MCNC: 9.2 MG/DL — SIGNIFICANT CHANGE UP (ref 8.5–10.1)
CALCIUM SERPL-MCNC: 9.2 MG/DL — SIGNIFICANT CHANGE UP (ref 8.5–10.1)
CHLORIDE SERPL-SCNC: 110 MMOL/L — HIGH (ref 96–108)
CHLORIDE SERPL-SCNC: 110 MMOL/L — HIGH (ref 96–108)
CO2 SERPL-SCNC: 31 MMOL/L — SIGNIFICANT CHANGE UP (ref 22–31)
CO2 SERPL-SCNC: 31 MMOL/L — SIGNIFICANT CHANGE UP (ref 22–31)
CREAT SERPL-MCNC: 0.76 MG/DL — SIGNIFICANT CHANGE UP (ref 0.5–1.3)
CREAT SERPL-MCNC: 0.76 MG/DL — SIGNIFICANT CHANGE UP (ref 0.5–1.3)
EGFR: 100 ML/MIN/1.73M2 — SIGNIFICANT CHANGE UP
EGFR: 100 ML/MIN/1.73M2 — SIGNIFICANT CHANGE UP
GLUCOSE SERPL-MCNC: 85 MG/DL — SIGNIFICANT CHANGE UP (ref 70–99)
GLUCOSE SERPL-MCNC: 85 MG/DL — SIGNIFICANT CHANGE UP (ref 70–99)
HCT VFR BLD CALC: 49 % — SIGNIFICANT CHANGE UP (ref 39–50)
HCT VFR BLD CALC: 49 % — SIGNIFICANT CHANGE UP (ref 39–50)
HGB BLD-MCNC: 15.9 G/DL — SIGNIFICANT CHANGE UP (ref 13–17)
HGB BLD-MCNC: 15.9 G/DL — SIGNIFICANT CHANGE UP (ref 13–17)
MCHC RBC-ENTMCNC: 29.1 PG — SIGNIFICANT CHANGE UP (ref 27–34)
MCHC RBC-ENTMCNC: 29.1 PG — SIGNIFICANT CHANGE UP (ref 27–34)
MCHC RBC-ENTMCNC: 32.4 GM/DL — SIGNIFICANT CHANGE UP (ref 32–36)
MCHC RBC-ENTMCNC: 32.4 GM/DL — SIGNIFICANT CHANGE UP (ref 32–36)
MCV RBC AUTO: 89.7 FL — SIGNIFICANT CHANGE UP (ref 80–100)
MCV RBC AUTO: 89.7 FL — SIGNIFICANT CHANGE UP (ref 80–100)
PLATELET # BLD AUTO: 131 K/UL — LOW (ref 150–400)
PLATELET # BLD AUTO: 131 K/UL — LOW (ref 150–400)
POTASSIUM SERPL-MCNC: 4.5 MMOL/L — SIGNIFICANT CHANGE UP (ref 3.5–5.3)
POTASSIUM SERPL-MCNC: 4.5 MMOL/L — SIGNIFICANT CHANGE UP (ref 3.5–5.3)
POTASSIUM SERPL-SCNC: 4.5 MMOL/L — SIGNIFICANT CHANGE UP (ref 3.5–5.3)
POTASSIUM SERPL-SCNC: 4.5 MMOL/L — SIGNIFICANT CHANGE UP (ref 3.5–5.3)
RBC # BLD: 5.46 M/UL — SIGNIFICANT CHANGE UP (ref 4.2–5.8)
RBC # BLD: 5.46 M/UL — SIGNIFICANT CHANGE UP (ref 4.2–5.8)
RBC # FLD: 15.9 % — HIGH (ref 10.3–14.5)
RBC # FLD: 15.9 % — HIGH (ref 10.3–14.5)
SODIUM SERPL-SCNC: 144 MMOL/L — SIGNIFICANT CHANGE UP (ref 135–145)
SODIUM SERPL-SCNC: 144 MMOL/L — SIGNIFICANT CHANGE UP (ref 135–145)
WBC # BLD: 7.14 K/UL — SIGNIFICANT CHANGE UP (ref 3.8–10.5)
WBC # BLD: 7.14 K/UL — SIGNIFICANT CHANGE UP (ref 3.8–10.5)
WBC # FLD AUTO: 7.14 K/UL — SIGNIFICANT CHANGE UP (ref 3.8–10.5)
WBC # FLD AUTO: 7.14 K/UL — SIGNIFICANT CHANGE UP (ref 3.8–10.5)

## 2024-01-06 PROCEDURE — 99232 SBSQ HOSP IP/OBS MODERATE 35: CPT

## 2024-01-06 RX ADMIN — APIXABAN 5 MILLIGRAM(S): 2.5 TABLET, FILM COATED ORAL at 09:58

## 2024-01-06 RX ADMIN — PANTOPRAZOLE SODIUM 40 MILLIGRAM(S): 20 TABLET, DELAYED RELEASE ORAL at 05:46

## 2024-01-06 RX ADMIN — NYSTATIN CREAM 1 APPLICATION(S): 100000 CREAM TOPICAL at 21:47

## 2024-01-06 RX ADMIN — LEVETIRACETAM 1500 MILLIGRAM(S): 250 TABLET, FILM COATED ORAL at 09:57

## 2024-01-06 RX ADMIN — TAMSULOSIN HYDROCHLORIDE 0.4 MILLIGRAM(S): 0.4 CAPSULE ORAL at 21:42

## 2024-01-06 RX ADMIN — ALBUTEROL 2.5 MILLIGRAM(S): 90 AEROSOL, METERED ORAL at 20:36

## 2024-01-06 RX ADMIN — Medication 0.5 MILLIGRAM(S): at 09:10

## 2024-01-06 RX ADMIN — ALBUTEROL 2.5 MILLIGRAM(S): 90 AEROSOL, METERED ORAL at 14:44

## 2024-01-06 RX ADMIN — Medication 4 MILLIGRAM(S): at 09:57

## 2024-01-06 RX ADMIN — APIXABAN 5 MILLIGRAM(S): 2.5 TABLET, FILM COATED ORAL at 21:42

## 2024-01-06 RX ADMIN — Medication 0.5 MILLIGRAM(S): at 20:40

## 2024-01-06 RX ADMIN — NYSTATIN CREAM 1 APPLICATION(S): 100000 CREAM TOPICAL at 05:46

## 2024-01-06 RX ADMIN — LACOSAMIDE 200 MILLIGRAM(S): 50 TABLET ORAL at 21:43

## 2024-01-06 RX ADMIN — LEVETIRACETAM 1500 MILLIGRAM(S): 250 TABLET, FILM COATED ORAL at 21:42

## 2024-01-06 RX ADMIN — LACOSAMIDE 200 MILLIGRAM(S): 50 TABLET ORAL at 09:58

## 2024-01-06 RX ADMIN — ALBUTEROL 2.5 MILLIGRAM(S): 90 AEROSOL, METERED ORAL at 09:10

## 2024-01-06 RX ADMIN — Medication 40 MILLIGRAM(S): at 09:58

## 2024-01-06 RX ADMIN — ROPINIROLE 0.75 MILLIGRAM(S): 8 TABLET, FILM COATED, EXTENDED RELEASE ORAL at 21:43

## 2024-01-06 NOTE — PROGRESS NOTE ADULT - SUBJECTIVE AND OBJECTIVE BOX
HOSPITALIST ATTENDING PROGRESS NOTE    Chart and meds reviewed.  Patient seen and examined.    CC: SOB    Subjective: Still with cough, wheeze and sob. No fever.     All other systems reviewed and found to be negative with the exception of what has been described above.    MEDICATIONS  (STANDING):  albuterol    0.083% 2.5 milliGRAM(s) Nebulizer every 6 hours  apixaban 5 milliGRAM(s) Oral every 12 hours  buDESOnide    Inhalation Suspension 0.5 milliGRAM(s) Inhalation two times a day  dexAMETHasone     Tablet 4 milliGRAM(s) Oral daily  lacosamide 200 milliGRAM(s) Oral two times a day  levETIRAcetam  Solution 1500 milliGRAM(s) Oral two times a day  methylPREDNISolone sodium succinate Injectable 40 milliGRAM(s) IV Push daily  nystatin Powder 1 Application(s) Topical three times a day  pantoprazole    Tablet 40 milliGRAM(s) Oral before breakfast  rOPINIRole 0.75 milliGRAM(s) Oral at bedtime  tamsulosin 0.4 milliGRAM(s) Oral at bedtime    MEDICATIONS  (PRN):  acetaminophen     Tablet .. 650 milliGRAM(s) Oral every 6 hours PRN Mild Pain (1 - 3)  benzonatate 100 milliGRAM(s) Oral every 8 hours PRN Cough  guaifenesin/dextromethorphan Oral Liquid 10 milliLiter(s) Oral every 6 hours PRN Cough    Vital Signs Last 24 Hrs  T(C): 36.4 (06 Jan 2024 05:30), Max: 37.1 (05 Jan 2024 21:49)  T(F): 97.5 (06 Jan 2024 05:30), Max: 98.7 (05 Jan 2024 21:49)  HR: 65 (06 Jan 2024 05:30) (65 - 87)  BP: 107/67 (06 Jan 2024 05:30) (104/60 - 114/68)  BP(mean): 77 (05 Jan 2024 22:05) (77 - 77)  RR: 18 (06 Jan 2024 05:30) (16 - 18)  SpO2: 97% (06 Jan 2024 05:30) (93% - 99%)    GEN: NAD  HEENT:  pupils equal and reactive, EOMI, no oropharyngeal lesions, erythema, exudates, oral thrush  NECK:   supple, no carotid bruits, no palpable lymph nodes, no thyromegaly  CV:  +S1, +S2, regular, no murmurs or rubs  RESP:  wheeze and ronchi  GI:  abdomen soft, non-tender, non-distended, normal BS, no bruits, no abdominal masses, no palpable masses  EXT:  no clubbing, no cyanosis, no edema, no calf pain, swelling or erythema  NEURO:  AAOX3, no focal neurological deficits, follows all commands, able to move extremities spontaneously  SKIN:  no ulcers, lesions or rashes    LABS:                          15.9   7.14  )-----------( 131      ( 06 Jan 2024 07:34 )             49.0     01-06    144  |  110<H>  |  17  ----------------------------<  85  4.5   |  31  |  0.76    Ca    9.2      06 Jan 2024 07:34      Urinalysis Basic - ( 06 Jan 2024 07:34 )  Color: x / Appearance: x / SG: x / pH: x  Gluc: 85 mg/dL / Ketone: x  / Bili: x / Urobili: x   Blood: x / Protein: x / Nitrite: x   Leuk Esterase: x / RBC: x / WBC x   Sq Epi: x / Non Sq Epi: x / Bacteria: x

## 2024-01-06 NOTE — DISCHARGE NOTE NURSING/CASE MANAGEMENT/SOCIAL WORK - NSDCPEFALRISK_GEN_ALL_CORE
For information on Fall & Injury Prevention, visit: [email or web address] OR  [email or web address] OR  https://www.cdc.gov/steadi/patient.html For information on Fall & Injury Prevention, visit: [email or web address] OR  [email or web address] OR  https://www.cdc.gov/steadi/patient.html

## 2024-01-06 NOTE — PROGRESS NOTE ADULT - ASSESSMENT
64 y/o M with a PMH of glioblastoma, seizure disorder, and DVT/PE who presented to  ED on 12/31/23 from [facility] with increased shortness of breath and cough for the past 3 days admitted with acute hypoxemic respiratory failure secondary to influenza virus.     #Acute hypoxemic respiratory failure secondary to influenza  - Initially saturating 88% on RA   - s/p IV solumedrol, Duoneb in the ED   - CXR official read pending - does not appear to be superimposed PNA  - Currently on 4L NC; continue supplemental oxygen   - Is on Decadron (due to GBM)  - Continue Oseltamavir 75 mg BID (Day 1 of 5)   - F/u pulmonology consult   - CT chest negative    #Glioblastoma multiforme   - Diagnosed on 9/27/22   - Pathology classification: WHO 4, IDH wild type, MGMT methylated   - Underwent large resection of mass on 10/3/22 with Dr. Turpin  - Last chemotherapy session was 12/20/23  - Most recent visit with Dr. Torres on 12/20/23 - plan to continue Avastin and follow-up MRI in 2 weeks   - Has appointment for outpatient MRI on 1/4/23  - Continue Dexamethasone 4 mg QD   -Repeat CT chest (1/4) with atelectasis but no infiltrate or consolidation. Procal (1/4) low. #Seizure Disorder   - Continue Keppra 1500 mg BID   - Continue Lacosamide 200 mg BID    # Hx of DVT/PE  - Above and below the knee DVT: Right femoral vein and left peroneal vein acute DVT on 3/7/23   - CT angio chest from 3/7/23: right lower lobe distal segmental PE  - Continue Eliquis 5 mg BID    #BPH   - Continue Tamsulosin 0.4 mg at bedtime     #Code Status   - FULL CODE        62 y/o M with a PMH of glioblastoma, seizure disorder, and DVT/PE who presented to  ED on 12/31/23 from [facility] with increased shortness of breath and cough for the past 3 days admitted with acute hypoxemic respiratory failure secondary to influenza virus.     #Acute hypoxemic respiratory failure secondary to influenza  - Initially saturating 88% on RA   - s/p IV solumedrol, Duoneb in the ED   - CXR official read pending - does not appear to be superimposed PNA  - Currently on 4L NC; continue supplemental oxygen   - Is on Decadron (due to GBM)  - Continue Oseltamavir 75 mg BID (Day 1 of 5)   - F/u pulmonology consult   - CT chest negative    #Glioblastoma multiforme   - Diagnosed on 9/27/22   - Pathology classification: WHO 4, IDH wild type, MGMT methylated   - Underwent large resection of mass on 10/3/22 with Dr. Turpin  - Last chemotherapy session was 12/20/23  - Most recent visit with Dr. Torres on 12/20/23 - plan to continue Avastin and follow-up MRI in 2 weeks   - Has appointment for outpatient MRI on 1/4/23  - Continue Dexamethasone 4 mg QD   -Repeat CT chest (1/4) with atelectasis but no infiltrate or consolidation. Procal (1/4) low. #Seizure Disorder   - Continue Keppra 1500 mg BID   - Continue Lacosamide 200 mg BID    # Hx of DVT/PE  - Above and below the knee DVT: Right femoral vein and left peroneal vein acute DVT on 3/7/23   - CT angio chest from 3/7/23: right lower lobe distal segmental PE  - Continue Eliquis 5 mg BID    #BPH   - Continue Tamsulosin 0.4 mg at bedtime     #Code Status   - FULL CODE

## 2024-01-06 NOTE — DISCHARGE NOTE NURSING/CASE MANAGEMENT/SOCIAL WORK - NSDCPEPTCAREGIVEDUMATLIST _GEN_ALL_CORE
Apixaban/Eliquis Skyrizi Counseling: I discussed with the patient the risks of risankizumab-rzaa including but not limited to immunosuppression, and serious infections.  The patient understands that monitoring is required including a PPD at baseline and must alert us or the primary physician if symptoms of infection or other concerning signs are noted.

## 2024-01-06 NOTE — DISCHARGE NOTE NURSING/CASE MANAGEMENT/SOCIAL WORK - NSSCNAMETXT_GEN_ALL_CORE
[facility] (pending insurance approval) [facility] (pending insurance approval)

## 2024-01-06 NOTE — DISCHARGE NOTE NURSING/CASE MANAGEMENT/SOCIAL WORK - PATIENT PORTAL LINK FT
You can access the FollowMyHealth Patient Portal offered by [facility] by registering at the following website: [email or web address]. By joining [facility]’s FollowMyHealth portal, you will also be able to view your health information using other applications (apps) compatible with our system. You can access the FollowMyHealth Patient Portal offered by [facility] by registering at the following website: [email or web address]. By joining [facility]’s FollowMyHealth portal, you will also be able to view your health information using other applications (apps) compatible with our system.

## 2024-01-07 LAB
ALBUMIN SERPL ELPH-MCNC: 2.4 G/DL — LOW (ref 3.3–5)
ALBUMIN SERPL ELPH-MCNC: 2.4 G/DL — LOW (ref 3.3–5)
ALP SERPL-CCNC: 47 U/L — SIGNIFICANT CHANGE UP (ref 40–120)
ALP SERPL-CCNC: 47 U/L — SIGNIFICANT CHANGE UP (ref 40–120)
ALT FLD-CCNC: 95 U/L — HIGH (ref 12–78)
ALT FLD-CCNC: 95 U/L — HIGH (ref 12–78)
ANION GAP SERPL CALC-SCNC: 2 MMOL/L — LOW (ref 5–17)
ANION GAP SERPL CALC-SCNC: 2 MMOL/L — LOW (ref 5–17)
AST SERPL-CCNC: 20 U/L — SIGNIFICANT CHANGE UP (ref 15–37)
AST SERPL-CCNC: 20 U/L — SIGNIFICANT CHANGE UP (ref 15–37)
BILIRUB SERPL-MCNC: 0.7 MG/DL — SIGNIFICANT CHANGE UP (ref 0.2–1.2)
BILIRUB SERPL-MCNC: 0.7 MG/DL — SIGNIFICANT CHANGE UP (ref 0.2–1.2)
BUN SERPL-MCNC: 16 MG/DL — SIGNIFICANT CHANGE UP (ref 7–23)
BUN SERPL-MCNC: 16 MG/DL — SIGNIFICANT CHANGE UP (ref 7–23)
CALCIUM SERPL-MCNC: 8.9 MG/DL — SIGNIFICANT CHANGE UP (ref 8.5–10.1)
CALCIUM SERPL-MCNC: 8.9 MG/DL — SIGNIFICANT CHANGE UP (ref 8.5–10.1)
CHLORIDE SERPL-SCNC: 111 MMOL/L — HIGH (ref 96–108)
CHLORIDE SERPL-SCNC: 111 MMOL/L — HIGH (ref 96–108)
CO2 SERPL-SCNC: 29 MMOL/L — SIGNIFICANT CHANGE UP (ref 22–31)
CO2 SERPL-SCNC: 29 MMOL/L — SIGNIFICANT CHANGE UP (ref 22–31)
CREAT SERPL-MCNC: 0.81 MG/DL — SIGNIFICANT CHANGE UP (ref 0.5–1.3)
CREAT SERPL-MCNC: 0.81 MG/DL — SIGNIFICANT CHANGE UP (ref 0.5–1.3)
EGFR: 98 ML/MIN/1.73M2 — SIGNIFICANT CHANGE UP
EGFR: 98 ML/MIN/1.73M2 — SIGNIFICANT CHANGE UP
GLUCOSE SERPL-MCNC: 82 MG/DL — SIGNIFICANT CHANGE UP (ref 70–99)
GLUCOSE SERPL-MCNC: 82 MG/DL — SIGNIFICANT CHANGE UP (ref 70–99)
HCT VFR BLD CALC: 48.7 % — SIGNIFICANT CHANGE UP (ref 39–50)
HCT VFR BLD CALC: 48.7 % — SIGNIFICANT CHANGE UP (ref 39–50)
HGB BLD-MCNC: 15.5 G/DL — SIGNIFICANT CHANGE UP (ref 13–17)
HGB BLD-MCNC: 15.5 G/DL — SIGNIFICANT CHANGE UP (ref 13–17)
MCHC RBC-ENTMCNC: 28.6 PG — SIGNIFICANT CHANGE UP (ref 27–34)
MCHC RBC-ENTMCNC: 28.6 PG — SIGNIFICANT CHANGE UP (ref 27–34)
MCHC RBC-ENTMCNC: 31.8 GM/DL — LOW (ref 32–36)
MCHC RBC-ENTMCNC: 31.8 GM/DL — LOW (ref 32–36)
MCV RBC AUTO: 89.9 FL — SIGNIFICANT CHANGE UP (ref 80–100)
MCV RBC AUTO: 89.9 FL — SIGNIFICANT CHANGE UP (ref 80–100)
PLATELET # BLD AUTO: 141 K/UL — LOW (ref 150–400)
PLATELET # BLD AUTO: 141 K/UL — LOW (ref 150–400)
POTASSIUM SERPL-MCNC: 3.6 MMOL/L — SIGNIFICANT CHANGE UP (ref 3.5–5.3)
POTASSIUM SERPL-MCNC: 3.6 MMOL/L — SIGNIFICANT CHANGE UP (ref 3.5–5.3)
POTASSIUM SERPL-SCNC: 3.6 MMOL/L — SIGNIFICANT CHANGE UP (ref 3.5–5.3)
POTASSIUM SERPL-SCNC: 3.6 MMOL/L — SIGNIFICANT CHANGE UP (ref 3.5–5.3)
PROT SERPL-MCNC: 6 GM/DL — SIGNIFICANT CHANGE UP (ref 6–8.3)
PROT SERPL-MCNC: 6 GM/DL — SIGNIFICANT CHANGE UP (ref 6–8.3)
RBC # BLD: 5.42 M/UL — SIGNIFICANT CHANGE UP (ref 4.2–5.8)
RBC # BLD: 5.42 M/UL — SIGNIFICANT CHANGE UP (ref 4.2–5.8)
RBC # FLD: 15.9 % — HIGH (ref 10.3–14.5)
RBC # FLD: 15.9 % — HIGH (ref 10.3–14.5)
SODIUM SERPL-SCNC: 142 MMOL/L — SIGNIFICANT CHANGE UP (ref 135–145)
SODIUM SERPL-SCNC: 142 MMOL/L — SIGNIFICANT CHANGE UP (ref 135–145)
WBC # BLD: 6.97 K/UL — SIGNIFICANT CHANGE UP (ref 3.8–10.5)
WBC # BLD: 6.97 K/UL — SIGNIFICANT CHANGE UP (ref 3.8–10.5)
WBC # FLD AUTO: 6.97 K/UL — SIGNIFICANT CHANGE UP (ref 3.8–10.5)
WBC # FLD AUTO: 6.97 K/UL — SIGNIFICANT CHANGE UP (ref 3.8–10.5)

## 2024-01-07 PROCEDURE — 99232 SBSQ HOSP IP/OBS MODERATE 35: CPT

## 2024-01-07 RX ORDER — LANOLIN ALCOHOL/MO/W.PET/CERES
3 CREAM (GRAM) TOPICAL AT BEDTIME
Refills: 0 | Status: DISCONTINUED | OUTPATIENT
Start: 2024-01-07 | End: 2024-01-08

## 2024-01-07 RX ADMIN — Medication 0.5 MILLIGRAM(S): at 09:05

## 2024-01-07 RX ADMIN — LACOSAMIDE 200 MILLIGRAM(S): 50 TABLET ORAL at 09:59

## 2024-01-07 RX ADMIN — APIXABAN 5 MILLIGRAM(S): 2.5 TABLET, FILM COATED ORAL at 09:55

## 2024-01-07 RX ADMIN — NYSTATIN CREAM 1 APPLICATION(S): 100000 CREAM TOPICAL at 13:53

## 2024-01-07 RX ADMIN — NYSTATIN CREAM 1 APPLICATION(S): 100000 CREAM TOPICAL at 21:50

## 2024-01-07 RX ADMIN — PANTOPRAZOLE SODIUM 40 MILLIGRAM(S): 20 TABLET, DELAYED RELEASE ORAL at 05:54

## 2024-01-07 RX ADMIN — ALBUTEROL 2.5 MILLIGRAM(S): 90 AEROSOL, METERED ORAL at 14:33

## 2024-01-07 RX ADMIN — TAMSULOSIN HYDROCHLORIDE 0.4 MILLIGRAM(S): 0.4 CAPSULE ORAL at 21:53

## 2024-01-07 RX ADMIN — APIXABAN 5 MILLIGRAM(S): 2.5 TABLET, FILM COATED ORAL at 21:53

## 2024-01-07 RX ADMIN — Medication 0.5 MILLIGRAM(S): at 20:03

## 2024-01-07 RX ADMIN — Medication 4 MILLIGRAM(S): at 09:55

## 2024-01-07 RX ADMIN — Medication 40 MILLIGRAM(S): at 09:56

## 2024-01-07 RX ADMIN — ALBUTEROL 2.5 MILLIGRAM(S): 90 AEROSOL, METERED ORAL at 20:03

## 2024-01-07 RX ADMIN — NYSTATIN CREAM 1 APPLICATION(S): 100000 CREAM TOPICAL at 05:54

## 2024-01-07 RX ADMIN — LEVETIRACETAM 1500 MILLIGRAM(S): 250 TABLET, FILM COATED ORAL at 09:56

## 2024-01-07 RX ADMIN — Medication 3 MILLIGRAM(S): at 22:49

## 2024-01-07 RX ADMIN — LACOSAMIDE 200 MILLIGRAM(S): 50 TABLET ORAL at 21:53

## 2024-01-07 RX ADMIN — ROPINIROLE 0.75 MILLIGRAM(S): 8 TABLET, FILM COATED, EXTENDED RELEASE ORAL at 21:53

## 2024-01-07 RX ADMIN — ALBUTEROL 2.5 MILLIGRAM(S): 90 AEROSOL, METERED ORAL at 09:04

## 2024-01-07 RX ADMIN — LEVETIRACETAM 1500 MILLIGRAM(S): 250 TABLET, FILM COATED ORAL at 21:51

## 2024-01-07 NOTE — PROGRESS NOTE ADULT - SUBJECTIVE AND OBJECTIVE BOX
HOSPITALIST ATTENDING PROGRESS NOTE    Chart and meds reviewed.  Patient seen and examined.    CC: SOB    Subjective: Still with cough and wheeze. No acute issues overnight. No fever.     All other systems reviewed and found to be negative with the exception of what has been described above.    MEDICATIONS  (STANDING):  albuterol    0.083% 2.5 milliGRAM(s) Nebulizer every 6 hours  apixaban 5 milliGRAM(s) Oral every 12 hours  buDESOnide    Inhalation Suspension 0.5 milliGRAM(s) Inhalation two times a day  dexAMETHasone     Tablet 4 milliGRAM(s) Oral daily  lacosamide 200 milliGRAM(s) Oral two times a day  levETIRAcetam  Solution 1500 milliGRAM(s) Oral two times a day  methylPREDNISolone sodium succinate Injectable 40 milliGRAM(s) IV Push daily  nystatin Powder 1 Application(s) Topical three times a day  pantoprazole    Tablet 40 milliGRAM(s) Oral before breakfast  rOPINIRole 0.75 milliGRAM(s) Oral at bedtime  tamsulosin 0.4 milliGRAM(s) Oral at bedtime    MEDICATIONS  (PRN):  acetaminophen     Tablet .. 650 milliGRAM(s) Oral every 6 hours PRN Mild Pain (1 - 3)  benzonatate 100 milliGRAM(s) Oral every 8 hours PRN Cough  guaifenesin/dextromethorphan Oral Liquid 10 milliLiter(s) Oral every 6 hours PRN Cough    Vital Signs Last 24 Hrs  T(C): 36.9 (06 Jan 2024 21:06), Max: 36.9 (06 Jan 2024 15:47)  T(F): 98.4 (06 Jan 2024 21:06), Max: 98.4 (06 Jan 2024 15:47)  HR: 78 (06 Jan 2024 21:06) (73 - 78)  BP: 102/54 (06 Jan 2024 21:06) (102/54 - 112/65)  RR: 18 (06 Jan 2024 21:06) (18 - 18)  SpO2: 99% (06 Jan 2024 21:06) (95% - 99%)    GEN: NAD  HEENT:  pupils equal and reactive, EOMI, no oropharyngeal lesions, erythema, exudates, oral thrush  NECK:   supple, no carotid bruits, no palpable lymph nodes, no thyromegaly  CV:  +S1, +S2, regular, no murmurs or rubs  RESP: Bilateral wheeze and ronchi  GI:  abdomen soft, non-tender, non-distended, normal BS, no bruits, no abdominal masses, no palpable masses  EXT:  no clubbing, no cyanosis, no edema, no calf pain, swelling or erythema  NEURO:  AAOX2, no focal neurological deficits, follows all commands, able to move extremities spontaneously  SKIN:  no ulcers, lesions or rashes    LABS:                          15.5   6.97  )-----------( 141      ( 07 Jan 2024 08:44 )             48.7     01-07    142  |  111<H>  |  16  ----------------------------<  82  3.6   |  29  |  0.81    Ca    8.9      07 Jan 2024 08:44    TPro  6.0  /  Alb  2.4<L>  /  TBili  0.7  /  DBili  x   /  AST  20  /  ALT  95<H>  /  AlkPhos  47  01-07      LIVER FUNCTIONS - ( 07 Jan 2024 08:44 )  Alb: 2.4 g/dL / Pro: 6.0 gm/dL / ALK PHOS: 47 U/L / ALT: 95 U/L / AST: 20 U/L / GGT: x           Urinalysis Basic - ( 07 Jan 2024 08:44 )  Color: x / Appearance: x / SG: x / pH: x  Gluc: 82 mg/dL / Ketone: x  / Bili: x / Urobili: x   Blood: x / Protein: x / Nitrite: x   Leuk Esterase: x / RBC: x / WBC x   Sq Epi: x / Non Sq Epi: x / Bacteria: x       CT Chest No Cont (01.04.24 @ 13:46) >  IMPRESSION:    Linear and subsegmental atelectasis right lung with dependent bibasilar   atelectasis.    Stable elevation right hemidiaphragm.    Other findings as discussed above.       Xray Chest 1 View- PORTABLE-Routine (Xray Chest 1 View- PORTABLE-Routine in AM.) (01.04.24 @ 08:06) >  IMPRESSION:    No focal consolidations.    --- End of Report ---

## 2024-01-07 NOTE — PROGRESS NOTE ADULT - ASSESSMENT
64 y/o M with a PMH of glioblastoma, seizure disorder, and DVT/PE who presented to  ED on 12/31/23 from [facility] with increased shortness of breath and cough for the past 3 days admitted with acute hypoxemic respiratory failure secondary to influenza virus.     #Acute hypoxemic respiratory failure secondary to influenza  - Initially saturating 88% on RA   - s/p IV solumedrol, Duoneb in the ED   - CXR official read pending - does not appear to be superimposed PNA  - Currently on 4L NC; continue supplemental oxygen   - Is on Decadron (due to GBM)  - Continue Oseltamavir 75 mg BID (Day 1 of 5)   - F/u pulmonology consult   - CT chest negative    #Glioblastoma multiforme   - Diagnosed on 9/27/22   - Pathology classification: WHO 4, IDH wild type, MGMT methylated   - Underwent large resection of mass on 10/3/22 with Dr. Turpin  - Last chemotherapy session was 12/20/23  - Most recent visit with Dr. Torres on 12/20/23 - plan to continue Avastin and follow-up MRI in 2 weeks   - Has appointment for outpatient MRI on 1/4/23  - Continue Dexamethasone 4 mg QD   -Repeat CT chest (1/4) with atelectasis but no infiltrate or consolidation. Procal (1/4) low.     #Seizure Disorder   - Continue Keppra 1500 mg BID   - Continue Lacosamide 200 mg BID    # Hx of DVT/PE  - Above and below the knee DVT: Right femoral vein and left peroneal vein acute DVT on 3/7/23   - CT angio chest from 3/7/23: right lower lobe distal segmental PE  - Continue Eliquis 5 mg BID    #BPH   - Continue Tamsulosin 0.4 mg at bedtime     #Code Status   - Full Code    62 y/o M with a PMH of glioblastoma, seizure disorder, and DVT/PE who presented to  ED on 12/31/23 from [facility] with increased shortness of breath and cough for the past 3 days admitted with acute hypoxemic respiratory failure secondary to influenza virus.     #Acute hypoxemic respiratory failure secondary to influenza  - Initially saturating 88% on RA   - s/p IV solumedrol, Duoneb in the ED   - CXR official read pending - does not appear to be superimposed PNA  - Currently on 4L NC; continue supplemental oxygen   - Is on Decadron (due to GBM)  - Continue Oseltamavir 75 mg BID (Day 1 of 5)   - F/u pulmonology consult   - CT chest negative    #Glioblastoma multiforme   - Diagnosed on 9/27/22   - Pathology classification: WHO 4, IDH wild type, MGMT methylated   - Underwent large resection of mass on 10/3/22 with Dr. Turpin  - Last chemotherapy session was 12/20/23  - Most recent visit with Dr. Torres on 12/20/23 - plan to continue Avastin and follow-up MRI in 2 weeks   - Has appointment for outpatient MRI on 1/4/23  - Continue Dexamethasone 4 mg QD   -Repeat CT chest (1/4) with atelectasis but no infiltrate or consolidation. Procal (1/4) low.     #Seizure Disorder   - Continue Keppra 1500 mg BID   - Continue Lacosamide 200 mg BID    # Hx of DVT/PE  - Above and below the knee DVT: Right femoral vein and left peroneal vein acute DVT on 3/7/23   - CT angio chest from 3/7/23: right lower lobe distal segmental PE  - Continue Eliquis 5 mg BID    #BPH   - Continue Tamsulosin 0.4 mg at bedtime     #Code Status   - Full Code    62 y/o M with a PMH of glioblastoma, seizure disorder, and DVT/PE who presented to  ED on 12/31/23 from [facility] with increased shortness of breath and cough for the past 3 days admitted with acute hypoxemic respiratory failure secondary to influenza virus.     #Acute hypoxemic respiratory failure secondary to influenza  - Initially saturating 88% on RA   - s/p IV solumedrol, Duoneb in the ED   - CXR official read pending - does not appear to be superimposed PNA  - Currently on 4L NC; continue supplemental oxygen   - Is on Decadron (due to GBM)  - Continue Oseltamavir 75 mg BID (Day 1 of 5)   - F/u pulmonology consult   - CT chest negative    #Glioblastoma multiforme   - Diagnosed on 9/27/22   - Pathology classification: WHO 4, IDH wild type, MGMT methylated   - Underwent large resection of mass on 10/3/22 with Dr. Turpin  - Last chemotherapy session was 12/20/23  - Most recent visit with Dr. Torres on 12/20/23 - plan to continue Avastin and follow-up MRI in 2 weeks   - Has appointment for outpatient MRI on 1/4/23  - Continue Dexamethasone 4 mg QD   -Repeat CT chest (1/4) with atelectasis but no infiltrate or consolidation. Procal (1/4) low.     #Seizure Disorder   - Continue Keppra 1500 mg BID   - Continue Lacosamide 200 mg BID    # Hx of DVT/PE  - Above and below the knee DVT: Right femoral vein and left peroneal vein acute DVT on 3/7/23   - CT angio chest from 3/7/23: right lower lobe distal segmental PE  - Continue Eliquis 5 mg BID    #BPH   - Continue Tamsulosin 0.4 mg at bedtime     #Code Status   - Full Code    # FU resp status, still wheezing.     64 y/o M with a PMH of glioblastoma, seizure disorder, and DVT/PE who presented to  ED on 12/31/23 from [facility] with increased shortness of breath and cough for the past 3 days admitted with acute hypoxemic respiratory failure secondary to influenza virus.     #Acute hypoxemic respiratory failure secondary to influenza  - Initially saturating 88% on RA   - s/p IV solumedrol, Duoneb in the ED   - CXR official read pending - does not appear to be superimposed PNA  - Currently on 4L NC; continue supplemental oxygen   - Is on Decadron (due to GBM)  - Continue Oseltamavir 75 mg BID (Day 1 of 5)   - F/u pulmonology consult   - CT chest negative    #Glioblastoma multiforme   - Diagnosed on 9/27/22   - Pathology classification: WHO 4, IDH wild type, MGMT methylated   - Underwent large resection of mass on 10/3/22 with Dr. Turpin  - Last chemotherapy session was 12/20/23  - Most recent visit with Dr. Torres on 12/20/23 - plan to continue Avastin and follow-up MRI in 2 weeks   - Has appointment for outpatient MRI on 1/4/23  - Continue Dexamethasone 4 mg QD   -Repeat CT chest (1/4) with atelectasis but no infiltrate or consolidation. Procal (1/4) low.     #Seizure Disorder   - Continue Keppra 1500 mg BID   - Continue Lacosamide 200 mg BID    # Hx of DVT/PE  - Above and below the knee DVT: Right femoral vein and left peroneal vein acute DVT on 3/7/23   - CT angio chest from 3/7/23: right lower lobe distal segmental PE  - Continue Eliquis 5 mg BID    #BPH   - Continue Tamsulosin 0.4 mg at bedtime     #Code Status   - Full Code    # FU resp status, still wheezing.

## 2024-01-07 NOTE — PROGRESS NOTE ADULT - REASON FOR ADMISSION
increased shortness of breath and cough

## 2024-01-08 ENCOUNTER — TRANSCRIPTION ENCOUNTER (OUTPATIENT)
Age: 64
End: 2024-01-08

## 2024-01-08 VITALS
SYSTOLIC BLOOD PRESSURE: 134 MMHG | HEART RATE: 87 BPM | DIASTOLIC BLOOD PRESSURE: 86 MMHG | TEMPERATURE: 98 F | RESPIRATION RATE: 17 BRPM | OXYGEN SATURATION: 96 %

## 2024-01-08 LAB
ALBUMIN SERPL ELPH-MCNC: 2.6 G/DL — LOW (ref 3.3–5)
ALBUMIN SERPL ELPH-MCNC: 2.6 G/DL — LOW (ref 3.3–5)
ALP SERPL-CCNC: 50 U/L — SIGNIFICANT CHANGE UP (ref 40–120)
ALP SERPL-CCNC: 50 U/L — SIGNIFICANT CHANGE UP (ref 40–120)
ALT FLD-CCNC: 88 U/L — HIGH (ref 12–78)
ALT FLD-CCNC: 88 U/L — HIGH (ref 12–78)
ANION GAP SERPL CALC-SCNC: 4 MMOL/L — LOW (ref 5–17)
ANION GAP SERPL CALC-SCNC: 4 MMOL/L — LOW (ref 5–17)
AST SERPL-CCNC: 14 U/L — LOW (ref 15–37)
AST SERPL-CCNC: 14 U/L — LOW (ref 15–37)
BILIRUB SERPL-MCNC: 0.5 MG/DL — SIGNIFICANT CHANGE UP (ref 0.2–1.2)
BILIRUB SERPL-MCNC: 0.5 MG/DL — SIGNIFICANT CHANGE UP (ref 0.2–1.2)
BUN SERPL-MCNC: 18 MG/DL — SIGNIFICANT CHANGE UP (ref 7–23)
BUN SERPL-MCNC: 18 MG/DL — SIGNIFICANT CHANGE UP (ref 7–23)
CALCIUM SERPL-MCNC: 9.2 MG/DL — SIGNIFICANT CHANGE UP (ref 8.5–10.1)
CALCIUM SERPL-MCNC: 9.2 MG/DL — SIGNIFICANT CHANGE UP (ref 8.5–10.1)
CHLORIDE SERPL-SCNC: 111 MMOL/L — HIGH (ref 96–108)
CHLORIDE SERPL-SCNC: 111 MMOL/L — HIGH (ref 96–108)
CO2 SERPL-SCNC: 28 MMOL/L — SIGNIFICANT CHANGE UP (ref 22–31)
CO2 SERPL-SCNC: 28 MMOL/L — SIGNIFICANT CHANGE UP (ref 22–31)
CREAT SERPL-MCNC: 0.91 MG/DL — SIGNIFICANT CHANGE UP (ref 0.5–1.3)
CREAT SERPL-MCNC: 0.91 MG/DL — SIGNIFICANT CHANGE UP (ref 0.5–1.3)
EGFR: 94 ML/MIN/1.73M2 — SIGNIFICANT CHANGE UP
EGFR: 94 ML/MIN/1.73M2 — SIGNIFICANT CHANGE UP
GLUCOSE SERPL-MCNC: 106 MG/DL — HIGH (ref 70–99)
GLUCOSE SERPL-MCNC: 106 MG/DL — HIGH (ref 70–99)
HCT VFR BLD CALC: 50.5 % — HIGH (ref 39–50)
HCT VFR BLD CALC: 50.5 % — HIGH (ref 39–50)
HGB BLD-MCNC: 16.4 G/DL — SIGNIFICANT CHANGE UP (ref 13–17)
HGB BLD-MCNC: 16.4 G/DL — SIGNIFICANT CHANGE UP (ref 13–17)
MCHC RBC-ENTMCNC: 28.9 PG — SIGNIFICANT CHANGE UP (ref 27–34)
MCHC RBC-ENTMCNC: 28.9 PG — SIGNIFICANT CHANGE UP (ref 27–34)
MCHC RBC-ENTMCNC: 32.5 GM/DL — SIGNIFICANT CHANGE UP (ref 32–36)
MCHC RBC-ENTMCNC: 32.5 GM/DL — SIGNIFICANT CHANGE UP (ref 32–36)
MCV RBC AUTO: 89.1 FL — SIGNIFICANT CHANGE UP (ref 80–100)
MCV RBC AUTO: 89.1 FL — SIGNIFICANT CHANGE UP (ref 80–100)
PLATELET # BLD AUTO: 141 K/UL — LOW (ref 150–400)
PLATELET # BLD AUTO: 141 K/UL — LOW (ref 150–400)
POTASSIUM SERPL-MCNC: 3.7 MMOL/L — SIGNIFICANT CHANGE UP (ref 3.5–5.3)
POTASSIUM SERPL-MCNC: 3.7 MMOL/L — SIGNIFICANT CHANGE UP (ref 3.5–5.3)
POTASSIUM SERPL-SCNC: 3.7 MMOL/L — SIGNIFICANT CHANGE UP (ref 3.5–5.3)
POTASSIUM SERPL-SCNC: 3.7 MMOL/L — SIGNIFICANT CHANGE UP (ref 3.5–5.3)
PROT SERPL-MCNC: 6.2 GM/DL — SIGNIFICANT CHANGE UP (ref 6–8.3)
PROT SERPL-MCNC: 6.2 GM/DL — SIGNIFICANT CHANGE UP (ref 6–8.3)
RBC # BLD: 5.67 M/UL — SIGNIFICANT CHANGE UP (ref 4.2–5.8)
RBC # BLD: 5.67 M/UL — SIGNIFICANT CHANGE UP (ref 4.2–5.8)
RBC # FLD: 16 % — HIGH (ref 10.3–14.5)
RBC # FLD: 16 % — HIGH (ref 10.3–14.5)
SARS-COV-2 RNA SPEC QL NAA+PROBE: SIGNIFICANT CHANGE UP
SARS-COV-2 RNA SPEC QL NAA+PROBE: SIGNIFICANT CHANGE UP
SODIUM SERPL-SCNC: 143 MMOL/L — SIGNIFICANT CHANGE UP (ref 135–145)
SODIUM SERPL-SCNC: 143 MMOL/L — SIGNIFICANT CHANGE UP (ref 135–145)
WBC # BLD: 7.07 K/UL — SIGNIFICANT CHANGE UP (ref 3.8–10.5)
WBC # BLD: 7.07 K/UL — SIGNIFICANT CHANGE UP (ref 3.8–10.5)
WBC # FLD AUTO: 7.07 K/UL — SIGNIFICANT CHANGE UP (ref 3.8–10.5)
WBC # FLD AUTO: 7.07 K/UL — SIGNIFICANT CHANGE UP (ref 3.8–10.5)

## 2024-01-08 PROCEDURE — 99239 HOSP IP/OBS DSCHRG MGMT >30: CPT

## 2024-01-08 RX ORDER — BUDESONIDE, MICRONIZED 100 %
1 POWDER (GRAM) MISCELLANEOUS
Qty: 0 | Refills: 0 | DISCHARGE
Start: 2024-01-08

## 2024-01-08 RX ORDER — ALBUTEROL 90 UG/1
1 AEROSOL, METERED ORAL
Qty: 0 | Refills: 0 | DISCHARGE
Start: 2024-01-08

## 2024-01-08 RX ORDER — GUAIFENESIN/DEXTROMETHORPHAN 600MG-30MG
10 TABLET, EXTENDED RELEASE 12 HR ORAL
Qty: 0 | Refills: 0 | DISCHARGE
Start: 2024-01-08

## 2024-01-08 RX ORDER — NYSTATIN CREAM 100000 [USP'U]/G
1 CREAM TOPICAL
Qty: 0 | Refills: 0 | DISCHARGE
Start: 2024-01-08

## 2024-01-08 RX ADMIN — NYSTATIN CREAM 1 APPLICATION(S): 100000 CREAM TOPICAL at 05:19

## 2024-01-08 RX ADMIN — Medication 4 MILLIGRAM(S): at 10:00

## 2024-01-08 RX ADMIN — ALBUTEROL 2.5 MILLIGRAM(S): 90 AEROSOL, METERED ORAL at 02:09

## 2024-01-08 RX ADMIN — ALBUTEROL 2.5 MILLIGRAM(S): 90 AEROSOL, METERED ORAL at 08:08

## 2024-01-08 RX ADMIN — Medication 40 MILLIGRAM(S): at 12:10

## 2024-01-08 RX ADMIN — NYSTATIN CREAM 1 APPLICATION(S): 100000 CREAM TOPICAL at 14:21

## 2024-01-08 RX ADMIN — Medication 0.5 MILLIGRAM(S): at 08:08

## 2024-01-08 RX ADMIN — APIXABAN 5 MILLIGRAM(S): 2.5 TABLET, FILM COATED ORAL at 10:00

## 2024-01-08 RX ADMIN — PANTOPRAZOLE SODIUM 40 MILLIGRAM(S): 20 TABLET, DELAYED RELEASE ORAL at 05:19

## 2024-01-08 RX ADMIN — LEVETIRACETAM 1500 MILLIGRAM(S): 250 TABLET, FILM COATED ORAL at 11:55

## 2024-01-08 RX ADMIN — LACOSAMIDE 200 MILLIGRAM(S): 50 TABLET ORAL at 09:59

## 2024-01-08 NOTE — DISCHARGE NOTE PROVIDER - HOSPITAL COURSE
Chart and meds reviewed.  Patient seen and examined.  CC: SOB  1/8/24 - denies cp, dyspnea, eager to go back to NH, refusing IV line placement , afebrile, wheezing improving, not a good historian   ROS - All other systems reviewed and found to be negative with the exception of what has been described above.  Vital sings reviewed for last 24 h  T(C): 36.3 (01-08-24 @ 09:54), Max: 37.1 (01-07-24 @ 21:19)  T(F): 97.3 (01-08-24 @ 09:54), Max: 98.7 (01-07-24 @ 21:19)  HR: 106 (01-08-24 @ 09:54) (74 - 106)  BP: 110/82 (01-08-24 @ 09:54) (110/82 - 116/72)  RR: 18 (01-08-24 @ 09:54) (18 - 18)  SpO2: 95% (01-08-24 @ 09:54) (95% - 98%)  PHYSICAL EXAM:  Constitutional: NAD, awake and alert   HEENT: PERR, EOMI, Normal Hearing, MMM  Neck: Soft and supple, No LAD, No JVD  Respiratory: Breath sounds are clear bilaterally, No wheezing, rales or rhonchi  Cardiovascular: S1 and S2, regular rate and rhythm, no Murmurs, gallops or rubs  Gastrointestinal: Bowel Sounds present, soft, nontender, nondistended, no guarding, no rebound  Extremities: No peripheral edema  Vascular: 2+ peripheral pulses  Neurological: A/O x 2, no focal deficits  Musculoskeletal: 5/5 strength b/l upper and lower extremities  Skin: No rashes  rectal : deferred, not indicated  · Assessment     62 y/o M with a PMH of glioblastoma, seizure disorder, and DVT/PE who presented to  ED on 12/31/23 from [facility] with increased shortness of breath and cough for the past 3 days admitted with acute hypoxemic respiratory failure secondary to influenza virus.   #Acute hypoxemic respiratory failure secondary to influenza  - Initially saturating 88% on RA , needs O2 supplementation  - s/p IV solumedrol--> PO steroids , Duoneb in the ED   - CXR official read pending - does not appear to be superimposed PNA  - Currently on 4L NC; continue supplemental oxygen   - Is on Decadron (due to GBM)  - Continue Oseltamavir 75 mg BID (Day 1 of 5)   - F/u pulmonology consult   - CT chest negative   #Glioblastoma multiforme   Diagnosed on 9/27/22   - Pathology classification: WHO 4, IDH wild type, MGMT methylated   - Underwent large resection of mass on 10/3/22 with Dr. Turpin  - Last chemotherapy session was 12/20/23  - Most recent visit with Dr. Torres on 12/20/23 - plan to continue Avastin and follow-up MRI in 2 weeks   - Has appointment for outpatient MRI on 1/4/23  - Continue Dexamethasone 4 mg QD   -Repeat CT chest (1/4) with atelectasis but no infiltrate or consolidation. Procal (1/4) low.   #Seizure Disorder   - Continue Keppra 1500 mg BID   - Continue Lacosamide 200 mg BID  # Hx of DVT/PE  - Above and below the knee DVT: Right femoral vein and left peroneal vein acute DVT on 3/7/23   - CT angio chest from 3/7/23: right lower lobe distal segmental PE  - Continue Eliquis 5 mg BID  #BPH  - Continue Tamsulosin 0.4 mg at bedtime   #Code Status  - Full Code  Final diagnosis, treatment plan, and follow-up recommendations were discussed and explained to the patient.   The patient was given an opportunity to ask questions concerning the diagnosis and treatment plan.   The patient acknowledged understanding of the diagnosis, treatment, and follow-up recommendations.   The patient was advised to seek urgent care upon discharge if worsening symptoms develop prior to scheduled follow-up.   Time spent on discharge included time with the patient, and also coordinating discharge care as outlined below.  Discharge note faxed to PCP with my contact information to call me back   PCP Dr. Almeida  Total time spent: 50 min               Chart and meds reviewed.  Patient seen and examined.  CC: SOB  1/8/24 - denies cp, dyspnea, eager to go back to NH, refusing IV line placement , afebrile, wheezing improving, not a good historian   ROS - All other systems reviewed and found to be negative with the exception of what has been described above.  Vital sings reviewed for last 24 h  T(C): 36.3 (01-08-24 @ 09:54), Max: 37.1 (01-07-24 @ 21:19)  T(F): 97.3 (01-08-24 @ 09:54), Max: 98.7 (01-07-24 @ 21:19)  HR: 106 (01-08-24 @ 09:54) (74 - 106)  BP: 110/82 (01-08-24 @ 09:54) (110/82 - 116/72)  RR: 18 (01-08-24 @ 09:54) (18 - 18)  SpO2: 95% (01-08-24 @ 09:54) (95% - 98%)  PHYSICAL EXAM:  Constitutional: NAD, awake and alert   HEENT: PERR, EOMI, Normal Hearing, MMM  Neck: Soft and supple, No LAD, No JVD  Respiratory: Breath sounds are clear bilaterally, No wheezing, rales or rhonchi  Cardiovascular: S1 and S2, regular rate and rhythm, no Murmurs, gallops or rubs  Gastrointestinal: Bowel Sounds present, soft, nontender, nondistended, no guarding, no rebound  Extremities: No peripheral edema  Vascular: 2+ peripheral pulses  Neurological: A/O x 2, no focal deficits  Musculoskeletal: 5/5 strength b/l upper and lower extremities  Skin: No rashes  rectal : deferred, not indicated  · Assessment     64 y/o M with a PMH of glioblastoma, seizure disorder, and DVT/PE who presented to  ED on 12/31/23 from [facility] with increased shortness of breath and cough for the past 3 days admitted with acute hypoxemic respiratory failure secondary to influenza virus.   #Acute hypoxemic respiratory failure secondary to influenza  - Initially saturating 88% on RA , needs O2 supplementation  - s/p IV solumedrol--> PO steroids , Duoneb in the ED   - CXR official read pending - does not appear to be superimposed PNA  - Currently on 4L NC; continue supplemental oxygen   - Is on Decadron (due to GBM)  - Continue Oseltamavir 75 mg BID (Day 1 of 5)   - F/u pulmonology consult   - CT chest negative   #Glioblastoma multiforme   Diagnosed on 9/27/22   - Pathology classification: WHO 4, IDH wild type, MGMT methylated   - Underwent large resection of mass on 10/3/22 with Dr. Turpin  - Last chemotherapy session was 12/20/23  - Most recent visit with Dr. Torres on 12/20/23 - plan to continue Avastin and follow-up MRI in 2 weeks   - Has appointment for outpatient MRI on 1/4/23  - Continue Dexamethasone 4 mg QD   -Repeat CT chest (1/4) with atelectasis but no infiltrate or consolidation. Procal (1/4) low.   #Seizure Disorder   - Continue Keppra 1500 mg BID   - Continue Lacosamide 200 mg BID  # Hx of DVT/PE  - Above and below the knee DVT: Right femoral vein and left peroneal vein acute DVT on 3/7/23   - CT angio chest from 3/7/23: right lower lobe distal segmental PE  - Continue Eliquis 5 mg BID  #BPH  - Continue Tamsulosin 0.4 mg at bedtime   #Code Status  - Full Code  Final diagnosis, treatment plan, and follow-up recommendations were discussed and explained to the patient.   The patient was given an opportunity to ask questions concerning the diagnosis and treatment plan.   The patient acknowledged understanding of the diagnosis, treatment, and follow-up recommendations.   The patient was advised to seek urgent care upon discharge if worsening symptoms develop prior to scheduled follow-up.   Time spent on discharge included time with the patient, and also coordinating discharge care as outlined below.  Discharge note faxed to PCP with my contact information to call me back   PCP Dr. Almeida  Total time spent: 50 min

## 2024-01-08 NOTE — DISCHARGE NOTE PROVIDER - NSDCCPCAREPLAN_GEN_ALL_CORE_FT
PRINCIPAL DISCHARGE DIAGNOSIS  Diagnosis: Acute respiratory failure with hypoxia  Assessment and Plan of Treatment: complete steroids taper , follow up with pulmonologist Dr. Yañez within 1 week   continue O2 supplementation      SECONDARY DISCHARGE DIAGNOSES  Diagnosis: Glioblastoma multiforme  Assessment and Plan of Treatment: follow up with oncologist within 1 week for further care    Diagnosis: Influenza A  Assessment and Plan of Treatment: drink plenty of fluids, take tylenol if fever, take cough medications as needed    Diagnosis: Pulmonary embolism  Assessment and Plan of Treatment: continue eliquis

## 2024-01-08 NOTE — DISCHARGE NOTE PROVIDER - CARE PROVIDERS DIRECT ADDRESSES
,[email or web address],DirectAddress_Unknown,DirectAddress_Unknown ,[email or web address],DirectAddress_Unknown,DirectAddress_Unknown

## 2024-01-08 NOTE — DISCHARGE NOTE PROVIDER - CARE PROVIDER_API CALL
Gavin Yañez  Pulmonary Disease  [address], [address]  [place] [address]  Phone: [phone]  Fax: [phone]  Follow Up Time: 1 week    [facility] oncologist  Phone: (   )    -  Fax: (   )    -  Follow Up Time: 1 week    Johnathan Almeida  [address] [address]  Phone: [phone]  Fax: [phone]  Follow Up Time: 1 week   Gavin Yañez  Pulmonary Disease  [address], [address]  [place] [address]  Phone: [phone]  Fax: [phone]  Follow Up Time: 1 week    [facility] oncologist  Phone: (   )    -  Fax: (   )    -  Follow Up Time: 1 week    Johnathan Almeida  [address] [address]  Phone: [phone]  Fax: [phone]  Follow Up Time: 1 week

## 2024-01-08 NOTE — DISCHARGE NOTE PROVIDER - NSDCMRMEDTOKEN_GEN_ALL_CORE_FT
albuterol 2.5 mg/3 mL (0.083%) inhalation solution: 1 inhaler(s) inhaled 4 times a day  apixaban 5 mg oral tablet: 1 tab(s) orally 2 times a day  benzonatate 100 mg oral capsule: 1 cap(s) orally every 8 hours As needed Cough  budesonide 0.5 mg/2 mL inhalation suspension: 1 inhaler(s) inhaled 2 times a day  dexAMETHasone 2 mg oral tablet: 2 tab(s) orally once a day (in the morning) at 8 am  guaiFENesin-dextromethorphan 100 mg-10 mg/5 mL oral liquid: 10 milliliter(s) orally every 6 hours As needed Cough  lacosamide 200 mg oral tablet: 1 tab(s) orally 2 times a day  levETIRAcetam 500 mg oral tablet: 3 tab(s) orally once a day ***total dose of 1500mg***  nystatin 100,000 units/g topical powder: 1 Apply topically to affected area 3 times a day  pantoprazole 40 mg oral delayed release tablet: 1 tab(s) orally once a day (in the afternoon)  predniSONE 20 mg oral tablet: 2 tab(s) orally once a day for 2 more days than 1 tab for 3 days than stop and continue dexamethasone home dose  rOPINIRole 0.25 mg oral tablet: 3 tab(s) orally once a day (at bedtime) ***3 hours before bedtime***  tamsulosin 0.4 mg oral capsule: 1 cap(s) orally once a day

## 2024-01-08 NOTE — DISCHARGE NOTE PROVIDER - NSDCCPTREATMENT_GEN_ALL_CORE_FT
PRINCIPAL PROCEDURE  Procedure: CT chest wo con  Findings and Treatment: LUNGS AND AIRWAYS: PLEURA:  There is linear subsegmental atelectasis right upper middle and lower   lobes, increased from prior exam.  There is dependent bibasilar atelectasis.  No lobar lung consolidation, pleural effusion or pneumothorax.  Elevation right hemidiaphragm similar to priorexam.  The central airways remain patent.  MEDIASTINUM AND CARLOS:  No enlarged lymphadenopathy.  VESSELS: Atherosclerotic changes thoracic aorta and coronary artery   calcification.  HEART: Heart size is normal. No pericardial effusion.  CHEST WALL AND LOWER NECK: Within normal limits.  VISUALIZED UPPER ABDOMEN:  Hepatic steatosis.  Left adrenal calcification.  BONES: Degenerative changes.  IMPRESSION:  Linear and subsegmental atelectasis right lung with dependent bibasilar   atelectasis.  Stable elevation right hemidiaphragm.  Other findings as discussed above.  LUNGS AND AIRWAYS: PLEURA:  There is linear subsegmental atelectasis right upper middle and lower   lobes, increased from prior exam.  There is dependent bibasilar atelectasis.  No lobar lung consolidation, pleural effusion or pneumothorax.  Elevation right hemidiaphragm similar to priorexam.  The central airways remain patent.  MEDIASTINUM AND CARLOS:  No enlarged lymphadenopathy.  VESSELS: Atherosclerotic changes thoracic aorta and coronary artery   calcification.  HEART: Heart size is normal. No pericardial effusion.  CHEST WALL AND LOWER NECK: Within normal limits.  VISUALIZED UPPER ABDOMEN:  Hepatic steatosis.  Left adrenal calcification.  BONES: Degenerative changes.  IMPRESSION:  Linear and subsegmental atelectasis right lung with dependent bibasilar   atelectasis.  Stable elevation right hemidiaphragm.  Other findings as discussed above.

## 2024-01-08 NOTE — DISCHARGE NOTE PROVIDER - PROVIDER TOKENS
PROVIDER:[TOKEN:[[id number]],FOLLOWUP:[1 week]],FREE:[LAST:[[facility]],FIRST:[oncologist],PHONE:[(   )    -],FAX:[(   )    -],FOLLOWUP:[1 week]],PROVIDER:[TOKEN:[[id number]],FOLLOWUP:[1 week]] PROVIDER:[TOKEN:[[id number]],FOLLOWUP:[1 week]],FREE:[LAST:[[facility]],FIRST:[oncologist],PHONE:[(   )    -],FAX:[(   )    -],FOLLOWUP:[1 week]],PROVIDER:[TOKEN:[[id number]],FOLLOWUP:[1 week]]

## 2024-01-08 NOTE — DISCHARGE NOTE PROVIDER - NSDCFUSCHEDAPPT_GEN_ALL_CORE_FT
[facility]  MRI  Lkv  Scheduled Appointment: 01/24/2024    Debby Torres  [facility]  NEUROLOGY [address]  Scheduled Appointment: 01/24/2024    [facility]  [facility]  Scheduled Appointment: 01/24/2024    [facility]  [facility]  Scheduled Appointment: 01/24/2024    [facility]  [facility]  Scheduled Appointment: 02/07/2024    [facility]  [facility]  Scheduled Appointment: 02/07/2024    [facility]  [facility]  Scheduled Appointment: 02/21/2024    [facility]  [facility]  Scheduled Appointment: 02/21/2024     [facility]  MRI  [facility]  Scheduled Appointment: 01/24/2024    Debby Torres  [facility]  NEUROLOGY [address]  Scheduled Appointment: 01/24/2024    [facility]  [facility]  Scheduled Appointment: 01/24/2024    [facility]  [facility]  Scheduled Appointment: 01/24/2024    [facility]  [facility]  Scheduled Appointment: 02/07/2024    [facility]  [facility]  Scheduled Appointment: 02/07/2024    [facility]  [facility]  Scheduled Appointment: 02/21/2024    [facility]  [facility]  Scheduled Appointment: 02/21/2024

## 2024-01-12 DIAGNOSIS — G40.909 EPILEPSY, UNSPECIFIED, NOT INTRACTABLE, WITHOUT STATUS EPILEPTICUS: ICD-10-CM

## 2024-01-12 DIAGNOSIS — J98.11 ATELECTASIS: ICD-10-CM

## 2024-01-12 DIAGNOSIS — Z86.711 PERSONAL HISTORY OF PULMONARY EMBOLISM: ICD-10-CM

## 2024-01-12 DIAGNOSIS — J98.01 ACUTE BRONCHOSPASM: ICD-10-CM

## 2024-01-12 DIAGNOSIS — N40.0 BENIGN PROSTATIC HYPERPLASIA WITHOUT LOWER URINARY TRACT SYMPTOMS: ICD-10-CM

## 2024-01-12 DIAGNOSIS — C71.9 MALIGNANT NEOPLASM OF BRAIN, UNSPECIFIED: ICD-10-CM

## 2024-01-12 DIAGNOSIS — Z11.52 ENCOUNTER FOR SCREENING FOR COVID-19: ICD-10-CM

## 2024-01-12 DIAGNOSIS — Z86.718 PERSONAL HISTORY OF OTHER VENOUS THROMBOSIS AND EMBOLISM: ICD-10-CM

## 2024-01-12 DIAGNOSIS — J10.1 INFLUENZA DUE TO OTHER IDENTIFIED INFLUENZA VIRUS WITH OTHER RESPIRATORY MANIFESTATIONS: ICD-10-CM

## 2024-01-12 DIAGNOSIS — J96.01 ACUTE RESPIRATORY FAILURE WITH HYPOXIA: ICD-10-CM

## 2024-01-12 DIAGNOSIS — Z79.01 LONG TERM (CURRENT) USE OF ANTICOAGULANTS: ICD-10-CM

## 2024-01-12 DIAGNOSIS — Z87.891 PERSONAL HISTORY OF NICOTINE DEPENDENCE: ICD-10-CM

## 2024-01-12 DIAGNOSIS — N20.0 CALCULUS OF KIDNEY: ICD-10-CM

## 2024-01-18 ENCOUNTER — OUTPATIENT (OUTPATIENT)
Dept: OUTPATIENT SERVICES | Facility: HOSPITAL | Age: 64
LOS: 1 days | Discharge: HOME | End: 2024-01-18

## 2024-01-18 DIAGNOSIS — Z98.890 OTHER SPECIFIED POSTPROCEDURAL STATES: Chronic | ICD-10-CM

## 2024-01-18 DIAGNOSIS — G93.9 DISORDER OF BRAIN, UNSPECIFIED: ICD-10-CM

## 2024-01-22 NOTE — PHYSICAL THERAPY INITIAL EVALUATION ADULT - PHYSICAL ASSIST/NONPHYSICAL ASSIST, REHAB EVAL
Continued Stay Note  [facility]     Patient Name: Zoila Nava  MRN: [id number]  Today's Date: 1/22/2024    Admit Date: 1/16/2024    Plan: TBD   Discharge Plan       Row Name 01/22/24 1402       Plan    Plan TBD    Patient/Family in Agreement with Plan other (see comments)    Plan Comments Per MDR, patient intubated, sedated, restrained.  Order for PICC, NG in place to start tube feeds. I updated Pam /[facility] to pass on to April @ [facility].  will continue to follow.    Final Discharge Disposition Code 30 - still a patient                   Discharge Codes    No documentation.                 Expected Discharge Date and Time       Expected Discharge Date Expected Discharge Time    Jan 27, 2024               Dwain Richter RN     supervision/verbal cues

## 2024-01-24 ENCOUNTER — APPOINTMENT (OUTPATIENT)
Dept: HEMATOLOGY ONCOLOGY | Facility: CLINIC | Age: 64
End: 2024-01-24

## 2024-01-24 ENCOUNTER — RESULT REVIEW (OUTPATIENT)
Age: 64
End: 2024-01-24

## 2024-01-24 ENCOUNTER — OUTPATIENT (OUTPATIENT)
Dept: OUTPATIENT SERVICES | Facility: HOSPITAL | Age: 64
LOS: 1 days | End: 2024-01-24
Payer: MEDICARE

## 2024-01-24 ENCOUNTER — APPOINTMENT (OUTPATIENT)
Dept: INFUSION THERAPY | Facility: HOSPITAL | Age: 64
End: 2024-01-24

## 2024-01-24 ENCOUNTER — APPOINTMENT (OUTPATIENT)
Dept: NEUROLOGY | Facility: CLINIC | Age: 64
End: 2024-01-24
Payer: MEDICARE

## 2024-01-24 ENCOUNTER — APPOINTMENT (OUTPATIENT)
Dept: MRI IMAGING | Facility: IMAGING CENTER | Age: 64
End: 2024-01-24
Payer: MEDICARE

## 2024-01-24 VITALS
HEIGHT: 70 IN | TEMPERATURE: 98.2 F | RESPIRATION RATE: 16 BRPM | WEIGHT: 211 LBS | SYSTOLIC BLOOD PRESSURE: 122 MMHG | BODY MASS INDEX: 30.21 KG/M2 | HEART RATE: 74 BPM | DIASTOLIC BLOOD PRESSURE: 85 MMHG | OXYGEN SATURATION: 96 %

## 2024-01-24 DIAGNOSIS — C71.9 MALIGNANT NEOPLASM OF BRAIN, UNSPECIFIED: ICD-10-CM

## 2024-01-24 DIAGNOSIS — Z98.890 OTHER SPECIFIED POSTPROCEDURAL STATES: Chronic | ICD-10-CM

## 2024-01-24 LAB
ALBUMIN SERPL ELPH-MCNC: 3.9 G/DL — SIGNIFICANT CHANGE UP (ref 3.3–5)
ALP SERPL-CCNC: 51 U/L — SIGNIFICANT CHANGE UP (ref 40–120)
ALT FLD-CCNC: 42 U/L — SIGNIFICANT CHANGE UP (ref 10–45)
ANION GAP SERPL CALC-SCNC: 9 MMOL/L — SIGNIFICANT CHANGE UP (ref 5–17)
AST SERPL-CCNC: 26 U/L — SIGNIFICANT CHANGE UP (ref 10–40)
BASOPHILS # BLD AUTO: 0.04 K/UL — SIGNIFICANT CHANGE UP (ref 0–0.2)
BASOPHILS NFR BLD AUTO: 0.4 % — SIGNIFICANT CHANGE UP (ref 0–2)
BILIRUB SERPL-MCNC: 0.4 MG/DL — SIGNIFICANT CHANGE UP (ref 0.2–1.2)
BUN SERPL-MCNC: 10 MG/DL — SIGNIFICANT CHANGE UP (ref 7–23)
CALCIUM SERPL-MCNC: 9.6 MG/DL — SIGNIFICANT CHANGE UP (ref 8.4–10.5)
CHLORIDE SERPL-SCNC: 102 MMOL/L — SIGNIFICANT CHANGE UP (ref 96–108)
CO2 SERPL-SCNC: 27 MMOL/L — SIGNIFICANT CHANGE UP (ref 22–31)
CREAT SERPL-MCNC: 0.91 MG/DL — SIGNIFICANT CHANGE UP (ref 0.5–1.3)
EGFR: 94 ML/MIN/1.73M2 — SIGNIFICANT CHANGE UP
EOSINOPHIL # BLD AUTO: 0.01 K/UL — SIGNIFICANT CHANGE UP (ref 0–0.5)
EOSINOPHIL NFR BLD AUTO: 0.1 % — SIGNIFICANT CHANGE UP (ref 0–6)
GLUCOSE SERPL-MCNC: 159 MG/DL — HIGH (ref 70–99)
HCT VFR BLD CALC: 41.9 % — SIGNIFICANT CHANGE UP (ref 39–50)
HGB BLD-MCNC: 14.1 G/DL — SIGNIFICANT CHANGE UP (ref 13–17)
IMM GRANULOCYTES NFR BLD AUTO: 2.4 % — HIGH (ref 0–0.9)
LYMPHOCYTES # BLD AUTO: 0.76 K/UL — LOW (ref 1–3.3)
LYMPHOCYTES # BLD AUTO: 8.2 % — LOW (ref 13–44)
MCHC RBC-ENTMCNC: 29.2 PG — SIGNIFICANT CHANGE UP (ref 27–34)
MCHC RBC-ENTMCNC: 33.7 G/DL — SIGNIFICANT CHANGE UP (ref 32–36)
MCV RBC AUTO: 86.7 FL — SIGNIFICANT CHANGE UP (ref 80–100)
MONOCYTES # BLD AUTO: 0.32 K/UL — SIGNIFICANT CHANGE UP (ref 0–0.9)
MONOCYTES NFR BLD AUTO: 3.5 % — SIGNIFICANT CHANGE UP (ref 2–14)
NEUTROPHILS # BLD AUTO: 7.89 K/UL — HIGH (ref 1.8–7.4)
NEUTROPHILS NFR BLD AUTO: 85.4 % — HIGH (ref 43–77)
NRBC # BLD: 0 /100 WBCS — SIGNIFICANT CHANGE UP (ref 0–0)
PLATELET # BLD AUTO: 131 K/UL — LOW (ref 150–400)
POTASSIUM SERPL-MCNC: 4.7 MMOL/L — SIGNIFICANT CHANGE UP (ref 3.5–5.3)
POTASSIUM SERPL-SCNC: 4.7 MMOL/L — SIGNIFICANT CHANGE UP (ref 3.5–5.3)
PROT SERPL-MCNC: 6.5 G/DL — SIGNIFICANT CHANGE UP (ref 6–8.3)
RBC # BLD: 4.83 M/UL — SIGNIFICANT CHANGE UP (ref 4.2–5.8)
RBC # FLD: 16.3 % — HIGH (ref 10.3–14.5)
SODIUM SERPL-SCNC: 139 MMOL/L — SIGNIFICANT CHANGE UP (ref 135–145)
WBC # BLD: 9.24 K/UL — SIGNIFICANT CHANGE UP (ref 3.8–10.5)
WBC # FLD AUTO: 9.24 K/UL — SIGNIFICANT CHANGE UP (ref 3.8–10.5)

## 2024-01-24 PROCEDURE — 70553 MRI BRAIN STEM W/O & W/DYE: CPT

## 2024-01-24 PROCEDURE — 99215 OFFICE O/P EST HI 40 MIN: CPT

## 2024-01-24 PROCEDURE — A9585: CPT

## 2024-01-24 PROCEDURE — 70553 MRI BRAIN STEM W/O & W/DYE: CPT | Mod: 26,MH

## 2024-01-24 RX ORDER — CYCLOBENZAPRINE HYDROCHLORIDE 10 MG/1
10 TABLET, FILM COATED ORAL 3 TIMES DAILY
Qty: 90 | Refills: 0 | Status: DISCONTINUED | COMMUNITY
Start: 2023-10-10 | End: 2024-01-24

## 2024-01-24 RX ORDER — SENNOSIDES 8.6 MG
8.6 TABLET ORAL AT BEDTIME
Qty: 60 | Refills: 0 | Status: DISCONTINUED | COMMUNITY
Start: 2023-10-10 | End: 2024-01-24

## 2024-01-24 NOTE — HISTORY OF PRESENT ILLNESS
[FreeTextEntry1] : Mr. Ramos is being seen in neuro oncologic follow-up.  History obtained via discussion with patient and chart review, including personal review of all neuroimaging.  Mr. 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 He returned on 926 with worsening headache and underwent MRI scan of his brain:  MRI brain 9/27 shows an irregularly enhancing mass in the left temporal-occipital region measuring 2.6 cm transversely and 8.9 cm cranial - caudal with surrounding vasogenic edema.  CT C/A/P 9/26 unremarkable.  Dr. Turpin performed a large resection of this mass on 10/3.  10/3 Pathology - Glioblastoma, WHO 4, IDH wt - MGMT methylated.  Post-operative MRI 10/5: LEFT parietal occipital craniotomy with near complete resection of the of the neoplasm in the LEFT occipital/posterior temporal lobes. Minimal residual neoplasm is seen in the anterior part of the neoplasm, which abuts the ependymal surface of the atrium of the LEFT ventricle. Moderate postsurgical hemorrhage is seen within the surgical cavity. Moderate surrounding vasogenic edema is unchanged with effacement of the posterior horn of the LEFT lateral ventricle. Abnormal signal is also seen within the LEFT cerebral peduncle with a 4 mm focus of central enhancement and 1.6 cm region of rim enhancement, which is suspicious for a secondary focus of neoplasm or extension from the primary along the white matter tracts.  10/25- Had an episode of loss of vision X 15-20 minutes , episode resolved on its own - Restarted Dexamethasone 2 mg daily, Keppra raised to 750 mg bid  11/2/2022- He continues to have headaches even waking him from sleep. Raised dexamethasone to 4 mg daily  11/7/2022 - CHEMORT started  11/14/2022 - Reports when we raised the Dexamethasone on the 2nd , he was not taking the 4 mg daily, after discussing with HCP started on 11/7, headache didn't resolve so on 11/10- Dr Goenka raised to 4 mg bid. Since then he reports no further headaches. Vision has not improved but has not worsened.  12/20/2022 - Completed ChemoRT  12/30/2022 - a friend called him and he was "not making sense.' Friend went to see him and bring him to the hospital - noted shaking of right UE and rigidity. In the ER, he reportedly had a GTC seizures - he was intubated. He was also found to be COVID positive. He was discharged to rehab on 12/29 and then discharged home on 1/18.  2/1/2023- MRI with slight improvement - Tapered Dexamethasone to 4-2 mg  2/6/2023- 2/10- TMZ first cycle  2/15-2/17 ( At [facility] ) and then transferred to [facility] and stayed inpatient from 2/17-2/21/2023 - Admitted sec to seizures, and expressive aphasia. discharged on Keppra 1000 mg bid and Vimpat 200 mg bid  3/7-3/9/2023 - Admitted at [facility] after a mechanical fall, he was found to have a PE bleed and UTI - He was treated with Rocephin for UTI, Started on IV Heparin and then transitioned to Eliquis and then was discharged home  3/15/2023- MRI with POD - Plan was made to continue TMZ and add IV AVastin  3/22/2023- 1st Avastin infusion  3/25/2023 - 3/29/2023 - TMZ second cycle  4/26/2023- Patient had 3 IV Avastin's thus far. MRI stable  5/4 - 5/8/2023- TMZ cycle 3  6/1- 6/4/2023 - TMZ cycle # 4  6/22/2023- Infusion last week cancelled as patient was complaining of abdominal pain. A follow up CT scan was performed and did not reveal any acute pathology. He feels his right sided weakness is getting more weak and reports his right side vision is worse. He had a near syncopal episode while at the clinic - was sent to [facility] 6/28/23-7/2/2023 - TMZ 5th cycle- Patient had 7 DOSES OF IV AVASTINS THUS FAR 7/9-7/12/2023- Patient presented s/p mechanical fall with RT ankle fracture. Seen by ortho, reduced and splinted, non-surgical. Patient is NWB to E. Outpatient ortho f/u with DR Hutson in 1 week. Medically stable for transfer to [facility] 10/10/2023 - MRI stable. Plan was made to repeat imaging in 2 months 10/22-10/26/2023- Patient was admitted to [facility] from [facility] for +COVID, vomiting and tachycardia. Upon arrival pt appears to be seizing. Pt on Keppra. EMS reports pt had 5 tonic clonic seizures lasting about 15 seconds each. Pt given Tylenol at 9am. pt seen by neuro and nsx and felt lowered sz threshold in setting of covid. Keppra dose adjusted. worsening of GBM noted on MRI. MRI from 10/24 showed Interval increase in the enhancing neoplasm located in the LEFT occipital and posterior temporal lobes now measuring 6.7 cm AP by 3.7 cm TRV by 3.8 cm cc with extension into the posterior horn of the LEFT lateral ventricle and ependymal spread. Increase in moderate vasogenic edema involving the LEFT occipital, parietal, posterior frontal and posterior temporal lobes. Minimal edema also extends into the LEFT middle cerebellar peduncle and LEFT caren Patient d/cd back to nursing home on 10/26 11/7/2023- He is frustrated with being at the subacute rehab - Johnnie is trying to get him to her house but he needs 24-hour supervision as he can be impulsive. He is tearful and not always participating in therapy - we discussed his mood - he is reluctant to start antidepressants. He is still able to enjoy visits with friends. He has no headaches. Raised Dexamethasone to 4 mg daily 11/22/2023- Here for a follow up prior to IV Avastin.( first dose since restarting)  12/20/23 - Second dose of IV Avastin 12/31-1/8/2024- Admitted at [facility]. Presented to ED on 12/31/23 from [facility] with increased shortness of breath and cough for the past 3 days admitted with acute hypoxemic respiratory failure secondary to influenza virus. Rx with steroids and Tamiflu. CT chest neg.  1/23/2024 - Here for a follow up along with a new MRI. He is disappointed as he still is staying at rehab. Due to insurance issues , he cant be discharged to home at this time

## 2024-01-24 NOTE — PHYSICAL EXAM
[General Appearance - Alert] : alert [General Appearance - In No Acute Distress] : in no acute distress [] : no respiratory distress [Respiration, Rhythm And Depth] : normal respiratory rhythm and effort [Pitting Edema] : pitting edema present [___ +] : bilateral [unfilled]+ pitting edema to the ankles [Over the Past 2 Weeks, Have You Felt Down, Depressed, or Hopeless?] : 1.) Over the past 2 weeks, have you felt down, depressed, or hopeless? Yes [Over the Past 2 Weeks, Have You Felt Little Interest or Pleasure Doing Things?] : 2.) Over the past 2 weeks, have you felt little interest or pleasure doing things? Yes [FreeTextEntry1] : Awake and alert - oriented to self, place , year with prompts, and fluent with normal comprehension. EOMI Right VF defect UQ more than LQ Face symmetric LOUISE X4 - No drift Bilateral extension tremor. Strong - mild distal right weakness 5-/5 foot and hand  On a wheel chair Able to stand up with one person assist/supervision and walker and able pivot himself to exam chair

## 2024-01-24 NOTE — DISCUSSION/SUMMARY
[FreeTextEntry1] : Patient seen and examined In brief, Angelita batista is a 64 yo female who was diagnosed with Glioblastoma, WHO 4, IDH wt - MGMT methylated in 10/2022. He had 5 cycles of TMZ ( last dose 6/2023 ) and a total of 6 doses of MVASI( last dose 5/31) - His further treatment got delayed secondary to fall and right ankle fracture - He is in rehab since July - MRI showed mild POD in 10/2023 - Restarting IV Avastin on 11/22- s/p 2 IV Avastin - Now here with a new MRI    GLIOMA - Neurologically not worse I reviewed MRI from today and I have compared it with MRI from 10/2/2023 and area of enhancement and flair changed to  left parieto - occipital  area appears decreased - Official report including perfusion pending  Continue with IV Avastin - Today he will receive the third dose Due for 4th dose on 2/7 , however HCP cant bring him in  CEREBRAL EDEMA - Taper dexamethasone to 2 mg daily. GI Prophylaxis with pantoprazole.  DVT/PE - Continue Eliquis 5 mg bid  SEIZURES- Continue Keppra 1000 mg bid and Vimpat 200 mg bid DEPRESSION - Continue follow up with psychiatry team FOLLOW UP - Will do a follow up prior to the infusion on 2/14/2024 . In the interim, if any concerns patient knows to call our office.

## 2024-01-25 DIAGNOSIS — Z51.11 ENCOUNTER FOR ANTINEOPLASTIC CHEMOTHERAPY: ICD-10-CM

## 2024-01-25 DIAGNOSIS — C44.42 SQUAMOUS CELL CARCINOMA OF SKIN OF SCALP AND NECK: ICD-10-CM

## 2024-01-25 LAB
APPEARANCE UR: CLEAR — SIGNIFICANT CHANGE UP
BACTERIA # UR AUTO: NEGATIVE /HPF — SIGNIFICANT CHANGE UP
BILIRUB UR-MCNC: NEGATIVE — SIGNIFICANT CHANGE UP
CAST: 0 /LPF — SIGNIFICANT CHANGE UP (ref 0–4)
COD CRY URNS QL: PRESENT
COLOR SPEC: YELLOW — SIGNIFICANT CHANGE UP
DIFF PNL FLD: NEGATIVE — SIGNIFICANT CHANGE UP
GLUCOSE UR QL: NEGATIVE MG/DL — SIGNIFICANT CHANGE UP
KETONES UR-MCNC: NEGATIVE MG/DL — SIGNIFICANT CHANGE UP
LEUKOCYTE ESTERASE UR-ACNC: ABNORMAL
NITRITE UR-MCNC: NEGATIVE — SIGNIFICANT CHANGE UP
PH UR: 6.5 — SIGNIFICANT CHANGE UP (ref 5–8)
PROT UR-MCNC: NEGATIVE MG/DL — SIGNIFICANT CHANGE UP
RBC CASTS # UR COMP ASSIST: 2 /HPF — SIGNIFICANT CHANGE UP (ref 0–4)
REVIEW: SIGNIFICANT CHANGE UP
SP GR SPEC: >1.03 — HIGH (ref 1–1.03)
SQUAMOUS # UR AUTO: 0 /HPF — SIGNIFICANT CHANGE UP (ref 0–5)
UROBILINOGEN FLD QL: 0.2 MG/DL — SIGNIFICANT CHANGE UP (ref 0.2–1)
WBC UR QL: 15 /HPF — HIGH (ref 0–5)

## 2024-02-07 ENCOUNTER — APPOINTMENT (OUTPATIENT)
Dept: HEMATOLOGY ONCOLOGY | Facility: CLINIC | Age: 64
End: 2024-02-07

## 2024-02-07 ENCOUNTER — APPOINTMENT (OUTPATIENT)
Dept: INFUSION THERAPY | Facility: HOSPITAL | Age: 64
End: 2024-02-07

## 2024-02-09 ENCOUNTER — APPOINTMENT (OUTPATIENT)
Dept: NEUROLOGY | Facility: CLINIC | Age: 64
End: 2024-02-09

## 2024-02-14 ENCOUNTER — RESULT REVIEW (OUTPATIENT)
Age: 64
End: 2024-02-14

## 2024-02-14 ENCOUNTER — APPOINTMENT (OUTPATIENT)
Dept: NEUROLOGY | Facility: CLINIC | Age: 64
End: 2024-02-14
Payer: MEDICARE

## 2024-02-14 ENCOUNTER — APPOINTMENT (OUTPATIENT)
Dept: HEMATOLOGY ONCOLOGY | Facility: CLINIC | Age: 64
End: 2024-02-14

## 2024-02-14 ENCOUNTER — APPOINTMENT (OUTPATIENT)
Dept: INFUSION THERAPY | Facility: HOSPITAL | Age: 64
End: 2024-02-14

## 2024-02-14 VITALS
TEMPERATURE: 98.5 F | HEART RATE: 107 BPM | HEIGHT: 70 IN | OXYGEN SATURATION: 95 % | BODY MASS INDEX: 30.06 KG/M2 | DIASTOLIC BLOOD PRESSURE: 78 MMHG | WEIGHT: 210 LBS | RESPIRATION RATE: 16 BRPM | SYSTOLIC BLOOD PRESSURE: 111 MMHG

## 2024-02-14 LAB
ALBUMIN SERPL ELPH-MCNC: 4 G/DL — SIGNIFICANT CHANGE UP (ref 3.3–5)
ALP SERPL-CCNC: 45 U/L — SIGNIFICANT CHANGE UP (ref 40–120)
ALT FLD-CCNC: 36 U/L — SIGNIFICANT CHANGE UP (ref 10–45)
ANION GAP SERPL CALC-SCNC: 10 MMOL/L — SIGNIFICANT CHANGE UP (ref 5–17)
APPEARANCE UR: CLEAR — SIGNIFICANT CHANGE UP
AST SERPL-CCNC: 26 U/L — SIGNIFICANT CHANGE UP (ref 10–40)
BACTERIA # UR AUTO: ABNORMAL /HPF
BASOPHILS # BLD AUTO: 0.03 K/UL — SIGNIFICANT CHANGE UP (ref 0–0.2)
BASOPHILS NFR BLD AUTO: 0.3 % — SIGNIFICANT CHANGE UP (ref 0–2)
BILIRUB SERPL-MCNC: 0.3 MG/DL — SIGNIFICANT CHANGE UP (ref 0.2–1.2)
BILIRUB UR-MCNC: NEGATIVE — SIGNIFICANT CHANGE UP
BUN SERPL-MCNC: 14 MG/DL — SIGNIFICANT CHANGE UP (ref 7–23)
CALCIUM SERPL-MCNC: 9.6 MG/DL — SIGNIFICANT CHANGE UP (ref 8.4–10.5)
CAST: 0 /LPF — SIGNIFICANT CHANGE UP (ref 0–4)
CHLORIDE SERPL-SCNC: 105 MMOL/L — SIGNIFICANT CHANGE UP (ref 96–108)
CO2 SERPL-SCNC: 24 MMOL/L — SIGNIFICANT CHANGE UP (ref 22–31)
COLOR SPEC: YELLOW — SIGNIFICANT CHANGE UP
CREAT SERPL-MCNC: 0.75 MG/DL — SIGNIFICANT CHANGE UP (ref 0.5–1.3)
DIFF PNL FLD: NEGATIVE — SIGNIFICANT CHANGE UP
EGFR: 100 ML/MIN/1.73M2 — SIGNIFICANT CHANGE UP
EOSINOPHIL # BLD AUTO: 0.01 K/UL — SIGNIFICANT CHANGE UP (ref 0–0.5)
EOSINOPHIL NFR BLD AUTO: 0.1 % — SIGNIFICANT CHANGE UP (ref 0–6)
GLUCOSE SERPL-MCNC: 105 MG/DL — HIGH (ref 70–99)
GLUCOSE UR QL: NEGATIVE MG/DL — SIGNIFICANT CHANGE UP
HCT VFR BLD CALC: 44.8 % — SIGNIFICANT CHANGE UP (ref 39–50)
HGB BLD-MCNC: 14.7 G/DL — SIGNIFICANT CHANGE UP (ref 13–17)
IMM GRANULOCYTES NFR BLD AUTO: 1 % — HIGH (ref 0–0.9)
KETONES UR-MCNC: ABNORMAL MG/DL
LEUKOCYTE ESTERASE UR-ACNC: ABNORMAL
LYMPHOCYTES # BLD AUTO: 0.93 K/UL — LOW (ref 1–3.3)
LYMPHOCYTES # BLD AUTO: 10.2 % — LOW (ref 13–44)
MCHC RBC-ENTMCNC: 30.1 PG — SIGNIFICANT CHANGE UP (ref 27–34)
MCHC RBC-ENTMCNC: 32.8 G/DL — SIGNIFICANT CHANGE UP (ref 32–36)
MCV RBC AUTO: 91.8 FL — SIGNIFICANT CHANGE UP (ref 80–100)
MONOCYTES # BLD AUTO: 0.52 K/UL — SIGNIFICANT CHANGE UP (ref 0–0.9)
MONOCYTES NFR BLD AUTO: 5.7 % — SIGNIFICANT CHANGE UP (ref 2–14)
NEUTROPHILS # BLD AUTO: 7.52 K/UL — HIGH (ref 1.8–7.4)
NEUTROPHILS NFR BLD AUTO: 82.7 % — HIGH (ref 43–77)
NITRITE UR-MCNC: POSITIVE
NRBC # BLD: 0 /100 WBCS — SIGNIFICANT CHANGE UP (ref 0–0)
PH UR: 5.5 — SIGNIFICANT CHANGE UP (ref 5–8)
PLATELET # BLD AUTO: 154 K/UL — SIGNIFICANT CHANGE UP (ref 150–400)
POTASSIUM SERPL-MCNC: 4.5 MMOL/L — SIGNIFICANT CHANGE UP (ref 3.5–5.3)
POTASSIUM SERPL-SCNC: 4.5 MMOL/L — SIGNIFICANT CHANGE UP (ref 3.5–5.3)
PROT SERPL-MCNC: 6.7 G/DL — SIGNIFICANT CHANGE UP (ref 6–8.3)
PROT UR-MCNC: SIGNIFICANT CHANGE UP MG/DL
RBC # BLD: 4.88 M/UL — SIGNIFICANT CHANGE UP (ref 4.2–5.8)
RBC # FLD: 17.8 % — HIGH (ref 10.3–14.5)
RBC CASTS # UR COMP ASSIST: 1 /HPF — SIGNIFICANT CHANGE UP (ref 0–4)
SODIUM SERPL-SCNC: 139 MMOL/L — SIGNIFICANT CHANGE UP (ref 135–145)
SP GR SPEC: 1.03 — SIGNIFICANT CHANGE UP (ref 1–1.03)
SQUAMOUS # UR AUTO: 0 /HPF — SIGNIFICANT CHANGE UP (ref 0–5)
UROBILINOGEN FLD QL: 0.2 MG/DL — SIGNIFICANT CHANGE UP (ref 0.2–1)
WBC # BLD: 9.1 K/UL — SIGNIFICANT CHANGE UP (ref 3.8–10.5)
WBC # FLD AUTO: 9.1 K/UL — SIGNIFICANT CHANGE UP (ref 3.8–10.5)
WBC UR QL: 254 /HPF — HIGH (ref 0–5)

## 2024-02-14 PROCEDURE — 99215 OFFICE O/P EST HI 40 MIN: CPT

## 2024-02-14 RX ORDER — SULFAMETHOXAZOLE AND TRIMETHOPRIM 800; 160 MG/1; MG/1
800-160 TABLET ORAL TWICE DAILY
Qty: 14 | Refills: 0 | Status: DISCONTINUED | COMMUNITY
Start: 2024-01-24 | End: 2024-02-14

## 2024-02-14 RX ORDER — DEXAMETHASONE 2 MG/1
2 TABLET ORAL DAILY
Qty: 30 | Refills: 0 | Status: ACTIVE | COMMUNITY
Start: 2023-05-24

## 2024-02-14 NOTE — HISTORY OF PRESENT ILLNESS
[FreeTextEntry1] : Mr. Ramos is being seen in neuro oncologic follow-up.   Mr. 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 He returned on 926 with worsening headache and underwent MRI scan of his brain:  MRI brain 9/27 shows an irregularly enhancing mass in the left temporal-occipital region measuring 2.6 cm transversely and 8.9 cm cranial - caudal with surrounding vasogenic edema.  CT C/A/P 9/26 unremarkable.  Dr. Turpin performed a large resection of this mass on 10/3.  10/3 Pathology - Glioblastoma, WHO 4, IDH wt - MGMT methylated.  Post-operative MRI 10/5: LEFT parietal occipital craniotomy with near complete resection of the of the neoplasm in the LEFT occipital/posterior temporal lobes. Minimal residual neoplasm is seen in the anterior part of the neoplasm, which abuts the ependymal surface of the atrium of the LEFT ventricle. Moderate postsurgical hemorrhage is seen within the surgical cavity. Moderate surrounding vasogenic edema is unchanged with effacement of the posterior horn of the LEFT lateral ventricle. Abnormal signal is also seen within the LEFT cerebral peduncle with a 4 mm focus of central enhancement and 1.6 cm region of rim enhancement, which is suspicious for a secondary focus of neoplasm or extension from the primary along the white matter tracts.  10/25- Had an episode of loss of vision X 15-20 minutes , episode resolved on its own - Restarted Dexamethasone 2 mg daily, Keppra raised to 750 mg bid  11/2/2022- He continues to have headaches even waking him from sleep. Raised dexamethasone to 4 mg daily  11/7/2022 - CHEMORT started  11/14/2022 - Reports when we raised the Dexamethasone on the 2nd , he was not taking the 4 mg daily, after discussing with HCP started on 11/7, headache didn't resolve so on 11/10- Dr Ayala raised to 4 mg bid. Since then he reports no further headaches. Vision has not improved but has not worsened.  12/20/2022 - Completed ChemoRT  12/30/2022 - a friend called him and he was "not making sense.' Friend went to see him and bring him to the hospital - noted shaking of right UE and rigidity. In the ER, he reportedly had a GTC seizures - he was intubated. He was also found to be COVID positive. He was discharged to rehab on 12/29 and then discharged home on 1/18.  2/1/2023- MRI with slight improvement - Tapered Dexamethasone to 4-2 mg  2/6/2023- 2/10- TMZ first cycle  2/15-2/17 ( At [facility] ) and then transferred to [facility] and stayed inpatient from 2/17-2/21/2023 - Admitted sec to seizures, and expressive aphasia. discharged on Keppra 1000 mg bid and Vimpat 200 mg bid  3/7-3/9/2023 - Admitted at [facility] after a mechanical fall, he was found to have a PE bleed and UTI - He was treated with Rocephin for UTI, Started on IV Heparin and then transitioned to Eliquis and then was discharged home  3/15/2023- MRI with POD - Plan was made to continue TMZ and add IV AVastin  3/22/2023- 1st Avastin infusion  3/25/2023 - 3/29/2023 - TMZ second cycle  4/26/2023- Patient had 3 IV Avastin's thus far. MRI stable  5/4 - 5/8/2023- TMZ cycle 3  6/1- 6/4/2023 - TMZ cycle # 4  6/22/2023- Infusion last week cancelled as patient was complaining of abdominal pain. A follow up CT scan was performed and did not reveal any acute pathology. He feels his right sided weakness is getting more weak and reports his right side vision is worse. He had a near syncopal episode while at the clinic - was sent to [facility] 6/28/23-7/2/2023 - TMZ 5th cycle- Patient had 7 DOSES OF IV AVASTINS THUS FAR 7/9-7/12/2023- Patient presented s/p mechanical fall with RT ankle fracture. Seen by ortho, reduced and splinted, non-surgical. Patient is NWB to RLE. Outpatient ortho f/u with DR Hutson in 1 week. Medically stable for transfer to [facility] 10/10/2023 - MRI stable. Plan was made to repeat imaging in 2 months 10/22-10/26/2023- Patient was admitted to [facility] from [facility] for +COVID, vomiting and tachycardia. Upon arrival pt appears to be seizing. Pt on Keppra. EMS reports pt had 5 tonic clonic seizures lasting about 15 seconds each. Pt given Tylenol at 9am. pt seen by neuro and nsx and felt lowered sz threshold in setting of covid. Keppra dose adjusted. worsening of GBM noted on MRI. MRI from 10/24 showed Interval increase in the enhancing neoplasm located in the LEFT occipital and posterior temporal lobes now measuring 6.7 cm AP by 3.7 cm TRV by 3.8 cm cc with extension into the posterior horn of the LEFT lateral ventricle and ependymal spread. Increase in moderate vasogenic edema involving the LEFT occipital, parietal, posterior frontal and posterior temporal lobes. Minimal edema also extends into the LEFT middle cerebellar peduncle and LEFT caren Patient d/cd back to nursing home on 10/26 11/7/2023- He is frustrated with being at the subacute rehab - Johnnie is trying to get him to her house but he needs 24-hour supervision as he can be impulsive. He is tearful and not always participating in therapy - we discussed his mood - he is reluctant to start antidepressants. He is still able to enjoy visits with friends. He has no headaches. Raised Dexamethasone to 4 mg daily 11/22/2023- Here for a follow up prior to IV Avastin.( first dose since restarting)  12/20/23 - Second dose of IV Avastin 12/31-1/8/2024- Admitted at [facility]. Presented to ED on 12/31/23 from [facility] with increased shortness of breath and cough for the past 3 days admitted with acute hypoxemic respiratory failure secondary to influenza virus. Rx with steroids and Tamiflu. CT chest neg. 1/24/2024 - MRI showed Decreased mass effect on the left atria of the lateral compared with the prior 10/24/2023. Extensive postop changes in the left temporal parietal occipital region as seen previously with areas of encephalomalacia and gliosis and findings suspicious for residual neoplasm as well with areas of enhancement as well as increased CBF and CBV on the perfusion imaging. Overall postcontrast appearance is similar to the prior 10/24/2023.  Third dose of IV Avastin on this day 2/14/2024- Here for a follow up along with a new MRI. Offers no new complaints

## 2024-02-14 NOTE — DISCUSSION/SUMMARY
[FreeTextEntry1] : Patient seen and examined In brief, Angelita batista is a 64 yo female who was diagnosed with Glioblastoma, WHO 4, IDH wt - MGMT methylated in 10/2022. He had 5 cycles of TMZ ( last dose 6/2023 ) and a total of 6 doses of MVASI( last dose 5/31) - His further treatment got delayed secondary to fall and right ankle fracture - He is in rehab since July - MRI showed mild POD in 10/2023 - Restarting IV Avastin on 11/22- s/p 2 IV Avastin - MRI stable - Had 3rd dose of IV Avastin on 1/24/2024   GLIOMA - Neurologically not worse Continue with IV Avastin - Today he will receive the 4th dose CEREBRAL EDEMA - c/w dexamethasone to 2 mg daily. GI Prophylaxis with pantoprazole.  DVT/PE - Continue Eliquis 5 mg bid  SEIZURES- Continue Keppra 1500 mg bid and Vimpat 200 mg bid GAIT IMBALANCE - c/w PT DEPRESSION - Continue follow up with psychiatry team FOLLOW UP - Will do a follow up in a month. Johnnie to call with her schedule so that patient could be booked for the next infusion.  In the interim, if any concerns patient knows to call our office.

## 2024-02-14 NOTE — PHYSICAL EXAM
[General Appearance - Alert] : alert [General Appearance - In No Acute Distress] : in no acute distress [General Appearance - Well Nourished] : well nourished [Oriented To Time, Place, And Person] : oriented to person, place, and time [Impaired Insight] : insight and judgment were intact [] : no respiratory distress [Respiration, Rhythm And Depth] : normal respiratory rhythm and effort [Exaggerated Use Of Accessory Muscles For Inspiration] : no accessory muscle use [Edema] : there was no peripheral edema [FreeTextEntry1] : Awake and alert - oriented to self, place , year with prompts, and fluent with normal comprehension Memory at 3 minutes 3/3 ( chair , con apple) EOMI Right VF defect UQ more than LQ Face symmetric LOUISE X4 - No drift Bilateral extension tremor. Strong - mild distal right weakness 5-/5 foot and hand On a wheel chair Able to stand up with one person assist/supervision and walker and able pivot himself to exam chair. [Over the Past 2 Weeks, Have You Felt Down, Depressed, or Hopeless?] : 1.) Over the past 2 weeks, have you felt down, depressed, or hopeless? No [Over the Past 2 Weeks, Have You Felt Little Interest or Pleasure Doing Things?] : 2.) Over the past 2 weeks, have you felt little interest or pleasure doing things? No

## 2024-02-28 ENCOUNTER — RESULT REVIEW (OUTPATIENT)
Age: 64
End: 2024-02-28

## 2024-02-28 ENCOUNTER — APPOINTMENT (OUTPATIENT)
Dept: HEMATOLOGY ONCOLOGY | Facility: CLINIC | Age: 64
End: 2024-02-28

## 2024-02-28 ENCOUNTER — APPOINTMENT (OUTPATIENT)
Dept: NEUROLOGY | Facility: CLINIC | Age: 64
End: 2024-02-28

## 2024-02-28 ENCOUNTER — APPOINTMENT (OUTPATIENT)
Dept: INFUSION THERAPY | Facility: HOSPITAL | Age: 64
End: 2024-02-28

## 2024-02-28 NOTE — OCCUPATIONAL THERAPY INITIAL EVALUATION ADULT - LEVEL OF INDEPENDENCE: DRESS UPPER BODY, OT EVAL
Infectious disease Consult Note      Patient: Mercedez Brooks  : 1994  Acct#:  [id number]     Date:  24  Subjective:       History of Present Illness  Patient is a 29 y.o. female    Chief Complaint   Patient presents with    Oral yeast infection     Follow up   She is here for positive throat culture on 2024.  She denied sore throat, no thrush, denied fever or chills, no other complaints.  No history of immunosuppression.  The patient had throat culture done on 2024 grew Nikki   guilliermondii   Vaginitis DNA probe 2023 was negative  GC and chlamydia 2023 was negative  She had a Pap smear done 2024      Past Medical History:   Diagnosis Date    Cancer (HCC)     Malignant melanoma of skin    Depressive disorder     Eosinophilic esophagitis     Irregular menstruation       Past Surgical History:   Procedure Laterality Date    COLONOSCOPY      ENDOSCOPY, COLON, DIAGNOSTIC      UPPER GASTROINTESTINAL ENDOSCOPY  2017    ESOPHAGEAL CAPSULE ENDOSCOPY performed by Ricky Lord MD at [facility]    UPPER GASTROINTESTINAL ENDOSCOPY N/A 2017    EGD DIAGNOSTIC ONLY performed by Ricky Lord MD at [facility]          Admission Meds  Current Outpatient Medications on File Prior to Visit   Medication Sig Dispense Refill    Lisdexamfetamine Dimesylate 40 MG CAPS Take 40 mg by mouth daily for 30 days. Max Daily Amount: 40 mg 30 capsule 0    montelukast (SINGULAIR) 10 MG tablet take 1 tablet by mouth once daily 90 tablet 3    nystatin (MYCOSTATIN) 292467 UNIT/GM cream Apply topically 2 times daily. 15 g 0    nystatin (MYCOSTATIN) 741695 UNIT/ML suspension Take 5 mLs by mouth 4 times daily Until symptoms resolved and then use for 2 more days after 180 mL 0    Armodafinil 200 MG TABS Take 200 mg by mouth daily for 90 days. Max Daily Amount: 200 mg 30 tablet 2    buPROPion (WELLBUTRIN XL) 300 MG extended release tablet take 1 tablet by mouth every morning  minimum assist (75% patients effort)

## 2024-02-29 DIAGNOSIS — C71.9 MALIGNANT NEOPLASM OF BRAIN, UNSPECIFIED: ICD-10-CM

## 2024-02-29 LAB
ALBUMIN SERPL ELPH-MCNC: 4.1 G/DL — SIGNIFICANT CHANGE UP (ref 3.3–5)
ALP SERPL-CCNC: 49 U/L — SIGNIFICANT CHANGE UP (ref 40–120)
ALT FLD-CCNC: 30 U/L — SIGNIFICANT CHANGE UP (ref 10–45)
ANION GAP SERPL CALC-SCNC: 15 MMOL/L — SIGNIFICANT CHANGE UP (ref 5–17)
AST SERPL-CCNC: 16 U/L — SIGNIFICANT CHANGE UP (ref 10–40)
BILIRUB SERPL-MCNC: 0.4 MG/DL — SIGNIFICANT CHANGE UP (ref 0.2–1.2)
BUN SERPL-MCNC: 16 MG/DL — SIGNIFICANT CHANGE UP (ref 7–23)
CALCIUM SERPL-MCNC: 9.6 MG/DL — SIGNIFICANT CHANGE UP (ref 8.4–10.5)
CHLORIDE SERPL-SCNC: 105 MMOL/L — SIGNIFICANT CHANGE UP (ref 96–108)
CO2 SERPL-SCNC: 24 MMOL/L — SIGNIFICANT CHANGE UP (ref 22–31)
CREAT SERPL-MCNC: 0.75 MG/DL — SIGNIFICANT CHANGE UP (ref 0.5–1.3)
EGFR: 100 ML/MIN/1.73M2 — SIGNIFICANT CHANGE UP
GLUCOSE SERPL-MCNC: 110 MG/DL — HIGH (ref 70–99)
HCT VFR BLD CALC: 47.8 % — SIGNIFICANT CHANGE UP (ref 39–50)
HGB BLD-MCNC: 15.6 G/DL — SIGNIFICANT CHANGE UP (ref 13–17)
MCHC RBC-ENTMCNC: 30.1 PG — SIGNIFICANT CHANGE UP (ref 27–34)
MCHC RBC-ENTMCNC: 32.6 GM/DL — SIGNIFICANT CHANGE UP (ref 32–36)
MCV RBC AUTO: 92.1 FL — SIGNIFICANT CHANGE UP (ref 80–100)
PLATELET # BLD AUTO: 165 K/UL — SIGNIFICANT CHANGE UP (ref 150–400)
POTASSIUM SERPL-MCNC: 4.5 MMOL/L — SIGNIFICANT CHANGE UP (ref 3.5–5.3)
POTASSIUM SERPL-SCNC: 4.5 MMOL/L — SIGNIFICANT CHANGE UP (ref 3.5–5.3)
PROT SERPL-MCNC: 7.1 G/DL — SIGNIFICANT CHANGE UP (ref 6–8.3)
RBC # BLD: 5.19 M/UL — SIGNIFICANT CHANGE UP (ref 4.2–5.8)
RBC # FLD: 16.4 % — HIGH (ref 10.3–14.5)
SODIUM SERPL-SCNC: 144 MMOL/L — SIGNIFICANT CHANGE UP (ref 135–145)
WBC # BLD: 8.3 K/UL — SIGNIFICANT CHANGE UP (ref 3.8–10.5)
WBC # FLD AUTO: 8.3 K/UL — SIGNIFICANT CHANGE UP (ref 3.8–10.5)

## 2024-03-06 RX ORDER — CEPHALEXIN 500 MG
1 CAPSULE ORAL
Refills: 0 | DISCHARGE
Start: 2024-03-06 | End: 2024-03-10

## 2024-03-07 ENCOUNTER — INPATIENT (INPATIENT)
Facility: HOSPITAL | Age: 64
LOS: 5 days | Discharge: NURSING, REHAB OR LONG-TERM CARE | DRG: 871 | End: 2024-03-13
Attending: FAMILY MEDICINE | Admitting: STUDENT IN AN ORGANIZED HEALTH CARE EDUCATION/TRAINING PROGRAM
Payer: MEDICARE

## 2024-03-07 VITALS — SYSTOLIC BLOOD PRESSURE: 105 MMHG | HEART RATE: 139 BPM | OXYGEN SATURATION: 91 % | DIASTOLIC BLOOD PRESSURE: 78 MMHG

## 2024-03-07 DIAGNOSIS — I26.99 OTHER PULMONARY EMBOLISM WITHOUT ACUTE COR PULMONALE: ICD-10-CM

## 2024-03-07 DIAGNOSIS — R56.9 UNSPECIFIED CONVULSIONS: ICD-10-CM

## 2024-03-07 DIAGNOSIS — Z98.890 OTHER SPECIFIED POSTPROCEDURAL STATES: Chronic | ICD-10-CM

## 2024-03-07 LAB
ALBUMIN SERPL ELPH-MCNC: 2.3 G/DL — LOW (ref 3.3–5)
ALBUMIN SERPL ELPH-MCNC: 3.2 G/DL — LOW (ref 3.3–5)
ALP SERPL-CCNC: 32 U/L — LOW (ref 40–120)
ALP SERPL-CCNC: 47 U/L — SIGNIFICANT CHANGE UP (ref 40–120)
ALT FLD-CCNC: 42 U/L — SIGNIFICANT CHANGE UP (ref 12–78)
ALT FLD-CCNC: 56 U/L — SIGNIFICANT CHANGE UP (ref 12–78)
AMMONIA BLD-MCNC: 22 UMOL/L — SIGNIFICANT CHANGE UP (ref 11–32)
ANION GAP SERPL CALC-SCNC: 4 MMOL/L — LOW (ref 5–17)
ANION GAP SERPL CALC-SCNC: 5 MMOL/L — SIGNIFICANT CHANGE UP (ref 5–17)
APAP SERPL-MCNC: <2 UG/ML — LOW (ref 10–30)
APPEARANCE UR: CLEAR — SIGNIFICANT CHANGE UP
APTT BLD: 35 SEC — SIGNIFICANT CHANGE UP (ref 24.5–35.6)
AST SERPL-CCNC: 156 U/L — HIGH (ref 15–37)
AST SERPL-CCNC: 196 U/L — HIGH (ref 15–37)
BACTERIA # UR AUTO: NEGATIVE /HPF — SIGNIFICANT CHANGE UP
BASE EXCESS BLDV CALC-SCNC: 1.2 MMOL/L — SIGNIFICANT CHANGE UP (ref -2–3)
BASOPHILS # BLD AUTO: 0.04 K/UL — SIGNIFICANT CHANGE UP (ref 0–0.2)
BASOPHILS NFR BLD AUTO: 0.2 % — SIGNIFICANT CHANGE UP (ref 0–2)
BILIRUB SERPL-MCNC: 1 MG/DL — SIGNIFICANT CHANGE UP (ref 0.2–1.2)
BILIRUB SERPL-MCNC: 1 MG/DL — SIGNIFICANT CHANGE UP (ref 0.2–1.2)
BILIRUB UR-MCNC: ABNORMAL
BLD GP AB SCN SERPL QL: SIGNIFICANT CHANGE UP
BUN SERPL-MCNC: 15 MG/DL — SIGNIFICANT CHANGE UP (ref 7–23)
BUN SERPL-MCNC: 17 MG/DL — SIGNIFICANT CHANGE UP (ref 7–23)
CALCIUM SERPL-MCNC: 7.5 MG/DL — LOW (ref 8.5–10.1)
CALCIUM SERPL-MCNC: 9.3 MG/DL — SIGNIFICANT CHANGE UP (ref 8.5–10.1)
CAST: 1 /LPF — SIGNIFICANT CHANGE UP (ref 0–4)
CHLORIDE SERPL-SCNC: 108 MMOL/L — SIGNIFICANT CHANGE UP (ref 96–108)
CHLORIDE SERPL-SCNC: 111 MMOL/L — HIGH (ref 96–108)
CO2 SERPL-SCNC: 27 MMOL/L — SIGNIFICANT CHANGE UP (ref 22–31)
CO2 SERPL-SCNC: 27 MMOL/L — SIGNIFICANT CHANGE UP (ref 22–31)
COLOR SPEC: SIGNIFICANT CHANGE UP
CREAT SERPL-MCNC: 0.86 MG/DL — SIGNIFICANT CHANGE UP (ref 0.5–1.3)
CREAT SERPL-MCNC: 1.09 MG/DL — SIGNIFICANT CHANGE UP (ref 0.5–1.3)
DIFF PNL FLD: ABNORMAL
EGFR: 75 ML/MIN/1.73M2 — SIGNIFICANT CHANGE UP
EGFR: 96 ML/MIN/1.73M2 — SIGNIFICANT CHANGE UP
EOSINOPHIL # BLD AUTO: 0 K/UL — SIGNIFICANT CHANGE UP (ref 0–0.5)
EOSINOPHIL NFR BLD AUTO: 0 % — SIGNIFICANT CHANGE UP (ref 0–6)
ETHANOL SERPL-MCNC: <10 MG/DL — SIGNIFICANT CHANGE UP (ref 0–10)
FLUAV AG NPH QL: SIGNIFICANT CHANGE UP
FLUBV AG NPH QL: SIGNIFICANT CHANGE UP
GAS PNL BLDV: SIGNIFICANT CHANGE UP
GLUCOSE SERPL-MCNC: 110 MG/DL — HIGH (ref 70–99)
GLUCOSE SERPL-MCNC: 123 MG/DL — HIGH (ref 70–99)
GLUCOSE UR QL: NEGATIVE MG/DL — SIGNIFICANT CHANGE UP
HCO3 BLDV-SCNC: 28 MMOL/L — SIGNIFICANT CHANGE UP (ref 22–29)
HCT VFR BLD CALC: 48.1 % — SIGNIFICANT CHANGE UP (ref 39–50)
HGB BLD-MCNC: 15.7 G/DL — SIGNIFICANT CHANGE UP (ref 13–17)
IMM GRANULOCYTES NFR BLD AUTO: 0.5 % — SIGNIFICANT CHANGE UP (ref 0–0.9)
INR BLD: 1.34 RATIO — HIGH (ref 0.85–1.18)
KETONES UR-MCNC: 40 MG/DL
LACTATE SERPL-SCNC: 1.7 MMOL/L — SIGNIFICANT CHANGE UP (ref 0.7–2)
LEUKOCYTE ESTERASE UR-ACNC: ABNORMAL
LYMPHOCYTES # BLD AUTO: 0.91 K/UL — LOW (ref 1–3.3)
LYMPHOCYTES # BLD AUTO: 5.1 % — LOW (ref 13–44)
MAGNESIUM SERPL-MCNC: 2.1 MG/DL — SIGNIFICANT CHANGE UP (ref 1.6–2.6)
MCHC RBC-ENTMCNC: 30.4 PG — SIGNIFICANT CHANGE UP (ref 27–34)
MCHC RBC-ENTMCNC: 32.6 GM/DL — SIGNIFICANT CHANGE UP (ref 32–36)
MCV RBC AUTO: 93 FL — SIGNIFICANT CHANGE UP (ref 80–100)
MONOCYTES # BLD AUTO: 1.28 K/UL — HIGH (ref 0–0.9)
MONOCYTES NFR BLD AUTO: 7.2 % — SIGNIFICANT CHANGE UP (ref 2–14)
NEUTROPHILS # BLD AUTO: 15.46 K/UL — HIGH (ref 1.8–7.4)
NEUTROPHILS NFR BLD AUTO: 87 % — HIGH (ref 43–77)
NITRITE UR-MCNC: NEGATIVE — SIGNIFICANT CHANGE UP
NT-PROBNP SERPL-SCNC: 400 PG/ML — HIGH (ref 0–125)
PCO2 BLDV: 50 MMHG — SIGNIFICANT CHANGE UP (ref 42–55)
PH BLDV: 7.35 — SIGNIFICANT CHANGE UP (ref 7.32–7.43)
PH UR: 5.5 — SIGNIFICANT CHANGE UP (ref 5–8)
PLATELET # BLD AUTO: 117 K/UL — LOW (ref 150–400)
PO2 BLDV: 53 MMHG — HIGH (ref 25–45)
POTASSIUM SERPL-MCNC: 3.7 MMOL/L — SIGNIFICANT CHANGE UP (ref 3.5–5.3)
POTASSIUM SERPL-MCNC: 3.8 MMOL/L — SIGNIFICANT CHANGE UP (ref 3.5–5.3)
POTASSIUM SERPL-SCNC: 3.7 MMOL/L — SIGNIFICANT CHANGE UP (ref 3.5–5.3)
POTASSIUM SERPL-SCNC: 3.8 MMOL/L — SIGNIFICANT CHANGE UP (ref 3.5–5.3)
PROT SERPL-MCNC: 5.3 GM/DL — LOW (ref 6–8.3)
PROT SERPL-MCNC: 7.3 GM/DL — SIGNIFICANT CHANGE UP (ref 6–8.3)
PROT UR-MCNC: 300 MG/DL
PROTHROM AB SERPL-ACNC: 15 SEC — HIGH (ref 9.5–13)
RBC # BLD: 5.17 M/UL — SIGNIFICANT CHANGE UP (ref 4.2–5.8)
RBC # FLD: 15.2 % — HIGH (ref 10.3–14.5)
RBC CASTS # UR COMP ASSIST: 12 /HPF — HIGH (ref 0–4)
RSV RNA NPH QL NAA+NON-PROBE: SIGNIFICANT CHANGE UP
SALICYLATES SERPL-MCNC: <1.7 MG/DL — LOW (ref 2.8–20)
SAO2 % BLDV: 78 % — SIGNIFICANT CHANGE UP (ref 67–88)
SARS-COV-2 RNA SPEC QL NAA+PROBE: SIGNIFICANT CHANGE UP
SODIUM SERPL-SCNC: 140 MMOL/L — SIGNIFICANT CHANGE UP (ref 135–145)
SODIUM SERPL-SCNC: 142 MMOL/L — SIGNIFICANT CHANGE UP (ref 135–145)
SP GR SPEC: 1.02 — SIGNIFICANT CHANGE UP (ref 1–1.03)
SQUAMOUS # UR AUTO: 1 /HPF — SIGNIFICANT CHANGE UP (ref 0–5)
TROPONIN I, HIGH SENSITIVITY RESULT: 46.05 NG/L — SIGNIFICANT CHANGE UP
UROBILINOGEN FLD QL: 2 MG/DL (ref 0.2–1)
WBC # BLD: 17.77 K/UL — HIGH (ref 3.8–10.5)
WBC # FLD AUTO: 17.77 K/UL — HIGH (ref 3.8–10.5)
WBC UR QL: 50 /HPF — HIGH (ref 0–5)

## 2024-03-07 PROCEDURE — 97162 PT EVAL MOD COMPLEX 30 MIN: CPT | Mod: GP

## 2024-03-07 PROCEDURE — 83735 ASSAY OF MAGNESIUM: CPT

## 2024-03-07 PROCEDURE — 84484 ASSAY OF TROPONIN QUANT: CPT

## 2024-03-07 PROCEDURE — 76870 US EXAM SCROTUM: CPT

## 2024-03-07 PROCEDURE — 82607 VITAMIN B-12: CPT

## 2024-03-07 PROCEDURE — 76870 US EXAM SCROTUM: CPT | Mod: 26

## 2024-03-07 PROCEDURE — 84443 ASSAY THYROID STIM HORMONE: CPT

## 2024-03-07 PROCEDURE — 70553 MRI BRAIN STEM W/O & W/DYE: CPT | Mod: MC

## 2024-03-07 PROCEDURE — 82746 ASSAY OF FOLIC ACID SERUM: CPT

## 2024-03-07 PROCEDURE — 85027 COMPLETE CBC AUTOMATED: CPT

## 2024-03-07 PROCEDURE — 71260 CT THORAX DX C+: CPT | Mod: 26,MC

## 2024-03-07 PROCEDURE — 71045 X-RAY EXAM CHEST 1 VIEW: CPT

## 2024-03-07 PROCEDURE — 97530 THERAPEUTIC ACTIVITIES: CPT | Mod: GP

## 2024-03-07 PROCEDURE — 74177 CT ABD & PELVIS W/CONTRAST: CPT | Mod: 26,MC

## 2024-03-07 PROCEDURE — 92523 SPEECH SOUND LANG COMPREHEN: CPT | Mod: GN

## 2024-03-07 PROCEDURE — 86140 C-REACTIVE PROTEIN: CPT

## 2024-03-07 PROCEDURE — 80053 COMPREHEN METABOLIC PANEL: CPT

## 2024-03-07 PROCEDURE — 99223 1ST HOSP IP/OBS HIGH 75: CPT

## 2024-03-07 PROCEDURE — 85025 COMPLETE CBC W/AUTO DIFF WBC: CPT

## 2024-03-07 PROCEDURE — C9113: CPT

## 2024-03-07 PROCEDURE — 84145 PROCALCITONIN (PCT): CPT

## 2024-03-07 PROCEDURE — 36415 COLL VENOUS BLD VENIPUNCTURE: CPT

## 2024-03-07 PROCEDURE — 93010 ELECTROCARDIOGRAM REPORT: CPT

## 2024-03-07 PROCEDURE — 70496 CT ANGIOGRAPHY HEAD: CPT | Mod: 26,MC

## 2024-03-07 PROCEDURE — 92610 EVALUATE SWALLOWING FUNCTION: CPT | Mod: GN

## 2024-03-07 PROCEDURE — 31500 INSERT EMERGENCY AIRWAY: CPT

## 2024-03-07 PROCEDURE — C9254: CPT

## 2024-03-07 PROCEDURE — 99285 EMERGENCY DEPT VISIT HI MDM: CPT | Mod: 25

## 2024-03-07 PROCEDURE — 74018 RADEX ABDOMEN 1 VIEW: CPT

## 2024-03-07 PROCEDURE — 95816 EEG AWAKE AND DROWSY: CPT | Mod: 26

## 2024-03-07 PROCEDURE — 82306 VITAMIN D 25 HYDROXY: CPT

## 2024-03-07 PROCEDURE — 84550 ASSAY OF BLOOD/URIC ACID: CPT

## 2024-03-07 PROCEDURE — 82140 ASSAY OF AMMONIA: CPT

## 2024-03-07 PROCEDURE — 84100 ASSAY OF PHOSPHORUS: CPT

## 2024-03-07 PROCEDURE — 87635 SARS-COV-2 COVID-19 AMP PRB: CPT

## 2024-03-07 PROCEDURE — 80048 BASIC METABOLIC PNL TOTAL CA: CPT

## 2024-03-07 PROCEDURE — 70498 CT ANGIOGRAPHY NECK: CPT | Mod: 26,MC

## 2024-03-07 PROCEDURE — 71045 X-RAY EXAM CHEST 1 VIEW: CPT | Mod: 26

## 2024-03-07 PROCEDURE — 0042T: CPT | Mod: MC

## 2024-03-07 PROCEDURE — 97116 GAIT TRAINING THERAPY: CPT | Mod: GP

## 2024-03-07 PROCEDURE — 94003 VENT MGMT INPAT SUBQ DAY: CPT

## 2024-03-07 PROCEDURE — 70450 CT HEAD/BRAIN W/O DYE: CPT | Mod: 26,MC,59

## 2024-03-07 PROCEDURE — A9579: CPT

## 2024-03-07 PROCEDURE — 82550 ASSAY OF CK (CPK): CPT

## 2024-03-07 RX ORDER — DEXAMETHASONE 0.5 MG/5ML
1 ELIXIR ORAL
Refills: 0 | DISCHARGE

## 2024-03-07 RX ORDER — LACOSAMIDE 50 MG/1
200 TABLET ORAL EVERY 12 HOURS
Refills: 0 | Status: DISCONTINUED | OUTPATIENT
Start: 2024-03-07 | End: 2024-03-13

## 2024-03-07 RX ORDER — LEVETIRACETAM 250 MG/1
1500 TABLET, FILM COATED ORAL EVERY 12 HOURS
Refills: 0 | Status: DISCONTINUED | OUTPATIENT
Start: 2024-03-07 | End: 2024-03-12

## 2024-03-07 RX ORDER — MIDAZOLAM HYDROCHLORIDE 1 MG/ML
2 INJECTION, SOLUTION INTRAMUSCULAR; INTRAVENOUS ONCE
Refills: 0 | Status: DISCONTINUED | OUTPATIENT
Start: 2024-03-07 | End: 2024-03-07

## 2024-03-07 RX ORDER — AMPICILLIN TRIHYDRATE 250 MG
2000 CAPSULE ORAL ONCE
Refills: 0 | Status: COMPLETED | OUTPATIENT
Start: 2024-03-07 | End: 2024-03-07

## 2024-03-07 RX ORDER — DEXAMETHASONE 0.5 MG/5ML
2 ELIXIR ORAL DAILY
Refills: 0 | Status: DISCONTINUED | OUTPATIENT
Start: 2024-03-07 | End: 2024-03-13

## 2024-03-07 RX ORDER — PANTOPRAZOLE SODIUM 20 MG/1
40 TABLET, DELAYED RELEASE ORAL ONCE
Refills: 0 | Status: COMPLETED | OUTPATIENT
Start: 2024-03-07 | End: 2024-03-07

## 2024-03-07 RX ORDER — DEXMEDETOMIDINE HYDROCHLORIDE IN 0.9% SODIUM CHLORIDE 4 UG/ML
0.1 INJECTION INTRAVENOUS
Qty: 400 | Refills: 0 | Status: DISCONTINUED | OUTPATIENT
Start: 2024-03-07 | End: 2024-03-09

## 2024-03-07 RX ORDER — PIPERACILLIN AND TAZOBACTAM 4; .5 G/20ML; G/20ML
3.38 INJECTION, POWDER, LYOPHILIZED, FOR SOLUTION INTRAVENOUS ONCE
Refills: 0 | Status: COMPLETED | OUTPATIENT
Start: 2024-03-07 | End: 2024-03-07

## 2024-03-07 RX ORDER — SODIUM CHLORIDE 9 MG/ML
1000 INJECTION, SOLUTION INTRAVENOUS ONCE
Refills: 0 | Status: COMPLETED | OUTPATIENT
Start: 2024-03-07 | End: 2024-03-07

## 2024-03-07 RX ORDER — LEVETIRACETAM 250 MG/1
1500 TABLET, FILM COATED ORAL ONCE
Refills: 0 | Status: DISCONTINUED | OUTPATIENT
Start: 2024-03-07 | End: 2024-03-07

## 2024-03-07 RX ORDER — LACOSAMIDE 50 MG/1
200 TABLET ORAL ONCE
Refills: 0 | Status: DISCONTINUED | OUTPATIENT
Start: 2024-03-07 | End: 2024-03-07

## 2024-03-07 RX ORDER — ROPINIROLE 8 MG/1
3 TABLET, FILM COATED, EXTENDED RELEASE ORAL
Refills: 0 | DISCHARGE

## 2024-03-07 RX ORDER — APIXABAN 2.5 MG/1
1 TABLET, FILM COATED ORAL
Refills: 0 | DISCHARGE

## 2024-03-07 RX ORDER — VANCOMYCIN HCL 1 G
1000 VIAL (EA) INTRAVENOUS ONCE
Refills: 0 | Status: DISCONTINUED | OUTPATIENT
Start: 2024-03-07 | End: 2024-03-07

## 2024-03-07 RX ORDER — LACOSAMIDE 50 MG/1
1 TABLET ORAL
Refills: 0 | DISCHARGE

## 2024-03-07 RX ORDER — PROPOFOL 10 MG/ML
40 INJECTION, EMULSION INTRAVENOUS
Qty: 1000 | Refills: 0 | Status: DISCONTINUED | OUTPATIENT
Start: 2024-03-07 | End: 2024-03-09

## 2024-03-07 RX ORDER — CEFTRIAXONE 500 MG/1
2000 INJECTION, POWDER, FOR SOLUTION INTRAMUSCULAR; INTRAVENOUS ONCE
Refills: 0 | Status: COMPLETED | OUTPATIENT
Start: 2024-03-07 | End: 2024-03-07

## 2024-03-07 RX ORDER — LEVETIRACETAM 250 MG/1
3 TABLET, FILM COATED ORAL
Refills: 0 | DISCHARGE

## 2024-03-07 RX ORDER — APIXABAN 2.5 MG/1
5 TABLET, FILM COATED ORAL EVERY 12 HOURS
Refills: 0 | Status: DISCONTINUED | OUTPATIENT
Start: 2024-03-08 | End: 2024-03-08

## 2024-03-07 RX ORDER — CHLORHEXIDINE GLUCONATE 213 G/1000ML
15 SOLUTION TOPICAL EVERY 12 HOURS
Refills: 0 | Status: DISCONTINUED | OUTPATIENT
Start: 2024-03-07 | End: 2024-03-08

## 2024-03-07 RX ORDER — PROPOFOL 10 MG/ML
30 INJECTION, EMULSION INTRAVENOUS
Qty: 1000 | Refills: 0 | Status: DISCONTINUED | OUTPATIENT
Start: 2024-03-07 | End: 2024-03-07

## 2024-03-07 RX ORDER — ETOMIDATE 2 MG/ML
20 INJECTION INTRAVENOUS ONCE
Refills: 0 | Status: COMPLETED | OUTPATIENT
Start: 2024-03-07 | End: 2024-03-07

## 2024-03-07 RX ORDER — TAMSULOSIN HYDROCHLORIDE 0.4 MG/1
1 CAPSULE ORAL
Refills: 0 | DISCHARGE

## 2024-03-07 RX ORDER — CHLORHEXIDINE GLUCONATE 213 G/1000ML
1 SOLUTION TOPICAL
Refills: 0 | Status: DISCONTINUED | OUTPATIENT
Start: 2024-03-07 | End: 2024-03-13

## 2024-03-07 RX ORDER — CEFTRIAXONE 500 MG/1
2000 INJECTION, POWDER, FOR SOLUTION INTRAMUSCULAR; INTRAVENOUS ONCE
Refills: 0 | Status: DISCONTINUED | OUTPATIENT
Start: 2024-03-07 | End: 2024-03-07

## 2024-03-07 RX ORDER — ACYCLOVIR SODIUM 500 MG
900 VIAL (EA) INTRAVENOUS ONCE
Refills: 0 | Status: COMPLETED | OUTPATIENT
Start: 2024-03-07 | End: 2024-03-07

## 2024-03-07 RX ORDER — ACETAMINOPHEN 500 MG
1000 TABLET ORAL ONCE
Refills: 0 | Status: COMPLETED | OUTPATIENT
Start: 2024-03-07 | End: 2024-03-07

## 2024-03-07 RX ORDER — ROCURONIUM BROMIDE 10 MG/ML
100 VIAL (ML) INTRAVENOUS ONCE
Refills: 0 | Status: COMPLETED | OUTPATIENT
Start: 2024-03-07 | End: 2024-03-07

## 2024-03-07 RX ORDER — NOREPINEPHRINE BITARTRATE/D5W 8 MG/250ML
0.05 PLASTIC BAG, INJECTION (ML) INTRAVENOUS
Qty: 8 | Refills: 0 | Status: DISCONTINUED | OUTPATIENT
Start: 2024-03-07 | End: 2024-03-08

## 2024-03-07 RX ORDER — VANCOMYCIN HCL 1 G
1500 VIAL (EA) INTRAVENOUS ONCE
Refills: 0 | Status: COMPLETED | OUTPATIENT
Start: 2024-03-07 | End: 2024-03-07

## 2024-03-07 RX ORDER — SODIUM CHLORIDE 9 MG/ML
2000 INJECTION, SOLUTION INTRAVENOUS ONCE
Refills: 0 | Status: COMPLETED | OUTPATIENT
Start: 2024-03-07 | End: 2024-03-07

## 2024-03-07 RX ADMIN — DEXMEDETOMIDINE HYDROCHLORIDE IN 0.9% SODIUM CHLORIDE 2.31 MICROGRAM(S)/KG/HR: 4 INJECTION INTRAVENOUS at 16:56

## 2024-03-07 RX ADMIN — Medication 300 MILLIGRAM(S): at 15:44

## 2024-03-07 RX ADMIN — ETOMIDATE 20 MILLIGRAM(S): 2 INJECTION INTRAVENOUS at 11:19

## 2024-03-07 RX ADMIN — LACOSAMIDE 100 MILLIGRAM(S): 50 TABLET ORAL at 13:09

## 2024-03-07 RX ADMIN — Medication 2 MILLIGRAM(S): at 16:55

## 2024-03-07 RX ADMIN — LACOSAMIDE 100 MILLIGRAM(S): 50 TABLET ORAL at 22:05

## 2024-03-07 RX ADMIN — PROPOFOL 16.7 MICROGRAM(S)/KG/MIN: 10 INJECTION, EMULSION INTRAVENOUS at 13:45

## 2024-03-07 RX ADMIN — CEFTRIAXONE 2000 MILLIGRAM(S): 500 INJECTION, POWDER, FOR SOLUTION INTRAMUSCULAR; INTRAVENOUS at 13:33

## 2024-03-07 RX ADMIN — PANTOPRAZOLE SODIUM 40 MILLIGRAM(S): 20 TABLET, DELAYED RELEASE ORAL at 16:55

## 2024-03-07 RX ADMIN — LEVETIRACETAM 1500 MILLIGRAM(S): 250 TABLET, FILM COATED ORAL at 13:32

## 2024-03-07 RX ADMIN — PROPOFOL 22.2 MICROGRAM(S)/KG/MIN: 10 INJECTION, EMULSION INTRAVENOUS at 17:29

## 2024-03-07 RX ADMIN — SODIUM CHLORIDE 1000 MILLILITER(S): 9 INJECTION, SOLUTION INTRAVENOUS at 11:20

## 2024-03-07 RX ADMIN — CHLORHEXIDINE GLUCONATE 15 MILLILITER(S): 213 SOLUTION TOPICAL at 21:29

## 2024-03-07 RX ADMIN — PIPERACILLIN AND TAZOBACTAM 25 GRAM(S): 4; .5 INJECTION, POWDER, LYOPHILIZED, FOR SOLUTION INTRAVENOUS at 20:32

## 2024-03-07 RX ADMIN — CHLORHEXIDINE GLUCONATE 1 APPLICATION(S): 213 SOLUTION TOPICAL at 15:04

## 2024-03-07 RX ADMIN — Medication 268 MILLIGRAM(S): at 13:17

## 2024-03-07 RX ADMIN — MIDAZOLAM HYDROCHLORIDE 2 MILLIGRAM(S): 1 INJECTION, SOLUTION INTRAMUSCULAR; INTRAVENOUS at 15:03

## 2024-03-07 RX ADMIN — Medication 216 MILLIGRAM(S): at 13:09

## 2024-03-07 RX ADMIN — LEVETIRACETAM 1500 MILLIGRAM(S): 250 TABLET, FILM COATED ORAL at 21:28

## 2024-03-07 RX ADMIN — Medication 100 MILLIGRAM(S): at 11:18

## 2024-03-07 RX ADMIN — PIPERACILLIN AND TAZOBACTAM 200 GRAM(S): 4; .5 INJECTION, POWDER, LYOPHILIZED, FOR SOLUTION INTRAVENOUS at 15:44

## 2024-03-07 RX ADMIN — SODIUM CHLORIDE 2000 MILLILITER(S): 9 INJECTION, SOLUTION INTRAVENOUS at 15:44

## 2024-03-07 RX ADMIN — Medication 400 MILLIGRAM(S): at 12:10

## 2024-03-07 RX ADMIN — Medication 8.68 MICROGRAM(S)/KG/MIN: at 15:03

## 2024-03-07 NOTE — ED ADULT NURSE NOTE - NSFALLUNIVINTERV_ED_ALL_ED
Bed/Stretcher in lowest position, wheels locked, appropriate side rails in place/Call bell, personal items and telephone in reach/Instruct patient to call for assistance before getting out of bed/chair/stretcher/Non-slip footwear applied when patient is off stretcher/[place] to call system/Physically safe environment - no spills, clutter or unnecessary equipment/Purposeful proactive rounding/Room/bathroom lighting operational, light cord in reach

## 2024-03-07 NOTE — CONSULT NOTE ADULT - ASSESSMENT
A/P:  63 y/o male with a PMHx of glioblastoma s/p large resection of mass on 10/3/22 with Dr. Turpin, seizure disorder on Keppra and Vimpat, and DVT/PE on Eliquis presents to the ED [facility]. Upon entry to the ED, patient was obtunded with, according to ED physician, R sided hemiplegia. Decision was made in ED to intubate patient due to low GCS and inability to protect airway. EMS stated prior to arrival that the patient got 10mgIV valium due to witnessed seizure in the field.  CTB stroke eval reported No acute intracranial hemorrhage or acute territorial infarction. Stable exam since 10/22/2023  EEG reported findings suggestive of left hemisphere dysfunction, likely structural in nature. No definite epileptiform activity was seen and no clinical events or seizures were recorded. Consider a repeat study if clinically indicated     - D/w attending/ deffer care to neurology/ primary team   - No acute neurosurgical intervention given stable imaging and no obvious mass/ hemorrhage   - Q1 neuro checks or as per neurology/ primary team   - recommend MRI Brain w/wo to assess for any changes given prior h/o GBM resection   - neurology for AEDs/ seizure  - oncology for h/o GBM   - SBP goals: normotension   - Sodium goals: normonatremia    - Chemical DVT ppx ok from NSx standpoint unless ICH develops/ will defer decision to primary team   - Plan to follow once MRIs available for review for possible further plan/ recommendations       Time spent:  75 minutes in examination of patient, review of labs and imaging, and coordination with contributing physicians

## 2024-03-07 NOTE — H&P ADULT - VTE RISK ASSESSMENT
RN printed the FMLA form from chart and gave to provider to complete  Provider updated FMLA form as requested by [facility] and RN faxed to [facility] [phone] for review  Updated FMLA form placed in scanning bin to upload into chart  <<--- Click to launch

## 2024-03-07 NOTE — PATIENT PROFILE ADULT - FALL HARM RISK - HARM RISK INTERVENTIONS

## 2024-03-07 NOTE — ED ADULT TRIAGE NOTE - CHIEF COMPLAINT QUOTE
Pt brought in by ambulance from [facility] for right sided facial droop, right sided weakness. EMS states that pt had a seizure while in their presence and received 2mg versed. Reported by EMS notification of GCS 3. Code stroke called at 1113

## 2024-03-07 NOTE — ED PROVIDER NOTE - OBJECTIVE STATEMENT
65 y/o male with a PMHx of glioblastoma s/p large resection of mass on 10/3/22 with Dr. Turpin, seizure disorder, and DVT/PE on Eliquis presents to the ED [facility]. EMS was called as pt was hypoxic to 82% and was febrile. Upon EMS arrival, pt was seizing and given 10mg of Versed. Pt was noted to have right facial droop and right sided weakness. Pt currently on Keflex for UTI. NKDA. Hx obtained from nursing facility paperwork and EMS. 63 y/o male with a PMHx of glioblastoma s/p large resection of mass on 10/3/22 with Dr. Turpin, seizure disorder on Keppra and Vimpat, and DVT/PE on Eliquis presents to the ED [facility]. EMS was called as pt was hypoxic to 82% and was febrile. Upon EMS arrival, pt was seizing and given 10mg of Versed. Pt was noted to have right facial droop and right sided weakness. Pt currently on Keflex for UTI. NKDA. Hx obtained from nursing facility paperwork and EMS.

## 2024-03-07 NOTE — H&P ADULT - HISTORY OF PRESENT ILLNESS
63 y/o male with a PMHx of glioblastoma s/p large resection of mass on 10/3/22 with Dr. Turpin, seizure disorder on Keppra and Vimpat, and DVT/PE on Eliquis presents to the ED BIBA from [facility]. Upon entry to the ED, patient was obtunded with, according to ED physician, R sided hemiplegia. Decision was made in ED to intubate patient due to low GCS and inability to protect airway. EMS stated prior to arrival that the patient got 10mgIV valium due to witnessed seizure in the field. Patient was successfully intubated and started on prop drip. neuro consulted. Stated stop prop temporarily in order to get a ECG initial reading to check for continued seizures. Patient was also given 1500 kepra in the ED. ICU was called.

## 2024-03-07 NOTE — ED ADULT NURSE NOTE - CHIEF COMPLAINT QUOTE
Pt brought in by ambulance from [facility] for right sided facial droop, right sided weakness. EMS states that pt had a seizure while in their presence and received 2mg versed. Reported by EMS notification of GCS 3. Code stroke called at 1113

## 2024-03-07 NOTE — STROKE CODE NOTE - NSMDCONSULT QTN_Y FT
Patient is a 64y old  Male who presents with a chief complaint of     HPI:      PAST MEDICAL & SURGICAL HISTORY:  Glioblastoma      Seizures      S/P craniotomy          FAMILY HISTORY:      Social Hx:  Nonsmoker, no drug or alcohol use    MEDICATIONS  (STANDING):  cefTRIAXone   IVPB 2000 milliGRAM(s) IV Intermittent once  etomidate Injectable 20 milliGRAM(s) IV Push Once  lacosamide Injectable 200 milliGRAM(s) IV Push once  lactated ringers Bolus 1000 milliLiter(s) IV Bolus once  levETIRAcetam   Injectable 1500 milliGRAM(s) IV Push once  rocuronium Injectable 100 milliGRAM(s) IV Push Once  vancomycin  IVPB. 1000 milliGRAM(s) IV Intermittent once       Allergies    No Known Allergies    Intolerances        ROS: Pertinent positives in HPI, all other ROS were reviewed and are negative.      Vital Signs Last 24 Hrs  T(C): --  T(F): --  HR: --  BP: --  BP(mean): --  RR: --  SpO2: --            Constitutional: awake and alert.  HEENT: PERRLA, EOMI,   Neck: Supple.  Respiratory: Breath sounds are clear bilaterally  Cardiovascular: S1 and S2, regular / irregular rhythm  Gastrointestinal: soft, nontender  Extremities:  no edema  Vascular: Caritid Bruit - no  Musculoskeletal: no joint swelling/tenderness, no abnormal movements  Skin: No rashes    Neurological exam:  HF: A x O x 3. Appropriately interactive, normal affect. Speech fluent, No Aphasia or paraphasic errors. Naming /repetition intact   CN: CHAPIN, EOMI, VFF, facial sensation normal, no NLFD, tongue midline, Palate moves equally, SCM equal bilaterally  Motor: No pronator drift, Strength 5/5 in all 4 ext, normal bulk and tone, no tremor, rigidity or bradykinesia.    Sens: Intact to light touch / PP/ VS/ JS    Reflexes: Symmetric and normal . BJ 2+, BR 2+, KJ 2+, AJ 2+, downgoing toes b/l  Coord:  No FNFA, dysmetria, RENEE intact   Gait/Balance: Normal/Cannot test    NIHSS:          Labs:                                   15.7   17.77 )-----------( 117      ( 07 Mar 2024 11:25 )             48.1       Radiology:  - CT Head:  - MRI brain  -MRA brain/Carotids  - EEG    A/P:    No IV tpa given because…    -ASA/PLAVIX  -Atorvastatin  -DVT prophylaxis  -Dysphagia screen  -Speech and swallow eval  -PT eval/ rehab eval    Mangement d/w Pt / family /     Total Critical Care Time spent: CC: code stroke    HPI: 62yo male with PMHx seizure disorder, PE on eliquis, and glioblastoma, s/p 5 cycles of TMA(last dose 6/2023), and 6 doses of MVASI(last dose 5/31), fall and R ankle fracture in 7/2023, stable MRI 10/2 follows up with Dr. Debby Torres,  presents to the ED [facility] from [facility] for hypoxia, fever.  When EMS arrived patient was having a witnessed seizure, no aspiration.  Patient is on Keppra 1500 BID, and lacosamide 200mg BID.  Upon arrival to ED code stroke was called for R sided hemiplegia and R facial droop.  LKW 9:30am.  Pt. was intubated and sedated therefore NIHSS 27.  There was a delay in getting to CT scan because of intubation and CXR.  CT head/CTA/CTP was neg for acute bleed, ischemia, LVO.  He was not a candidate for IV thrombolytics because of seizure and patient is on Eliquis.        PAST MEDICAL & SURGICAL HISTORY:  Glioblastoma      Seizures      S/P craniotomy          FAMILY HISTORY: Unknown      Social Hx:  Unable to obtain    MEDICATIONS  (STANDING):  cefTRIAXone   IVPB 2000 milliGRAM(s) IV Intermittent once  etomidate Injectable 20 milliGRAM(s) IV Push Once  lacosamide Injectable 200 milliGRAM(s) IV Push once  lactated ringers Bolus 1000 milliLiter(s) IV Bolus once  levETIRAcetam   Injectable 1500 milliGRAM(s) IV Push once  rocuronium Injectable 100 milliGRAM(s) IV Push Once  vancomycin  IVPB. 1000 milliGRAM(s) IV Intermittent once       Allergies  No Known Allergies        ROS: Unable toobtain        Constitutional: intubated, sedated  HEAD: Normocephalic  Neck: Intubated, unable to determine  Extremities:  no edema  Musculoskeletal:  no abnormal movements  Skin: No rashes    Neurological exam:  HF: intubated and sedated, unable to do full neuro exam  CN: CHAPIN, R NLFD  Motor: unable to check.  As per EMS and ER physician patient had R hemiplegia  Sens: unable to check  Reflexes: unable to check  Coord:  unable to check  Gait/Balance: unable to check    NIHSS: 27          Labs:              15.7   17.77 )-----------( 117      ( 07 Mar 2024 11:25 )             48.1       Radiology:  < from: CT Brain Perfusion Maps Stroke (03.07.24 @ 11:53) >      IMPRESSION:    HEAD CT: No acute intracranial hemorrhage or acute territorial   infarction. Stable exam since 10/22/2023    Notification to clinician of alert:  Dr. VANESSA LIM was notified about the noncontrast head CT findings at   11:57 AM on 3/7/2024 with readback confirmation. The opportunity for   questions was provided and all questions asked were answered.    CT PERFUSION demonstrated: No territorial perfusion abnormality. Abnormal   perfusion corresponding to the left parietal operative bed.    If symptoms persist consider follow-up imaging with contrast-enhanced MRI   of the brain if no contraindications.    CTA COW:  Patent intracranial circulation without flow limiting stenosis   or large vessel occlusion.    CTA NECK: Patent, ECAs, ICAs, no  hemodynamically significant stenosis at    ICA origins by NASCET criteria.  Bilateral vertebral arteries are patent without flow limiting stenosis.            A/P: 62yo male with PMHx seizure disorder, PE on eliquis, and glioblastoma, s/p 5 cycles of TMA(last dose 6/2023), and 6 doses of MVASI(last dose 5/31), fall and R ankle fracture in 7/2023, stable MRI 10/2 follows up with Dr. Debby Torres,  presents to the ED [facility] from [facility] for hypoxia, fever 103.  When EMS arrived patient was having a witnessed seizure, no aspiration.  Patient is on Keppra 1500 BID, and lacosamide 200mg BID.  Upon arrival to ED code stroke was called for R sided hemiplegia and R facial droop.  LKW 9:30am.  Pt. was intubated and sedated therefore NIHSS 27.  There was a delay in getting to CT scan because of intubation and CXR.  CT head/CTA/CTP was neg for acute bleed, ischemia, LVO.  He was not a candidate for IV thrombolytics because of seizure and patient is on Eliquis.        #likely breakthrough seizure in the setting of elevated temp/possible sepsis with likely Maciej's paralysis, doubt TIA/stroke      Recommendations  -ICU monitoring  -EEG ordered-will need to be done off propofol-d/w Dr. Lim  -Sepsis/Fever w/u  -Continue Lacosamide 200mg IV BID, keppra 1500mg IV BID  -MRI brain when stable  -will follow  -seizure precautions/aspiration precautions    D/W Dr. Samayoa, Dr. Lim

## 2024-03-07 NOTE — CONSULT NOTE ADULT - ASSESSMENT
A:    64yMale    Here for:    1. Seizures hx of   2. Epilepsy  3. GBM s/p rxn  4. VTE/PE  5. UTI  6. Severe sepsis POA 2/2 UTI    This patient requires critical care for support of one or more vital organ systems with a high probability of imminent or life threatening deterioration in his/her condition    P:    Hx of epilepsy GBM s/p rxn 2022  On 2 AEDs  Breakthrough seizure, low GCS, intubated in ER    being Tx for UTI as output; suspect infection, fever reason for sz breakthru    Sedation with propofol drip; f/u spot EEG, Neuro involved (Dr Samayoa, epileptologist), note much appreciated;  keppra 1500mg q12, vompat 200mg q12; may need to increase vimpat +/- third agent; add'l imaging, EEG, w/u per Neuro; NSGY consult given Hx of GBM  HD monitoring, PRN vasopressors to maintain MAP > 65  NPO, place OGT  Pip/tazo; on keflex as outpt for UTI; f/u Cx's, sepsis protocol  f/u labs, replete lytes PRN  VTE PUX ppx; start DOAC  Med rec  f/u labs, replete lytes PRN  Minimize invasive lines/catheters    Dispo: Accept to critical care.    Date of entry of this note is equal to the date of services rendered    TOTAL CRITICAL CARE TIME: 115 minutes   (EXCLUSIVE of any non bundled procedures)    Note: This is a critically ill patient. Time spent has been in salvage of life, limb and vital organ systems. This time INCLUDES time spent directly as this patient's bedside with evaluation and management, review of chart including review of laboratory and imaging studies, interpretation of vital signs and cardiac output measurements, any necessary ventilator management, and time spent discussing plan of care with patient and family, including goals of care discussion. This also includes time spent in multidisciplinary discussion with care team and various consultants to optimize treatment plan. This time may NOT include various procedures, which will be noted seperately.

## 2024-03-07 NOTE — H&P ADULT - BIRTH SEX
Male [Immunizations are up to date] : Immunizations are up to date [RSV prophylaxis received] : RSV prophylaxis received [Nasal Congestion] : nasal congestion [Fatigue] : no fatigue [Fever] : no fever [Swollen Eyelids] : no ~T ~L swollen eyelids [Eye Discharge] : no eye discharge [Puffy Eyelids] : no puffy ~T eyelids [Cyanosis] : no cyanosis [Vomiting] : no vomiting [Difficulty Breathing] : no dyspnea [Seizure] : no seizures [Diarrhea] : no diarrhea [Dec Urine Output] : no oliguria [Yellow Skin Color] : skin not yellow [Dry Skin] : no ~L dry skin [Blood in Stools] : no blood in stools [Rash] : no rash [Skin Rash] : no skin rash [de-identified] : Hemangioma to neck

## 2024-03-07 NOTE — ED PROVIDER NOTE - CLINICAL SUMMARY MEDICAL DECISION MAKING FREE TEXT BOX
Patient/Caregiver provided printed discharge information. Hx of GBM s/p resection, hx of seizures, PE/DVT on A/C presenting from NH for fever and witnessed GTC seizure by EMS s/p 10 mg versed, presenting to the ED with fever and decreased LOC. Patient has been treated for UTI as an outpatient. On exam here, stuporous, R hemiplegia, tachycardic, febrile, mildly hypoxic. Ddx Maciej's paralysis iso seizure (possibly from decreased seizure threshold iso infection), new ischemic CVA (though on A/C), hemorrhagic stroke (on A/C), CNS infection. Given unknown LKN, likely not a TNK candidate. Will evaluate for LVO and perfusion deficits to evaluate thrombectomy candidacy. Will check sepsis labs, code stroke after intubation, neuro consult for EEG, continue AEDs, empiric meningitis/encephalitis coverage (cannot perform LP until washout period for A/C is completed), TBA ICU.

## 2024-03-07 NOTE — CONSULT NOTE ADULT - SUPERVISING ATTENDING
I personally reviewed the patient's imaging. History and plan discussed with MARTITA Whitehead, agree with above.

## 2024-03-07 NOTE — ED ADULT NURSE NOTE - OBJECTIVE STATEMENT
pt bibems from [facility] unresponsive with with right sided hemiplegia noticed by staff approx 1hr pta per EMS. seizure en route, given 10mg of versed. IV in left forearm by ems. MD tong at bedside code stroke called at 11:13AM. pt GCS 3, hypoxic, temp of 48268C placed in right and left AC. BGM on arrival 118. 100mg of rocuronium at 1118 and 20mg etomidate administered at 1119, LR bolus at 1119 intubated at 1119. RT at bedside. Temperature castro inserted 16fr at 1120. 1126 propofol started at 10mcg/kg/min. OG tube inserted at 1133. Taken to CT at 11:43. pt has pmh of epilepsy managed with keppra, brain ca, dysphagic, squamous cell carcinoma, impaired gait, and was just being treated with keflex per ems. pt bibems from [facility] unresponsive with with right sided hemiplegia noticed by staff approx 1hr pta per EMS. seizure en route, given 10mg of versed. IV in left forearm by ems. MD tong at bedside code stroke called at 11:13AM. pt GCS 3, hypoxic, temp of 29883A placed in right and left AC. BGM on arrival 118. 100mg of rocuronium at 1118 and 20mg etomidate administered at 1119, LR bolus at 1119 [place]ubated at [address].5 23 to the lip. . RT at bedside. Temperature castro inserted 16fr at 1120. 1126 propofol started at 10mcg/kg/min. OG tube inserted at 1133. Taken to CT at 11:43. pt has pmh of epilepsy managed with keppra, brain ca, dysphagic, squamous cell carcinoma, impaired gait, and was just being treated with keflex per ems. pt bibems from [facility] unresponsive with with right sided hemiplegia noticed by staff approx 1hr pta per EMS. seizure en route, given 10mg of versed. IV in left forearm by ems. MD tong at bedside code stroke called at 11:13AM. pt GCS 3, hypoxic, temp of 69129G placed in right and left AC. BGM on arrival 118. 100mg of rocuronium at 1118 and 20mg etomidate administered at 1119, LR bolus at 1119 intubated at 11:19  7.5, 23 to the lip. . RT at bedside. Temperature castro inserted 16fr at 1120. 1126 propofol started at 10mcg/kg/min. OG tube inserted at 1133. Taken to CT at 11:43. pt has pmh of epilepsy managed with keppra, brain ca, dysphagic, squamous cell carcinoma, impaired gait, and was just being treated with keflex per ems.

## 2024-03-07 NOTE — H&P ADULT - ASSESSMENT
65 y/o male with a PMHx of glioblastoma s/p large resection of mass on 10/3/22 with Dr. Turpin, seizure disorder on Keppra and Vimpat, and DVT/PE on Eliquis presents to the ED [facility] from [facility]. Presents with:    #seizures  #epilepsy  #GBM s/p rxn  #VTE/PE  #UTI  #Severe sepsis POA 2/2 UTI    -septic vs glioblastoma etiology for seizure. Likely seizure resulted from sepsis lowering threshold even with medications on board  -check culture results for susceptibility. was being treated out patient for uti, likely that abx he was using were resistant. sepsis may be driving the seizures.  -r/o todds paralysis as reason for right hemiplegia   -f/u on spot eeg  -neuro on board  -started on prop drip, given keppra 1500mg in ed  -levo started due to propofol induced hypotension  -labs reviewed  -imaging reviewed  - no acute bleeds noted on ct, resume Eliquis and other home meds

## 2024-03-07 NOTE — ED PROVIDER NOTE - PHYSICAL EXAMINATION
GENERAL: Stuporous   HEAD: atraumatic, normocephalic  EYES: pupils 2mm b/l. No gaze deviation.   CARDIAC: tachycardic, normotensive, normal S1S2,  no appreciable murmurs, no cyanosis, cap refill < 2 seconds  PULM: mildly hypoxic  GI: abdomen nondistended, soft, no guarding or rebound tenderness, no palpable masses  NEURO: stuporous. Hemiplegia right arm and leg. Withdraws to pain left arm and left leg.   MSK: spine appears normal, no joint swelling or erythema, no gross deformities of extremities  EXT: no peripheral edema, calf tenderness, redness or swelling  SKIN: warm, dry, and intact, no rashes

## 2024-03-07 NOTE — PHARMACOTHERAPY INTERVENTION NOTE - COMMENTS
Medication history complete, patient is from [facility], reviewed and confirmed medication with list provided by facility.
adjusted first vancomycin dose based on vancomycin policy

## 2024-03-07 NOTE — PATIENT PROFILE ADULT - OVER THE PAST TWO WEEKS HAVE YOU FELT DOWN, DEPRESSED OR HOPELESS?
unable to assess- from previous health record/yes unable to assess- intubated/sedated unable to assess- intubated/sedated/no

## 2024-03-08 LAB
ALBUMIN SERPL ELPH-MCNC: 2.4 G/DL — LOW (ref 3.3–5)
ALP SERPL-CCNC: 48 U/L — SIGNIFICANT CHANGE UP (ref 40–120)
ALT FLD-CCNC: 52 U/L — SIGNIFICANT CHANGE UP (ref 12–78)
ANION GAP SERPL CALC-SCNC: 7 MMOL/L — SIGNIFICANT CHANGE UP (ref 5–17)
ANISOCYTOSIS BLD QL: SLIGHT — SIGNIFICANT CHANGE UP
AST SERPL-CCNC: 148 U/L — HIGH (ref 15–37)
BASOPHILS # BLD AUTO: 0.05 K/UL — SIGNIFICANT CHANGE UP (ref 0–0.2)
BASOPHILS NFR BLD AUTO: 0.2 % — SIGNIFICANT CHANGE UP (ref 0–2)
BILIRUB SERPL-MCNC: 1 MG/DL — SIGNIFICANT CHANGE UP (ref 0.2–1.2)
BUN SERPL-MCNC: 12 MG/DL — SIGNIFICANT CHANGE UP (ref 7–23)
CALCIUM SERPL-MCNC: 9.5 MG/DL — SIGNIFICANT CHANGE UP (ref 8.5–10.1)
CHLORIDE SERPL-SCNC: 109 MMOL/L — HIGH (ref 96–108)
CO2 SERPL-SCNC: 26 MMOL/L — SIGNIFICANT CHANGE UP (ref 22–31)
CREAT SERPL-MCNC: 0.83 MG/DL — SIGNIFICANT CHANGE UP (ref 0.5–1.3)
CULTURE RESULTS: SIGNIFICANT CHANGE UP
EGFR: 97 ML/MIN/1.73M2 — SIGNIFICANT CHANGE UP
EOSINOPHIL # BLD AUTO: 0.03 K/UL — SIGNIFICANT CHANGE UP (ref 0–0.5)
EOSINOPHIL NFR BLD AUTO: 0.1 % — SIGNIFICANT CHANGE UP (ref 0–6)
GLUCOSE SERPL-MCNC: 144 MG/DL — HIGH (ref 70–99)
HCT VFR BLD CALC: 43.7 % — SIGNIFICANT CHANGE UP (ref 39–50)
HGB BLD-MCNC: 14.1 G/DL — SIGNIFICANT CHANGE UP (ref 13–17)
IMM GRANULOCYTES NFR BLD AUTO: 0.7 % — SIGNIFICANT CHANGE UP (ref 0–0.9)
LYMPHOCYTES # BLD AUTO: 0.82 K/UL — LOW (ref 1–3.3)
LYMPHOCYTES # BLD AUTO: 3.6 % — LOW (ref 13–44)
MACROCYTES BLD QL: SLIGHT — SIGNIFICANT CHANGE UP
MAGNESIUM SERPL-MCNC: 2.4 MG/DL — SIGNIFICANT CHANGE UP (ref 1.6–2.6)
MANUAL SMEAR VERIFICATION: SIGNIFICANT CHANGE UP
MCHC RBC-ENTMCNC: 30.2 PG — SIGNIFICANT CHANGE UP (ref 27–34)
MCHC RBC-ENTMCNC: 32.3 GM/DL — SIGNIFICANT CHANGE UP (ref 32–36)
MCV RBC AUTO: 93.6 FL — SIGNIFICANT CHANGE UP (ref 80–100)
MICROCYTES BLD QL: SLIGHT — SIGNIFICANT CHANGE UP
MONOCYTES # BLD AUTO: 1.18 K/UL — HIGH (ref 0–0.9)
MONOCYTES NFR BLD AUTO: 5.2 % — SIGNIFICANT CHANGE UP (ref 2–14)
NEUTROPHILS # BLD AUTO: 20.56 K/UL — HIGH (ref 1.8–7.4)
NEUTROPHILS NFR BLD AUTO: 90.2 % — HIGH (ref 43–77)
PHOSPHATE SERPL-MCNC: 3 MG/DL — SIGNIFICANT CHANGE UP (ref 2.5–4.5)
PLAT MORPH BLD: NORMAL — SIGNIFICANT CHANGE UP
PLATELET # BLD AUTO: 121 K/UL — LOW (ref 150–400)
POTASSIUM SERPL-MCNC: 4.1 MMOL/L — SIGNIFICANT CHANGE UP (ref 3.5–5.3)
POTASSIUM SERPL-SCNC: 4.1 MMOL/L — SIGNIFICANT CHANGE UP (ref 3.5–5.3)
PROCALCITONIN SERPL-MCNC: 4.37 NG/ML — HIGH (ref 0.02–0.1)
PROLACTIN SERPL-MCNC: 15.4 NG/ML — SIGNIFICANT CHANGE UP (ref 4.1–18.4)
PROT SERPL-MCNC: 6.3 GM/DL — SIGNIFICANT CHANGE UP (ref 6–8.3)
RBC # BLD: 4.67 M/UL — SIGNIFICANT CHANGE UP (ref 4.2–5.8)
RBC # FLD: 15.5 % — HIGH (ref 10.3–14.5)
RBC BLD AUTO: SIGNIFICANT CHANGE UP
SODIUM SERPL-SCNC: 142 MMOL/L — SIGNIFICANT CHANGE UP (ref 135–145)
SPECIMEN SOURCE: SIGNIFICANT CHANGE UP
WBC # BLD: 22.81 K/UL — HIGH (ref 3.8–10.5)
WBC # FLD AUTO: 22.81 K/UL — HIGH (ref 3.8–10.5)

## 2024-03-08 PROCEDURE — 99232 SBSQ HOSP IP/OBS MODERATE 35: CPT

## 2024-03-08 PROCEDURE — 71045 X-RAY EXAM CHEST 1 VIEW: CPT | Mod: 26

## 2024-03-08 PROCEDURE — 74018 RADEX ABDOMEN 1 VIEW: CPT | Mod: 26

## 2024-03-08 RX ORDER — PANTOPRAZOLE SODIUM 20 MG/1
40 TABLET, DELAYED RELEASE ORAL DAILY
Refills: 0 | Status: DISCONTINUED | OUTPATIENT
Start: 2024-03-08 | End: 2024-03-13

## 2024-03-08 RX ORDER — SENNA PLUS 8.6 MG/1
2 TABLET ORAL EVERY 12 HOURS
Refills: 0 | Status: DISCONTINUED | OUTPATIENT
Start: 2024-03-08 | End: 2024-03-13

## 2024-03-08 RX ORDER — PIPERACILLIN AND TAZOBACTAM 4; .5 G/20ML; G/20ML
3.38 INJECTION, POWDER, LYOPHILIZED, FOR SOLUTION INTRAVENOUS ONCE
Refills: 0 | Status: DISCONTINUED | OUTPATIENT
Start: 2024-03-08 | End: 2024-03-08

## 2024-03-08 RX ORDER — PIPERACILLIN AND TAZOBACTAM 4; .5 G/20ML; G/20ML
3.38 INJECTION, POWDER, LYOPHILIZED, FOR SOLUTION INTRAVENOUS ONCE
Refills: 0 | Status: COMPLETED | OUTPATIENT
Start: 2024-03-08 | End: 2024-03-08

## 2024-03-08 RX ORDER — APIXABAN 2.5 MG/1
5 TABLET, FILM COATED ORAL EVERY 12 HOURS
Refills: 0 | Status: DISCONTINUED | OUTPATIENT
Start: 2024-03-08 | End: 2024-03-13

## 2024-03-08 RX ORDER — SODIUM CHLORIDE 9 MG/ML
1000 INJECTION, SOLUTION INTRAVENOUS
Refills: 0 | Status: DISCONTINUED | OUTPATIENT
Start: 2024-03-08 | End: 2024-03-09

## 2024-03-08 RX ORDER — PIPERACILLIN AND TAZOBACTAM 4; .5 G/20ML; G/20ML
3.38 INJECTION, POWDER, LYOPHILIZED, FOR SOLUTION INTRAVENOUS EVERY 8 HOURS
Refills: 0 | Status: COMPLETED | OUTPATIENT
Start: 2024-03-08 | End: 2024-03-10

## 2024-03-08 RX ORDER — POLYETHYLENE GLYCOL 3350 17 G/17G
17 POWDER, FOR SOLUTION ORAL EVERY 12 HOURS
Refills: 0 | Status: DISCONTINUED | OUTPATIENT
Start: 2024-03-08 | End: 2024-03-13

## 2024-03-08 RX ADMIN — PIPERACILLIN AND TAZOBACTAM 200 GRAM(S): 4; .5 INJECTION, POWDER, LYOPHILIZED, FOR SOLUTION INTRAVENOUS at 10:08

## 2024-03-08 RX ADMIN — LACOSAMIDE 100 MILLIGRAM(S): 50 TABLET ORAL at 22:05

## 2024-03-08 RX ADMIN — SODIUM CHLORIDE 100 MILLILITER(S): 9 INJECTION, SOLUTION INTRAVENOUS at 17:38

## 2024-03-08 RX ADMIN — LEVETIRACETAM 1500 MILLIGRAM(S): 250 TABLET, FILM COATED ORAL at 22:06

## 2024-03-08 RX ADMIN — LACOSAMIDE 100 MILLIGRAM(S): 50 TABLET ORAL at 10:08

## 2024-03-08 RX ADMIN — LEVETIRACETAM 1500 MILLIGRAM(S): 250 TABLET, FILM COATED ORAL at 10:26

## 2024-03-08 RX ADMIN — PIPERACILLIN AND TAZOBACTAM 25 GRAM(S): 4; .5 INJECTION, POWDER, LYOPHILIZED, FOR SOLUTION INTRAVENOUS at 22:05

## 2024-03-08 RX ADMIN — Medication 2 MILLIGRAM(S): at 10:10

## 2024-03-08 RX ADMIN — CHLORHEXIDINE GLUCONATE 15 MILLILITER(S): 213 SOLUTION TOPICAL at 10:09

## 2024-03-08 RX ADMIN — PANTOPRAZOLE SODIUM 40 MILLIGRAM(S): 20 TABLET, DELAYED RELEASE ORAL at 10:09

## 2024-03-08 RX ADMIN — PIPERACILLIN AND TAZOBACTAM 25 GRAM(S): 4; .5 INJECTION, POWDER, LYOPHILIZED, FOR SOLUTION INTRAVENOUS at 14:45

## 2024-03-08 RX ADMIN — POLYETHYLENE GLYCOL 3350 17 GRAM(S): 17 POWDER, FOR SOLUTION ORAL at 10:09

## 2024-03-08 RX ADMIN — PROPOFOL 22.2 MICROGRAM(S)/KG/MIN: 10 INJECTION, EMULSION INTRAVENOUS at 00:36

## 2024-03-08 RX ADMIN — CHLORHEXIDINE GLUCONATE 1 APPLICATION(S): 213 SOLUTION TOPICAL at 05:51

## 2024-03-08 RX ADMIN — SENNA PLUS 2 TABLET(S): 8.6 TABLET ORAL at 10:09

## 2024-03-08 RX ADMIN — PROPOFOL 22.2 MICROGRAM(S)/KG/MIN: 10 INJECTION, EMULSION INTRAVENOUS at 06:52

## 2024-03-08 RX ADMIN — DEXMEDETOMIDINE HYDROCHLORIDE IN 0.9% SODIUM CHLORIDE 2.31 MICROGRAM(S)/KG/HR: 4 INJECTION INTRAVENOUS at 06:52

## 2024-03-08 NOTE — PROGRESS NOTE ADULT - ASSESSMENT
65 y/o male with a PMHx of glioblastoma s/p large resection of mass on 10/3/22 with Dr. Turpin, seizure disorder on Keppra and Vimpat, and DVT/PE on Eliquis presents to the ED [facility] from [facility]. Upon entry to the ED, patient was obtunded with, according to ED physician, R sided hemiplegia. Decision was made in ED to intubate patient due to low GCS and inability to protect airway. EMS stated prior to arrival that the patient got 10mgIV valium due to witnessed seizure in the field.  CTB stroke eval reported No acute intracranial hemorrhage or acute territorial infarction. Stable exam since 10/22/2023  EEG reported findings suggestive of left hemisphere dysfunction, likely structural in nature. No definite epileptiform activity was seen and no clinical events or seizures were recorded. Consider a repeat study if clinically indicated   pt clinically at baseline from nsx standpoint as compared to yesterday     - D/w attending/ defer care to neurology/ primary team   - No acute neurosurgical intervention given stable imaging and no obvious mass/ hemorrhage   - Q1 neuro checks or as per neurology/ primary team   - recommend MRI Brain w/wo to assess for any changes given prior h/o GBM resection   - neurology for AEDs/ seizure  - oncology for h/o GBM   - SBP goals: normotension   - Sodium goals: normonatremia    - Chemical DVT ppx ok from NSx standpoint unless ICH develops/ will defer decision to primary team   - Plan to follow once MRIs available for review for possible further plan/ recommendations or f/u w Dr Turpin NSx and/or Dr Torres neuro onc       Time spent:  25 minutes in examination of patient, review of labs and imaging, and coordination with contributing physicians

## 2024-03-08 NOTE — PROGRESS NOTE ADULT - SUBJECTIVE AND OBJECTIVE BOX
Interval History:  3/8/24: No new events    MEDICATIONS  (STANDING):  chlorhexidine 0.12% Liquid 15 milliLiter(s) Oral Mucosa every 12 hours  chlorhexidine 2% Cloths 1 Application(s) Topical <User Schedule>  dexAMETHasone     Tablet 2 milliGRAM(s) Oral daily  dexMEDEtomidine Infusion 0.1 MICROgram(s)/kG/Hr (2.31 mL/Hr) IV Continuous <Continuous>  lacosamide IVPB 200 milliGRAM(s) IV Intermittent every 12 hours  levETIRAcetam   Injectable 1500 milliGRAM(s) IV Push every 12 hours  norepinephrine Infusion 0.05 MICROgram(s)/kG/Min (8.68 mL/Hr) IV Continuous <Continuous>  pantoprazole  Injectable 40 milliGRAM(s) IV Push daily  piperacillin/tazobactam IVPB. 3.375 Gram(s) IV Intermittent once  piperacillin/tazobactam IVPB.- 3.375 Gram(s) IV Intermittent once  piperacillin/tazobactam IVPB.. 3.375 Gram(s) IV Intermittent every 8 hours  polyethylene glycol 3350 17 Gram(s) Oral every 12 hours  propofol Infusion 40 MICROgram(s)/kG/Min (22.2 mL/Hr) IV Continuous <Continuous>  senna 2 Tablet(s) Oral every 12 hours    MEDICATIONS  (PRN):      Allergies    No Known Allergies    Intolerances        PHYSICAL EXAM:  Vital Signs Last 24 Hrs  T(F): 97.3 (03-08-24 @ 08:30)  HR: 72 (03-08-24 @ 08:30)  BP: 101/77 (03-08-24 @ 08:00)  RR: 21 (03-08-24 @ 08:30)    General:   No apparent distress  intubated on ventilator, on precedex  neuro:  Not following commands  CN: PERRL, oculocephalics intact, + corneals  motor: + spontaneous movements of upper extremities  sensory: + withdrawal to noxious stimuli in upper extremities, left brisker than right    LABS:                        14.1   22.81 )-----------( 121      ( 08 Mar 2024 05:40 )             43.7     03-08    142  |  109<H>  |  12  ----------------------------<  144<H>  4.1   |  26  |  0.83    Ca    9.5      08 Mar 2024 05:40  Phos  3.0     03-08  Mg     2.4     03-08    TPro  6.3  /  Alb  2.4<L>  /  TBili  1.0  /  DBili  x   /  AST  148<H>  /  ALT  52  /  AlkPhos  48  03-08    PT/INR - ( 07 Mar 2024 11:25 )   PT: 15.0 sec;   INR: 1.34 ratio         PTT - ( 07 Mar 2024 11:25 )  PTT:35.0 sec  Urinalysis Basic - ( 08 Mar 2024 05:40 )    Color: x / Appearance: x / SG: x / pH: x  Gluc: 144 mg/dL / Ketone: x  / Bili: x / Urobili: x   Blood: x / Protein: x / Nitrite: x   Leuk Esterase: x / RBC: x / WBC x   Sq Epi: x / Non Sq Epi: x / Bacteria: x        RADIOLOGY & ADDITIONAL STUDIES:  CT head, CTA head and neck, CT perfusion 3/7/24:    CT head:  No acute intracranial hemorrhage or acute territorial   infarction. Stable exam since 10/22/2023    CT PERFUSION demonstrated: No territorial perfusion abnormality. Abnormal   perfusion corresponding to the left parietal operative bed.    If symptoms persist consider follow-up imaging with contrast-enhanced MRI   of the brain if no contraindications.    CTA COW:  Patent intracranial circulation without flow limiting stenosis   or large vessel occlusion.    CTA NECK: Patent, ECAs, ICAs, no  hemodynamically significant stenosis at    ICA origins by NASCET criteria.  Bilateral vertebral arteries are patent without flow limiting stenosis.

## 2024-03-08 NOTE — PROGRESS NOTE ADULT - ASSESSMENT
Mr. Ramos is a 64yo man with PMHx glioblastoma s/p 5 cycles of TMA(last dose 6/2023), and 6 doses of MVASI(last dose 5/31) stable MRI 10/2 follows up with Dr. Debby Torres,  , seizure disorder, PE on eliquis,   fall and R ankle fracture in 7/2023, presents to the ED [facility] from [facility] for hypoxia and  fever to 103.  When EMS arrived patient was having a witnessed seizure, no aspiration.  Patient is on Keppra 1500 BID, and lacosamide 200mg BID.  Upon arrival to ED code stroke was called for R sided hemiplegia and R facial droop.  LKW 9:30am.  Pt. was intubated and sedated therefore NIHSS 27.  There was a delay in getting to CT scan because of intubation and CXR.  CT head/CTA/CTP was neg for acute bleed, ischemia, LVO.  He was not a candidate for IV thrombolytics because of seizure and patient is on Eliquis.        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 Can add 50 mg to evening dose if he has additional seizures.   -Continue Keppra 1500 mg q12  -Attempt to wean off sedation, extubate    -Continue treatment of infection    -Although this patient was initially seen as a code stroke, seizure with postictal weakness is favored over stroke.   -MRI can be done when patient is stabilized to look for any tumor progression.       Dr. Maher will cover neurology beginning 3/9 and will f/u as needed.

## 2024-03-08 NOTE — PROGRESS NOTE ADULT - ASSESSMENT
64M PMH glioblastoma (s/p resection of mass on 10/3/22 with Dr. Turpin), seizure disorder (on Keppra and Vimpat), and DVT/PE (on Eliquis) presents to the ED from [facility] found to have seizure in setting of severe sepsis / UTI. Intubated and admitted to CCU.     Neuro:  Will try to extubate today  MRI non urgent to assess for tumor progression  C/w anti epileptics  Neuro and NSG recs appreciated  Suspect seizure provoked by UTI  C/w dexamethasone    ID:  C/w pip tazo for UTI    DVT/PE  Resume Eliquis

## 2024-03-08 NOTE — PROGRESS NOTE ADULT - SUBJECTIVE AND OBJECTIVE BOX
Patient is a 64y old  Male who presents with a chief complaint of Seizures/ fever/ infection/ h/o GBM (08 Mar 2024 11:03)      SUBJECTIVE / OVERNIGHT EVENTS:    Patient seen and examined at bedside. No acute events overnight.    T(F): 98.6 (03-08 @ 12:00), Max: 99.3 (03-07 @ 13:52)  HR: 79 (03-08 @ 12:00) (60 - 139)  BP: 100/81 (03-08 @ 12:00) (70/50 - 122/89)  RR: 18 (03-08 @ 12:00) (17 - 29)  SpO2: 97% (03-08 @ 12:00) (95% - 100%)    I&O's Summary    07 Mar 2024 07:01  -  08 Mar 2024 07:00  --------------------------------------------------------  IN: 3198 mL / OUT: 2700 mL / NET: 498 mL        MEDICATIONS  (STANDING):  apixaban 5 milliGRAM(s) Oral every 12 hours  chlorhexidine 0.12% Liquid 15 milliLiter(s) Oral Mucosa every 12 hours  chlorhexidine 2% Cloths 1 Application(s) Topical <User Schedule>  dexAMETHasone     Tablet 2 milliGRAM(s) Oral daily  dexMEDEtomidine Infusion 0.1 MICROgram(s)/kG/Hr (2.31 mL/Hr) IV Continuous <Continuous>  lacosamide IVPB 200 milliGRAM(s) IV Intermittent every 12 hours  levETIRAcetam   Injectable 1500 milliGRAM(s) IV Push every 12 hours  norepinephrine Infusion 0.05 MICROgram(s)/kG/Min (8.68 mL/Hr) IV Continuous <Continuous>  pantoprazole  Injectable 40 milliGRAM(s) IV Push daily  piperacillin/tazobactam IVPB.- 3.375 Gram(s) IV Intermittent once  piperacillin/tazobactam IVPB.. 3.375 Gram(s) IV Intermittent every 8 hours  polyethylene glycol 3350 17 Gram(s) Oral every 12 hours  propofol Infusion 40 MICROgram(s)/kG/Min (22.2 mL/Hr) IV Continuous <Continuous>  senna 2 Tablet(s) Oral every 12 hours    MEDICATIONS  (PRN):          PHYSICAL EXAM:   GEN: Age appropriate, resting comfortably in bed, no acute distress, non toxic appearing  PULM: Lungs CTAB, no wheezes, rales, rhonchi  CV: RRR, S1S2, no MRG  Abdominal: Soft, nontender to palpation, non-distended, +BS  Extremities: No edema   NEURO: Alert, tracking, responsive, following commands      LABS:  Labs personally reviewed.                        14.1   22.81 )-----------( 121      ( 08 Mar 2024 05:40 )             43.7     Hgb Trend: 14.1<--, 15.7<--  03-08    142  |  109<H>  |  12  ----------------------------<  144<H>  4.1   |  26  |  0.83    Ca    9.5      08 Mar 2024 05:40  Phos  3.0     03-08  Mg     2.4     03-08    TPro  6.3  /  Alb  2.4<L>  /  TBili  1.0  /  DBili  x   /  AST  148<H>  /  ALT  52  /  AlkPhos  48  03-08    Creatinine Trend: 0.83<--, 0.86<--, 1.09<--, 0.75<--, 0.75<--  PT/INR - ( 07 Mar 2024 11:25 )   PT: 15.0 sec;   INR: 1.34 ratio         PTT - ( 07 Mar 2024 11:25 )  PTT:35.0 sec  Urinalysis Basic - ( 08 Mar 2024 05:40 )    Color: x / Appearance: x / SG: x / pH: x  Gluc: 144 mg/dL / Ketone: x  / Bili: x / Urobili: x   Blood: x / Protein: x / Nitrite: x   Leuk Esterase: x / RBC: x / WBC x   Sq Epi: x / Non Sq Epi: x / Bacteria: x

## 2024-03-08 NOTE — PROGRESS NOTE ADULT - SUBJECTIVE AND OBJECTIVE BOX
Patient is a 64y old  Male who presents with a chief complaint of Seizures (08 Mar 2024 10:01)      HPI:  65 y/o male with a PMHx of glioblastoma s/p large resection of mass on 10/3/22 with Dr. Turpin, seizure disorder on Keppra and Vimpat, and DVT/PE on Eliquis presents to the ED [facility]. Upon entry to the ED, patient was obtunded with, according to ED physician, R sided hemiplegia. Decision was made in ED to intubate patient due to low GCS and inability to protect airway. EMS stated prior to arrival that the patient got 10mgIV valium due to witnessed seizure in the field. Patient was successfully intubated and started on prop drip. neuro consulted. Stated stop prop temporarily in order to get a ECG initial reading to check for continued seizures. Patient was also given 1500 kepra in the ED. ICU was called.  (07 Mar 2024 14:04)    3/7/24: pt seen in bed, vented on propofol and recently received versed for agitation/ vent dyssynchrony.  sedation was held x 40 mins and pt examined frequently.   unable to assess if pt has HA/N/V/ subjective sensorimotor changes       3/8/24: pt seen in bed this morning, vented/ sedation held for eval.   pt is attempting to OE to VC visible eyebrow rise is noted b/l and symmetrically, pt is able to nod "yes" and "no", nodded "yes" when asked if he has a headache x3.   no acute events reported o/w overnight  neurology recommendations appreciated       chlorhexidine 0.12% Liquid 15 milliLiter(s) Oral Mucosa every 12 hours  chlorhexidine 2% Cloths 1 Application(s) Topical <User Schedule>  dexAMETHasone     Tablet 2 milliGRAM(s) Oral daily  dexMEDEtomidine Infusion 0.1 MICROgram(s)/kG/Hr IV Continuous <Continuous>  lacosamide IVPB 200 milliGRAM(s) IV Intermittent every 12 hours  levETIRAcetam   Injectable 1500 milliGRAM(s) IV Push every 12 hours  norepinephrine Infusion 0.05 MICROgram(s)/kG/Min IV Continuous <Continuous>  pantoprazole  Injectable 40 milliGRAM(s) IV Push daily  piperacillin/tazobactam IVPB.- 3.375 Gram(s) IV Intermittent once  piperacillin/tazobactam IVPB.. 3.375 Gram(s) IV Intermittent every 8 hours  polyethylene glycol 3350 17 Gram(s) Oral every 12 hours  propofol Infusion 40 MICROgram(s)/kG/Min IV Continuous <Continuous>  senna 2 Tablet(s) Oral every 12 hours      Vital Signs Last 24 Hrs  T(C): 36.8 (08 Mar 2024 10:00), Max: 38.5 (07 Mar 2024 11:26)  T(F): 98.2 (08 Mar 2024 10:00), Max: 101.3 (07 Mar 2024 11:26)  HR: 63 (08 Mar 2024 10:00) (60 - 149)  BP: 102/76 (08 Mar 2024 10:00) (70/50 - 124/83)  BP(mean): 86 (08 Mar 2024 10:00) (59 - 100)  RR: 19 (08 Mar 2024 10:00) (17 - 29)  SpO2: 97% (08 Mar 2024 10:00) (91% - 100%)    Parameters below as of 07 Mar 2024 13:52  Patient On (Oxygen Delivery Method): ventilator                              14.1   22.81 )-----------( 121      ( 08 Mar 2024 05:40 )             43.7       03-08    142  |  109<H>  |  12  ----------------------------<  144<H>  4.1   |  26  |  0.83    Ca    9.5      08 Mar 2024 05:40  Phos  3.0     03-08  Mg     2.4     03-08    TPro  6.3  /  Alb  2.4<L>  /  TBili  1.0  /  DBili  x   /  AST  148<H>  /  ALT  52  /  AlkPhos  48  03-08      PT/INR - ( 07 Mar 2024 11:25 )   PT: 15.0 sec;   INR: 1.34 ratio    PTT - ( 07 Mar 2024 11:25 )  PTT:35.0 sec                Radiology:  no new NSx imaging available for review  Patient is a 64y old  Male who presents with a chief complaint of Seizures (08 Mar 2024 10:01)      HPI:  63 y/o male with a PMHx of glioblastoma s/p large resection of mass on 10/3/22 with Dr. Turpin, seizure disorder on Keppra and Vimpat, and DVT/PE on Eliquis presents to the ED [facility]. Upon entry to the ED, patient was obtunded with, according to ED physician, R sided hemiplegia. Decision was made in ED to intubate patient due to low GCS and inability to protect airway. EMS stated prior to arrival that the patient got 10mgIV valium due to witnessed seizure in the field. Patient was successfully intubated and started on prop drip. neuro consulted. Stated stop prop temporarily in order to get a ECG initial reading to check for continued seizures. Patient was also given 1500 kepra in the ED. ICU was called.  (07 Mar 2024 14:04)    3/7/24: pt seen in bed, vented on propofol and recently received versed for agitation/ vent dyssynchrony.  sedation was held x 40 mins and pt examined frequently.   unable to assess if pt has HA/N/V/ subjective sensorimotor changes       3/8/24: pt seen in bed this morning, vented/ sedation held for eval.   pt is attempting to OE to VC visible eyebrow rise is noted b/l and symmetrically, pt is able to nod "yes" and "no", nodded "yes" when asked if he has a headache x3.   no acute events reported o/w overnight  neurology recommendations appreciated       chlorhexidine 0.12% Liquid 15 milliLiter(s) Oral Mucosa every 12 hours  chlorhexidine 2% Cloths 1 Application(s) Topical <User Schedule>  dexAMETHasone     Tablet 2 milliGRAM(s) Oral daily  dexMEDEtomidine Infusion 0.1 MICROgram(s)/kG/Hr IV Continuous <Continuous>  lacosamide IVPB 200 milliGRAM(s) IV Intermittent every 12 hours  levETIRAcetam   Injectable 1500 milliGRAM(s) IV Push every 12 hours  norepinephrine Infusion 0.05 MICROgram(s)/kG/Min IV Continuous <Continuous>  pantoprazole  Injectable 40 milliGRAM(s) IV Push daily  piperacillin/tazobactam IVPB.- 3.375 Gram(s) IV Intermittent once  piperacillin/tazobactam IVPB.. 3.375 Gram(s) IV Intermittent every 8 hours  polyethylene glycol 3350 17 Gram(s) Oral every 12 hours  propofol Infusion 40 MICROgram(s)/kG/Min IV Continuous <Continuous>  senna 2 Tablet(s) Oral every 12 hours      Vital Signs Last 24 Hrs  T(C): 36.8 (08 Mar 2024 10:00), Max: 38.5 (07 Mar 2024 11:26)  T(F): 98.2 (08 Mar 2024 10:00), Max: 101.3 (07 Mar 2024 11:26)  HR: 63 (08 Mar 2024 10:00) (60 - 149)  BP: 102/76 (08 Mar 2024 10:00) (70/50 - 124/83)  BP(mean): 86 (08 Mar 2024 10:00) (59 - 100)  RR: 19 (08 Mar 2024 10:00) (17 - 29)  SpO2: 97% (08 Mar 2024 10:00) (91% - 100%)    Parameters below as of 07 Mar 2024 13:52  Patient On (Oxygen Delivery Method): ventilator                              14.1   22.81 )-----------( 121      ( 08 Mar 2024 05:40 )             43.7       03-08    142  |  109<H>  |  12  ----------------------------<  144<H>  4.1   |  26  |  0.83    Ca    9.5      08 Mar 2024 05:40  Phos  3.0     03-08  Mg     2.4     03-08    TPro  6.3  /  Alb  2.4<L>  /  TBili  1.0  /  DBili  x   /  AST  148<H>  /  ALT  52  /  AlkPhos  48  03-08      PT/INR - ( 07 Mar 2024 11:25 )   PT: 15.0 sec;   INR: 1.34 ratio    PTT - ( 07 Mar 2024 11:25 )  PTT:35.0 sec        Physical Exam:  Constitutional: vented/ sedation held  HEENT: PERRL b/l and 4mm/ brisk and symmetric, + b/l cataract, unable to assess for EOMI  Neck: Supple, unable to assess if has any pain/tenderness   Respiratory: Breath sounds are present bilaterally  Cardiovascular: S1 and S2, regular rhythm on monitor   Gastrointestinal: Soft, NT/ND, obese abdomen   Extremities:  no edema appreciated b/l   Musculoskeletal: no abnormal movements or spasticity   Skin: No rashes    Neurological Exam:  HF: A x O x 0 as pt is vented/ intubated, ?follows commands/ attempted to epen eyes/ eyebrows lift up symmetrically and was able to open and close right hand, unable to assess speech  CN: PERRL 4mm b/l to light, unable to assess for EOMI or tongue deviation, no NLFD appreciated,   Motor: Strength localizing w L>R UEs and withdrawing R>L LEs   Sens: grimaces to sternal rub and peripheral nailbed pressure as well as withdraws   Reflexes: unable to assess   Coord: unable to assess  Gait/Balance: Cannot test        Radiology:  no new NSx imaging available for review

## 2024-03-09 LAB
ANION GAP SERPL CALC-SCNC: 4 MMOL/L — LOW (ref 5–17)
BUN SERPL-MCNC: 13 MG/DL — SIGNIFICANT CHANGE UP (ref 7–23)
CALCIUM SERPL-MCNC: 9.3 MG/DL — SIGNIFICANT CHANGE UP (ref 8.5–10.1)
CHLORIDE SERPL-SCNC: 109 MMOL/L — HIGH (ref 96–108)
CO2 SERPL-SCNC: 28 MMOL/L — SIGNIFICANT CHANGE UP (ref 22–31)
CREAT SERPL-MCNC: 0.7 MG/DL — SIGNIFICANT CHANGE UP (ref 0.5–1.3)
EGFR: 102 ML/MIN/1.73M2 — SIGNIFICANT CHANGE UP
GLUCOSE SERPL-MCNC: 141 MG/DL — HIGH (ref 70–99)
HCT VFR BLD CALC: 39.8 % — SIGNIFICANT CHANGE UP (ref 39–50)
HGB BLD-MCNC: 12.9 G/DL — LOW (ref 13–17)
MAGNESIUM SERPL-MCNC: 2.2 MG/DL — SIGNIFICANT CHANGE UP (ref 1.6–2.6)
MCHC RBC-ENTMCNC: 30.4 PG — SIGNIFICANT CHANGE UP (ref 27–34)
MCHC RBC-ENTMCNC: 32.4 GM/DL — SIGNIFICANT CHANGE UP (ref 32–36)
MCV RBC AUTO: 93.9 FL — SIGNIFICANT CHANGE UP (ref 80–100)
PHOSPHATE SERPL-MCNC: 2.4 MG/DL — LOW (ref 2.5–4.5)
PLATELET # BLD AUTO: 104 K/UL — LOW (ref 150–400)
POTASSIUM SERPL-MCNC: 3.8 MMOL/L — SIGNIFICANT CHANGE UP (ref 3.5–5.3)
POTASSIUM SERPL-SCNC: 3.8 MMOL/L — SIGNIFICANT CHANGE UP (ref 3.5–5.3)
RBC # BLD: 4.24 M/UL — SIGNIFICANT CHANGE UP (ref 4.2–5.8)
RBC # FLD: 15.5 % — HIGH (ref 10.3–14.5)
SODIUM SERPL-SCNC: 141 MMOL/L — SIGNIFICANT CHANGE UP (ref 135–145)
WBC # BLD: 13.08 K/UL — HIGH (ref 3.8–10.5)
WBC # FLD AUTO: 13.08 K/UL — HIGH (ref 3.8–10.5)

## 2024-03-09 PROCEDURE — 99233 SBSQ HOSP IP/OBS HIGH 50: CPT

## 2024-03-09 RX ADMIN — PIPERACILLIN AND TAZOBACTAM 25 GRAM(S): 4; .5 INJECTION, POWDER, LYOPHILIZED, FOR SOLUTION INTRAVENOUS at 13:54

## 2024-03-09 RX ADMIN — LACOSAMIDE 100 MILLIGRAM(S): 50 TABLET ORAL at 11:20

## 2024-03-09 RX ADMIN — PIPERACILLIN AND TAZOBACTAM 25 GRAM(S): 4; .5 INJECTION, POWDER, LYOPHILIZED, FOR SOLUTION INTRAVENOUS at 05:01

## 2024-03-09 RX ADMIN — LACOSAMIDE 100 MILLIGRAM(S): 50 TABLET ORAL at 21:34

## 2024-03-09 RX ADMIN — PIPERACILLIN AND TAZOBACTAM 25 GRAM(S): 4; .5 INJECTION, POWDER, LYOPHILIZED, FOR SOLUTION INTRAVENOUS at 21:35

## 2024-03-09 RX ADMIN — SENNA PLUS 2 TABLET(S): 8.6 TABLET ORAL at 11:22

## 2024-03-09 RX ADMIN — LEVETIRACETAM 1500 MILLIGRAM(S): 250 TABLET, FILM COATED ORAL at 11:38

## 2024-03-09 RX ADMIN — APIXABAN 5 MILLIGRAM(S): 2.5 TABLET, FILM COATED ORAL at 21:35

## 2024-03-09 RX ADMIN — LEVETIRACETAM 1500 MILLIGRAM(S): 250 TABLET, FILM COATED ORAL at 21:35

## 2024-03-09 RX ADMIN — APIXABAN 5 MILLIGRAM(S): 2.5 TABLET, FILM COATED ORAL at 11:21

## 2024-03-09 RX ADMIN — POLYETHYLENE GLYCOL 3350 17 GRAM(S): 17 POWDER, FOR SOLUTION ORAL at 11:21

## 2024-03-09 RX ADMIN — Medication 2 MILLIGRAM(S): at 11:20

## 2024-03-09 RX ADMIN — CHLORHEXIDINE GLUCONATE 1 APPLICATION(S): 213 SOLUTION TOPICAL at 05:02

## 2024-03-09 RX ADMIN — PANTOPRAZOLE SODIUM 40 MILLIGRAM(S): 20 TABLET, DELAYED RELEASE ORAL at 11:21

## 2024-03-09 RX ADMIN — SENNA PLUS 2 TABLET(S): 8.6 TABLET ORAL at 21:35

## 2024-03-09 RX ADMIN — POLYETHYLENE GLYCOL 3350 17 GRAM(S): 17 POWDER, FOR SOLUTION ORAL at 21:35

## 2024-03-09 NOTE — SWALLOW BEDSIDE ASSESSMENT ADULT - SWALLOW EVAL: PROGNOSIS
2) On encounter, a mild right eye lid ptosis & mild right lip lag were evident which are chronic. An O2 cannula was in place. The pt was alert and interactive. However, he was irritable & oppositional at times. The pt could not be directed to structured communication tasks but he did follow some 1 step commands at times and often spontaneously verbalized in conversation. At these times, pt's motor speech integrity was preserved, and his speech output was intelligible. However, his verbalizations were variably marked by formulation halts/reformulations, occasional paraphasias and periodic perseverations. Pt was often able to verbalize his needs on a concrete level, despite Aphasia, but chronic pre-existing Aphasia did hinder communicative effectiveness at times. Note that pt has completed extensive courses of speech therapy PTH.

## 2024-03-09 NOTE — SWALLOW BEDSIDE ASSESSMENT ADULT - COMMENTS
The pt was admitted to  from [facility] with fever/tachycardia. 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 The pt was previously hospitalized at this facility in 10/23 with COVID, seizures and altered sensorium with agitation. Head CT as the time was remarkable for heterogenous cerebral calcifications on left with vasogenic edema. The pt fractured his right ankle in 7/23. Other selected prior medical history is remarkable for depression, GERD, BPH, skin cancer s/p removal, seizures disorder and Glioblastoma Multiforme for which he is status post craniotomy/RT/chemo. The pt also has a longstanding h/o Aphasia for which he has received extensive speech therapy in the past. Of note is that the record from [facility] stated that the pt has a h/o Dysphagia which was not witnessed by this clinician.

## 2024-03-09 NOTE — PROGRESS NOTE ADULT - SUBJECTIVE AND OBJECTIVE BOX
Patient is a 64y old  Male who presents with a chief complaint of Seizures (08 Mar 2024 13:05)    24 hour events:     Allergies    No Known Allergies    Intolerances      REVIEW OF SYSTEMS: SEE BELOW       ICU Vital Signs Last 24 Hrs  T(C): 36.8 (09 Mar 2024 08:00), Max: 37.7 (08 Mar 2024 16:00)  T(F): 98.2 (09 Mar 2024 08:00), Max: 99.9 (08 Mar 2024 16:00)  HR: 67 (09 Mar 2024 08:00) (63 - 92)  BP: 112/69 (09 Mar 2024 08:00) (96/67 - 112/82)  BP(mean): 84 (09 Mar 2024 08:00) (78 - 93)  ABP: --  ABP(mean): --  RR: 18 (09 Mar 2024 08:00) (14 - 26)  SpO2: 98% (09 Mar 2024 08:00) (92% - 100%)    O2 Parameters below as of 08 Mar 2024 17:00  Patient On (Oxygen Delivery Method): nasal cannula  O2 Flow (L/min): 3          CAPILLARY BLOOD GLUCOSE          I&O's Summary    08 Mar 2024 07:01  -  09 Mar 2024 07:00  --------------------------------------------------------  IN: 1829.8 mL / OUT: 925 mL / NET: 904.8 mL        Mode: CPAP with PS  FiO2: 40  PEEP: 6  PS: 10      MEDICATIONS  (STANDING):  apixaban 5 milliGRAM(s) Oral every 12 hours  chlorhexidine 2% Cloths 1 Application(s) Topical <User Schedule>  dexAMETHasone     Tablet 2 milliGRAM(s) Oral daily  dexMEDEtomidine Infusion 0.1 MICROgram(s)/kG/Hr (2.31 mL/Hr) IV Continuous <Continuous>  dextrose 5% + lactated ringers. 1000 milliLiter(s) (100 mL/Hr) IV Continuous <Continuous>  lacosamide IVPB 200 milliGRAM(s) IV Intermittent every 12 hours  levETIRAcetam   Injectable 1500 milliGRAM(s) IV Push every 12 hours  pantoprazole  Injectable 40 milliGRAM(s) IV Push daily  piperacillin/tazobactam IVPB.. 3.375 Gram(s) IV Intermittent every 8 hours  polyethylene glycol 3350 17 Gram(s) Oral every 12 hours  senna 2 Tablet(s) Oral every 12 hours      MEDICATIONS  (PRN):      PHYSICAL EXAM: SEE BELOW                          12.9   13.08 )-----------( 104      ( 09 Mar 2024 05:57 )             39.8       03-09    141  |  109<H>  |  13  ----------------------------<  141<H>  3.8   |  28  |  0.70    Ca    9.3      09 Mar 2024 05:57  Phos  2.4     03-09  Mg     2.2     03-09    TPro  6.3  /  Alb  2.4<L>  /  TBili  1.0  /  DBili  x   /  AST  148<H>  /  ALT  52  /  AlkPhos  48  03-08          PT/INR - ( 07 Mar 2024 11:25 )   PT: 15.0 sec;   INR: 1.34 ratio         PTT - ( 07 Mar 2024 11:25 )  PTT:35.0 sec  Urinalysis Basic - ( 09 Mar 2024 05:57 )    Color: x / Appearance: x / SG: x / pH: x  Gluc: 141 mg/dL / Ketone: x  / Bili: x / Urobili: x   Blood: x / Protein: x / Nitrite: x   Leuk Esterase: x / RBC: x / WBC x   Sq Epi: x / Non Sq Epi: x / Bacteria: x      Clean Catch Clean Catch (Midstream)   <10,000 CFU/mL Normal Urogenital Jayda -- 03-07 @ 11:26  .Blood Blood-Peripheral   No growth at 24 hours -- 03-07 @ 11:25

## 2024-03-09 NOTE — SWALLOW BEDSIDE ASSESSMENT ADULT - SLP GENERAL OBSERVATIONS
On encounter, a mild right eye lid ptosis & mild right lip lag were evident which are chronic. An O2 cannula was in place. The pt was alert and interactive. However, he was irritable & oppositional at times. The pt could not be directed to structured communication tasks but he did follow some 1 step commands at times and often spontaneously verbalized in conversation. At these times, pt's motor speech integrity was preserved, and his speech output was intelligible. However, his verbalizations were variably marked by formulation halts/reformulations, occasional paraphasias and periodic perseverations. Pt was often able to verbalize his needs on a concrete level, despite Aphasia, but chronic pre-existing Aphasia did hinder communicative effectiveness at times. Note that pt has completed extensive courses of speech therapy PTH.

## 2024-03-09 NOTE — SWALLOW BEDSIDE ASSESSMENT ADULT - SWALLOW EVAL: FEEDING ASSISTANCE
PT WAS ABLE TO FEED SELF WHEN IN AN ALERT CALM STATE. WITH THAT BEING STATED, THE PT WAS VARIABLY OPPOSITIONAL ON EXAM.

## 2024-03-09 NOTE — SWALLOW BEDSIDE ASSESSMENT ADULT - DIET PRIOR TO ADMI
The pt was on a regular texture diet without a liquid consistency restriction when previously hospitalized at this facility in 10/23. Diet section of transfer record  from [facility] was not filled out by the facility.

## 2024-03-09 NOTE — PROGRESS NOTE ADULT - ASSESSMENT
64M PMH glioblastoma (s/p resection of mass on 10/3/22 with Dr. Turpin), seizure disorder (on Keppra and Vimpat), and DVT/PE (on Eliquis) presents to the ED from [facility] found to have seizure in setting of severe sepsis / UTI. Intubated and admitted to CCU.     Intubated 3/7-8      Plan:    Successfully extubated on 3/8  Respiratory status is stable    Remains confused, not agitated  No active seizure episode  Continue Keppra and lacosamide  Check MRI with contrast for follow-up of GBM  Continue dexamethasone  Neurology and neurosurgery following    Continue empiric Zosyn  WBC is improving, no fever  Urine and blood cultures negative so far  Will likely do a short course    Renal function stable  Trend and replete electrolytes  DC Pryor catheter    Continue apixaban for history of DVT and PE    PT eval, out of bed to chair    Start diet  DC IVF

## 2024-03-10 LAB
ANION GAP SERPL CALC-SCNC: 5 MMOL/L — SIGNIFICANT CHANGE UP (ref 5–17)
BUN SERPL-MCNC: 16 MG/DL — SIGNIFICANT CHANGE UP (ref 7–23)
CALCIUM SERPL-MCNC: 9.1 MG/DL — SIGNIFICANT CHANGE UP (ref 8.5–10.1)
CHLORIDE SERPL-SCNC: 111 MMOL/L — HIGH (ref 96–108)
CO2 SERPL-SCNC: 26 MMOL/L — SIGNIFICANT CHANGE UP (ref 22–31)
CREAT SERPL-MCNC: 0.75 MG/DL — SIGNIFICANT CHANGE UP (ref 0.5–1.3)
EGFR: 100 ML/MIN/1.73M2 — SIGNIFICANT CHANGE UP
GLUCOSE SERPL-MCNC: 110 MG/DL — HIGH (ref 70–99)
HCT VFR BLD CALC: 40.9 % — SIGNIFICANT CHANGE UP (ref 39–50)
HGB BLD-MCNC: 13 G/DL — SIGNIFICANT CHANGE UP (ref 13–17)
MAGNESIUM SERPL-MCNC: 2.1 MG/DL — SIGNIFICANT CHANGE UP (ref 1.6–2.6)
MCHC RBC-ENTMCNC: 30 PG — SIGNIFICANT CHANGE UP (ref 27–34)
MCHC RBC-ENTMCNC: 31.8 GM/DL — LOW (ref 32–36)
MCV RBC AUTO: 94.5 FL — SIGNIFICANT CHANGE UP (ref 80–100)
PHOSPHATE SERPL-MCNC: 2.7 MG/DL — SIGNIFICANT CHANGE UP (ref 2.5–4.5)
PLATELET # BLD AUTO: 122 K/UL — LOW (ref 150–400)
POTASSIUM SERPL-MCNC: 3.9 MMOL/L — SIGNIFICANT CHANGE UP (ref 3.5–5.3)
POTASSIUM SERPL-SCNC: 3.9 MMOL/L — SIGNIFICANT CHANGE UP (ref 3.5–5.3)
RBC # BLD: 4.33 M/UL — SIGNIFICANT CHANGE UP (ref 4.2–5.8)
RBC # FLD: 15 % — HIGH (ref 10.3–14.5)
SODIUM SERPL-SCNC: 142 MMOL/L — SIGNIFICANT CHANGE UP (ref 135–145)
WBC # BLD: 11.47 K/UL — HIGH (ref 3.8–10.5)
WBC # FLD AUTO: 11.47 K/UL — HIGH (ref 3.8–10.5)

## 2024-03-10 PROCEDURE — 99233 SBSQ HOSP IP/OBS HIGH 50: CPT

## 2024-03-10 PROCEDURE — 99232 SBSQ HOSP IP/OBS MODERATE 35: CPT

## 2024-03-10 RX ORDER — ACETAMINOPHEN 500 MG
650 TABLET ORAL EVERY 6 HOURS
Refills: 0 | Status: DISCONTINUED | OUTPATIENT
Start: 2024-03-10 | End: 2024-03-13

## 2024-03-10 RX ORDER — ONDANSETRON 8 MG/1
4 TABLET, FILM COATED ORAL EVERY 8 HOURS
Refills: 0 | Status: DISCONTINUED | OUTPATIENT
Start: 2024-03-10 | End: 2024-03-13

## 2024-03-10 RX ADMIN — PIPERACILLIN AND TAZOBACTAM 25 GRAM(S): 4; .5 INJECTION, POWDER, LYOPHILIZED, FOR SOLUTION INTRAVENOUS at 13:21

## 2024-03-10 RX ADMIN — SENNA PLUS 2 TABLET(S): 8.6 TABLET ORAL at 22:24

## 2024-03-10 RX ADMIN — PIPERACILLIN AND TAZOBACTAM 25 GRAM(S): 4; .5 INJECTION, POWDER, LYOPHILIZED, FOR SOLUTION INTRAVENOUS at 05:22

## 2024-03-10 RX ADMIN — PANTOPRAZOLE SODIUM 40 MILLIGRAM(S): 20 TABLET, DELAYED RELEASE ORAL at 09:02

## 2024-03-10 RX ADMIN — POLYETHYLENE GLYCOL 3350 17 GRAM(S): 17 POWDER, FOR SOLUTION ORAL at 09:02

## 2024-03-10 RX ADMIN — LEVETIRACETAM 1500 MILLIGRAM(S): 250 TABLET, FILM COATED ORAL at 09:02

## 2024-03-10 RX ADMIN — LACOSAMIDE 100 MILLIGRAM(S): 50 TABLET ORAL at 10:44

## 2024-03-10 RX ADMIN — CHLORHEXIDINE GLUCONATE 1 APPLICATION(S): 213 SOLUTION TOPICAL at 05:22

## 2024-03-10 RX ADMIN — POLYETHYLENE GLYCOL 3350 17 GRAM(S): 17 POWDER, FOR SOLUTION ORAL at 22:24

## 2024-03-10 RX ADMIN — APIXABAN 5 MILLIGRAM(S): 2.5 TABLET, FILM COATED ORAL at 09:02

## 2024-03-10 RX ADMIN — SENNA PLUS 2 TABLET(S): 8.6 TABLET ORAL at 09:02

## 2024-03-10 RX ADMIN — Medication 2 MILLIGRAM(S): at 09:01

## 2024-03-10 RX ADMIN — LEVETIRACETAM 1500 MILLIGRAM(S): 250 TABLET, FILM COATED ORAL at 22:24

## 2024-03-10 RX ADMIN — PIPERACILLIN AND TAZOBACTAM 25 GRAM(S): 4; .5 INJECTION, POWDER, LYOPHILIZED, FOR SOLUTION INTRAVENOUS at 22:23

## 2024-03-10 RX ADMIN — APIXABAN 5 MILLIGRAM(S): 2.5 TABLET, FILM COATED ORAL at 22:24

## 2024-03-10 RX ADMIN — LACOSAMIDE 100 MILLIGRAM(S): 50 TABLET ORAL at 22:22

## 2024-03-10 NOTE — PHYSICAL THERAPY INITIAL EVALUATION ADULT - PRECAUTIONS/LIMITATIONS, REHAB EVAL
cardiac precautions/fall precautions/seizure precautions Rt eye vision deficits/cardiac precautions/fall precautions/seizure precautions/vision precautions

## 2024-03-10 NOTE — PHYSICAL THERAPY INITIAL EVALUATION ADULT - PERTINENT HX OF CURRENT PROBLEM, REHAB EVAL
64M PMH glioblastoma (s/p resection of mass on 10/3/22 with Dr. Turpin), seizure disorder (on Keppra and Vimpat), and DVT/PE (on Eliquis) presents to the ED from [facility] found to have seizure in setting of severe sepsis / UTI. Intubated and admitted to CCU. Intubated 3/7-8, Successfully extubated on 3/8

## 2024-03-10 NOTE — PROGRESS NOTE ADULT - ASSESSMENT
62yo man with PMHx glioblastoma, seizure disorder, PE on Eliquis. ADM with breakthrough seizures. Pt. was initially intubated, sedated. Improved, extubated, no further seizures noted.   Suggest:  Lacosamide 200 mg IV q12.    Keppra 1500 mg q12  MRI head w/wo when stable

## 2024-03-10 NOTE — PROGRESS NOTE ADULT - SUBJECTIVE AND OBJECTIVE BOX
Patient is a 64y old  Male who presents with a chief complaint of Seizures (10 Mar 2024 11:23)    24 hour events:     Allergies    No Known Allergies    Intolerances      REVIEW OF SYSTEMS: SEE BELOW       ICU Vital Signs Last 24 Hrs  T(C): 36.5 (10 Mar 2024 07:17), Max: 37.1 (09 Mar 2024 13:00)  T(F): 97.7 (10 Mar 2024 07:17), Max: 98.8 (09 Mar 2024 13:00)  HR: 82 (10 Mar 2024 10:00) (66 - 93)  BP: 133/91 (10 Mar 2024 09:00) (112/80 - 139/83)  BP(mean): 105 (10 Mar 2024 09:00) (90 - 107)  ABP: --  ABP(mean): --  RR: 16 (10 Mar 2024 10:00) (14 - 27)  SpO2: 99% (10 Mar 2024 10:00) (91% - 100%)    O2 Parameters below as of 10 Mar 2024 07:00  Patient On (Oxygen Delivery Method): room air            CAPILLARY BLOOD GLUCOSE          I&O's Summary    09 Mar 2024 06:01  -  10 Mar 2024 07:00  --------------------------------------------------------  IN: 0 mL / OUT: 1150 mL / NET: -1150 mL            MEDICATIONS  (STANDING):  apixaban 5 milliGRAM(s) Oral every 12 hours  chlorhexidine 2% Cloths 1 Application(s) Topical <User Schedule>  dexAMETHasone     Tablet 2 milliGRAM(s) Oral daily  lacosamide IVPB 200 milliGRAM(s) IV Intermittent every 12 hours  levETIRAcetam   Injectable 1500 milliGRAM(s) IV Push every 12 hours  pantoprazole  Injectable 40 milliGRAM(s) IV Push daily  piperacillin/tazobactam IVPB.. 3.375 Gram(s) IV Intermittent every 8 hours  polyethylene glycol 3350 17 Gram(s) Oral every 12 hours  senna 2 Tablet(s) Oral every 12 hours      MEDICATIONS  (PRN):  acetaminophen     Tablet .. 650 milliGRAM(s) Oral every 6 hours PRN Temp greater or equal to 38C (100.4F), Mild Pain (1 - 3)  aluminum hydroxide/magnesium hydroxide/simethicone Suspension 30 milliLiter(s) Oral every 4 hours PRN Dyspepsia  ondansetron Injectable 4 milliGRAM(s) IV Push every 8 hours PRN Nausea and/or Vomiting      PHYSICAL EXAM: SEE BELOW                          13.0   11.47 )-----------( 122      ( 10 Mar 2024 06:17 )             40.9       03-10    142  |  111<H>  |  16  ----------------------------<  110<H>  3.9   |  26  |  0.75    Ca    9.1      10 Mar 2024 06:17  Phos  2.7     03-10  Mg     2.1     03-10              Urinalysis Basic - ( 10 Mar 2024 06:17 )    Color: x / Appearance: x / SG: x / pH: x  Gluc: 110 mg/dL / Ketone: x  / Bili: x / Urobili: x   Blood: x / Protein: x / Nitrite: x   Leuk Esterase: x / RBC: x / WBC x   Sq Epi: x / Non Sq Epi: x / Bacteria: x      Clean Catch Clean Catch (Midstream)   <10,000 CFU/mL Normal Urogenital Jayda -- 03-07 @ 11:26  .Blood Blood-Peripheral   No growth at 48 Hours -- 03-07 @ 11:25

## 2024-03-10 NOTE — PROGRESS NOTE ADULT - ASSESSMENT
64M PMH glioblastoma (s/p resection of mass on 10/3/22 with Dr. Turpin), seizure disorder (on Keppra and Vimpat), and DVT/PE (on Eliquis) presents to the ED from [facility] found to have seizure in setting of severe sepsis / UTI. Intubated and admitted to CCU.     Intubated 3/7-8      Plan:    Successfully extubated on 3/8  Respiratory status is stable    Confusion resolved, AAOx3 today  No active seizure episode  Continue Keppra and lacosamide  MRI with contrast for follow-up of GBM  Continue dexamethasone  Neurology and neurosurgery following    On empiric Zosyn  WBC is improving, no fever  Urine and blood cultures negative so far  Will do a 5 day course and stop (until 3/10)     Renal function stable  Trend and replete electrolytes  Pryor d/c'd     Continue apixaban for history of DVT and PE    PT eval, out of bed to chair    PO diet       Stable for floor, sign out given to Dr Goldberg

## 2024-03-10 NOTE — PROGRESS NOTE ADULT - SUBJECTIVE AND OBJECTIVE BOX
HPI:  65 y/o man with a PMHx of glioblastoma s/p large resection of mass on 10/3/22 with Dr. Turpin, seizure disorder on Keppra and Vimpat, and DVT/PE on Eliquis presented to  ED BIBA, for episode of unresponsiveness, seizure.     MEDICATIONS  (STANDING):  apixaban 5 milliGRAM(s) Oral every 12 hours  chlorhexidine 2% Cloths 1 Application(s) Topical <User Schedule>  dexAMETHasone     Tablet 2 milliGRAM(s) Oral daily  lacosamide IVPB 200 milliGRAM(s) IV Intermittent every 12 hours  levETIRAcetam   Injectable 1500 milliGRAM(s) IV Push every 12 hours  pantoprazole  Injectable 40 milliGRAM(s) IV Push daily  piperacillin/tazobactam IVPB.. 3.375 Gram(s) IV Intermittent every 8 hours  polyethylene glycol 3350 17 Gram(s) Oral every 12 hours  senna 2 Tablet(s) Oral every 12 hours    MEDICATIONS  (PRN):  acetaminophen     Tablet .. 650 milliGRAM(s) Oral every 6 hours PRN Temp greater or equal to 38C (100.4F), Mild Pain (1 - 3)  aluminum hydroxide/magnesium hydroxide/simethicone Suspension 30 milliLiter(s) Oral every 4 hours PRN Dyspepsia  ondansetron Injectable 4 milliGRAM(s) IV Push every 8 hours PRN Nausea and/or Vomiting      ICU Vital Signs Last 24 Hrs  T(C): 36.5 (10 Mar 2024 07:17), Max: 37.1 (09 Mar 2024 13:00)  T(F): 97.7 (10 Mar 2024 07:17), Max: 98.8 (09 Mar 2024 13:00)  HR: 82 (10 Mar 2024 10:00) (66 - 93)  BP: 133/91 (10 Mar 2024 09:00) (112/80 - 139/83)  BP(mean): 105 (10 Mar 2024 09:00) (90 - 107)  RR: 16 (10 Mar 2024 10:00) (14 - 27)  SpO2: 99% (10 Mar 2024 10:00) (91% - 100%)    O2 Parameters below as of 10 Mar 2024 07:00  Patient On (Oxygen Delivery Method): room air          Neurological Exam:    HF: Patient is alert and oriented x 2. There is word finding difficulty, follows commands.  CN: Vision is intact to confrontation. Pupils are equal and reactive. Extra ocular muscles are intact. There is no facial droop or asymmetry. Tongue is midline. Sensation is intact in the face. Other CN II-XII are intact.   Motor: moving all extremities, refused to examine LUE due to pain.  Sensory: intact to touch.   DTR: 0-1/4 all 4 extremities. Babinski is negative bilateral.  Co-ord:  Limited unable to test LUE, no gross dysmetria.   Gait/balance: Cannot test.       Basic Metabolic Panel in AM (03.10.24 @ 06:17)   Sodium: 142 mmol/L  Potassium: 3.9 mmol/L  Chloride: 111 mmol/L  Carbon Dioxide: 26 mmol/L  Anion Gap: 5 mmol/L  Blood Urea Nitrogen: 16 mg/dL  Creatinine: 0.75 mg/dL  Glucose: 110 mg/dL  Calcium: 9.1 mg/dL  eGFR: 100:     < from: CT Angio Brain Stroke Protocol  w/ IV Cont (03.07.24 @ 11:53) >    IMPRESSION:    HEAD CT: No acute intracranial hemorrhage or acute territorial   infarction. Stable exam since 10/22/2023    Notification to clinician of alert:  Dr. VANESSA LIM was notified about the noncontrast head CT findings at   11:57 AM on 3/7/2024 with readback confirmation. The opportunity for   questions was provided and all questions asked were answered.    CT PERFUSION demonstrated: No territorial perfusion abnormality. Abnormal   perfusion corresponding to the left parietal operative bed.    If symptoms persist consider follow-up imaging with contrast-enhanced MRI   of the brain if no contraindications.    CTA COW:  Patent intracranial circulation without flow limiting stenosis   or large vessel occlusion.    CTA NECK: Patent, ECAs, ICAs, no  hemodynamically significant stenosis at    ICA origins by NASCET criteria.  Bilateral vertebral arteries are patent without flow limiting stenosis.    < end of copied text >      < from: MR Head w/wo IV Cont (01.24.24 @ 13:39) >    IMPRESSION:  Decreased mass effect on the left atria of the lateral compared with the   prior 10/24/2023. Extensive postop changes in the left temporal parietal   occipital region as seen previously with areas of encephalomalacia and   gliosis and findings suspicious for residual neoplasm as well with areas   of enhancement as well as increased CBF and CBV on the perfusion imaging.   Overall postcontrast appearance is similar to the prior 10/24/2023.   Perfusion was not performed on the prior.      < from: EEG Awake or Drowsy (03.07.24 @ 13:00) >  EEG Summary/Classification:  This was an abnormal EEG study due to:  -Lack of waking record  -Continuous left hemisphere slowing, less prominent anteriorly.    EEG Clinical Correlate/  Impression:  The findings are suggestive of left hemisphere dysfunction, likely   structural in nature.  No definite epileptiform activity was seen and no clinical events or   seizures were recorded. Consider a repeat study if clinically indicated.    < end of copied text >    < end of copied text >

## 2024-03-10 NOTE — PHYSICAL THERAPY INITIAL EVALUATION ADULT - GENERAL OBSERVATIONS, REHAB EVAL
Pt was found lying in bed in CCU with lines intact, IV, friends in room, Pt is pleasantly confused and willing to participate in PT, c/o scrotal pain,RLE pain,  RN Tanisha philip

## 2024-03-11 DIAGNOSIS — C71.9 MALIGNANT NEOPLASM OF BRAIN, UNSPECIFIED: ICD-10-CM

## 2024-03-11 LAB
ANION GAP SERPL CALC-SCNC: 7 MMOL/L — SIGNIFICANT CHANGE UP (ref 5–17)
BUN SERPL-MCNC: 15 MG/DL — SIGNIFICANT CHANGE UP (ref 7–23)
CALCIUM SERPL-MCNC: 9.3 MG/DL — SIGNIFICANT CHANGE UP (ref 8.5–10.1)
CHLORIDE SERPL-SCNC: 108 MMOL/L — SIGNIFICANT CHANGE UP (ref 96–108)
CO2 SERPL-SCNC: 27 MMOL/L — SIGNIFICANT CHANGE UP (ref 22–31)
CREAT SERPL-MCNC: 0.74 MG/DL — SIGNIFICANT CHANGE UP (ref 0.5–1.3)
EGFR: 100 ML/MIN/1.73M2 — SIGNIFICANT CHANGE UP
GLUCOSE SERPL-MCNC: 106 MG/DL — HIGH (ref 70–99)
HCT VFR BLD CALC: 40.4 % — SIGNIFICANT CHANGE UP (ref 39–50)
HGB BLD-MCNC: 13.1 G/DL — SIGNIFICANT CHANGE UP (ref 13–17)
MAGNESIUM SERPL-MCNC: 2.1 MG/DL — SIGNIFICANT CHANGE UP (ref 1.6–2.6)
MCHC RBC-ENTMCNC: 30.5 PG — SIGNIFICANT CHANGE UP (ref 27–34)
MCHC RBC-ENTMCNC: 32.4 GM/DL — SIGNIFICANT CHANGE UP (ref 32–36)
MCV RBC AUTO: 94 FL — SIGNIFICANT CHANGE UP (ref 80–100)
PHOSPHATE SERPL-MCNC: 3.1 MG/DL — SIGNIFICANT CHANGE UP (ref 2.5–4.5)
PLATELET # BLD AUTO: 136 K/UL — LOW (ref 150–400)
POTASSIUM SERPL-MCNC: 3.6 MMOL/L — SIGNIFICANT CHANGE UP (ref 3.5–5.3)
POTASSIUM SERPL-SCNC: 3.6 MMOL/L — SIGNIFICANT CHANGE UP (ref 3.5–5.3)
RBC # BLD: 4.3 M/UL — SIGNIFICANT CHANGE UP (ref 4.2–5.8)
RBC # FLD: 15 % — HIGH (ref 10.3–14.5)
SODIUM SERPL-SCNC: 142 MMOL/L — SIGNIFICANT CHANGE UP (ref 135–145)
WBC # BLD: 8.51 K/UL — SIGNIFICANT CHANGE UP (ref 3.8–10.5)
WBC # FLD AUTO: 8.51 K/UL — SIGNIFICANT CHANGE UP (ref 3.8–10.5)

## 2024-03-11 PROCEDURE — 99497 ADVNCD CARE PLAN 30 MIN: CPT | Mod: 25

## 2024-03-11 PROCEDURE — 99233 SBSQ HOSP IP/OBS HIGH 50: CPT

## 2024-03-11 PROCEDURE — 99223 1ST HOSP IP/OBS HIGH 75: CPT

## 2024-03-11 PROCEDURE — 99232 SBSQ HOSP IP/OBS MODERATE 35: CPT

## 2024-03-11 PROCEDURE — 70553 MRI BRAIN STEM W/O & W/DYE: CPT | Mod: 26

## 2024-03-11 PROCEDURE — 71045 X-RAY EXAM CHEST 1 VIEW: CPT | Mod: 26

## 2024-03-11 RX ADMIN — SENNA PLUS 2 TABLET(S): 8.6 TABLET ORAL at 10:17

## 2024-03-11 RX ADMIN — POLYETHYLENE GLYCOL 3350 17 GRAM(S): 17 POWDER, FOR SOLUTION ORAL at 10:17

## 2024-03-11 RX ADMIN — LACOSAMIDE 100 MILLIGRAM(S): 50 TABLET ORAL at 23:29

## 2024-03-11 RX ADMIN — SENNA PLUS 2 TABLET(S): 8.6 TABLET ORAL at 23:05

## 2024-03-11 RX ADMIN — APIXABAN 5 MILLIGRAM(S): 2.5 TABLET, FILM COATED ORAL at 23:05

## 2024-03-11 RX ADMIN — CHLORHEXIDINE GLUCONATE 1 APPLICATION(S): 213 SOLUTION TOPICAL at 08:43

## 2024-03-11 RX ADMIN — APIXABAN 5 MILLIGRAM(S): 2.5 TABLET, FILM COATED ORAL at 10:17

## 2024-03-11 RX ADMIN — LEVETIRACETAM 1500 MILLIGRAM(S): 250 TABLET, FILM COATED ORAL at 23:06

## 2024-03-11 RX ADMIN — LACOSAMIDE 100 MILLIGRAM(S): 50 TABLET ORAL at 10:17

## 2024-03-11 RX ADMIN — Medication 1 TABLET(S): at 23:05

## 2024-03-11 RX ADMIN — Medication 2 MILLIGRAM(S): at 10:17

## 2024-03-11 RX ADMIN — LEVETIRACETAM 1500 MILLIGRAM(S): 250 TABLET, FILM COATED ORAL at 13:07

## 2024-03-11 RX ADMIN — PANTOPRAZOLE SODIUM 40 MILLIGRAM(S): 20 TABLET, DELAYED RELEASE ORAL at 10:17

## 2024-03-11 NOTE — CONSULT NOTE ADULT - NS ATTEND AMEND GEN_ALL_CORE FT
case reviewed and discussed w/ np  agree w/ above note  pt currently w/ o capacity  goc discussed and no chagnes at this time

## 2024-03-11 NOTE — CONSULT NOTE ADULT - ASSESSMENT
HPI: Pt is a 64y old Male with a PMHx of glioblastoma s/p large resection of mass on 10/3/22 with Dr. Turpin, seizure disorder on Keppra and Vimpat, and DVT/PE on Eliquis presents from [facility]. Upon entry to the ED, patient was obtunded with, according to ED physician, R sided hemiplegia. Decision was made to intubate patient due to low GCS and inability to protect airway. EMS stated prior to arrival that the patient got 10mgIV valium due to witnessed seizure in the field. Patient was successfully intubated and started on prop drip. neuro consulted. Stated stop prop temporarily in order to get a ECG initial reading to check for continued seizures. He is currently extubated and Palliative medicine is consulted to further explore GOC.   3/11/24 Seen and examined at bedside. Pt aware that he he is in the hospital however unable to provide medical hx. Difficulty with word finding at times. Denies pain or dyspnea.      Assessment and Plan:  !) Glioblastoma  -s/p large resection of mass on 10/3/22 with Dr. Turpin  -seizure disorder on Keppra and Vimpa  -Neuro eval noted  -No acute neurosurgical intervention given stable imaging and no obvious mass/ hemorrhage   -recommend MRI Brain   -oncology for h/o GBM     2) Debility  -S/P intubation  -Weakness  -Confusion  -Right hemiparesis  -Poor PO intake  -PT    3) H/O DVT/PE  -Cont AC as per medicne    4) Advanced Directives  -Pt without capacity  -Friend Tania Johnson emergency contact  -Will discuss GOC with Sandi    Time Spent: 75 minutes including the care, coordination and counseling of this patient, 50% of which was spent coordinating and counseling.          HPI: Pt is a 64y old Male with a PMHx of glioblastoma s/p large resection of mass on 10/3/22 with Dr. Turpin, seizure disorder on Keppra and Vimpat, and DVT/PE on Eliquis presents from [facility]. Upon entry to the ED, patient was obtunded with, according to ED physician, R sided hemiplegia. Decision was made to intubate patient due to low GCS and inability to protect airway. EMS stated prior to arrival that the patient got 10mgIV valium due to witnessed seizure in the field. Patient was successfully intubated and started on prop drip. neuro consulted. Stated stop prop temporarily in order to get a ECG initial reading to check for continued seizures. He is currently extubated and Palliative medicine is consulted to further explore GOC.   3/11/24 Seen and examined at bedside. Pt aware that he he is in the hospital however unable to provide medical hx. Difficulty with word finding at times. Denies pain or dyspnea.      Assessment and Plan:  !) Glioblastoma  -s/p large resection of mass on 10/3/22 with Dr. Turpin  -seizure disorder on Keppra and Vimpa  -Neuro eval noted  -No acute neurosurgical intervention given stable imaging and no obvious mass/ hemorrhage   -recommend MRI Brain   -oncology for h/o GBM     2) Debility  -S/P intubation  -Weakness  -Confusion  -Right hemiparesis  -Poor PO intake  -PT    3) H/O DVT/PE  -Cont AC as per medicne    4) Advanced Directives  -Pt without capacity  -Friend Tania Johnson [facility]  -No copy on file  -GOC with Tania  -No limits set    Time Spent: 75 minutes including the care, coordination and counseling of this patient, 50% of which was spent coordinating and counseling.

## 2024-03-11 NOTE — PROGRESS NOTE ADULT - ASSESSMENT
64 M [facility] glioblastoma (s/p resection of mass on 10/3/22 with Dr. Turpin), seizure disorder (on Keppra and Vimpat), and DVT/PE (on Eliquis) presents to the ED from [facility] found to have seizure in setting of severe sepsis / UTI. Intubated and admitted to CCU.     # Intubated 3/7-8   - Successfully extubated on 3/8  - Respiratory status is stable    #Encephalopathy  , Confusion resolved  # GBM s/p resection 2022   # Seizure disorder   -  AAOx3 today  - No active seizure episode  - Continue Keppra and lacosamide  - MRI with contrast for follow-up of GBM  - Continue dexamethasone  - Neurology and neurosurgery following    # Leukocytosis ruled out sepsis   - pt was  empiric Zosyn  - WBC is improving, no fever  - Urine and blood cultures negative so far  - s/p  5 day course and stop (until 3/10)     # Renal function stable  - Creatinine Trend: 0.74<--, 0.75<--, 0.70<--, 0.83<--, 0.86<--, 1.09<--  - Trend and replete electrolytes  - Pryor d/c'd     #history of DVT and PE  -  Continue apixaban     PT eval, out of bed to chair  PO diet     Dispo - PT evaluation, MRI of the brain      64 M [facility] glioblastoma (s/p resection of mass on 10/3/22 with Dr. Turpin), seizure disorder (on Keppra and Vimpat), and DVT/PE (on Eliquis) presents to the ED from [facility] found to have seizure in setting of severe sepsis / UTI. Intubated and admitted to CCU.     # Intubated 3/7-8   - Successfully extubated on 3/8  - Respiratory status is stable    #Encephalopathy  , Confusion resolved  # GBM s/p resection 2022   # Seizure disorder   -  AAOx3 today  - No active seizure episode  - Continue Keppra and lacosamide  - MRI with contrast for follow-up of GBM  - Continue dexamethasone  - Neurology and neurosurgery following    # Sepsis POA due to probable PNA    - pt was  empiric Zosyn  - WBC is improving, no fever  - Urine and blood cultures negative so far  - s/p  5 day course and stop (until 3/10)   - repeat CXR     # Renal function stable  - Creatinine Trend: 0.74<--, 0.75<--, 0.70<--, 0.83<--, 0.86<--, 1.09<--  - Trend and replete electrolytes  - Pryor d/c'd     #history of DVT and PE  -  Continue apixaban     PT eval, out of bed to chair  PO diet     Dispo - PT evaluation, MRI of the brain      64 M [facility] glioblastoma (s/p resection of mass on 10/3/22 with Dr. Turpin), seizure disorder (on Keppra and Vimpat), and DVT/PE (on Eliquis) presents to the ED from [facility] found to have seizure in setting of severe sepsis / UTI. Intubated and admitted to CCU.     # Intubated 3/7-8   - Successfully extubated on 3/8  - Respiratory status is stable    #Encephalopathy  , Confusion resolved  # GBM s/p resection 2022   # Seizure disorder   -  AAOx3 today  - No active seizure episode  - Continue Keppra and lacosamide  - MRI with contrast for follow-up of GBM  - Continue dexamethasone  - Neurology and neurosurgery following    # Sepsis POA due to probable PNA    - pt was  empiric Zosyn  - WBC is improving, no fever  - Urine and blood cultures negative so far  - s/p  5 day course and stop (until 3/10)   - repeat CXR   - c/w augmentin 5 more days     # Renal function stable  - Creatinine Trend: 0.74<--, 0.75<--, 0.70<--, 0.83<--, 0.86<--, 1.09<--  - Trend and replete electrolytes  - Pryor d/c'd     #history of DVT and PE  -  Continue apixaban     PT eval, out of bed to chair  PO diet     Dispo - PT evaluation, MRI of the brain

## 2024-03-11 NOTE — CONSULT NOTE ADULT - PROBLEM/RECOMMENDATION-1
HOSPITAL DISCHARGE SUMMARY    Patient Name: Bridger Castillo  YOB: 1954  Age: 68 year old  Medical Record Number: [id number]  Primary Physician: Gustavo Neumann  Phone: [phone]  Admission Date: 7/20/2023  Discharge Date: 7/22/2023    He will be discharged from [facility] to Home with No Skilled Service    PRINCIPAL DISCHARGE DIAGNOSIS:   1.  Status post right infected total knee.  2.  Status post I and D with removal of infected total knee and placement of high dose articulating antibiotic spacer.  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  Several years after being performed, his aspiration showed greater than 5000 WBCs, 90% neutrophils, elevated blood work, and he was diagnosed with a chronically infected right total knee.  He subsequently underwent debridement, removal of the total knee and placement of a high dose articulating spacer.  He has tolerated the spacer well.  His lab values returned to normal and he has been off his antibiotics now for a few weeks and his last aspiration showed normal cell count and differential in  the synovial fluid without any growth.  He has always been a culture negative infection to date.  After discussing the risks, benefits, possible complications, and treatment alternatives, the patient wished to proceed with replantation of his knee in order to improve his lifestyle, reduce his pain, and increase his activity. Informed consent was obtained. For further details, please refer to the H&P in the chart.    The patient was admitted on 7/20/2023 and underwent the above-mentioned procedure. Patient tolerated this procedure well. Postoperatively, patient was seen in consultation by the internal medicine hospitalist service. Throughout the hospitalization, wound remained clean. The patient was started and advanced in a diet. CMS remained intact. Patient was seen and evaluated by physical therapy and rehab program was initiated. Patient was also seen by occupational therapy. Hemoglobin on day of discharge was stable.    COMPLICATIONS IN HOSPITAL: None    PERTINENT FINDINGS/RESULTS AT DISCHARGE:   Blood pressure 128/65, pulse 71, temperature 97.4  F (36.3  C), temperature source Oral, resp. rate 16, height 1.829 m (6'), weight 88 kg (194 lb 1.6 oz), SpO2 97 %.      Subjective: Patient feels good today.  Pain controlled. Discharge instructions reviewed.      PE:   The patient is awake and alert  Calves are soft and non-tender.   Sensation is intact.  Dorsiflexion and plantar flexion is intact.  Foot warm with nl cap refill.  The incision is covered with prevena wound vac, empty canister.      Latest Laboratory Results:  Chem:  Recent Labs   Lab Test 07/21/23  0713 07/20/23  1053   POTASSIUM 4.8 4.2     WBC/Hgb:  Recent Labs   Lab Test 07/21/23  0713 05/29/23  0830 05/22/23  0805   WBC  --  6.7 8.0   HGB 11.1* 12.9* 12.7*     INR:  Recent Labs   Lab Test 05/15/20  0930   INR 1.02     No components found for: [id number]    IMPORTANT PENDING TEST RESULTS: final cultures    CONDITION AT DISCHARGE:     "Stabilized    DISCHARGE ORDERS    Discharge Orders        Reason for your hospital stay    Revision TKA     When to call - Contact Surgeon Team    You may experience symptoms that require follow-up before your scheduled appointment. Refer to the \"Stoplight Tool\" for instructions on when to contact your Surgeon Team if you are concerned about pain control, blood clots, constipation, or if you are unable to urinate.     When to call - Reach out to Urgent Care    If you are not able to reach your Surgeon Team and you need immediate care, go to the Orthopedic Walk-in Clinic or Urgent Care at your Surgeon's office.  Do NOT go to the Emergency Room unless you have shortness of breath, chest pain, or other signs of a medical emergency.     When to call - Reasons to Call 911    Call 911 immediately if you experience sudden-onset chest pain, arm weakness/numbness, slurred speech, or shortness of breath     Discharge Instruction - Breathing exercises    Perform breathing exercises using your Incentive Spirometer 10 times per hour while awake for 2 weeks.     Symptoms - Fever Management    A low grade fever can be expected after surgery.  Use acetaminophen (TYLENOL) as needed for fever management.  Contact your Surgeon Team if you have a fever greater than 101.5 F, chills, and/or night sweats.     Symptoms - Constipation management    Constipation (hard, dry bowel movements) is expected after surgery due to the combination of being less active, the anesthetic, and the opioid pain medication.  You can do the following to help reduce constipation:  ~  FLUIDS:  Drink clear liquids (water or Gatorade), or juice (apple/prune).  ~  DIET:  Eat a fiber rich diet.    ~  ACTIVITY:  Get up and move around several times a day.  Increase your activity as you are able.  MEDICATIONS:  Reduce the risk of constipation by starting medications before you are constipated.  You can take Miralax   (1 packet as directed) and/or a stool softener " (Senokot 1-2 tablets 1-2 times a day).  If you already have constipation and these medications are not working, you can get magnesium citrate and use as directed.  If you continue to have constipation you can try an over the counter suppository or enema.  Call your Surgeon Team if it has been greater than 3 days since your last bowel movement.     Symptoms - Reduced Urine Output    Changes in the amount of fluids you drank before and after surgery may result in problems urinating.  It is important to stay well-hydrated after surgery and drink plenty of water. If it has been greater than 8 hours since you have urinated despite drinking plenty of water, call your Surgeon Team.     Activity - Exercises to prevent blood clots    Unless otherwise directed by your Surgeon team, perform the following exercises at least three times per day for the first four weeks after surgery to prevent blood clots in your legs: 1) Point and flex your feet (Ankle Pumps), 2) Move your ankle around in big circles, 3) Wiggle your toes, 4) Walk, even for short distances, several times a day, will help decrease the risk of blood clots.     Comfort and Pain Management - Pain after Surgery    Pain after surgery is normal and expected.  You will have some amount of pain for several weeks after surgery.  Your pain will improve with time.  There are several things you can do to help reduce your pain including: rest, ice, elevation, and using pain medications as needed. Contact your Surgeon Team if you have pain that persists or worsens after surgery despite rest, ice, elevation, and taking your medication(s) as prescribed. Contact your Surgeon Team if you have new numbness, tingling, or weakness in your operative extremity.     Comfort and Pain Management - Swelling after Surgery    Swelling and/or bruising of the surgical extremity is common and may persist for several months after surgery. In addition to frequent icing and elevation, gentle  compressive support with an ACE wrap or tubigrip may help with swelling. Apply compression regularly, removing at least twice daily to perform skin checks. Contact your Surgeon Team if your swelling increases and is NOT associated with an increase in your activity level, or if your swelling increases and is associated with redness and pain.     Comfort and Pain Management - Cold therapy    Ice can be used to control swelling and discomfort after surgery. Place a thin towel over your operative site and apply the ice pack overtop. Leave ice pack in place for 20 minutes, then remove for 20 minutes. Repeat this 20 minutes on/20 minutes off routine as often as tolerated.     Medication Instructions - Acetaminophen (TYLENOL) Instructions    You were discharged with acetaminophen (TYLENOL) for pain management after surgery. Acetaminophen most effectively manages pain symptoms when it is taken on a schedule without missing doses (every four, six, or eight hours). Your Provider will prescribe a safe daily dose between 3000 - 4000 mg.  Do NOT exceed this daily dose. Most patients use acetaminophen for pain control for the first four weeks after surgery.  You can wean from this medication as your pain decreases.     Medication Instructions - NSAID Instructions    You were discharged with an anti-inflammatory medication for pain management to use in combination with acetaminophen (TYLENOL) and the narcotic pain medication.  Take this medication exactly as directed.  You should only take one anti-inflammatory at a time.  Some common anti-inflammatories include: ibuprofen (ADVIL, MOTRIN), naproxen (ALEVE, NAPROSYN), celecoxib (CELEBREX), meloxicam (MOBIC), ketorolac (TORADOL).  Take this medication with food and water.     Medication Instructions - Opioids - Tapering Instructions    In the first three days following surgery, your symptoms may warrant use of the narcotic pain medication every four to six hours as prescribed. This  "is normal. As your pain symptoms improve, focus your efforts on decreasing (tapering) use of narcotic medications. The most successful tapering strategy is to first, decrease the number of tablets you take every 4-6 hours to the minimum prescribed. Then, increase the amount of time between doses.  For example:  First, taper to   or 1 tablet every 4-6 hours.  Then, taper to   or 1 tablet every 6-8 hours.  Then, taper to   or 1 tablet every 8-10 hours.  Then, taper to   or 1 tablet every 10-12 hours.  Then, taper to   or 1 tablet at bedtime.  The bedtime dose can help with comfort during sleep and is typically the last dose to be discontinued after surgery.     Follow Up Care    Follow-up with your Surgeon Team in 2 weeks for wound check.     Medication instructions -  Anticoagulation - aspirin    Take the aspirin as prescribed for a total of four weeks after surgery.  This is given to help minimize your risk of blood clot.     Comfort and Pain Management - LOWER Extremity Elevation    Swelling is expected for several months after surgery. This type of swelling is usually associated with gravity and activity, and can be improved with elevation.   The best way to do this is to get your \"toes above your nose\" by laying down and placing several pillows lengthwise under your calf and heel. When elevating your leg keep your knee completely straight. Perform this elevation as often as possible especially for the first two weeks after surgery.     Medication Instructions - Opioid Instructions (0.5 - 1 tablet Q 4-6 hours, MAX 4 tablets)    You were discharged with an opioid medication (hydromorphone, oxycodone, hydrocodone, or tramadol). This medication should only be taken for breakthrough pain that is not controlled with acetaminophen (TYLENOL). If you rate your pain less than 3 you do not need this medication.  Pain rating 0-3:  You do not need this medication  Pain rating 4-6:  Take 1/2 tablet every 4-6 hours as " needed  Pain rating 7-10:  Take 1 tablet every 4-6 hours as needed  Do not exceed 4 tablets per day     Activity - Total Knee Arthroplasty     Return to Driving    Return to driving - Driving is NOT permitted until directed by your provider. Under no circumstance are you permitted to drive while using narcotic pain medications.     Dressing / Wound Care - Wound    You have a clean dressing on your surgical wound. Dressing change instructions as follows: dressing will be removed at your follow-up appointment. Contact your Surgeon Team if you have increased redness, warmth around the surgical wound, and/or drainage from the surgical wound.     Dressing / Wound Care - NO Tub Bathing    Tub bathing, swimming, or any other activities that will cause your incision to be submerged in water should be avoided until allowed by your Surgeon.     Precautions    Hinged knee brace locked in extension when up. Okay to have off when not ambulating     Toe touch weight bearing    Touch  down weight bearing.     Dressing / Wound care - Shower with wound/dressing covered    You must COVER your dressing or incision with saran wrap (or any other non-permeable covering) to allow the incision to remain dry while showering.  Disconnect vacuum tubing and cover the tubing insert for shower.  You may shower 3 days after surgery as long as the surgical wound stays dry. Continue to cover your dressing or incision for showering until your first office visit.  You are strictly prohibited from soaking   or submerging the surgical wound underwater.     Hinged Knee Brace    Remove for twice daily for skin assessment and hygiene.  Brace settings: locked in extension  Wear hinged knee brace at all times when up and remove for skin assessment and hygiene. When removing, ensure that you are safely seated or lying in bed     Discharge Instruction - Regular Diet Adult    Return to your pre-surgery diet unless instructed otherwise       Discharge  Medications     Discharge Medication List as of 7/22/2023 10:28 AM      START taking these medications    Details   acetaminophen (TYLENOL) 325 MG tablet Take 3 tablets (975 mg) by mouth every 8 hours, Disp-60 tablet, R-0, E-Prescribe      amoxicillin-clavulanate (AUGMENTIN) 875-125 MG tablet Take 1 tablet by mouth every 12 hours, Disp-28 tablet, R-0, E-Prescribe      doxycycline hyclate (VIBRAMYCIN) 100 MG capsule Take 1 capsule (100 mg) by mouth every 12 hours, Disp-28 capsule, R-0, E-Prescribe      oxyCODONE (ROXICODONE) 5 MG tablet Take 1 tablet (5 mg) by mouth every 4 hours as needed for moderate pain, Disp-33 tablet, R-0, E-Prescribe      senna-docusate (SENOKOT-S/PERICOLACE) 8.6-50 MG tablet Take 1 tablet by mouth 2 times daily, Disp-60 tablet, R-0, E-Prescribe         CONTINUE these medications which have CHANGED    Details   aspirin 81 MG EC tablet Take 1 tablet (81 mg) by mouth daily, Disp-45 tablet, R-0, E-Prescribe         CONTINUE these medications which have NOT CHANGED    Details   B Complex Vitamins (B COMPLEX PO) Take 1 tablet by mouth daily, Historical      Cholecalciferol (VITAMIN D) 2000 UNITS CAPS Take 1 capsule by mouth daily, Historical      cinnamon 500 MG CAPS Take 1 capsule by mouth daily, Historical      diphenhydrAMINE-acetaminophen (TYLENOL PM)  MG tablet Take 1 tablet by mouth At Bedtime, Historical      ezetimibe (ZETIA) 10 MG tablet Take 1 tablet (10 mg) by mouth every evening, Disp-90 tablet, R-2, E-Prescribe      lisinopril (ZESTRIL) 20 MG tablet Take 0.5 tablets by mouth daily (0.5 x 20 mg = 10 mg), Historical      metoprolol succinate ER (TOPROL XL) 25 MG 24 hr tablet Take 1 tablet (25 mg) by mouth daily, Disp-90 tablet, R-1, E-Prescribe      nitroGLYcerin (NITROSTAT) 0.4 MG sublingual tablet Place 1 tablet (0.4 mg) under the tongue every 5 minutes as needed for chest pain, Disp-25 tablet, R-11, E-Prescribe      prasugrel (EFFIENT) 10 MG TABS tablet Take 1 tablet (10 mg) by  mouth daily, Disp-90 tablet, R-2, E-Prescribe      rosuvastatin (CRESTOR) 40 MG tablet Take 1 tablet (40 mg) by mouth daily, Disp-90 tablet, R-2, E-Prescribe               After Discharge Recommendations:  1. Resume pre-admission diet  2. DVT prophylaxis: aspirin and prasurgel  3. Follow up: He should see Dr. Dominique in clinic in 1-2wks.  4. Sutures or staples will be removed by orthopedic surgeon at the 10-14 day follow-up appointment.  5. Weight Bearing TDWB (Touch down weight bearing).  Hinged knee brace locked in extension when up.  6. Additional followup: None  7. Special instructions: Per ID recommendations, will use a combination of Doxycycline 100 mg BID + augmentin 875 mg BID which provides broad coverage.     Total time spent for discharge on date of discharge: 25 minutes    Physician(s) in addition to primary physician who should receive a copy:  Primary Care Physician Gustavo Neumann  Orthopedic Medicine and Surgery -[phone]    SUKHI Nelson...................  7/24/2023   7:25 AM      DISPLAY PLAN FREE TEXT

## 2024-03-11 NOTE — PROGRESS NOTE ADULT - SUBJECTIVE AND OBJECTIVE BOX
Patient is a 64y old  Male who presents with a chief complaint of Seizures (10 Mar 2024 11:58)      HPI:  65 y/o male with a PMHx of glioblastoma s/p large resection of mass on 10/3/22 with Dr. Turpin, seizure disorder on Keppra and Vimpat, and DVT/PE on Eliquis presents to the ED [facility] from [facility]. Upon entry to the ED, patient was obtunded with, according to ED physician, R sided hemiplegia. Decision was made in ED to intubate patient due to low GCS and inability to protect airway. EMS stated prior to arrival that the patient got 10mgIV valium due to witnessed seizure in the field. Patient was successfully intubated and started on prop drip. neuro consulted. Stated stop prop temporarily in order to get a ECG initial reading to check for continued seizures. Patient was also given 1500 kepra in the ED. ICU was called.  (07 Mar 2024 14:04)    3/7/24: pt seen in bed, vented on propofol and recently received versed for agitation/ vent dyssynchrony.  sedation was held x 40 mins and pt examined frequently.   unable to assess if pt has HA/N/V/ subjective sensorimotor changes       3/8/24: pt seen in bed this morning, vented/ sedation held for eval.   pt is attempting to OE to VC visible eyebrow rise is noted b/l and symmetrically, pt is able to nod "yes" and "no", nodded "yes" when asked if he has a headache x3.   no acute events reported o/w overnight  neurology recommendations appreciated     3/11/24: Patient was seen and examined in bedside on 1N. Patient was extubated on 3/8 and subsequently downgraded to 1N. At this time, the patient reports mild headaches, patient is following commands.       acetaminophen     Tablet .. 650 milliGRAM(s) Oral every 6 hours PRN  aluminum hydroxide/magnesium hydroxide/simethicone Suspension 30 milliLiter(s) Oral every 4 hours PRN  apixaban 5 milliGRAM(s) Oral every 12 hours  chlorhexidine 2% Cloths 1 Application(s) Topical <User Schedule>  dexAMETHasone     Tablet 2 milliGRAM(s) Oral daily  lacosamide IVPB 200 milliGRAM(s) IV Intermittent every 12 hours  levETIRAcetam   Injectable 1500 milliGRAM(s) IV Push every 12 hours  ondansetron Injectable 4 milliGRAM(s) IV Push every 8 hours PRN  pantoprazole  Injectable 40 milliGRAM(s) IV Push daily  polyethylene glycol 3350 17 Gram(s) Oral every 12 hours  senna 2 Tablet(s) Oral every 12 hours      Vital Signs Last 24 Hrs  T(C): 36.1 (11 Mar 2024 05:57), Max: 36.6 (10 Mar 2024 15:46)  T(F): 97 (11 Mar 2024 05:57), Max: 97.8 (10 Mar 2024 15:46)  HR: 63 (11 Mar 2024 05:57) (63 - 88)  BP: 133/92 (11 Mar 2024 05:57) (124/88 - 144/86)  BP(mean): 110 (10 Mar 2024 14:00) (100 - 110)  RR: 18 (11 Mar 2024 05:57) (16 - 26)  SpO2: 97% (11 Mar 2024 05:57) (94% - 99%)    Parameters below as of 11 Mar 2024 05:57  Patient On (Oxygen Delivery Method): room air      Physical Exam:  Constitutional: Awake / alert  HEENT: PERRLA, EOMI  Neck: Supple  Respiratory: No respiratory distress noted  Gastrointestinal: Soft, NT/ND  Extremities:  bruising noted in extremities  Musculoskeletal: no abnormal movements  Skin: No rashes    Neurological Exam:  HF: A x O x 2 to self and place, appropriately interactive, normal affect, some difficulty with word finding. Naming /repetition intact   CN: PERRL, EOMI, VFF, facial sensation normal, no NLFD, tongue midline  Motor: No pronator drift, Strength 5/5 in all 4 ext, normal bulk and tone, no tremor, rigidity or bradykinesia  Sens: Intact to light touch  Reflexes: Symmetric and normal, downgoing toes b/l  Coord:  No FNFA, dysmetria, RENEE intact   Gait/Balance: Cannot test                          13.1   8.51  )-----------( 136      ( 11 Mar 2024 07:39 )             40.4       03-11    142  |  108  |  15  ----------------------------<  106<H>  3.6   |  27  |  0.74    Ca    9.3      11 Mar 2024 07:39  Phos  3.1     03-11  Mg     2.1     03-11

## 2024-03-11 NOTE — PROGRESS NOTE ADULT - SUBJECTIVE AND OBJECTIVE BOX
Subjective:  Chief complain :  chief complaint of Seizures , weakness    HPI:     65 y/o male with a PMHx of glioblastoma s/p large resection of mass on 10/3/22 with Dr. Turpin, seizure disorder on Keppra and Vimpat, and DVT/PE on Eliquis [facility]. Upon entry to the ED, patient was obtunded with, according to ED physician, R sided hemiplegia. Decision was made in ED to intubate patient due to low GCS and inability to protect airway. EMS stated prior to arrival that the patient got 10mgIV valium due to witnessed seizure in the field. Patient was successfully intubated and started on prop drip. neuro consulted. Stated stop prop temporarily in order to get a ECG initial reading to check for continued seizures. Patient was also given 1500 kepra in the ED. ICU was called.      3/11 -  Patient seen and examined at bedside earlier today, + gen weakness, + some headache , denies cp, dyspnea, abdominal pain    Review of system- Rest of the review of system are negative except mentioned in HPI     Vital sings reviewed for last 24 h  T(C): 36.9 (03-11-24 @ 09:18), Max: 36.9 (03-11-24 @ 09:18)  HR: 65 (03-11-24 @ 09:18) (63 - 65)  BP: 121/77 (03-11-24 @ 09:18) (121/77 - 133/92)  RR: 18 (03-11-24 @ 09:18) (18 - 18)  SpO2: 96% (03-11-24 @ 09:18) (96% - 97%)    Physical exam:   General : NAD, appear to be of stated age , well groomed   NERVOUS SYSTEM:  Alert & Oriented X2, non- focal exam, Motor Strength 5/5 B/L upper and lower extremities; DTRs 2+ intact and symmetric  HEAD:  Atraumatic, Normocephalic  EYES: EOMI, PERRLA, conjunctiva and sclera clear  HEENT: Moist mucous membranes, Supple neck , No JVD  CHEST: Clear to auscultation bilaterally; No rales, no rhonchi, no wheezing  HEART: Regular rate and rhythm; No murmurs, no rubs or gallops  ABDOMEN: Soft, Non-tender, Non-distended; Bowel sounds present, no guarding , no peritoneal irritation   GENITOURINARY- Voiding, no suprapubic tenderness  EXTREMITIES:  2+ Peripheral Pulses, No clubbing, cyanosis,   edema  MUSCULOSKELETAL:- No muscle tenderness, Muscle tone normal, No joint tenderness, no Joint swelling,  Joint ROM –normal   SKIN-no rash, no lesion      Labs radiologic and other test : all reviewed and interpreted :                         13.1   8.51  )-----------( 136      ( 11 Mar 2024 07:39 )             40.4     03-11    142  |  108  |  15  ----------------------------<  106<H>  3.6   |  27  |  0.74    Ca    9.3      11 Mar 2024 07:39  Phos  3.1     03-11  Mg     2.1     03-11    < from: Xray Abdomen 1 View PORTABLE -Routine (Xray Abdomen 1 View PORTABLE -Routine .) (03.08.24 @ 10:33) >  IMPRESSION:  No evidence of bowel obstruction.    < end of copied text >      < from: US Testicles (03.07.24 @ 16:46) >  IMPRESSION:    No acute abnormality detected.    < end of copied text >    < from: CT Abdomen and Pelvis w/ IV Cont (03.07.24 @ 12:05) >  IMPRESSION:    Patchy bibasilar atelectasis/consolidation.    No acute intra-abdominal pathology.    2 cm low density/cystic lesion pancreatic tail; stable.  If the patient is not  a candidate for follow-up MRI, recommend a   follow-up CT scan in 6-12 months.    Other findings as discussed above.    < end of copied text >      < from: CT Brain Perfusion Maps Stroke (03.07.24 @ 11:53) >  IMPRESSION:    HEAD CT: No acute intracranial hemorrhage or acute territorial   infarction. Stable exam since 10/22/2023    Notification to clinician of alert:  Dr. VANESSA LIM was notified about the noncontrast head CT findings at   11:57 AM on 3/7/2024 with readback confirmation. The opportunity for   questions was provided and all questions asked were answered.    CT PERFUSION demonstrated: No territorial perfusion abnormality. Abnormal   perfusion corresponding to the left parietal operative bed.    If symptoms persist consider follow-up imaging with contrast-enhanced MRI   of the brain if no contraindications.    CTA COW:  Patent intracranial circulation without flow limiting stenosis   or large vessel occlusion.    CTA NECK: Patent, ECAs, ICAs, no  hemodynamically significant stenosis at    ICA origins by NASCET criteria.  Bilateral vertebral arteries are patent without flow limiting stenosis.    < end of copied text >            RECENT CULTURES:      Cardiac testing : reviewed   EKG     Procedures :     Devices:     Current medications:  acetaminophen     Tablet .. 650 milliGRAM(s) Oral every 6 hours PRN  aluminum hydroxide/magnesium hydroxide/simethicone Suspension 30 milliLiter(s) Oral every 4 hours PRN  apixaban 5 milliGRAM(s) Oral every 12 hours  chlorhexidine 2% Cloths 1 Application(s) Topical <User Schedule>  dexAMETHasone     Tablet 2 milliGRAM(s) Oral daily  lacosamide IVPB 200 milliGRAM(s) IV Intermittent every 12 hours  levETIRAcetam   Injectable 1500 milliGRAM(s) IV Push every 12 hours  ondansetron Injectable 4 milliGRAM(s) IV Push every 8 hours PRN  pantoprazole  Injectable 40 milliGRAM(s) IV Push daily  polyethylene glycol 3350 17 Gram(s) Oral every 12 hours  senna 2 Tablet(s) Oral every 12 hours

## 2024-03-11 NOTE — CONSULT NOTE ADULT - CONVERSATION DETAILS
The role of Palliative Medicine was reviewed with the pt's friend Tania Johnson, she states she is [facility] although we do not have copy on file. She also stated that she is aware of the pt's wishes and that he has clearly stated that he would like all resuscitative measures. This is very  important to him as long as his mother is alive he does not want her to lose another son as his brother had taken his own life. No limits will be set. She also states that he will return to [facility] until Medicaid aide support can be placed in the home where he resides with his mother.

## 2024-03-11 NOTE — CONSULT NOTE ADULT - SUBJECTIVE AND OBJECTIVE BOX
ICU Admit Note    HPI:    S:    Pt seen and examined  65 y/o male with a PMHx of glioblastoma s/p large resection of mass on 10/3/22 with Dr. Turpin, seizure disorder on Keppra and Vimpat, and DVT/PE on Eliquis presents to the ED [facility] from [facility]. Upon entry to the ED, patient was obtunded with, according to ED physician, R sided hemiplegia. Decision was made in ED to intubate patient due to low GCS and inability to protect airway. EMS stated prior to arrival that the patient got 10mgIV valium due to witnessed seizure in the field. Patient was successfully intubated and started on prop drip. neuro consulted. Stated stop prop temporarily in order to get a ECG initial reading to check for continued seizures. Patient was also given 1500 kepra in the ED. ICU was called.    3/7: Intubated. s/p sEEG in ER. Waking up, on propofol drip.    ROS: Unable to obtain 2/2 Pt status (intubated)        Allergies    No Known Allergies    Intolerances        MEDICATIONS  (STANDING):  chlorhexidine 2% Cloths 1 Application(s) Topical <User Schedule>  midazolam Injectable 2 milliGRAM(s) IV Push once  norepinephrine Infusion 0.05 MICROgram(s)/kG/Min (8.68 mL/Hr) IV Continuous <Continuous>  piperacillin/tazobactam IVPB. 3.375 Gram(s) IV Intermittent Once  piperacillin/tazobactam IVPB.. 3.375 Gram(s) IV Intermittent Once  propofol Infusion. 30 MICROgram(s)/kG/Min (16.7 mL/Hr) IV Continuous <Continuous>  vancomycin  IVPB 1500 milliGRAM(s) IV Intermittent Once    MEDICATIONS  (PRN):      Drug Dosing Weight  Height (cm): 167.6 (31 Dec 2023 18:30)  Weight (kg): 92.533 (07 Mar 2024 12:18)  BMI (kg/m2): 32.9 (07 Mar 2024 12:18)  BSA (m2): 2.02 (07 Mar 2024 12:18)        PAST MEDICAL & SURGICAL HISTORY:  Glioblastoma      Seizures      S/P craniotomy          FAMILY HISTORY:        REVIEW OF SYSTEMS    UNLESS OTHERWISE NOTED IN HPI above:    Constitutional:  No Weight Change, No Fever, No Chills, No Night Sweats, No Fatigue, No Malaise  ENT/Mouth:  No Hearing Changes, No Ear Pain, No Nasal Congestion, No  Sinus Pain, No Hoarseness, No sore throat, No Rhinorrhea, No Swallowing  Difficulty  Eyes:  No Eye Pain, No Swelling, No Redness, No Foreign Body, No Discharge, No Vision Changes  Cardiovascular:  No Chest Pain, No SOB, No PND, No Dyspnea on Exertion,  No Orthopnea, No Claudication, No Edema, No Palpitations  Respiratory:  No Cough, No Sputum, No Wheezing, No Smoke Exposure, No Dyspnea  Gastrointestinal:  No Nausea, No Vomiting, No Diarrhea, No  Constipation, No Pain, No Heartburn, No Anorexia, No Dysphagia, No  Hematochezia, No Melena, No Flatulence, No Jaundice  Genitourinary:  No Dysmenorrhea, No DUB, No Dyspareunia, No Dysuria, No  Urinary Frequency, No Hematuria, No Urinary Incontinence, No Urgency,  No Flank Pain, No Urinary Flow Changes, No Hesitancy  Musculoskeletal:  No Arthralgias, No Myalgias, No Joint Swelling, No  Joint Stiffness, No Back Pain, No Neck Pain, No Injury History  Skin:  No Skin Lesions, No Pruritis, No Hair Changes, No Breast/Skin Changes, No Nipple Discharge  Neuro:  No Weakness, No Numbness, No Paresthesias, No Loss of  Consciousness, No Syncope, No Dizziness, No Headache, No Coordination  Changes, No Recent Falls  Psych:  No Anxiety/Panic, No Depression, No Insomnia, No Personality  Changes, No Delusions, No Rumination, No SI/HI/AH/VH, No Social Issues,  No Memory Changes, No Violence/Abuse Hx., No Eating Concerns  Heme/Lymph:  No Bruising, No Bleeding, No Transfusions History, No Lymphadenopathy  Endocrine:  No Polyuria, No Polydipsia, No Temperature Intolerance    O:    ICU Vital Signs Last 24 Hrs  T(C): 37.4 (07 Mar 2024 13:52), Max: 38.5 (07 Mar 2024 11:26)  T(F): 99.3 (07 Mar 2024 13:52), Max: 101.3 (07 Mar 2024 11:26)  HR: 139 (07 Mar 2024 13:52) (119 - 149)  BP: 122/89 (07 Mar 2024 13:52) (102/71 - 124/83)  BP(mean): 100 (07 Mar 2024 13:52) (86 - 100)  ABP: --  ABP(mean): --  RR: 29 (07 Mar 2024 13:52) (19 - 29)  SpO2: 95% (07 Mar 2024 13:52) (91% - 95%)    O2 Parameters below as of 07 Mar 2024 13:52  Patient On (Oxygen Delivery Method): ventilator                I&O's Detail      Mode: AC/ CMV (Assist Control/ Continuous Mandatory Ventilation)  RR (machine): 18  TV (machine): 400  FiO2: 40  PEEP: 6  ITime: 1  PIP: 24      PE:    Adult M lying in bed  Intubated, sedated  No JVD trachea midline  Normocephalic, atraumatic  S1S2+  CTA B/L  Abd soft NTND  + enlarged R testicle  Intubated, sedated  Skin pink, warm    LABS:    CBC Full  -  ( 07 Mar 2024 11:25 )  WBC Count : 17.77 K/uL  RBC Count : 5.17 M/uL  Hemoglobin : 15.7 g/dL  Hematocrit : 48.1 %  Platelet Count - Automated : 117 K/uL  Mean Cell Volume : 93.0 fl  Mean Cell Hemoglobin : 30.4 pg  Mean Cell Hemoglobin Concentration : 32.6 gm/dL  Auto Neutrophil # : 15.46 K/uL  Auto Lymphocyte # : 0.91 K/uL  Auto Monocyte # : 1.28 K/uL  Auto Eosinophil # : 0.00 K/uL  Auto Basophil # : 0.04 K/uL  Auto Neutrophil % : 87.0 %  Auto Lymphocyte % : 5.1 %  Auto Monocyte % : 7.2 %  Auto Eosinophil % : 0.0 %  Auto Basophil % : 0.2 %    03-07    142  |  111<H>  |  15  ----------------------------<  110<H>  3.7   |  27  |  0.86    Ca    7.5<L>      07 Mar 2024 12:30  Mg     2.1     03-07    TPro  5.3<L>  /  Alb  2.3<L>  /  TBili  1.0  /  DBili  x   /  AST  156<H>  /  ALT  42  /  AlkPhos  32<L>  03-07    PT/INR - ( 07 Mar 2024 11:25 )   PT: 15.0 sec;   INR: 1.34 ratio         PTT - ( 07 Mar 2024 11:25 )  PTT:35.0 sec  Urinalysis Basic - ( 07 Mar 2024 12:30 )    Color: x / Appearance: x / SG: x / pH: x  Gluc: 110 mg/dL / Ketone: x  / Bili: x / Urobili: x   Blood: x / Protein: x / Nitrite: x   Leuk Esterase: x / RBC: x / WBC x   Sq Epi: x / Non Sq Epi: x / Bacteria: x      CAPILLARY BLOOD GLUCOSE  118 (07 Mar 2024 11:43)  118 (07 Mar 2024 11:15)      POCT Blood Glucose.: 118 mg/dL (07 Mar 2024 11:15)        LIVER FUNCTIONS - ( 07 Mar 2024 12:30 )  Alb: 2.3 g/dL / Pro: 5.3 gm/dL / ALK PHOS: 32 U/L / ALT: 42 U/L / AST: 156 U/L / GGT: x               
  HPI: Pt is a 64y old Male with a PMHx of glioblastoma s/p large resection of mass on 10/3/22 with Dr. Turpin, seizure disorder on Keppra and Vimpat, and DVT/PE on Eliquis presents from [facility]. Upon entry to the ED, patient was obtunded with, according to ED physician, R sided hemiplegia. Decision was made to intubate patient due to low GCS and inability to protect airway. EMS stated prior to arrival that the patient got 10mgIV valium due to witnessed seizure in the field. Patient was successfully intubated and started on prop drip. neuro consulted. Stated stop prop temporarily in order to get a ECG initial reading to check for continued seizures. He is currently extubated and Palliative medicine is consulted to further explore GOC.   3/11/24 Seen and examined at bedside. Pt aware that he he is in the hospital however unable to provide medical hx. Difficulty with word finding at times. Denies pain or dyspnea.      PAIN: ( )Yes   (X )No  DYSPNEA: ( ) Yes  (X ) No      PAST MEDICAL & SURGICAL HISTORY:  Glioblastoma  Seizures  S/P craniotomy    SOCIAL HX:  Coming from [facility]    Hx opiate tolerance ( )YES  (X )NO    Baseline ADLs  (Prior to Admission)  ( ) Independent   (X )Dependent    FAMILY HISTORY:    Unable to obtain due to altered mentation   Review of Systems:  Depression-situational  Physical Discomfort-mod  Dyspnea-denies  Anorexia-mod  Fatigue-yes  Weakness-r hemiplegia    Unable to obtain/Limited due to: AMS      PHYSICAL EXAM:    Vital Signs Last 24 Hrs  T(C): 36.9 (11 Mar 2024 09:18), Max: 36.9 (11 Mar 2024 09:18)  T(F): 98.5 (11 Mar 2024 09:18), Max: 98.5 (11 Mar 2024 09:18)  HR: 65 (11 Mar 2024 09:18) (63 - 88)  BP: 121/77 (11 Mar 2024 09:18) (121/77 - 144/86)  BP(mean): 110 (10 Mar 2024 14:00) (104 - 110)  RR: 18 (11 Mar 2024 09:18) (18 - 26)  SpO2: 96% (11 Mar 2024 09:18) (94% - 97%)  Parameters below as of 11 Mar 2024 09:18  Patient On (Oxygen Delivery Method): room air    PPSV2: 30  %    General: Elderly male in bed in NAD  Mental Status: Awake and alert/disoriented at times  HEENT: oral mucosa dry  Lungs: clear diminished nikita  Cardiac: S1S2+  GI: abd soft NT ND + BS  : voids  Ext: Right sided hemiplegia  Neuro: as noted      LABS:                        13.1   8.51  )-----------( 136      ( 11 Mar 2024 07:39 )             40.4     03-11    142  |  108  |  15  ----------------------------<  106<H>  3.6   |  27  |  0.74    Ca    9.3      11 Mar 2024 07:39  Phos  3.1     03-11  Mg     2.1     03-11    Albumin: Albumin: 2.4 g/dL (03-08 @ 05:40)    Allergies    No Known Allergies    Intolerances      MEDICATIONS  (STANDING):  apixaban 5 milliGRAM(s) Oral every 12 hours  chlorhexidine 2% Cloths 1 Application(s) Topical <User Schedule>  dexAMETHasone     Tablet 2 milliGRAM(s) Oral daily  lacosamide IVPB 200 milliGRAM(s) IV Intermittent every 12 hours  levETIRAcetam   Injectable 1500 milliGRAM(s) IV Push every 12 hours  pantoprazole  Injectable 40 milliGRAM(s) IV Push daily  polyethylene glycol 3350 17 Gram(s) Oral every 12 hours  senna 2 Tablet(s) Oral every 12 hours    MEDICATIONS  (PRN):  acetaminophen     Tablet .. 650 milliGRAM(s) Oral every 6 hours PRN Temp greater or equal to 38C (100.4F), Mild Pain (1 - 3)  aluminum hydroxide/magnesium hydroxide/simethicone Suspension 30 milliLiter(s) Oral every 4 hours PRN Dyspepsia  ondansetron Injectable 4 milliGRAM(s) IV Push every 8 hours PRN Nausea and/or Vomiting      RADIOLOGY/ADDITIONAL STUDIES:    < from: Xray Abdomen 1 View PORTABLE -Routine (Xray Abdomen 1 View PORTABLE -Routine .) (03.08.24 @ 10:33) >    ACC: [id number] EXAM:  XR ABDOMEN PORTABLE ROUTINE 1V   ORDERED BY: EMILIANO SHAIKH     PROCEDURE DATE:  03/08/2024          INTERPRETATION:  Abdomen one view    HISTORY: Abdominal distention    Frontal view of the abdomen shows a normal gas pattern. The psoas margins   are within normal limits. Nasogastric tube coils in the stomach. No   definite free air is identified.    IMPRESSION:  No evidence of bowel obstruction.      Thank you for this referral.    < end of copied text >  < from: Xray Chest 1 View- PORTABLE-Routine (Xray Chest 1 View- PORTABLE-Routine .) (03.08.24 @ 10:32) >    ACC: [id number] EXAM:  XR CHEST PORTABLE ROUTINE 1V   ORDERED BY: ALMA DELIA   [facility]     PROCEDURE DATE:  03/08/2024          INTERPRETATION:  Chest one view    HISTORY: Intubation    COMPARISON STUDY: 3/7/2024    Frontal expiratory view of the chest shows the heart to be similarly   enlarged in size. Endotracheal tube terminates at the level of the medial   clavicles. Nasogastric tube coils in the stomach.    The lungs show clear right lung with small left effusion and there is no   evidence of pneumothorax nor right pleural effusion.    IMPRESSION:  Small left effusion.    < from: US Testicles (03.07.24 @ 16:46) >    ACC: [id number] EXAM:  US SCROTUM AND CONTENTS   [facility]     PROCEDURE DATE:  03/07/2024          INTERPRETATION:  CLINICAL INFORMATION: Scrotal pain.    COMPARISON: None available.    TECHNIQUE: Testicular ultrasound utilizing color and spectral Doppler.    FINDINGS:    RIGHT:  Right testis: 4.7 cm x 3.4 cm x 3.9 cm. Normal echogenicity and   echotexture with no masses or areas of architectural distortion. Normal   arterial and venous blood flow pattern.  Right epididymis: Small cyst.  Right hydrocele: Trace  Right varicocele: None.    LEFT:  Left testis: 2.2 cm x 2.7 cm x 2.1 cm. Normal echogenicity and   echotexture with no masses or areas of architectural distortion. Normal   arterial and venous blood flow pattern.  Left epididymis: Small cysts.  Left hydrocele: None.  Left varicocele: None.    IMPRESSION:    No acute abnormality detected.    < from: CT Abdomen and Pelvis w/ IV Cont (03.07.24 @ 12:05) >    ACC: [id number] EXAM:  CT ABDOMEN AND PELVIS IC   ORDERED BY: VANESSA LIM     ACC: [id number] EXAM:  CT CHEST IC   ORDERED BY: VANESSA LIM     PROCEDURE DATE:  03/07/2024          INTERPRETATION:  CLINICAL INFORMATION: Sepsis. Hypoxia, unresponsive.  Prior medical history of glioblastoma.    COMPARISON: CT scan chest 1/4/2024 and CT scan abdomen and pelvis   10/22/2023.    CONTRAST/COMPLICATIONS:  IV Contrast: Omnipaque 350 (accession [id number]), IV contrast documented   in unlinked concurrent exam (accession [id number])  90 cc administered   0   cc discarded  Oral Contrast: NONE  Complications: None reported at time of study completion    PROCEDURE:  CT of the Chest, Abdomen and Pelvis was performed.  Sagittal and coronal reformats were performed.    FINDINGS:    CHEST:    LUNGS AND LARGE AIRWAYS: PLEURA:    Endotracheal tube, tip above the level of amor; the central airways   remain patent.  Elevation right hemidiaphragm, similar to prior exam.    Patchy bibasilar lower lobe atelectasis/consolidation.  Linear atelectasis/scarring right middle and lower lobes.    No pleural effusion or pneumothorax.    VESSELS: Atherosclerotic changes thoracic aorta and coronary artery   calcification.    HEART: Heart size is normal. No pericardial effusion.    MEDIASTINUM AND CARLOS: No lymphadenopathy.    CHEST WALL AND LOWER NECK: Within normal limits.    ABDOMEN AND PELVIS:    LIVER:  Steatosis.  BILE DUCTS: Normal caliber.  GALLBLADDER: Within normal limits.  SPLEEN: Within normal limits.  PANCREAS:  2 cm low-density cystic lesion tail of pancreas, stable from CT scan   10/22/2023.  No main pancreatic ductal dilatation.  ADRENALS: Within normal limits.  KIDNEYS/URETERS: Within normal limits.    BLADDER: Pryor catheter with bladder wall thickening.  REPRODUCTIVE ORGANS: Enlarged prostate.    BOWEL:  Nasogastric tube within stomach.   No bowel obstruction.   Appendix is normal.  PERITONEUM: No ascites.    VESSELS: Atherosclerotic changes, abdominal aorta normal in caliber.  RETROPERITONEUM/LYMPH NODES: No lymphadenopathy.    ABDOMINAL WALL: Within normal limits.    BONES: No acute osseous abnormality.    IMPRESSION:    Patchy bibasilar atelectasis/consolidation.    No acute intra-abdominal pathology.    2 cm low density/cystic lesion pancreatic tail; stable.  If the patient is not  a candidate for follow-up MRI, recommend a   follow-up CT scan in 6-12 months.    Other findings as discussed above.    < end of copied text >    < from: CT Brain Perfusion Maps Stroke (03.07.24 @ 11:53) >    ACC: [id number] EXAM:  CT BRAIN PERFUSION MAPS STROKE   ORDERED BY: VANESSA LIM     ACC: [id number] EXAM:  CT ANGIO NECK STROKE PROTCL IC   ORDERED BY: VANESSA LIM     ACC: [id number] EXAM:  CT BRAIN STROKE PROTOCOL   ORDERED BY: VANESSA LIM     ACC: [id number] EXAM:  CT ANGIO BRAIN STROKE [facility] IC   ORDERED BY: VANESSA LIM     PROCEDURE DATE:  03/07/2024          INTERPRETATION:  HEAD CT, CT PERFUSION, CTA OF THE [place] OF CUEVAS AND   NECK:    INDICATIONS: Stroke Code. Right-sided hemiplegia.. A glioblastoma.    TECHNIQUE:    HEAD CT:    Serial axial images were obtained from the skull base to the vertex   without the use of intravenous contrast. RAPID artificial intelligence   was used for perfusion analysis and for preliminary evaluation of   intracranial hemorrhage.    CTA [place] OF [place]UE[place]AS:    After the intravenous power injection of non-ionic contrast material,   serial thin sections were obtained through the intracranial circulation   on a multislice CT scanner.  Images were reformatted using a dedicated 3D   software package and viewed on a dedicated workstation in multiple   planes. MIP image analysis was performed on a separate workstation.    CTA NECK:    After the intravenous power injection of non-ionic contrast material,   serial thin sections were obtained through the cervical circulation on a   multislice CT scanner.  Images were reformatted using a dedicated 3D   software package and viewed on a dedicated workstation in multiple   planes. MIP image analysis was performed on a separate workstation.    CONTRAST:  IV Contrast: Omnipaque 350 (accession [id number]), IV contrast documented   in unlinked concurrent exam (accession [id number]), Omnipaque 350   (accession [id number])  90 cc administered (accession [id number]), 50 cc   administered (accession [id number])  0 cc discarded  Complications: None reported at time of study completion    COMPARISON EXAMINATION: CT head 10/22/2023    FINDINGS:  There are postoperative changes related to prior left parietal   craniotomy. Stable extent of underlying left parietal temporal occipital   hypoattenuation and focus of coarse parenchymal calcification. There is   associated ex vacuo dilatation of the atrium of the left lateral   ventricle.    VENTRICLES AND SULCI: Ventricles and sulci are unremarkable for patient   age.  INTRA-AXIAL: No acute hemorrhage, or midline shift is present.  EXTRA-AXIAL: No extra-axial fluid collection is present.  INTRACRANIAL HEMORRHAGE: None.    VISUALIZED SINUSES: No air-fluid levels are identified.  VISUALIZED MASTOIDS:  Clear  CALVARIUM: As above.      CT RAPID PERFUSION:  Abnormal perfusion in the left parietal lobe which corresponds to the   postoperative bed. No other asymmetric or territorial perfusion   abnormality.    INFARCT CORE: 35 mL.    TISSUE AT RISK: 32 mL.    MISMATCH RATIO: None.      CTA [place] OF CUEVAS:    ANTERIOR CIRCULATION    ICA  CAVERNOUS, SUPRACLINOID, BIFURCATION SEGMENTS: Patent without flow   limiting stenosis. Both posterior communicating arteriesare present.    ANTERIOR CEREBRAL ARTERIES: Bilateral A1, anterior communicating and A2   anterior cerebral arteries are unremarkable in course and caliber without   flow limiting stenosis.    MIDDLE CEREBRAL ARTERIES: Patent bilateral M1, M2, and distal MCA   branches without flow limiting stenosis.    POSTERIOR CIRCULATION:    VERTEBRAL ARTERIES: Patent without flow limiting stenosis    BASILAR ARTERY: Patent no flow limiting stenosis.    POSTERIOR CEREBRAL ARTERIES: Patent without flow limiting stenosis.    CTA NECK:    GREAT VESSELS: Visualized segments are patent, no flow limiting stenosis.    COMMON CAROTID ARTERIES:  RIGHT: Patent without flow limiting stenosis  LEFT: Patent without flow limiting stenosis    INTERNAL CAROTID ARTERIES:  RIGHT: Patent no evidence for any hemodynamically significant stenosis at   the ICA origin by NASCET criteria.  LEFT: Patent no evidence for any hemodynamically significant stenosis at   the ICA origin by NASCET criteria.    VERTEBRAL ARTERIES:    RIGHT: Patent no evidence for any flow limiting stenosis.  LEFT: Patent no evidence for any flow limiting stenosis.      SOFT TISSUES: Unremarkable    Endotracheal tube and enteric tube are noted.    BONES: Unremarkable    IMPRESSION:    HEAD CT: No acute intracranial hemorrhage or acute territorial   infarction. Stable exam since 10/22/2023    Notification to clinician of alert:  Dr. VANESSA LIM was notified about the noncontrast head CT findings at   11:57 AM on 3/7/2024 with readback confirmation. The opportunity for   questions was provided and all questions asked were answered.    CT PERFUSION demonstrated: No territorial perfusion abnormality. Abnormal   perfusion corresponding to the left parietal operative bed.    If symptoms persist consider follow-up imaging with contrast-enhanced MRI   of the brain if no contraindications.    CTA COW:  Patent intracranial circulation without flow limiting stenosis   or large vessel occlusion.    CTA NECK: Patent, ECAs, ICAs, no  hemodynamically significant stenosis at    ICA origins by NASCET criteria.  Bilateral vertebral arteries are patent without flow limiting stenosis.    --- End of Report ---    < end of copied text >  
Patient is a 64y old  Male who presents with a chief complaint of Seizures (07 Mar 2024 14:35)    HPI:  63 y/o male with a PMHx of glioblastoma s/p large resection of mass on 10/3/22 with Dr. Turpin, seizure disorder on Keppra and Vimpat, and DVT/PE on Eliquis presents to the ED [facility]. Upon entry to the ED, patient was obtunded with, according to ED physician, R sided hemiplegia. Decision was made in ED to intubate patient due to low GCS and inability to protect airway. EMS stated prior to arrival that the patient got 10mgIV valium due to witnessed seizure in the field. Patient was successfully intubated and started on prop drip. neuro consulted. Stated stop prop temporarily in order to get a ECG initial reading to check for continued seizures. Patient was also given 1500 keppra in the ED. ICU was called.  (07 Mar 2024 14:04)    pt seen in bed, vented on propofol and recently received versed for agitation/ vent dyssynchrony.  sedation was held x 40 mins and pt examined frequently.   unable to assess if pt has HA/N/V/ subjective sensorimotor changes       PAST MEDICAL & SURGICAL HISTORY:  Glioblastoma  Seizures  DVT/PE on Eliquis  pancreatic cystic lesion   S/P craniotomy        FAMILY HISTORY:  Noncontributory     Social Hx: Noncontributory    Allergies  No Known Allergies    Intolerances        MEDICATIONS  (STANDING):  chlorhexidine 0.12% Liquid 15 milliLiter(s) Oral Mucosa every 12 hours  chlorhexidine 2% Cloths 1 Application(s) Topical <User Schedule>  dexAMETHasone     Tablet 2 milliGRAM(s) Oral daily  dexMEDEtomidine Infusion 0.1 MICROgram(s)/kG/Hr (2.31 mL/Hr) IV Continuous <Continuous>  lacosamide IVPB 200 milliGRAM(s) IV Intermittent every 12 hours  lactated ringers Bolus 2000 milliLiter(s) IV Bolus once  levETIRAcetam   Injectable 1500 milliGRAM(s) IV Push every 12 hours  norepinephrine Infusion 0.05 MICROgram(s)/kG/Min (8.68 mL/Hr) IV Continuous <Continuous>  pantoprazole  Injectable 40 milliGRAM(s) IV Push once  piperacillin/tazobactam IVPB. 3.375 Gram(s) IV Intermittent Once  piperacillin/tazobactam IVPB.. 3.375 Gram(s) IV Intermittent Once  propofol Infusion. 30 MICROgram(s)/kG/Min (16.7 mL/Hr) IV Continuous <Continuous>  vancomycin  IVPB 1500 milliGRAM(s) IV Intermittent Once       ROS: Pertinent positives in HPI, all other ROS were reviewed and are negative.      Vital Signs Last 24 Hrs  T(C): 37.2 (07 Mar 2024 15:00), Max: 38.5 (07 Mar 2024 11:26)  T(F): 99 (07 Mar 2024 15:00), Max: 101.3 (07 Mar 2024 11:26)  HR: 119 (07 Mar 2024 15:00) (119 - 149)  BP: 111/89 (07 Mar 2024 15:00) (89/65 - 124/83)  BP(mean): 97 (07 Mar 2024 15:00) (72 - 100)  RR: 24 (07 Mar 2024 15:00) (19 - 29)  SpO2: 100% (07 Mar 2024 15:00) (91% - 100%)    Parameters below as of 07 Mar 2024 13:52  Patient On (Oxygen Delivery Method): ventilator        Physical Exam:  Constitutional: vented/ sedation held  HEENT: PERRL b/l and 2mm/ brisk and symmetric, + b/l cataract, unable to assess for EOMI  Neck: Supple, unable to assess if has any pain/tenderness   Respiratory: Breath sounds are present bilaterally  Cardiovascular: S1 and S2, regular rhythm on monitor   Gastrointestinal: Soft, NT/ND, obese abdomen   Extremities:  no edema appreciated b/l   Musculoskeletal: no abnormal movements or spasticity   Skin: No rashes    Neurological Exam:  HF: A x O x 0 as pr is vented/ intubated, ?follows commands/ attempted to epen eyes/ eyebrows lift up symmetrically and was able to open and close right hand, unable to assess speech  CN: PERRL 2mm b/l to light, unable to assess for EOMI or tongue deviation, no NLFD appreciated,   Motor: Strength localizing w L>R UEs and withdrawing R>L LEs   Sens: grimaces to sternal rub and peripheral nailbed pressure as well as withdraws   Reflexes: unable to assess   Coord: unable to assess  Gait/Balance: Cannot test      Labs:                        15.7   17.77 )-----------( 117      ( 07 Mar 2024 11:25 )             48.1     03-07    142  |  111<H>  |  15  ----------------------------<  110<H>  3.7   |  27  |  0.86    Ca    7.5<L>      07 Mar 2024 12:30  Mg     2.1     03-07    TPro  5.3<L>  /  Alb  2.3<L>  /  TBili  1.0  /  DBili  x   /  AST  156<H>  /  ALT  42  /  AlkPhos  32<L>  03-07      PT/INR - ( 07 Mar 2024 11:25 )   PT: 15.0 sec;   INR: 1.34 ratio    PTT - ( 07 Mar 2024 11:25 )  PTT:35.0 sec    Radiology:  < from: CT Brain Stroke Protocol (03.07.24 @ 11:49) >  ACC: [id number] EXAM:  CT BRAIN PERFUSION MAPS STROKE   ORDERED BY: VANESSA LIM   ACC: [id number] EXAM:  CT ANGIO NECK STROKE PROTCL IC   ORDERED BY: VANESSA LIM   ACC: [id number] EXAM:  CT BRAIN STROKE PROTOCOL   ORDERED BY: VANESSA LIM   ACC: [id number] EXAM:  CT ANGIO BRAIN STROKE PROTC IC   ORDERED BY: VANESSA LIM     PROCEDURE DATE:  03/07/2024    INTERPRETATION:  HEAD CT, CT PERFUSION, CTA OF THE [place] OF CUEVAS AND NECK:  INDICATIONS: Stroke Code. Right-sided hemiplegia.. A glioblastoma.    TECHNIQUE:  HEAD CT:  Serial axial images were obtained from the skull base to the vertex without the use of intravenous contrast. RAPID artificial intelligence was used for perfusion analysis and for preliminary evaluation of intracranial hemorrhage.    CTA False [place] OF CUEVAS:    After the intravenous power injection of non-ionic contrast material, serial thin sections were obtained through the intracranial circulation on a multislice CT scanner.  Images were reformatted using a dedicated 3D software package and viewed on a dedicated workstation in multiple planes. MIP image analysis was performed on a separate workstation.    CTA NECK:    After the intravenous power injection of non-ionic contrast material, serial thin sections were obtained through the cervical circulation on a multislice CT scanner.  Images were reformatted using a dedicated 3D software package and viewed on a dedicated workstation in multiple planes. MIP image analysis was performed on a separate workstation.    CONTRAST:  IV Contrast: Omnipaque 350 (accession [id number]), IV contrast documented in unlinked concurrent exam (accession [id number]), Omnipaque 350 (accession [id number])  90 cc administered (accession [id number]), 50 cc administered (accession [id number])  0 cc discarded  Complications: None reported at time of study completion    COMPARISON EXAMINATION: CT head 10/22/2023    FINDINGS:  There are postoperative changes related to prior left parietal craniotomy. Stable extent of underlying left parietal temporal occipital hypoattenuation and focus of coarse parenchymal calcification. There is associated ex vacuo dilatation of the atrium of the left lateral   ventricle.    VENTRICLES AND SULCI: Ventricles and sulci are unremarkable for patient age.  INTRA-AXIAL: No acute hemorrhage, or midline shift is present.  EXTRA-AXIAL: No extra-axial fluid collection is present.  INTRACRANIAL HEMORRHAGE: None.    VISUALIZED SINUSES: No air-fluid levels are identified.  VISUALIZED MASTOIDS:  Clear  CALVARIUM: As above.      CT RAPID PERFUSION:  Abnormal perfusion in the left parietal lobe which corresponds to the postoperative bed. No other asymmetric or territorial perfusion abnormality.    INFARCT CORE: 35 mL.    TISSUE AT RISK: 32 mL.    MISMATCH RATIO: None.      CTA [place] OF CUEVAS:    ANTERIOR CIRCULATION    ICA  CAVERNOUS, SUPRACLINOID, BIFURCATION SEGMENTS: Patent without flow limiting stenosis. Both posterior communicating arteriesare present.    ANTERIOR CEREBRAL ARTERIES: Bilateral A1, anterior communicating and A2 anterior cerebral arteries are unremarkable in course and caliber without flow limiting stenosis.    MIDDLE CEREBRAL ARTERIES: Patent bilateral M1, M2, and distal MCA branches without flow limiting stenosis.    POSTERIOR CIRCULATION:    VERTEBRAL ARTERIES: Patent without flow limiting stenosis    BASILAR ARTERY: Patent no flow limiting stenosis.    POSTERIOR CEREBRAL ARTERIES: Patent without flow limiting stenosis.    CTA NECK:    GREAT VESSELS: Visualized segments are patent, no flow limiting stenosis.    COMMON CAROTID ARTERIES:  RIGHT: Patent without flow limiting stenosis  LEFT: Patent without flow limiting stenosis    INTERNAL CAROTID ARTERIES:  RIGHT: Patent no evidence for any hemodynamically significant stenosis at the ICA origin by NASCET criteria.  LEFT: Patent no evidence for any hemodynamically significant stenosis at the ICA origin by NASCET criteria.    VERTEBRAL ARTERIES:    RIGHT: Patent no evidence for any flow limiting stenosis.  LEFT: Patent no evidence for any flow limiting stenosis.      SOFT TISSUES: Unremarkable    Endotracheal tube and enteric tube are noted.    BONES: Unremarkable    IMPRESSION:    HEAD CT: No acute intracranial hemorrhage or acute territorial infarction. Stable exam since 10/22/2023    Notification to clinician of alert:  Dr. VANESSA LIM was notified about the noncontrast head CT findings at 11:57 AM on 3/7/2024 with readback confirmation. The opportunity for questions was provided and all questions asked were answered.    CT PERFUSION demonstrated: No territorial perfusion abnormality. Abnormal perfusion corresponding to the left parietal operative bed.    If symptoms persist consider follow-up imaging with contrast-enhanced MRI of the brain if no contraindications.    CTA COW:  Patent intracranial circulation without flow limiting stenosis or large vessel occlusion.    CTA NECK: Patent, ECAs, ICAs, no  hemodynamically significant stenosis at  ICA origins by NASCET criteria.  Bilateral vertebral arteries are patent without flow limiting stenosis.    --- End of Report ---    JOANNE JOHNSON MD; Attending Radiologist  This document has been electronically signed. Mar  7 2024 12:19PM  < end of copied text >        < from: CT Abdomen and Pelvis w/ IV Cont (03.07.24 @ 12:05) >  IMPRESSION:  Patchy bibasilar atelectasis/consolidation.  No acute intra-abdominal pathology.  2 cm low density/cystic lesion pancreatic tail; stable.  If the patient is not  a candidate for follow-up MRI, recommend a follow-up CT scan in 6-12 months.  Other findings as discussed above.    --- End of Report ---  KELLI JAMES MD; Attending Radiologist  This document has been electronically signed. Mar  7 2024  1:21PM  < end of copied text >

## 2024-03-11 NOTE — PROGRESS NOTE ADULT - SUPERVISING ATTENDING
History and plan discussed with MARTITA Whitehead, agree with above.
Plan discussed with MARTITA Santacruz, agree with above.

## 2024-03-11 NOTE — CONSULT NOTE ADULT - CONSULT REASON
Seizures, intubated
GOC
h/o GBM and resection by Dr Turpin in 2022 & AMS/ right facial droop/ right sided weakness w hypoxia/ seizure while on ABX for UTI w h/o seizures

## 2024-03-11 NOTE — PROGRESS NOTE ADULT - ASSESSMENT
65 y/o male with a PMHx of glioblastoma s/p large resection of mass on 10/3/22 with Dr. Turpin, seizure disorder on Keppra and Vimpat, and DVT/PE on Eliquis presents to the ED BIBA from [facility] unresponsive patient was found to have a seizure on the field en route to the ED no hemorrhage noted on CT Head. Neurosurgery was consulted because of history of GBM.       - D/w attending/ defer care to neurology/ primary team   - No acute neurosurgical intervention given stable imaging and no obvious mass/ hemorrhage   - recommend MRI Brain w/wo to assess for any changes given prior h/o GBM resection, pending imaging studies today  - neurology for AEDs/ seizure  - oncology for h/o GBM   - SBP goals: normotension   - Sodium goals: normonatremia    - Chemical DVT ppx ok from NSx standpoint unless ICH develops/ will defer decision to primary team   - Plan to follow once MRIs available for review for possible further plan/ recommendations or f/u w Dr Turpin NSx and/or Dr Torres neuro onc   - Plan as per Dr. Caldera   65 y/o male with a PMHx of glioblastoma s/p large resection of mass on 10/3/22 with Dr. Turpin, seizure disorder on Keppra and Vimpat, and DVT/PE on Eliquis presents to the ED BIBA from [facility] unresponsive patient was found to have a seizure on the field en route to the ED no hemorrhage noted on CT Head. Neurosurgery was consulted because of history of GBM.     - D/w attending/ defer care to neurology/ primary team   - No acute neurosurgical intervention given stable imaging and no obvious mass/ hemorrhage   - recommend MRI Brain w/wo to assess for any changes given prior h/o GBM resection, pending imaging studies today  - neurology for AEDs/ seizure  - oncology for h/o GBM   - SBP goals: normotension   - Sodium goals: normonatremia    - Chemical DVT ppx ok from NSx standpoint unless ICH develops/ will defer decision to primary team   - Plan to follow once MRIs available for review for possible further plan/ recommendations or f/u w Dr Turpin NSx and/or Dr Torres neuro onc   - Plan as per Dr. Caldera

## 2024-03-12 ENCOUNTER — OUTPATIENT (OUTPATIENT)
Dept: OUTPATIENT SERVICES | Facility: HOSPITAL | Age: 64
LOS: 1 days | Discharge: HOME | End: 2024-03-12

## 2024-03-12 DIAGNOSIS — G93.9 DISORDER OF BRAIN, UNSPECIFIED: ICD-10-CM

## 2024-03-12 DIAGNOSIS — Z98.890 OTHER SPECIFIED POSTPROCEDURAL STATES: Chronic | ICD-10-CM

## 2024-03-12 LAB
24R-OH-CALCIDIOL SERPL-MCNC: 20.5 NG/ML — LOW (ref 30–80)
ALBUMIN SERPL ELPH-MCNC: 2.5 G/DL — LOW (ref 3.3–5)
ALP SERPL-CCNC: 53 U/L — SIGNIFICANT CHANGE UP (ref 40–120)
ALT FLD-CCNC: 51 U/L — SIGNIFICANT CHANGE UP (ref 12–78)
AMMONIA BLD-MCNC: <10 UMOL/L — LOW (ref 11–32)
ANION GAP SERPL CALC-SCNC: 5 MMOL/L — SIGNIFICANT CHANGE UP (ref 5–17)
AST SERPL-CCNC: 28 U/L — SIGNIFICANT CHANGE UP (ref 15–37)
BASOPHILS # BLD AUTO: 0 K/UL — SIGNIFICANT CHANGE UP (ref 0–0.2)
BASOPHILS NFR BLD AUTO: 0 % — SIGNIFICANT CHANGE UP (ref 0–2)
BILIRUB SERPL-MCNC: 0.7 MG/DL — SIGNIFICANT CHANGE UP (ref 0.2–1.2)
BUN SERPL-MCNC: 14 MG/DL — SIGNIFICANT CHANGE UP (ref 7–23)
CALCIUM SERPL-MCNC: 9.6 MG/DL — SIGNIFICANT CHANGE UP (ref 8.5–10.1)
CHLORIDE SERPL-SCNC: 107 MMOL/L — SIGNIFICANT CHANGE UP (ref 96–108)
CK SERPL-CCNC: 64 U/L — SIGNIFICANT CHANGE UP (ref 26–308)
CO2 SERPL-SCNC: 28 MMOL/L — SIGNIFICANT CHANGE UP (ref 22–31)
CREAT SERPL-MCNC: 0.89 MG/DL — SIGNIFICANT CHANGE UP (ref 0.5–1.3)
CRP SERPL-MCNC: 46 MG/L — HIGH
CULTURE RESULTS: SIGNIFICANT CHANGE UP
CULTURE RESULTS: SIGNIFICANT CHANGE UP
EGFR: 95 ML/MIN/1.73M2 — SIGNIFICANT CHANGE UP
EOSINOPHIL # BLD AUTO: 0.5 K/UL — SIGNIFICANT CHANGE UP (ref 0–0.5)
EOSINOPHIL NFR BLD AUTO: 5 % — SIGNIFICANT CHANGE UP (ref 0–6)
FOLATE SERPL-MCNC: 7.7 NG/ML — SIGNIFICANT CHANGE UP
GIANT PLATELETS BLD QL SMEAR: PRESENT — SIGNIFICANT CHANGE UP
GLUCOSE SERPL-MCNC: 98 MG/DL — SIGNIFICANT CHANGE UP (ref 70–99)
HCT VFR BLD CALC: 44.8 % — SIGNIFICANT CHANGE UP (ref 39–50)
HGB BLD-MCNC: 14.3 G/DL — SIGNIFICANT CHANGE UP (ref 13–17)
LYMPHOCYTES # BLD AUTO: 1.01 K/UL — SIGNIFICANT CHANGE UP (ref 1–3.3)
LYMPHOCYTES # BLD AUTO: 10 % — LOW (ref 13–44)
MAGNESIUM SERPL-MCNC: 2 MG/DL — SIGNIFICANT CHANGE UP (ref 1.6–2.6)
MANUAL SMEAR VERIFICATION: SIGNIFICANT CHANGE UP
MCHC RBC-ENTMCNC: 30 PG — SIGNIFICANT CHANGE UP (ref 27–34)
MCHC RBC-ENTMCNC: 31.9 GM/DL — LOW (ref 32–36)
MCV RBC AUTO: 94.1 FL — SIGNIFICANT CHANGE UP (ref 80–100)
MONOCYTES # BLD AUTO: 0.3 K/UL — SIGNIFICANT CHANGE UP (ref 0–0.9)
MONOCYTES NFR BLD AUTO: 3 % — SIGNIFICANT CHANGE UP (ref 2–14)
NEUTROPHILS # BLD AUTO: 7.94 K/UL — HIGH (ref 1.8–7.4)
NEUTROPHILS NFR BLD AUTO: 77 % — SIGNIFICANT CHANGE UP (ref 43–77)
NEUTS BAND # BLD: 2 % — SIGNIFICANT CHANGE UP (ref 0–8)
NRBC # BLD: 0 /100 WBCS — SIGNIFICANT CHANGE UP (ref 0–0)
NRBC # BLD: SIGNIFICANT CHANGE UP /100 WBCS (ref 0–0)
PHOSPHATE SERPL-MCNC: 2.4 MG/DL — LOW (ref 2.5–4.5)
PLAT MORPH BLD: SIGNIFICANT CHANGE UP
PLATELET # BLD AUTO: 153 K/UL — SIGNIFICANT CHANGE UP (ref 150–400)
POLYCHROMASIA BLD QL SMEAR: SLIGHT — SIGNIFICANT CHANGE UP
POTASSIUM SERPL-MCNC: 3.9 MMOL/L — SIGNIFICANT CHANGE UP (ref 3.5–5.3)
POTASSIUM SERPL-SCNC: 3.9 MMOL/L — SIGNIFICANT CHANGE UP (ref 3.5–5.3)
PROCALCITONIN SERPL-MCNC: 0.99 NG/ML — HIGH (ref 0.02–0.1)
PROT SERPL-MCNC: 6.8 GM/DL — SIGNIFICANT CHANGE UP (ref 6–8.3)
RBC # BLD: 4.76 M/UL — SIGNIFICANT CHANGE UP (ref 4.2–5.8)
RBC # FLD: 15.1 % — HIGH (ref 10.3–14.5)
RBC BLD AUTO: SIGNIFICANT CHANGE UP
SODIUM SERPL-SCNC: 140 MMOL/L — SIGNIFICANT CHANGE UP (ref 135–145)
SPECIMEN SOURCE: SIGNIFICANT CHANGE UP
SPECIMEN SOURCE: SIGNIFICANT CHANGE UP
TROPONIN I, HIGH SENSITIVITY RESULT: 4.5 NG/L — SIGNIFICANT CHANGE UP
TSH SERPL-MCNC: 1.92 UU/ML — SIGNIFICANT CHANGE UP (ref 0.34–4.82)
URATE SERPL-MCNC: 4.1 MG/DL — SIGNIFICANT CHANGE UP (ref 3.4–8.8)
VARIANT LYMPHS # BLD: 3 % — SIGNIFICANT CHANGE UP (ref 0–6)
VIT B12 SERPL-MCNC: 599 PG/ML — SIGNIFICANT CHANGE UP (ref 232–1245)
WBC # BLD: 10.05 K/UL — SIGNIFICANT CHANGE UP (ref 3.8–10.5)
WBC # FLD AUTO: 10.05 K/UL — SIGNIFICANT CHANGE UP (ref 3.8–10.5)

## 2024-03-12 PROCEDURE — 99232 SBSQ HOSP IP/OBS MODERATE 35: CPT

## 2024-03-12 RX ORDER — CHOLECALCIFEROL (VITAMIN D3) 125 MCG
2000 CAPSULE ORAL AT BEDTIME
Refills: 0 | Status: DISCONTINUED | OUTPATIENT
Start: 2024-03-12 | End: 2024-03-13

## 2024-03-12 RX ORDER — NYSTATIN CREAM 100000 [USP'U]/G
1 CREAM TOPICAL
Refills: 0 | Status: DISCONTINUED | OUTPATIENT
Start: 2024-03-12 | End: 2024-03-13

## 2024-03-12 RX ORDER — LEVETIRACETAM 250 MG/1
1500 TABLET, FILM COATED ORAL EVERY 12 HOURS
Refills: 0 | Status: DISCONTINUED | OUTPATIENT
Start: 2024-03-12 | End: 2024-03-13

## 2024-03-12 RX ADMIN — LACOSAMIDE 100 MILLIGRAM(S): 50 TABLET ORAL at 22:54

## 2024-03-12 RX ADMIN — NYSTATIN CREAM 1 APPLICATION(S): 100000 CREAM TOPICAL at 12:45

## 2024-03-12 RX ADMIN — APIXABAN 5 MILLIGRAM(S): 2.5 TABLET, FILM COATED ORAL at 10:30

## 2024-03-12 RX ADMIN — Medication 1 TABLET(S): at 22:53

## 2024-03-12 RX ADMIN — LEVETIRACETAM 1500 MILLIGRAM(S): 250 TABLET, FILM COATED ORAL at 23:16

## 2024-03-12 RX ADMIN — APIXABAN 5 MILLIGRAM(S): 2.5 TABLET, FILM COATED ORAL at 22:54

## 2024-03-12 RX ADMIN — Medication 2000 UNIT(S): at 22:53

## 2024-03-12 RX ADMIN — Medication 2 MILLIGRAM(S): at 10:30

## 2024-03-12 RX ADMIN — PANTOPRAZOLE SODIUM 40 MILLIGRAM(S): 20 TABLET, DELAYED RELEASE ORAL at 10:30

## 2024-03-12 RX ADMIN — LEVETIRACETAM 1500 MILLIGRAM(S): 250 TABLET, FILM COATED ORAL at 10:30

## 2024-03-12 RX ADMIN — LACOSAMIDE 100 MILLIGRAM(S): 50 TABLET ORAL at 10:29

## 2024-03-12 RX ADMIN — NYSTATIN CREAM 1 APPLICATION(S): 100000 CREAM TOPICAL at 22:53

## 2024-03-12 RX ADMIN — Medication 1 TABLET(S): at 10:30

## 2024-03-12 NOTE — PROGRESS NOTE ADULT - ASSESSMENT
64 M [facility] glioblastoma (s/p resection of mass on 10/3/22 with Dr. Turpin), seizure disorder (on Keppra and Vimpat), and DVT/PE (on Eliquis) presents to the ED from [facility] found to have seizure in setting of severe sepsis / UTI. Intubated and admitted to CCU.     # Intubated 3/7-8 for air way protection  - Successfully extubated on 3/8  - Respiratory status is stable    #Encephalopathy  , Confusion resolved  # GBM s/p resection 2022   # Seizure disorder   -  AAOx3 today  - No active seizure episode  - Continue Keppra and lacosamide  - MRI with contrast for follow-up of GBM -pending   - Continue dexamethasone  - Neurology and neurosurgery following    # Sepsis POA due to probable PNA    - pt was  empiric Zosyn  - WBC is improving, no fever  - Urine and blood cultures negative so far  - s/p  5 day course and stop (until 3/10)   - repeat CXR 3/11 - no consolidations  - c/w augmentin 5 more days     # Renal function stable  - Creatinine Trend: 0.74<--, 0.75<--, 0.70<--, 0.83<--, 0.86<--, 1.09<--  - Trend and replete electrolytes  - Pryor d/c'd     #history of DVT and PE  -  Continue apixaban     PT eval, out of bed to chair  PO diet     Dispo - PT evaluation, MRI of the brain , d/c planning     64 M [facility] glioblastoma (s/p resection of mass on 10/3/22 with Dr. Turpin), seizure disorder (on Keppra and Vimpat), and DVT/PE (on Eliquis) presents to the ED from [facility] found to have seizure in setting of severe sepsis / UTI. Intubated and admitted to CCU.     # Intubated 3/7-8 for air way protection  - Successfully extubated on 3/8  - Respiratory status is stable    #Encephalopathy  , Confusion resolved  # GBM s/p resection 2022   # Seizure disorder   -  AAOx3 today  - No active seizure episode  - Continue Keppra and lacosamide  - MRI with contrast for follow-up of GBM -pending   - Continue dexamethasone  - Neurology and neurosurgery following    # Sepsis POA due to probable PNA    - pt was  empiric Zosyn  - WBC is improving, no fever  - Urine and blood cultures negative so far  - s/p  5 day course and stop (until 3/10)   - repeat CXR 3/11 - no consolidations  - c/w augmentin 5 more days     # Testicular pain and rash , probable fungal infection  - us testicles  recent - wnl  - add nystatin powder  - urology consult    # Renal function stable  - Creatinine Trend: 0.74<--, 0.75<--, 0.70<--, 0.83<--, 0.86<--, 1.09<--  - Trend and replete electrolytes  - Konstantin d/c'd     #history of DVT and PE  -  Continue apixaban     PT eval, out of bed to chair  PO diet     Dispo - PT evaluation, MRI of the brain , d/c planning

## 2024-03-12 NOTE — CHART NOTE - NSCHARTNOTEFT_GEN_A_CORE
Neurosurgery PA Note:  MRI reviewed, no sign of GBM reoccurrence  Continue with antiepileptics as per Neurology   Follow up with Dr. Todd Lopez as needed         < from: MR Head w/wo IV Cont (03.11.24 @ 19:50) >    The brain demonstrates postsurgical changes in the LEFT parieto-occipital   region with encephalomalacia, gliosis and subacute hemorrhage. Following   gadolinium administration no recurrent enhancing neoplasm is seen. Tiny   old cortical infarctions in the BILATERAL frontal lobes.  No acute   cerebral cortical infarct is found.   No acute intracranial hemorrhage is   recognized.  No mass effect is found in the brain.    The ventricles, sulci and basal cisterns appear otherwise unremarkable.    The vertebral and internal carotid arteries demonstrate expected flow   voids indicating their patency.    The orbits are unremarkable.  The paranasal sinuses are clear.  The nasal   cavity appears intact.  The nasopharynx is symmetric.  The central skull   base and petrous temporal bones are intact.  The calvarium demonstrates   LEFT parietal craniotomy.      IMPRESSION:  Post surgical changes in the LEFT parieto-occipital region   with encephalomalacia, gliosis and subacute hemorrhage.  No recurrent   neoplasm is seen.  Tiny old cortical infarctions in the BILATERAL frontal   lobes.    < end of copied text >
HPI:  65 y/o male with a PMHx of glioblastoma s/p large resection of mass on 10/3/22 with Dr. Turpin, seizure disorder on Keppra and Vimpat, and DVT/PE on Eliquis presents to the ED AP[facility]OR[facility] from [facility]. (07 Mar 2024 14:04)      PERTINENT PMH REVIEWED:  [ X ] YES [ ] NO           Primary Contact:   Friend/HCP Tania     HCP [ X ] Surrogate [   ] Guardian [   ]    Mental Status: [ ] Alert  [  ] Oriented [  ] Confused [  X  ] Lethargic [  ]  Concerns of Depression [  ]- HCP shared pt. is depressed about being in [facility], situational   Anxiety [   ]- Not identified   Baseline ADLs (prior to admission):  Independent [ ] moderately [ ] fully   Dependent   [ X ] moderately [ ]fully    Family Meeting: Took place with Radha Sarkar 2/11 No limits set     Anticipated Grief: Patient [  ] Family [ X ]    Caregiver [place] Assessed: Yes [  X ] No [  ]    Roman Catholic: Unknown     Spiritual Concerns: Not identified     Goals of Care: Comfort [  ] Rehabilitation [  ] Curative [  ] Life Prolonging [  X  ]    Previous Services: [facility]     ADVANCE DIRECTIVES:  Full Code    Anticipated D/C Plan: Return to [facility] and follow up with Oncology for Immunotherapy                      Summary:    THis SW spoke with pts friend/HCP Tania. Tania has copy of HCP and will be emailing to this SW. Pt. has never been  and does nto have children. Pt. has a mother however, it is a difficult relationship therefore, pts friend Tania is HCP. Pt. has recently been residing at [facility] and has been receiving Immunotherapy every 2 weeks. Tania reports that pts next session will now be around 3/4 weeks and she takes him to his appointments from [facility]. Pt. had been living with Tania prior to [facility] however, his needs now exceed what can be managed at home. Tania reports they are working toward home health aides through Medicaid however, in the meantime he is at [facility]. She shared pt. is depressed being at [facility] and that is difficult for him. Feelings explored and support provided.    Goals of care reviewed. Pts HCP shared that pt. lost a brother right before was Diagnosed in October and because of this he does not wish to set any limits as he wants to outlive his mother, if possible, so that she does not have to bury two children. Pts HCP shared that she is honoring pts wishes. He remains a full code.    Plan at this time is for pt. to return to [facility] upon d/c and follow up for further treatment. Emotional support provided. Our team will continue to follow.

## 2024-03-13 ENCOUNTER — TRANSCRIPTION ENCOUNTER (OUTPATIENT)
Age: 64
End: 2024-03-13

## 2024-03-13 VITALS
HEART RATE: 108 BPM | TEMPERATURE: 97 F | OXYGEN SATURATION: 97 % | RESPIRATION RATE: 18 BRPM | DIASTOLIC BLOOD PRESSURE: 94 MMHG | SYSTOLIC BLOOD PRESSURE: 137 MMHG

## 2024-03-13 LAB
ALBUMIN SERPL ELPH-MCNC: 2.5 G/DL — LOW (ref 3.3–5)
ALP SERPL-CCNC: 60 U/L — SIGNIFICANT CHANGE UP (ref 40–120)
ALT FLD-CCNC: 58 U/L — SIGNIFICANT CHANGE UP (ref 12–78)
ANION GAP SERPL CALC-SCNC: 7 MMOL/L — SIGNIFICANT CHANGE UP (ref 5–17)
AST SERPL-CCNC: 24 U/L — SIGNIFICANT CHANGE UP (ref 15–37)
BASOPHILS # BLD AUTO: 0.03 K/UL — SIGNIFICANT CHANGE UP (ref 0–0.2)
BASOPHILS NFR BLD AUTO: 0.3 % — SIGNIFICANT CHANGE UP (ref 0–2)
BILIRUB SERPL-MCNC: 0.6 MG/DL — SIGNIFICANT CHANGE UP (ref 0.2–1.2)
BUN SERPL-MCNC: 18 MG/DL — SIGNIFICANT CHANGE UP (ref 7–23)
CALCIUM SERPL-MCNC: 9.5 MG/DL — SIGNIFICANT CHANGE UP (ref 8.5–10.1)
CHLORIDE SERPL-SCNC: 108 MMOL/L — SIGNIFICANT CHANGE UP (ref 96–108)
CO2 SERPL-SCNC: 24 MMOL/L — SIGNIFICANT CHANGE UP (ref 22–31)
CREAT SERPL-MCNC: 0.79 MG/DL — SIGNIFICANT CHANGE UP (ref 0.5–1.3)
EGFR: 98 ML/MIN/1.73M2 — SIGNIFICANT CHANGE UP
EOSINOPHIL # BLD AUTO: 0.13 K/UL — SIGNIFICANT CHANGE UP (ref 0–0.5)
EOSINOPHIL NFR BLD AUTO: 1.3 % — SIGNIFICANT CHANGE UP (ref 0–6)
GLUCOSE SERPL-MCNC: 102 MG/DL — HIGH (ref 70–99)
HCT VFR BLD CALC: 47.6 % — SIGNIFICANT CHANGE UP (ref 39–50)
HGB BLD-MCNC: 15.3 G/DL — SIGNIFICANT CHANGE UP (ref 13–17)
IMM GRANULOCYTES NFR BLD AUTO: 1.8 % — HIGH (ref 0–0.9)
LYMPHOCYTES # BLD AUTO: 1.27 K/UL — SIGNIFICANT CHANGE UP (ref 1–3.3)
LYMPHOCYTES # BLD AUTO: 12.2 % — LOW (ref 13–44)
MAGNESIUM SERPL-MCNC: 2.2 MG/DL — SIGNIFICANT CHANGE UP (ref 1.6–2.6)
MCHC RBC-ENTMCNC: 30.1 PG — SIGNIFICANT CHANGE UP (ref 27–34)
MCHC RBC-ENTMCNC: 32.1 GM/DL — SIGNIFICANT CHANGE UP (ref 32–36)
MCV RBC AUTO: 93.7 FL — SIGNIFICANT CHANGE UP (ref 80–100)
MONOCYTES # BLD AUTO: 0.68 K/UL — SIGNIFICANT CHANGE UP (ref 0–0.9)
MONOCYTES NFR BLD AUTO: 6.5 % — SIGNIFICANT CHANGE UP (ref 2–14)
NEUTROPHILS # BLD AUTO: 8.09 K/UL — HIGH (ref 1.8–7.4)
NEUTROPHILS NFR BLD AUTO: 77.9 % — HIGH (ref 43–77)
PHOSPHATE SERPL-MCNC: 2.7 MG/DL — SIGNIFICANT CHANGE UP (ref 2.5–4.5)
PLATELET # BLD AUTO: 179 K/UL — SIGNIFICANT CHANGE UP (ref 150–400)
POTASSIUM SERPL-MCNC: 3.8 MMOL/L — SIGNIFICANT CHANGE UP (ref 3.5–5.3)
POTASSIUM SERPL-SCNC: 3.8 MMOL/L — SIGNIFICANT CHANGE UP (ref 3.5–5.3)
PROT SERPL-MCNC: 7.2 GM/DL — SIGNIFICANT CHANGE UP (ref 6–8.3)
RBC # BLD: 5.08 M/UL — SIGNIFICANT CHANGE UP (ref 4.2–5.8)
RBC # FLD: 14.9 % — HIGH (ref 10.3–14.5)
SARS-COV-2 RNA SPEC QL NAA+PROBE: SIGNIFICANT CHANGE UP
SODIUM SERPL-SCNC: 139 MMOL/L — SIGNIFICANT CHANGE UP (ref 135–145)
WBC # BLD: 10.39 K/UL — SIGNIFICANT CHANGE UP (ref 3.8–10.5)
WBC # FLD AUTO: 10.39 K/UL — SIGNIFICANT CHANGE UP (ref 3.8–10.5)

## 2024-03-13 PROCEDURE — 99231 SBSQ HOSP IP/OBS SF/LOW 25: CPT

## 2024-03-13 PROCEDURE — 99239 HOSP IP/OBS DSCHRG MGMT >30: CPT

## 2024-03-13 RX ORDER — ALPRAZOLAM 0.25 MG
0.5 TABLET ORAL ONCE
Refills: 0 | Status: DISCONTINUED | OUTPATIENT
Start: 2024-03-13 | End: 2024-03-13

## 2024-03-13 RX ADMIN — Medication 1 TABLET(S): at 09:22

## 2024-03-13 RX ADMIN — POLYETHYLENE GLYCOL 3350 17 GRAM(S): 17 POWDER, FOR SOLUTION ORAL at 09:22

## 2024-03-13 RX ADMIN — NYSTATIN CREAM 1 APPLICATION(S): 100000 CREAM TOPICAL at 09:23

## 2024-03-13 RX ADMIN — LEVETIRACETAM 1500 MILLIGRAM(S): 250 TABLET, FILM COATED ORAL at 09:21

## 2024-03-13 RX ADMIN — Medication 0.5 MILLIGRAM(S): at 15:05

## 2024-03-13 RX ADMIN — SENNA PLUS 2 TABLET(S): 8.6 TABLET ORAL at 09:21

## 2024-03-13 RX ADMIN — LACOSAMIDE 100 MILLIGRAM(S): 50 TABLET ORAL at 11:01

## 2024-03-13 RX ADMIN — PANTOPRAZOLE SODIUM 40 MILLIGRAM(S): 20 TABLET, DELAYED RELEASE ORAL at 09:23

## 2024-03-13 RX ADMIN — Medication 2 MILLIGRAM(S): at 09:22

## 2024-03-13 RX ADMIN — APIXABAN 5 MILLIGRAM(S): 2.5 TABLET, FILM COATED ORAL at 09:21

## 2024-03-13 NOTE — PROGRESS NOTE ADULT - PROVIDER SPECIALTY LIST ADULT
Neurosurgery
Critical Care
Hospitalist
Neurology
Neurology
Hospitalist
Neurosurgery
Palliative Care

## 2024-03-13 NOTE — DISCHARGE NOTE NURSING/CASE MANAGEMENT/SOCIAL WORK - NSDCPEFALRISK_GEN_ALL_CORE
For information on Fall & Injury Prevention, visit: [email or web address] OR  [email or web address] OR  https://www.cdc.gov/steadi/patient.html

## 2024-03-13 NOTE — PROGRESS NOTE ADULT - SUBJECTIVE AND OBJECTIVE BOX
HPI: Pt is a 64y old Male with a PMHx of glioblastoma s/p large resection of mass on 10/3/22 with Dr. Turpin, seizure disorder on Keppra and Vimpat, and DVT/PE on Eliquis presents from [facility]. Upon entry to the ED, patient was obtunded with, according to ED physician, R sided hemiplegia. Decision was made to intubate patient due to low GCS and inability to protect airway. EMS stated prior to arrival that the patient got 10mgIV valium due to witnessed seizure in the field. Patient was successfully intubated and started on prop drip. neuro consulted. Stated stop prop temporarily in order to get a ECG initial reading to check for continued seizures. He is currently extubated and Palliative medicine is consulted to further explore GOC.   3/11/24 Seen and examined at bedside. Pt aware that he is in the hospital however unable to provide medical hx. Difficulty with word finding at times. Denies pain or dyspnea.    3/13/23 Seen and examined at bedside. Resting quietly. Lethargic however arousable.    PAIN: ( )Yes   (X )No  DYSPNEA: ( ) Yes  (X ) No      PAST MEDICAL & SURGICAL HISTORY:  Glioblastoma  Seizures  S/P craniotomy    SOCIAL HX:  Coming from [facility]    Hx opiate tolerance ( )YES  (X )NO    Baseline ADLs  (Prior to Admission)  ( ) Independent   (X )Dependent    FAMILY HISTORY:    Unable to obtain due to altered mentation   Review of Systems:  Depression-situational  Physical Discomfort-mod  Dyspnea-denies  Anorexia-mod  Fatigue-yes  Weakness-r hemiplegia    Unable to obtain/Limited due to: AMS      PHYSICAL EXAM:  ICU Vital Signs Last 24 Hrs  T(C): 36.1 (13 Mar 2024 09:05), Max: 37.1 (12 Mar 2024 15:41)  T(F): 97 (13 Mar 2024 09:05), Max: 98.8 (12 Mar 2024 15:41)  HR: 87 (13 Mar 2024 09:05) (87 - 96)  BP: 117/87 (13 Mar 2024 09:05) (117/87 - 128/67)  RR: 16 (13 Mar 2024 09:05) (16 - 17)  SpO2: 96% (13 Mar 2024 09:05) (94% - 98%)    Patient On (Oxygen Delivery Method): room air    PPSV2: 30  %    General: Elderly male in bed in NAD  Mental Status: Awake and alert/disoriented at times  HEENT: oral mucosa dry  Lungs: clear diminished nikita  Cardiac: S1S2+  GI: abd soft NT ND + BS  : voids  Ext: Right sided hemiplegia  Neuro: as noted      LABS:                             15.3   10.39 )-----------( 179      ( 13 Mar 2024 07:25 )             47.6         Albumin: Albumin: 2.4 g/dL (03-08 @ 05:40)    Allergies    No Known Allergies    Intolerances    MEDICATIONS  (STANDING):  amoxicillin  875 milliGRAM(s)/clavulanate 1 Tablet(s) Oral two times a day  apixaban 5 milliGRAM(s) Oral every 12 hours  chlorhexidine 2% Cloths 1 Application(s) Topical <User Schedule>  cholecalciferol 2000 Unit(s) Oral at bedtime  dexAMETHasone     Tablet 2 milliGRAM(s) Oral daily  lacosamide IVPB 200 milliGRAM(s) IV Intermittent every 12 hours  levETIRAcetam 1500 milliGRAM(s) Oral every 12 hours  nystatin Powder 1 Application(s) Topical two times a day  pantoprazole  Injectable 40 milliGRAM(s) IV Push daily  polyethylene glycol 3350 17 Gram(s) Oral every 12 hours  senna 2 Tablet(s) Oral every 12 hours    MEDICATIONS  (PRN):  acetaminophen     Tablet .. 650 milliGRAM(s) Oral every 6 hours PRN Temp greater or equal to 38C (100.4F), Mild Pain (1 - 3)  aluminum hydroxide/magnesium hydroxide/simethicone Suspension 30 milliLiter(s) Oral every 4 hours PRN Dyspepsia  ondansetron Injectable 4 milliGRAM(s) IV Push every 8 hours PRN Nausea and/or Vomiting      RADIOLOGY/ADDITIONAL STUDIES:  < from: MR Head w/wo IV Cont (03.11.24 @ 19:50) >    ACC: [id number] EXAM:  MR BRAIN WAW IC   ORDERED BY: MIREYA ARREOLA     PROCEDURE DATE:  03/11/2024          INTERPRETATION:  MR brain with and without gadolinium    CLINICAL INFORMATION:   Seizures, follow-up GBM    TECHNIQUE:   Sagittal and axial T1-weighted images, axial FLAIR images,   axial T2-weighted images, axial gradient echo images and axial diffusion   weighted images of the brain were obtained. Following 9.5 cc of Gadavist   administration, 5 cc discarded, isotropic volumetric and fastspin echo   T1-weighted images were obtained; this data was reformatted using image   post processing software in multiple imaging planes.    FINDINGS:   MR brain dated 01/24/2024 is available for review.    The brain demonstrates postsurgical changes in the LEFT parieto-occipital   region with encephalomalacia, gliosis and subacute hemorrhage. Following   gadolinium administration no recurrent enhancing neoplasm is seen. Tiny   old cortical infarctions in the BILATERAL frontal lobes.  No acute   cerebral cortical infarct is found.   No acute intracranial hemorrhage is   recognized.  No mass effect is found in the brain.    The ventricles, sulci and basal cisterns appear otherwise unremarkable.    The vertebral and internal carotid arteries demonstrate expected flow   voids indicating their patency.    The orbits are unremarkable.  The paranasal sinuses are clear.  The nasal   cavity appears intact.  The nasopharynx is symmetric.  The central skull   base and petrous temporal bones are intact.  The calvarium demonstrates   LEFT parietal craniotomy.      IMPRESSION:  Post surgical changes in the LEFT parieto-occipital region   with encephalomalacia, gliosis and subacute hemorrhage.  No recurrent   neoplasm is seen.  Tiny old cortical infarctions in the BILATERAL frontal   lobes.      < end of copied text >    < from: Xray Chest 1 View-PORTABLE IMMEDIATE (Xray Chest 1 View-PORTABLE IMMEDIATE .) (03.11.24 @ 18:54) >  ACC: [id number] EXAM:  XR CHEST PORTABLE IMMED 1V   ORDERED BY: NIKKI RAYO     PROCEDURE DATE:  03/11/2024          INTERPRETATION:  TIME OF EXAM: March 11, 2024 at 6:38 PM.    CLINICAL INFORMATION: Pneumonia follow-up.    COMPARISON:  March8, 2024.    TECHNIQUE:   AP Portable chest x-ray.    INTERPRETATION:    Heart size and the mediastinum cannot be accurately evaluated on this   projection.  Continued elevation of right hemidiaphragm.  Enteric tube no longer seen.  Limited evaluationof left lower/retrocardiac region due to overlying   soft tissues and cardiac silhouette. No gross focal lung consolidation   seen.  No pleural effusion or pneumothorax is seen.  There is no acute bony abnormality.      IMPRESSION:  Continued elevation of right hemidiaphragm.    Limited evaluation of left lower/retrocardiac region due to overlying   soft tissues and cardiac silhouette. No gross focal lung consolidation   seen.    < end of copied text >

## 2024-03-13 NOTE — DISCHARGE NOTE PROVIDER - NSDCFUSCHEDAPPT_GEN_ALL_CORE_FT
[facility]  MRI  [facility]  Scheduled Appointment: 03/20/2024    Debby Torres  [facility]  NEUROLOGY [address]  Scheduled Appointment: 03/20/2024    [facility]  [facility]  Scheduled Appointment: 03/20/2024    [facility]  [facility]  Scheduled Appointment: 03/20/2024

## 2024-03-13 NOTE — DISCHARGE NOTE NURSING/CASE MANAGEMENT/SOCIAL WORK - PATIENT PORTAL LINK FT
You can access the FollowMyHealth Patient Portal offered by [facility] by registering at the following website: [email or web address]. By joining [employer]’s FollowMyHealth portal, you will also be able to view your health information using other applications (apps) compatible with our system.

## 2024-03-13 NOTE — DISCHARGE NOTE PROVIDER - HOSPITAL COURSE
64 M [facility] glioblastoma (s/p resection of mass on 10/3/22 with Dr. Turpin), seizure disorder (on Keppra and Vimpat), and DVT/PE (on Eliquis) presents to the ED from [facility] found to have seizure in setting of severe sepsis / UTI. Intubated and admitted to CCU.     # Intubated 3/7-8 for air way protection  - Successfully extubated on 3/8  - Respiratory status is stable    #Encephalopathy  , Confusion resolved  # GBM s/p resection 2022   # Seizure disorder   -  AAOx3 today  - No active seizure episode  - Continue Keppra and lacosamide  - MRI with contrast neg for GBM recurrecnce  - Continue dexamethasone  - Neurology and neurosurgery following    # Sepsis POA due to probable PNA    - pt was  empiric Zosyn  - WBC is improving, no fever  - Urine and blood cultures negative so far  - s/p  5 day course and stop (until 3/10)   - repeat CXR 3/11 - no consolidations  - c/w augmentin 5 more days     # Testicular pain and rash , probable fungal infection  - us testicles  recent - wnl  - add nystatin powder  - urology consult    # Renal function stable  - Creatinine Trend: 0.74<--, 0.75<--, 0.70<--, 0.83<--, 0.86<--, 1.09<--  - Trend and replete electrolytes  - Konstantin d/c'd     #history of DVT and PE  -  Continue apixaban

## 2024-03-13 NOTE — DISCHARGE NOTE PROVIDER - CARE PROVIDER_API CALL
Rahel Samayoa  Neurology  [address], [place] [address]  Phone: [phone]  Fax: [phone]  Follow Up Time:

## 2024-03-13 NOTE — PROGRESS NOTE ADULT - ASSESSMENT
HPI: Pt is a 64y old Male with a PMHx of glioblastoma s/p large resection of mass on 10/3/22 with Dr. Turpin, seizure disorder on Keppra and Vimpat, and DVT/PE on Eliquis presents from [facility]. Upon entry to the ED, patient was obtunded with, according to ED physician, R sided hemiplegia. Decision was made to intubate patient due to low GCS and inability to protect airway. EMS stated prior to arrival that the patient got 10mgIV valium due to witnessed seizure in the field. Patient was successfully intubated and started on prop drip. neuro consulted. Stated stop prop temporarily in order to get a ECG initial reading to check for continued seizures. He is currently extubated and Palliative medicine is consulted to further explore GOC.   3/11/24 Seen and examined at bedside. Pt aware that he he is in the hospital however unable to provide medical hx. Difficulty with word finding at times. Denies pain or dyspnea.      Assessment and Plan:  !) Glioblastoma  -s/p large resection of mass on 10/3/22 with Dr. Turpin  -seizure disorder on Keppra and Vimpa  -Neuro eval noted  -No acute neurosurgical intervention given stable imaging and no obvious mass/ hemorrhage   -recommend MRI Brain   -oncology for h/o GBM     2) Debility  -S/P intubation  -Weakness  -Confusion  -Right hemiparesis  -Poor PO intake  -PT    3) H/O DVT/PE  -Cont AC as per medicne    4) Advanced Directives  -Pt without capacity  -Friend Tania Johnson [facility]  -GOC with Tania  -No limits set  -Will sign off  -Transition to [facility] when stable    Time Spent: 25 minutes including the care, coordination and counseling of this patient, 50% of which was spent coordinating and counseling.

## 2024-03-13 NOTE — DISCHARGE NOTE PROVIDER - NSDCCPCAREPLAN_GEN_ALL_CORE_FT
PRINCIPAL DISCHARGE DIAGNOSIS  Diagnosis: Seizure  Assessment and Plan of Treatment: You were diagnosed with having a seizure, associated with an infection. You are doing better, please continue medications as prescribed. Please fu with neurology as outpatient in 1 week.

## 2024-03-13 NOTE — PROGRESS NOTE ADULT - REASON FOR ADMISSION
Seizures
Seizures/ fever/ infection/ h/o GBM
Seizures

## 2024-03-13 NOTE — DISCHARGE NOTE PROVIDER - NSDCMRMEDTOKEN_GEN_ALL_CORE_FT
apixaban 5 mg oral tablet: 1 tab(s) orally 2 times a day  dexAMETHasone 2 mg oral tablet: 1 tab(s) orally once a day (in the morning)  lacosamide 200 mg oral tablet: 1 tab(s) orally 2 times a day  levETIRAcetam 500 mg oral tablet: 3 tab(s) orally 2 times a day  pantoprazole 40 mg oral delayed release tablet: 1 tab(s) orally once a day (in the afternoon)  rOPINIRole 0.25 mg oral tablet: 3 tab(s) orally once a day (in the evening) ***3 hours before bedtime***  tamsulosin 0.4 mg oral capsule: 1 cap(s) orally once a day

## 2024-03-17 ENCOUNTER — EMERGENCY (EMERGENCY)
Facility: HOSPITAL | Age: 64
LOS: 0 days | Discharge: HOME | End: 2024-03-17
Attending: EMERGENCY MEDICINE
Payer: MEDICARE

## 2024-03-17 VITALS
SYSTOLIC BLOOD PRESSURE: 106 MMHG | RESPIRATION RATE: 18 BRPM | TEMPERATURE: 98 F | DIASTOLIC BLOOD PRESSURE: 76 MMHG | OXYGEN SATURATION: 97 % | HEART RATE: 88 BPM

## 2024-03-17 VITALS
RESPIRATION RATE: 16 BRPM | TEMPERATURE: 98 F | OXYGEN SATURATION: 96 % | SYSTOLIC BLOOD PRESSURE: 126 MMHG | WEIGHT: 205.03 LBS | DIASTOLIC BLOOD PRESSURE: 95 MMHG | HEART RATE: 95 BPM | HEIGHT: 70 IN
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
## 2024-03-17 DIAGNOSIS — S09.90XA UNSPECIFIED INJURY OF HEAD, INITIAL ENCOUNTER: ICD-10-CM
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
## 2024-03-17 DIAGNOSIS — Z86.711 PERSONAL HISTORY OF PULMONARY EMBOLISM: ICD-10-CM

## 2024-03-17 DIAGNOSIS — Z79.01 LONG TERM (CURRENT) USE OF ANTICOAGULANTS: ICD-10-CM

## 2024-03-17 DIAGNOSIS — R41.0 DISORIENTATION, UNSPECIFIED: ICD-10-CM

## 2024-03-17 DIAGNOSIS — D72.829 ELEVATED WHITE BLOOD CELL COUNT, UNSPECIFIED: ICD-10-CM
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
## 2024-03-17 DIAGNOSIS — Z86.718 PERSONAL HISTORY OF OTHER VENOUS THROMBOSIS AND EMBOLISM: ICD-10-CM

## 2024-03-17 DIAGNOSIS — G40.909 EPILEPSY, UNSPECIFIED, NOT INTRACTABLE, WITHOUT STATUS EPILEPTICUS: ICD-10-CM

## 2024-03-17 DIAGNOSIS — Z98.890 OTHER SPECIFIED POSTPROCEDURAL STATES: Chronic | ICD-10-CM

## 2024-03-17 LAB
ALBUMIN SERPL ELPH-MCNC: 2.8 G/DL — LOW (ref 3.3–5)
ALP SERPL-CCNC: 52 U/L — SIGNIFICANT CHANGE UP (ref 40–120)
ALT FLD-CCNC: 38 U/L — SIGNIFICANT CHANGE UP (ref 12–78)
ANION GAP SERPL CALC-SCNC: 7 MMOL/L — SIGNIFICANT CHANGE UP (ref 5–17)
APTT BLD: 32.2 SEC — SIGNIFICANT CHANGE UP (ref 24.5–35.6)
AST SERPL-CCNC: 11 U/L — LOW (ref 15–37)
BASOPHILS # BLD AUTO: 0.04 K/UL — SIGNIFICANT CHANGE UP (ref 0–0.2)
BASOPHILS NFR BLD AUTO: 0.3 % — SIGNIFICANT CHANGE UP (ref 0–2)
BILIRUB SERPL-MCNC: 0.6 MG/DL — SIGNIFICANT CHANGE UP (ref 0.2–1.2)
BLD GP AB SCN SERPL QL: SIGNIFICANT CHANGE UP
BUN SERPL-MCNC: 14 MG/DL — SIGNIFICANT CHANGE UP (ref 7–23)
CALCIUM SERPL-MCNC: 9.3 MG/DL — SIGNIFICANT CHANGE UP (ref 8.5–10.1)
CHLORIDE SERPL-SCNC: 105 MMOL/L — SIGNIFICANT CHANGE UP (ref 96–108)
CO2 SERPL-SCNC: 29 MMOL/L — SIGNIFICANT CHANGE UP (ref 22–31)
CREAT SERPL-MCNC: 0.83 MG/DL — SIGNIFICANT CHANGE UP (ref 0.5–1.3)
EGFR: 97 ML/MIN/1.73M2 — SIGNIFICANT CHANGE UP
EOSINOPHIL # BLD AUTO: 0.01 K/UL — SIGNIFICANT CHANGE UP (ref 0–0.5)
EOSINOPHIL NFR BLD AUTO: 0.1 % — SIGNIFICANT CHANGE UP (ref 0–6)
GLUCOSE SERPL-MCNC: 116 MG/DL — HIGH (ref 70–99)
HCT VFR BLD CALC: 43.2 % — SIGNIFICANT CHANGE UP (ref 39–50)
HGB BLD-MCNC: 14.3 G/DL — SIGNIFICANT CHANGE UP (ref 13–17)
IMM GRANULOCYTES NFR BLD AUTO: 0.6 % — SIGNIFICANT CHANGE UP (ref 0–0.9)
INR BLD: 1.4 RATIO — HIGH (ref 0.85–1.18)
LYMPHOCYTES # BLD AUTO: 0.79 K/UL — LOW (ref 1–3.3)
LYMPHOCYTES # BLD AUTO: 5.4 % — LOW (ref 13–44)
MCHC RBC-ENTMCNC: 30.2 PG — SIGNIFICANT CHANGE UP (ref 27–34)
MCHC RBC-ENTMCNC: 33.1 GM/DL — SIGNIFICANT CHANGE UP (ref 32–36)
MCV RBC AUTO: 91.3 FL — SIGNIFICANT CHANGE UP (ref 80–100)
MONOCYTES # BLD AUTO: 0.43 K/UL — SIGNIFICANT CHANGE UP (ref 0–0.9)
MONOCYTES NFR BLD AUTO: 3 % — SIGNIFICANT CHANGE UP (ref 2–14)
NEUTROPHILS # BLD AUTO: 13.19 K/UL — HIGH (ref 1.8–7.4)
NEUTROPHILS NFR BLD AUTO: 90.6 % — HIGH (ref 43–77)
PLATELET # BLD AUTO: 253 K/UL — SIGNIFICANT CHANGE UP (ref 150–400)
POTASSIUM SERPL-MCNC: 4.3 MMOL/L — SIGNIFICANT CHANGE UP (ref 3.5–5.3)
POTASSIUM SERPL-SCNC: 4.3 MMOL/L — SIGNIFICANT CHANGE UP (ref 3.5–5.3)
PROT SERPL-MCNC: 7 GM/DL — SIGNIFICANT CHANGE UP (ref 6–8.3)
PROTHROM AB SERPL-ACNC: 15.7 SEC — HIGH (ref 9.5–13)
RBC # BLD: 4.73 M/UL — SIGNIFICANT CHANGE UP (ref 4.2–5.8)
RBC # FLD: 14.1 % — SIGNIFICANT CHANGE UP (ref 10.3–14.5)
SODIUM SERPL-SCNC: 141 MMOL/L — SIGNIFICANT CHANGE UP (ref 135–145)
WBC # BLD: 14.55 K/UL — HIGH (ref 3.8–10.5)
WBC # FLD AUTO: 14.55 K/UL — HIGH (ref 3.8–10.5)

## 2024-03-17 PROCEDURE — 99284 EMERGENCY DEPT VISIT MOD MDM: CPT | Mod: FS

## 2024-03-17 PROCEDURE — 71250 CT THORAX DX C-: CPT | Mod: 26,MC

## 2024-03-17 PROCEDURE — 71250 CT THORAX DX C-: CPT | Mod: MC

## 2024-03-17 PROCEDURE — 72125 CT NECK SPINE W/O DYE: CPT | Mod: MC

## 2024-03-17 PROCEDURE — 86900 BLOOD TYPING SEROLOGIC ABO: CPT

## 2024-03-17 PROCEDURE — 82962 GLUCOSE BLOOD TEST: CPT

## 2024-03-17 PROCEDURE — 80053 COMPREHEN METABOLIC PANEL: CPT

## 2024-03-17 PROCEDURE — 70450 CT HEAD/BRAIN W/O DYE: CPT | Mod: 26,MC

## 2024-03-17 PROCEDURE — 85730 THROMBOPLASTIN TIME PARTIAL: CPT

## 2024-03-17 PROCEDURE — 70450 CT HEAD/BRAIN W/O DYE: CPT | Mod: MC

## 2024-03-17 PROCEDURE — 36415 COLL VENOUS BLD VENIPUNCTURE: CPT

## 2024-03-17 PROCEDURE — 86850 RBC ANTIBODY SCREEN: CPT

## 2024-03-17 PROCEDURE — 86901 BLOOD TYPING SEROLOGIC RH(D): CPT

## 2024-03-17 PROCEDURE — 93010 ELECTROCARDIOGRAM REPORT: CPT

## 2024-03-17 PROCEDURE — 85025 COMPLETE CBC W/AUTO DIFF WBC: CPT

## 2024-03-17 PROCEDURE — 99285 EMERGENCY DEPT VISIT HI MDM: CPT | Mod: 25

## 2024-03-17 PROCEDURE — 72125 CT NECK SPINE W/O DYE: CPT | Mod: 26,MC

## 2024-03-17 PROCEDURE — 74176 CT ABD & PELVIS W/O CONTRAST: CPT | Mod: MC

## 2024-03-17 PROCEDURE — 74176 CT ABD & PELVIS W/O CONTRAST: CPT | Mod: 26,MC

## 2024-03-17 PROCEDURE — 93005 ELECTROCARDIOGRAM TRACING: CPT

## 2024-03-17 PROCEDURE — 85610 PROTHROMBIN TIME: CPT

## 2024-03-17 NOTE — ED PROVIDER NOTE - CLINICAL SUMMARY MEDICAL DECISION MAKING FREE TEXT BOX
CTs negative for acute injury.  Labs demonstrate only leukocytosis.  Discussed obtaining UA with family, however would like to hold off and obtain at facility.  D/c back to facility at this time.  Return precautions given.

## 2024-03-17 NOTE — ED ADULT NURSE NOTE - NSFALLHARMRISKINTERV_ED_ALL_ED

## 2024-03-17 NOTE — ED PROVIDER NOTE - OBJECTIVE STATEMENT
64-year-old male with a past medical history of glioblastoma status post large resection of mass on 10/3/2022, seizure disorder on Keppra and Vimpat, DVT and PE on Eliquis, who presents via EMS status post fall from bed.  Patient had unwitnessed fall from bed.  Patient seems confused currently, per EMS no obvious signs of trauma, brought to ED for further evaluation management.

## 2024-03-17 NOTE — ED PROVIDER NOTE - PROGRESS NOTE DETAILS
Labs and imaging were reviewed.  White blood cell count of 14 rest of labs are within normal limits. CT scans negative for acute abnormalities.  Discussed all results with patient and wife at bedside. Patient reassessed.  No new symptoms,  well-appearing.   Patient recently discharged from [facility] for urinary tract infection.  Patient does not have any urinary symptoms at this time.  Does not want to have urinalysis done today.  Will have UA done outpatient while at [facility].  Stable for discharge back to [facility]. Strict return precautions were given. All questions and concerns were addressed. - Beverley Astorga PA-C.

## 2024-03-17 NOTE — ED PROVIDER NOTE - PATIENT PORTAL LINK FT
You can access the FollowMyHealth Patient Portal offered by [facility] by registering at the following website: [email or web address]. By joining [facility]’s FollowMyHealth portal, you will also be able to view your health information using other applications (apps) compatible with our system.

## 2024-03-17 NOTE — ED ADULT NURSE NOTE - OBJECTIVE STATEMENT
Pt presents to ER from [facility] s/p unwitnessed fall, staff at facility assisted pt to bed and call 911. Facility reports baseline confusion. On Eliquis. pt denies pain. pt is very confused and cannot tell me why he is here.

## 2024-03-17 NOTE — ED ADULT TRIAGE NOTE - CHIEF COMPLAINT QUOTE
Pt presents to ER from [facility] s/p unwitnessed fall, staff at facility assisted pt to bed and call 911. Facility reports baseline confusion. On Eliquis. MD aware

## 2024-03-18 ENCOUNTER — NON-APPOINTMENT (OUTPATIENT)
Age: 64
End: 2024-03-18

## 2024-03-20 ENCOUNTER — APPOINTMENT (OUTPATIENT)
Dept: INFUSION THERAPY | Facility: HOSPITAL | Age: 64
End: 2024-03-20

## 2024-03-20 ENCOUNTER — APPOINTMENT (OUTPATIENT)
Dept: NEUROLOGY | Facility: CLINIC | Age: 64
End: 2024-03-20

## 2024-03-20 ENCOUNTER — APPOINTMENT (OUTPATIENT)
Dept: MRI IMAGING | Facility: IMAGING CENTER | Age: 64
End: 2024-03-20

## 2024-03-20 ENCOUNTER — APPOINTMENT (OUTPATIENT)
Dept: HEMATOLOGY ONCOLOGY | Facility: CLINIC | Age: 64
End: 2024-03-20

## 2024-03-21 DIAGNOSIS — G40.909 EPILEPSY, UNSPECIFIED, NOT INTRACTABLE, WITHOUT STATUS EPILEPTICUS: ICD-10-CM

## 2024-03-21 DIAGNOSIS — A41.9 SEPSIS, UNSPECIFIED ORGANISM: ICD-10-CM

## 2024-03-21 DIAGNOSIS — Z79.01 LONG TERM (CURRENT) USE OF ANTICOAGULANTS: ICD-10-CM

## 2024-03-21 DIAGNOSIS — N39.0 URINARY TRACT INFECTION, SITE NOT SPECIFIED: ICD-10-CM

## 2024-03-21 DIAGNOSIS — R65.20 SEVERE SEPSIS WITHOUT SEPTIC SHOCK: ICD-10-CM

## 2024-03-21 DIAGNOSIS — Z86.711 PERSONAL HISTORY OF PULMONARY EMBOLISM: ICD-10-CM

## 2024-03-21 DIAGNOSIS — Z86.718 PERSONAL HISTORY OF OTHER VENOUS THROMBOSIS AND EMBOLISM: ICD-10-CM

## 2024-03-21 DIAGNOSIS — Z79.52 LONG TERM (CURRENT) USE OF SYSTEMIC STEROIDS: ICD-10-CM

## 2024-03-21 DIAGNOSIS — G93.40 ENCEPHALOPATHY, UNSPECIFIED: ICD-10-CM

## 2024-03-21 DIAGNOSIS — J18.9 PNEUMONIA, UNSPECIFIED ORGANISM: ICD-10-CM

## 2024-03-21 DIAGNOSIS — B35.6 TINEA CRURIS: ICD-10-CM

## 2024-05-01 ENCOUNTER — APPOINTMENT (OUTPATIENT)
Dept: NEUROLOGY | Facility: CLINIC | Age: 64
End: 2024-05-01
Payer: MEDICARE

## 2024-05-01 ENCOUNTER — RESULT REVIEW (OUTPATIENT)
Age: 64
End: 2024-05-01

## 2024-05-01 ENCOUNTER — APPOINTMENT (OUTPATIENT)
Dept: INFUSION THERAPY | Facility: HOSPITAL | Age: 64
End: 2024-05-01

## 2024-05-01 ENCOUNTER — APPOINTMENT (OUTPATIENT)
Dept: HEMATOLOGY ONCOLOGY | Facility: CLINIC | Age: 64
End: 2024-05-01

## 2024-05-01 VITALS
HEART RATE: 60 BPM | SYSTOLIC BLOOD PRESSURE: 113 MMHG | BODY MASS INDEX: 30.06 KG/M2 | OXYGEN SATURATION: 96 % | TEMPERATURE: 98 F | RESPIRATION RATE: 16 BRPM | WEIGHT: 210 LBS | HEIGHT: 70 IN | DIASTOLIC BLOOD PRESSURE: 76 MMHG

## 2024-05-01 LAB
ALBUMIN SERPL ELPH-MCNC: 4.5 G/DL — SIGNIFICANT CHANGE UP (ref 3.3–5)
ALP SERPL-CCNC: 52 U/L — SIGNIFICANT CHANGE UP (ref 40–120)
ALT FLD-CCNC: 31 U/L — SIGNIFICANT CHANGE UP (ref 10–45)
ANION GAP SERPL CALC-SCNC: 10 MMOL/L — SIGNIFICANT CHANGE UP (ref 5–17)
AST SERPL-CCNC: 17 U/L — SIGNIFICANT CHANGE UP (ref 10–40)
BASOPHILS # BLD AUTO: 0.02 K/UL — SIGNIFICANT CHANGE UP (ref 0–0.2)
BASOPHILS NFR BLD AUTO: 0.2 % — SIGNIFICANT CHANGE UP (ref 0–2)
BILIRUB SERPL-MCNC: 0.4 MG/DL — SIGNIFICANT CHANGE UP (ref 0.2–1.2)
BUN SERPL-MCNC: 16 MG/DL — SIGNIFICANT CHANGE UP (ref 7–23)
CALCIUM SERPL-MCNC: 9.9 MG/DL — SIGNIFICANT CHANGE UP (ref 8.4–10.5)
CHLORIDE SERPL-SCNC: 104 MMOL/L — SIGNIFICANT CHANGE UP (ref 96–108)
CO2 SERPL-SCNC: 29 MMOL/L — SIGNIFICANT CHANGE UP (ref 22–31)
CREAT SERPL-MCNC: 0.65 MG/DL — SIGNIFICANT CHANGE UP (ref 0.5–1.3)
EGFR: 104 ML/MIN/1.73M2 — SIGNIFICANT CHANGE UP
EOSINOPHIL # BLD AUTO: 0 K/UL — SIGNIFICANT CHANGE UP (ref 0–0.5)
EOSINOPHIL NFR BLD AUTO: 0 % — SIGNIFICANT CHANGE UP (ref 0–6)
GLUCOSE SERPL-MCNC: 143 MG/DL — HIGH (ref 70–99)
HCT VFR BLD CALC: 46.1 % — SIGNIFICANT CHANGE UP (ref 39–50)
HGB BLD-MCNC: 15.1 G/DL — SIGNIFICANT CHANGE UP (ref 13–17)
IMM GRANULOCYTES NFR BLD AUTO: 0.7 % — SIGNIFICANT CHANGE UP (ref 0–0.9)
LYMPHOCYTES # BLD AUTO: 1.1 K/UL — SIGNIFICANT CHANGE UP (ref 1–3.3)
LYMPHOCYTES # BLD AUTO: 13.4 % — SIGNIFICANT CHANGE UP (ref 13–44)
MCHC RBC-ENTMCNC: 29.2 PG — SIGNIFICANT CHANGE UP (ref 27–34)
MCHC RBC-ENTMCNC: 32.8 G/DL — SIGNIFICANT CHANGE UP (ref 32–36)
MCV RBC AUTO: 89.2 FL — SIGNIFICANT CHANGE UP (ref 80–100)
MONOCYTES # BLD AUTO: 0.36 K/UL — SIGNIFICANT CHANGE UP (ref 0–0.9)
MONOCYTES NFR BLD AUTO: 4.4 % — SIGNIFICANT CHANGE UP (ref 2–14)
NEUTROPHILS # BLD AUTO: 6.69 K/UL — SIGNIFICANT CHANGE UP (ref 1.8–7.4)
NEUTROPHILS NFR BLD AUTO: 81.3 % — HIGH (ref 43–77)
NRBC # BLD: 0 /100 WBCS — SIGNIFICANT CHANGE UP (ref 0–0)
PLATELET # BLD AUTO: 142 K/UL — LOW (ref 150–400)
POTASSIUM SERPL-MCNC: 4.2 MMOL/L — SIGNIFICANT CHANGE UP (ref 3.5–5.3)
POTASSIUM SERPL-SCNC: 4.2 MMOL/L — SIGNIFICANT CHANGE UP (ref 3.5–5.3)
PROT SERPL-MCNC: 7.2 G/DL — SIGNIFICANT CHANGE UP (ref 6–8.3)
RBC # BLD: 5.17 M/UL — SIGNIFICANT CHANGE UP (ref 4.2–5.8)
RBC # FLD: 14.9 % — HIGH (ref 10.3–14.5)
SODIUM SERPL-SCNC: 142 MMOL/L — SIGNIFICANT CHANGE UP (ref 135–145)
WBC # BLD: 8.23 K/UL — SIGNIFICANT CHANGE UP (ref 3.8–10.5)
WBC # FLD AUTO: 8.23 K/UL — SIGNIFICANT CHANGE UP (ref 3.8–10.5)

## 2024-05-01 PROCEDURE — 99215 OFFICE O/P EST HI 40 MIN: CPT

## 2024-05-01 NOTE — PHYSICAL EXAM
[General Appearance - Alert] : alert [General Appearance - In No Acute Distress] : in no acute distress [Affect] : the affect was normal [Mood] : the mood was normal [] : no respiratory distress [Respiration, Rhythm And Depth] : normal respiratory rhythm and effort [Exaggerated Use Of Accessory Muscles For Inspiration] : no accessory muscle use [Edema] : there was no peripheral edema [FreeTextEntry1] : Awake and alert - oriented to self, place, do not know year and fluent with normal comprehension EOMI Right VF defect UQ more than LQ Face symmetric LOUISE X4 - No drift Bilateral extension tremor. Strong - mild distal right weakness 5-/5 foot and hand On a wheel chair- Gait not assessed at this time

## 2024-05-01 NOTE — DISCUSSION/SUMMARY
[FreeTextEntry1] : Patient seen and examined In brief, Angelita batista is a 62 yo female who was diagnosed with Glioblastoma, WHO 4, IDH wt - MGMT methylated in 10/2022. He had 5 cycles of TMZ ( last dose 6/2023 ) and a total of 6 doses of MVASI( last dose 5/31) - His further treatment got delayed secondary to fall and right ankle fracture - He is in rehab since July - MRI showed mild POD in 10/2023 - Restarting IV Avastin on 11/22- s/p 2 IV Avastin - MRI stable - Had 5 doses of IV AVastin's thus far -- last dose of 2/28- MRI on 3/5/2024- Stable   GLIOMA - Neurologically not worse Continue with IV Avastin - Today he will receive the 6th dose CEREBRAL EDEMA - Taper dexamethasone to 1 mg daily. GI Prophylaxis with pantoprazole.  DVT/PE - Continue Eliquis 5 mg bid  SEIZURES- Continue Keppra 1500 mg bid and Vimpat 200 mg bid GAIT IMBALANCE - c/w PT/ OT DEPRESSION - Patient refuse to follow up with psychiatry - He states he is feeling ok FOLLOW UP - Will do a follow up along with an MRI prior to the infusion in 2 weeks. Johnnie to call to schedule next follow up , infusion MRI date etc. In the interim, if any concerns patient knows to call our office.

## 2024-05-01 NOTE — HISTORY OF PRESENT ILLNESS
[FreeTextEntry1] : Mr. Ramos is being seen in neuro oncologic follow-up.   Mr. 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 He returned on 926 with worsening headache and underwent MRI scan of his brain:  MRI brain 9/27 shows an irregularly enhancing mass in the left temporal-occipital region measuring 2.6 cm transversely and 8.9 cm cranial - caudal with surrounding vasogenic edema.  CT C/A/P 9/26 unremarkable.  Dr. Turpin performed a large resection of this mass on 10/3.  10/3 Pathology - Glioblastoma, WHO 4, IDH wt - MGMT methylated.  Post-operative MRI 10/5: LEFT parietal occipital craniotomy with near complete resection of the of the neoplasm in the LEFT occipital/posterior temporal lobes. Minimal residual neoplasm is seen in the anterior part of the neoplasm, which abuts the ependymal surface of the atrium of the LEFT ventricle. Moderate postsurgical hemorrhage is seen within the surgical cavity. Moderate surrounding vasogenic edema is unchanged with effacement of the posterior horn of the LEFT lateral ventricle. Abnormal signal is also seen within the LEFT cerebral peduncle with a 4 mm focus of central enhancement and 1.6 cm region of rim enhancement, which is suspicious for a secondary focus of neoplasm or extension from the primary along the white matter tracts.  10/25- Had an episode of loss of vision X 15-20 minutes , episode resolved on its own - Restarted Dexamethasone 2 mg daily, Keppra raised to 750 mg bid  11/2/2022- He continues to have headaches even waking him from sleep. Raised dexamethasone to 4 mg daily  11/7/2022 - CHEMORT started  11/14/2022 - Reports when we raised the Dexamethasone on the 2nd , he was not taking the 4 mg daily, after discussing with HCP started on 11/7, headache didn't resolve so on 11/10- Dr Ayala raised to 4 mg bid. Since then he reports no further headaches. Vision has not improved but has not worsened.  12/20/2022 - Completed ChemoRT  12/30/2022 - a friend called him and he was "not making sense.' Friend went to see him and bring him to the hospital - noted shaking of right UE and rigidity. In the ER, he reportedly had a GTC seizures - he was intubated. He was also found to be COVID positive. He was discharged to rehab on 12/29 and then discharged home on 1/18.  2/1/2023- MRI with slight improvement - Tapered Dexamethasone to 4-2 mg  2/6/2023- 2/10- TMZ first cycle  2/15-2/17 ( At [facility] ) and then transferred to [facility] and stayed inpatient from 2/17-2/21/2023 - Admitted sec to seizures, and expressive aphasia. discharged on Keppra 1000 mg bid and Vimpat 200 mg bid  3/7-3/9/2023 - Admitted at [facility] after a mechanical fall, he was found to have a PE bleed and UTI - He was treated with Rocephin for UTI, Started on IV Heparin and then transitioned to Eliquis and then was discharged home  3/15/2023- MRI with POD - Plan was made to continue TMZ and add IV AVastin  3/22/2023- 1st Avastin infusion  3/25/2023 - 3/29/2023 - TMZ second cycle  4/26/2023- Patient had 3 IV Avastin's thus far. MRI stable  5/4 - 5/8/2023- TMZ cycle 3  6/1- 6/4/2023 - TMZ cycle # 4  6/22/2023- Infusion last week cancelled as patient was complaining of abdominal pain. A follow up CT scan was performed and did not reveal any acute pathology. He feels his right sided weakness is getting more weak and reports his right side vision is worse. He had a near syncopal episode while at the clinic - was sent to [facility] 6/28/23-7/2/2023 - TMZ 5th cycle- Patient had 7 DOSES OF IV AVASTINS THUS FAR 7/9-7/12/2023- Patient presented s/p mechanical fall with RT ankle fracture. Seen by ortho, reduced and splinted, non-surgical. Patient is NWB to RLE. Outpatient ortho f/u with DR Hutson in 1 week. Medically stable for transfer to [facility] 10/10/2023 - MRI stable. Plan was made to repeat imaging in 2 months 10/22-10/26/2023- Patient was admitted to [facility] from [facility] for +COVID, vomiting and tachycardia. Upon arrival pt appears to be seizing. Pt on Keppra. EMS reports pt had 5 tonic clonic seizures lasting about 15 seconds each. Pt given Tylenol at 9am. pt seen by neuro and nsx and felt lowered sz threshold in setting of covid. Keppra dose adjusted. worsening of GBM noted on MRI. MRI from 10/24 showed Interval increase in the enhancing neoplasm located in the LEFT occipital and posterior temporal lobes now measuring 6.7 cm AP by 3.7 cm TRV by 3.8 cm cc with extension into the posterior horn of the LEFT lateral ventricle and ependymal spread. Increase in moderate vasogenic edema involving the LEFT occipital, parietal, posterior frontal and posterior temporal lobes. Minimal edema also extends into the LEFT middle cerebellar peduncle and LEFT caren Patient d/cd back to nursing home on 10/26 11/7/2023- He is frustrated with being at the subacute rehab - Johnnie is trying to get him to her house but he needs 24-hour supervision as he can be impulsive. He is tearful and not always participating in therapy - we discussed his mood - he is reluctant to start antidepressants. He is still able to enjoy visits with friends. He has no headaches. Raised Dexamethasone to 4 mg daily 11/22/2023- Here for a follow up prior to IV Avastin.( first dose since restarting)  12/20/23 - Second dose of IV Avastin 12/31-1/8/2024- Admitted at [facility]. Presented to ED on 12/31/23 from [facility] with increased shortness of breath and cough for the past 3 days admitted with acute hypoxemic respiratory failure secondary to influenza virus. Rx with steroids and Tamiflu. CT chest neg. 1/24/2024 - MRI showed Decreased mass effect on the left atria of the lateral compared with the prior 10/24/2023. Extensive postop changes in the left temporal parietal occipital region as seen previously with areas of encephalomalacia and gliosis and findings suspicious for residual neoplasm as well with areas of enhancement as well as increased CBF and CBV on the perfusion imaging. Overall postcontrast appearance is similar to the prior 10/24/2023. Third dose of IV Avastin on this day 2/14/2024- 4th dose of IV Avastin 2/28/2024- 5th dose of IV Avastin 3/7-3/13/2024- Admitted at [facility]. Presents to the ED from [facility] found to have seizure in setting of severe sepsis / UTI. Intubated and admitted to CCU. MRI stable 3/18/2024- Spoke with Johnnie today. Patient was at [place] after a fall. Was discharged on the same day without any acute findings. Since patient back at nursing home , he has been agitated and was refusing to co operate with Johnnie or the other friend who cares for him. Patient refuses to come for follow up. Recommended psychiatry consult. Johnnie said " its most likely impossible to get him to agree to see a psychiatrist nor he will be willing to take any anti anxiety or anti depressants. She is cancelling follow up scheduled with Dr Torres this week. Will call to reschedule. 5/1/2024- Here for a follow up. Currently on Macrobid for UTI. Remains on Dexamethasone 1 mg daily

## 2024-05-01 NOTE — DATA REVIEWED
[de-identified] :  ACC: [id number] EXAM: MR BRAIN WAW IC ORDERED BY: MIREYA ARREOLA  PROCEDURE DATE: 03/11/2024    INTERPRETATION: MR brain with and without gadolinium  CLINICAL INFORMATION: Seizures, follow-up GBM  TECHNIQUE: Sagittal and axial T1-weighted images, axial FLAIR images, axial T2-weighted images, axial gradient echo images and axial diffusion weighted images of the brain were obtained. Following 9.5 cc of Gadavist administration, 5 cc discarded, isotropic volumetric and fast spin echo T1-weighted images were obtained; this data was reformatted using image post processing software in multiple imaging planes.  FINDINGS: MR brain dated 01/24/2024 is available for review.  The brain demonstrates postsurgical changes in the LEFT parieto-occipital region with encephalomalacia, gliosis and subacute hemorrhage. Following gadolinium administration no recurrent enhancing neoplasm is seen. Tiny old cortical infarctions in the BILATERAL frontal lobes. No acute cerebral cortical infarct is found. No acute intracranial hemorrhage is recognized. No mass effect is found in the brain.  The ventricles, sulci and basal cisterns appear otherwise unremarkable.  The vertebral and internal carotid arteries demonstrate expected flow voids indicating their patency.  The orbits are unremarkable. The paranasal sinuses are clear. The nasal cavity appears intact. The nasopharynx is symmetric. The central skull base and petrous temporal bones are intact. The calvarium demonstrates LEFT parietal craniotomy.   IMPRESSION: Post surgical changes in the LEFT parieto-occipital region with encephalomalacia, gliosis and subacute hemorrhage. No recurrent neoplasm is seen. Tiny old cortical infarctions in the BILATERAL frontal lobes.  --- End of Report ---      OLAYINKA MEDELLIN MD; Attending Radiologist This document has been electronically signed. Mar 12 2024 10:02AM

## 2024-05-02 DIAGNOSIS — Z51.11 ENCOUNTER FOR ANTINEOPLASTIC CHEMOTHERAPY: ICD-10-CM

## 2024-05-02 DIAGNOSIS — C71.9 MALIGNANT NEOPLASM OF BRAIN, UNSPECIFIED: ICD-10-CM

## 2024-05-17 ENCOUNTER — NON-APPOINTMENT (OUTPATIENT)
Age: 64
End: 2024-05-17

## 2024-05-30 NOTE — PHYSICAL THERAPY INITIAL EVALUATION ADULT - BED MOBILITY LIMITATIONS, REHAB EVAL
Addended by: CRISTIAN MARTINEZ on: 5/30/2024 02:00 PM     Modules accepted: Orders    
Addended by: CRISTIAN MARTINEZ on: 5/30/2024 02:03 PM     Modules accepted: Level of Service    
decreased ability to use arms for pushing/pulling/decreased ability to use legs for bridging/pushing/impaired ability to control trunk for mobility

## 2024-06-12 ENCOUNTER — OUTPATIENT (OUTPATIENT)
Dept: OUTPATIENT SERVICES | Facility: HOSPITAL | Age: 64
LOS: 1 days | Discharge: HOME | End: 2024-06-12

## 2024-06-12 DIAGNOSIS — G93.9 DISORDER OF BRAIN, UNSPECIFIED: ICD-10-CM

## 2024-06-12 DIAGNOSIS — Z98.890 OTHER SPECIFIED POSTPROCEDURAL STATES: Chronic | ICD-10-CM

## 2024-06-20 ENCOUNTER — RESULT REVIEW (OUTPATIENT)
Age: 64
End: 2024-06-20

## 2024-06-20 ENCOUNTER — APPOINTMENT (OUTPATIENT)
Dept: NEUROLOGY | Facility: CLINIC | Age: 64
End: 2024-06-20
Payer: MEDICARE

## 2024-06-20 ENCOUNTER — OUTPATIENT (OUTPATIENT)
Dept: OUTPATIENT SERVICES | Facility: HOSPITAL | Age: 64
LOS: 1 days | End: 2024-06-20
Payer: MEDICARE

## 2024-06-20 ENCOUNTER — APPOINTMENT (OUTPATIENT)
Dept: INFUSION THERAPY | Facility: HOSPITAL | Age: 64
End: 2024-06-20

## 2024-06-20 ENCOUNTER — APPOINTMENT (OUTPATIENT)
Dept: MRI IMAGING | Facility: CLINIC | Age: 64
End: 2024-06-20
Payer: MEDICARE

## 2024-06-20 ENCOUNTER — APPOINTMENT (OUTPATIENT)
Dept: HEMATOLOGY ONCOLOGY | Facility: CLINIC | Age: 64
End: 2024-06-20

## 2024-06-20 VITALS
TEMPERATURE: 98.4 F | HEART RATE: 83 BPM | SYSTOLIC BLOOD PRESSURE: 119 MMHG | DIASTOLIC BLOOD PRESSURE: 82 MMHG | RESPIRATION RATE: 16 BRPM | OXYGEN SATURATION: 98 %

## 2024-06-20 DIAGNOSIS — Z98.890 OTHER SPECIFIED POSTPROCEDURAL STATES: Chronic | ICD-10-CM

## 2024-06-20 DIAGNOSIS — C71.9 MALIGNANT NEOPLASM OF BRAIN, UNSPECIFIED: ICD-10-CM

## 2024-06-20 LAB
BASOPHILS # BLD AUTO: 0.04 K/UL — SIGNIFICANT CHANGE UP (ref 0–0.2)
BASOPHILS NFR BLD AUTO: 0.3 % — SIGNIFICANT CHANGE UP (ref 0–2)
EOSINOPHIL # BLD AUTO: 0.01 K/UL — SIGNIFICANT CHANGE UP (ref 0–0.5)
EOSINOPHIL NFR BLD AUTO: 0.1 % — SIGNIFICANT CHANGE UP (ref 0–6)
HCT VFR BLD CALC: 47.6 % — SIGNIFICANT CHANGE UP (ref 39–50)
HGB BLD-MCNC: 15.8 G/DL — SIGNIFICANT CHANGE UP (ref 13–17)
IMM GRANULOCYTES NFR BLD AUTO: 0.9 % — SIGNIFICANT CHANGE UP (ref 0–0.9)
LYMPHOCYTES # BLD AUTO: 1.3 K/UL — SIGNIFICANT CHANGE UP (ref 1–3.3)
LYMPHOCYTES # BLD AUTO: 11.1 % — LOW (ref 13–44)
MCHC RBC-ENTMCNC: 29.5 PG — SIGNIFICANT CHANGE UP (ref 27–34)
MCHC RBC-ENTMCNC: 33.2 G/DL — SIGNIFICANT CHANGE UP (ref 32–36)
MCV RBC AUTO: 88.8 FL — SIGNIFICANT CHANGE UP (ref 80–100)
MONOCYTES # BLD AUTO: 0.66 K/UL — SIGNIFICANT CHANGE UP (ref 0–0.9)
MONOCYTES NFR BLD AUTO: 5.6 % — SIGNIFICANT CHANGE UP (ref 2–14)
NEUTROPHILS # BLD AUTO: 9.6 K/UL — HIGH (ref 1.8–7.4)
NEUTROPHILS NFR BLD AUTO: 82 % — HIGH (ref 43–77)
NRBC # BLD: 0 /100 WBCS — SIGNIFICANT CHANGE UP (ref 0–0)
PLATELET # BLD AUTO: 137 K/UL — LOW (ref 150–400)
RBC # BLD: 5.36 M/UL — SIGNIFICANT CHANGE UP (ref 4.2–5.8)
RBC # FLD: 15.2 % — HIGH (ref 10.3–14.5)
WBC # BLD: 11.72 K/UL — HIGH (ref 3.8–10.5)
WBC # FLD AUTO: 11.72 K/UL — HIGH (ref 3.8–10.5)

## 2024-06-20 PROCEDURE — A9585: CPT

## 2024-06-20 PROCEDURE — 99215 OFFICE O/P EST HI 40 MIN: CPT

## 2024-06-20 PROCEDURE — 70553 MRI BRAIN STEM W/O & W/DYE: CPT

## 2024-06-20 PROCEDURE — 70553 MRI BRAIN STEM W/O & W/DYE: CPT | Mod: 26,MH

## 2024-06-20 NOTE — DISCUSSION/SUMMARY
[FreeTextEntry1] : Patient seen and examined In brief, Angelita batista is a 62 yo female who was diagnosed with Glioblastoma, WHO 4, IDH wt - MGMT methylated in 10/2022. He had 5 cycles of TMZ ( last dose 6/2023 ) and a total of 6 doses of MVASI( last dose 5/31) - His further treatment got delayed secondary to fall and right ankle fracture - He is in rehab since July - MRI showed mild POD in 10/2023 - Restarting IV Avastin on 11/22- s/p 2 IV Avastin - MRI stable - Had 5 doses of IV AVastin's thus far -- last dose of 2/28- MRI on 3/5/2024- Stable- 6th dose on 5/1/2024- Here with a new MRI   GLIOMA - Neurologically worse MRI reviewed from this am and I compared it with MRI from 3/24, 1/2024 and to my review increased enhancement and flair changes noted to  left parieto occipital area and also new enhancement noted to brain stem - Official report including perfusion pending CEREBRAL EDEMA - Will raise Dexamethasone to 4mg bid GI Prophylaxis with pantoprazole.  DVT/PE - Continue Eliquis 5 mg bid  SEIZURES- Continue Keppra 1500 mg bid and Vimpat 200 mg bid GAIT IMBALANCE - c/w PT/ OT  FOLLOW UP - Will schedule IV Avastin for 7/10 and 7/24, follow up prior both infusions.  In the interim, if any concerns patient knows to call our office. Had meeting with Johnnie - HCP regarding need for regular treatment to see any sustained benefit.

## 2024-06-20 NOTE — PHYSICAL EXAM
[General Appearance - Alert] : alert [General Appearance - In No Acute Distress] : in no acute distress [] : no respiratory distress [Respiration, Rhythm And Depth] : normal respiratory rhythm and effort [Exaggerated Use Of Accessory Muscles For Inspiration] : no accessory muscle use [Edema] : there was no peripheral edema [FreeTextEntry1] : Multiple ecchymotic areas

## 2024-06-20 NOTE — HISTORY OF PRESENT ILLNESS
[FreeTextEntry1] : Mr. Ramos is being seen in neuro oncologic follow-up.   Mr. 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 He returned on 926 with worsening headache and underwent MRI scan of his brain:  MRI brain 9/27 shows an irregularly enhancing mass in the left temporal-occipital region measuring 2.6 cm transversely and 8.9 cm cranial - caudal with surrounding vasogenic edema.  CT C/A/P 9/26 unremarkable.  Dr. Turpin performed a large resection of this mass on 10/3.  10/3 Pathology - Glioblastoma, WHO 4, IDH wt - MGMT methylated.  Post-operative MRI 10/5: LEFT parietal occipital craniotomy with near complete resection of the of the neoplasm in the LEFT occipital/posterior temporal lobes. Minimal residual neoplasm is seen in the anterior part of the neoplasm, which abuts the ependymal surface of the atrium of the LEFT ventricle. Moderate postsurgical hemorrhage is seen within the surgical cavity. Moderate surrounding vasogenic edema is unchanged with effacement of the posterior horn of the LEFT lateral ventricle. Abnormal signal is also seen within the LEFT cerebral peduncle with a 4 mm focus of central enhancement and 1.6 cm region of rim enhancement, which is suspicious for a secondary focus of neoplasm or extension from the primary along the white matter tracts.  10/25- Had an episode of loss of vision X 15-20 minutes , episode resolved on its own - Restarted Dexamethasone 2 mg daily, Keppra raised to 750 mg bid  11/2/2022- He continues to have headaches even waking him from sleep. Raised dexamethasone to 4 mg daily  11/7/2022 - CHEMORT started  11/14/2022 - Reports when we raised the Dexamethasone on the 2nd , he was not taking the 4 mg daily, after discussing with HCP started on 11/7, headache didn't resolve so on 11/10- Dr Ayala raised to 4 mg bid. Since then he reports no further headaches. Vision has not improved but has not worsened.  12/20/2022 - Completed ChemoRT  12/30/2022 - a friend called him and he was "not making sense.' Friend went to see him and bring him to the hospital - noted shaking of right UE and rigidity. In the ER, he reportedly had a GTC seizures - he was intubated. He was also found to be COVID positive. He was discharged to rehab on 12/29 and then discharged home on 1/18.  2/1/2023- MRI with slight improvement - Tapered Dexamethasone to 4-2 mg  2/6/2023- 2/10- TMZ first cycle  2/15-2/17 ( At [facility] ) and then transferred to [facility] and stayed inpatient from 2/17-2/21/2023 - Admitted sec to seizures, and expressive aphasia. discharged on Keppra 1000 mg bid and Vimpat 200 mg bid  3/7-3/9/2023 - Admitted at [facility] after a mechanical fall, he was found to have a PE bleed and UTI - He was treated with Rocephin for UTI, Started on IV Heparin and then transitioned to Eliquis and then was discharged home  3/15/2023- MRI with POD - Plan was made to continue TMZ and add IV AVastin  3/22/2023- 1st Avastin infusion  3/25/2023 - 3/29/2023 - TMZ second cycle  4/26/2023- Patient had 3 IV Avastin's thus far. MRI stable  5/4 - 5/8/2023- TMZ cycle 3  6/1- 6/4/2023 - TMZ cycle # 4  6/22/2023- Infusion last week cancelled as patient was complaining of abdominal pain. A follow up CT scan was performed and did not reveal any acute pathology. He feels his right sided weakness is getting more weak and reports his right side vision is worse. He had a near syncopal episode while at the clinic - was sent to [facility] 6/28/23-7/2/2023 - TMZ 5th cycle- Patient had 7 DOSES OF IV AVASTINS THUS FAR 7/9-7/12/2023- Patient presented s/p mechanical fall with RT ankle fracture. Seen by ortho, reduced and splinted, non-surgical. Patient is NWB to RLE. Outpatient ortho f/u with DR Hutson in 1 week. Medically stable for transfer to [facility] 10/10/2023 - MRI stable. Plan was made to repeat imaging in 2 months 10/22-10/26/2023- Patient was admitted to [facility] from [facility] for +COVID, vomiting and tachycardia. Upon arrival pt appears to be seizing. Pt on Keppra. EMS reports pt had 5 tonic clonic seizures lasting about 15 seconds each. Pt given Tylenol at 9am. pt seen by neuro and nsx and felt lowered sz threshold in setting of covid. Keppra dose adjusted. worsening of GBM noted on MRI. MRI from 10/24 showed Interval increase in the enhancing neoplasm located in the LEFT occipital and posterior temporal lobes now measuring 6.7 cm AP by 3.7 cm TRV by 3.8 cm cc with extension into the posterior horn of the LEFT lateral ventricle and ependymal spread. Increase in moderate vasogenic edema involving the LEFT occipital, parietal, posterior frontal and posterior temporal lobes. Minimal edema also extends into the LEFT middle cerebellar peduncle and LEFT caren Patient d/cd back to nursing home on 10/26 11/7/2023- He is frustrated with being at the subacute rehab - Johnnie is trying to get him to her house but he needs 24-hour supervision as he can be impulsive. He is tearful and not always participating in therapy - we discussed his mood - he is reluctant to start antidepressants. He is still able to enjoy visits with friends. He has no headaches. Raised Dexamethasone to 4 mg daily 11/22/2023- Here for a follow up prior to IV Avastin.( first dose since restarting)  12/20/23 - Second dose of IV Avastin 12/31-1/8/2024- Admitted at [facility]. Presented to ED on 12/31/23 from [facility] with increased shortness of breath and cough for the past 3 days admitted with acute hypoxemic respiratory failure secondary to influenza virus. Rx with steroids and Tamiflu. CT chest neg. 1/24/2024 - MRI showed Decreased mass effect on the left atria of the lateral compared with the prior 10/24/2023. Extensive postop changes in the left temporal parietal occipital region as seen previously with areas of encephalomalacia and gliosis and findings suspicious for residual neoplasm as well with areas of enhancement as well as increased CBF and CBV on the perfusion imaging. Overall postcontrast appearance is similar to the prior 10/24/2023. Third dose of IV Avastin on this day 2/14/2024- 4th dose of IV Avastin 2/28/2024- 5th dose of IV Avastin 3/7-3/13/2024- Admitted at [facility]. Presents to the ED from [facility] unresponsive found to have seizure in setting of severe sepsis / UTI. Intubated and admitted to CCU. MRI stable 3/18/2024- Spoke with Johnnie today. Patient was at [place] [facility] after a fall. Was discharged on the same day without any acute findings. Since patient back at nursing home , he has been agitated and was refusing to co operate with Johnnie or the other friend who cares for him. Patient refuses to come for follow up. Recommended psychiatry consult. Johnnie said " its most likely impossible to get him to agree to see a psychiatrist nor he will be willing to take any anti anxiety or anti depressants. She is cancelling follow up scheduled with Dr Torres this week. Will call to reschedule. 5/1/2024- 6th dose of IV Avastin. Recommended to take Dexamethasone 1 mg daily- Plan was made to repeat MRI in 2 weeks  6/20/2024- Patient here for a follow up along with a new MRI. Johnnie reports patient is having increased trouble talking, swallowing difficulty,  right LE and UE weakness. Patient reports he sleeps a lot. Remains on Dexamethasone 2 mg daily Denies headaches,

## 2024-06-21 DIAGNOSIS — C71.3 MALIGNANT NEOPLASM OF PARIETAL LOBE: ICD-10-CM

## 2024-06-21 DIAGNOSIS — Z51.11 ENCOUNTER FOR ANTINEOPLASTIC CHEMOTHERAPY: ICD-10-CM

## 2024-06-21 LAB
ALBUMIN SERPL ELPH-MCNC: 3.8 G/DL — SIGNIFICANT CHANGE UP (ref 3.3–5)
ALP SERPL-CCNC: 48 U/L — SIGNIFICANT CHANGE UP (ref 40–120)
ALT FLD-CCNC: 41 U/L — SIGNIFICANT CHANGE UP (ref 10–45)
ANION GAP SERPL CALC-SCNC: 20 MMOL/L — HIGH (ref 5–17)
AST SERPL-CCNC: 29 U/L — SIGNIFICANT CHANGE UP (ref 10–40)
BILIRUB SERPL-MCNC: 0.4 MG/DL — SIGNIFICANT CHANGE UP (ref 0.2–1.2)
BUN SERPL-MCNC: 18 MG/DL — SIGNIFICANT CHANGE UP (ref 7–23)
CALCIUM SERPL-MCNC: 9.6 MG/DL — SIGNIFICANT CHANGE UP (ref 8.4–10.5)
CHLORIDE SERPL-SCNC: 100 MMOL/L — SIGNIFICANT CHANGE UP (ref 96–108)
CO2 SERPL-SCNC: 21 MMOL/L — LOW (ref 22–31)
CREAT SERPL-MCNC: 0.76 MG/DL — SIGNIFICANT CHANGE UP (ref 0.5–1.3)
EGFR: 99 ML/MIN/1.73M2 — SIGNIFICANT CHANGE UP
GLUCOSE SERPL-MCNC: 78 MG/DL — SIGNIFICANT CHANGE UP (ref 70–99)
POTASSIUM SERPL-MCNC: 3.8 MMOL/L — SIGNIFICANT CHANGE UP (ref 3.5–5.3)
POTASSIUM SERPL-SCNC: 3.8 MMOL/L — SIGNIFICANT CHANGE UP (ref 3.5–5.3)
PROT SERPL-MCNC: 6.8 G/DL — SIGNIFICANT CHANGE UP (ref 6–8.3)
SODIUM SERPL-SCNC: 141 MMOL/L — SIGNIFICANT CHANGE UP (ref 135–145)

## 2024-07-10 ENCOUNTER — APPOINTMENT (OUTPATIENT)
Dept: HEMATOLOGY ONCOLOGY | Facility: CLINIC | Age: 64
End: 2024-07-10

## 2024-07-10 ENCOUNTER — APPOINTMENT (OUTPATIENT)
Dept: INFUSION THERAPY | Facility: HOSPITAL | Age: 64
End: 2024-07-10

## 2024-07-10 ENCOUNTER — APPOINTMENT (OUTPATIENT)
Dept: NEUROLOGY | Facility: CLINIC | Age: 64
End: 2024-07-10

## 2024-07-11 NOTE — BH CONSULTATION LIAISON ASSESSMENT NOTE - HPI (INCLUDE ILLNESS QUALITY, SEVERITY, DURATION, TIMING, CONTEXT, MODIFYING FACTORS, ASSOCIATED SIGNS AND SYMPTOMS)
62 year old male with PMH of GBM s/p resection in 10/22 on Keppra 750 bid followed by Dr. Turpin [facility] change in mental status. Further history provided by HCP friend at bedside. Per HCP, she spoke to pt on the phone at 1230 on day of admit and he was not making sense. While en route to the hospital had witnessed RUE twitching and rigid lasting around 2 min.  Pt given decadron 8mg PO by HCP as per radiooncology PA recommendations over the phone en route, in ED found to have witnessed seizure, intubated for airway protection and admitted to NSICU. Patient was on VEEG and AEDs were optimized in NSCU. Patient had MRI brain and MR SPECT on 12/22, which showed suspicious for progressive neoplasm in the left parietal postoperative bed compared with 10/5/2022 with MR spectroscopy. Tiny focus of neoplasm also likely in the left brainstem, unchanged. Patient was extubated on 12/22. Patient was found to be positive for COVID upon admission and isolated per protocol. Patient has been asymptomatic during this admission and repeat COVID tests on 12/28 and 12/29 were negative. Hospital course was also complicated by agitation, which resolved with standing haldol. He was evaluated for admission to acute inpatient rehab and admitted to [facility] on 12/29/22. psychiatry asked to evaluate for agitation, capacity to make decision re D/C . PERRL/EOMI/conjunctiva clear/normal

## 2024-07-18 ENCOUNTER — NON-APPOINTMENT (OUTPATIENT)
Age: 64
End: 2024-07-18

## 2024-07-24 ENCOUNTER — APPOINTMENT (OUTPATIENT)
Dept: INFUSION THERAPY | Facility: HOSPITAL | Age: 64
End: 2024-07-24

## 2024-07-24 ENCOUNTER — APPOINTMENT (OUTPATIENT)
Dept: HEMATOLOGY ONCOLOGY | Facility: CLINIC | Age: 64
End: 2024-07-24

## 2024-07-24 ENCOUNTER — APPOINTMENT (OUTPATIENT)
Dept: NEUROLOGY | Facility: CLINIC | Age: 64
End: 2024-07-24

## 2024-09-24 NOTE — PATIENT PROFILE ADULT - OVER THE PAST TWO WEEKS, HAVE YOU FELT LITTLE INTEREST OR PLEASURE IN DOING THINGS?
yes Obtain Level of Care/Service Not Available at this Facility/Worsening of Condition, Death, or Disability if Patient Does Not Transfer/Continuity of Care at Other Facility

## 2024-10-26 NOTE — DISCHARGE NOTE NURSING/CASE MANAGEMENT/SOCIAL WORK - NSDCPEELIQUISREACT_GEN_ALL_CORE
Vent alarming, pt taken off vent and bagged. Pt spo2 in the 60s. Bagged pt back into the 90s. Placed pt back on vent. Pt has been bagged a total of 5 times today between RN and RT.    Apixaban/Eliquis increases your risk for bleeding. Notify your doctor if you experience any of the following side effects: bleeding, coughing or vomiting blood, red or black stool, unexpected pain or swelling, itching or hives, chest pain, chest tightness, trouble breathing, changes in how much or how often you urinate, red or pink urine, numbness or tingling in your feet, or unusual muscle weakness. When Apixaban/Eliquis is taken with other medicines, they can affect how it works. Taking other medications such as aspirin, blood thinners, nonsteroidal anti-inflammatories, and medications that treat depression can increase your risk of bleeding. It is very important to tell your health care provider about all of the other medicines, including over-the-counter medications, herbs, and vitamins you are taking. DO NOT start, stop, or change the dosage of any medicine, including over-the-counter medicines, vitamins, and herbal products without your doctor’s approval. Any products containing aspirin or are nonsteroidal anti-inflammatories lessen the blood’s ability to form clots and add to the effect of Apixaban/Eliquis. Never take aspirin or medicines that contain aspirin without speaking to your doctor.

## 2024-11-06 NOTE — DISCHARGE NOTE NURSING/CASE MANAGEMENT/SOCIAL WORK - NSDCFUADDAPPT_GEN_ALL_CORE_FT
Ramón Cosby  [facility]  [address]  Scheduled Appointment: 12/09/2024    
Please call Ozzy to update PCP information. [facility]:  Ph:[phone]  Address: [address]  [address], [place] [address]

## 2025-01-26 NOTE — SWALLOW BEDSIDE ASSESSMENT ADULT - SWALLOW EVAL: FEEDING ASSISTANCE
PT WAS ABLE TO FEED SELF WHEN IN AN ALERT CALM STATE. WITH THAT BEING STATED, THE PT WAS VARIABLY OPPOSITIONAL ON EXAM. None

## 2025-02-03 NOTE — ED ADULT TRIAGE NOTE - ARRIVAL FROM
She is having terrible back pain again and is requesting a Prednisone taper. Can you do this? [facility] in [place]  
Home

## 2025-07-15 NOTE — PATIENT PROFILE ADULT - CONTRAINDICATIONS & PRECAUTIONS (SELECT ALL THAT APPLY)
This encounter is being sent to inform the clinic that this patient has a referral from Tanika Clark MD for the diagnoses of  Chest pain, unspecified type [R07.9]; Elevated troponin [R79.89]  and has requested that this patient be seen within 3-5 days and/or with any provider.  Based on the availability of our provider(s), we are unable to accommodate this request.    Were all sites offered this patient?  Yes    Does scheduling algorithm request to schedule next available?  Patient has been scheduled for the first available opening with Dr. Sandoval on 11/5/25.  We have informed the patient that the clinic will review their referral and reach out if a sooner appointment is medically necessary.      Per new referral, patient needs appointment in 3-5 days. Please assist her for a sooner appointment if possible. Thanks   Patient/surrogate refused vaccine...

## 2025-07-18 NOTE — DISCHARGE NOTE PROVIDER - PROVIDER TOKENS
PROVIDER:[TOKEN:[[id number]]],PROVIDER:[TOKEN:[[id number]]],PROVIDER:[TOKEN:[[id number]]] No

## (undated) DEVICE — GLV 8 PROTEXIS (WHITE)

## (undated) DEVICE — DRSG CURITY GAUZE SPONGE 4 X 4" 12-PLY

## (undated) DEVICE — DRSG TELFA .5 X 3

## (undated) DEVICE — DRAPE MAYO STAND 30"

## (undated) DEVICE — SOL IRR POUR NS 0.9% 500ML

## (undated) DEVICE — VENODYNE/SCD SLEEVE CALF LARGE

## (undated) DEVICE — Device

## (undated) DEVICE — AESCULAP SCALPFIX 10 CLIPS

## (undated) DEVICE — ELCTR BOVIE TIP BLADE INSULATED 2.75" EDGE

## (undated) DEVICE — MARKING PEN W RULER

## (undated) DEVICE — ELCTR 4-DISC 20MM 49" (RED, BLUE, GREEN, BLACK)

## (undated) DEVICE — ELCTR SUBDERMAL NDL CLASSIC 1.5M X 59" (6 COLOR)

## (undated) DEVICE — SUT VICRYL 3-0 18" CP-2 UNDYED (POP-OFF)

## (undated) DEVICE — SUT SOFSILK 4-0 18" CV-23

## (undated) DEVICE — SUT VICRYL 2-0 18" CP-2 UNDYED (POP-OFF)

## (undated) DEVICE — ELCTR SUBDERMAL NDL 27G X 1/2" WITH TWISTED PAIR

## (undated) DEVICE — RUBBER BANDS STERILE

## (undated) DEVICE — DRSG TELFA .25 X 3

## (undated) DEVICE — MEDICATION LABELS W MARKER

## (undated) DEVICE — DRSG MASTISOL

## (undated) DEVICE — SPECIMEN CONTAINER 100ML

## (undated) DEVICE — DRSG OPSITE 13.75 X 4"

## (undated) DEVICE — POSITIONER FOAM HEADREST (PINK)

## (undated) DEVICE — DRAIN JACKSON PRATT 3 SPRING RESERVOIR W 10FR PVC DRAIN

## (undated) DEVICE — POSITIONER FOAM EGG CRATE ULNAR 2PCS (PINK)

## (undated) DEVICE — SOL IRR POUR H2O 250ML

## (undated) DEVICE — ELCTR SUBDERMAL CORKSCREW NDL 1.2MM

## (undated) DEVICE — TUBING BIPOLAR IRRIGATOR AND CORD SET

## (undated) DEVICE — WARMING BLANKET FULL ADULT

## (undated) DEVICE — BLADE SCALPEL SAFETYLOCK #15

## (undated) DEVICE — DRAPE SURGICAL #1010

## (undated) DEVICE — NDL HYPO SAFE 22G X 1.5" (BLACK)

## (undated) DEVICE — DRSG XEROFORM 1 X 8"

## (undated) DEVICE — SUT SOFSILK 0 30" V-20

## (undated) DEVICE — GLV 7.5 PROTEXIS (WHITE)

## (undated) DEVICE — SUT ETHILON 3-0 18" FS-1

## (undated) DEVICE — DRAPE TOWEL BLUE 17" X 24"

## (undated) DEVICE — DRAPE CAMERA VIDEO 7"X96"

## (undated) DEVICE — DRAPE 1/2 SHEET 40X57"

## (undated) DEVICE — CODMAN PERFORATOR 14MM (BLUE)

## (undated) DEVICE — SUT NUROLON 4-0 8-18" TF (POP-OFF)

## (undated) DEVICE — MIDAS REX MR8 TAPERED SM BORE 2.3MM X 7CM FOOTED

## (undated) DEVICE — TUBING SUCTION 20FT

## (undated) DEVICE — STRYKER SONOPET IQ TUBING SET

## (undated) DEVICE — GLV 7 PROTEXIS (WHITE)

## (undated) DEVICE — FOLEY TRAY 16FR LF URINE METER SURESTEP

## (undated) DEVICE — MIDAS REX MR7 LUBRICANT DIFFUSER CARTRIDGE

## (undated) DEVICE — DRAPE 3/4 SHEET W REINFORCEMENT 56X77"

## (undated) DEVICE — PREP BETADINE KIT

## (undated) DEVICE — SUCTION YANKAUER NO CONTROL VENT